# Patient Record
Sex: MALE | Race: WHITE | Employment: FULL TIME | URBAN - METROPOLITAN AREA
[De-identification: names, ages, dates, MRNs, and addresses within clinical notes are randomized per-mention and may not be internally consistent; named-entity substitution may affect disease eponyms.]

---

## 2017-02-06 ENCOUNTER — OFFICE VISIT (OUTPATIENT)
Dept: FAMILY MEDICINE CLINIC | Facility: CLINIC | Age: 42
End: 2017-02-06

## 2017-02-06 VITALS
OXYGEN SATURATION: 96 % | RESPIRATION RATE: 16 BRPM | WEIGHT: 221.4 LBS | SYSTOLIC BLOOD PRESSURE: 128 MMHG | HEART RATE: 94 BPM | BODY MASS INDEX: 31.7 KG/M2 | TEMPERATURE: 98.1 F | DIASTOLIC BLOOD PRESSURE: 88 MMHG | HEIGHT: 70 IN

## 2017-02-06 DIAGNOSIS — R68.89 FLU-LIKE SYMPTOMS: ICD-10-CM

## 2017-02-06 DIAGNOSIS — J11.1 INFLUENZA: Primary | ICD-10-CM

## 2017-02-06 LAB
FLUAV+FLUBV AG NOSE QL IA.RAPID: NEGATIVE POS/NEG
FLUAV+FLUBV AG NOSE QL IA.RAPID: NEGATIVE POS/NEG
VALID INTERNAL CONTROL?: YES

## 2017-02-06 RX ORDER — BENZONATATE 200 MG/1
200 CAPSULE ORAL
Qty: 21 CAP | Refills: 0 | Status: SHIPPED | OUTPATIENT
Start: 2017-02-06 | End: 2017-02-13

## 2017-02-06 RX ORDER — OSELTAMIVIR PHOSPHATE 75 MG/1
75 CAPSULE ORAL 2 TIMES DAILY
Qty: 10 CAP | Refills: 0 | Status: SHIPPED | OUTPATIENT
Start: 2017-02-06 | End: 2017-02-11

## 2017-02-06 NOTE — PROGRESS NOTES
SUBJECTIVE:  Kareen Zamora is a 42y.o. year old male   Chief Complaint   Patient presents with    Nasal Congestion     Ear Pain, chills, fever, bodyaches       History of Present Illness: For the last 3 days, he has had chills and body-ache, nasal congestion, cough and bilateral earache. Today, he had fever up to 102 and more severe bodyaches. Cough is making his chest sore. No dyspnea. No sore throat. He took a Tylenol #3 and his body ache/fever improved. He requests refill of Tylenol#3. Past Medical History   Diagnosis Date    Depression     Diabetes ([facility] [address]     Herniated lumbar intervertebral disc     Hypertension     Neurological disorder L3,4,5 torn Herniated disc     Past Surgical History   Procedure Laterality Date    Hx orthopaedic  trigger finger release        Current Outpatient Prescriptions   Medication Sig    atenolol (TENORMIN) 100 mg tablet Take 1 Tab by mouth two (2) times a day.  sertraline (ZOLOFT) 100 mg tablet Take 100 mg by mouth daily.  metFORMIN (GLUCOPHAGE) 1,000 mg tablet Take 500 mg by mouth three (3) times daily. No current facility-administered medications for this visit. No Known Allergies a      Review of Systems:   Ear/Nose/Throat: No throat/ sinus pain, lesions, unusual discharge, new speaking or hearing problems, nose bleed. Skin: no rash  Eyes: No eye discomfort, discharge, redness, new visual changes. Cardiovascular: No angina, palpitations, PND, orthopnea, lightheadedness, edema, claudication. Respiratory: No dyspnea, wheeze, pleurisy, hemoptysis, unusual sputum. Gastrointestinal: No nausea/ vomiting, bowel habit change, pain, YAJAIRA symptoms, melena, hematochezia, anorexia. Musculoskeletal: No other acute complaints  Psychiatric: No agitation, confusion/disorientation, suicidal or homicidal ideation. OBJECTIVE:  Physical Exam:   Constitutional: General Appearance:  well developed, obese, nontoxic, in no acute distress.    Visit Vitals    /88 (BP 1 Location: Left arm, BP Patient Position: Sitting)    Pulse 94    Temp 98.1 °F (36.7 °C) (Oral)    Resp 16    Ht 5' 10\" (1.778 m)    Wt 221 lb 6.4 oz (100.4 kg)    SpO2 96%    BMI 31.77 kg/m2     Otoscopic Examination: external auditory canals are clear; tympanic membranes are dull. Nasal Cavity: mildly swollen mucosa & turbinates. Maxillas are not tender w/o redness or heat. Throat: clear tonsils, oropharynx, posterior pharynx. Nodes[de-identified] Cervical: no significant adenopathy. Skin: no acute rash. Pulmonary: Respiratory effort: normal; no dyspnea, no retractions, no accessory muscle use. Auscultation: normal & symmetrical air exchange; no rales, no rhonchi, no wheeze; no rubs    Cardiovascular: Palpation: PMI not displaced or enlarged, no thrills or heaves. Auscultation: RRR; no murmur, rubs or gallops. Extremities: no edema, no active varicosity. GI[de-identified] Normal bowel sounds. No masses; no tenderness; no rebound/rigidity; no CVA tenderness. No hepatosplenomegaly. Psychiatric: Oriented to time, place and person. Musculoskeletal: NC/AT. Neck-supple. Rapid Flu- negative. ASSESSMENT:     1. Influenza    2. Flu-like symptoms    Clinically the patient has influenza. PLAN:     Pharmacologic Management: Medications reviewed with the patient. Tamiflu 75 bid x 5 days. Tessalon 200 tid prn cough. Tylenol prn fever and malaise. [facility] states \"Please note that this person has received controlled substances prescriptions written by 3 prescribers and had them filled at 4 pharmacies during the past 3 months. This equals or exceeds the threshold of 3 prescribers and 3 pharmacies and while there may be a valid reason for this, it also may be indicative of the practice of prescriber and pharmacy shopping. \". This is discussed with the patient and advised. Unable to provide with Tylenol #3 at this time.     Orders Placed This Encounter    XR CHEST PA LAT    CBC WITH AUTOMATED DIFF    AMB POC CHRIS INFLUENZA A/B TEST    oseltamivir (TAMIFLU) 75 mg capsule    benzonatate (TESSALON) 200 mg capsule      Other Instructions: Will get CBC and CXR since rapid influenza test were negative. Discussed DDx, follow-up & work-up. Discussed risk/benefit & side effect of treatment. Follow up for regular OV, prn sooner. PRN to ER. Rest and push fluids. Health risk from non adherence discussed. Patient voiced understanding. Follow-up Disposition:  Return if symptoms worsen or fail to improve.     Preston Stock MD

## 2017-02-16 ENCOUNTER — HOSPITAL ENCOUNTER (OUTPATIENT)
Dept: LAB | Age: 42
Discharge: HOME | End: 2017-02-16
Payer: MEDICAID

## 2017-02-16 ENCOUNTER — OFFICE VISIT (OUTPATIENT)
Dept: INTERNAL MEDICINE CLINIC | Age: 42
End: 2017-02-16

## 2017-02-16 ENCOUNTER — TELEPHONE (OUTPATIENT)
Dept: INTERNAL MEDICINE CLINIC | Age: 42
End: 2017-02-16

## 2017-02-16 VITALS
DIASTOLIC BLOOD PRESSURE: 94 MMHG | TEMPERATURE: 97.2 F | SYSTOLIC BLOOD PRESSURE: 127 MMHG | RESPIRATION RATE: 16 BRPM | HEART RATE: 106 BPM | WEIGHT: 223 LBS | BODY MASS INDEX: 31.92 KG/M2 | HEIGHT: 70 IN

## 2017-02-16 DIAGNOSIS — I10 ESSENTIAL HYPERTENSION: ICD-10-CM

## 2017-02-16 DIAGNOSIS — R52 BODY ACHES: ICD-10-CM

## 2017-02-16 DIAGNOSIS — J02.9 SORE THROAT: ICD-10-CM

## 2017-02-16 DIAGNOSIS — Z76.0 MEDICATION REFILL: ICD-10-CM

## 2017-02-16 DIAGNOSIS — J02.9 SORE THROAT: Primary | ICD-10-CM

## 2017-02-16 LAB
BASOPHILS # BLD AUTO: 0 K/UL (ref 0–0.06)
BASOPHILS # BLD: 1 % (ref 0–2)
DIFFERENTIAL METHOD BLD: NORMAL
EOSINOPHIL # BLD: 0.1 K/UL (ref 0–0.4)
EOSINOPHIL NFR BLD: 1 % (ref 0–5)
ERYTHROCYTE [DISTWIDTH] IN BLOOD BY AUTOMATED COUNT: 14 % (ref 11.6–14.5)
HCT VFR BLD AUTO: 42.5 % (ref 36–48)
HGB BLD-MCNC: 14.2 G/DL (ref 13–16)
LYMPHOCYTES # BLD AUTO: 26 % (ref 21–52)
LYMPHOCYTES # BLD: 1.5 K/UL (ref 0.9–3.6)
MCH RBC QN AUTO: 28.9 PG (ref 24–34)
MCHC RBC AUTO-ENTMCNC: 33.4 G/DL (ref 31–37)
MCV RBC AUTO: 86.6 FL (ref 74–97)
MONOCYTES # BLD: 0.4 K/UL (ref 0.05–1.2)
MONOCYTES NFR BLD AUTO: 6 % (ref 3–10)
NEUTS SEG # BLD: 3.9 K/UL (ref 1.8–8)
NEUTS SEG NFR BLD AUTO: 66 % (ref 40–73)
PLATELET # BLD AUTO: 262 K/UL (ref 135–420)
PMV BLD AUTO: 10.4 FL (ref 9.2–11.8)
RBC # BLD AUTO: 4.91 M/UL (ref 4.7–5.5)
S PYO AG THROAT QL: NEGATIVE
VALID INTERNAL CONTROL?: YES
WBC # BLD AUTO: 6 K/UL (ref 4.6–13.2)

## 2017-02-16 PROCEDURE — 36415 COLL VENOUS BLD VENIPUNCTURE: CPT | Performed by: NURSE PRACTITIONER

## 2017-02-16 PROCEDURE — 87077 CULTURE AEROBIC IDENTIFY: CPT | Performed by: NURSE PRACTITIONER

## 2017-02-16 PROCEDURE — 86308 HETEROPHILE ANTIBODY SCREEN: CPT | Performed by: NURSE PRACTITIONER

## 2017-02-16 PROCEDURE — 86664 EPSTEIN-BARR NUCLEAR ANTIGEN: CPT | Performed by: NURSE PRACTITIONER

## 2017-02-16 PROCEDURE — 85025 COMPLETE CBC W/AUTO DIFF WBC: CPT | Performed by: NURSE PRACTITIONER

## 2017-02-16 PROCEDURE — 87070 CULTURE OTHR SPECIMN AEROBIC: CPT | Performed by: NURSE PRACTITIONER

## 2017-02-16 RX ORDER — IBUPROFEN 800 MG/1
800 TABLET ORAL
Qty: 20 TAB | Refills: 0 | Status: SHIPPED | OUTPATIENT
Start: 2017-02-16 | End: 2017-03-03 | Stop reason: ALTCHOICE

## 2017-02-16 RX ORDER — ATENOLOL 100 MG/1
100 TABLET ORAL 2 TIMES DAILY
Qty: 60 TAB | Refills: 0 | Status: SHIPPED | OUTPATIENT
Start: 2017-02-16 | End: 2017-03-20 | Stop reason: SDUPTHER

## 2017-02-16 NOTE — MR AVS SNAPSHOT
Visit Information Date & Time Provider Department Dept. Phone Encounter #  
 2/16/2017 10:45 AM Radha Nath NP [facility] [phone] [id number] Upcoming Health Maintenance Date Due  
 EYE EXAM RETINAL OR DILATED Q1 12/28/1984 Pneumococcal 19-64 Highest Risk (1 of 3 - PCV13) 12/28/1993 DTaP/Tdap/Td series (1 - Tdap) 12/28/1995 HEMOGLOBIN A1C Q6M 11/4/2015 MICROALBUMIN Q1 7/7/2016 LIPID PANEL Q1 7/7/2016 FOOT EXAM Q1 4/27/2017 Allergies as of 2/16/2017  Review Complete On: 2/16/2017 By: Radha Nath NP No Known Allergies Current Immunizations  Reviewed on 9/2/2015 Name Date Influenza Vaccine (Quad) PF 9/2/2015 Influenza Vaccine Whole 12/1/2009 Not reviewed this visit You Were Diagnosed With   
  
 Codes Comments Sore throat    -  Primary ICD-10-CM: J02.9 ICD-9-CM: 704 Body aches     ICD-10-CM: R52 ICD-9-CM: 780.96 Essential hypertension     ICD-10-CM: I10 
ICD-9-CM: 401.9 Medication refill     ICD-10-CM: Z76.0 ICD-9-CM: V68.1 Vitals BP Pulse Temp Resp Height(growth percentile) Weight(growth percentile) (!) 127/94 (BP 1 Location: Left arm, BP Patient Position: Sitting) (!) 106 97.2 °F (36.2 °C) (Oral) 16 5' 10\" (1.778 m) 223 lb (101.2 kg) BMI Smoking Status 32 kg/m2 Never Smoker BMI and BSA Data Body Mass Index Body Surface Area 32 kg/m 2 2.24 m 2 Preferred Pharmacy Pharmacy Name Phone [facility] [address] [place] [address] [address] [phone] Your Updated Medication List  
  
   
This list is accurate as of: 2/16/17 11:43 AM.  Always use your most recent med list.  
  
  
  
  
 atenolol 100 mg tablet Commonly known as:  TENORMIN Take 1 Tab by mouth two (2) times a day. ibuprofen 800 mg tablet Commonly known as:  MOTRIN  
 Take 1 Tab by mouth every six (6) hours as needed for Pain.  
  
 magic mouthwash solution Take 10 mL by mouth two (2) times daily as needed for Stomatitis. Magic mouth wash  Maalox Lidocaine 2% viscous  Diphenhydramine oral solution   Pharmacy to mix equal portions of ingredients to a total volume as indicated in the dispense amount. metFORMIN 1,000 mg tablet Commonly known as:  GLUCOPHAGE Take 500 mg by mouth three (3) times daily. ZOLOFT 100 mg tablet Generic drug:  sertraline Take 100 mg by mouth daily. Prescriptions Printed Refills  
 magic mouthwash solution 0 Sig: Take 10 mL by mouth two (2) times daily as needed for Stomatitis. Magic mouth wash Maalox Lidocaine 2% viscous Diphenhydramine oral solution Pharmacy to mix equal portions of ingredients to a total volume as indicated in the dispense amount. Class: Print Route: Oral  
  
Prescriptions Sent to Pharmacy Refills  
 ibuprofen (MOTRIN) 800 mg tablet 0 Sig: Take 1 Tab by mouth every six (6) hours as needed for Pain. Class: Normal  
 Pharmacy: [address] Ph #: [phone] Route: Oral  
 atenolol (TENORMIN) 100 mg tablet 0 Sig: Take 1 Tab by mouth two (2) times a day. Class: Normal  
 Pharmacy: [address] Ph #: [phone] Route: Oral  
  
We Performed the Following AMB POC RAPID STREP A [31350 CPT(R)] To-Do List   
 02/16/2017 Lab:  CBC WITH AUTOMATED DIFF   
  
 02/16/2017 Lab:  MONO SCREEN W/ REFLX EBV Patient Instructions Sore Throat: Care Instructions Your Care Instructions Infection by bacteria or a virus causes most sore throats.  Cigarette smoke, dry air, air pollution, allergies, and yelling can also cause a sore throat. Sore throats can be painful and annoying. Fortunately, most sore throats go away on their own. If you have a bacterial infection, your doctor may prescribe antibiotics. Follow-up care is a key part of your treatment and safety. Be sure to make and go to all appointments, and call your doctor if you are having problems. It's also a good idea to know your test results and keep a list of the medicines you take. How can you care for yourself at home? · If your doctor prescribed antibiotics, take them as directed. Do not stop taking them just because you feel better. You need to take the full course of antibiotics. · Gargle with warm salt water once an hour to help reduce swelling and relieve discomfort. Use 1 teaspoon of salt mixed in 1 cup of warm water. · Take an over-the-counter pain medicine, such as acetaminophen (Tylenol), ibuprofen (Advil, Motrin), or naproxen (Aleve). Read and follow all instructions on the label. · Be careful when taking over-the-counter cold or flu medicines and Tylenol at the same time. Many of these medicines have acetaminophen, which is Tylenol. Read the labels to make sure that you are not taking more than the recommended dose. Too much acetaminophen (Tylenol) can be harmful. · Drink plenty of fluids. Fluids may help soothe an irritated throat. Hot fluids, such as tea or soup, may help decrease throat pain. · Use over-the-counter throat lozenges to soothe pain. Regular cough drops or hard candy may also help. These should not be given to young children because of the risk of choking. · Do not smoke or allow others to smoke around you. If you need help quitting, talk to your doctor about stop-smoking programs and medicines. These can increase your chances of quitting for good. · Use a vaporizer or humidifier to add moisture to your bedroom. Follow the directions for cleaning the machine. When should you call for help? Call your doctor now or seek immediate medical care if: 
· You have new or worse trouble swallowing. · Your sore throat gets much worse on one side. Watch closely for changes in your health, and be sure to contact your doctor if you do not get better as expected. Where can you learn more? Go to http://jacklyn-denice.info/. Enter [phone] [id number] [phone] in the search box to learn more about \"Sore Throat: Care Instructions. \" Current as of: July 29, 2016 Content Version: 11.1 © 5405-7231 reQwip. Care instructions adapted under license by [employer] (which disclaims liability or warranty for this information). If you have questions about a medical condition or this instruction, always ask your healthcare professional. [address] any warranty or liability for your use of this information. High Blood Pressure: Care Instructions Your Care Instructions If your blood pressure is usually above 140/90, you have high blood pressure, or hypertension. That means the top number is 140 or higher or the bottom number is 90 or higher, or both. Despite what a lot of people think, high blood pressure usually doesn't cause headaches or make you feel dizzy or lightheaded. It usually has no symptoms. But it does increase your risk for heart attack, stroke, and kidney or eye damage. The higher your blood pressure, the more your risk increases. Your doctor will give you a goal for your blood pressure. Your goal will be based on your health and your age. An example of a goal is to keep your blood pressure below 140/90. Lifestyle changes, such as eating healthy and being active, are always important to help lower blood pressure. You might also take medicine to reach your blood pressure goal. 
Follow-up care is a key part of your treatment and safety.  Be sure to make and go to all appointments, and call your doctor if you are having problems. It's also a good idea to know your test results and keep a list of the medicines you take. How can you care for yourself at home? Medical treatment · If you stop taking your medicine, your blood pressure will go back up. You may take one or more types of medicine to lower your blood pressure. Be safe with medicines. Take your medicine exactly as prescribed. Call your doctor if you think you are having a problem with your medicine. · Talk to your doctor before you start taking aspirin every day. Aspirin can help certain people lower their risk of a heart attack or stroke. But taking aspirin isn't right for everyone, because it can cause serious bleeding. · See your doctor regularly. You may need to see the doctor more often at first or until your blood pressure comes down. · If you are taking blood pressure medicine, talk to your doctor before you take decongestants or anti-inflammatory medicine, such as ibuprofen. Some of these medicines can raise blood pressure. · Learn how to check your blood pressure at home. Lifestyle changes · Stay at a healthy weight. This is especially important if you put on weight around the waist. Losing even 10 pounds can help you lower your blood pressure. · If your doctor recommends it, get more exercise. Walking is a good choice. Bit by bit, increase the amount you walk every day. Try for at least 30 minutes on most days of the week. You also may want to swim, bike, or do other activities. · Avoid or limit alcohol. Talk to your doctor about whether you can drink any alcohol. · Try to limit how much sodium you eat to less than 2,300 milligrams (mg) a day. Your doctor may ask you to try to eat less than 1,500 mg a day. · Eat plenty of fruits (such as bananas and oranges), vegetables, legumes, whole grains, and low-fat dairy products. · Lower the amount of saturated fat in your diet.  Saturated fat is found in animal products such as milk, cheese, and meat. Limiting these foods may help you lose weight and also lower your risk for heart disease. · Do not smoke. Smoking increases your risk for heart attack and stroke. If you need help quitting, talk to your doctor about stop-smoking programs and medicines. These can increase your chances of quitting for good. When should you call for help? Call 911 anytime you think you may need emergency care. This may mean having symptoms that suggest that your blood pressure is causing a serious heart or blood vessel problem. Your blood pressure may be over 180/110. For example, call 911 if: 
· You have symptoms of a heart attack. These may include: ¨ Chest pain or pressure, or a strange feeling in the chest. 
¨ Sweating. ¨ Shortness of breath. ¨ Nausea or vomiting. ¨ Pain, pressure, or a strange feeling in the back, neck, jaw, or upper belly or in one or both shoulders or arms. ¨ Lightheadedness or sudden weakness. ¨ A fast or irregular heartbeat. · You have symptoms of a stroke. These may include: 
¨ Sudden numbness, tingling, weakness, or loss of movement in your face, arm, or leg, especially on only one side of your body. ¨ Sudden vision changes. ¨ Sudden trouble speaking. ¨ Sudden confusion or trouble understanding simple statements. ¨ Sudden problems with walking or balance. ¨ A sudden, severe headache that is different from past headaches. · You have severe back or belly pain. Do not wait until your blood pressure comes down on its own. Get help right away. Call your doctor now or seek immediate care if: 
· Your blood pressure is much higher than normal (such as 180/110 or higher), but you don't have symptoms. · You think high blood pressure is causing symptoms, such as: ¨ Severe headache. ¨ Blurry vision. Watch closely for changes in your health, and be sure to contact your doctor if: · Your blood pressure measures 140/90 or higher at least 2 times. That means the top number is 140 or higher or the bottom number is 90 or higher, or both. · You think you may be having side effects from your blood pressure medicine. · Your blood pressure is usually normal, but it goes above normal at least 2 times. Where can you learn more? Go to http://jacklyn-denice.info/. Enter U239 in the search box to learn more about \"High Blood Pressure: Care Instructions. \" Current as of: August 8, 2016 Content Version: 11.1 © 7128-5376 TabUp. Care instructions adapted under license by [facility] (which disclaims liability or warranty for this information). If you have questions about a medical condition or this instruction, always ask your healthcare professional. [address] any warranty or liability for your use of this information. Introducing [facility]! Kiran Patino introduces Shop[employer] patient portal. Now you can access parts of your medical record, email your doctor's office, and request medication refills online. 1. In your internet browser, go to [email or web address] 2. Click on the First Time User? Click Here link in the Sign In box. You will see the New Member Sign Up page. 3. Enter your [employer] Access Code exactly as it appears below. You will not need to use this code after youve completed the sign-up process. If you do not sign up before the expiration date, you must request a new code. · [employer] Access Code: [email or web address] Expires: 5/17/2017 11:43 AM 
 
4. Enter the last four digits of your Social Security Number (xxxx) and Date of Birth (mm/dd/yyyy) as indicated and click Submit. You will be taken to the next sign-up page. 5. Create a [employer] ID. This will be your [employer] login ID and cannot be changed, so think of one that is secure and easy to remember. 6. Create a [employer] password. You can change your password at any time. 7. Enter your Password Reset Question and Answer. This can be used at a later time if you forget your password. 8. Enter your e-mail address. You will receive e-mail notification when new information is available in [address] 9. Click Sign Up. You can now view and download portions of your medical record. 10. Click the Download Summary menu link to download a portable copy of your medical information. If you have questions, please visit the Frequently Asked Questions section of the [employer] website. Remember, [employer] is NOT to be used for urgent needs. For medical emergencies, dial 911. Now available from your iPhone and Android! Please provide this summary of care documentation to your next provider. Your primary care clinician is listed as Vasquez Contreras. If you have any questions after today's visit, please call [phone].

## 2017-02-16 NOTE — PATIENT INSTRUCTIONS
Sore Throat: Care Instructions  Your Care Instructions    Infection by bacteria or a virus causes most sore throats. Cigarette smoke, dry air, air pollution, allergies, and yelling can also cause a sore throat. Sore throats can be painful and annoying. Fortunately, most sore throats go away on their own. If you have a bacterial infection, your doctor may prescribe antibiotics. Follow-up care is a key part of your treatment and safety. Be sure to make and go to all appointments, and call your doctor if you are having problems. It's also a good idea to know your test results and keep a list of the medicines you take. How can you care for yourself at home? · If your doctor prescribed antibiotics, take them as directed. Do not stop taking them just because you feel better. You need to take the full course of antibiotics. · Gargle with warm salt water once an hour to help reduce swelling and relieve discomfort. Use 1 teaspoon of salt mixed in 1 cup of warm water. · Take an over-the-counter pain medicine, such as acetaminophen (Tylenol), ibuprofen (Advil, Motrin), or naproxen (Aleve). Read and follow all instructions on the label. · Be careful when taking over-the-counter cold or flu medicines and Tylenol at the same time. Many of these medicines have acetaminophen, which is Tylenol. Read the labels to make sure that you are not taking more than the recommended dose. Too much acetaminophen (Tylenol) can be harmful. · Drink plenty of fluids. Fluids may help soothe an irritated throat. Hot fluids, such as tea or soup, may help decrease throat pain. · Use over-the-counter throat lozenges to soothe pain. Regular cough drops or hard candy may also help. These should not be given to young children because of the risk of choking. · Do not smoke or allow others to smoke around you. If you need help quitting, talk to your doctor about stop-smoking programs and medicines.  These can increase your chances of quitting for good. · Use a vaporizer or humidifier to add moisture to your bedroom. Follow the directions for cleaning the machine. When should you call for help? Call your doctor now or seek immediate medical care if:  · You have new or worse trouble swallowing. · Your sore throat gets much worse on one side. Watch closely for changes in your health, and be sure to contact your doctor if you do not get better as expected. Where can you learn more? Go to http://jacklyn-denice.info/. Enter 062 [id number] in the search box to learn more about \"Sore Throat: Care Instructions. \"  Current as of: July 29, 2016  Content Version: 11.1  © 7763-4870 [employer]. Care instructions adapted under license by [employer] (which disclaims liability or warranty for this information). If you have questions about a medical condition or this instruction, always ask your healthcare professional. [place] [address] any warranty or liability for your use of this information. High Blood Pressure: Care Instructions  Your Care Instructions  If your blood pressure is usually above 140/90, you have high blood pressure, or hypertension. That means the top number is 140 or higher or the bottom number is 90 or higher, or both. Despite what a lot of people think, high blood pressure usually doesn't cause headaches or make you feel dizzy or lightheaded. It usually has no symptoms. But it does increase your risk for heart attack, stroke, and kidney or eye damage. The higher your blood pressure, the more your risk increases. Your doctor will give you a goal for your blood pressure. Your goal will be based on your health and your age. An example of a goal is to keep your blood pressure below 140/90. Lifestyle changes, such as eating healthy and being active, are always important to help lower blood pressure.  You might also take medicine to reach your blood pressure goal.  Follow-up care is a key part of your treatment and safety. Be sure to make and go to all appointments, and call your doctor if you are having problems. It's also a good idea to know your test results and keep a list of the medicines you take. How can you care for yourself at home? Medical treatment  · If you stop taking your medicine, your blood pressure will go back up. You may take one or more types of medicine to lower your blood pressure. Be safe with medicines. Take your medicine exactly as prescribed. Call your doctor if you think you are having a problem with your medicine. · Talk to your doctor before you start taking aspirin every day. Aspirin can help certain people lower their risk of a heart attack or stroke. But taking aspirin isn't right for everyone, because it can cause serious bleeding. · See your doctor regularly. You may need to see the doctor more often at first or until your blood pressure comes down. · If you are taking blood pressure medicine, talk to your doctor before you take decongestants or anti-inflammatory medicine, such as ibuprofen. Some of these medicines can raise blood pressure. · Learn how to check your blood pressure at home. Lifestyle changes  · Stay at a healthy weight. This is especially important if you put on weight around the waist. Losing even 10 pounds can help you lower your blood pressure. · If your doctor recommends it, get more exercise. Walking is a good choice. Bit by bit, increase the amount you walk every day. Try for at least 30 minutes on most days of the week. You also may want to swim, bike, or do other activities. · Avoid or limit alcohol. Talk to your doctor about whether you can drink any alcohol. · Try to limit how much sodium you eat to less than 2,300 milligrams (mg) a day. Your doctor may ask you to try to eat less than 1,500 mg a day. · Eat plenty of fruits (such as bananas and oranges), vegetables, legumes, whole grains, and low-fat dairy products.   · Lower the amount of saturated fat in your diet. Saturated fat is found in animal products such as milk, cheese, and meat. Limiting these foods may help you lose weight and also lower your risk for heart disease. · Do not smoke. Smoking increases your risk for heart attack and stroke. If you need help quitting, talk to your doctor about stop-smoking programs and medicines. These can increase your chances of quitting for good. When should you call for help? Call 911 anytime you think you may need emergency care. This may mean having symptoms that suggest that your blood pressure is causing a serious heart or blood vessel problem. Your blood pressure may be over 180/110. For example, call 911 if:  · You have symptoms of a heart attack. These may include:  ¨ Chest pain or pressure, or a strange feeling in the chest.  ¨ Sweating. ¨ Shortness of breath. ¨ Nausea or vomiting. ¨ Pain, pressure, or a strange feeling in the back, neck, jaw, or upper belly or in one or both shoulders or arms. ¨ Lightheadedness or sudden weakness. ¨ A fast or irregular heartbeat. · You have symptoms of a stroke. These may include:  ¨ Sudden numbness, tingling, weakness, or loss of movement in your face, arm, or leg, especially on only one side of your body. ¨ Sudden vision changes. ¨ Sudden trouble speaking. ¨ Sudden confusion or trouble understanding simple statements. ¨ Sudden problems with walking or balance. ¨ A sudden, severe headache that is different from past headaches. · You have severe back or belly pain. Do not wait until your blood pressure comes down on its own. Get help right away. Call your doctor now or seek immediate care if:  · Your blood pressure is much higher than normal (such as 180/110 or higher), but you don't have symptoms. · You think high blood pressure is causing symptoms, such as:  ¨ Severe headache. ¨ Blurry vision.   Watch closely for changes in your health, and be sure to contact your doctor if:  · Your blood pressure measures 140/90 or higher at least 2 times. That means the top number is 140 or higher or the bottom number is 90 or higher, or both. · You think you may be having side effects from your blood pressure medicine. · Your blood pressure is usually normal, but it goes above normal at least 2 times. Where can you learn more? Go to http://jacklyn-denice.info/. Enter W815 in the search box to learn more about \"High Blood Pressure: Care Instructions. \"  Current as of: August 8, 2016  Content Version: 11.1  © 4640-9056 [employer]. Care instructions adapted under license by [employer] (which disclaims liability or warranty for this information). If you have questions about a medical condition or this instruction, always ask your healthcare professional. [address] any warranty or liability for your use of this information.

## 2017-02-16 NOTE — PROGRESS NOTES
HISTORY OF PRESENT ILLNESS  Deven Babcock is a 42 y.o. male. Patient presents with sore throat,chills ,body aches x 3 days,rates pain as 9/10. Patient was seen by his PCP 10 days ago and treated for clinical flu. Patient is also hypertensive and diabetic, would like to schedule an appointment to establish care within the practice, has been out of HTN medication x 5 days and request a temporal refill until he can either see his PCP or establish care with the practice. Patient denies any NVD, Patient denies any HA,blurry vision, unilateral weakness, slurred speech,facial drooling,drooping, ,numbness,tingling,dizziness,palpitations, malaise,faigue,confusion,SOB,CP. Sore Throat    The history is provided by the patient. This is a new problem. The current episode started more than 2 days ago. The problem has been gradually worsening. There has been no fever. Associated symptoms include swollen glands and cough. Pertinent negatives include no diarrhea, no vomiting, no drooling, no ear discharge, no ear pain, no headaches, no plugged ear sensation, no shortness of breath, no stridor, no trouble swallowing and no stiff neck. Review of Systems   HENT: Positive for sore throat. Negative for drooling, ear discharge, ear pain and trouble swallowing. Swollen bilateral tonsils   Eyes: Negative. Respiratory: Positive for cough. Negative for shortness of breath and stridor. Gastrointestinal: Negative for diarrhea and vomiting. Musculoskeletal: Negative. Skin: Negative. Neurological: Negative. Negative for headaches. Endo/Heme/Allergies: Negative. Psychiatric/Behavioral: Negative. Physical Exam   Constitutional: He is oriented to person, place, and time. He appears well-developed and well-nourished.    BP (!) 127/94 (BP 1 Location: Left arm, BP Patient Position: Sitting)  Pulse (!) 106  Temp 97.2 °F (36.2 °C) (Oral)   Resp 16  Ht 5' 10\" (1.778 m)  Wt 223 lb (101.2 kg)  BMI 32 kg/m2 HENT:   Head: Normocephalic and atraumatic. Mouth/Throat: Posterior oropharyngeal edema and posterior oropharyngeal erythema present. Eyes: Conjunctivae and EOM are normal. Pupils are equal, round, and reactive to light. Neck: Normal range of motion. Cardiovascular: Tachycardia present. Pulmonary/Chest: Effort normal and breath sounds normal.   Abdominal: Soft. Bowel sounds are normal.   Musculoskeletal: Normal range of motion. Lymphadenopathy:        Head (right side): Submental adenopathy present. Head (left side): Submental adenopathy present. Neurological: He is alert and oriented to person, place, and time. GCS eye subscore is 4. GCS verbal subscore is 5. GCS motor subscore is 6. Skin: Skin is warm and dry. Psychiatric: He has a normal mood and affect. His speech is normal and behavior is normal. Judgment and thought content normal. Cognition and memory are normal.   Vitals reviewed. ASSESSMENT and PLAN    ICD-10-CM ICD-9-CM    1. Sore throat J02.9 462 AMB POC RAPID STREP A      CULTURE, STREP THROAT      MONO SCREEN W/ REFLX EBV      CBC WITH AUTOMATED DIFF      magic mouthwash solution      ibuprofen (MOTRIN) 800 mg tablet   2. Body aches R52 780.96 ibuprofen (MOTRIN) 800 mg tablet   3. Essential hypertension I10 401.9 atenolol (TENORMIN) 100 mg tablet   4. Medication refill Z76.0 V68.1 atenolol (TENORMIN) 100 mg tablet     Encounter Diagnoses   Name Primary?     Sore throat Yes    Body aches     Essential hypertension     Medication refill      Orders Placed This Encounter    CULTURE, STREP THROAT    MONO SCREEN W/ REFLX EBV    CBC WITH AUTOMATED DIFF    AMB POC RAPID STREP A    magic mouthwash solution    ibuprofen (MOTRIN) 800 mg tablet    atenolol (TENORMIN) 100 mg tablet     Orders Placed This Encounter    CULTURE, STREP THROAT     Standing Status:   Future     Standing Expiration Date:   2/17/2018    MONO SCREEN W/ REFLX EBV     Standing Status:   Future Standing Expiration Date:   2/17/2018    CBC WITH AUTOMATED DIFF     Standing Status:   Future     Standing Expiration Date:   2/17/2018    AMB POC RAPID STREP A    magic mouthwash solution     Sig: Take 10 mL by mouth two (2) times daily as needed for Stomatitis. Magic mouth wash   Maalox  Lidocaine 2% viscous   Diphenhydramine oral solution     Pharmacy to mix equal portions of ingredients to a total volume as indicated in the dispense amount. Dispense:  120 mL     Refill:  0    ibuprofen (MOTRIN) 800 mg tablet     Sig: Take 1 Tab by mouth every six (6) hours as needed for Pain. Dispense:  20 Tab     Refill:  0    atenolol (TENORMIN) 100 mg tablet     Sig: Take 1 Tab by mouth two (2) times a day. Dispense:  60 Tab     Refill:  0     Orders Placed This Encounter    CULTURE, STREP THROAT    MONO SCREEN W/ REFLX EBV    CBC WITH AUTOMATED DIFF    AMB POC RAPID STREP A    magic mouthwash solution    ibuprofen (MOTRIN) 800 mg tablet    atenolol (TENORMIN) 100 mg tablet     Disamuel Zenakirit was seen today for sore throat. Diagnoses and all orders for this visit:    Sore throat  -     AMB POC RAPID STREP A  -     CULTURE, STREP THROAT; Future  -     MONO SCREEN W/ REFLX EBV; Future  -     CBC WITH AUTOMATED DIFF; Future  -     magic mouthwash solution; Take 10 mL by mouth two (2) times daily as needed for Stomatitis. Magic mouth wash   Maalox  Lidocaine 2% viscous   Diphenhydramine oral solution     Pharmacy to mix equal portions of ingredients to a total volume as indicated in the dispense amount. -     ibuprofen (MOTRIN) 800 mg tablet; Take 1 Tab by mouth every six (6) hours as needed for Pain. Body aches  -     ibuprofen (MOTRIN) 800 mg tablet; Take 1 Tab by mouth every six (6) hours as needed for Pain. Essential hypertension  -     atenolol (TENORMIN) 100 mg tablet; Take 1 Tab by mouth two (2) times a day. Medication refill  -     atenolol (TENORMIN) 100 mg tablet;  Take 1 Tab by mouth two (2) times a day. Follow-up Disposition:  Return if symptoms worsen or fail to improve.   current treatment plan is effective, no change in therapy  the following changes in treatment are made: F/u with PCP for DM and HTN

## 2017-02-16 NOTE — PROGRESS NOTES
Patient presents with sore throat,chills x 3 days,body aches,rates pain as 9/10. Patient was seen by his PCP 10 days ago and treated for clinical flu. Patient is also hypertensive and diabetic, would like to schedule an appointment to establish care within the practice, has been out of HTN medication x 5 days and request a temporal refill until he can either see his PCP or establish care with the practice. Patient denies any NVD, Patient denies any HA,blurry vision, unilateral weakness, slurred speech,facial drooling,drooping, ,numbness,tingling,dizziness,palpitations, malaise,faigue,confusion,SOB,CP.

## 2017-02-16 NOTE — PROGRESS NOTES
Pt presented today with worsening sore throat x 3 days . Has pt had any falls since last visit? no.  Pt preferred language for health care discussion is english. Advanced Directive? no    Is someone accompanying this pt? no    Is the patient using any DME equipment during OV? n      1. Have you been to the ER, urgent care clinic since your last visit? [facility] weeks ago for flu   Hospitalized since your last visit? No    2. Have you seen or consulted any other health care providers outside of the [address] [place] [address] since your last visit? Include any pap smears or colon screening. No      Patient  has a reminder for a \"due or due soon\" health maintenance. I have asked that she contact his primary care provider for follow-up on this health maintenance.

## 2017-02-17 ENCOUNTER — OFFICE VISIT (OUTPATIENT)
Dept: INTERNAL MEDICINE CLINIC | Age: 42
End: 2017-02-17

## 2017-02-17 ENCOUNTER — TELEPHONE (OUTPATIENT)
Dept: INTERNAL MEDICINE CLINIC | Age: 42
End: 2017-02-17

## 2017-02-17 VITALS
TEMPERATURE: 96.6 F | HEART RATE: 76 BPM | RESPIRATION RATE: 16 BRPM | HEIGHT: 70 IN | WEIGHT: 223.6 LBS | DIASTOLIC BLOOD PRESSURE: 92 MMHG | SYSTOLIC BLOOD PRESSURE: 145 MMHG | BODY MASS INDEX: 32.01 KG/M2 | OXYGEN SATURATION: 94 %

## 2017-02-17 DIAGNOSIS — R68.89 FLU-LIKE SYMPTOMS: ICD-10-CM

## 2017-02-17 DIAGNOSIS — R05.9 COUGH: Primary | ICD-10-CM

## 2017-02-17 DIAGNOSIS — I10 ESSENTIAL HYPERTENSION: ICD-10-CM

## 2017-02-17 LAB
BACTERIA SPEC CULT: ABNORMAL
EBV EA IGG SER-ACNC: <9 U/ML (ref 0–8.9)
EBV NA IGG SER-ACNC: 128 U/ML (ref 0–17.9)
EBV VCA IGG SER-ACNC: 79.5 U/ML (ref 0–17.9)
EBV VCA IGM SER-ACNC: <36 U/ML (ref 0–35.9)
HETEROPH AB SER QL: NEGATIVE
INTERPRETATION, 169995: ABNORMAL
QUICKVUE INFLUENZA TEST: NEGATIVE
SERVICE CMNT-IMP: ABNORMAL
VALID INTERNAL CONTROL?: YES

## 2017-02-17 RX ORDER — HYDROCODONE POLISTIREX AND CHLORPHENIRAMINE POLISTIREX 10; 8 MG/5ML; MG/5ML
5 SUSPENSION, EXTENDED RELEASE ORAL
Qty: 70 ML | Refills: 0 | Status: SHIPPED | OUTPATIENT
Start: 2017-02-17 | End: 2017-02-21

## 2017-02-17 NOTE — TELEPHONE ENCOUNTER
Spoke with the patient he was c/o worsening cough and he is requesting a cough medication to make him sleep. I informed the patient to come in to be seen today to worsening cough.

## 2017-02-17 NOTE — PATIENT INSTRUCTIONS
I recommend resuming your blood pressure medication and returning in a month or so to get it rechecked. Continue to monitor at home. Cough: Care Instructions  Your Care Instructions  A cough is your body's response to something that bothers your throat or airways. Many things can cause a cough. You might cough because of a cold or the flu, bronchitis, or asthma. Smoking, postnasal drip, allergies, and stomach acid that backs up into your throat also can cause coughs. A cough is a symptom, not a disease. Most coughs stop when the cause, such as a cold, goes away. You can take a few steps at home to cough less and feel better. Follow-up care is a key part of your treatment and safety. Be sure to make and go to all appointments, and call your doctor if you are having problems. It's also a good idea to know your test results and keep a list of the medicines you take. How can you care for yourself at home? · Drink lots of water and other fluids. This helps thin the mucus and soothes a dry or sore throat. Honey or lemon juice in hot water or tea may ease a dry cough. · Take cough medicine as directed by your doctor. · Prop up your head on pillows to help you breathe and ease a dry cough. · Try cough drops to soothe a dry or sore throat. Cough drops don't stop a cough. Medicine-flavored cough drops are no better than candy-flavored drops or hard candy. · Do not smoke. Avoid secondhand smoke. If you need help quitting, talk to your doctor about stop-smoking programs and medicines. These can increase your chances of quitting for good. When should you call for help? Call 911 anytime you think you may need emergency care. For example, call if:  · You have severe trouble breathing. Call your doctor now or seek immediate medical care if:  · You cough up blood. · You have new or worse trouble breathing. · You have a new or higher fever. · You have a new rash.   Watch closely for changes in your health, and be sure to contact your doctor if:  · You cough more deeply or more often, especially if you notice more mucus or a change in the color of your mucus. · You have new symptoms, such as a sore throat, an earache, or sinus pain. · You do not get better as expected. Where can you learn more? Go to http://jacklyn-denice.info/. Enter D279 in the search box to learn more about \"Cough: Care Instructions. \"  Current as of: May 27, 2016  Content Version: 11.1  © 2982-7704 [employer]. Care instructions adapted under license by [employer] (which disclaims liability or warranty for this information). If you have questions about a medical condition or this instruction, always ask your healthcare professional. [address] any warranty or liability for your use of this information.

## 2017-02-17 NOTE — TELEPHONE ENCOUNTER
Patient called back again this morning. He said he only got 2 hours of sleep last night, up coughing and just feeling terrible.

## 2017-02-17 NOTE — PROGRESS NOTES
Pt presented today with worsening cough, SOB and unable to sleep x 1 day . Has pt had any falls since last visit? no.  Pt preferred language for health care discussion is english. Advanced Directive? no    Is someone accompanying this pt? no    Is the patient using any DME equipment during OV? no      1. Have you been to the ER, urgent care clinic since your last visit? Hospitalized since your last visit? No    2. Have you seen or consulted any other health care providers outside of the [address] [place] [address] since your last visit? Include any pap smears or colon screening. No      Patient  has a reminder for a \"due or due soon\" health maintenance. I have asked that he contact his primary care provider for follow-up on this health maintenance.

## 2017-02-17 NOTE — PROGRESS NOTES
HISTORY OF PRESENT ILLNESS  Mackenzie Hawthorne is a 42 y.o. male. HPI Comments: Pt presents with ongoing cold/flu symptoms. He was treated clinically for flu on 2/7, and tested for strep and mono yesterday. The rapid strep test was negative. He complains of fever (101 last night), body aches, nasal congestion, sore throat, and cough. The cough is slightly productive, and has been keeping him up at night. States he took all the tamiflu when it was prescribed, but has been feeling worse over the past couple of days. His blood pressure is elevated today. He admits to stopping his blood pressure medication because his pressures have been 120s/80s at home. Cough   Associated symptoms include headaches (slight) and shortness of breath (greenish/yellow). Pertinent negatives include no chest pain. Sleep Problem   Associated symptoms include headaches (slight) and shortness of breath (greenish/yellow). Pertinent negatives include no chest pain. Shortness of Breath   Associated symptoms include a fever, headaches (slight), sore throat, ear pain (right ear) and cough. Pertinent negatives include no chest pain and no vomiting. Review of Systems   Constitutional: Positive for chills, diaphoresis (night sweats) and fever. HENT: Positive for congestion, ear pain (right ear) and sore throat. Eyes: Positive for blurred vision (\"always; I need glases\"). Negative for double vision and pain. Respiratory: Positive for cough and shortness of breath (greenish/yellow). Cardiovascular: Negative for chest pain and palpitations. Gastrointestinal: Positive for diarrhea (2-3 times last night). Negative for blood in stool, nausea and vomiting. Musculoskeletal: Positive for myalgias (generalized body). Neurological: Positive for headaches (slight). Negative for dizziness. Physical Exam   Constitutional: He is oriented to person, place, and time.  Vital signs are normal. He appears well-developed and well-nourished. He is cooperative. Non-toxic appearance. He appears ill. No distress. HENT:   Head: Normocephalic and atraumatic. Right Ear: Tympanic membrane, external ear and ear canal normal.   Left Ear: Tympanic membrane, external ear and ear canal normal.   Nose: Nose normal. No mucosal edema or rhinorrhea. Mouth/Throat: Uvula is midline, oropharynx is clear and moist and mucous membranes are normal. No oropharyngeal exudate. Eyes: Conjunctivae are normal.   Neck: Neck supple. Cardiovascular: Normal rate, regular rhythm and normal heart sounds. Exam reveals no gallop and no friction rub. No murmur heard. Pulses:       Radial pulses are 2+ on the right side, and 2+ on the left side. Pulmonary/Chest: Effort normal and breath sounds normal. No respiratory distress. He has no decreased breath sounds. He has no wheezes. He has no rhonchi. He has no rales. Lymphadenopathy:        Head (left side): Tonsillar adenopathy present. He has cervical adenopathy. Right cervical: No superficial cervical adenopathy present. Left cervical: Superficial cervical adenopathy present. Neurological: He is alert and oriented to person, place, and time. Skin: Skin is warm and dry. No rash noted. He is not diaphoretic. Psychiatric: He has a normal mood and affect. His speech is normal and behavior is normal. Thought content normal.   Nursing note and vitals reviewed. Results for orders placed or performed in visit on 02/17/17   AMB POC RAPID INFLUENZA TEST   Result Value Ref Range    VALID INTERNAL CONTROL POC Yes     QuickVue Influenza test Negative Negative       ASSESSMENT and PLAN  Unspecified viral illness. Reviewed . Shows multiple prescriptions for short-term narcotics and benzos. ICD-10-CM ICD-9-CM    1. Cough R05 786.2 chlorpheniramine-HYDROcodone (TUSSIONEX) 10-8 mg/5 mL suspension   2. Flu-like symptoms R68.89 780.99 AMB POC RAPID INFLUENZA TEST   3.  Essential hypertension I10 401.9      Pt encouraged to return in about a month for BP recheck (resume meds) and HM items. Provided after-visit information on  Cough  Reviewed reasons to return or seek emergency care. Pt verbalized understanding and agreement with the plan of care.     Niki Cheadle, PA-C

## 2017-02-17 NOTE — MR AVS SNAPSHOT
Visit Information Date & Time Provider Department Dept. Phone Encounter #  
 2/17/2017 10:00 AM [address] [phone] [id number] Follow-up Instructions Return in about 4 weeks (around 3/17/2017). Upcoming Health Maintenance Date Due  
 EYE EXAM RETINAL OR DILATED Q1 12/28/1984 Pneumococcal 19-64 Highest Risk (1 of 3 - PCV13) 12/28/1993 DTaP/Tdap/Td series (1 - Tdap) 12/28/1995 HEMOGLOBIN A1C Q6M 11/4/2015 MICROALBUMIN Q1 7/7/2016 LIPID PANEL Q1 7/7/2016 FOOT EXAM Q1 4/27/2017 Allergies as of 2/17/2017  Review Complete On: 2/17/2017 By: Sharee Barraza No Known Allergies Current Immunizations  Reviewed on 9/2/2015 Name Date Influenza Vaccine (Quad) PF 9/2/2015 Influenza Vaccine Whole 12/1/2009 Not reviewed this visit You Were Diagnosed With   
  
 Codes Comments Cough    -  Primary ICD-10-CM: U17 ICD-9-CM: 786.2 Flu-like symptoms     ICD-10-CM: R68.89 ICD-9-CM: 780.99 Vitals BP Pulse Temp Resp Height(growth percentile) Weight(growth percentile) (!) 145/92 (BP 1 Location: Left arm, BP Patient Position: Sitting) 76 96.6 °F (35.9 °C) (Oral) 16 5' 10\" (1.778 m) 223 lb 9.6 oz (101.4 kg) SpO2 BMI Smoking Status 94% 32.08 kg/m2 Never Smoker Vitals History BMI and BSA Data Body Mass Index Body Surface Area 32.08 kg/m 2 2.24 m 2 Preferred Pharmacy Pharmacy Name Phone [facility] [address] [place] [address] [address] [phone] Your Updated Medication List  
  
   
This list is accurate as of: 2/17/17 11:09 AM.  Always use your most recent med list.  
  
  
  
  
 atenolol 100 mg tablet Commonly known as:  TENORMIN Take 1 Tab by mouth two (2) times a day. chlorpheniramine-HYDROcodone 10-8 mg/5 mL suspension Commonly known as:  Lis Greenberg Take 5 mL by mouth every twelve (12) hours as needed for Cough for up to 7 days. Max Daily Amount: 10 mL. ibuprofen 800 mg tablet Commonly known as:  MOTRIN Take 1 Tab by mouth every six (6) hours as needed for Pain.  
  
 magic mouthwash solution Take 10 mL by mouth two (2) times daily as needed for Stomatitis. Magic mouth wash  Maalox Lidocaine 2% viscous  Diphenhydramine oral solution   Pharmacy to mix equal portions of ingredients to a total volume as indicated in the dispense amount. metFORMIN 1,000 mg tablet Commonly known as:  GLUCOPHAGE Take 500 mg by mouth three (3) times daily. ZOLOFT 100 mg tablet Generic drug:  sertraline Take 100 mg by mouth daily. Prescriptions Printed Refills  
 chlorpheniramine-HYDROcodone (TUSSIONEX) 10-8 mg/5 mL suspension 0 Sig: Take 5 mL by mouth every twelve (12) hours as needed for Cough for up to 7 days. Max Daily Amount: 10 mL. Class: Print Route: Oral  
  
We Performed the Following AMB POC RAPID INFLUENZA TEST [94970 CPT(R)] Follow-up Instructions Return in about 4 weeks (around 3/17/2017). Patient Instructions I recommend resuming your blood pressure medication and returning in a month or so to get it rechecked. Continue to monitor at home. Cough: Care Instructions Your Care Instructions A cough is your body's response to something that bothers your throat or airways. Many things can cause a cough. You might cough because of a cold or the flu, bronchitis, or asthma. Smoking, postnasal drip, allergies, and stomach acid that backs up into your throat also can cause coughs. A cough is a symptom, not a disease. Most coughs stop when the cause, such as a cold, goes away. You can take a few steps at home to cough less and feel better. Follow-up care is a key part of your treatment and safety.  Be sure to make and go to all appointments, and call your doctor if you are having problems. It's also a good idea to know your test results and keep a list of the medicines you take. How can you care for yourself at home? · Drink lots of water and other fluids. This helps thin the mucus and soothes a dry or sore throat. Honey or lemon juice in hot water or tea may ease a dry cough. · Take cough medicine as directed by your doctor. · Prop up your head on pillows to help you breathe and ease a dry cough. · Try cough drops to soothe a dry or sore throat. Cough drops don't stop a cough. Medicine-flavored cough drops are no better than candy-flavored drops or hard candy. · Do not smoke. Avoid secondhand smoke. If you need help quitting, talk to your doctor about stop-smoking programs and medicines. These can increase your chances of quitting for good. When should you call for help? Call 911 anytime you think you may need emergency care. For example, call if: 
· You have severe trouble breathing. Call your doctor now or seek immediate medical care if: 
· You cough up blood. · You have new or worse trouble breathing. · You have a new or higher fever. · You have a new rash. Watch closely for changes in your health, and be sure to contact your doctor if: 
· You cough more deeply or more often, especially if you notice more mucus or a change in the color of your mucus. · You have new symptoms, such as a sore throat, an earache, or sinus pain. · You do not get better as expected. Where can you learn more? Go to http://jacklyn-denice.info/. Enter D279 in the search box to learn more about \"Cough: Care Instructions. \" Current as of: May 27, 2016 Content Version: 11.1 © 9448-6208 [facility]. Care instructions adapted under license by [employer] (which disclaims liability or warranty for this information).  If you have questions about a medical condition or this instruction, always ask your healthcare professional. [employer] disclaims any warranty or liability for your use of this information. Introducing [facility]! [address] introduces [employer] patient portal. Now you can access parts of your medical record, email your doctor's office, and request medication refills online. 1. In your internet browser, go to [email or web address] 2. Click on the First Time User? Click Here link in the Sign In box. You will see the New Member Sign Up page. 3. Enter your [employer] Access Code exactly as it appears below. You will not need to use this code after youve completed the sign-up process. If you do not sign up before the expiration date, you must request a new code. · [employer] Access Code: [email or web address] Expires: 5/17/2017 11:43 AM 
 
4. Enter the last four digits of your Social Security Number (xxxx) and Date of Birth (mm/dd/yyyy) as indicated and click Submit. You will be taken to the next sign-up page. 5. Create a Ener1 ID. This will be your [employer]1 login ID and cannot be changed, so think of one that is secure and easy to remember. 6. Create a E[employer] password. You can change your password at any time. 7. Enter your Password Reset Question and Answer. This can be used at a later time if you forget your password. 8. Enter your e-mail address. You will receive e-mail notification when new information is available in [address] 9. Click Sign Up. You can now view and download portions of your medical record. 10. Click the Download Summary menu link to download a portable copy of your medical information. If you have questions, please visit the Frequently Asked Questions section of the [employer] website. Remember, [employer] is NOT to be used for urgent needs. For medical emergencies, dial 911. Now available from your iPhone and Android! Please provide this summary of care documentation to your next provider. Your primary care clinician is listed as Fletcher Lazo. If you have any questions after today's visit, please call [phone].

## 2017-02-18 ENCOUNTER — TELEPHONE (OUTPATIENT)
Dept: FAMILY MEDICINE CLINIC | Age: 42
End: 2017-02-18

## 2017-02-18 DIAGNOSIS — J02.0 STREPTOCOCCAL PHARYNGITIS: Primary | ICD-10-CM

## 2017-02-18 RX ORDER — AMOXICILLIN 500 MG/1
500 CAPSULE ORAL 2 TIMES DAILY
Qty: 20 CAP | Refills: 0 | Status: SHIPPED | OUTPATIENT
Start: 2017-02-18 | End: 2017-03-03

## 2017-02-18 NOTE — TELEPHONE ENCOUNTER
Spoke with patient's wife, confirmed name and . Advised of patient's lab results, per Prudence Sensor NP. Patient verbalized understanding.      Be advised

## 2017-02-18 NOTE — TELEPHONE ENCOUNTER
----- Message from Slywia Gallo NP sent at 2/18/2017  9:29 AM EST -----  Please inform patient he throat culture was positive for strep but his mono screen was negative with a positve IGG meaning he is either convalescent or has bee exposed to the virus in the past. Abx has been sent to the pharmacy on file for the strep.

## 2017-02-20 ENCOUNTER — HOSPITAL ENCOUNTER (EMERGENCY)
Age: 42
Discharge: HOME | End: 2017-02-21
Attending: EMERGENCY MEDICINE
Payer: SUBSIDIZED

## 2017-02-20 ENCOUNTER — HOSPITAL ENCOUNTER (EMERGENCY)
Age: 42
Discharge: HOME | End: 2017-02-20
Attending: EMERGENCY MEDICINE
Payer: SUBSIDIZED

## 2017-02-20 VITALS
BODY MASS INDEX: 31.5 KG/M2 | HEIGHT: 70 IN | OXYGEN SATURATION: 98 % | DIASTOLIC BLOOD PRESSURE: 94 MMHG | SYSTOLIC BLOOD PRESSURE: 141 MMHG | WEIGHT: 220 LBS | RESPIRATION RATE: 14 BRPM | TEMPERATURE: 97.9 F | HEART RATE: 67 BPM

## 2017-02-20 VITALS
RESPIRATION RATE: 18 BRPM | HEART RATE: 60 BPM | SYSTOLIC BLOOD PRESSURE: 125 MMHG | OXYGEN SATURATION: 95 % | DIASTOLIC BLOOD PRESSURE: 77 MMHG | TEMPERATURE: 97.8 F

## 2017-02-20 DIAGNOSIS — J02.0 ACUTE STREPTOCOCCAL PHARYNGITIS: Primary | ICD-10-CM

## 2017-02-20 DIAGNOSIS — B34.9 VIRAL ILLNESS: ICD-10-CM

## 2017-02-20 DIAGNOSIS — R45.851 SUICIDAL IDEATION: Primary | ICD-10-CM

## 2017-02-20 LAB
ALBUMIN SERPL BCP-MCNC: 4 G/DL (ref 3.4–5)
ALBUMIN/GLOB SERPL: 1 {RATIO} (ref 0.8–1.7)
ALP SERPL-CCNC: 60 U/L (ref 45–117)
ALT SERPL-CCNC: 63 U/L (ref 16–61)
AMPHET UR QL SCN: NEGATIVE
ANION GAP BLD CALC-SCNC: 9 MMOL/L (ref 3–18)
APAP SERPL-MCNC: <2 UG/ML (ref 10–30)
APPEARANCE UR: CLEAR
AST SERPL W P-5'-P-CCNC: 52 U/L (ref 15–37)
BARBITURATES UR QL SCN: NEGATIVE
BASOPHILS # BLD AUTO: 0 K/UL (ref 0–0.06)
BASOPHILS # BLD: 1 % (ref 0–2)
BENZODIAZ UR QL: POSITIVE
BILIRUB SERPL-MCNC: 0.2 MG/DL (ref 0.2–1)
BILIRUB UR QL: NEGATIVE
BUN SERPL-MCNC: 12 MG/DL (ref 7–18)
BUN/CREAT SERPL: 11 (ref 12–20)
CALCIUM SERPL-MCNC: 9.1 MG/DL (ref 8.5–10.1)
CANNABINOIDS UR QL SCN: NEGATIVE
CHLORIDE SERPL-SCNC: 95 MMOL/L (ref 100–108)
CO2 SERPL-SCNC: 30 MMOL/L (ref 21–32)
COCAINE UR QL SCN: NEGATIVE
COLOR UR: YELLOW
CREAT SERPL-MCNC: 1.05 MG/DL (ref 0.6–1.3)
DIFFERENTIAL METHOD BLD: ABNORMAL
EOSINOPHIL # BLD: 0.1 K/UL (ref 0–0.4)
EOSINOPHIL NFR BLD: 1 % (ref 0–5)
ERYTHROCYTE [DISTWIDTH] IN BLOOD BY AUTOMATED COUNT: 13.7 % (ref 11.6–14.5)
ETHANOL SERPL-MCNC: <3 MG/DL (ref 0–3)
GLOBULIN SER CALC-MCNC: 3.9 G/DL (ref 2–4)
GLUCOSE SERPL-MCNC: 202 MG/DL (ref 74–99)
GLUCOSE UR STRIP.AUTO-MCNC: NEGATIVE MG/DL
HCT VFR BLD AUTO: 38.8 % (ref 36–48)
HDSCOM,HDSCOM: ABNORMAL
HGB BLD-MCNC: 13.5 G/DL (ref 13–16)
HGB UR QL STRIP: NEGATIVE
KETONES UR QL STRIP.AUTO: NEGATIVE MG/DL
LEUKOCYTE ESTERASE UR QL STRIP.AUTO: NEGATIVE
LYMPHOCYTES # BLD AUTO: 23 % (ref 21–52)
LYMPHOCYTES # BLD: 1.4 K/UL (ref 0.9–3.6)
MCH RBC QN AUTO: 29 PG (ref 24–34)
MCHC RBC AUTO-ENTMCNC: 34.8 G/DL (ref 31–37)
MCV RBC AUTO: 83.4 FL (ref 74–97)
METHADONE UR QL: NEGATIVE
MONOCYTES # BLD: 0.3 K/UL (ref 0.05–1.2)
MONOCYTES NFR BLD AUTO: 5 % (ref 3–10)
NEUTS SEG # BLD: 4.6 K/UL (ref 1.8–8)
NEUTS SEG NFR BLD AUTO: 70 % (ref 40–73)
NITRITE UR QL STRIP.AUTO: NEGATIVE
OPIATES UR QL: POSITIVE
PCP UR QL: NEGATIVE
PH UR STRIP: 5 [PH] (ref 5–8)
PLATELET # BLD AUTO: 244 K/UL (ref 135–420)
PMV BLD AUTO: 9.7 FL (ref 9.2–11.8)
POTASSIUM SERPL-SCNC: 3.2 MMOL/L (ref 3.5–5.5)
PROT SERPL-MCNC: 7.9 G/DL (ref 6.4–8.2)
PROT UR STRIP-MCNC: NEGATIVE MG/DL
RBC # BLD AUTO: 4.65 M/UL (ref 4.7–5.5)
SALICYLATES SERPL-MCNC: <2.8 MG/DL (ref 2.8–20)
SODIUM SERPL-SCNC: 134 MMOL/L (ref 136–145)
SP GR UR REFRACTOMETRY: 1.02 (ref 1–1.03)
UROBILINOGEN UR QL STRIP.AUTO: 0.2 EU/DL (ref 0.2–1)
WBC # BLD AUTO: 6.4 K/UL (ref 4.6–13.2)

## 2017-02-20 PROCEDURE — 85025 COMPLETE CBC W/AUTO DIFF WBC: CPT | Performed by: PHYSICIAN ASSISTANT

## 2017-02-20 PROCEDURE — 80053 COMPREHEN METABOLIC PANEL: CPT | Performed by: PHYSICIAN ASSISTANT

## 2017-02-20 PROCEDURE — 99285 EMERGENCY DEPT VISIT HI MDM: CPT

## 2017-02-20 PROCEDURE — 81003 URINALYSIS AUTO W/O SCOPE: CPT | Performed by: PHYSICIAN ASSISTANT

## 2017-02-20 PROCEDURE — 99281 EMR DPT VST MAYX REQ PHY/QHP: CPT

## 2017-02-20 PROCEDURE — 74011250636 HC RX REV CODE- 250/636: Performed by: PHYSICIAN ASSISTANT

## 2017-02-20 PROCEDURE — 74011250637 HC RX REV CODE- 250/637: Performed by: PHYSICIAN ASSISTANT

## 2017-02-20 PROCEDURE — 96372 THER/PROPH/DIAG INJ SC/IM: CPT

## 2017-02-20 PROCEDURE — 80307 DRUG TEST PRSMV CHEM ANLYZR: CPT | Performed by: PHYSICIAN ASSISTANT

## 2017-02-20 RX ORDER — DIPHENHYDRAMINE HCL 50 MG
50 CAPSULE ORAL
Status: DISCONTINUED | OUTPATIENT
Start: 2017-02-20 | End: 2017-02-21 | Stop reason: HOSPADM

## 2017-02-20 RX ORDER — POTASSIUM CHLORIDE 20 MEQ/1
40 TABLET, EXTENDED RELEASE ORAL
Status: COMPLETED | OUTPATIENT
Start: 2017-02-20 | End: 2017-02-20

## 2017-02-20 RX ORDER — HYDROXYZINE 50 MG/ML
50 INJECTION, SOLUTION INTRAMUSCULAR
Status: DISCONTINUED | OUTPATIENT
Start: 2017-02-20 | End: 2017-02-21 | Stop reason: HOSPADM

## 2017-02-20 RX ADMIN — POTASSIUM CHLORIDE 40 MEQ: 20 TABLET, EXTENDED RELEASE ORAL at 19:15

## 2017-02-20 RX ADMIN — HYDROXYZINE HYDROCHLORIDE 50 MG: 50 INJECTION, SOLUTION INTRAMUSCULAR at 16:38

## 2017-02-20 NOTE — ED NOTES
4826 PM:  Accepted care of patient from Janice Lima PA-C. Awaiting [facility] evaluation. Pt is suicidal without plan. Positive for benzos and opiates. Hypokalemic. Will replace orally. Pt is now stating he would like to leave. He is informed that he needs to stay for evaluation and that if he leaves, PD will be notified. He is informed of TDO process. He is still voluntary for now. Will continue to monitor. Kang Hardin PA-C    1145 PM:  Pt evaluated by [facility]. States he is no longer suicidal and states he never really was. No HI or hallucinations. Will contract for safety and DC home.   Kang Hardin PA-C

## 2017-02-20 NOTE — ED PROVIDER NOTES
Patient is a 42 y.o. male presenting with suicidal ideation. The history is provided by the patient. Suicidal      Lawergerman Banks is a 42 y.o. male presents with SI thoughts. Pt was seen earlier today for Sore throat and requesting Tylenol #3. Review of records shows that he was denied from his PCP for frequent multiple Narcotic Rx. Pt left without his discharge summary. Pt was later brought in by EMS for SI thoughts. Does not have a plan. Has had similar episodes. Past Medical History:   Diagnosis Date    Depression     Diabetes ([facility] [address]     Herniated lumbar intervertebral disc     Hypertension     Neurological disorder L3,4,5 torn Herniated disc       Past Surgical History:   Procedure Laterality Date    Hx orthopaedic  trigger finger release         Family History:   Problem Relation Age of Onset    Hypertension Mother     Diabetes Mother     Heart Failure Mother     COPD Mother     Hypertension Father     Alcohol abuse Father        Social History     Social History    Marital status:      Spouse name: N/A    Number of children: N/A    Years of education: N/A     Occupational History    Not on file. Social History Main Topics    Smoking status: Never Smoker    Smokeless tobacco: Current User    Alcohol use No    Drug use: No    Sexual activity: No     Other Topics Concern    Not on file     Social History Narrative         ALLERGIES: Review of patient's allergies indicates no known allergies. Review of Systems  Constitutional:  Denies malaise, fever, chills. Head:  Denies injury. Face:  Denies injury or pain. ENMT:  Denies sore throat. Neck:  Denies injury or pain. Chest:  Denies injury. Cardiac:  Denies chest pain or palpitations. Respiratory:  Denies cough, wheezing, difficulty breathing, shortness of breath. GI/ABD:  Denies injury, pain, distention, nausea, vomiting, diarrhea. :  Denies injury, pain, dysuria or urgency.    Back:  Denies injury or pain.   Pelvis:  Denies injury or pain. Extremity/MS:  Denies injury or pain. Neuro:  Denies headache, LOC, dizziness, neurologic symptoms/deficits/paresthesias. Skin: Denies injury, rash, itching or skin changes. There were no vitals filed for this visit. Physical Exam   CONSTITUTIONAL: Alert, in no apparent distress; well-developed; well-nourished. HEAD:  Normocephalic, atraumatic. EYES: PERRL; EOM's intact. ENTM: Nose: No rhinorrhea; Throat: mucous membranes moist. Posterior pharynx-normal.  Neck:  No JVD, supple without lymphadenopathy. RESP: Chest clear, equal breath sounds. CV: S1 and S2 WNL; No murmurs, gallops or rubs. GI: Abdomen soft and non-tender. No masses or organomegaly. UPPER EXT:  Normal inspection. LOWER EXT: Normal inspection. NEURO: strength 5/5 and sym, sensation intact. SKIN: No rashes; Normal for age and stage. PSYCH:  Alert and oriented, normal affect. MDM  Number of Diagnoses or Management Options  Diagnosis management comments: IMPRESSION AND MEDICAL DECISION MAKING:  Based upon the patient's presentation with noted HPI and PE, along with the work up done in the emergency department, I believe that the patient has moderate depression and anxiety with SI thoughts. Psych evaluation recommends voluntary hospitalization. Spoke with  and will try to arrange with [facility]. Amount and/or Complexity of Data Reviewed  Clinical lab tests: ordered and reviewed  Tests in the medicine section of CPT®: ordered and reviewed  Review and summarize past medical records: yes  Discuss the patient with other providers: yes      ED Course       Procedures    Consulted with MATHEUS Aiken Teller  concerning patient Lawernce Sinks, standard discussion of reason for visit, HPI, ROS, PE, and current results available. Report was given at this time and pt was turned over to the provider above, who will assume care of pt at this time and disposition.  MATHEUS Arambula

## 2017-02-20 NOTE — CONSULTS
II: 42year old  male with hx of depression, anxiety, who came in with suicidal ideation. HPI:  \"I feel like I'm going to blow up at any second\". His ex told him that he was never going to see my children again. \"I'd rather die than not see my son again (son is 10years old). \"  He states that he was going to jump off the pier \"I made plans to go but I didn't go and I thought about my son. \"  He has been feeling depressed for the last 4 or 5 months worse over the past day after the recent incident with his ex-wife. He has been having trouble sleeping \"I don't sleep\". He has decreased appetite and states he has lost about 30-40 pounds. Psych Hx: last inpatient admit 9 months ago, he sees Dr. Hanley Litten for medications, no current outpatient therapy. Substance Abuse Hx: rare alcohol, no drugs    MHx:   Past Medical History   Diagnosis Date    Depression     Diabetes ([facility] [address]     Herniated lumbar intervertebral disc     Hypertension     Neurological disorder L3,4,5 torn Herniated disc     All: No Known Allergies     Meds:   No current facility-administered medications for this encounter. Current Outpatient Prescriptions   Medication Sig    amoxicillin (AMOXIL) 500 mg capsule Take 1 Cap by mouth two (2) times a day.  chlorpheniramine-HYDROcodone (TUSSIONEX) 10-8 mg/5 mL suspension Take 5 mL by mouth every twelve (12) hours as needed for Cough for up to 7 days. Max Daily Amount: 10 mL.  magic mouthwash solution Take 10 mL by mouth two (2) times daily as needed for Stomatitis. Magic mouth wash   Maalox  Lidocaine 2% viscous   Diphenhydramine oral solution     Pharmacy to mix equal portions of ingredients to a total volume as indicated in the dispense amount.  ibuprofen (MOTRIN) 800 mg tablet Take 1 Tab by mouth every six (6) hours as needed for Pain.  atenolol (TENORMIN) 100 mg tablet Take 1 Tab by mouth two (2) times a day.     sertraline (ZOLOFT) 100 mg tablet Take 100 mg by mouth daily.    metFORMIN (GLUCOPHAGE) 1,000 mg tablet Take 500 mg by mouth three (3) times daily. Xanax filled on Thursday but not on patient list    FHx: denies    Social Hx:  Lives with his brother, unemployed, graduated from high school.     Lab:   Recent Results (from the past 12 hour(s))   DRUG SCREEN, URINE    Collection Time: 02/20/17 11:45 AM   Result Value Ref Range    BENZODIAZEPINE POSITIVE (A) NEG      BARBITURATES NEGATIVE  NEG      THC (TH-CANNABINOL) NEGATIVE  NEG      OPIATES POSITIVE (A) NEG      PCP(PHENCYCLIDINE) NEGATIVE  NEG      COCAINE NEGATIVE  NEG      AMPHETAMINE NEGATIVE  NEG      METHADONE NEGATIVE       HDSCOM (NOTE)    URINALYSIS W/ RFLX MICROSCOPIC    Collection Time: 02/20/17 11:45 AM   Result Value Ref Range    Color YELLOW      Appearance CLEAR      Specific gravity 1.018 1.005 - 1.030      pH (UA) 5.0 5.0 - 8.0      Protein NEGATIVE  NEG mg/dL    Glucose NEGATIVE  NEG mg/dL    Ketone NEGATIVE  NEG mg/dL    Bilirubin NEGATIVE  NEG      Blood NEGATIVE  NEG      Urobilinogen 0.2 0.2 - 1.0 EU/dL    Nitrites NEGATIVE  NEG      Leukocyte Esterase NEGATIVE  NEG     ETHYL ALCOHOL    Collection Time: 02/20/17 12:10 PM   Result Value Ref Range    ALCOHOL(ETHYL),SERUM <3 0 - 3 MG/DL   ACETAMINOPHEN    Collection Time: 02/20/17 12:10 PM   Result Value Ref Range    ACETAMINOPHEN <2 (L) 10 - 30 ug/mL   SALICYLATE    Collection Time: 02/20/17 12:10 PM   Result Value Ref Range    SALICYLATE <7.6 (L) 2.8 - 20.0 MG/DL   CBC WITH AUTOMATED DIFF    Collection Time: 02/20/17 12:10 PM   Result Value Ref Range    WBC 6.4 4.6 - 13.2 K/uL    RBC 4.65 (L) 4.70 - 5.50 M/uL    HGB 13.5 13.0 - 16.0 g/dL    HCT 38.8 36.0 - 48.0 %    MCV 83.4 74.0 - 97.0 FL    MCH 29.0 24.0 - 34.0 PG    MCHC 34.8 31.0 - 37.0 g/dL    RDW 13.7 11.6 - 14.5 %    PLATELET 394 862 - 406 K/uL    MPV 9.7 9.2 - 11.8 FL    NEUTROPHILS 70 40 - 73 %    LYMPHOCYTES 23 21 - 52 %    MONOCYTES 5 3 - 10 %    EOSINOPHILS 1 0 - 5 %    BASOPHILS 1 0 - 2 %    ABS. NEUTROPHILS 4.6 1.8 - 8.0 K/UL    ABS. LYMPHOCYTES 1.4 0.9 - 3.6 K/UL    ABS. MONOCYTES 0.3 0.05 - 1.2 K/UL    ABS. EOSINOPHILS 0.1 0.0 - 0.4 K/UL    ABS. BASOPHILS 0.0 0.0 - 0.06 K/UL    DF AUTOMATED     METABOLIC PANEL, COMPREHENSIVE    Collection Time: 02/20/17 12:10 PM   Result Value Ref Range    Sodium 134 (L) 136 - 145 mmol/L    Potassium 3.2 (L) 3.5 - 5.5 mmol/L    Chloride 95 (L) 100 - 108 mmol/L    CO2 30 21 - 32 mmol/L    Anion gap 9 3.0 - 18 mmol/L    Glucose 202 (H) 74 - 99 mg/dL    BUN 12 7.0 - 18 MG/DL    Creatinine 1.05 0.6 - 1.3 MG/DL    BUN/Creatinine ratio 11 (L) 12 - 20      GFR est AA >60 >60 ml/min/1.73m2    GFR est non-AA >60 >60 ml/min/1.73m2    Calcium 9.1 8.5 - 10.1 MG/DL    Bilirubin, total 0.2 0.2 - 1.0 MG/DL    ALT (SGPT) 63 (H) 16 - 61 U/L    AST (SGOT) 52 (H) 15 - 37 U/L    Alk. phosphatase 60 45 - 117 U/L    Protein, total 7.9 6.4 - 8.2 g/dL    Albumin 4.0 3.4 - 5.0 g/dL    Globulin 3.9 2.0 - 4.0 g/dL    A-G Ratio 1.0 0.8 - 1.7         MSE:  Moderately well kempt in hospital attire, alert and oriented to person place and situation, cooperative but focused on getting benzo and tylenol 3, \"depressed\" affect congruent TP: logical and goal directed TC: +SI with plan no HI no delusions no AH no VH I/J:fair    Dx: MDD severe recurrent    Assessment:  42year old male with hx of depression anxiety comes in with SI with plan to jump off pier. Patient is moderate to high risk for acute self harm due to SI with plan, recent hospitalization, possible substance abuse, and recent separation. He requires admission for safety and stabilization and he is voluntary. Should he change his mind he should be screened by board of  for possible commitment. He states he is taking Xanax every 6 hours which is more than recommended. Before giving benzos please verify the benzo prescription with pharmacy or doctor's office.   He tells me that he has not been taking opiates but UDS positive for opiates. Plan: 1) Admit to psych voluntarily  2) Vistaril 50 mg PO q4 hrs prn anxiety first dose now  3) Verify Xanax prescription before giving benzos.

## 2017-02-20 NOTE — ED PROVIDER NOTES
Patient is a 42 y.o. male presenting with sore throat and general illness. The history is provided by the patient. Sore Throat      Generalized Body Aches      Liza Hermosillo is a 42 y.o. male presents with c/o sore throat and body aches. States was seen by PCP and given cough medication and Amox. States he was to go back today to get Tylenol # 3 but PCP was not in today. Past Medical History:   Diagnosis Date    Depression     Diabetes ([address]     Herniated lumbar intervertebral disc     Hypertension     Neurological disorder L3,4,5 torn Herniated disc       Past Surgical History:   Procedure Laterality Date    Hx orthopaedic  trigger finger release         Family History:   Problem Relation Age of Onset    Hypertension Mother     Diabetes Mother     Heart Failure Mother     COPD Mother     Hypertension Father     Alcohol abuse Father        Social History     Social History    Marital status:      Spouse name: N/A    Number of children: N/A    Years of education: N/A     Occupational History    Not on file. Social History Main Topics    Smoking status: Never Smoker    Smokeless tobacco: Current User    Alcohol use No    Drug use: No    Sexual activity: No     Other Topics Concern    Not on file     Social History Narrative         ALLERGIES: Review of patient's allergies indicates no known allergies. Review of Systems   HENT: Positive for sore throat. Constitutional:  body aches  Head:  Denies injury. Face:  Denies injury or pain. ENMT:  sore throat. Neck:  Denies injury or pain. Chest:  Denies injury. Cardiac:  Denies chest pain or palpitations. Respiratory:  Denies cough, wheezing, difficulty breathing, shortness of breath. GI/ABD:  Denies injury, pain, distention, nausea, vomiting, diarrhea. :  Denies injury, pain, dysuria or urgency. Back:  Denies injury or pain. Pelvis:  Denies injury or pain. Extremity/MS:  Denies injury or pain.    Neuro: Denies headache, LOC, dizziness, neurologic symptoms/deficits/paresthesias. Skin: Denies injury, rash, itching or skin changes. Vitals:    02/20/17 0814   BP: (!) 141/94   Pulse: 67   Resp: 14   Temp: 97.9 °F (36.6 °C)   SpO2: 98%   Weight: 99.8 kg (220 lb)   Height: 5' 10\" (1.778 m)            Physical Exam   Nursing note and vitals reviewed. CONSTITUTIONAL: Alert, in no apparent distress; well-developed; well-nourished. HEAD:  Normocephalic, atraumatic. EYES: PERRL; EOM's intact. ENTM: Nose: No rhinorrhea; Throat: mucous membranes moist. Posterior pharynx erythemaous  Neck:  No JVD, supple without lymphadenopathy. RESP: Chest clear, equal breath sounds. CV: S1 and S2 WNL; No murmurs, gallops or rubs. GI: Abdomen soft and non-tender. No masses or organomegaly. UPPER EXT:  Normal inspection. LOWER EXT: Normal inspection. NEURO: strength 5/5 and sym, sensation intact. SKIN: No rashes; Normal for age and stage. PSYCH:  Alert and oriented, normal affect. MDM  Number of Diagnoses or Management Options  Acute streptococcal pharyngitis:   Elevated blood pressure:   Viral illness:   Diagnosis management comments: DDx: pharyngitis, tonsillitis, laryngitis, URI, strep, mono, PTA, Bishop's angina, reactive lymphadenopathy, sialadenitis, bronchoconstrictions   IMPRESSION AND MEDICAL DECISION MAKING:  Based upon the patient's presentation with noted HPI and PE, along with the work up done in the emergency department, I believe that the patient has strep pharyngitis and is being treated for it. Pt was asking for Narcotics and has been asking multiple locations. States his PCP was to give him it but was not in today. Review of his PCP notes shows that he was denied due to multiple Rx. Explained that Narcotics are not indicated for Strep. He would need to follow back up with his PCP for additional pain medication. The patient will be discharged home.   Warning signs of worsening condition were discussed and understood by the patient. Based on patient's age, coexisting illness, exam, and the results of this ED evaluation, the decision to treat as an outpatient was made. Based on the information available at time of discharge, acute pathology requiring immediate intervention was deemed relative unlikely. While it is impossible to completely exclude the possibility of underlying serious disease or worsening of condition, I feel the relative likelihood is extremely low. I discussed this uncertainty with the patient, who understood ED evaluation and treatment and felt comfortable with the outpatient treatment plan. All questions regarding care, test results, and follow up were answered. The patient is stable and appropriate to discharge. They understand that they should return to the emergency department for any new or worsening symptoms. I stressed the importance of follow up for repeat assessment and possibly further evaluation/treatment. Amount and/or Complexity of Data Reviewed  Review and summarize past medical records: yes      ED Course       Procedures      Vitals:  Patient Vitals for the past 12 hrs:   Temp Pulse Resp BP SpO2   02/20/17 0814 97.9 °F (36.6 °C) 67 14 (!) 141/94 98 %         Medications ordered:   Medications - No data to display      Lab findings:  No results found for this or any previous visit (from the past 12 hour(s)). EKG interpretation by ED Physician:      X-Ray, CT or other radiology findings or impressions:  No orders to display       Progress notes, Consult notes or additional Procedure notes:       Disposition:  Diagnosis:   1. Acute streptococcal pharyngitis    2. Viral illness    3. Elevated blood pressure        Disposition:   9:24 AM  Pt reevaluated at this time and is resting comfortably in NAD. Discussed results and findings, as well as, diagnosis and plan for discharge. Pt verbalizes understanding and agreement with plan.  All questions addressed at this time.     Follow-up Information     Follow up With Details Comments Contact Info    MATHEUS Delvalle Schedule an appointment as soon as possible for a visit As needed [address] [place] [address],[facility] EMERGENCY DEPT  If symptoms worsen [address] [facility] [[email or web address]] 76.  [phone]           Discharge Medication List as of 2/20/2017  9:16 AM      CONTINUE these medications which have NOT CHANGED    Details   amoxicillin (AMOXIL) 500 mg capsule Take 1 Cap by mouth two (2) times a day., Normal, Disp-20 Cap, R-0      ibuprofen (MOTRIN) 800 mg tablet Take 1 Tab by mouth every six (6) hours as needed for Pain., Normal, Disp-20 Tab, R-0      atenolol (TENORMIN) 100 mg tablet Take 1 Tab by mouth two (2) times a day., Normal, Disp-60 Tab, R-0      sertraline (ZOLOFT) 100 mg tablet Take 100 mg by mouth daily. , Historical Med      metFORMIN (GLUCOPHAGE) 1,000 mg tablet Take 500 mg by mouth three (3) times daily. , Historical Med      chlorpheniramine-HYDROcodone (TUSSIONEX) 10-8 mg/5 mL suspension Take 5 mL by mouth every twelve (12) hours as needed for Cough for up to 7 days. Max Daily Amount: 10 mL., Print, Disp-70 mL, R-0      magic mouthwash solution Take 10 mL by mouth two (2) times daily as needed for Stomatitis. Magic mouth wash   Maalox  Lidocaine 2% viscous   Diphenhydramine oral solution     Pharmacy to mix equal portions of ingredients to a total volume as indicated in the dispense amount. , Pr int, Disp-120 mL, R-0

## 2017-02-20 NOTE — ED TRIAGE NOTES
Patient states he was at Urgent care on Thursday, was diagnosed with strep and the flu, was given an antibiotic and tussionex and 800 mg motrin, states he is still in pain \"feels like I have been beaten up\"    Patient states has a swab for both the flu and strep

## 2017-02-20 NOTE — DISCHARGE INSTRUCTIONS
Sore Throat: Care Instructions  Your Care Instructions    Infection by bacteria or a virus causes most sore throats. Cigarette smoke, dry air, air pollution, allergies, and yelling can also cause a sore throat. Sore throats can be painful and annoying. Fortunately, most sore throats go away on their own. If you have a bacterial infection, your doctor may prescribe antibiotics. Follow-up care is a key part of your treatment and safety. Be sure to make and go to all appointments, and call your doctor if you are having problems. It's also a good idea to know your test results and keep a list of the medicines you take. How can you care for yourself at home? · If your doctor prescribed antibiotics, take them as directed. Do not stop taking them just because you feel better. You need to take the full course of antibiotics. · Gargle with warm salt water once an hour to help reduce swelling and relieve discomfort. Use 1 teaspoon of salt mixed in 1 cup of warm water. · Take an over-the-counter pain medicine, such as acetaminophen (Tylenol), ibuprofen (Advil, Motrin), or naproxen (Aleve). Read and follow all instructions on the label. · Be careful when taking over-the-counter cold or flu medicines and Tylenol at the same time. Many of these medicines have acetaminophen, which is Tylenol. Read the labels to make sure that you are not taking more than the recommended dose. Too much acetaminophen (Tylenol) can be harmful. · Drink plenty of fluids. Fluids may help soothe an irritated throat. Hot fluids, such as tea or soup, may help decrease throat pain. · Use over-the-counter throat lozenges to soothe pain. Regular cough drops or hard candy may also help. These should not be given to young children because of the risk of choking. · Do not smoke or allow others to smoke around you. If you need help quitting, talk to your doctor about stop-smoking programs and medicines.  These can increase your chances of quitting for good. · Use a vaporizer or humidifier to add moisture to your bedroom. Follow the directions for cleaning the machine. When should you call for help? Call your doctor now or seek immediate medical care if:  · You have new or worse trouble swallowing. · Your sore throat gets much worse on one side. Watch closely for changes in your health, and be sure to contact your doctor if you do not get better as expected. Where can you learn more? Go to http://jacklyn-denice.info/. Enter [phone] [id number] [phone] in the search box to learn more about \"Sore Throat: Care Instructions. \"  Current as of: July 29, 2016  Content Version: 11.1  © 3337-6278 [employer]. Care instructions adapted under license by [employer] (which disclaims liability or warranty for this information). If you have questions about a medical condition or this instruction, always ask your healthcare professional. [address] any warranty or liability for your use of this information.

## 2017-02-20 NOTE — Clinical Note
Increase fluids Allegra or Zyrtec Daily Mucinex D or similar product Take Tylenol/Acetaminophen (every 4-6 hours) and/or Motrin/Ibuprofen/Advil (every 6-8 hours)  for fever or pain as needed. Follow up with your primary care provider or the provided  referral for further evaluation and management Increase fluids Return to emergency room at once for worsening or new symptoms.

## 2017-02-20 NOTE — TELEPHONE ENCOUNTER
Patient states he spoke with you on Sat and was told you were going to write him a Rx for Tylenol 3? Annika Keene came in but there is no Rx for him to ?

## 2017-02-21 ENCOUNTER — TELEPHONE (OUTPATIENT)
Dept: INTERNAL MEDICINE CLINIC | Age: 42
End: 2017-02-21

## 2017-02-21 ENCOUNTER — OFFICE VISIT (OUTPATIENT)
Dept: INTERNAL MEDICINE CLINIC | Age: 42
End: 2017-02-21

## 2017-02-21 VITALS
DIASTOLIC BLOOD PRESSURE: 87 MMHG | HEIGHT: 70 IN | OXYGEN SATURATION: 97 % | RESPIRATION RATE: 16 BRPM | TEMPERATURE: 97.5 F | HEART RATE: 86 BPM | SYSTOLIC BLOOD PRESSURE: 129 MMHG | WEIGHT: 220 LBS | BODY MASS INDEX: 31.5 KG/M2

## 2017-02-21 DIAGNOSIS — R52 BODY ACHES: ICD-10-CM

## 2017-02-21 DIAGNOSIS — J02.0 STREP THROAT: Primary | ICD-10-CM

## 2017-02-21 RX ORDER — ALPRAZOLAM 1 MG/1
TABLET ORAL
Refills: 1 | COMMUNITY
Start: 2017-02-15 | End: 2017-05-23 | Stop reason: ALTCHOICE

## 2017-02-21 RX ORDER — PENICILLIN V POTASSIUM 500 MG/1
TABLET, FILM COATED ORAL
Refills: 0 | COMMUNITY
Start: 2017-02-17 | End: 2017-03-03 | Stop reason: ALTCHOICE

## 2017-02-21 RX ORDER — AZITHROMYCIN 250 MG/1
TABLET, FILM COATED ORAL
Qty: 6 TAB | Refills: 0 | Status: SHIPPED | OUTPATIENT
Start: 2017-02-21 | End: 2017-02-26

## 2017-02-21 RX ORDER — QUETIAPINE FUMARATE 100 MG/1
TABLET, FILM COATED ORAL
Refills: 1 | COMMUNITY
Start: 2016-12-13 | End: 2017-03-03

## 2017-02-21 RX ORDER — QUETIAPINE FUMARATE 400 MG/1
400 TABLET, FILM COATED ORAL
COMMUNITY
Start: 2017-02-08 | End: 2017-04-08 | Stop reason: CLARIF

## 2017-02-21 RX ORDER — BENZONATATE 200 MG/1
CAPSULE ORAL
Refills: 0 | COMMUNITY
Start: 2017-02-17 | End: 2017-03-03

## 2017-02-21 RX ORDER — ACETAMINOPHEN AND CODEINE PHOSPHATE 300; 30 MG/1; MG/1
1 TABLET ORAL
Qty: 8 TAB | Refills: 0 | Status: SHIPPED | OUTPATIENT
Start: 2017-02-21 | End: 2017-02-23

## 2017-02-21 RX ORDER — HYDROCHLOROTHIAZIDE 25 MG/1
TABLET ORAL
Refills: 2 | COMMUNITY
Start: 2017-02-13 | End: 2017-04-10 | Stop reason: ALTCHOICE

## 2017-02-21 NOTE — TELEPHONE ENCOUNTER
Attempted to contact pt at  number, no answer. Lvm for pt to return call to office at [phone]. Will continue to try to contact pt.

## 2017-02-21 NOTE — MR AVS SNAPSHOT
Visit Information Date & Time Provider Department Dept. Phone Encounter #  
 2/21/2017  1:00 PM Patric Smallwood  [facility] [id number] Upcoming Health Maintenance Date Due  
 EYE EXAM RETINAL OR DILATED Q1 12/28/1984 Pneumococcal 19-64 Highest Risk (1 of 3 - PCV13) 12/28/1993 DTaP/Tdap/Td series (1 - Tdap) 12/28/1995 HEMOGLOBIN A1C Q6M 11/4/2015 MICROALBUMIN Q1 7/7/2016 LIPID PANEL Q1 7/7/2016 FOOT EXAM Q1 4/27/2017 Allergies as of 2/21/2017  Review Complete On: 2/21/2017 By: Odin Cooper LPN No Known Allergies Current Immunizations  Reviewed on 9/2/2015 Name Date Influenza Vaccine (Quad) PF 9/2/2015 Influenza Vaccine Whole 12/1/2009 Not reviewed this visit You Were Diagnosed With   
  
 Codes Comments Strep throat    -  Primary ICD-10-CM: J02.0 ICD-9-CM: 034.0 Body aches     ICD-10-CM: R52 ICD-9-CM: 780.96 Vitals BP Pulse Temp Resp Height(growth percentile) Weight(growth percentile) 129/87 (BP 1 Location: Right arm, BP Patient Position: Sitting) 86 97.5 °F (36.4 °C) (Oral) 16 5' 10\" (1.778 m) 220 lb (99.8 kg) SpO2 BMI Smoking Status 97% 31.57 kg/m2 Never Smoker Vitals History BMI and BSA Data Body Mass Index Body Surface Area  
 31.57 kg/m 2 2.22 m 2 Preferred Pharmacy Pharmacy Name Phone [facility] #[id number]- [place] Pillai, [facility] [phone] Your Updated Medication List  
  
   
This list is accurate as of: 2/21/17  1:31 PM.  Always use your most recent med list.  
  
  
  
  
 acetaminophen-codeine 300-30 mg per tablet Commonly known as:  TYLENOL #3 Take 1 Tab by mouth every six (6) hours as needed for Pain for up to 2 days. Max Daily Amount: 4 Tabs. ALPRAZolam 1 mg tablet Commonly known as:  XANAX  
TAKE 1 TABLET BY MOUTH 3 TIMES DAILY AS NEEDED  
  
 amoxicillin 500 mg capsule Commonly known as:  AMOXIL Take 1 Cap by mouth two (2) times a day. atenolol 100 mg tablet Commonly known as:  TENORMIN Take 1 Tab by mouth two (2) times a day. azithromycin 250 mg tablet Commonly known as:  [place] Take 2 tablets today, then take 1 tablet daily  
  
 benzonatate 200 mg capsule Commonly known as:  TESSALON  
TK ONE C PO 8 H PRF COUGH. TAKE WITH A GLASS OF WATER  
  
 hydroCHLOROthiazide 25 mg tablet Commonly known as:  HYDRODIURIL TK ONE T PO DAILY FOR 30 DAYS  
  
 ibuprofen 800 mg tablet Commonly known as:  MOTRIN Take 1 Tab by mouth every six (6) hours as needed for Pain.  
  
 magic mouthwash solution Take 10 mL by mouth two (2) times daily as needed for Stomatitis. Magic mouth wash  Maalox Lidocaine 2% viscous  Diphenhydramine oral solution   Pharmacy to mix equal portions of ingredients to a total volume as indicated in the dispense amount. metFORMIN 1,000 mg tablet Commonly known as:  GLUCOPHAGE Take 500 mg by mouth three (3) times daily. penicillin v potassium 500 mg tablet Commonly known as:  VEETID * QUEtiapine 100 mg tablet Commonly known as:  SEROquel TAKE 1 TABLET BY MOUTH AT BEDTIME  
  
 * QUEtiapine 400 mg tablet Commonly known as:  SEROquel 400 mg. ZOLOFT 100 mg tablet Generic drug:  sertraline Take 100 mg by mouth daily. * Notice: This list has 2 medication(s) that are the same as other medications prescribed for you. Read the directions carefully, and ask your doctor or other care provider to review them with you. Prescriptions Printed Refills  
 acetaminophen-codeine (TYLENOL #3) 300-30 mg per tablet 0 Sig: Take 1 Tab by mouth every six (6) hours as needed for Pain for up to 2 days. Max Daily Amount: 4 Tabs. Class: Print Route: Oral  
  
Prescriptions Sent to Pharmacy  Refills  
 azithromycin (ZITHROMAX) 250 mg tablet 0  
 Sig: Take 2 tablets today, then take 1 tablet daily Class: Normal  
 Pharmacy: [address], [address] Ph #: [phone] Introducing [facility]! Lynette Avery introduces [email or web address] patient portal. Now you can access parts of your medical record, email your doctor's office, and request medication refills online. 1. In your internet browser, go to [email or web address] 2. Click on the First Time User? Click Here link in the Sign In box. You will see the New Member Sign Up page. 3. Enter your [email or web address] Access Code exactly as it appears below. You will not need to use this code after youve completed the sign-up process. If you do not sign up before the expiration date, you must request a new code. · [email or web address] Access Code: [email or web address] Expires: 5/17/2017 11:43 AM 
 
4. Enter the last four digits of your Social Security Number (xxxx) and Date of Birth (mm/dd/yyyy) as indicated and click Submit. You will be taken to the next sign-up page. 5. Create a [email or web address] ID. This will be your [email or web address] login ID and cannot be changed, so think of one that is secure and easy to remember. 6. Create a [email or web address] password. You can change your password at any time. 7. Enter your Password Reset Question and Answer. This can be used at a later time if you forget your password. 8. Enter your e-mail address. You will receive e-mail notification when new information is available in [address] 9. Click Sign Up. You can now view and download portions of your medical record. 10. Click the Download Summary menu link to download a portable copy of your medical information. If you have questions, please visit the Frequently Asked Questions section of the [email or web address] website. Remember, Edvisor.io is NOT to be used for urgent needs. For medical emergencies, dial 911. Now available from your iPhone and Android! Please provide this summary of care documentation to your next provider. Your primary care clinician is listed as Isaiah Muro. If you have any questions after today's visit, please call [phone].

## 2017-02-21 NOTE — DISCHARGE INSTRUCTIONS
Suicidal Thoughts and Behavior: Care Instructions  Your Care Instructions  You have been seen by a doctor because you've had thoughts about killing yourself. Maybe you have tried to do it. This is much different from having fleeting thoughts of death, which many people have from time to time. Your doctor and support team will work hard to help keep you safe. Your team may include a , a , and a counselor. Most people who think about suicide don't want to die. They think suicide will end their problems and pain. People who consider suicide often feel hopeless, helpless, and worthless. These thoughts can make a person feel that there is no other choice. But you do have a choice. Help is always available. The doctor and staff members are taking you and your pain very seriously. It is important to remember that there are people who are willing and able to talk with you about your suicidal thoughts. Treatment and close follow-up care can help you feel better about life. Thoughts of hopelessness and suicide may come from being depressed. Depression is a medical condition. When you have depression, there may be problems with activity levels in certain parts of your brain. Chemicals in your brain called neurotransmitters may be out of balance. But depression can be treated. Treatment for depression includes counseling, medicines, and lifestyle changes. With treatment, you can feel better. Your doctor doesn't want you to hurt yourself. He or she may ask you to sign a \"no harm\" agreement or contract. This contract is your promise that you will not hurt yourself between now and your next visit. Be completely honest with your doctor if you feel that you can't sign it. You will get help. Follow-up care is a key part of your treatment and safety. Be sure to make and go to all appointments, and call your doctor if you are having problems.  It's also a good idea to know your test results and keep a list of the medicines you take. How can you care for yourself at home? · Talk to someone. Reach out to family members, friends, your doctor, or a counselor. Be open and honest with them about your thoughts and feelings. · Be safe with medicines. Take your medicines exactly as prescribed. Call your doctor if you think you are having a problem with your medicine. · Avoid illegal drugs and alcohol. · Attend all counseling sessions recommended by your doctor. · Have someone take away sharp or dangerous objects, guns, and drugs from your home. · Keep the numbers for these national suicide hotlines: 0-068-088-TALK (2-501.319.5034) and 7-764-DNEFWFP (6-406.385.1997). When should you call for help? Call 911 anytime you think you may need emergency care. For example, call if:  · You feel you cannot stop from hurting yourself or someone else. Call your doctor now or seek immediate medical care if:  · You have one or more warning signs of suicide. For example, call if:  ¨ You feel like giving away your possessions. ¨ You use illegal drugs or drink alcohol heavily. ¨ You talk or write about death. This may include writing suicide notes and talking about guns, knives, or pills. ¨ You start to spend a lot of time alone or spend more time alone than usual.  · You hear voices. · You start acting in an aggressive way that's not normal for you. Watch closely for changes in your health, and be sure to contact your doctor if you have any problems. Where can you learn more? Go to http://jacklyn-denice.info/. Enter T479 in the search box to learn more about \"Suicidal Thoughts and Behavior: Care Instructions. \"  Current as of: July 26, 2016  Content Version: 11.1  © 3097-7960 [employer]. Care instructions adapted under license by [employer] (which disclaims liability or warranty for this information).  If you have questions about a medical condition or this instruction, always ask your healthcare professional. [place] [address] any warranty or liability for your use of this information.

## 2017-02-21 NOTE — PROGRESS NOTES
Pt presented today with body pain, pt is requesting tylenol 3. Has pt had any falls since last visit? No.  Pt preferred language for health care discussion is english. Advanced Directive? No    Is someone accompanying this pt? No    Is the patient using any DME equipment during OV? No      1. Have you been to the ER, urgent care clinic since your last visit? Hospitalized since your last visit? No    2. Have you seen or consulted any other health care providers outside of the [address] [place] [address] since your last visit? Include any pap smears or colon screening. No      Pt has a reminder for a \"due or due soon\" health maintenance. I have asked that he contact his primary care provider for follow-up on this health maintenance.

## 2017-02-21 NOTE — TELEPHONE ENCOUNTER
Incoming call from pt. 2 pt identifiers confirmed. Pt states he is in severe pain due to having strep throat and he would like pain medication such as Tylenol 3. Pt states he was informed by MELODY Galvin that he could receive pain medication for his discomfort. Informed pt that MELODY Galvin is not in today and he can be seen in walk in clinic by MELODY Delarosa if he is in pain. Pt verbalized understanding. Daiana Galvin, NP please be advised.

## 2017-02-21 NOTE — ED NOTES
I have reviewed discharge instructions with the patient. The patient verbalized understanding. Patient armband removed and shredded. Pt agrees to safety contract. Pt d/c'd to home awake, alert and in NAD. All questions answered.

## 2017-02-21 NOTE — PROGRESS NOTES
HISTORY OF PRESENT ILLNESS  Chikis Bradshaw is a 42 y.o. male. Sore Throat    The history is provided by the patient. This is a new problem. The current episode started more than 1 week ago. The problem has been rapidly worsening. Patient reports a subjective fever - was not measured. The fever has been present for 1 - 2 days. Associated symptoms include congestion, ear pain, headaches, plugged ear sensation, trouble swallowing and cough. Pertinent negatives include no vomiting and no shortness of breath. He has had no exposure to strep or mono. Treatments tried: amoxil. The treatment provided no relief. Review of Systems   Constitutional: Positive for chills, fever and malaise/fatigue. HENT: Positive for congestion, ear pain, sore throat and trouble swallowing. Eyes: Negative for blurred vision and double vision. Respiratory: Positive for cough and sputum production. Negative for shortness of breath and wheezing. Cardiovascular: Negative for chest pain and palpitations. Gastrointestinal: Negative for heartburn, nausea and vomiting. Genitourinary: Negative for dysuria, frequency and urgency. Musculoskeletal: Negative for myalgias and neck pain. Skin: Negative for itching and rash. Neurological: Positive for headaches. Negative for dizziness and tingling. Endo/Heme/Allergies: Negative for polydipsia. Psychiatric/Behavioral: Negative for depression. Physical Exam   Constitutional: He is oriented to person, place, and time. He appears well-developed and well-nourished. HENT:   Head: Normocephalic and atraumatic. Right Ear: External ear normal. Tympanic membrane is erythematous. Left Ear: External ear normal. Tympanic membrane is erythematous. Mouth/Throat: Mucous membranes are normal. Oropharyngeal exudate, posterior oropharyngeal edema, posterior oropharyngeal erythema and tonsillar abscesses present. Eyes: EOM are normal. Pupils are equal, round, and reactive to light. Neck: Neck supple. Cardiovascular: Regular rhythm. Pulmonary/Chest: Breath sounds normal. No respiratory distress. He has no wheezes. Lymphadenopathy:     He has no cervical adenopathy. Neurological: He is alert and oriented to person, place, and time. No cranial nerve deficit. Skin: Skin is warm and dry. No erythema. Psychiatric: He has a normal mood and affect. Nursing note and vitals reviewed. ASSESSMENT and PLAN    ICD-10-CM ICD-9-CM    1. Strep throat J02.0 034.0 azithromycin (ZITHROMAX) 250 mg tablet   2. Body aches R52 780.96 acetaminophen-codeine (TYLENOL #3) 300-30 mg per tablet   Patient advised to take medication as prescribed. Take rest and plenty of fluids. Alternate tylenol and ibuprofen for fever. Return to clinic if condition worsens in next 72 hours.

## 2017-02-22 NOTE — TELEPHONE ENCOUNTER
Patient is calling in pain to speak with you. States he was only given an RX for 8 pain pills when he was in yesterday. States he is still in a lot of pain and is requesting to speak with you.

## 2017-02-22 NOTE — TELEPHONE ENCOUNTER
Patient was never told I would prescribe him Tylenol #3, patient was explicitly and unequivocally informed during our first meeting that this provider will not  prescribe even Tussionex because it contains Codeine and his records indicate an over use of narcotics

## 2017-02-23 ENCOUNTER — OFFICE VISIT (OUTPATIENT)
Dept: INTERNAL MEDICINE CLINIC | Age: 42
End: 2017-02-23

## 2017-02-23 ENCOUNTER — PATIENT OUTREACH (OUTPATIENT)
Dept: INTERNAL MEDICINE CLINIC | Age: 42
End: 2017-02-23

## 2017-02-23 VITALS
WEIGHT: 222.4 LBS | TEMPERATURE: 98.5 F | RESPIRATION RATE: 16 BRPM | OXYGEN SATURATION: 95 % | DIASTOLIC BLOOD PRESSURE: 83 MMHG | HEIGHT: 68 IN | BODY MASS INDEX: 33.71 KG/M2 | HEART RATE: 92 BPM | SYSTOLIC BLOOD PRESSURE: 123 MMHG

## 2017-02-23 DIAGNOSIS — H92.03 EAR PAIN, BILATERAL: ICD-10-CM

## 2017-02-23 DIAGNOSIS — J02.9 SORE THROAT: Primary | ICD-10-CM

## 2017-02-23 DIAGNOSIS — R52 BODY ACHES: ICD-10-CM

## 2017-02-23 RX ORDER — ACETAMINOPHEN 500 MG
500 TABLET ORAL
Qty: 20 TAB | Refills: 0 | Status: SHIPPED | OUTPATIENT
Start: 2017-02-23 | End: 2017-03-03

## 2017-02-23 NOTE — PROGRESS NOTES
Pt presented today with sore throat and ear pain x 4 days . Has pt had any falls since last visit? no.  Pt preferred language for health care discussion is english. Advanced Directive? no    Is someone accompanying this pt? no    Is the patient using any DME equipment during OV? no      1. Have you been to the ER, urgent care clinic since your last visit? Hospitalized since your last visit? No    2. Have you seen or consulted any other health care providers outside of the [address] [place] [address] since your last visit? Include any pap smears or colon screening. No      Patient  has a reminder for a \"due or due soon\" health maintenance. I have asked that he contact his primary care provider for follow-up on this health maintenance.

## 2017-02-23 NOTE — PROGRESS NOTES
Patient presents with sore throat x 10 days, bilateral ear pain x 3 days. Patient has been seen at this facility three different times by three different providers in the past week for the same complaints and treated initially with Amoxicillin, returned stating he was still in pain and the amoxicillin was not working ans was switched to Azithromycin. States he has one pill of Azithromycin left. Patient has also been calling on multiple occasions and spoke to various staff members requesting pain medication, patient told one of the staff members that this provider promised prescribing Tylenol # 3. Rates pain as 8/10, states \" it is ear and throat\". Patient requesting Tylenol# 3, states \" can you just give me Tylenol# 3/\". Reminded patient he does not need Tylenol # 3  for strep pain, he then states \" I have been having back pain, knee pain, for the past 6-7 days\". Reminded patient of his record of extensive narcotic use and that this provider can not provide him any narcotics at this but will refer him to ENT to investigate the throat and ear pain, Continue current Rx with abx and Tylenol for pain.

## 2017-02-23 NOTE — PROGRESS NOTES
HISTORY OF PRESENT ILLNESS  Shirley Bearden is a 42 y.o. male. Patient presents with sore throat x 10 days, bilateral ear pain x 3 days. Patient has been seen at this facility three different times by three different providers in the past week for the same complaints and treated initially with Amoxicillin, returned stating he was still in pain and the amoxicillin was not working ans was switched to Azithromycin. States he has one pill of Azithromycin left. Patient has also been calling on multiple occasions and spoke to various staff members requesting pain medication, patient told one of the staff members that this provider promised prescribing Tylenol # 3. Rates pain as 8/10, states \" it is ear and throat\". Patient requesting Tylenol# 3, states \" can you just give me Tylenol# 3/\". Reminded patient he does not need Tylenol # 3  for strep pain, he then states \" I have been having back pain, knee pain, for the past 6-7 days\". Reminded patient of his record of extensive narcotic use and that this provider can not provide him any narcotics at this but will refer him to ENT to investigate the throat and ear pain, Continue current Rx with abx and Tylenol for pain. Patient denies any drooling,drooping,dizziness,SOB,CP,SI or HI. Sore Throat    The history is provided by the patient. This is a recurrent problem. The current episode started more than 1 week ago. The problem has been gradually worsening. There has been no fever. Pertinent negatives include no diarrhea, no vomiting, no congestion, no drooling, no ear discharge, no ear pain, no headaches, no plugged ear sensation, no shortness of breath, no stridor, no trouble swallowing, no stiff neck and no cough. He has tried NSAIDs and oral narcotic analgesics for the symptoms. Ear Pain   The history is provided by the patient. This is a new problem. The current episode started more than 2 days ago. The problem occurs constantly.  Pertinent negatives include no headaches and no shortness of breath. Review of Systems   HENT: Positive for sore throat. Negative for congestion, drooling, ear discharge, ear pain and trouble swallowing. Respiratory: Negative. Negative for cough, shortness of breath and stridor. Cardiovascular: Negative. Gastrointestinal: Negative for diarrhea and vomiting. Genitourinary: Negative. Musculoskeletal:        C/o body aches, non specific,points to knee,back   Neurological: Negative for headaches. Physical Exam   Constitutional: He is oriented to person, place, and time. He appears well-developed. HENT:   Head: Normocephalic. Eyes: Pupils are equal, round, and reactive to light. Neck: Normal range of motion. Cardiovascular: Normal rate. Pulmonary/Chest: Effort normal.   Neurological: He is alert and oriented to person, place, and time. GCS eye subscore is 4. GCS verbal subscore is 5. GCS motor subscore is 6. Skin: Skin is warm. Psychiatric: His speech is normal and behavior is normal. Judgment and thought content normal. His affect is labile. Cognition and memory are normal.       ASSESSMENT and PLAN    ICD-10-CM ICD-9-CM    1. Sore throat J02.9 462 REFERRAL TO ENT-OTOLARYNGOLOGY   2. Ear pain, bilateral H92.03 388.70 REFERRAL TO ENT-OTOLARYNGOLOGY   3. Body aches R52 780.96      Encounter Diagnoses   Name Primary?  Sore throat Yes    Ear pain, bilateral     Body aches      Orders Placed This Encounter    REFERRAL TO ENT-OTOLARYNGOLOGY    acetaminophen (TYLENOL) 500 mg tablet     Orders Placed This Encounter    REFERRAL TO ENT-OTOLARYNGOLOGY     Referral Priority:   Routine     Referral Type:   Consultation     Referral Reason:   Specialty Services Required    acetaminophen (TYLENOL) 500 mg tablet     Sig: Take 1 Tab by mouth every six (6) hours as needed for Pain.      Dispense:  20 Tab     Refill:  0     Orders Placed This Encounter    REFERRAL TO ENT-OTOLARYNGOLOGY    acetaminophen (TYLENOL) 500 mg tablet     Clive Narvaez was seen today for sore throat and ear pain. Diagnoses and all orders for this visit:    Sore throat  -     REFERRAL TO ENT-OTOLARYNGOLOGY    Ear pain, bilateral  -     REFERRAL TO ENT-OTOLARYNGOLOGY    Body aches    Other orders  -     acetaminophen (TYLENOL) 500 mg tablet; Take 1 Tab by mouth every six (6) hours as needed for Pain. Follow-up Disposition:  Return if symptoms worsen or fail to improve.   current treatment plan is effective, no change in therapy

## 2017-02-23 NOTE — PROGRESS NOTES
Chart review due to multiple ED visits within the last 12 months. Pt was seen at:  [facility] visits  [facility] for [facility] visits    He is currently being seen for office 2/23/17.

## 2017-02-23 NOTE — MR AVS SNAPSHOT
Visit Information Date & Time Provider Department Dept. Phone Encounter #  
 2/23/2017  1:30 PM Todd Kaba NP [address] [phone] [id number] Upcoming Health Maintenance Date Due  
 EYE EXAM RETINAL OR DILATED Q1 12/28/1984 Pneumococcal 19-64 Highest Risk (1 of 3 - PCV13) 12/28/1993 DTaP/Tdap/Td series (1 - Tdap) 12/28/1995 HEMOGLOBIN A1C Q6M 11/4/2015 MICROALBUMIN Q1 7/7/2016 LIPID PANEL Q1 7/7/2016 FOOT EXAM Q1 4/27/2017 Allergies as of 2/23/2017  Review Complete On: 2/23/2017 By: Todd Kaba NP No Known Allergies Current Immunizations  Reviewed on 9/2/2015 Name Date Influenza Vaccine (Quad) PF 9/2/2015 Influenza Vaccine Whole 12/1/2009 Not reviewed this visit You Were Diagnosed With   
  
 Codes Comments Sore throat    -  Primary ICD-10-CM: J02.9 ICD-9-CM: 974 Ear pain, bilateral     ICD-10-CM: H92.03 
ICD-9-CM: 388.70 Body aches     ICD-10-CM: R52 ICD-9-CM: 780.96 Vitals BP  
  
  
  
  
  
 123/83 (BP 1 Location: Left arm, BP Patient Position: Sitting) BMI and BSA Data Body Mass Index Body Surface Area  
 33.82 kg/m 2 2.2 m 2 Preferred Pharmacy Pharmacy Name Phone [id number] [phone] [facility], [address] [place] [address] [phone] Your Updated Medication List  
  
   
This list is accurate as of: 2/23/17  2:00 PM.  Always use your most recent med list.  
  
  
  
  
 acetaminophen 500 mg tablet Commonly known as:  TYLENOL Take 1 Tab by mouth every six (6) hours as needed for Pain. acetaminophen-codeine 300-30 mg per tablet Commonly known as:  TYLENOL #3 Take 1 Tab by mouth every six (6) hours as needed for Pain for up to 2 days. Max Daily Amount: 4 Tabs. ALPRAZolam 1 mg tablet Commonly known as:  XANAX  
TAKE 1 TABLET BY MOUTH 3 TIMES DAILY AS NEEDED  
  
 amoxicillin 500 mg capsule Commonly known as:  AMOXIL Take 1 Cap by mouth two (2) times a day. atenolol 100 mg tablet Commonly known as:  TENORMIN Take 1 Tab by mouth two (2) times a day. azithromycin 250 mg tablet Commonly known as:  Tonny Ernst Take 2 tablets today, then take 1 tablet daily  
  
 benzonatate 200 mg capsule Commonly known as:  TESSALON  
TK ONE C PO 8 H PRF COUGH. TAKE WITH A GLASS OF WATER  
  
 hydroCHLOROthiazide 25 mg tablet Commonly known as:  HYDRODIURIL TK ONE T PO DAILY FOR 30 DAYS  
  
 ibuprofen 800 mg tablet Commonly known as:  MOTRIN Take 1 Tab by mouth every six (6) hours as needed for Pain.  
  
 magic mouthwash solution Take 10 mL by mouth two (2) times daily as needed for Stomatitis. Magic mouth wash  Maalox Lidocaine 2% viscous  Diphenhydramine oral solution   Pharmacy to mix equal portions of ingredients to a total volume as indicated in the dispense amount. metFORMIN 1,000 mg tablet Commonly known as:  GLUCOPHAGE Take 500 mg by mouth three (3) times daily. penicillin v potassium 500 mg tablet Commonly known as:  VEETID * QUEtiapine 100 mg tablet Commonly known as:  SEROquel TAKE 1 TABLET BY MOUTH AT BEDTIME  
  
 * QUEtiapine 400 mg tablet Commonly known as:  SEROquel 400 mg. ZOLOFT 100 mg tablet Generic drug:  sertraline Take 100 mg by mouth daily. * Notice: This list has 2 medication(s) that are the same as other medications prescribed for you. Read the directions carefully, and ask your doctor or other care provider to review them with you. Prescriptions Sent to Pharmacy Refills  
 acetaminophen (TYLENOL) 500 mg tablet 0 Sig: Take 1 Tab by mouth every six (6) hours as needed for Pain. Class: Normal  
 Pharmacy: [address], [address] [facility] Ph #: [phone] Route: Oral  
  
We Performed the Following REFERRAL TO ENT-OTOLARYNGOLOGY [BKF13 Custom] Comments: Please evaluate patient for Refractory pharyngitis associated with ear pain Referral Information Referral ID Referred By Referred To  
  
 [id number] MILES JOHNSON Not Available Visits Status Start Date End Date 1 New Request 2/23/17 2/23/18 If your referral has a status of pending review or denied, additional information will be sent to support the outcome of this decision. Introducing [facility]! Amaya Miller introduces [employer] patient portal. Now you can access parts of your medical record, email your doctor's office, and request medication refills online. 1. In your internet browser, go to [email or web address] 2. Click on the First Time User? Click Here link in the Sign In box. You will see the New Member Sign Up page. 3. Enter your [employer] Access Code exactly as it appears below. You will not need to use this code after youve completed the sign-up process. If you do not sign up before the expiration date, you must request a new code. · [employer] Access Code: [email or web address] Expires: 5/17/2017 11:43 AM 
 
4. Enter the last four digits of your Social Security Number (xxxx) and Date of Birth (mm/dd/yyyy) as indicated and click Submit. You will be taken to the next sign-up page. 5. Create a [employer] ID. This will be your [employer] login ID and cannot be changed, so think of one that is secure and easy to remember. 6. Create a [employer] password. You can change your password at any time. 7. Enter your Password Reset Question and Answer. This can be used at a later time if you forget your password. 8. Enter your e-mail address. You will receive e-mail notification when new information is available in [address] 9. Click Sign Up. You can now view and download portions of your medical record. 10. Click the Download Summary menu link to download a portable copy of your medical information. If you have questions, please visit the Frequently Asked Questions section of the [employer] website. Remember, [employer] is NOT to be used for urgent needs. For medical emergencies, dial 911. Now available from your iPhone and Android! Please provide this summary of care documentation to your next provider. Your primary care clinician is listed as Devora Peabody. If you have any questions after today's visit, please call [phone].

## 2017-03-03 ENCOUNTER — OFFICE VISIT (OUTPATIENT)
Dept: FAMILY MEDICINE CLINIC | Age: 42
End: 2017-03-03

## 2017-03-03 ENCOUNTER — HOSPITAL ENCOUNTER (OUTPATIENT)
Dept: LAB | Age: 42
Discharge: HOME | End: 2017-03-03
Payer: SUBSIDIZED

## 2017-03-03 VITALS
OXYGEN SATURATION: 95 % | BODY MASS INDEX: 31.35 KG/M2 | WEIGHT: 219 LBS | HEART RATE: 74 BPM | TEMPERATURE: 98.9 F | DIASTOLIC BLOOD PRESSURE: 82 MMHG | SYSTOLIC BLOOD PRESSURE: 125 MMHG | HEIGHT: 70 IN | RESPIRATION RATE: 16 BRPM

## 2017-03-03 DIAGNOSIS — E11.9 TYPE 2 DIABETES MELLITUS WITHOUT COMPLICATION, WITHOUT LONG-TERM CURRENT USE OF INSULIN (HCC): Primary | ICD-10-CM

## 2017-03-03 DIAGNOSIS — R42 DIZZINESS: ICD-10-CM

## 2017-03-03 DIAGNOSIS — G56.01 CARPAL TUNNEL SYNDROME ON RIGHT: ICD-10-CM

## 2017-03-03 PROBLEM — R52 BODY ACHES: Status: RESOLVED | Noted: 2017-02-21 | Resolved: 2017-03-03

## 2017-03-03 PROBLEM — J02.0 STREP THROAT: Status: RESOLVED | Noted: 2017-02-21 | Resolved: 2017-03-03

## 2017-03-03 LAB
ALBUMIN SERPL BCP-MCNC: 4.2 G/DL (ref 3.4–5)
ALBUMIN/GLOB SERPL: 1 {RATIO} (ref 0.8–1.7)
ALP SERPL-CCNC: 64 U/L (ref 45–117)
ALT SERPL-CCNC: 44 U/L (ref 16–61)
ANION GAP BLD CALC-SCNC: 11 MMOL/L (ref 3–18)
AST SERPL W P-5'-P-CCNC: 36 U/L (ref 15–37)
BILIRUB SERPL-MCNC: 0.3 MG/DL (ref 0.2–1)
BUN SERPL-MCNC: 15 MG/DL (ref 7–18)
BUN/CREAT SERPL: 14 (ref 12–20)
CALCIUM SERPL-MCNC: 9.7 MG/DL (ref 8.5–10.1)
CHLORIDE SERPL-SCNC: 96 MMOL/L (ref 100–108)
CO2 SERPL-SCNC: 28 MMOL/L (ref 21–32)
CREAT SERPL-MCNC: 1.04 MG/DL (ref 0.6–1.3)
GLOBULIN SER CALC-MCNC: 4.3 G/DL (ref 2–4)
GLUCOSE POC: 135 MG/DL
GLUCOSE SERPL-MCNC: 137 MG/DL (ref 74–99)
HBA1C MFR BLD HPLC: 8 %
POTASSIUM SERPL-SCNC: 3.3 MMOL/L (ref 3.5–5.5)
PROT SERPL-MCNC: 8.5 G/DL (ref 6.4–8.2)
SODIUM SERPL-SCNC: 135 MMOL/L (ref 136–145)

## 2017-03-03 PROCEDURE — 80053 COMPREHEN METABOLIC PANEL: CPT | Performed by: FAMILY MEDICINE

## 2017-03-03 RX ORDER — MELOXICAM 15 MG/1
15 TABLET ORAL
Qty: 30 TAB | Refills: 1 | Status: SHIPPED | OUTPATIENT
Start: 2017-03-03 | End: 2017-04-08 | Stop reason: CLARIF

## 2017-03-03 NOTE — PATIENT INSTRUCTIONS
For the carpal tunnel, wear the splint at all times except when bathing for the next couple of weeks. Switch from Motrin to meloxicam, it's better for inflammation and pain. If the wrist doesn't get better. We'll refer you to a specialist.    For the diabetes, add Januvia 50 mg every morning. Recheck with PCP in 6 weeks.
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
## 2017-03-03 NOTE — MR AVS SNAPSHOT
Visit Information Date & Time Provider Department Dept. Phone Encounter #  
 3/3/2017  1:30 PM Angelia Soto, [address] [place] [address] [phone] [id number] Follow-up Instructions Return in about 4 weeks (around 3/31/2017), or if symptoms worsen or fail to improve. Upcoming Health Maintenance Date Due  
 EYE EXAM RETINAL OR DILATED Q1 12/28/1984 Pneumococcal 19-64 Highest Risk (1 of 3 - PCV13) 12/28/1993 DTaP/Tdap/Td series (1 - Tdap) 12/28/1995 HEMOGLOBIN A1C Q6M 11/4/2015 MICROALBUMIN Q1 7/7/2016 LIPID PANEL Q1 7/7/2016 FOOT EXAM Q1 4/27/2017 Allergies as of 3/3/2017  Review Complete On: 3/3/2017 By: Millie Negrete LPN No Known Allergies Current Immunizations  Reviewed on 9/2/2015 Name Date Influenza Vaccine (Quad) PF 9/2/2015 Influenza Vaccine Whole 12/1/2009 Not reviewed this visit You Were Diagnosed With   
  
 Codes Comments Type 2 diabetes mellitus without complication, without long-term current use of insulin (HCC)    -  Primary ICD-10-CM: E11.9 ICD-9-CM: 250.00 Carpal tunnel syndrome on right     ICD-10-CM: G56.01 
ICD-9-CM: 354.0 Dizziness     ICD-10-CM: J21 ICD-9-CM: 780.4 Vitals BP  
  
  
  
  
  
 125/82 (BP 1 Location: Left arm, BP Patient Position: Sitting) BMI and BSA Data Body Mass Index Body Surface Area  
 31.42 kg/m 2 2.21 m 2 Preferred Pharmacy Pharmacy Name Phone [address] [phone] Your Updated Medication List  
  
   
This list is accurate as of: 3/3/17  2:13 PM.  Always use your most recent med list.  
  
  
  
  
 ALPRAZolam 1 mg tablet Commonly known as:  XANAX  
TAKE 1 TABLET BY MOUTH 3 TIMES DAILY AS NEEDED  
  
 atenolol 100 mg tablet Commonly known as:  TENORMIN Take 1 Tab by mouth two (2) times a day. hydroCHLOROthiazide 25 mg tablet Commonly known as:  HYDRODIURIL TK ONE T PO DAILY FOR 30 DAYS  
  
 meloxicam 15 mg tablet Commonly known as:  MOBIC Take 1 Tab by mouth daily as needed (inflammation). metFORMIN 1,000 mg tablet Commonly known as:  GLUCOPHAGE Take 500 mg by mouth three (3) times daily. QUEtiapine 400 mg tablet Commonly known as:  SEROquel 400 mg. SITagliptin 50 mg tablet Commonly known as:  Arleta Moons Take 1 Tab by mouth daily. ZOLOFT 100 mg tablet Generic drug:  sertraline Take 100 mg by mouth daily. Prescriptions Sent to Pharmacy Refills  
 meloxicam (MOBIC) 15 mg tablet 1 Sig: Take 1 Tab by mouth daily as needed (inflammation). Class: Normal  
 Pharmacy: [address]  #: [phone] Route: Oral  
 SITagliptin (JANUVIA) 50 mg tablet 2 Sig: Take 1 Tab by mouth daily. Class: Normal  
 Pharmacy: [address] Ph #: [phone] Route: Oral  
  
We Performed the Following AMB POC GLUCOSE BLOOD, BY GLUCOSE MONITORING DEVICE [04019 CPT(R)] AMB POC HEMOGLOBIN A1C [81457 CPT(R)] [facility] SUPPLY ORDER [[id number] Custom] Comments:  
 Premier wrist splint, [id number] Follow-up Instructions Return in about 4 weeks (around 3/31/2017), or if symptoms worsen or fail to improve. Patient Instructions For the carpal tunnel, wear the splint at all times except when bathing for the next couple of weeks. Switch from Motrin to meloxicam, it's better for inflammation and pain. If the wrist doesn't get better. We'll refer you to a specialist. 
 
For the diabetes, add Januvia 50 mg every morning. Recheck with PCP in 6 weeks. Introducing [facility]!    
 Jose M Hogue introduces i[employer] patient portal. Now you can access parts of your medical record, email your doctor's office, and request medication refills online. 1. In your internet browser, go to [email or web address] 2. Click on the First Time User? Click Here link in the Sign In box. You will see the New Member Sign Up page. 3. Enter your OpenX Access Code exactly as it appears below. You will not need to use this code after youve completed the sign-up process. If you do not sign up before the expiration date, you must request a new code. · OpenX Access Code: [email or web address] Expires: 5/17/2017 11:43 AM 
 
4. Enter the last four digits of your Social Security Number (xxxx) and Date of Birth (mm/dd/yyyy) as indicated and click Submit. You will be taken to the next sign-up page. 5. Create a OpenX ID. This will be your OpenX login ID and cannot be changed, so think of one that is secure and easy to remember. 6. Create a OpenX password. You can change your password at any time. 7. Enter your Password Reset Question and Answer. This can be used at a later time if you forget your password. 8. Enter your e-mail address. You will receive e-mail notification when new information is available in [address] 9. Click Sign Up. You can now view and download portions of your medical record. 10. Click the Download Summary menu link to download a portable copy of your medical information. If you have questions, please visit the Frequently Asked Questions section of the OpenX website. Remember, OpenX is NOT to be used for urgent needs. For medical emergencies, dial 911. Now available from your iPhone and Android! Please provide this summary of care documentation to your next provider. Your primary care clinician is listed as Edinson Hemphill. If you have any questions after today's visit, please call [phone].

## 2017-03-03 NOTE — PROGRESS NOTES
Patient presents to the clinic for carpal tunnel in both hands. Flu shot requested: received    Depression Screening Completed: yes    Learning Assessment Completed: yes    Abuse Screening Completed: n/a    Health Maintenance reviewed and discussed per provider: yes     Coordination of Care:    1. Have you been to the ER, urgent care clinic since your last visit? Hospitalized since your last visit? Yes, [facility]    2. Have you seen or consulted any other health care providers outside of the [address] [place] [address] since your last visit? Include any pap smears or colon screening.  no

## 2017-03-06 ENCOUNTER — TELEPHONE (OUTPATIENT)
Dept: FAMILY MEDICINE CLINIC | Age: 42
End: 2017-03-06

## 2017-03-06 DIAGNOSIS — G56.01 CARPAL TUNNEL SYNDROME OF RIGHT WRIST: Primary | ICD-10-CM

## 2017-03-06 NOTE — TELEPHONE ENCOUNTER
Patient called the office back. Patient stated he is experiencing a lot of pain in his right hand. Patient stated he is using 800 mg of ibuprofen along with Mobic and it is not helping his pain. Patient was advised Dr. Ferdinand Stone does not recommend taking ibuprofen and Mobic together because it can potentially damage the kidneys. Patient was informed Dr. Ferdinand Stone recommends taking tylenol with the Mobic. Patient was also informed Dr. Ferdinand Stone will place a referral to the hand specialist for further evaluation. Patient verbalized understanding and had no further questions.

## 2017-03-07 ENCOUNTER — TELEPHONE (OUTPATIENT)
Dept: FAMILY MEDICINE CLINIC | Age: 42
End: 2017-03-07

## 2017-03-07 NOTE — TELEPHONE ENCOUNTER
Pt called requesting an update on Ortho referral.     Pt was informed referral was generated yesterday 3/06/17 and faxed over. He will be contacted by Ortho to schedule an appt. Pt is aware and has spoken w/Aqeela about being unable to request pain medication, however he is requesting \"Tylenol 3\".

## 2017-03-07 NOTE — TELEPHONE ENCOUNTER
Patient call the office back. Patient stated he is having a lot of pain in his right hand and would like something for pain. Patient was informed Dr. Baudilio Patterson will not prescribe any narcotics because he received too many narcotics within the last 3 months. Patient stated he has an appointment with the hand specialist on 03/14/17 and he will wait until then. Patient verbalized understanding and had no further questions.

## 2017-03-10 ENCOUNTER — TELEPHONE (OUTPATIENT)
Dept: FAMILY MEDICINE CLINIC | Age: 42
End: 2017-03-10

## 2017-03-10 NOTE — TELEPHONE ENCOUNTER
Pt is requesting a prescription for a diabetes monitor, strips and needles. He also requested more pain meds and did not understand why he couldn't get any more to last him until his appointment. His appointment is March 14.

## 2017-03-14 RX ORDER — LANCETS
EACH MISCELLANEOUS
Qty: 1 EACH | Refills: 11 | Status: SHIPPED | OUTPATIENT
Start: 2017-03-14 | End: 2017-06-16 | Stop reason: CLARIF

## 2017-03-14 RX ORDER — INSULIN PUMP SYRINGE, 3 ML
EACH MISCELLANEOUS
Qty: 1 KIT | Refills: 0 | Status: SHIPPED | OUTPATIENT
Start: 2017-03-14 | End: 2017-06-16 | Stop reason: CLARIF

## 2017-03-20 ENCOUNTER — HOSPITAL ENCOUNTER (OUTPATIENT)
Dept: LAB | Age: 42
Discharge: HOME | End: 2017-03-20
Payer: SUBSIDIZED

## 2017-03-20 ENCOUNTER — OFFICE VISIT (OUTPATIENT)
Dept: FAMILY MEDICINE CLINIC | Age: 42
End: 2017-03-20

## 2017-03-20 VITALS
RESPIRATION RATE: 16 BRPM | HEIGHT: 70 IN | OXYGEN SATURATION: 96 % | WEIGHT: 221 LBS | DIASTOLIC BLOOD PRESSURE: 90 MMHG | SYSTOLIC BLOOD PRESSURE: 129 MMHG | BODY MASS INDEX: 31.64 KG/M2 | HEART RATE: 131 BPM | TEMPERATURE: 99.3 F

## 2017-03-20 DIAGNOSIS — I10 ESSENTIAL HYPERTENSION: ICD-10-CM

## 2017-03-20 DIAGNOSIS — R42 DIZZINESS: ICD-10-CM

## 2017-03-20 DIAGNOSIS — R10.84 GENERALIZED ABDOMINAL PAIN: ICD-10-CM

## 2017-03-20 DIAGNOSIS — R10.84 GENERALIZED ABDOMINAL PAIN: Primary | ICD-10-CM

## 2017-03-20 LAB
ALBUMIN SERPL BCP-MCNC: 4.5 G/DL (ref 3.4–5)
ALBUMIN/GLOB SERPL: 1 {RATIO} (ref 0.8–1.7)
ALP SERPL-CCNC: 72 U/L (ref 45–117)
ALT SERPL-CCNC: 32 U/L (ref 16–61)
ANION GAP BLD CALC-SCNC: 14 MMOL/L (ref 3–18)
AST SERPL W P-5'-P-CCNC: 27 U/L (ref 15–37)
BASOPHILS # BLD AUTO: 0 K/UL (ref 0–0.06)
BASOPHILS # BLD: 0 % (ref 0–2)
BILIRUB SERPL-MCNC: 0.3 MG/DL (ref 0.2–1)
BILIRUB UR QL STRIP: NEGATIVE
BUN SERPL-MCNC: 9 MG/DL (ref 7–18)
BUN/CREAT SERPL: 7 (ref 12–20)
CALCIUM SERPL-MCNC: 9.7 MG/DL (ref 8.5–10.1)
CHLORIDE SERPL-SCNC: 97 MMOL/L (ref 100–108)
CO2 SERPL-SCNC: 24 MMOL/L (ref 21–32)
CREAT SERPL-MCNC: 1.29 MG/DL (ref 0.6–1.3)
DIFFERENTIAL METHOD BLD: ABNORMAL
EOSINOPHIL # BLD: 0 K/UL (ref 0–0.4)
EOSINOPHIL NFR BLD: 1 % (ref 0–5)
ERYTHROCYTE [DISTWIDTH] IN BLOOD BY AUTOMATED COUNT: 14.1 % (ref 11.6–14.5)
GLOBULIN SER CALC-MCNC: 4.5 G/DL (ref 2–4)
GLUCOSE POC: 152 MG/DL
GLUCOSE SERPL-MCNC: 213 MG/DL (ref 74–99)
GLUCOSE UR-MCNC: NEGATIVE MG/DL
HCT VFR BLD AUTO: 42.5 % (ref 36–48)
HGB BLD-MCNC: 14 G/DL (ref 13–16)
KETONES P FAST UR STRIP-MCNC: NEGATIVE MG/DL
LIPASE SERPL-CCNC: 196 U/L (ref 73–393)
LYMPHOCYTES # BLD AUTO: 20 % (ref 21–52)
LYMPHOCYTES # BLD: 1.5 K/UL (ref 0.9–3.6)
MCH RBC QN AUTO: 28.5 PG (ref 24–34)
MCHC RBC AUTO-ENTMCNC: 32.9 G/DL (ref 31–37)
MCV RBC AUTO: 86.6 FL (ref 74–97)
MONOCYTES # BLD: 0.5 K/UL (ref 0.05–1.2)
MONOCYTES NFR BLD AUTO: 6 % (ref 3–10)
NEUTS SEG # BLD: 5.5 K/UL (ref 1.8–8)
NEUTS SEG NFR BLD AUTO: 73 % (ref 40–73)
PH UR STRIP: 6 [PH] (ref 4.6–8)
PLATELET # BLD AUTO: 297 K/UL (ref 135–420)
PMV BLD AUTO: 11.1 FL (ref 9.2–11.8)
POTASSIUM SERPL-SCNC: 3.6 MMOL/L (ref 3.5–5.5)
PROT SERPL-MCNC: 9 G/DL (ref 6.4–8.2)
PROT UR QL STRIP: NORMAL MG/DL
RBC # BLD AUTO: 4.91 M/UL (ref 4.7–5.5)
SODIUM SERPL-SCNC: 135 MMOL/L (ref 136–145)
SP GR UR STRIP: 1.02 (ref 1–1.03)
UA UROBILINOGEN AMB POC: NORMAL (ref 0.2–1)
URINALYSIS CLARITY POC: CLEAR
URINALYSIS COLOR POC: YELLOW
URINE BLOOD POC: NORMAL
URINE LEUKOCYTES POC: NEGATIVE
URINE NITRITES POC: NEGATIVE
WBC # BLD AUTO: 7.6 K/UL (ref 4.6–13.2)

## 2017-03-20 PROCEDURE — 85025 COMPLETE CBC W/AUTO DIFF WBC: CPT | Performed by: FAMILY MEDICINE

## 2017-03-20 PROCEDURE — 83690 ASSAY OF LIPASE: CPT | Performed by: FAMILY MEDICINE

## 2017-03-20 PROCEDURE — 80053 COMPREHEN METABOLIC PANEL: CPT | Performed by: FAMILY MEDICINE

## 2017-03-20 RX ORDER — ATENOLOL 100 MG/1
100 TABLET ORAL DAILY
Qty: 90 TAB | Refills: 1 | Status: SHIPPED | OUTPATIENT
Start: 2017-03-20 | End: 2017-04-10 | Stop reason: SDUPTHER

## 2017-03-20 NOTE — LETTER
NOTIFICATION RETURN TO WORK / SCHOOL 
 
3/20/2017 3:35 PM 
 
Mr. Petty Casas [address] [place] [id number] To Whom It May Concern: 
 
Petty Casas is currently under the care of [facility]. He will return to work/school on: 3/21/2017 If there are questions or concerns please have the patient contact our office. Sincerely, Viviana Lin MD

## 2017-03-20 NOTE — MR AVS SNAPSHOT
Visit Information Date & Time Provider Department Dept. Phone Encounter #  
 3/20/2017  3:30 PM Shawna Avelar Press [phone] [id number] Follow-up Instructions Return if symptoms worsen or fail to improve. Your Appointments 3/20/2017  3:30 PM  
Office Visit with MD Boston Mckeon [facility]) Appt Note: blood pressure and blood sugar issues Johnson Patiño [address] [place] [id number]  
[phone]  
  
   
 [address] [id number] Upcoming Health Maintenance Date Due  
 EYE EXAM RETINAL OR DILATED Q1 12/28/1984 Pneumococcal 19-64 Medium Risk (1 of 1 - PPSV23) 12/28/1993 DTaP/Tdap/Td series (1 - Tdap) 12/28/1995 MICROALBUMIN Q1 7/7/2016 LIPID PANEL Q1 7/7/2016 FOOT EXAM Q1 4/27/2017 HEMOGLOBIN A1C Q6M 9/3/2017 Allergies as of 3/20/2017  Review Complete On: 3/20/2017 By: Claudette Leos, LPN No Known Allergies Current Immunizations  Reviewed on 9/2/2015 Name Date Influenza Vaccine (Quad) PF 9/2/2015 Influenza Vaccine Whole 12/1/2009 Not reviewed this visit You Were Diagnosed With   
  
 Codes Comments Generalized abdominal pain    -  Primary ICD-10-CM: R10.84 ICD-9-CM: 789.07 Dizziness     ICD-10-CM: P27 ICD-9-CM: 780.4 Vitals BP Pulse Temp Resp Height(growth percentile) Weight(growth percentile) 129/90 (BP 1 Location: Right arm, BP Patient Position: Sitting) (!) 131 99.3 °F (37.4 °C) (Oral) 16 5' 10\" (1.778 m) 221 lb (100.2 kg) SpO2 BMI Smoking Status 96% 31.71 kg/m2 Never Smoker BMI and BSA Data Body Mass Index Body Surface Area 31.71 kg/m 2 2.22 m 2 Preferred Pharmacy Pharmacy Name Phone [facility] [address] [place] [address] [address] [phone] Your Updated Medication List  
  
   
 This list is accurate as of: 3/20/17  3:29 PM.  Always use your most recent med list.  
  
  
  
  
 ALPRAZolam 1 mg tablet Commonly known as:  XANAX  
TAKE 1 TABLET BY MOUTH 3 TIMES DAILY AS NEEDED  
  
 atenolol 100 mg tablet Commonly known as:  TENORMIN Take 1 Tab by mouth two (2) times a day. Blood-Glucose Meter monitoring kit Commonly known as:  Yazan Robertson Use as directed  
  
 glucose blood VI test strips strip Commonly known as:  ASCENSIA AUTODISC VI, ONE TOUCH ULTRA TEST VI Check blood sugar daily  
  
 hydroCHLOROthiazide 25 mg tablet Commonly known as:  HYDRODIURIL TK ONE T PO DAILY FOR 30 DAYS Lancets Misc Commonly known as:  ONETOUCH ULTRASOFT LANCETS Check blood sugar daily  
  
 meloxicam 15 mg tablet Commonly known as:  MOBIC Take 1 Tab by mouth daily as needed (inflammation). metFORMIN 1,000 mg tablet Commonly known as:  GLUCOPHAGE Take 500 mg by mouth two (2) times a day. QUEtiapine 400 mg tablet Commonly known as:  SEROquel 400 mg. SITagliptin 50 mg tablet Commonly known as:  Schuyler Karen Take 1 Tab by mouth daily. ZOLOFT 100 mg tablet Generic drug:  sertraline Take 100 mg by mouth daily. We Performed the Following AMB POC GLUCOSE BLOOD, BY GLUCOSE MONITORING DEVICE [53044 CPT(R)] AMB POC URINALYSIS DIP STICK AUTO W/O MICRO [57998 CPT(R)] Follow-up Instructions Return if symptoms worsen or fail to improve. Patient Instructions Observe, no treatment needed. We will call you tomorrow with the lab results. Recheck as needed, go to ER if you get worse. Introducing [facility]! Joseph Kowalski introduces [employer] patient portal. Now you can access parts of your medical record, email your doctor's office, and request medication refills online. 1. In your internet browser, go to [email or web address] 2. Click on the First Time User? Click Here link in the Sign In box. You will see the New Member Sign Up page. 3. Enter your Awesome.me Access Code exactly as it appears below. You will not need to use this code after youve completed the sign-up process. If you do not sign up before the expiration date, you must request a new code. · [email or web address] Access Code: [email or web address] Expires: 5/17/2017 12:43 PM 
 
4. Enter the last four digits of your Social Security Number (xxxx) and Date of Birth (mm/dd/yyyy) as indicated and click Submit. You will be taken to the next sign-up page. 5. Create a Awesome.me ID. This will be your Awesome.me login ID and cannot be changed, so think of one that is secure and easy to remember. 6. Create a Awesome.me password. You can change your password at any time. 7. Enter your Password Reset Question and Answer. This can be used at a later time if you forget your password. 8. Enter your e-mail address. You will receive e-mail notification when new information is available in [address] 9. Click Sign Up. You can now view and download portions of your medical record. 10. Click the Download Summary menu link to download a portable copy of your medical information. If you have questions, please visit the Frequently Asked Questions section of the [email or web address] website. Remember, Awesome.me is NOT to be used for urgent needs. For medical emergencies, dial 911. Now available from your iPhone and Android! Please provide this summary of care documentation to your next provider. Your primary care clinician is listed as Meagan Osullivan. If you have any questions after today's visit, please call [phone].

## 2017-03-20 NOTE — PROGRESS NOTES
Patient presents to the clinic for elevated blood sugar.  Patient states his blood sugar was 370 on 03/18/17

## 2017-03-20 NOTE — PROGRESS NOTES
HISTORY OF PRESENT ILLNESS  Glenna Kanner is a 42 y.o. male. HPI Comments: Mr. Du Wilder is here because he feels strange, dizzy and light-headed and his blood sugar was 370 this morning. I saw him a couple of weeks ago, his A1C was 8 at that time so i added Januvia to the regimen. He also c/o vague abdominal pain and points to both sides of his abdomen. No fever, chills, nausea or vomiting. Blood sugar problem   Associated symptoms include abdominal pain. Pertinent negatives include no chest pain, no headaches and no shortness of breath. Blood Pressure Check   Associated symptoms include abdominal pain. Pertinent negatives include no chest pain, no headaches and no shortness of breath. Review of Systems   Constitutional: Negative for chills and fever. Eyes: Negative for blurred vision and double vision. Respiratory: Negative for cough and shortness of breath. Cardiovascular: Negative for chest pain and palpitations. Gastrointestinal: Positive for abdominal pain. Negative for blood in stool, constipation, diarrhea, nausea and vomiting. Neurological: Positive for dizziness. Negative for tingling and headaches. Physical Exam   Constitutional: He appears well-developed and well-nourished. HENT:   Right Ear: Tympanic membrane, external ear and ear canal normal.   Left Ear: Tympanic membrane, external ear and ear canal normal.   Nose: Nose normal.   Mouth/Throat: Uvula is midline, oropharynx is clear and moist and mucous membranes are normal. No posterior oropharyngeal erythema. Eyes: Conjunctivae are normal. Pupils are equal, round, and reactive to light. Right conjunctiva is not injected. Left conjunctiva is not injected. Neck: Neck supple. No thyromegaly present. Cardiovascular: Normal rate and regular rhythm. Pulmonary/Chest: Effort normal and breath sounds normal. No respiratory distress. He has no wheezes. He has no rales. Abdominal: Soft.  Bowel sounds are normal. He exhibits no distension. There is no tenderness. There is no rebound and no guarding. Lymphadenopathy:     He has no cervical adenopathy. Skin: No rash noted. Nursing note and vitals reviewed. ASSESSMENT and PLAN    ICD-10-CM ICD-9-CM    1. Generalized abdominal pain R10.84 789.07 AMB POC URINALYSIS DIP STICK AUTO W/O MICRO      METABOLIC PANEL, COMPREHENSIVE      CBC WITH AUTOMATED DIFF      LIPASE   2. Dizziness R42 780.4 AMB POC GLUCOSE BLOOD, BY GLUCOSE MONITORING DEVICE      METABOLIC PANEL, COMPREHENSIVE   3.  Essential hypertension I10 401.9 atenolol (TENORMIN) 100 mg tablet

## 2017-03-20 NOTE — PATIENT INSTRUCTIONS
Observe, no treatment needed. We will call you tomorrow with the lab results. Recheck as needed, go to ER if you get worse.

## 2017-03-21 ENCOUNTER — TELEPHONE (OUTPATIENT)
Dept: FAMILY MEDICINE CLINIC | Age: 42
End: 2017-03-21

## 2017-03-21 NOTE — TELEPHONE ENCOUNTER
Patient called the office requesting his lab results. Patient was informed Dr. Margot Levy reviewed his lab results and it indicated his labs look fine. Patient still complains of dizziness, weakness, longer urination and agitation. Patient was advised Dr. Margot Levy recommends going to the ED if his symptoms continue. Patient verbalized understanding and had no further questions.

## 2017-03-30 ENCOUNTER — HOSPITAL ENCOUNTER (EMERGENCY)
Age: 42
Discharge: HOME | End: 2017-03-30
Attending: EMERGENCY MEDICINE | Admitting: EMERGENCY MEDICINE
Payer: SUBSIDIZED

## 2017-03-30 VITALS
DIASTOLIC BLOOD PRESSURE: 91 MMHG | SYSTOLIC BLOOD PRESSURE: 140 MMHG | RESPIRATION RATE: 16 BRPM | WEIGHT: 225 LBS | OXYGEN SATURATION: 100 % | HEART RATE: 60 BPM | BODY MASS INDEX: 32.28 KG/M2 | TEMPERATURE: 97.3 F

## 2017-03-30 DIAGNOSIS — K40.20 BILATERAL INGUINAL HERNIA WITHOUT OBSTRUCTION OR GANGRENE, RECURRENCE NOT SPECIFIED: ICD-10-CM

## 2017-03-30 DIAGNOSIS — R10.9 ABDOMINAL PAIN, OTHER SPECIFIED SITE: Primary | ICD-10-CM

## 2017-03-30 LAB
ALBUMIN SERPL BCP-MCNC: 3.9 G/DL (ref 3.4–5)
ALBUMIN/GLOB SERPL: 1 {RATIO} (ref 0.8–1.7)
ALP SERPL-CCNC: 54 U/L (ref 45–117)
ALT SERPL-CCNC: 43 U/L (ref 16–61)
ANION GAP BLD CALC-SCNC: 6 MMOL/L (ref 3–18)
AST SERPL W P-5'-P-CCNC: 31 U/L (ref 15–37)
BASOPHILS # BLD AUTO: 0 K/UL (ref 0–0.06)
BASOPHILS # BLD: 0 % (ref 0–2)
BILIRUB DIRECT SERPL-MCNC: <0.1 MG/DL (ref 0–0.2)
BILIRUB SERPL-MCNC: 0.4 MG/DL (ref 0.2–1)
BUN SERPL-MCNC: 6 MG/DL (ref 7–18)
BUN/CREAT SERPL: 7 (ref 12–20)
CALCIUM SERPL-MCNC: 8.9 MG/DL (ref 8.5–10.1)
CHLORIDE SERPL-SCNC: 102 MMOL/L (ref 100–108)
CO2 SERPL-SCNC: 30 MMOL/L (ref 21–32)
CREAT SERPL-MCNC: 0.86 MG/DL (ref 0.6–1.3)
DIFFERENTIAL METHOD BLD: ABNORMAL
EOSINOPHIL # BLD: 0.1 K/UL (ref 0–0.4)
EOSINOPHIL NFR BLD: 1 % (ref 0–5)
ERYTHROCYTE [DISTWIDTH] IN BLOOD BY AUTOMATED COUNT: 13.8 % (ref 11.6–14.5)
GLOBULIN SER CALC-MCNC: 4 G/DL (ref 2–4)
GLUCOSE SERPL-MCNC: 112 MG/DL (ref 74–99)
HCT VFR BLD AUTO: 37.3 % (ref 36–48)
HGB BLD-MCNC: 12.9 G/DL (ref 13–16)
LIPASE SERPL-CCNC: 214 U/L (ref 73–393)
LYMPHOCYTES # BLD AUTO: 27 % (ref 21–52)
LYMPHOCYTES # BLD: 2 K/UL (ref 0.9–3.6)
MCH RBC QN AUTO: 29.1 PG (ref 24–34)
MCHC RBC AUTO-ENTMCNC: 34.6 G/DL (ref 31–37)
MCV RBC AUTO: 84.2 FL (ref 74–97)
MONOCYTES # BLD: 0.6 K/UL (ref 0.05–1.2)
MONOCYTES NFR BLD AUTO: 9 % (ref 3–10)
NEUTS SEG # BLD: 4.6 K/UL (ref 1.8–8)
NEUTS SEG NFR BLD AUTO: 63 % (ref 40–73)
PLATELET # BLD AUTO: 262 K/UL (ref 135–420)
PMV BLD AUTO: 9.5 FL (ref 9.2–11.8)
POTASSIUM SERPL-SCNC: 3.8 MMOL/L (ref 3.5–5.5)
PROT SERPL-MCNC: 7.9 G/DL (ref 6.4–8.2)
RBC # BLD AUTO: 4.43 M/UL (ref 4.7–5.5)
SODIUM SERPL-SCNC: 138 MMOL/L (ref 136–145)
WBC # BLD AUTO: 7.3 K/UL (ref 4.6–13.2)

## 2017-03-30 PROCEDURE — 85025 COMPLETE CBC W/AUTO DIFF WBC: CPT | Performed by: EMERGENCY MEDICINE

## 2017-03-30 PROCEDURE — 96374 THER/PROPH/DIAG INJ IV PUSH: CPT

## 2017-03-30 PROCEDURE — 99284 EMERGENCY DEPT VISIT MOD MDM: CPT

## 2017-03-30 PROCEDURE — 80076 HEPATIC FUNCTION PANEL: CPT | Performed by: EMERGENCY MEDICINE

## 2017-03-30 PROCEDURE — 74011250636 HC RX REV CODE- 250/636: Performed by: EMERGENCY MEDICINE

## 2017-03-30 PROCEDURE — 80048 BASIC METABOLIC PNL TOTAL CA: CPT | Performed by: EMERGENCY MEDICINE

## 2017-03-30 PROCEDURE — 96361 HYDRATE IV INFUSION ADD-ON: CPT

## 2017-03-30 PROCEDURE — 83690 ASSAY OF LIPASE: CPT | Performed by: EMERGENCY MEDICINE

## 2017-03-30 RX ORDER — OXYCODONE AND ACETAMINOPHEN 5; 325 MG/1; MG/1
TABLET ORAL
COMMUNITY
End: 2017-04-08 | Stop reason: CLARIF

## 2017-03-30 RX ORDER — KETOROLAC TROMETHAMINE 30 MG/ML
30 INJECTION, SOLUTION INTRAMUSCULAR; INTRAVENOUS
Status: COMPLETED | OUTPATIENT
Start: 2017-03-30 | End: 2017-03-30

## 2017-03-30 RX ADMIN — KETOROLAC TROMETHAMINE 30 MG: 30 INJECTION, SOLUTION INTRAMUSCULAR at 20:50

## 2017-03-30 RX ADMIN — SODIUM CHLORIDE 1000 ML: 900 INJECTION, SOLUTION INTRAVENOUS at 20:54

## 2017-03-30 NOTE — ED TRIAGE NOTES
Seen at [facility] clinic for hernia. Told has bilateral hernias and will call with surgery time in next few days. Given percocet  With no relief.

## 2017-03-31 NOTE — ED PROVIDER NOTES
HPI Comments: Cristina Burch is a 42 y.o. Male with h/o bilateral inguinal hernias recently diagnosed on ct with c/o worsening pain in both groins, l>r for last day. Seen yesterday at [facility] and today at [address] [place] [address] by surgery and will get elective repair set up. Pt states the pain is worse, sharp, stabbing with nv x 3 yesterday. . Passing urine without diff. No fcs, cough, cp. Worse with movement    The history is provided by the patient and medical records. Past Medical History:   Diagnosis Date    Depression     Diabetes ([facility] [address]     Herniated lumbar intervertebral disc     Hypertension     Neurological disorder L3,4,5 torn Herniated disc       Past Surgical History:   Procedure Laterality Date    HX ORTHOPAEDIC  trigger finger release         Family History:   Problem Relation Age of Onset    Hypertension Mother     Diabetes Mother     Heart Failure Mother     COPD Mother     Heart Disease Mother     Hypertension Father     Alcohol abuse Father        Social History     Social History    Marital status:      Spouse name: N/A    Number of children: N/A    Years of education: N/A     Occupational History    Not on file. Social History Main Topics    Smoking status: Never Smoker    Smokeless tobacco: Never Used    Alcohol use Yes      Comment: rarely    Drug use: No    Sexual activity: Yes     Partners: Female     Birth control/ protection: Condom     Other Topics Concern    Not on file     Social History Narrative         ALLERGIES: Review of patient's allergies indicates no known allergies. Review of Systems   Constitutional: Positive for appetite change. Negative for fever. HENT: Negative for trouble swallowing. Eyes: Negative for visual disturbance. Respiratory: Negative for cough and shortness of breath. Cardiovascular: Negative for chest pain. Gastrointestinal: Positive for abdominal pain and nausea. Negative for diarrhea.    Endocrine: Negative for polyuria. Genitourinary: Negative for difficulty urinating, frequency and hematuria. Musculoskeletal: Negative for gait problem. Skin: Negative for rash. Allergic/Immunologic: Negative for immunocompromised state. Neurological: Negative for syncope. Hematological: Does not bruise/bleed easily. Psychiatric/Behavioral: Positive for sleep disturbance. Vitals:    03/30/17 1906   BP: (!) 140/91   Pulse: 60   Resp: 16   Temp: 97.3 °F (36.3 °C)   SpO2: 100%   Weight: 102.1 kg (225 lb)            Physical Exam   Constitutional: He is oriented to person, place, and time. Non-toxic appearance. He does not appear ill. He appears distressed (seems uncomfortable). HENT:   Head: Normocephalic and atraumatic. Right Ear: External ear normal.   Left Ear: External ear normal.   Nose: Nose normal.   Mouth/Throat: Oropharynx is clear and moist. No oropharyngeal exudate. Eyes: Conjunctivae are normal.   Neck: Normal range of motion. Cardiovascular: Normal rate, regular rhythm, normal heart sounds and intact distal pulses. Pulmonary/Chest: Effort normal and breath sounds normal. No respiratory distress. Abdominal: Soft. He exhibits no distension and no mass. There is no hepatosplenomegaly. There is tenderness. There is no rigidity, no rebound, no guarding and no CVA tenderness. Musculoskeletal: Normal range of motion. He exhibits no edema. Neurological: He is alert and oriented to person, place, and time. Skin: Skin is warm and dry. He is not diaphoretic. Psychiatric: His behavior is normal.   Nursing note and vitals reviewed.        [facility]  ED Course       Procedures  Vitals:  Patient Vitals for the past 12 hrs:   Temp Pulse Resp BP SpO2   03/30/17 1906 97.3 °F (36.3 °C) 60 16 (!) 140/91 100 %         Medications ordered:   Medications   sodium chloride 0.9 % bolus infusion 1,000 mL (0 mL IntraVENous IV Completed 3/30/17 2158)   ketorolac (TORADOL) injection 30 mg (30 mg IntraVENous Given 3/30/17 2050)         Lab findings:  Recent Results (from the past 12 hour(s))   CBC WITH AUTOMATED DIFF    Collection Time: 03/30/17  8:00 PM   Result Value Ref Range    WBC 7.3 4.6 - 13.2 K/uL    RBC 4.43 (L) 4.70 - 5.50 M/uL    HGB 12.9 (L) 13.0 - 16.0 g/dL    HCT 37.3 36.0 - 48.0 %    MCV 84.2 74.0 - 97.0 FL    MCH 29.1 24.0 - 34.0 PG    MCHC 34.6 31.0 - 37.0 g/dL    RDW 13.8 11.6 - 14.5 %    PLATELET 522 478 - 638 K/uL    MPV 9.5 9.2 - 11.8 FL    NEUTROPHILS 63 40 - 73 %    LYMPHOCYTES 27 21 - 52 %    MONOCYTES 9 3 - 10 %    EOSINOPHILS 1 0 - 5 %    BASOPHILS 0 0 - 2 %    ABS. NEUTROPHILS 4.6 1.8 - 8.0 K/UL    ABS. LYMPHOCYTES 2.0 0.9 - 3.6 K/UL    ABS. MONOCYTES 0.6 0.05 - 1.2 K/UL    ABS. EOSINOPHILS 0.1 0.0 - 0.4 K/UL    ABS. BASOPHILS 0.0 0.0 - 0.06 K/UL    DF AUTOMATED     HEPATIC FUNCTION PANEL    Collection Time: 03/30/17  8:00 PM   Result Value Ref Range    Protein, total 7.9 6.4 - 8.2 g/dL    Albumin 3.9 3.4 - 5.0 g/dL    Globulin 4.0 2.0 - 4.0 g/dL    A-G Ratio 1.0 0.8 - 1.7      Bilirubin, total 0.4 0.2 - 1.0 MG/DL    Bilirubin, direct <0.1 0.0 - 0.2 MG/DL    Alk.  phosphatase 54 45 - 117 U/L    AST (SGOT) 31 15 - 37 U/L    ALT (SGPT) 43 16 - 61 U/L   LIPASE    Collection Time: 03/30/17  8:00 PM   Result Value Ref Range    Lipase 214 73 - 999 U/L   METABOLIC PANEL, BASIC    Collection Time: 03/30/17  8:00 PM   Result Value Ref Range    Sodium 138 136 - 145 mmol/L    Potassium 3.8 3.5 - 5.5 mmol/L    Chloride 102 100 - 108 mmol/L    CO2 30 21 - 32 mmol/L    Anion gap 6 3.0 - 18 mmol/L    Glucose 112 (H) 74 - 99 mg/dL    BUN 6 (L) 7.0 - 18 MG/DL    Creatinine 0.86 0.6 - 1.3 MG/DL    BUN/Creatinine ratio 7 (L) 12 - 20      GFR est AA >60 >60 ml/min/1.73m2    GFR est non-AA >60 >60 ml/min/1.73m2    Calcium 8.9 8.5 - 10.1 MG/DL       EKG interpretation by ED Physician:      X-Ray, CT or other radiology findings or impressions:  No orders to display   ct abd/pel 3/29    1.  No acute intra-abdominal findings.  No etiology for patient's clinical symptoms. 2.  Small, probable left renal cysts. 3.  Diverticulosis. 4.  Inguinal hernias, fat-containing.  No bowel involvement. 5.  Mild hepatic steatosis. Progress notes, Consult notes or additional Procedure notes:   Pain improved; doubt need for other work up, repeat imaging. No bowel containing hernias. Dw/ pt need to take meds as directed, surgical f/u    Reevaluation of patient:   Stable for d/ c    Disposition:  Diagnosis:   1. Abdominal pain, other specified site    2. Bilateral inguinal hernia without obstruction or gangrene, recurrence not specified        Disposition: home      Follow-up Information     Follow up With Details Comments Contact Info    follow up with your surgery appointment as directed               Discharge Medication List as of 3/30/2017  9:51 PM      CONTINUE these medications which have NOT CHANGED    Details   oxyCODONE-acetaminophen (PERCOCET) 5-325 mg per tablet Take  by mouth every four (4) hours as needed for Pain., Historical Med      atenolol (TENORMIN) 100 mg tablet Take 1 Tab by mouth daily. , Normal, Disp-90 Tab, R-1      glucose blood VI test strips (ASCENSIA AUTODISC VI, ONE TOUCH ULTRA TEST VI) strip Check blood sugar daily, Normal, Disp-100 Strip, R-1      Lancets (ONETOUCH ULTRASOFT LANCETS) misc Check blood sugar daily, Normal, Disp-1 Each, R-11      Blood-Glucose Meter (ONETOUCH ULTRA2) monitoring kit Use as directed, Normal, Disp-1 Kit, R-0      meloxicam (MOBIC) 15 mg tablet Take 1 Tab by mouth daily as needed (inflammation). , Normal, Disp-30 Tab, R-1      SITagliptin (JANUVIA) 50 mg tablet Take 1 Tab by mouth daily. , Normal, Disp-30 Tab, R-2      ALPRAZolam (XANAX) 1 mg tablet TAKE 1 TABLET BY MOUTH 3 TIMES DAILY AS NEEDED, Historical Med, R-1      hydroCHLOROthiazide (HYDRODIURIL) 25 mg tablet TK ONE T PO DAILY FOR 30 DAYS, Historical Med, R-2      QUEtiapine (SEROQUEL) 400 mg tablet 400 mg., Historical Med      sertraline (ZOLOFT) 100 mg tablet Take 100 mg by mouth daily. , Historical Med      metFORMIN (GLUCOPHAGE) 1,000 mg tablet Take 500 mg by mouth two (2) times a day., Historical Med

## 2017-04-08 ENCOUNTER — APPOINTMENT (OUTPATIENT)
Dept: GENERAL RADIOLOGY | Age: 42
End: 2017-04-08
Attending: EMERGENCY MEDICINE
Payer: COMMERCIAL

## 2017-04-08 ENCOUNTER — HOSPITAL ENCOUNTER (EMERGENCY)
Age: 42
Discharge: HOME | End: 2017-04-09
Attending: EMERGENCY MEDICINE
Payer: COMMERCIAL

## 2017-04-08 VITALS
WEIGHT: 223 LBS | DIASTOLIC BLOOD PRESSURE: 106 MMHG | RESPIRATION RATE: 18 BRPM | SYSTOLIC BLOOD PRESSURE: 151 MMHG | HEIGHT: 70 IN | BODY MASS INDEX: 31.92 KG/M2 | OXYGEN SATURATION: 99 % | TEMPERATURE: 97.7 F | HEART RATE: 71 BPM

## 2017-04-08 DIAGNOSIS — R44.0 AUDITORY HALLUCINATION: ICD-10-CM

## 2017-04-08 DIAGNOSIS — R45.851 SUICIDAL THOUGHTS: Primary | ICD-10-CM

## 2017-04-08 LAB
ALBUMIN SERPL BCP-MCNC: 4 G/DL (ref 3.4–5)
ALBUMIN/GLOB SERPL: 1.2 {RATIO} (ref 0.8–1.7)
ALP SERPL-CCNC: 66 U/L (ref 45–117)
ALT SERPL-CCNC: 23 U/L (ref 16–61)
AMPHET UR QL SCN: NEGATIVE
ANION GAP BLD CALC-SCNC: 6 MMOL/L (ref 3–18)
APAP SERPL-MCNC: <2 UG/ML (ref 10–30)
AST SERPL W P-5'-P-CCNC: 13 U/L (ref 15–37)
BARBITURATES UR QL SCN: NEGATIVE
BASOPHILS # BLD AUTO: 0 K/UL (ref 0–0.06)
BASOPHILS # BLD: 0 % (ref 0–2)
BENZODIAZ UR QL: POSITIVE
BILIRUB SERPL-MCNC: 0.3 MG/DL (ref 0.2–1)
BUN SERPL-MCNC: 9 MG/DL (ref 7–18)
BUN/CREAT SERPL: 9 (ref 12–20)
CALCIUM SERPL-MCNC: 8.3 MG/DL (ref 8.5–10.1)
CANNABINOIDS UR QL SCN: NEGATIVE
CHLORIDE SERPL-SCNC: 107 MMOL/L (ref 100–108)
CO2 SERPL-SCNC: 27 MMOL/L (ref 21–32)
COCAINE UR QL SCN: NEGATIVE
CREAT SERPL-MCNC: 0.96 MG/DL (ref 0.6–1.3)
DIFFERENTIAL METHOD BLD: ABNORMAL
EOSINOPHIL # BLD: 0.1 K/UL (ref 0–0.4)
EOSINOPHIL NFR BLD: 1 % (ref 0–5)
ERYTHROCYTE [DISTWIDTH] IN BLOOD BY AUTOMATED COUNT: 14.1 % (ref 11.6–14.5)
ETHANOL SERPL-MCNC: <3 MG/DL (ref 0–3)
GLOBULIN SER CALC-MCNC: 3.3 G/DL (ref 2–4)
GLUCOSE SERPL-MCNC: 228 MG/DL (ref 74–99)
HCT VFR BLD AUTO: 35.1 % (ref 36–48)
HDSCOM,HDSCOM: ABNORMAL
HGB BLD-MCNC: 11.9 G/DL (ref 13–16)
LIPASE SERPL-CCNC: 218 U/L (ref 73–393)
LYMPHOCYTES # BLD AUTO: 19 % (ref 21–52)
LYMPHOCYTES # BLD: 1.3 K/UL (ref 0.9–3.6)
MCH RBC QN AUTO: 28.6 PG (ref 24–34)
MCHC RBC AUTO-ENTMCNC: 33.9 G/DL (ref 31–37)
MCV RBC AUTO: 84.4 FL (ref 74–97)
METHADONE UR QL: NEGATIVE
MONOCYTES # BLD: 0.5 K/UL (ref 0.05–1.2)
MONOCYTES NFR BLD AUTO: 7 % (ref 3–10)
NEUTS SEG # BLD: 5 K/UL (ref 1.8–8)
NEUTS SEG NFR BLD AUTO: 73 % (ref 40–73)
OPIATES UR QL: NEGATIVE
PCP UR QL: NEGATIVE
PLATELET # BLD AUTO: 195 K/UL (ref 135–420)
PMV BLD AUTO: 9.5 FL (ref 9.2–11.8)
POTASSIUM SERPL-SCNC: 3.9 MMOL/L (ref 3.5–5.5)
PROT SERPL-MCNC: 7.3 G/DL (ref 6.4–8.2)
RBC # BLD AUTO: 4.16 M/UL (ref 4.7–5.5)
SALICYLATES SERPL-MCNC: <2.8 MG/DL (ref 2.8–20)
SODIUM SERPL-SCNC: 140 MMOL/L (ref 136–145)
WBC # BLD AUTO: 6.9 K/UL (ref 4.6–13.2)

## 2017-04-08 PROCEDURE — 80307 DRUG TEST PRSMV CHEM ANLYZR: CPT | Performed by: EMERGENCY MEDICINE

## 2017-04-08 PROCEDURE — 74011250636 HC RX REV CODE- 250/636: Performed by: EMERGENCY MEDICINE

## 2017-04-08 PROCEDURE — 74020 XR ABD FLAT/ ERECT: CPT

## 2017-04-08 PROCEDURE — 74011250637 HC RX REV CODE- 250/637: Performed by: EMERGENCY MEDICINE

## 2017-04-08 PROCEDURE — 83690 ASSAY OF LIPASE: CPT | Performed by: EMERGENCY MEDICINE

## 2017-04-08 PROCEDURE — 85025 COMPLETE CBC W/AUTO DIFF WBC: CPT | Performed by: EMERGENCY MEDICINE

## 2017-04-08 PROCEDURE — 99285 EMERGENCY DEPT VISIT HI MDM: CPT

## 2017-04-08 PROCEDURE — 96374 THER/PROPH/DIAG INJ IV PUSH: CPT

## 2017-04-08 PROCEDURE — 80053 COMPREHEN METABOLIC PANEL: CPT | Performed by: EMERGENCY MEDICINE

## 2017-04-08 RX ORDER — ALPRAZOLAM 0.5 MG/1
1 TABLET ORAL
Status: COMPLETED | OUTPATIENT
Start: 2017-04-08 | End: 2017-04-08

## 2017-04-08 RX ORDER — KETOROLAC TROMETHAMINE 30 MG/ML
30 INJECTION, SOLUTION INTRAMUSCULAR; INTRAVENOUS
Status: COMPLETED | OUTPATIENT
Start: 2017-04-08 | End: 2017-04-08

## 2017-04-08 RX ORDER — KETOROLAC TROMETHAMINE 30 MG/ML
30 INJECTION, SOLUTION INTRAMUSCULAR; INTRAVENOUS
Status: DISCONTINUED | OUTPATIENT
Start: 2017-04-08 | End: 2017-04-09 | Stop reason: HOSPADM

## 2017-04-08 RX ORDER — METFORMIN HYDROCHLORIDE 500 MG/1
1000 TABLET ORAL 2 TIMES DAILY WITH MEALS
Status: DISCONTINUED | OUTPATIENT
Start: 2017-04-08 | End: 2017-04-09 | Stop reason: HOSPADM

## 2017-04-08 RX ORDER — ATENOLOL 50 MG/1
100 TABLET ORAL DAILY
Status: DISCONTINUED | OUTPATIENT
Start: 2017-04-08 | End: 2017-04-09 | Stop reason: HOSPADM

## 2017-04-08 RX ADMIN — ATENOLOL 100 MG: 50 TABLET ORAL at 18:50

## 2017-04-08 RX ADMIN — SITAGLIPTIN 50 MG: 50 TABLET, FILM COATED ORAL at 20:07

## 2017-04-08 RX ADMIN — METFORMIN HYDROCHLORIDE 1000 MG: 500 TABLET, FILM COATED ORAL at 18:50

## 2017-04-08 RX ADMIN — ALPRAZOLAM 1 MG: 0.5 TABLET ORAL at 22:42

## 2017-04-08 RX ADMIN — KETOROLAC TROMETHAMINE 30 MG: 30 INJECTION, SOLUTION INTRAMUSCULAR at 18:52

## 2017-04-08 NOTE — ED TRIAGE NOTES
Patient seen here on 2/17 for SI, patient stats that he is having thoughts of hurting self again and also has abdominal pain

## 2017-04-08 NOTE — ED PROVIDER NOTES
HPI Comments: 6:05 PM Vani Nicole is a 42 y.o. male with a history of depression presenting to the ED with SI. He has been admitted for this before his last admission was in May 16. He also reports decreased sleep and auditory hallucinations as associated symptoms. Pt also reports abd pain secondary to an inguinal hernia that he is supposed to have surgically corrected. Pt denies nausea, vomiting, diarrhea, fever, dysuria and cough. He has not used EtOH or illicit drug use. He does not have a plan for harming himself. No other complaints at this time. Patient is a 42 y.o. male presenting with suicidal ideation and abdominal pain. The history is provided by the patient. Suicidal   Pertinent negatives include no shortness of breath, no chest pain, no headaches and no nausea. Abdominal Pain    Pertinent negatives include no fever, no nausea, no dysuria, no headaches, no arthralgias and no chest pain. Past Medical History:   Diagnosis Date    Depression     Diabetes ([address]     Herniated lumbar intervertebral disc     Hypertension     Neurological disorder L3,4,5 torn Herniated disc       Past Surgical History:   Procedure Laterality Date    HX ORTHOPAEDIC  trigger finger release         Family History:   Problem Relation Age of Onset    Hypertension Mother     Diabetes Mother     Heart Failure Mother     COPD Mother     Heart Disease Mother     Hypertension Father     Alcohol abuse Father        Social History     Social History    Marital status:      Spouse name: N/A    Number of children: N/A    Years of education: N/A     Occupational History    Not on file.      Social History Main Topics    Smoking status: Never Smoker    Smokeless tobacco: Never Used    Alcohol use Yes      Comment: rarely    Drug use: No    Sexual activity: Yes     Partners: Female     Birth control/ protection: Condom     Other Topics Concern    Not on file     Social History Narrative         ALLERGIES: Review of patient's allergies indicates no known allergies. Review of Systems   Constitutional: Negative for fever. HENT: Negative for sore throat. Eyes: Negative for redness and visual disturbance. Respiratory: Negative for shortness of breath and wheezing. Cardiovascular: Negative for chest pain. Gastrointestinal: Positive for abdominal pain. Negative for nausea. Endocrine: Negative for polyuria. Genitourinary: Negative for dysuria. Musculoskeletal: Negative for arthralgias and neck stiffness. Skin: Negative for rash. Neurological: Negative for headaches. Psychiatric/Behavioral: Positive for hallucinations and suicidal ideas. All other systems reviewed and are negative. Vitals:    04/08/17 1659   BP: (!) 151/106   Pulse: 71   Resp: 18   Temp: 97.7 °F (36.5 °C)   SpO2: 99%   Weight: 101.2 kg (223 lb)   Height: 5' 10\" (1.778 m)            Physical Exam   Constitutional: He is oriented to person, place, and time. He appears well-developed and well-nourished. No distress. HENT:   Head: Normocephalic and atraumatic. Mouth/Throat: Oropharynx is clear and moist.   Eyes: Conjunctivae are normal. Pupils are equal, round, and reactive to light. No scleral icterus. Neck: Normal range of motion. Neck supple. Cardiovascular: Intact distal pulses. Capillary refill < 3 seconds   Pulmonary/Chest: Effort normal and breath sounds normal. No respiratory distress. He has no wheezes. Abdominal: Soft. Bowel sounds are normal. He exhibits no distension. There is no tenderness. Genitourinary:   Genitourinary Comments: No current inguinal hernia palpated. No testicular tenderness   Musculoskeletal: Normal range of motion. He exhibits no edema. Lymphadenopathy:     He has no cervical adenopathy. Neurological: He is alert and oriented to person, place, and time. No cranial nerve deficit. Skin: Skin is warm and dry. He is not diaphoretic.    Psychiatric: Flat affect   Nursing note and vitals reviewed. MDM  Number of Diagnoses or Management Options  Auditory hallucination:   Suicidal thoughts:   Diagnosis management comments: Pt with S.I and hearing voices. With get labs to clear for tele psychiatry to eval him. Pt with hx inguinal hernia; not palpated hernia or scrotal tenderness on my exam, pt had recent CT abd showed inguinal hernia and no bowel involvement, was fat containing. Amount and/or Complexity of Data Reviewed  Clinical lab tests: ordered  Tests in the radiology section of CPT®: ordered and reviewed  Tests in the medicine section of CPT®: ordered and reviewed      ED Course       Procedures      . PROGRESS NOTES  6:06 PM: Paris Murphy DO arrives to the bedside to evaluate the patient. Answered the patient's questions regarding the treatment plan. 7:00 PM aPris Murphy DO signed out the patient to Dr. Henry Oropeza pending telepsych evaluation and dispo.     ED PHYSICIAN ORDERS  Orders Placed This Encounter    XR ABD FLAT/ ERECT     Standing Status:   Standing     Number of Occurrences:   1     Order Specific Question:   Transport     Answer:   Stretcher [5]     Order Specific Question:   Reason for Exam     Answer:   abdo pain    CBC WITH AUTOMATED DIFF     Standing Status:   Standing     Number of Occurrences:   1    METABOLIC PANEL, COMPREHENSIVE     Standing Status:   Standing     Number of Occurrences:   1    LIPASE     Standing Status:   Standing     Number of Occurrences:   1    DRUG SCREEN, URINE     Standing Status:   Standing     Number of Occurrences:   1    ETHYL ALCOHOL     Standing Status:   Standing     Number of Occurrences:   1    ACETAMINOPHEN     Standing Status:   Standing     Number of Occurrences:   1    SALICYLATE     Standing Status:   Standing     Number of Occurrences:   1    SITTER     Standing Status:   Standing     Number of Occurrences:   1    IP CONSULT TO PSYCHIATRY     Standing Status:   Standing Number of Occurrences:   1     Order Specific Question:   Reason for Consult: Answer:   SI, hearing voices     Order Specific Question:   Did you call or speak to the consulting provider? Answer:   No     Order Specific Question:   Consult To     Answer:   tele psychiatry     Order Specific Question:   Schedule When? Answer:   TODAY    SUICIDE PRECAUTIONS     Standing Status:   Standing     Number of Occurrences:   1    ketorolac (TORADOL) injection 30 mg    SITagliptin (JANUVIA) tablet 50 mg    atenolol (TENORMIN) tablet 100 mg    metFORMIN (GLUCOPHAGE) tablet 1,000 mg           MEDICATIONS ORDERED  Medications   SITagliptin (JANUVIA) tablet 50 mg (50 mg Oral Given 4/8/17 2007)   atenolol (TENORMIN) tablet 100 mg (100 mg Oral Given 4/8/17 1850)   metFORMIN (GLUCOPHAGE) tablet 1,000 mg (1,000 mg Oral Given 4/8/17 1850)   ketorolac (TORADOL) injection 30 mg (30 mg IntraVENous Given 4/8/17 1852)           RADIOLOGY INTERPRETATIONS  XR ABD FLAT/ ERECT      no free air no obstruction no acute process  Per Marah Rogers, DO         CT abd pelvis at [place] on 3/29/17: Impression:     1.  No acute intra-abdominal findings.  No etiology for patient's clinical symptoms. 2.  Small, probable left renal cysts. 3.  Diverticulosis. 4.  Inguinal hernias, fat-containing.  No bowel involvement. 5.  Mild hepatic steatosis.         LABORATORY RESULTS  Recent Results (from the past 12 hour(s))   DRUG SCREEN, URINE    Collection Time: 04/08/17  5:01 PM   Result Value Ref Range    BENZODIAZEPINE POSITIVE (A) NEG      BARBITURATES NEGATIVE  NEG      THC (TH-CANNABINOL) NEGATIVE  NEG      OPIATES NEGATIVE  NEG      PCP(PHENCYCLIDINE) NEGATIVE  NEG      COCAINE NEGATIVE  NEG      AMPHETAMINE NEGATIVE  NEG      METHADONE NEGATIVE       HDSCOM (NOTE)    CBC WITH AUTOMATED DIFF    Collection Time: 04/08/17  5:43 PM   Result Value Ref Range    WBC 6.9 4.6 - 13.2 K/uL    RBC 4.16 (L) 4.70 - 5.50 M/uL    HGB 11.9 (L) 13.0 - 16.0 g/dL    HCT 35.1 (L) 36.0 - 48.0 %    MCV 84.4 74.0 - 97.0 FL    MCH 28.6 24.0 - 34.0 PG    MCHC 33.9 31.0 - 37.0 g/dL    RDW 14.1 11.6 - 14.5 %    PLATELET 174 887 - 565 K/uL    MPV 9.5 9.2 - 11.8 FL    NEUTROPHILS 73 40 - 73 %    LYMPHOCYTES 19 (L) 21 - 52 %    MONOCYTES 7 3 - 10 %    EOSINOPHILS 1 0 - 5 %    BASOPHILS 0 0 - 2 %    ABS. NEUTROPHILS 5.0 1.8 - 8.0 K/UL    ABS. LYMPHOCYTES 1.3 0.9 - 3.6 K/UL    ABS. MONOCYTES 0.5 0.05 - 1.2 K/UL    ABS. EOSINOPHILS 0.1 0.0 - 0.4 K/UL    ABS. BASOPHILS 0.0 0.0 - 0.06 K/UL    DF AUTOMATED     METABOLIC PANEL, COMPREHENSIVE    Collection Time: 04/08/17  5:43 PM   Result Value Ref Range    Sodium 140 136 - 145 mmol/L    Potassium 3.9 3.5 - 5.5 mmol/L    Chloride 107 100 - 108 mmol/L    CO2 27 21 - 32 mmol/L    Anion gap 6 3.0 - 18 mmol/L    Glucose 228 (H) 74 - 99 mg/dL    BUN 9 7.0 - 18 MG/DL    Creatinine 0.96 0.6 - 1.3 MG/DL    BUN/Creatinine ratio 9 (L) 12 - 20      GFR est AA >60 >60 ml/min/1.73m2    GFR est non-AA >60 >60 ml/min/1.73m2    Calcium 8.3 (L) 8.5 - 10.1 MG/DL    Bilirubin, total 0.3 0.2 - 1.0 MG/DL    ALT (SGPT) 23 16 - 61 U/L    AST (SGOT) 13 (L) 15 - 37 U/L    Alk. phosphatase 66 45 - 117 U/L    Protein, total 7.3 6.4 - 8.2 g/dL    Albumin 4.0 3.4 - 5.0 g/dL    Globulin 3.3 2.0 - 4.0 g/dL    A-G Ratio 1.2 0.8 - 1.7     LIPASE    Collection Time: 04/08/17  5:43 PM   Result Value Ref Range    Lipase 218 73 - 393 U/L   ETHYL ALCOHOL    Collection Time: 04/08/17  5:45 PM   Result Value Ref Range    ALCOHOL(ETHYL),SERUM <3 0 - 3 MG/DL       EKG READINGS        ED DIAGNOSIS & DISPOSITION INFORMATION  Diagnosis:   1. Suicidal thoughts    2.  Auditory hallucination            Disposition: pending    Follow-up Information     None            Patient's Medications   Start Taking    No medications on file   Continue Taking    ALPRAZOLAM (XANAX) 1 MG TABLET    TAKE 1 TABLET BY MOUTH 3 TIMES DAILY AS NEEDED    ATENOLOL (TENORMIN) 100 MG TABLET Take 1 Tab by mouth daily. BLOOD-GLUCOSE METER (ONETOUCH ULTRA2) MONITORING KIT    Use as directed    GLUCOSE BLOOD VI TEST STRIPS (ASCENSIA AUTODISC VI, ONE TOUCH ULTRA TEST VI) STRIP    Check blood sugar daily    HYDROCHLOROTHIAZIDE (HYDRODIURIL) 25 MG TABLET    TK ONE T PO DAILY FOR 30 DAYS    LANCETS (ONETOUCH ULTRASOFT LANCETS) MISC    Check blood sugar daily    METFORMIN (GLUCOPHAGE) 1,000 MG TABLET    Take 500 mg by mouth two (2) times a day. SERTRALINE (ZOLOFT) 100 MG TABLET    Take 100 mg by mouth daily. SITAGLIPTIN (JANUVIA) 50 MG TABLET    Take 1 Tab by mouth daily. These Medications have changed    No medications on file   Stop Taking    No medications on file               Scribe Attestation  I, Amando Goldberg , am scribing for, and in the presence of Dr. Mp Gilman DO, 6:06 PM, 04/08/17. Physician Attestation  I personally performed the services described in the documentation, reviewed the documentation, as recorded by the scribe in my presence, and it accurately and completely records my words and actions.     Mp Gilman DO

## 2017-04-08 NOTE — ED NOTES
-------------------------------------------------------------------------------------------------------------------  PROGRESS NOTE:  7:11 PM Shell Hernandez DO assumed care of the patient from Dr. Marietta Pastrana. 11:48 PM Pt states that he is in pain from his hernia; told pt will order Toradol to which he stated that is unacceptable and requested to leave stating he had pain medication at home; pt is requesting to have IV removed  12:07 AM Pt was contracted for safety and cleared for discharge per psych evaluation     CONSULTATIONS:  Discussed care with [facility] screener, who evaluated to the patient at bedside. Attempts to see placed the patient at [facility] and [address] [place] [address] unsuccessful.       ORDERS:  Orders Placed This Encounter    XR ABD FLAT/ ERECT    CBC WITH AUTOMATED DIFF    METABOLIC PANEL, COMPREHENSIVE    LIPASE    DRUG SCREEN, URINE    ETHYL ALCOHOL    ACETAMINOPHEN    SALICYLATE    SITTER    IP CONSULT TO PSYCHIATRY    SUICIDE PRECAUTIONS    ketorolac (TORADOL) injection 30 mg    SITagliptin (JANUVIA) tablet 50 mg    atenolol (TENORMIN) tablet 100 mg    metFORMIN (GLUCOPHAGE) tablet 1,000 mg    ALPRAZolam (XANAX) tablet 1 mg    ketorolac (TORADOL) injection 30 mg       LAB RESULTS:    Recent Results (from the past 12 hour(s))   DRUG SCREEN, URINE    Collection Time: 04/08/17  5:01 PM   Result Value Ref Range    BENZODIAZEPINE POSITIVE (A) NEG      BARBITURATES NEGATIVE  NEG      THC (TH-CANNABINOL) NEGATIVE  NEG      OPIATES NEGATIVE  NEG      PCP(PHENCYCLIDINE) NEGATIVE  NEG      COCAINE NEGATIVE  NEG      AMPHETAMINE NEGATIVE  NEG      METHADONE NEGATIVE       HDSCOM (NOTE)    CBC WITH AUTOMATED DIFF    Collection Time: 04/08/17  5:43 PM   Result Value Ref Range    WBC 6.9 4.6 - 13.2 K/uL    RBC 4.16 (L) 4.70 - 5.50 M/uL    HGB 11.9 (L) 13.0 - 16.0 g/dL    HCT 35.1 (L) 36.0 - 48.0 %    MCV 84.4 74.0 - 97.0 FL    MCH 28.6 24.0 - 34.0 PG    MCHC 33.9 31.0 - 37.0 g/dL    RDW 14.1 11.6 - 14.5 %    PLATELET 305 699 - 321 K/uL    MPV 9.5 9.2 - 11.8 FL    NEUTROPHILS 73 40 - 73 %    LYMPHOCYTES 19 (L) 21 - 52 %    MONOCYTES 7 3 - 10 %    EOSINOPHILS 1 0 - 5 %    BASOPHILS 0 0 - 2 %    ABS. NEUTROPHILS 5.0 1.8 - 8.0 K/UL    ABS. LYMPHOCYTES 1.3 0.9 - 3.6 K/UL    ABS. MONOCYTES 0.5 0.05 - 1.2 K/UL    ABS. EOSINOPHILS 0.1 0.0 - 0.4 K/UL    ABS. BASOPHILS 0.0 0.0 - 0.06 K/UL    DF AUTOMATED     METABOLIC PANEL, COMPREHENSIVE    Collection Time: 04/08/17  5:43 PM   Result Value Ref Range    Sodium 140 136 - 145 mmol/L    Potassium 3.9 3.5 - 5.5 mmol/L    Chloride 107 100 - 108 mmol/L    CO2 27 21 - 32 mmol/L    Anion gap 6 3.0 - 18 mmol/L    Glucose 228 (H) 74 - 99 mg/dL    BUN 9 7.0 - 18 MG/DL    Creatinine 0.96 0.6 - 1.3 MG/DL    BUN/Creatinine ratio 9 (L) 12 - 20      GFR est AA >60 >60 ml/min/1.73m2    GFR est non-AA >60 >60 ml/min/1.73m2    Calcium 8.3 (L) 8.5 - 10.1 MG/DL    Bilirubin, total 0.3 0.2 - 1.0 MG/DL    ALT (SGPT) 23 16 - 61 U/L    AST (SGOT) 13 (L) 15 - 37 U/L    Alk. phosphatase 66 45 - 117 U/L    Protein, total 7.3 6.4 - 8.2 g/dL    Albumin 4.0 3.4 - 5.0 g/dL    Globulin 3.3 2.0 - 4.0 g/dL    A-G Ratio 1.2 0.8 - 1.7     LIPASE    Collection Time: 04/08/17  5:43 PM   Result Value Ref Range    Lipase 218 73 - 393 U/L   ETHYL ALCOHOL    Collection Time: 04/08/17  5:45 PM   Result Value Ref Range    ALCOHOL(ETHYL),SERUM <3 0 - 3 MG/DL   ACETAMINOPHEN    Collection Time: 04/08/17  5:45 PM   Result Value Ref Range    ACETAMINOPHEN <2 (L) 10 - 30 ug/mL   SALICYLATE    Collection Time: 04/08/17  5:45 PM   Result Value Ref Range    SALICYLATE <1.3 (L) 2.8 - 20.0 MG/DL         DISPOSITION:  Diagnosis:   1. Suicidal thoughts    2.  Auditory hallucination            Disposition: Discharge home      Follow-up Information     Follow up With Details Comments Contact Info    [facility] Schedule an appointment as soon as possible for a visit in 1 day for re-evaluation and further treatment [address] [facility]    Sabino Carrillo MD Schedule an appointment as soon as possible for a visit in 3 days for re-evaluation and further treatment [address] [place] [address]  [phone]                   Patient's Medications   Start Taking    No medications on file   Continue Taking    ALPRAZOLAM (XANAX) 1 MG TABLET    TAKE 1 TABLET BY MOUTH 3 TIMES DAILY AS NEEDED    ATENOLOL (TENORMIN) 100 MG TABLET    Take 1 Tab by mouth daily. BLOOD-GLUCOSE METER (ONETOUCH ULTRA2) MONITORING KIT    Use as directed    GLUCOSE BLOOD VI TEST STRIPS (ASCENSIA AUTODISC VI, ONE TOUCH ULTRA TEST VI) STRIP    Check blood sugar daily    HYDROCHLOROTHIAZIDE (HYDRODIURIL) 25 MG TABLET    TK ONE T PO DAILY FOR 30 DAYS    LANCETS (ONETOUCH ULTRASOFT LANCETS) MISC    Check blood sugar daily    METFORMIN (GLUCOPHAGE) 1,000 MG TABLET    Take 500 mg by mouth two (2) times a day. SERTRALINE (ZOLOFT) 100 MG TABLET    Take 100 mg by mouth daily. SITAGLIPTIN (JANUVIA) 50 MG TABLET    Take 1 Tab by mouth daily. These Medications have changed    No medications on file   Stop Taking    No medications on file         -------------------------------------------------------------------------------------------------------------------  Scribe Attestation:  I, Timotyh Jefferson, am scribing for and in the presence of Homer Burnette DO. Signed by: Gilbert Prabhakar, 04/08/17, 7:11 PM      Provider Attestation:  I personally performed the services described in the documentation, reviewed the documentation, as recorded by the scribe in my presence, and it accurately and completely records my words and actions. Dr. Kev Mcnair. 7:11 PM      Scribe Attestation:   I, Sandra Cordova, am scribing for and in the presence of Homer Burnette DO on this day 04/08/17 at 11:48 PM   gilbert Avila    Provider Attestation:  I personally performed the services described in the documentation, reviewed the documentation, as recorded by the scribe in my presence, and it accurately and completely records my words and actions.   Dom Madrid DO. 11:48 PM      Signed by: Jayjay Haynes, 11:48 PM

## 2017-04-08 NOTE — ED NOTES
Pt requests he has access to a phone at [address] he calls his 8 yo son every night at that time

## 2017-04-08 NOTE — ED NOTES
abdo pain, hernia and wants to kill self. I performed a brief evaluation, including history and physical, of the patient here in triage and I have determined that pt will need further treatment and evaluation from the main side ER physician. I have placed initial orders to help in expediting patients care.      April 08, 2017 at 5:00 PM - Jannie Wilde MD        Visit Vitals    BP (!) 151/106 (BP 1 Location: Right arm, BP Patient Position: Sitting)    Pulse 71    Temp 97.7 °F (36.5 °C)    Resp 18    Ht 5' 10\" (1.778 m)    Wt 101.2 kg (223 lb)    SpO2 99%    BMI 32 kg/m2

## 2017-04-08 NOTE — ED NOTES
Pt medicated with most of his nightly medications and for abdominal pain  psychiatric medications held until after tele-psych interview  Educated pt and pt verbalizes understanding  Pt states \"torodol isnt strong enough for my pain\"  Told pt we could re-evaluate his pain after tele-psych interview-no narcotics for now

## 2017-04-09 NOTE — BSMART NOTE
Counselor was already informed that [facility]. Contacted  [facility] and spoke with Vinny Xie. He stated that they have no beds available. Contacted [facility] and spoke with Rodriguez. He informed the counselor that [facility] and [facility] were full and he was concerned that they would not have any beds available on tomorrow. Counselor contacted [facility] and spoke with Ed. He requested that the counselor fax the information to them. He called back and stated that they did not have a bed available for the pt. The counselor was informed that the pt wanted to leave because he was not getting any pain medication. The pt took out his IV. Devan Kyle.  Jono Philippe, Ph.D., LPC, CSAC, BCPC, CCTP, CAMS-I  Crisis Counselor

## 2017-04-09 NOTE — ED NOTES
Pt requesting stronger pain medication \"through the IV\" and nightly dose of zoloft. Pt given xanax, pt in nad at this time.

## 2017-04-09 NOTE — ED NOTES
Patient wanting something for pain. Dr. Hallie Berry went to talk to patient and informed him that he wasn't going to give him narcotics. Patient upset that he wasn't getting narcotics and threatening to pull his IV out.

## 2017-04-09 NOTE — ED NOTES
Pt voicing he is upset he is not receiving more medication. Dr. Amanuel Ruiz not writing for more pain medication at this time. Pt states he would like to speak with MD dr. Amanuel Ruiz  Notified.

## 2017-04-09 NOTE — ED NOTES
Pt requesting stronger pain medication and nightly psychiatric medication and PRN xanex  Provider aware

## 2017-04-09 NOTE — ED NOTES
Pt given ice pop, states he is \"hungry but the pain is so bad im afraid to eat\"  Awaiting tele-psych consult

## 2017-04-09 NOTE — ED NOTES
I have reviewed discharge instructions with the patient. The patient verbalized understanding. Pt given belongings from locker #2 and security. Pt agreeable to dc plan in nad, ambulatory to ED lobby.

## 2017-04-09 NOTE — ED NOTES
Pt becoming agitated he his not receiving any pain medication or medication to help fall asleep. Pt updated by [facility] possible placement at [facility]. Pt states \"i want to go home, I've got pain medication there.   I might as well leave and go home since you're doing nothing\"

## 2017-04-09 NOTE — ED NOTES
Pt adds complaint that over the past two nights in the middle of the night he wakes up \"stumbles around drunk, but im not drunk. Dont remember my family members who i live with. And im very confused like i'm in a fog\".     Provider notified

## 2017-04-09 NOTE — DISCHARGE INSTRUCTIONS
Suicidal Thoughts and Behavior: Care Instructions  Your Care Instructions  You have been seen by a doctor because you've had thoughts about killing yourself. Maybe you have tried to do it. This is much different from having fleeting thoughts of death, which many people have from time to time. Your doctor and support team will work hard to help keep you safe. Your team may include a , a , and a counselor. Most people who think about suicide don't want to die. They think suicide will end their problems and pain. People who consider suicide often feel hopeless, helpless, and worthless. These thoughts can make a person feel that there is no other choice. But you do have a choice. Help is always available. The doctor and staff members are taking you and your pain very seriously. It is important to remember that there are people who are willing and able to talk with you about your suicidal thoughts. Treatment and close follow-up care can help you feel better about life. Thoughts of hopelessness and suicide may come from being depressed. Depression is a medical condition. When you have depression, there may be problems with activity levels in certain parts of your brain. Chemicals in your brain called neurotransmitters may be out of balance. But depression can be treated. Treatment for depression includes counseling, medicines, and lifestyle changes. With treatment, you can feel better. Your doctor doesn't want you to hurt yourself. He or she may ask you to sign a \"no harm\" agreement or contract. This contract is your promise that you will not hurt yourself between now and your next visit. Be completely honest with your doctor if you feel that you can't sign it. You will get help. Follow-up care is a key part of your treatment and safety. Be sure to make and go to all appointments, and call your doctor if you are having problems.  It's also a good idea to know your test results and keep a list of the medicines you take. How can you care for yourself at home? · Talk to someone. Reach out to family members, friends, your doctor, or a counselor. Be open and honest with them about your thoughts and feelings. · Be safe with medicines. Take your medicines exactly as prescribed. Call your doctor if you think you are having a problem with your medicine. · Avoid illegal drugs and alcohol. · Attend all counseling sessions recommended by your doctor. · Have someone take away sharp or dangerous objects, guns, and drugs from your home. · Keep the numbers for these national suicide hotlines: 8-739-099-TALK (8-383.932.8333) and 6-329-FLOLBJP (3-980.215.6811). When should you call for help? Call 911 anytime you think you may need emergency care. For example, call if:  · You feel you cannot stop from hurting yourself or someone else. Call your doctor now or seek immediate medical care if:  · You have one or more warning signs of suicide. For example, call if:  ¨ You feel like giving away your possessions. ¨ You use illegal drugs or drink alcohol heavily. ¨ You talk or write about death. This may include writing suicide notes and talking about guns, knives, or pills. ¨ You start to spend a lot of time alone or spend more time alone than usual.  · You hear voices. · You start acting in an aggressive way that's not normal for you. Watch closely for changes in your health, and be sure to contact your doctor if you have any problems. Where can you learn more? Go to http://jacklyn-denice.info/. Enter W906 in the search box to learn more about \"Suicidal Thoughts and Behavior: Care Instructions. \"  Current as of: July 26, 2016  Content Version: 11.2  © 0459-1585 [employer]. Care instructions adapted under license by [employer] (which disclaims liability or warranty for this information).  If you have questions about a medical condition or this instruction, always ask your healthcare professional. [place] [address] any warranty or liability for your use of this information.

## 2017-04-09 NOTE — BSMART NOTE
Mental Status: Pt was alert and oriented. He was pleasant and cooperative. Mood and affect were depressed. Thought process was logical and goal oriented. Memory was intact. Speech was normal.     Present Illness: Pt reported that one year ago his wife left him abruptly and took his son. He stated he was so distraught that he walked to a gas station, purchased a 20 oz beer and refused to leave his home. The pt stated he was threatening to hurt himself. He stated \"if you look on Google, you will see my story. \" The pt stated he was hospitalized at [facility] and crisis stabilization for approximately 23 days. The pt stated that this is the anniversary of that incident and it triggered a lot of negative emotions. He stated he is not sleeping, he is severely depressed, and he is having thoughts of suicide. He stated his thoughts are \"It would be better for everyone else if I . \" He stated he is worth more dead than alive, especially to his son. The pt stated that he has no specific plan, but he believes that [place] he [place]. The pt stated that he is also having visual and auditory hallucinations. He stated it started two weeks ago and he thought it was due to his medication, so he stopped taking them. However, the hallucinations continued so he started taking his medications again. The pt stated he has been seeing his brother and actually talking to him, but when he would close and open his eyes, his brother would not be there. The pt stated this usually happened at night when he was in bed, so now he keeps his light on at night. The pt stated that he worries all of the time. He worries about his son, his future, and his physical and mental health. The pt denied having any homicidal ideations. Past Psychiatric Illness: Prior to the incident last year, the pt denied any mental health issues. The client reported that he has a psychiatrist at [address] [place] [address].     Family Psychiatric History: The pt reported that his father was an alcoholic, but he  23 years ago. Medications: The pt reported that he takes Atenolol for high blood pressure, Metformin for diabetes, Xanax for anxiety, Zoloft and Seroquel for depression. Medical issues: Pt stated he is scheduled for hernia surgery on 2017 at [facility]. He stated he does not want to miss this surgery due to the abdominal pain he has been experiencing. Substance Use: Pt stated that he was a heavy drinker from the age of 14 until the age of 24. He stated now he only drinks on special occasions. Social History: Pt stated he and his wife will be filing for divorce in May of 2017. He has visitation with his son. He sees him every other weekend. He stated his son will be turning 7 this month. Pt lives with his younger brother. He stated that they get along well. Diagnosis:  296.34   Major Depressive Disorder, With Psychotic Features  300.02   Generalized Anxiety Disorder: By History  V61.10  Relationship Distress With Spouse or Intimate Partner      Impression/Assessement: Pt is a 42year-old [facility] male with a history of depression and anxiety. Pt reported feeling depressed and having suicidal ideations with a vague plan involving the [place] area. Pt denied having any homicidal ideations. Pt reported having auditory and visual hallucinations that began approximately two weeks ago. The hallucinations only occur at night. He now keeps his light on at night. The pt stated he worries a lot. Pt believes that his emotional state has been triggered by thoughts of what occurred last year when his wife left him and as a result he threatened to harm himself and barricaded himself in his home. Pt is voluntary for inpatient psychiatric hospitalization, but is concerned about being out in time for his surgery that is scheduled for 17 at [facility]. Disposition: Counselor will attempt to locate an inpatient psychiatric facility willing to accept the pt.     Brunilda Schulte Jessica Jimenez, Ph.D., LPC, CSAC, BCPC, CCTP, CAMS-I  Crisis Counselor

## 2017-04-09 NOTE — BSMART NOTE
Pt talked with Dr. Stevphen Sever and the decision was made to allow the pt to go home. The pt was concerned about his surgery on 4/11/17. Counselor talked with pt and he stated that has no desire to die. He wants to have his surgery so that he can feel better. He completed a personal safety plan. Pt was discharged to home. Nancy Ovalle.  Iftikhar Prasad, Ph.D., LPC, CSAC, BCPC, CCTP, CAMS-I  Crisis Counselor

## 2017-04-10 DIAGNOSIS — I10 ESSENTIAL HYPERTENSION: ICD-10-CM

## 2017-04-10 RX ORDER — ATENOLOL 100 MG/1
100 TABLET ORAL DAILY
Qty: 90 TAB | Refills: 1 | Status: SHIPPED | OUTPATIENT
Start: 2017-04-10 | End: 2017-05-12

## 2017-04-10 RX ORDER — LISINOPRIL AND HYDROCHLOROTHIAZIDE 20; 25 MG/1; MG/1
1 TABLET ORAL DAILY
Qty: 30 TAB | Refills: 5 | Status: SHIPPED | OUTPATIENT
Start: 2017-04-10 | End: 2017-07-03

## 2017-04-10 RX ORDER — HYDROCHLOROTHIAZIDE 25 MG/1
TABLET ORAL
Refills: 2 | OUTPATIENT
Start: 2017-04-10

## 2017-05-02 ENCOUNTER — HOSPITAL ENCOUNTER (EMERGENCY)
Age: 42
Discharge: HOME | End: 2017-05-02
Attending: EMERGENCY MEDICINE
Payer: SUBSIDIZED

## 2017-05-02 ENCOUNTER — APPOINTMENT (OUTPATIENT)
Dept: GENERAL RADIOLOGY | Age: 42
End: 2017-05-02
Attending: NURSE PRACTITIONER
Payer: SUBSIDIZED

## 2017-05-02 VITALS
OXYGEN SATURATION: 96 % | TEMPERATURE: 98.7 F | DIASTOLIC BLOOD PRESSURE: 83 MMHG | SYSTOLIC BLOOD PRESSURE: 129 MMHG | RESPIRATION RATE: 14 BRPM | HEART RATE: 78 BPM

## 2017-05-02 DIAGNOSIS — R07.89 NON-CARDIAC CHEST PAIN: Primary | ICD-10-CM

## 2017-05-02 LAB
ALBUMIN SERPL BCP-MCNC: 4.1 G/DL (ref 3.4–5)
ALBUMIN/GLOB SERPL: 0.9 {RATIO} (ref 0.8–1.7)
ALP SERPL-CCNC: 65 U/L (ref 45–117)
ALT SERPL-CCNC: 25 U/L (ref 16–61)
ANION GAP BLD CALC-SCNC: 9 MMOL/L (ref 3–18)
AST SERPL W P-5'-P-CCNC: 20 U/L (ref 15–37)
BASOPHILS # BLD AUTO: 0 K/UL (ref 0–0.06)
BASOPHILS # BLD: 0 % (ref 0–2)
BILIRUB SERPL-MCNC: 0.2 MG/DL (ref 0.2–1)
BUN SERPL-MCNC: 11 MG/DL (ref 7–18)
BUN/CREAT SERPL: 9 (ref 12–20)
CALCIUM SERPL-MCNC: 9.4 MG/DL (ref 8.5–10.1)
CHLORIDE SERPL-SCNC: 101 MMOL/L (ref 100–108)
CK MB CFR SERPL CALC: NORMAL % (ref 0–4)
CK MB SERPL-MCNC: <1 NG/ML (ref 5–25)
CK SERPL-CCNC: 43 U/L (ref 39–308)
CO2 SERPL-SCNC: 26 MMOL/L (ref 21–32)
CREAT SERPL-MCNC: 1.18 MG/DL (ref 0.6–1.3)
DIFFERENTIAL METHOD BLD: ABNORMAL
EOSINOPHIL # BLD: 0.1 K/UL (ref 0–0.4)
EOSINOPHIL NFR BLD: 1 % (ref 0–5)
ERYTHROCYTE [DISTWIDTH] IN BLOOD BY AUTOMATED COUNT: 13.2 % (ref 11.6–14.5)
GLOBULIN SER CALC-MCNC: 4.6 G/DL (ref 2–4)
GLUCOSE SERPL-MCNC: 162 MG/DL (ref 74–99)
HCT VFR BLD AUTO: 40.1 % (ref 36–48)
HGB BLD-MCNC: 13.9 G/DL (ref 13–16)
LYMPHOCYTES # BLD AUTO: 15 % (ref 21–52)
LYMPHOCYTES # BLD: 1.1 K/UL (ref 0.9–3.6)
MCH RBC QN AUTO: 28.9 PG (ref 24–34)
MCHC RBC AUTO-ENTMCNC: 34.7 G/DL (ref 31–37)
MCV RBC AUTO: 83.4 FL (ref 74–97)
MONOCYTES # BLD: 0.4 K/UL (ref 0.05–1.2)
MONOCYTES NFR BLD AUTO: 5 % (ref 3–10)
NEUTS SEG # BLD: 5.5 K/UL (ref 1.8–8)
NEUTS SEG NFR BLD AUTO: 79 % (ref 40–73)
PLATELET # BLD AUTO: 296 K/UL (ref 135–420)
PMV BLD AUTO: 9.2 FL (ref 9.2–11.8)
POTASSIUM SERPL-SCNC: 4 MMOL/L (ref 3.5–5.5)
PROT SERPL-MCNC: 8.7 G/DL (ref 6.4–8.2)
RBC # BLD AUTO: 4.81 M/UL (ref 4.7–5.5)
SODIUM SERPL-SCNC: 136 MMOL/L (ref 136–145)
TROPONIN I SERPL-MCNC: <0.02 NG/ML (ref 0–0.04)
WBC # BLD AUTO: 7.1 K/UL (ref 4.6–13.2)

## 2017-05-02 PROCEDURE — 93005 ELECTROCARDIOGRAM TRACING: CPT

## 2017-05-02 PROCEDURE — 82550 ASSAY OF CK (CPK): CPT | Performed by: NURSE PRACTITIONER

## 2017-05-02 PROCEDURE — 80053 COMPREHEN METABOLIC PANEL: CPT | Performed by: NURSE PRACTITIONER

## 2017-05-02 PROCEDURE — 99285 EMERGENCY DEPT VISIT HI MDM: CPT

## 2017-05-02 PROCEDURE — 74011250637 HC RX REV CODE- 250/637: Performed by: NURSE PRACTITIONER

## 2017-05-02 PROCEDURE — 85025 COMPLETE CBC W/AUTO DIFF WBC: CPT | Performed by: NURSE PRACTITIONER

## 2017-05-02 PROCEDURE — 71010 XR CHEST PORT: CPT

## 2017-05-02 RX ORDER — IBUPROFEN 600 MG/1
600 TABLET ORAL
Qty: 20 TAB | Refills: 0 | Status: SHIPPED | OUTPATIENT
Start: 2017-05-02 | End: 2017-06-16 | Stop reason: CLARIF

## 2017-05-02 RX ORDER — IBUPROFEN 400 MG/1
800 TABLET ORAL
Status: COMPLETED | OUTPATIENT
Start: 2017-05-02 | End: 2017-05-02

## 2017-05-02 RX ADMIN — IBUPROFEN 800 MG: 400 TABLET, FILM COATED ORAL at 12:08

## 2017-05-02 NOTE — DISCHARGE INSTRUCTIONS
Chest Pain: Care Instructions  Your Care Instructions  There are many things that can cause chest pain. Some are not serious and will get better on their own in a few days. But some kinds of chest pain need more testing and treatment. Your doctor may have recommended a follow-up visit in the next 8 to 12 hours. If you are not getting better, you may need more tests or treatment. Even though your doctor has released you, you still need to watch for any problems. The doctor carefully checked you, but sometimes problems can develop later. If you have new symptoms or if your symptoms do not get better, get medical care right away. If you have worse or different chest pain or pressure that lasts more than 5 minutes or you passed out (lost consciousness), call 911 or seek other emergency help right away. A medical visit is only one step in your treatment. Even if you feel better, you still need to do what your doctor recommends, such as going to all suggested follow-up appointments and taking medicines exactly as directed. This will help you recover and help prevent future problems. How can you care for yourself at home? · Rest until you feel better. · Take your medicine exactly as prescribed. Call your doctor if you think you are having a problem with your medicine. · Do not drive after taking a prescription pain medicine. When should you call for help? Call 911 if:  · You passed out (lost consciousness). · You have severe difficulty breathing. · You have symptoms of a heart attack. These may include:  ¨ Chest pain or pressure, or a strange feeling in your chest.  ¨ Sweating. ¨ Shortness of breath. ¨ Nausea or vomiting. ¨ Pain, pressure, or a strange feeling in your back, neck, jaw, or upper belly or in one or both shoulders or arms. ¨ Lightheadedness or sudden weakness. ¨ A fast or irregular heartbeat.   After you call 911, the  may tell you to chew 1 adult-strength or 2 to 4 low-dose aspirin. Wait for an ambulance. Do not try to drive yourself. Call your doctor today if:  · You have any trouble breathing. · Your chest pain gets worse. · You are dizzy or lightheaded, or you feel like you may faint. · You are not getting better as expected. · You are having new or different chest pain. Where can you learn more? Go to http://jacklyn-denice.info/. Enter A120 in the search box to learn more about \"Chest Pain: Care Instructions. \"  Current as of: May 27, 2016  Content Version: 11.2  © 0465-9901 [employer]. Care instructions adapted under license by [employer] (which disclaims liability or warranty for this information). If you have questions about a medical condition or this instruction, always ask your healthcare professional. [address] any warranty or liability for your use of this information. BeckonCall Activation    Thank you for requesting access to [employer]. Please follow the instructions below to securely access and download your online medical record. BeckonCall allows you to send messages to your doctor, view your test results, renew your prescriptions, schedule appointments, and more. How Do I Sign Up? 1. In your internet browser, go to [email or web address]  2. Click on the First Time User? Click Here link in the Sign In box. You will be redirect to the New Member Sign Up page. 3. Enter your BeckonCall Access Code exactly as it appears below. You will not need to use this code after youve completed the sign-up process. If you do not sign up before the expiration date, you must request a new code. BeckonCall Access Code: [email or web address]  Expires: 2017 12:43 PM (This is the date your BeckonCall access code will )    4. Enter the last four digits of your Social Security Number (xxxx) and Date of Birth (mm/dd/yyyy) as indicated and click Submit. You will be taken to the next sign-up page.   5. Create a BeckonCall ID. This will be your Skillz login ID and cannot be changed, so think of one that is secure and easy to remember. 6. Create a Skillz password. You can change your password at any time. 7. Enter your Password Reset Question and Answer. This can be used at a later time if you forget your password. 8. Enter your e-mail address. You will receive e-mail notification when new information is available in [address] 9. Click Sign Up. You can now view and download portions of your medical record. 10. Click the Download Summary menu link to download a portable copy of your medical information. Additional Information    If you have questions, please visit the Frequently Asked Questions section of the Sk[employer]z website at [email or web address] Remember, Skillz is NOT to be used for urgent needs. For medical emergencies, dial 911.

## 2017-05-02 NOTE — ED PROVIDER NOTES
Patient is a 42 y.o. male presenting with chest pain. The history is provided by the patient. History limited by: No communication barrier. Chest Pain (Angina)    This is a new problem. The current episode started 12 to 24 hours ago. The problem has not changed since onset. The problem occurs constantly. The pain is associated with normal activity. The pain does not radiate. Past Medical History:   Diagnosis Date    Depression     Diabetes ([address]     Herniated lumbar intervertebral disc     Hypertension     Neurological disorder L3,4,5 torn Herniated disc       Past Surgical History:   Procedure Laterality Date    HX ORTHOPAEDIC  trigger finger release         Family History:   Problem Relation Age of Onset    Hypertension Mother     Diabetes Mother     Heart Failure Mother     COPD Mother     Heart Disease Mother     Hypertension Father     Alcohol abuse Father        Social History     Social History    Marital status:      Spouse name: N/A    Number of children: N/A    Years of education: N/A     Occupational History    Not on file. Social History Main Topics    Smoking status: Never Smoker    Smokeless tobacco: Never Used    Alcohol use Yes      Comment: rarely    Drug use: No    Sexual activity: Yes     Partners: Female     Birth control/ protection: Condom     Other Topics Concern    Not on file     Social History Narrative         ALLERGIES: Review of patient's allergies indicates no known allergies. Review of Systems   Constitutional: Negative. HENT: Negative. Eyes: Negative. Respiratory: Negative. Cardiovascular: Positive for chest pain. Gastrointestinal: Negative. Endocrine: Negative. Genitourinary: Negative. Musculoskeletal: Negative. Skin: Negative. Allergic/Immunologic: Negative. Neurological: Negative. Hematological: Negative. Psychiatric/Behavioral: Negative.         Vitals:    05/02/17 1100   BP: 124/82   Pulse: 83 Resp: 14   SpO2: 97%            Physical Exam   Constitutional: He is oriented to person, place, and time. He appears well-developed and well-nourished. No distress. HENT:   Head: Normocephalic and atraumatic. Eyes: Pupils are equal, round, and reactive to light. Neck: Normal range of motion. Neck supple. Cardiovascular: Normal rate and regular rhythm. Pulmonary/Chest: Effort normal and breath sounds normal. He has no wheezes. He has no rales. Abdominal: Soft. Bowel sounds are normal. There is no tenderness. There is no rebound and no guarding. Genitourinary:   Genitourinary Comments: NE   Musculoskeletal: Normal range of motion. Neurological: He is alert and oriented to person, place, and time. No cranial nerve deficit. Coordination normal.   Skin: Skin is warm and dry. Psychiatric: He has a normal mood and affect. Nursing note and vitals reviewed. MDM  Number of Diagnoses or Management Options  Non-cardiac chest pain:      Amount and/or Complexity of Data Reviewed  Clinical lab tests: ordered and reviewed  Tests in the radiology section of CPT®: ordered and reviewed      ED Course       Procedures    Diagnosis:   1. Non-cardiac chest pain          Disposition:   discharge to home. Follow-up Information     Follow up With Details Comments Contact Derian Peralta Call today to arrange follow up. Belinda  [phone]          Patient's Medications   Start Taking    IBUPROFEN (MOTRIN) 600 MG TABLET    Take 1 Tab by mouth every six (6) hours as needed for Pain. Continue Taking    ALPRAZOLAM (XANAX) 1 MG TABLET    TAKE 1 TABLET BY MOUTH 3 TIMES DAILY AS NEEDED    ATENOLOL (TENORMIN) 100 MG TABLET    Take 1 Tab by mouth daily.     BLOOD-GLUCOSE METER (ONETOUCH ULTRA2) MONITORING KIT    Use as directed    GLUCOSE BLOOD VI TEST STRIPS (ASCENSIA AUTODISC VI, ONE TOUCH ULTRA TEST VI) STRIP    Check blood sugar daily LANCETS (ONETOUCH ULTRASOFT LANCETS) MISC    Check blood sugar daily    LISINOPRIL-HYDROCHLOROTHIAZIDE (ZESTORETIC) 20-25 MG PER TABLET    Take 1 Tab by mouth daily. METFORMIN (GLUCOPHAGE) 1,000 MG TABLET    Take 500 mg by mouth two (2) times a day. SERTRALINE (ZOLOFT) 100 MG TABLET    Take 100 mg by mouth daily. SITAGLIPTIN (JANUVIA) 50 MG TABLET    Take 1 Tab by mouth daily.    These Medications have changed    No medications on file   Stop Taking    No medications on file

## 2017-05-03 LAB
ATRIAL RATE: 79 BPM
CALCULATED P AXIS, ECG09: 9 DEGREES
CALCULATED R AXIS, ECG10: -46 DEGREES
CALCULATED T AXIS, ECG11: 18 DEGREES
DIAGNOSIS, 93000: NORMAL
P-R INTERVAL, ECG05: 140 MS
Q-T INTERVAL, ECG07: 348 MS
QRS DURATION, ECG06: 100 MS
QTC CALCULATION (BEZET), ECG08: 399 MS
VENTRICULAR RATE, ECG03: 79 BPM

## 2017-05-08 ENCOUNTER — OFFICE VISIT (OUTPATIENT)
Dept: INTERNAL MEDICINE CLINIC | Age: 42
End: 2017-05-08

## 2017-05-08 VITALS
RESPIRATION RATE: 16 BRPM | SYSTOLIC BLOOD PRESSURE: 103 MMHG | TEMPERATURE: 97.4 F | BODY MASS INDEX: 30.92 KG/M2 | HEART RATE: 81 BPM | DIASTOLIC BLOOD PRESSURE: 69 MMHG | HEIGHT: 70 IN | OXYGEN SATURATION: 98 % | WEIGHT: 216 LBS

## 2017-05-08 DIAGNOSIS — R10.30 LOWER ABDOMINAL PAIN: Primary | ICD-10-CM

## 2017-05-08 DIAGNOSIS — R11.2 INTRACTABLE VOMITING WITH NAUSEA, UNSPECIFIED VOMITING TYPE: ICD-10-CM

## 2017-05-08 DIAGNOSIS — K40.20 BILATERAL INGUINAL HERNIA WITHOUT OBSTRUCTION OR GANGRENE, RECURRENCE NOT SPECIFIED: ICD-10-CM

## 2017-05-08 RX ORDER — CARVEDILOL 3.12 MG/1
3.12 TABLET ORAL
COMMUNITY
Start: 2017-04-28 | End: 2017-07-03

## 2017-05-08 RX ORDER — HYDROCODONE POLISTIREX AND CHLORPHENIRAMINE POLISTIREX 10; 8 MG/5ML; MG/5ML
SUSPENSION, EXTENDED RELEASE ORAL
Refills: 0 | COMMUNITY
Start: 2017-02-17 | End: 2017-05-12

## 2017-05-08 RX ORDER — MELOXICAM 15 MG/1
TABLET ORAL
Refills: 1 | COMMUNITY
Start: 2017-03-27 | End: 2017-05-12

## 2017-05-08 RX ORDER — IBUPROFEN 800 MG/1
TABLET ORAL
Refills: 0 | COMMUNITY
Start: 2017-02-16 | End: 2017-06-16 | Stop reason: CLARIF

## 2017-05-08 RX ORDER — DOCUSATE SODIUM 100 MG/1
100 CAPSULE, LIQUID FILLED ORAL
COMMUNITY
Start: 2017-04-28 | End: 2017-05-12

## 2017-05-08 RX ORDER — PROMETHAZINE HYDROCHLORIDE 12.5 MG/1
TABLET ORAL
Refills: 0 | COMMUNITY
Start: 2017-03-29 | End: 2017-05-12

## 2017-05-08 RX ORDER — ONDANSETRON 4 MG/1
4 TABLET, ORALLY DISINTEGRATING ORAL
Qty: 15 TAB | Refills: 0 | Status: SHIPPED | OUTPATIENT
Start: 2017-05-08 | End: 2017-05-13

## 2017-05-08 RX ORDER — POLYETHYLENE GLYCOL 3350 17 G/17G
POWDER, FOR SOLUTION ORAL
COMMUNITY
Start: 2017-04-28 | End: 2017-05-12

## 2017-05-08 RX ORDER — LISINOPRIL AND HYDROCHLOROTHIAZIDE 20; 25 MG/1; MG/1
TABLET ORAL
COMMUNITY
End: 2017-05-12

## 2017-05-08 RX ORDER — QUETIAPINE FUMARATE 400 MG/1
TABLET, FILM COATED ORAL
Refills: 0 | COMMUNITY
Start: 2017-04-03 | End: 2017-06-16 | Stop reason: CLARIF

## 2017-05-08 RX ORDER — PRAZOSIN HYDROCHLORIDE 1 MG/1
CAPSULE ORAL
Refills: 1 | COMMUNITY
Start: 2017-04-24 | End: 2017-05-12

## 2017-05-08 RX ORDER — OXYCODONE AND ACETAMINOPHEN 5; 325 MG/1; MG/1
1 TABLET ORAL
Qty: 15 TAB | Refills: 0 | Status: SHIPPED | OUTPATIENT
Start: 2017-05-08 | End: 2017-05-12

## 2017-05-08 RX ORDER — BENZONATATE 200 MG/1
CAPSULE ORAL
Refills: 0 | COMMUNITY
Start: 2017-02-17 | End: 2017-05-12

## 2017-05-08 NOTE — MR AVS SNAPSHOT
Visit Information Date & Time Provider Department Dept. Phone Encounter #  
 5/8/2017  3:15 PM Katrina Dailey NP [facility] [address] [id number] Upcoming Health Maintenance Date Due  
 EYE EXAM RETINAL OR DILATED Q1 12/28/1984 Pneumococcal 19-64 Medium Risk (1 of 1 - PPSV23) 12/28/1993 DTaP/Tdap/Td series (1 - Tdap) 12/28/1995 MICROALBUMIN Q1 7/7/2016 LIPID PANEL Q1 7/7/2016 FOOT EXAM Q1 4/27/2017 INFLUENZA AGE 9 TO ADULT 8/1/2017 HEMOGLOBIN A1C Q6M 9/3/2017 Allergies as of 5/8/2017  Review Complete On: 5/8/2017 By: Vanna Galindo LPN No Known Allergies Current Immunizations  Reviewed on 9/2/2015 Name Date Influenza Vaccine (Quad) PF 9/2/2015 Influenza Vaccine Whole 12/1/2009 Not reviewed this visit You Were Diagnosed With   
  
 Codes Comments Lower abdominal pain    -  Primary ICD-10-CM: R10.30 ICD-9-CM: 789.09 Bilateral inguinal hernia without obstruction or gangrene, recurrence not specified     ICD-10-CM: K40.20 ICD-9-CM: 550.92 Intractable vomiting with nausea, unspecified vomiting type     ICD-10-CM: R11.2 ICD-9-CM: 945. 2 Vitals BP Pulse Temp Resp Height(growth percentile) Weight(growth percentile) 103/69 (BP 1 Location: Right arm, BP Patient Position: Sitting) 81 97.4 °F (36.3 °C) (Oral) 16 5' 10\" (1.778 m) 216 lb (98 kg) SpO2 BMI Smoking Status 98% 30.99 kg/m2 Never Smoker Vitals History BMI and BSA Data Body Mass Index Body Surface Area 30.99 kg/m 2 2.2 m 2 Preferred Pharmacy Pharmacy Name Phone [facility] [address] [place], [place] [address] [address] [phone] Your Updated Medication List  
  
   
This list is accurate as of: 5/8/17  3:45 PM.  Always use your most recent med list.  
  
  
  
  
 ALPRAZolam 1 mg tablet Commonly known as:  Angelina Knee TAKE 1 TABLET BY MOUTH 3 TIMES DAILY AS NEEDED  
  
 atenolol 100 mg tablet Commonly known as:  TENORMIN Take 1 Tab by mouth daily. benzonatate 200 mg capsule Commonly known as:  TESSALON  
TK ONE C PO 8 H PRF COUGH. TAKE WITH A GLASS OF WATER Blood-Glucose Meter monitoring kit Commonly known as:  Andrew Rogers Use as directed  
  
 carvedilol 3.125 mg tablet Commonly known as:  COREG  
3.125 mg.  
  
 chlorpheniramine-HYDROcodone 10-8 mg/5 mL suspension Commonly known as:  TUSSIONEX  
take 5 milliliters by mouth every 12 hours if needed for cough for UP TO 7 DAYS / MAX 10MLS PER DAY  
  
 docusate sodium 100 mg capsule Commonly known as:  COLACE  
100 mg.  
  
 glucose blood VI test strips strip Commonly known as:  ASCENSIA AUTODISC VI, ONE TOUCH ULTRA TEST VI Check blood sugar daily * ibuprofen 800 mg tablet Commonly known as:  MOTRIN  
TK 1 T PO Q 6 H PRF PAIN  
  
 * ibuprofen 600 mg tablet Commonly known as:  MOTRIN Take 1 Tab by mouth every six (6) hours as needed for Pain. Lancets Misc Commonly known as:  ONETOUCH ULTRASOFT LANCETS Check blood sugar daily * lisinopril-hydroCHLOROthiazide 20-25 mg per tablet Commonly known as:  Fab Miranda Take 1 Tab by Mouth Once a Day. Patient is not quite sure of dosage at this time * lisinopril-hydroCHLOROthiazide 20-25 mg per tablet Commonly known as:  Herlinda Du Take 1 Tab by mouth daily. meloxicam 15 mg tablet Commonly known as:  MOBIC TK 1 T PO D PRF INFLAMMATION  
  
 metFORMIN 1,000 mg tablet Commonly known as:  GLUCOPHAGE Take 500 mg by mouth two (2) times a day. ondansetron 4 mg disintegrating tablet Commonly known as:  ZOFRAN ODT Take 1 Tab by mouth every eight (8) hours as needed for Nausea for up to 5 days. oxyCODONE-acetaminophen 5-325 mg per tablet Commonly known as:  PERCOCET  
 Take 1 Tab by mouth every eight (8) hours as needed for Pain for up to 5 days. Max Daily Amount: 3 Tabs. polyethylene glycol 17 gram packet Commonly known as:  Cathy [place] Mix 1 packet (17g) into 4-8 ounces of beverage and drink once daily. Results in 2-4 days, Max treatment length of 2 weeks. prazosin 1 mg capsule Commonly known as:  MINIPRESS TK 1 C PO HS  
  
 promethazine 12.5 mg tablet Commonly known as:  PHENERGAN  
TK 1 T PO QID - GEF - PHENERGAN  
  
 QUEtiapine 400 mg tablet Commonly known as:  SEROquel TK 1 T PO HS  
  
 * SITagliptin 50 mg tablet Commonly known as:  Annie Go 50 mg.  
  
 * SITagliptin 50 mg tablet Commonly known as:  Annie Go Take 1 Tab by mouth daily. ZOLOFT 100 mg tablet Generic drug:  sertraline Take 100 mg by mouth daily. * Notice: This list has 6 medication(s) that are the same as other medications prescribed for you. Read the directions carefully, and ask your doctor or other care provider to review them with you. Prescriptions Printed Refills  
 oxyCODONE-acetaminophen (PERCOCET) 5-325 mg per tablet 0 Sig: Take 1 Tab by mouth every eight (8) hours as needed for Pain for up to 5 days. Max Daily Amount: 3 Tabs. Class: Print Route: Oral  
  
Prescriptions Sent to Pharmacy Refills  
 ondansetron (ZOFRAN ODT) 4 mg disintegrating tablet 0 Sig: Take 1 Tab by mouth every eight (8) hours as needed for Nausea for up to 5 days. Class: Normal  
 Pharmacy: [facility] [address] [place] [address] [address]  #: [phone] Route: Oral  
  
Patient Instructions Hernia Repair: Before Your Surgery What is hernia repair surgery? A hernia occurs when a weak spot in your belly muscles allows a piece of your intestines or the tissue around them to poke through. This can cause a bulge in the area. It can also cause pain. But you may not feel anything. The hernia may be in your groin. Or it may be near your belly button. In some cases, it's in a scar from an earlier surgery. A doctor can fix a hernia through a cut (incision) made near it. This is called open surgery. Or the doctor may make some very small cuts and use a thin, lighted scope and small tools. This is laparoscopic surgery. If your hernia is bulging, the bulge is pushed back into place. The doctor then sews the healthy tissue back together. Often a piece of material is used to patch the weak spot. Open surgery will leave a longer scar. Laparoscopic surgery leaves a few small scars. The scars will fade with time. You may need to take 1 to 2 weeks off from work. This depends on the type of work you do and how you feel. Follow-up care is a key part of your treatment and safety. Be sure to make and go to all appointments, and call your doctor if you are having problems. It's also a good idea to know your test results and keep a list of the medicines you take. What happens before surgery? Surgery can be stressful. This information will help you understand what you can expect. And it will help you safely prepare for surgery. Preparing for surgery · Understand exactly what surgery is planned, along with the risks, benefits, and other options. · Tell your doctors ALL the medicines, vitamins, supplements, and herbal remedies you take. Some of these can increase the risk of bleeding or interact with anesthesia. · If you take blood thinners, such as warfarin (Coumadin), clopidogrel (Plavix), or aspirin, be sure to talk to your doctor. He or she will tell you if you should stop taking these medicines before your surgery. Make sure that you understand exactly what your doctor wants you to do. · Your doctor will tell you which medicines to take or stop before your surgery. You may need to stop taking certain medicines a week or more before surgery. So talk to your doctor as soon as you can. · If you have an advance directive, let your doctor know. It may include a living will and a durable power of  for health care. Bring a copy to the hospital. If you don't have one, you may want to prepare one. It lets your doctor and loved ones know your health care wishes. Doctors advise that everyone prepare these papers before any type of surgery or procedure. What happens on the day of surgery? · Follow the instructions exactly about when to stop eating and drinking. If you don't, your surgery may be canceled. If your doctor told you to take your medicines on the day of surgery, take them with only a sip of water. · Take a bath or shower before you come in for your surgery. Do not apply lotions, perfumes, deodorants, or nail polish. · Do not shave the surgical site yourself. · Take off all jewelry and piercings. And take out contact lenses, if you wear them. At the hospital or surgery center · Bring a picture ID. · The area for surgery is often marked to make sure there are no errors. · You will be kept comfortable and safe by your anesthesia provider. You will be asleep during the surgery. · The surgery will take about 30 minutes to 2 hours. This depends on the size of the hernia and where it is. Going home · Be sure you have someone to drive you home. Anesthesia and pain medicine make it unsafe for you to drive. · You will be given more specific instructions about recovering from your surgery. They will cover things like diet, wound care, follow-up care, driving, and getting back to your normal routine. When should you call your doctor? · You have questions or concerns. · You don't understand how to prepare for your surgery. · You become ill before the surgery (such as fever, flu, or a cold). · You need to reschedule or have changed your mind about having the surgery. Where can you learn more? Go to http://jacklyn-denice.info/. Enter M309 in the search box to learn more about \"Hernia Repair: Before Your Surgery. \" Current as of: August 9, 2016 Content Version: 11.2 © 5317-0569 [employer]. Care instructions adapted under license by [employer] (which disclaims liability or warranty for this information). If you have questions about a medical condition or this instruction, always ask your healthcare professional. [place] [address] any warranty or liability for your use of this information. Introducing [facility]! [facility] introduces Monitoring Division patient portal. Now you can access parts of your medical record, email your doctor's office, and request medication refills online. 1. In your internet browser, go to [email or web address] 2. Click on the First Time User? Click Here link in the Sign In box. You will see the New Member Sign Up page. 3. Enter your Monitoring Division Access Code exactly as it appears below. You will not need to use this code after youve completed the sign-up process. If you do not sign up before the expiration date, you must request a new code. · Monitoring Division Access Code: [email or web address] Expires: 5/17/2017 12:43 PM 
 
4. Enter the last four digits of your Social Security Number (xxxx) and Date of Birth (mm/dd/yyyy) as indicated and click Submit. You will be taken to the next sign-up page. 5. Create a Monitoring Division ID. This will be your Monitoring Division login ID and cannot be changed, so think of one that is secure and easy to remember. 6. Create a Monitoring Division password. You can change your password at any time. 7. Enter your Password Reset Question and Answer. This can be used at a later time if you forget your password. 8. Enter your e-mail address. You will receive e-mail notification when new information is available in [address] 9. Click Sign Up. You can now view and download portions of your medical record. 10. Click the Download Summary menu link to download a portable copy of your medical information. If you have questions, please visit the Frequently Asked Questions section of the OB10 website. Remember, [facility] is NOT to be used for urgent needs. For medical emergencies, dial 911. Now available from your iPhone and Android! Please provide this summary of care documentation to your next provider. Your primary care clinician is listed as Nichol Donahue. If you have any questions after today's visit, please call [phone].

## 2017-05-08 NOTE — PROGRESS NOTES
Pt presented today with hernia pain, pt reports he is supposed to have surgery in 2-3 weeks. Has pt had any falls since last visit? No.  Pt preferred language for health care discussion is english. Advanced Directive? No    Is someone accompanying this pt? No    Is the patient using any DME equipment during OV? No      1. Have you been to the ER, urgent care clinic since your last visit? Hospitalized since your last visit? No    2. Have you seen or consulted any other health care providers outside of the [address] [place] [address] since your last visit? Include any colon screening. No      Pt has a reminder for a \"due or due soon\" health maintenance. I have asked that he contact his primary care provider for follow-up on this health maintenance.

## 2017-05-08 NOTE — PATIENT INSTRUCTIONS
Hernia Repair: Before Your Surgery  What is hernia repair surgery? A hernia occurs when a weak spot in your belly muscles allows a piece of your intestines or the tissue around them to poke through. This can cause a bulge in the area. It can also cause pain. But you may not feel anything. The hernia may be in your groin. Or it may be near your belly button. In some cases, it's in a scar from an earlier surgery. A doctor can fix a hernia through a cut (incision) made near it. This is called open surgery. Or the doctor may make some very small cuts and use a thin, lighted scope and small tools. This is laparoscopic surgery. If your hernia is bulging, the bulge is pushed back into place. The doctor then sews the healthy tissue back together. Often a piece of material is used to patch the weak spot. Open surgery will leave a longer scar. Laparoscopic surgery leaves a few small scars. The scars will fade with time. You may need to take 1 to 2 weeks off from work. This depends on the type of work you do and how you feel. Follow-up care is a key part of your treatment and safety. Be sure to make and go to all appointments, and call your doctor if you are having problems. It's also a good idea to know your test results and keep a list of the medicines you take. What happens before surgery? Surgery can be stressful. This information will help you understand what you can expect. And it will help you safely prepare for surgery. Preparing for surgery  · Understand exactly what surgery is planned, along with the risks, benefits, and other options. · Tell your doctors ALL the medicines, vitamins, supplements, and herbal remedies you take. Some of these can increase the risk of bleeding or interact with anesthesia. · If you take blood thinners, such as warfarin (Coumadin), clopidogrel (Plavix), or aspirin, be sure to talk to your doctor.  He or she will tell you if you should stop taking these medicines before your surgery. Make sure that you understand exactly what your doctor wants you to do. · Your doctor will tell you which medicines to take or stop before your surgery. You may need to stop taking certain medicines a week or more before surgery. So talk to your doctor as soon as you can. · If you have an advance directive, let your doctor know. It may include a living will and a durable power of  for health care. Bring a copy to the hospital. If you don't have one, you may want to prepare one. It lets your doctor and loved ones know your health care wishes. Doctors advise that everyone prepare these papers before any type of surgery or procedure. What happens on the day of surgery? · Follow the instructions exactly about when to stop eating and drinking. If you don't, your surgery may be canceled. If your doctor told you to take your medicines on the day of surgery, take them with only a sip of water. · Take a bath or shower before you come in for your surgery. Do not apply lotions, perfumes, deodorants, or nail polish. · Do not shave the surgical site yourself. · Take off all jewelry and piercings. And take out contact lenses, if you wear them. At the hospital or surgery center  · Bring a picture ID. · The area for surgery is often marked to make sure there are no errors. · You will be kept comfortable and safe by your anesthesia provider. You will be asleep during the surgery. · The surgery will take about 30 minutes to 2 hours. This depends on the size of the hernia and where it is. Going home  · Be sure you have someone to drive you home. Anesthesia and pain medicine make it unsafe for you to drive. · You will be given more specific instructions about recovering from your surgery. They will cover things like diet, wound care, follow-up care, driving, and getting back to your normal routine. When should you call your doctor? · You have questions or concerns.   · You don't understand how to prepare for your surgery. · You become ill before the surgery (such as fever, flu, or a cold). · You need to reschedule or have changed your mind about having the surgery. Where can you learn more? Go to http://jacklyn-denice.info/. Enter B378 in the search box to learn more about \"Hernia Repair: Before Your Surgery. \"  Current as of: August 9, 2016  Content Version: 11.2  © 6282-4133 [employer]. Care instructions adapted under license by [employer] (which disclaims liability or warranty for this information). If you have questions about a medical condition or this instruction, always ask your healthcare professional. [place] [address] any warranty or liability for your use of this information.

## 2017-05-08 NOTE — PROGRESS NOTES
HISTORY OF PRESENT ILLNESS  Hodan Chamorro is a 42 y.o. male. Abdominal Pain   The history is provided by the patient. This is a chronic problem. The problem occurs constantly. The problem has been gradually worsening. Associated symptoms include abdominal pain. Pertinent negatives include no chest pain, no headaches and no shortness of breath. The symptoms are aggravated by bending, twisting, coughing, walking and exertion. The symptoms are relieved by medications. Treatments tried: percocet. The treatment provided mild relief. Review of Systems   Constitutional: Negative for chills, fever and malaise/fatigue. HENT: Negative for congestion and sore throat. Eyes: Negative for blurred vision, double vision, photophobia and pain. Respiratory: Negative for cough, sputum production and shortness of breath. Cardiovascular: Negative for chest pain, palpitations and orthopnea. Gastrointestinal: Positive for abdominal pain, nausea and vomiting. Negative for blood in stool, constipation, diarrhea, heartburn and melena. Genitourinary: Negative for dysuria, flank pain, frequency, hematuria and urgency. Musculoskeletal: Negative for myalgias. Skin: Negative for itching and rash. Neurological: Negative for dizziness and headaches. Endo/Heme/Allergies: Negative for polydipsia. Psychiatric/Behavioral: The patient is not nervous/anxious. Physical Exam   Constitutional: He is oriented to person, place, and time. He appears well-developed and well-nourished. HENT:   Head: Normocephalic and atraumatic. Eyes: Pupils are equal, round, and reactive to light. Neck: Normal range of motion. Neck supple. Cardiovascular: Regular rhythm. Pulmonary/Chest: Breath sounds normal.   Abdominal: Soft. He exhibits no distension. There is tenderness in the periumbilical area and suprapubic area. There is no rebound, no tenderness at McBurney's point and negative Bradley's sign.    Lymphadenopathy:     He has no cervical adenopathy. Neurological: He is alert and oriented to person, place, and time. No cranial nerve deficit. Skin: He is not diaphoretic. Psychiatric: He has a normal mood and affect. Nursing note and vitals reviewed. ASSESSMENT and PLAN    ICD-10-CM ICD-9-CM    1. Lower abdominal pain R10.30 789.09 oxyCODONE-acetaminophen (PERCOCET) 5-325 mg per tablet   2. Bilateral inguinal hernia without obstruction or gangrene, recurrence not specified K40.20 550.92 oxyCODONE-acetaminophen (PERCOCET) 5-325 mg per tablet   3. Intractable vomiting with nausea, unspecified vomiting type R11.2 536.2 ondansetron (ZOFRAN ODT) 4 mg disintegrating tablet   patient is scheduled for hernia repair surgery on June 5th. Further plan of care will be established accordingly. Patient teaching done regarding safety.

## 2017-05-10 ENCOUNTER — TELEPHONE (OUTPATIENT)
Dept: INTERNAL MEDICINE CLINIC | Age: 42
End: 2017-05-10

## 2017-05-10 NOTE — TELEPHONE ENCOUNTER
Attempted to contact pt at  number, no answer. Lvm for pt to return call to office at [phone]. Will continue to try to contact pt.

## 2017-05-10 NOTE — TELEPHONE ENCOUNTER
Incoming call from pt. 2 pt identifiers confirmed. Pt is calling stating he is in a great deal of pain. Would like more pain medication. Please be advised.

## 2017-05-10 NOTE — TELEPHONE ENCOUNTER
Patient called again for stronger medication, advised him a nurse will call him, but Adriano November is not in and patient was just given pain meds on the 8th of May

## 2017-05-11 ENCOUNTER — TELEPHONE (OUTPATIENT)
Dept: INTERNAL MEDICINE CLINIC | Age: 42
End: 2017-05-11

## 2017-05-11 NOTE — TELEPHONE ENCOUNTER
Call returned. Patient want more pain medication. i informed patient that we cannot write more pain medication. Patient should contact general surgeon or go to nearest ER. Patient verbalized understanding.

## 2017-05-12 ENCOUNTER — HOSPITAL ENCOUNTER (EMERGENCY)
Age: 42
Discharge: HOME | End: 2017-05-12
Attending: EMERGENCY MEDICINE
Payer: SUBSIDIZED

## 2017-05-12 VITALS
HEART RATE: 78 BPM | WEIGHT: 216 LBS | TEMPERATURE: 98.4 F | RESPIRATION RATE: 16 BRPM | OXYGEN SATURATION: 99 % | DIASTOLIC BLOOD PRESSURE: 72 MMHG | BODY MASS INDEX: 30.99 KG/M2 | SYSTOLIC BLOOD PRESSURE: 133 MMHG

## 2017-05-12 DIAGNOSIS — K40.20 NON-RECURRENT BILATERAL INGUINAL HERNIA WITHOUT OBSTRUCTION OR GANGRENE: Primary | ICD-10-CM

## 2017-05-12 DIAGNOSIS — R10.30 INGUINAL PAIN, UNSPECIFIED LATERALITY: ICD-10-CM

## 2017-05-12 PROCEDURE — 99283 EMERGENCY DEPT VISIT LOW MDM: CPT

## 2017-05-12 RX ORDER — HYDROCODONE BITARTRATE AND ACETAMINOPHEN 5; 325 MG/1; MG/1
1 TABLET ORAL
Qty: 8 TAB | Refills: 0 | Status: SHIPPED | OUTPATIENT
Start: 2017-05-12 | End: 2017-06-02

## 2017-05-12 RX ORDER — HYDROMORPHONE HYDROCHLORIDE 1 MG/ML
1 INJECTION, SOLUTION INTRAMUSCULAR; INTRAVENOUS; SUBCUTANEOUS
Status: DISCONTINUED | OUTPATIENT
Start: 2017-05-12 | End: 2017-05-12

## 2017-05-12 NOTE — DISCHARGE INSTRUCTIONS
[employer] Activation    Thank you for requesting access to [employer]. Please follow the instructions below to securely access and download your online medical record. [employer] allows you to send messages to your doctor, view your test results, renew your prescriptions, schedule appointments, and more. How Do I Sign Up? 1. In your internet browser, go to [email or web address]  2. Click on the First Time User? Click Here link in the Sign In box. You will be redirect to the New Member Sign Up page. 3. Enter your [employer] Access Code exactly as it appears below. You will not need to use this code after youve completed the sign-up process. If you do not sign up before the expiration date, you must request a new code. [employer] Access Code: [email or web address]  Expires: 2017  9:05 PM (This is the date your [employer] access code will )    4. Enter the last four digits of your Social Security Number (xxxx) and Date of Birth (mm/dd/yyyy) as indicated and click Submit. You will be taken to the next sign-up page. 5. Create a [facility] ID. This will be your [employer] login ID and cannot be changed, so think of one that is secure and easy to remember. 6. Create a [employer] password. You can change your password at any time. 7. Enter your Password Reset Question and Answer. This can be used at a later time if you forget your password. 8. Enter your e-mail address. You will receive e-mail notification when new information is available in [address] 9. Click Sign Up. You can now view and download portions of your medical record. 10. Click the Download Summary menu link to download a portable copy of your medical information. Additional Information    If you have questions, please visit the Frequently Asked Questions section of the [employer] website at [email or web address] Remember, [employer] is NOT to be used for urgent needs. For medical emergencies, dial 911.     Follow up with Dr Sharath Barbour by calling today for an appointment.

## 2017-05-12 NOTE — ED TRIAGE NOTES
Lower abdominal pain from hernia. 4/27 was in [place] [address] and prepped for surgery and had \"cardiac arrest\". Surgery cancel;ed ,stayed in hospital 2 days . Has appointment 6/5 with cardiology.  Here for pain

## 2017-05-12 NOTE — ED NOTES
Pt being discharged home. Discharge instructions given, and pt expresses understanding. Helped patient to gather belongings. Pt discharged with family member.  Prescription given for Norco.

## 2017-05-12 NOTE — ED PROVIDER NOTES
HPI Comments: Zelalem Amador is a 42year old male who presents with a bilateral inguinal hernia pain. Pt states he wqs diagnosed and went to have laparascopic surgery to correct this at [facility] on 04-27-17. While in the OR under anesthesia, he started to have ST segment elevation,  and the surgery was cancelled. He is to have the surgery with his surgeon, but has to be cleaned by Cardiology first.  Pt came here because he is still in pain. Patient is a 42 y.o. male presenting with abdominal pain. The history is provided by the patient. History limited by: no communication barrier. Abdominal Pain    This is a chronic problem. The current episode started more than 2 days ago. The problem occurs constantly. The problem has not changed since onset. The pain is associated with vomiting. The pain is moderate. Past Medical History:   Diagnosis Date    Depression     Diabetes ([facility] [address]     Herniated lumbar intervertebral disc     Hypertension     Neurological disorder L3,4,5 torn Herniated disc       Past Surgical History:   Procedure Laterality Date    HX ORTHOPAEDIC  trigger finger release         Family History:   Problem Relation Age of Onset    Hypertension Mother     Diabetes Mother     Heart Failure Mother     COPD Mother     Heart Disease Mother     Hypertension Father     Alcohol abuse Father        Social History     Social History    Marital status:      Spouse name: N/A    Number of children: N/A    Years of education: N/A     Occupational History    Not on file. Social History Main Topics    Smoking status: Never Smoker    Smokeless tobacco: Never Used    Alcohol use Yes      Comment: rarely    Drug use: No    Sexual activity: Yes     Partners: Female     Birth control/ protection: Condom     Other Topics Concern    Not on file     Social History Narrative         ALLERGIES: Review of patient's allergies indicates no known allergies.     Review of Systems Constitutional: Negative. HENT: Negative. Eyes: Negative. Respiratory: Negative. Cardiovascular: Negative. Gastrointestinal:        Bilateral inguinal pain     Endocrine: Negative. Genitourinary: Negative. Musculoskeletal: Negative. Skin: Negative. Allergic/Immunologic: Negative. Neurological: Negative. Hematological: Negative. Psychiatric/Behavioral: Negative. Vitals:    05/12/17 0836 05/12/17 0900 05/12/17 0905 05/12/17 0915   BP: 123/77 118/73  133/72   Pulse: 78      Resp: 16      Temp: 98.4 °F (36.9 °C)      SpO2: 97%  97% 99%   Weight: 98 kg (216 lb)               Physical Exam   Constitutional: He is oriented to person, place, and time. He appears well-developed and well-nourished. No distress. HENT:   Head: Normocephalic and atraumatic. Eyes: Pupils are equal, round, and reactive to light. Neck: Normal range of motion. Neck supple. Cardiovascular: Normal rate and regular rhythm. Pulmonary/Chest: Effort normal and breath sounds normal.   Abdominal: Soft. Bowel sounds are normal.   No profound bulging of intestine in the inguinal area. Pt is TTP bilaterally. Genitourinary:   Genitourinary Comments: NE   Musculoskeletal: Normal range of motion. Neurological: He is alert and oriented to person, place, and time. No cranial nerve deficit. Coordination normal.   Skin: Skin is warm and dry. Psychiatric: He has a normal mood and affect. Nursing note and vitals reviewed. MDM  Number of Diagnoses or Management Options  Inguinal pain, unspecified laterality:   Non-recurrent bilateral inguinal hernia without obstruction or gangrene:   Risk of Complications, Morbidity, and/or Mortality  Presenting problems: low  Diagnostic procedures: low  Management options: low    Patient Progress  Patient progress: stable    ED Course       Procedures    Diagnosis:   1. Non-recurrent bilateral inguinal hernia without obstruction or gangrene    2.  Inguinal pain, unspecified laterality          Disposition:   Discharged to Home. Follow-up Information     Follow up With Details Comments Contact Jaylyn Romero MD Call for follow up for pain [address] [facility],[address]      Lowry Rinne, MD Call today to schedule a follow up appointment. [address] [place] [address] Dr Nargis Carlin  [phone]            Patient's Medications   Start Taking    No medications on file   Continue Taking    ALPRAZOLAM (XANAX) 1 MG TABLET    TAKE 1 TABLET BY MOUTH 3 TIMES DAILY AS NEEDED    BLOOD-GLUCOSE METER (ONETOUCH ULTRA2) MONITORING KIT    Use as directed    CARVEDILOL (COREG) 3.125 MG TABLET    3.125 mg. GLUCOSE BLOOD VI TEST STRIPS (ASCENSIA AUTODISC VI, ONE TOUCH ULTRA TEST VI) STRIP    Check blood sugar daily    IBUPROFEN (MOTRIN) 600 MG TABLET    Take 1 Tab by mouth every six (6) hours as needed for Pain. IBUPROFEN (MOTRIN) 800 MG TABLET    TK 1 T PO Q 6 H PRF PAIN    LANCETS (ONETOUCH ULTRASOFT LANCETS) MISC    Check blood sugar daily    LISINOPRIL-HYDROCHLOROTHIAZIDE (ZESTORETIC) 20-25 MG PER TABLET    Take 1 Tab by mouth daily. METFORMIN (GLUCOPHAGE) 1,000 MG TABLET    Take 500 mg by mouth two (2) times a day. ONDANSETRON (ZOFRAN ODT) 4 MG DISINTEGRATING TABLET    Take 1 Tab by mouth every eight (8) hours as needed for Nausea for up to 5 days. QUETIAPINE (SEROQUEL) 400 MG TABLET    TK 1 T PO HS    SERTRALINE (ZOLOFT) 100 MG TABLET    Take 100 mg by mouth daily. SITAGLIPTIN (JANUVIA) 50 MG TABLET    Take 1 Tab by mouth daily. These Medications have changed    No medications on file   Stop Taking    ATENOLOL (TENORMIN) 100 MG TABLET    Take 1 Tab by mouth daily. BENZONATATE (TESSALON) 200 MG CAPSULE    TK ONE C PO 8 H PRF COUGH.  TAKE WITH A GLASS OF WATER    CHLORPHENIRAMINE-HYDROCODONE (TUSSIONEX) 10-8 MG/5 ML SUSPENSION    take 5 milliliters by mouth every 12 hours if needed for cough for UP TO 7 DAYS / MAX 10MLS PER DAY    DOCUSATE SODIUM (COLACE) 100 MG CAPSULE    100 mg. LISINOPRIL-HYDROCHLOROTHIAZIDE (PRINZIDE, ZESTORETIC) 20-25 MG PER TABLET    Take 1 Tab by Mouth Once a Day. Patient is not quite sure of dosage at this time    MELOXICAM (MOBIC) 15 MG TABLET    TK 1 T PO D PRF INFLAMMATION    OXYCODONE-ACETAMINOPHEN (PERCOCET) 5-325 MG PER TABLET    Take 1 Tab by mouth every eight (8) hours as needed for Pain for up to 5 days. Max Daily Amount: 3 Tabs. POLYETHYLENE GLYCOL (MIRALAX) 17 GRAM PACKET    Mix 1 packet (17g) into 4-8 ounces of beverage and drink once daily. Results in 2-4 days, Max treatment length of 2 weeks. PRAZOSIN (MINIPRESS) 1 MG CAPSULE    TK 1 C PO HS    PROMETHAZINE (PHENERGAN) 12.5 MG TABLET    TK 1 T PO QID - GEF - PHENERGAN    SITAGLIPTIN (JANUVIA) 50 MG TABLET    50 mg.

## 2017-05-16 ENCOUNTER — OFFICE VISIT (OUTPATIENT)
Dept: FAMILY MEDICINE CLINIC | Age: 42
End: 2017-05-16

## 2017-05-16 VITALS
DIASTOLIC BLOOD PRESSURE: 92 MMHG | OXYGEN SATURATION: 98 % | WEIGHT: 220 LBS | TEMPERATURE: 96.9 F | HEART RATE: 74 BPM | SYSTOLIC BLOOD PRESSURE: 126 MMHG | RESPIRATION RATE: 16 BRPM | BODY MASS INDEX: 31.5 KG/M2 | HEIGHT: 70 IN

## 2017-05-16 DIAGNOSIS — K40.20 BILATERAL INGUINAL HERNIA WITHOUT OBSTRUCTION OR GANGRENE, RECURRENCE NOT SPECIFIED: Primary | ICD-10-CM

## 2017-05-16 DIAGNOSIS — E66.9 NON MORBID OBESITY, UNSPECIFIED OBESITY TYPE: ICD-10-CM

## 2017-05-16 DIAGNOSIS — F11.90 NARCOTIC DRUG USE: ICD-10-CM

## 2017-05-16 DIAGNOSIS — F41.9 ANXIETY: ICD-10-CM

## 2017-05-16 DIAGNOSIS — I10 ESSENTIAL HYPERTENSION: ICD-10-CM

## 2017-05-16 DIAGNOSIS — E11.9 TYPE 2 DIABETES MELLITUS WITHOUT COMPLICATION, WITHOUT LONG-TERM CURRENT USE OF INSULIN (HCC): ICD-10-CM

## 2017-05-16 RX ORDER — NALOXONE HYDROCHLORIDE 4 MG/.1ML
1 SPRAY NASAL AS NEEDED
Qty: 1 BOX | Refills: 1 | Status: SHIPPED | OUTPATIENT
Start: 2017-05-16 | End: 2017-06-16 | Stop reason: CLARIF

## 2017-05-16 RX ORDER — CLONAZEPAM 1 MG/1
1 TABLET ORAL 2 TIMES DAILY
Qty: 14 TAB | Refills: 0 | Status: SHIPPED | OUTPATIENT
Start: 2017-05-16 | End: 2017-05-23 | Stop reason: ALTCHOICE

## 2017-05-16 NOTE — PROGRESS NOTES
Rm 8  Pt presents to the clinic for a med evaluation. Pt currently takes Xanax but does not believe it is working well. Pt also complains of pain from his two hernias. Pt had a scheduled appointment with Dr. Leni Willis but his BP dropped too low while sedated so they postponed the surgery until he sees cardio. Depression Screening Completed: yes    Learning Assessment Completed: yes    Abuse Screening Completed: n/a    Health Maintenance reviewed and discussed per provider: yes     Coordination of Care:    1. Have you been to the ER, urgent care clinic since your last visit? Hospitalized since your last visit? no    2. Have you seen or consulted any other health care providers outside of the [address] [place] [address] since your last visit? Include any pap smears or colon screening.  no

## 2017-05-16 NOTE — MR AVS SNAPSHOT
Visit Information Date & Time Provider Department Dept. Phone Encounter #  
 5/16/2017 12:00 PM Rylee Ferrell PA-C [facility] [phone] [id number] Follow-up Instructions Return if symptoms worsen or fail to improve. Upcoming Health Maintenance Date Due  
 EYE EXAM RETINAL OR DILATED Q1 12/28/1984 Pneumococcal 19-64 Medium Risk (1 of 1 - PPSV23) 12/28/1993 DTaP/Tdap/Td series (1 - Tdap) 12/28/1995 MICROALBUMIN Q1 7/7/2016 LIPID PANEL Q1 7/7/2016 FOOT EXAM Q1 4/27/2017 INFLUENZA AGE 9 TO ADULT 8/1/2017 HEMOGLOBIN A1C Q6M 9/3/2017 Allergies as of 5/16/2017  Review Complete On: 5/16/2017 By: Rylee Ferrell PA-C No Known Allergies Current Immunizations  Reviewed on 9/2/2015 Name Date Influenza Vaccine (Quad) PF 9/2/2015 Influenza Vaccine Whole 12/1/2009 Not reviewed this visit You Were Diagnosed With   
  
 Codes Comments Bilateral inguinal hernia without obstruction or gangrene, recurrence not specified    -  Primary ICD-10-CM: K40.20 ICD-9-CM: 550.92 Anxiety     ICD-10-CM: F41.9 ICD-9-CM: 300.00 Narcotic drug use     ICD-10-CM: F11.90 ICD-9-CM: 305.50 Vitals BP Pulse Temp Resp Height(growth percentile) Weight(growth percentile) (!) 126/92 (BP 1 Location: Left arm, BP Patient Position: Sitting) 74 96.9 °F (36.1 °C) (Oral) 16 5' 10\" (1.778 m) 220 lb (99.8 kg) SpO2 BMI Smoking Status 98% 31.57 kg/m2 Never Smoker Vitals History BMI and BSA Data Body Mass Index Body Surface Area  
 31.57 kg/m 2 2.22 m 2 Preferred Pharmacy Pharmacy Name Phone [address] Jerome [address] [phone] Your Updated Medication List  
  
   
This list is accurate as of: 5/16/17  1:37 PM.  Always use your most recent med list.  
  
  
  
  
 ALPRAZolam 1 mg tablet Commonly known as:  Lawyer Calderón TAKE 1 TABLET BY MOUTH 3 TIMES DAILY AS NEEDED Blood-Glucose Meter monitoring kit Commonly known as:  Karely Vega Use as directed  
  
 carvedilol 3.125 mg tablet Commonly known as:  COREG  
3.125 mg.  
  
 clonazePAM 1 mg tablet Commonly known as:  Alexandr Meieroms Take 1 Tab by mouth two (2) times a day. Max Daily Amount: 2 mg.  
  
 glucose blood VI test strips strip Commonly known as:  ASCENSIA AUTODISC VI, ONE TOUCH ULTRA TEST VI Check blood sugar daily HYDROcodone-acetaminophen 5-325 mg per tablet Commonly known as:  Miguel Hobosn Take 1 Tab by mouth every four (4) hours as needed for Pain. Max Daily Amount: 6 Tabs. * ibuprofen 800 mg tablet Commonly known as:  MOTRIN  
TK 1 T PO Q 6 H PRF PAIN  
  
 * ibuprofen 600 mg tablet Commonly known as:  MOTRIN Take 1 Tab by mouth every six (6) hours as needed for Pain. Lancets Misc Commonly known as:  ONETOUCH ULTRASOFT LANCETS Check blood sugar daily  
  
 lisinopril-hydroCHLOROthiazide 20-25 mg per tablet Commonly known as:  [facility] Take 1 Tab by mouth daily. metFORMIN 1,000 mg tablet Commonly known as:  GLUCOPHAGE Take 500 mg by mouth two (2) times a day.  
  
 naloxone 4 mg/actuation Spry 1 Spray by Nasal route as needed (overdose). Use as directed QUEtiapine 400 mg tablet Commonly known as:  SEROquel TK 1 T PO HS  
  
 SITagliptin 50 mg tablet Commonly known as:  Jon Soulier Take 1 Tab by mouth daily. ZOLOFT 100 mg tablet Generic drug:  sertraline Take 100 mg by mouth daily. * Notice: This list has 2 medication(s) that are the same as other medications prescribed for you. Read the directions carefully, and ask your doctor or other care provider to review them with you. Prescriptions Printed Refills  
 clonazePAM (KLONOPIN) 1 mg tablet 0 Sig: Take 1 Tab by mouth two (2) times a day. Max Daily Amount: 2 mg. Class: Print  Route: Oral  
  
 Prescriptions Sent to Pharmacy Refills  
 naloxone 4 mg/actuation spry 1 Si Spray by Nasal route as needed (overdose). Use as directed Class: Normal  
 Pharmacy: [facility] [address] [place] [address] Jerome Spivey  #: [phone] Route: Nasal  
  
We Performed the Following REFERRAL TO GENERAL SURGERY [REF27 Custom] Comments:  
 Please evaluate patient for bilateral inguinal hernias. REFERRAL TO PSYCHIATRY [REF91 Custom] Comments:  
 Please evaluate patient for anxiety and depression. Follow-up Instructions Return if symptoms worsen or fail to improve. Referral Information Referral ID Referred By Referred To  
  
 [id number] AIDEE, [address] [place] [address] [facility] [place] [address] Phone: [phone] Fax: [phone] Visits Status Start Date End Date 1 New Request 17 If your referral has a status of pending review or denied, additional information will be sent to support the outcome of this decision. Referral ID Referred By Referred To  
 [id number] AIDEE[address] Gonzalo Steve MD  
   [address] [place] [address] [facility] Phone: [phone] Fax: [phone] Visits Status Start Date End Date 1 New Request 17 If your referral has a status of pending review or denied, additional information will be sent to support the outcome of this decision. Patient Instructions I have given you enough klonopin to take twice daily for a week. If it works, you'll need to approach your psychiatrist about a refill of it. We don't prescribe these kinds of medication on a chronic basis. Be sure to discuss your full history with our surgeon when you go. Start working on [employer] now (see below), and work on getting more exercise once your hernias get fixed.  your blood sugar monitoring device and start checking your blood sugars twice daily. Check out \"The Ultra Simple Diet\" by Dr Ana Crowder Consider going on a low carb or ketogenic diet. Reducing dietary intake of carbohydrates can be an effective way of losing weight and improving metabolic diseases such as diabetes, pre-diabetes, hypertension, and high cholesterol. Although some end up eating fewer calories on this type of diet, it is not a calorie-restricted diet per se. Most people will need to replace the calories from carbohydrates with healthy fats and protein in order to remain satisfied and maintain or increase their metabolism. Here are some great articles on low-carb diets:  
https://Clzby/low-carb-diet-meal-plan-and-menu/ 
https://Clzby/eme-mbze-iakpc-per-day-to-lose-weight/ 
https://MakersKit/diabetes-carbs-per-day/ This may be another approach to figuring out food sensitivities:  
http://[employer]/grow/easy-elimination-diet-for-food-intolerance Eat real food 
 
https://MakersKit/real-foods-and-weight-loss/ 
 
 
 
Many people say that it is expensive to eat well, but here are some web sites that show it doesn't have to be:  
 
AntiHot.gl 
 
https://Clzby/56-dvfg-hs-eat-healthy-on-a-budget/ 
 
http://ecoATM/blog/2010/08/20/eat-well-for-less-even-if-you-dont-have-time/ 
 
SSLAlert.is Try these quick mindfulness exercise. Setting aside a few minutes every day can help with stress and focus, Or you can do it when you're feeling stressed:  
 
https://OANDAu. be/kLnLTQPzI5b It can be found on YouParallelsube under \"5-Minute Mindful Breathing Meditation\" Stop, Breathe & Think 
 
IRSCoupons.no \"Mindfulness Guided Meditation - 5 Minutes\" by Dr. Migdalia Calabrese. Farhana 
 
https://youtu. be/4K3VuzKAECt 10 minute Mindfulness Breathing Exercise Meditation has been shown in scientific studies to have a whole host of benefits from reductions in anxiety, blood pressure, and hemoglobin A1C to improvements in concentration, sleep, and general well-being. Check out this article from Brainly on the benefits of regular meditation and mindfulness practices: https://Cronote/rpuxwgjnhpw-uydjzfbx-cgvpdw-mindfulness-can-help/ 
 
Here's a quick (10 minute) mindfulness exercise:  
https://youCareemu. be/5L5UodEWZMd \"Mindfulness Breathing Exercise\" (on YouTube) Anxiety Disorder: Care Instructions Your Care Instructions Anxiety is a normal reaction to stress. Difficult situations can cause you to have symptoms such as sweaty palms and a nervous feeling. In an anxiety disorder, the symptoms are far more severe. Constant worry, muscle tension, trouble sleeping, nausea and diarrhea, and other symptoms can make normal daily activities difficult or impossible. These symptoms may occur for no reason, and they can affect your work, school, or social life. Medicines, counseling, and self-care can all help. Follow-up care is a key part of your treatment and safety. Be sure to make and go to all appointments, and call your doctor if you are having problems. It's also a good idea to know your test results and keep a list of the medicines you take. How can you care for yourself at home? · Take medicines exactly as directed. Call your doctor if you think you are having a problem with your medicine. · Go to your counseling sessions and follow-up appointments. · Recognize and accept your anxiety. Then, when you are in a situation that makes you anxious, say to yourself, \"This is not an emergency. I feel uncomfortable, but I am not in danger. I can keep going even if I feel anxious. \" · Be kind to your body: ¨ Relieve tension with exercise or a massage. ¨ Get enough rest. 
¨ Avoid alcohol, caffeine, nicotine, and illegal drugs. They can increase your anxiety level and cause sleep problems. ¨ Learn and do relaxation techniques. See below for more about these techniques. · Engage your mind. Get out and do something you enjoy. Go to a funny movie, or take a walk or hike. Plan your day. Having too much or too little to do can make you anxious. · Keep a record of your symptoms. Discuss your fears with a good friend or family member, or join a support group for people with similar problems. Talking to others sometimes relieves stress. · Get involved in social groups, or volunteer to help others. Being alone sometimes makes things seem worse than they are. · Get at least 30 minutes of exercise on most days of the week to relieve stress. Walking is a good choice. You also may want to do other activities, such as running, swimming, cycling, or playing tennis or team sports. Relaxation techniques Do relaxation exercises 10 to 20 minutes a day. You can play soothing, relaxing music while you do them, if you wish. · Tell others in your house that you are going to do your relaxation exercises. Ask them not to disturb you. · Find a comfortable place, away from all distractions and noise. · Lie down on your back, or sit with your back straight. · Focus on your breathing. Make it slow and steady. · Breathe in through your nose. Breathe out through either your nose or mouth. · Breathe deeply, filling up the area between your navel and your rib cage. Breathe so that your belly goes up and down. · Do not hold your breath. · Breathe like this for 5 to 10 minutes. Notice the feeling of calmness throughout your whole body. As you continue to breathe slowly and deeply, relax by doing the following for another 5 to 10 minutes: · Tighten and relax each muscle group in your body.  You can begin at your toes and work your way up to your head. · Imagine your muscle groups relaxing and becoming heavy. · Empty your mind of all thoughts. · Let yourself relax more and more deeply. · Become aware of the state of calmness that surrounds you. · When your relaxation time is over, you can bring yourself back to alertness by moving your fingers and toes and then your hands and feet and then stretching and moving your entire body. Sometimes people fall asleep during relaxation, but they usually wake up shortly afterward. · Always give yourself time to return to full alertness before you drive a car or do anything that might cause an accident if you are not fully alert. Never play a relaxation tape while you drive a car. When should you call for help? Call 911 anytime you think you may need emergency care. For example, call if: 
· You feel you cannot stop from hurting yourself or someone else. Keep the numbers for these national suicide hotlines: 3-947-800-TALK (0-111.274.9220) and 0-156-MFHLWAG (5-437.100.4677). If you or someone you know talks about suicide or feeling hopeless, get help right away. Watch closely for changes in your health, and be sure to contact your doctor if: 
· You have anxiety or fear that affects your life. · You have symptoms of anxiety that are new or different from those you had before. Where can you learn more? Go to http://jacklyn-denice.info/. Enter P754 in the search box to learn more about \"Anxiety Disorder: Care Instructions. \" Current as of: July 26, 2016 Content Version: 11.2 © 4695-9918 Wanderio. Care instructions adapted under license by goBramble (which disclaims liability or warranty for this information). If you have questions about a medical condition or this instruction, always ask your healthcare professional. [address] any warranty or liability for your use of this information. Hernia: Care Instructions Your Care Instructions A hernia develops when tissue bulges through a weak spot in the wall of your belly. The groin area and the navel are common areas for a hernia. A hernia can also develop near the area of a surgery you had before. Pressure from lifting, straining, or coughing can tear the weak area, causing the hernia to bulge and be painful. If you cannot push a hernia back into place, the tissue may become trapped outside the belly wall. If the hernia gets twisted and loses its blood supply, it will swell and die. This is called a strangulated hernia. It usually causes a lot of pain. It needs treatment right away. Some hernias need to be repaired to prevent a strangulated hernia. If your hernia causes symptoms or is large, you may need surgery. Follow-up care is a key part of your treatment and safety. Be sure to make and go to all appointments, and call your doctor if you are having problems. Its also a good idea to know your test results and keep a list of the medicines you take. How can you care for yourself at home? · Take care when lifting heavy objects. · Stay at a healthy weight. · Do not smoke. Smoking can cause coughing, which can cause your hernia to bulge. If you need help quitting, talk to your doctor about stop-smoking programs and medicines. These can increase your chances of quitting for good. · Talk with your doctor before wearing a corset or truss for a hernia. These devices are not recommended for treating hernias and sometimes can do more harm than good. There may be certain situations when your doctor thinks a truss would work, but these are rare. When should you call for help? Call your doctor now or seek immediate medical care if: 
· You have severe belly pain. · You have nausea or vomiting. · You have belly pain and are not passing gas or stool. · You cannot push the hernia back into place with gentle pressure when you are lying down. · The skin over the hernia turns red or becomes tender. Watch closely for changes in your health, and be sure to contact your doctor if: 
· You have new or increased pain. · You do not get better as expected. Where can you learn more? Go to http://jacklyn-denice.info/. Enter C129 in the search box to learn more about \"Hernia: Care Instructions. \" Current as of: August 9, 2016 Content Version: 11.2 © 3801-0292 SuiteLinq. Care instructions adapted under license by 2Catalyze (which disclaims liability or warranty for this information). If you have questions about a medical condition or this instruction, always ask your healthcare professional. [address] any warranty or liability for your use of this information. Introducing [facility]! Vivian Cade introduces Ticket Cake patient portal. Now you can access parts of your medical record, email your doctor's office, and request medication refills online. 1. In your internet browser, go to [email or web address] Throttle Indoor Kart Racing. Group Commerce/Full Throttle [employer]art [employer] 2. Click on the First Time User? Click Here link in the Sign In box. You will see the New Member Sign Up page. 3. Enter your Ticket Cake Access Code exactly as it appears below. You will not need to use this code after youve completed the sign-up process. If you do not sign up before the expiration date, you must request a new code. · Ticket Cake Access Code: [email or web address] Expires: 8/9/2017  9:05 PM 
 
4. Enter the last four digits of your Social Security Number (xxxx) and Date of Birth (mm/dd/yyyy) as indicated and click Submit. You will be taken to the next sign-up page. 5. Create a Ticket Cake ID. This will be your Ticket Cake login ID and cannot be changed, so think of one that is secure and easy to remember. 6. Create a Ticket Cake password. You can change your password at any time. 7. Enter your Password Reset Question and Answer.  This can be used at a later time if you forget your password. 8. Enter your e-mail address. You will receive e-mail notification when new information is available in [address] 9. Click Sign Up. You can now view and download portions of your medical record. 10. Click the Download Summary menu link to download a portable copy of your medical information. If you have questions, please visit the Frequently Asked Questions section of the Swag Of The Month website. Remember, Swag Of The Month is NOT to be used for urgent needs. For medical emergencies, dial 911. Now available from your iPhone and Android! Please provide this summary of care documentation to your next provider. Your primary care clinician is listed as Nichol Donahue. If you have any questions after today's visit, please call [phone].

## 2017-05-16 NOTE — PROGRESS NOTES
HISTORY OF PRESENT ILLNESS  Yanci Jasso is a [address] y.o. male. HPI Comments: Pt presents with c/o hernia pain and worsening anxiety and depression. He reports that he was undergoing anesthesia for bilateral inguinal hernia repair when the surgery was cancelled. Previous notes show that the patient went into SVT during the surgery, and was referred to cardiology for clearance prior to attempting the operation again. States he is scheduled to see cardiology on June 7th. States he is scheduled with Osmel Mina for hernia repair following cardiology clearance. States the pain is not helped by 800 mg ibuprofen nor by ultram. States he got some ultram yesterday. He complains of worsening anxiety d/t being in the midst of a divorce, loss of job because of the hernias, and a niece at [employer], and having to move in with his brother. According to the , he has been prescribed Xanax as recently as March by Jluis Bonner. He is also taking seroquel and Zoloft. States Dr Rhett Moreno office told him he would have to come to his regular doctor for a refill of Xanax. His diastolic has been noted to be elevated. States he took his coreg and lisinopril-HCTZ this morning. Medication Evaluation   Associated symptoms include chest pain, abdominal pain (hernias) and shortness of breath. Review of Systems   Constitutional: Positive for malaise/fatigue. Negative for chills and fever. Respiratory: Positive for shortness of breath. Cardiovascular: Positive for chest pain and palpitations (heart racing). Gastrointestinal: Positive for abdominal pain (hernias), constipation (chronic, last BM last night) and nausea (periodically). Negative for blood in stool and vomiting. Genitourinary: Negative for dysuria and hematuria.        + bilateral hernia pain   Neurological: Negative for weakness. Psychiatric/Behavioral: Positive for depression.  The patient is nervous/anxious and has insomnia (secondary to pain).      Visit Vitals    BP (!) 126/92 (BP 1 Location: Left arm, BP Patient Position: Sitting)  Comment: maroon cuff    Pulse 74    Temp 96.9 °F (36.1 °C) (Oral)    Resp 16    Ht 5' 10\" (1.778 m)    Wt 220 lb (99.8 kg)    SpO2 98%    BMI 31.57 kg/m2       Physical Exam   Constitutional: He is oriented to person, place, and time. He appears well-developed and well-nourished. He is cooperative. He does not appear ill. No distress. HENT:   Head: Normocephalic and atraumatic. Right Ear: External ear normal.   Left Ear: External ear normal.   Nose: Nose normal. No nasal deformity. Eyes: Conjunctivae are normal.   Neck: Neck supple. Cardiovascular: Normal rate, regular rhythm and normal heart sounds. Exam reveals no gallop and no friction rub. No murmur heard. Pulses:       Radial pulses are 2+ on the right side, and 2+ on the left side. Pulmonary/Chest: Effort normal and breath sounds normal. No respiratory distress. He has no decreased breath sounds. He has no wheezes. He has no rhonchi. He has no rales. Abdominal: Soft. Normal appearance and bowel sounds are normal. There is tenderness in the left lower quadrant. A hernia is present. Hernia confirmed positive in the left inguinal area. Hernia confirmed negative in the ventral area. Genitourinary:   Genitourinary Comments: There distention noted with valsava at my finger on the left side. None appreciated on the right    There is some suprapubic distention (likely fat) that does not change with valsalva. Pt is tender in the inguial/suprpubic area, especially on the left   Lymphadenopathy:     He has no cervical adenopathy. Right: No inguinal adenopathy present. Left: No inguinal adenopathy present. Neurological: He is alert and oriented to person, place, and time. Skin: Skin is warm and dry. No rash noted. He is not diaphoretic. Psychiatric: He has a normal mood and affect.  His speech is normal and behavior is normal. Thought content normal.   Nursing note and vitals reviewed. ASSESSMENT and PLAN    ICD-10-CM ICD-9-CM    1. Bilateral inguinal hernia without obstruction or gangrene, recurrence not specified K40.20 550.92 REFERRAL TO GENERAL SURGERY   2. Anxiety F41.9 300.00 REFERRAL TO PSYCHIATRY      clonazePAM (KLONOPIN) 1 mg tablet   3. Narcotic drug use F11.90 305.50 naloxone 4 mg/actuation spry   4. Type 2 diabetes mellitus without complication, without long-term current use of insulin (HCC) E11.9 250.00    5. Essential hypertension I10 401.9    6. Non morbid obesity, unspecified obesity type E66.9 278.00      1. Placed referral to [facility] surgeon at pt's request. He is advised that our surgeon may want him to follow through with cardiology work-up first. Provided after-visit information on  Hernia. Reviewed reasons to return or seek emergency care. 2. Referred pt to alternate psychiatrist per his request. He is advised that we do not prescribe benzodiazepines on a chronic basis, and that further requests should be submitted to psychiatry. Counseled him not to take Xanax and Klonopin at the same time.  was reviewed, and raises some significant concerns, however the medication prescribed is short term in the interest of helping pt determine what works for him, with plans in place to turn management over to psych. Also recommended daily mindfulness practice (15-20 minutes daily), and discussed the Ultra Simple Diet, which may help to remove some food sensitivities and sources of blood sugar instability in hopes of improving mood and inflammation. 3. Provided naloxone prescription d/t concurrant use of benzos and narcotics. 4. Recommended low carb or ketogenic diet to improve blood sugar control. It may also help with reflux symptoms, weight loss, and mood. 5. Diastolic likely elevated d/t pain and anxiety.  Will recheck and follow-up and consider adjustment of regimen depending on outcome of surgery, improvements in pain, and changes in lifestyle. 6. Recommending starting with dietary improvements now & recommendations for weekly exercise for after his hernias get fixed. Provided information on ways to eat healthy on a budget. Pt verbalized understanding and agreement with the plan of care.     Brian Zaman PA-C

## 2017-05-16 NOTE — PATIENT INSTRUCTIONS
I have given you enough klonopin to take twice daily for a week. If it works, you'll need to approach your psychiatrist about a refill of it. We don't prescribe these kinds of medication on a chronic basis. Be sure to discuss your full history with our surgeon when you go. Start working on [employer] now (see below), and work on getting more exercise once your hernias get fixed.  your blood sugar monitoring device and start checking your blood sugars twice daily. Check out \"The Ultra Simple Diet\" by Dr Nadya Acosta    Consider going on a low carb or ketogenic diet. Reducing dietary intake of carbohydrates can be an effective way of losing weight and improving metabolic diseases such as diabetes, pre-diabetes, hypertension, and high cholesterol. Although some end up eating fewer calories on this type of diet, it is not a calorie-restricted diet per se. Most people will need to replace the calories from carbohydrates with healthy fats and protein in order to remain satisfied and maintain or increase their metabolism. Here are some great articles on low-carb diets:   https://Prifloat/low-carb-diet-meal-plan-and-menu/  https://Prifloat/dfg-uzdu-czupl-per-day-to-lose-weight/  https://PinoyTravel/diabetes-carbs-per-day/    This may be another approach to figuring out food sensitivities:   http://Netli/grow/easy-elimination-diet-for-food-intolerance    Eat real food    https://PinoyTravel/real-foods-and-weight-loss/        Many people say that it is expensive to eat well, but here are some web sites that show it doesn't have to be: AntiHot.gl    https://Tesoro Enterprises.Tylr Mobile/56-lenc-fy-eat-healthy-on-a-budget/    http://MyRooms Inc..Tylr Mobile/blog/2010/08/20/eat-well-for-less-even-if-you-dont-have-time/    SSLAivanat.is      Try these quick mindfulness exercise. Setting aside a few minutes every day can help with stress and focus,  Or you can do it when you're feeling stressed:     https://Nexx Systems/cCpEOXYyC9f  It can be found on eeGeoube under   \"5-Minute Mindful Breathing Meditation\"  Stop, Breathe & Think    IRSCoupons.no  \"Mindfulness Guided Meditation - 5 Minutes\"  by Dr. Claudia Rice. Farhana    https://Tango Card. Swagbucks/3F8AhrCYOEb  10 minute Mindfulness Breathing Exercise    Meditation has been shown in scientific studies to have a whole host of benefits from reductions in anxiety, blood pressure, and hemoglobin A1C to improvements in concentration, sleep, and general well-being. Check out this article from Isidoro [employer] on the benefits of regular meditation and mindfulness practices: https://Winster/kyotbawfetj-oyfoqfqh-xjiuid-mindfulness-can-help/    Here's a quick (10 minute) mindfulness exercise:   https://Nexx Systems/0E0WhxVEGAr  \"Mindfulness Breathing Exercise\"  (on YouTube)               Anxiety Disorder: Care Instructions  Your Care Instructions  Anxiety is a normal reaction to stress. Difficult situations can cause you to have symptoms such as sweaty palms and a nervous feeling. In an anxiety disorder, the symptoms are far more severe. Constant worry, muscle tension, trouble sleeping, nausea and diarrhea, and other symptoms can make normal daily activities difficult or impossible. These symptoms may occur for no reason, and they can affect your work, school, or social life. Medicines, counseling, and self-care can all help.   Follow-up care is a key part of your treatment and safety. Be sure to make and go to all appointments, and call your doctor if you are having problems. It's also a good idea to know your test results and keep a list of the medicines you take. How can you care for yourself at home? · Take medicines exactly as directed. Call your doctor if you think you are having a problem with your medicine. · Go to your counseling sessions and follow-up appointments. · Recognize and accept your anxiety. Then, when you are in a situation that makes you anxious, say to yourself, \"This is not an emergency. I feel uncomfortable, but I am not in danger. I can keep going even if I feel anxious. \"  · Be kind to your body:  ¨ Relieve tension with exercise or a massage. ¨ Get enough rest.  ¨ Avoid alcohol, caffeine, nicotine, and illegal drugs. They can increase your anxiety level and cause sleep problems. ¨ Learn and do relaxation techniques. See below for more about these techniques. · Engage your mind. Get out and do something you enjoy. Go to a funny movie, or take a walk or hike. Plan your day. Having too much or too little to do can make you anxious. · Keep a record of your symptoms. Discuss your fears with a good friend or family member, or join a support group for people with similar problems. Talking to others sometimes relieves stress. · Get involved in social groups, or volunteer to help others. Being alone sometimes makes things seem worse than they are. · Get at least 30 minutes of exercise on most days of the week to relieve stress. Walking is a good choice. You also may want to do other activities, such as running, swimming, cycling, or playing tennis or team sports. Relaxation techniques  Do relaxation exercises 10 to 20 minutes a day. You can play soothing, relaxing music while you do them, if you wish. · Tell others in your house that you are going to do your relaxation exercises. Ask them not to disturb you.   · Find a comfortable place, away from all distractions and noise. · Lie down on your back, or sit with your back straight. · Focus on your breathing. Make it slow and steady. · Breathe in through your nose. Breathe out through either your nose or mouth. · Breathe deeply, filling up the area between your navel and your rib cage. Breathe so that your belly goes up and down. · Do not hold your breath. · Breathe like this for 5 to 10 minutes. Notice the feeling of calmness throughout your whole body. As you continue to breathe slowly and deeply, relax by doing the following for another 5 to 10 minutes:  · Tighten and relax each muscle group in your body. You can begin at your toes and work your way up to your head. · Imagine your muscle groups relaxing and becoming heavy. · Empty your mind of all thoughts. · Let yourself relax more and more deeply. · Become aware of the state of calmness that surrounds you. · When your relaxation time is over, you can bring yourself back to alertness by moving your fingers and toes and then your hands and feet and then stretching and moving your entire body. Sometimes people fall asleep during relaxation, but they usually wake up shortly afterward. · Always give yourself time to return to full alertness before you drive a car or do anything that might cause an accident if you are not fully alert. Never play a relaxation tape while you drive a car. When should you call for help? Call 911 anytime you think you may need emergency care. For example, call if:  · You feel you cannot stop from hurting yourself or someone else. Keep the numbers for these national suicide hotlines: 6-546-549-TALK (1-631.344.6535) and 9-119-YBMHMEL (4-594.691.7531). If you or someone you know talks about suicide or feeling hopeless, get help right away. Watch closely for changes in your health, and be sure to contact your doctor if:  · You have anxiety or fear that affects your life.   · You have symptoms of anxiety that are new or different from those you had before. Where can you learn more? Go to http://jacklyn-denice.info/. Enter P754 in the search box to learn more about \"Anxiety Disorder: Care Instructions. \"  Current as of: July 26, 2016  Content Version: 11.2  © 2787-9733 [employer]. Care instructions adapted under license by m-spatial (which disclaims liability or warranty for this information). If you have questions about a medical condition or this instruction, always ask your healthcare professional. [place] [address] any warranty or liability for your use of this information. Hernia: Care Instructions  Your Care Instructions    A hernia develops when tissue bulges through a weak spot in the wall of your belly. The groin area and the navel are common areas for a hernia. A hernia can also develop near the area of a surgery you had before. Pressure from lifting, straining, or coughing can tear the weak area, causing the hernia to bulge and be painful. If you cannot push a hernia back into place, the tissue may become trapped outside the belly wall. If the hernia gets twisted and loses its blood supply, it will swell and die. This is called a strangulated hernia. It usually causes a lot of pain. It needs treatment right away. Some hernias need to be repaired to prevent a strangulated hernia. If your hernia causes symptoms or is large, you may need surgery. Follow-up care is a key part of your treatment and safety. Be sure to make and go to all appointments, and call your doctor if you are having problems. Its also a good idea to know your test results and keep a list of the medicines you take. How can you care for yourself at home? · Take care when lifting heavy objects. · Stay at a healthy weight. · Do not smoke. Smoking can cause coughing, which can cause your hernia to bulge.  If you need help quitting, talk to your doctor about stop-smoking programs and medicines. These can increase your chances of quitting for good. · Talk with your doctor before wearing a corset or truss for a hernia. These devices are not recommended for treating hernias and sometimes can do more harm than good. There may be certain situations when your doctor thinks a truss would work, but these are rare. When should you call for help? Call your doctor now or seek immediate medical care if:  · You have severe belly pain. · You have nausea or vomiting. · You have belly pain and are not passing gas or stool. · You cannot push the hernia back into place with gentle pressure when you are lying down. · The skin over the hernia turns red or becomes tender. Watch closely for changes in your health, and be sure to contact your doctor if:  · You have new or increased pain. · You do not get better as expected. Where can you learn more? Go to http://jacklyn-denice.info/. Enter C129 in the search box to learn more about \"Hernia: Care Instructions. \"  Current as of: August 9, 2016  Content Version: 11.2  © 8671-8033 [employer]. Care instructions adapted under license by Medisse (which disclaims liability or warranty for this information). If you have questions about a medical condition or this instruction, always ask your healthcare professional. [address] any warranty or liability for your use of this information.

## 2017-05-17 ENCOUNTER — TELEPHONE (OUTPATIENT)
Dept: SURGERY | Age: 42
End: 2017-05-17

## 2017-05-17 ENCOUNTER — OFFICE VISIT (OUTPATIENT)
Dept: SURGERY | Age: 42
End: 2017-05-17

## 2017-05-17 VITALS
HEART RATE: 105 BPM | OXYGEN SATURATION: 96 % | SYSTOLIC BLOOD PRESSURE: 116 MMHG | HEIGHT: 70 IN | TEMPERATURE: 99.2 F | RESPIRATION RATE: 18 BRPM | DIASTOLIC BLOOD PRESSURE: 70 MMHG | WEIGHT: 222.4 LBS | BODY MASS INDEX: 31.84 KG/M2

## 2017-05-17 DIAGNOSIS — K40.20 BILATERAL INGUINAL HERNIA WITHOUT OBSTRUCTION OR GANGRENE, RECURRENCE NOT SPECIFIED: Primary | ICD-10-CM

## 2017-05-17 NOTE — PATIENT INSTRUCTIONS
If you have any questions or concerns about today's appointment, the verbal and/or written instructions you were given for follow up care, please call our office at [phone].     [facility] - [address]    [phone] office  [phone]

## 2017-05-17 NOTE — PROGRESS NOTES
General Surgery Consult      Josephine Gaytan  Admit date: (Not on file)    MRN: [id number]     : 1975     Age: 42 y.o. Attending Physician: Leann Macdonald MD, FACS      History of Present Illness:     Anuj Gavin is a 42 y.o. male was referredfor evaluation of bilateral inguinal hernia. Symptoms were first noted a few months ago. Pain is dull. The mass is  reducible. He does not have a history of incarceration or obstruction. The patient was scheduled for surgery at [facility] but was cancelled after induction because of hypotension. He was suppose to follow up to re-schedule his surgery but decided to get another opinion. Patient Active Problem List    Diagnosis Date Noted    Bilateral inguinal hernia 2017    Intractable vomiting with nausea 2017    ACP (advance care planning) 2016    Diabetes mellitus ([facility] [address] [facility] 2015    Lower abdominal pain 2014    Chronic low back pain 2013    Narcotic abuse 2013    Hypertension      Past Medical History:   Diagnosis Date    Depression     Diabetes ([address]     Herniated lumbar intervertebral disc     Hypertension     Neurological disorder L3,4,5 torn Herniated disc      Past Surgical History:   Procedure Laterality Date    HX ORTHOPAEDIC  trigger finger release      Social History   Substance Use Topics    Smoking status: Never Smoker    Smokeless tobacco: Never Used    Alcohol use Yes      Comment: rarely      History   Smoking Status    Never Smoker   Smokeless Tobacco    Never Used     Family History   Problem Relation Age of Onset    Hypertension Mother     Diabetes Mother     Heart Failure Mother     COPD Mother     Heart Disease Mother     Hypertension Father     Alcohol abuse Father       Current Outpatient Prescriptions   Medication Sig    clonazePAM (KLONOPIN) 1 mg tablet Take 1 Tab by mouth two (2) times a day. Max Daily Amount: 2 mg.     carvedilol (COREG) 3.125 mg tablet 3.125 mg.    ibuprofen (MOTRIN) 800 mg tablet TK 1 T PO Q 6 H PRF PAIN    ibuprofen (MOTRIN) 600 mg tablet Take 1 Tab by mouth every six (6) hours as needed for Pain.  lisinopril-hydroCHLOROthiazide (ZESTORETIC) 20-25 mg per tablet Take 1 Tab by mouth daily.  glucose blood VI test strips (ASCENSIA AUTODISC VI, ONE TOUCH ULTRA TEST VI) strip Check blood sugar daily    Lancets (ONETOUCH ULTRASOFT LANCETS) misc Check blood sugar daily    Blood-Glucose Meter (ONETOUCH ULTRA2) monitoring kit Use as directed    SITagliptin (JANUVIA) 50 mg tablet Take 1 Tab by mouth daily.  sertraline (ZOLOFT) 100 mg tablet Take 100 mg by mouth daily.  metFORMIN (GLUCOPHAGE) 1,000 mg tablet Take 500 mg by mouth two (2) times a day.  naloxone 4 mg/actuation spry 1 Spray by Nasal route as needed (overdose). Use as directed    HYDROcodone-acetaminophen (NORCO) 5-325 mg per tablet Take 1 Tab by mouth every four (4) hours as needed for Pain. Max Daily Amount: 6 Tabs.  QUEtiapine (SEROQUEL) 400 mg tablet TK 1 T PO HS    ALPRAZolam (XANAX) 1 mg tablet TAKE 1 TABLET BY MOUTH 3 TIMES DAILY AS NEEDED     No current facility-administered medications for this visit. No Known Allergies     Review of Systems:  Pertinent items are noted in the History of Present Illness. Objective:     Visit Vitals    /70 (BP 1 Location: Right arm, BP Patient Position: Sitting)    Pulse (!) 105    Temp 99.2 °F (37.3 °C) (Oral)    Resp 18    Ht 5' 10\" (1.778 m)    Wt 100.9 kg (222 lb 6.4 oz)    SpO2 96%    BMI 31.91 kg/m2       Physical Exam:      General:  in no apparent distress   Eyes:  conjunctivae and sclerae normal, pupils equal, round, reactive to light   Throat & Neck: no erythema or exudates noted and neck supple and symmetrical; no palpable masses   Lungs:   clear to auscultation bilaterally   Heart:  Regular rate and rhythm   Abdomen:   rounded, soft, nontender, nondistended, no masses or organomegaly.  bilateral inguinal hernias that are easily reducible and mildly tender on palpation. Extremities: extremities normal, atraumatic, no cyanosis or edema   Skin: Normal.       Imaging and Lab Review:     CBC:   Lab Results   Component Value Date/Time    WBC 7.1 05/02/2017 10:59 AM    RBC 4.81 05/02/2017 10:59 AM    HGB 13.9 05/02/2017 10:59 AM    HCT 40.1 05/02/2017 10:59 AM    PLATELET 893 09/13/9593 10:59 AM     BMP:   Lab Results   Component Value Date/Time    Glucose 162 05/02/2017 10:59 AM    Sodium 136 05/02/2017 10:59 AM    Potassium 4.0 05/02/2017 10:59 AM    Chloride 101 05/02/2017 10:59 AM    CO2 26 05/02/2017 10:59 AM    BUN 11 05/02/2017 10:59 AM    Creatinine 1.18 05/02/2017 10:59 AM    Calcium 9.4 05/02/2017 10:59 AM     CMP:  Lab Results   Component Value Date/Time    Glucose 162 05/02/2017 10:59 AM    Sodium 136 05/02/2017 10:59 AM    Potassium 4.0 05/02/2017 10:59 AM    Chloride 101 05/02/2017 10:59 AM    CO2 26 05/02/2017 10:59 AM    BUN 11 05/02/2017 10:59 AM    Creatinine 1.18 05/02/2017 10:59 AM    Calcium 9.4 05/02/2017 10:59 AM    Anion gap 9 05/02/2017 10:59 AM    BUN/Creatinine ratio 9 05/02/2017 10:59 AM    Alk. phosphatase 65 05/02/2017 10:59 AM    Protein, total 8.7 05/02/2017 10:59 AM    Albumin 4.1 05/02/2017 10:59 AM    Globulin 4.6 05/02/2017 10:59 AM    A-G Ratio 0.9 05/02/2017 10:59 AM       No results found for this or any previous visit (from the past 24 hour(s)). images and reports reviewed    Assessment:   Isaura Prather is a 42 y.o. male is presenting with a picture of bilateral inguinal hernias. He needs cardiology consult before surgery. I Discussed the possibility of incarceration, strangulation, enlargement in size over time, and the risk of emergency surgery in the face of strangulation. I also discussed the use of prosthetic materials (mesh), including the risk of infection.  Also discussed the risk of surgery including recurrence and the possible need for reoperation and removal of mesh if used, possibility of postoperative small bowel injury, obstruction or ileus, and the risks of general anesthetic. I explained to the the patient about the robotic hernia repair procedure. Plan:     Make sure insurance will cover his surgery  Cardiology clearance  Follow up for surgery.    Please call me if you have any questions (cell phone: [phone])     Signed By: Mary Yao MD     May 17, 2017

## 2017-05-17 NOTE — PROGRESS NOTES
Patient is a 42 y.o. male who presents for an evaluation of bilateral inguinal hernias, which he relays having for the past three to four months. He scores his groin pain as a 10/10 today.

## 2017-05-17 NOTE — LETTER
5/17/2017 1:49 PM 
 
Patient:  Shefali Denis YOB: 1975 Date of Visit: 5/17/2017 Malena Madrid PA-C 
[address]  [address] [place] [id number] VIA In Basket Dear Malena Madrid PA-C, Thank you for referring Mr. Matias Deleon to [place] [address] for evaluation and treatment. Below are the relevant portions of my assessment and plan of care. Thank you very much for your referral of Mr. Matias Deleon. If you have questions, please do not hesitate to call me. I look forward to following Mr. Gaytan along with you and will keep you updated as to his progress. Sincerely, Ko Segura MD

## 2017-05-17 NOTE — TELEPHONE ENCOUNTER
Spoke with Mr. Taya Gray to inform Dr. Deon Hopper has reviewed his medical records and would like him to obtain a cardiac clearance prior to surgery (bilateral inguinal hernia repair with mesh) Mr. Taya Gray states that's entirely too long for him to wait and want to know if Dr. Deon Hopper could prescribe him a pain medication. I informed Mr. Taya Gray I would relay his message to Dr. Deon Hopper nurse.

## 2017-05-18 ENCOUNTER — DOCUMENTATION ONLY (OUTPATIENT)
Dept: SURGERY | Age: 42
End: 2017-05-18

## 2017-05-18 NOTE — PROGRESS NOTES
Mr. Derik Sandy stopped by the office to inform he's scheduled for a stress test today and to inquire what time could he have his surgery tomorrow at [facility]. I informed Mr. Derik Sandy per Dr. Lisbeth Dupree is requiring that he obtain a cardiac clearance from his cardiologist not a stress test. Mr. Derik Sandy states he spoke with [facility] last night and his May 24, 2017 appointment was rescheduled for today however he doesn't know where the stress test is scheduled. I called [facility] office at [phone] and spoke with nurse, Talib Ayala, of which Talib Ayala spoke with me and Mr. Derik Sandy to inform they do not have any sooner available appointments then May 24, 2017. Additionally I told Mr. Derik Sandy as we had discussed during his office visit yesterday with Dr. Lisbeth Dupree that his current health insurance, [place], will only cover outpatient diagnostic services ie. ..testing, labs,  office visit/procedures performed in the physicians office. I provided Mr. Derik Sandy with financial assistance phone number to initiate an application. I also explained routinely it takes approximately 30 days to process the financial assistance applications and encouraged Mr. Derik Sandy to call soon. Mr. Derik Sandy asked, \"so I'm not scheduled for surgery tomorrow. \" of which I informed him he is not scheduled and will not be scheduled for surgery unless he obtains the cardiac clearance as required by Dr. Lisbeth Dupree and contact  at [phone].

## 2017-05-19 ENCOUNTER — TELEPHONE (OUTPATIENT)
Dept: SURGERY | Age: 42
End: 2017-05-19

## 2017-05-19 NOTE — TELEPHONE ENCOUNTER
Mr. Rehana Barry called stating he's experiencing a lot of pain, and that he don't understand why he's not able to have his surgery today because he hasn't eaten anything and he went to his cardiologist yesterday to have the stress test. I explained to Mr. Rehana Barry Dr. Michele Li is requesting a cardiac clearance from his cardiologist prior to scheduling his surgery additionally I informed Mr. Rehana Barry he should go to the nearest emergency room for an evaluation of his pain. I also informed Mr. Rehana Barry to contact  at [facility] to initiate an application for financial assistance.

## 2017-05-19 NOTE — TELEPHONE ENCOUNTER
Mr. Therese Jara called again emphasizing that he don't understand why he's not being scheduled for surgery. He called his cardiologist and they informed him they faxed his stress test results to our office this morning at 6am and he was also seen by the cardiologist yesterday. I informed Mr. Leah Billingsley will contact his [facility] to verify this information and upon receipt of the medical records I will forward them to Dr. Frances Tavares for his review and I will call him back this afternoon after I speak with Dr. Frances Tavares.

## 2017-05-22 ENCOUNTER — APPOINTMENT (OUTPATIENT)
Dept: GENERAL RADIOLOGY | Age: 42
End: 2017-05-22
Attending: PHYSICIAN ASSISTANT
Payer: SUBSIDIZED

## 2017-05-22 ENCOUNTER — HOSPITAL ENCOUNTER (EMERGENCY)
Age: 42
Discharge: HOME | End: 2017-05-22
Attending: EMERGENCY MEDICINE
Payer: SUBSIDIZED

## 2017-05-22 VITALS
RESPIRATION RATE: 18 BRPM | TEMPERATURE: 97.4 F | HEART RATE: 93 BPM | DIASTOLIC BLOOD PRESSURE: 84 MMHG | SYSTOLIC BLOOD PRESSURE: 129 MMHG | OXYGEN SATURATION: 98 %

## 2017-05-22 DIAGNOSIS — K40.20 BILATERAL INGUINAL HERNIA WITHOUT OBSTRUCTION OR GANGRENE, RECURRENCE NOT SPECIFIED: Primary | ICD-10-CM

## 2017-05-22 LAB
ALBUMIN SERPL BCP-MCNC: 4.1 G/DL (ref 3.4–5)
ALBUMIN/GLOB SERPL: 0.9 {RATIO} (ref 0.8–1.7)
ALP SERPL-CCNC: 66 U/L (ref 45–117)
ALT SERPL-CCNC: 36 U/L (ref 16–61)
ANION GAP BLD CALC-SCNC: 9 MMOL/L (ref 3–18)
APPEARANCE UR: CLEAR
AST SERPL W P-5'-P-CCNC: 18 U/L (ref 15–37)
BASOPHILS # BLD AUTO: 0 K/UL (ref 0–0.06)
BASOPHILS # BLD: 0 % (ref 0–2)
BILIRUB SERPL-MCNC: 0.3 MG/DL (ref 0.2–1)
BILIRUB UR QL: NEGATIVE
BUN SERPL-MCNC: 25 MG/DL (ref 7–18)
BUN/CREAT SERPL: 18 (ref 12–20)
CALCIUM SERPL-MCNC: 10.5 MG/DL (ref 8.5–10.1)
CHLORIDE SERPL-SCNC: 99 MMOL/L (ref 100–108)
CO2 SERPL-SCNC: 29 MMOL/L (ref 21–32)
COLOR UR: YELLOW
CREAT SERPL-MCNC: 1.38 MG/DL (ref 0.6–1.3)
DIFFERENTIAL METHOD BLD: ABNORMAL
EOSINOPHIL # BLD: 0.1 K/UL (ref 0–0.4)
EOSINOPHIL NFR BLD: 1 % (ref 0–5)
ERYTHROCYTE [DISTWIDTH] IN BLOOD BY AUTOMATED COUNT: 13.4 % (ref 11.6–14.5)
GLOBULIN SER CALC-MCNC: 4.5 G/DL (ref 2–4)
GLUCOSE SERPL-MCNC: 137 MG/DL (ref 74–99)
GLUCOSE UR STRIP.AUTO-MCNC: NEGATIVE MG/DL
HCT VFR BLD AUTO: 38.5 % (ref 36–48)
HGB BLD-MCNC: 13.1 G/DL (ref 13–16)
HGB UR QL STRIP: NEGATIVE
KETONES UR QL STRIP.AUTO: NEGATIVE MG/DL
LEUKOCYTE ESTERASE UR QL STRIP.AUTO: NEGATIVE
LIPASE SERPL-CCNC: 208 U/L (ref 73–393)
LYMPHOCYTES # BLD AUTO: 20 % (ref 21–52)
LYMPHOCYTES # BLD: 1.3 K/UL (ref 0.9–3.6)
MCH RBC QN AUTO: 28.7 PG (ref 24–34)
MCHC RBC AUTO-ENTMCNC: 34 G/DL (ref 31–37)
MCV RBC AUTO: 84.2 FL (ref 74–97)
MONOCYTES # BLD: 0.6 K/UL (ref 0.05–1.2)
MONOCYTES NFR BLD AUTO: 8 % (ref 3–10)
NEUTS SEG # BLD: 4.8 K/UL (ref 1.8–8)
NEUTS SEG NFR BLD AUTO: 71 % (ref 40–73)
NITRITE UR QL STRIP.AUTO: NEGATIVE
PH UR STRIP: 5.5 [PH] (ref 5–8)
PLATELET # BLD AUTO: 249 K/UL (ref 135–420)
PMV BLD AUTO: 9.5 FL (ref 9.2–11.8)
POTASSIUM SERPL-SCNC: 4.5 MMOL/L (ref 3.5–5.5)
PROT SERPL-MCNC: 8.6 G/DL (ref 6.4–8.2)
PROT UR STRIP-MCNC: NEGATIVE MG/DL
RBC # BLD AUTO: 4.57 M/UL (ref 4.7–5.5)
SODIUM SERPL-SCNC: 137 MMOL/L (ref 136–145)
SP GR UR REFRACTOMETRY: 1.02 (ref 1–1.03)
UROBILINOGEN UR QL STRIP.AUTO: 0.2 EU/DL (ref 0.2–1)
WBC # BLD AUTO: 6.8 K/UL (ref 4.6–13.2)

## 2017-05-22 PROCEDURE — 83690 ASSAY OF LIPASE: CPT | Performed by: EMERGENCY MEDICINE

## 2017-05-22 PROCEDURE — 99284 EMERGENCY DEPT VISIT MOD MDM: CPT

## 2017-05-22 PROCEDURE — 80053 COMPREHEN METABOLIC PANEL: CPT | Performed by: EMERGENCY MEDICINE

## 2017-05-22 PROCEDURE — 81003 URINALYSIS AUTO W/O SCOPE: CPT | Performed by: EMERGENCY MEDICINE

## 2017-05-22 PROCEDURE — 85025 COMPLETE CBC W/AUTO DIFF WBC: CPT | Performed by: EMERGENCY MEDICINE

## 2017-05-22 PROCEDURE — 74011250637 HC RX REV CODE- 250/637: Performed by: PHYSICIAN ASSISTANT

## 2017-05-22 PROCEDURE — 74020 XR ABD (AP AND ERECT OR DECUB): CPT

## 2017-05-22 RX ORDER — TRAMADOL HYDROCHLORIDE 50 MG/1
50 TABLET ORAL
Status: COMPLETED | OUTPATIENT
Start: 2017-05-22 | End: 2017-05-22

## 2017-05-22 RX ORDER — TRAMADOL HYDROCHLORIDE 50 MG/1
50 TABLET ORAL
Qty: 12 TAB | Refills: 0 | Status: SHIPPED | OUTPATIENT
Start: 2017-05-22 | End: 2017-06-07

## 2017-05-22 RX ADMIN — TRAMADOL HYDROCHLORIDE 50 MG: 50 TABLET, FILM COATED ORAL at 16:53

## 2017-05-22 NOTE — DISCHARGE INSTRUCTIONS
Hernia Repair: Before Your Surgery  What is hernia repair surgery? A hernia occurs when a weak spot in your belly muscles allows a piece of your intestines or the tissue around them to poke through. This can cause a bulge in the area. It can also cause pain. But you may not feel anything. The hernia may be in your groin. Or it may be near your belly button. In some cases, it's in a scar from an earlier surgery. A doctor can fix a hernia through a cut (incision) made near it. This is called open surgery. Or the doctor may make some very small cuts and use a thin, lighted scope and small tools. This is laparoscopic surgery. If your hernia is bulging, the bulge is pushed back into place. The doctor then sews the healthy tissue back together. Often a piece of material is used to patch the weak spot. Open surgery will leave a longer scar. Laparoscopic surgery leaves a few small scars. The scars will fade with time. You may need to take 1 to 2 weeks off from work. This depends on the type of work you do and how you feel. Follow-up care is a key part of your treatment and safety. Be sure to make and go to all appointments, and call your doctor if you are having problems. It's also a good idea to know your test results and keep a list of the medicines you take. What happens before surgery? Surgery can be stressful. This information will help you understand what you can expect. And it will help you safely prepare for surgery. Preparing for surgery  · Understand exactly what surgery is planned, along with the risks, benefits, and other options. · Tell your doctors ALL the medicines, vitamins, supplements, and herbal remedies you take. Some of these can increase the risk of bleeding or interact with anesthesia. · If you take blood thinners, such as warfarin (Coumadin), clopidogrel (Plavix), or aspirin, be sure to talk to your doctor.  He or she will tell you if you should stop taking these medicines before your surgery. Make sure that you understand exactly what your doctor wants you to do. · Your doctor will tell you which medicines to take or stop before your surgery. You may need to stop taking certain medicines a week or more before surgery. So talk to your doctor as soon as you can. · If you have an advance directive, let your doctor know. It may include a living will and a durable power of  for health care. Bring a copy to the hospital. If you don't have one, you may want to prepare one. It lets your doctor and loved ones know your health care wishes. Doctors advise that everyone prepare these papers before any type of surgery or procedure. What happens on the day of surgery? · Follow the instructions exactly about when to stop eating and drinking. If you don't, your surgery may be canceled. If your doctor told you to take your medicines on the day of surgery, take them with only a sip of water. · Take a bath or shower before you come in for your surgery. Do not apply lotions, perfumes, deodorants, or nail polish. · Do not shave the surgical site yourself. · Take off all jewelry and piercings. And take out contact lenses, if you wear them. At the hospital or surgery center  · Bring a picture ID. · The area for surgery is often marked to make sure there are no errors. · You will be kept comfortable and safe by your anesthesia provider. You will be asleep during the surgery. · The surgery will take about 30 minutes to 2 hours. This depends on the size of the hernia and where it is. Going home  · Be sure you have someone to drive you home. Anesthesia and pain medicine make it unsafe for you to drive. · You will be given more specific instructions about recovering from your surgery. They will cover things like diet, wound care, follow-up care, driving, and getting back to your normal routine. When should you call your doctor? · You have questions or concerns.   · You don't understand how to prepare for your surgery. · You become ill before the surgery (such as fever, flu, or a cold). · You need to reschedule or have changed your mind about having the surgery. Where can you learn more? Go to http://jacklyn-denice.info/. Enter M258 in the search box to learn more about \"Hernia Repair: Before Your Surgery. \"  Current as of: August 9, 2016  Content Version: 11.2  © 6284-2590 [employer]. Care instructions adapted under license by [employer] (which disclaims liability or warranty for this information). If you have questions about a medical condition or this instruction, always ask your healthcare professional. [place] [address] any warranty or liability for your use of this information. Inguinal Hernia: Care Instructions  Your Care Instructions    An inguinal hernia occurs when tissue bulges through a weak spot in your groin area. You may see or feel a tender bulge in the groin or scrotum. You may also have pain, pressure or burning, or a feeling that something has \"given way. \"  Hernias are caused by a weakness in the belly wall. The bulge or discomfort may occur after heavy lifting, straining, or coughing. Hernias do not heal on their own, and they tend to get worse over time. If your hernia does not bother you, you most likely can wait to have surgery. Your hernia may get worse, but it may not. In some cases, hernias that are small and painless may never need to be repaired. Follow-up care is a key part of your treatment and safety. Be sure to make and go to all appointments, and call your doctor if you are having problems. It's also a good idea to know your test results and keep a list of the medicines you take. How can you care for yourself at home? · Take pain medicines exactly as directed. ¨ If the doctor gave you a prescription medicine for pain, take it as prescribed.   ¨ If you are not taking a prescription pain medicine, ask your doctor if you can take an over-the-counter medicine. · Use proper lifting techniques, and avoid heavy lifting if you can. To lift things more safely, bend your knees and let your arms and legs do the work. Keep your back straight, and do not bend over at the waist. Keep the load as close to your body as you can. Move your feet instead of turning or twisting your body. · Lose weight if you are overweight. · Include fruits, vegetables, legumes, and whole grains in your diet each day. These foods are high in fiber and will make it easier to avoid straining during bowel movements. · Do not smoke. Smoking can cause coughing, which can cause your hernia to bulge. If you need help quitting, talk to your doctor about stop-smoking programs and medicines. These can increase your chances of quitting for good. When should you call for help? Call your doctor now or seek immediate medical care if:  · You have sudden, severe pain in the hernia area. · You have nausea or vomiting. · You have belly pain and are not passing gas or stool. · You cannot push your hernia back into place with gentle pressure when you are lying down. · The skin over the hernia turns red or becomes tender. Watch closely for changes in your health, and be sure to contact your doctor if:  · You have new or increased pain. · You do not get better as expected. Where can you learn more? Go to http://jacklyn-denice.info/. Enter E625 in the search box to learn more about \"Inguinal Hernia: Care Instructions. \"  Current as of: August 9, 2016  Content Version: 11.2  © 6108-5062 [employer]. Care instructions adapted under license by [employer] (which disclaims liability or warranty for this information). If you have questions about a medical condition or this instruction, always ask your healthcare professional. [address] any warranty or liability for your use of this information.

## 2017-05-22 NOTE — ED TRIAGE NOTES
PT ARRIVED THROUGH TRIAGE WITH C/O PAIN FROM ABDOMINAL HERNIA. PT STATES HE WAS SEEN AT [place] [address] ER TODAY AND D/C AND TOLD TO COME HERE.

## 2017-05-22 NOTE — ED PROVIDER NOTES
HPI Comments: Pt 41 yo male with PMH, depression, DM and bilateral inguinal hernias presents to the ED for worsening abd pain for a week. Symptoms were first noted a few months ago but now feels the pain is \"moving up some\". Pain is dull. The mass is reducible. He does not have a history of incarceration or obstruction. The patient was scheduled for surgery at [facility] but was cancelled after induction because of hypotension. He then went to Dr. Aydee Rivera last week who agreed to do surgery pending cardio clearance and insurance approval. He has been cleared with stress test by cardio per pt. Pt went to [place] [address] today and was evaluated for the same, labs neg, he reports he was told to follow up here since his surgeon was here. Pt without any other complaints at this time. Patient is a 42 y.o. male presenting with abdominal pain. The history is provided by the patient. Abdominal Pain    Pertinent negatives include no fever, no belching, no diarrhea, no flatus, no hematochezia, no melena, no nausea, no vomiting, no constipation, no dysuria, no frequency, no hematuria, no headaches, no arthralgias, no myalgias, no trauma, no chest pain, no testicular pain and no back pain. Nothing worsens the pain. The pain is relieved by nothing.         Past Medical History:   Diagnosis Date    Depression     Diabetes ([address]     Herniated lumbar intervertebral disc     Hypertension     Neurological disorder L3,4,5 torn Herniated disc       Past Surgical History:   Procedure Laterality Date    HX ORTHOPAEDIC  trigger finger release         Family History:   Problem Relation Age of Onset    Hypertension Mother     Diabetes Mother     Heart Failure Mother     COPD Mother     Heart Disease Mother     Hypertension Father     Alcohol abuse Father        Social History     Social History    Marital status:      Spouse name: N/A    Number of children: N/A    Years of education: N/A     Occupational History    Not on file. Social History Main Topics    Smoking status: Never Smoker    Smokeless tobacco: Never Used    Alcohol use Yes      Comment: rarely    Drug use: No    Sexual activity: Yes     Partners: Female     Birth control/ protection: Condom     Other Topics Concern    Not on file     Social History Narrative         ALLERGIES: Review of patient's allergies indicates no known allergies. Review of Systems   Constitutional: Negative for chills and fever. HENT: Negative for ear pain, rhinorrhea and sore throat. Eyes: Negative for pain and redness. Respiratory: Negative for cough and shortness of breath. Cardiovascular: Negative for chest pain. Gastrointestinal: Positive for abdominal pain. Negative for abdominal distention, anal bleeding, blood in stool, constipation, diarrhea, flatus, hematochezia, melena, nausea, rectal pain and vomiting. Genitourinary: Negative for dysuria, frequency, hematuria and testicular pain. Musculoskeletal: Negative for arthralgias, back pain and myalgias. Skin: Negative. Neurological: Negative for dizziness, light-headedness and headaches. Psychiatric/Behavioral: Negative. Vitals:    05/22/17 1323   BP: 129/84   Pulse: 93   Resp: 18   Temp: 97.4 °F (36.3 °C)   SpO2: 98%            Physical Exam   Constitutional: He is oriented to person, place, and time. He appears well-developed and well-nourished. No distress. HENT:   Head: Normocephalic and atraumatic. Right Ear: Tympanic membrane, external ear and ear canal normal.   Left Ear: Tympanic membrane, external ear and ear canal normal.   Nose: Nose normal.   Mouth/Throat: Oropharynx is clear and moist and mucous membranes are normal. No oropharyngeal exudate. Eyes: Conjunctivae and EOM are normal. Pupils are equal, round, and reactive to light. Right eye exhibits no discharge. Left eye exhibits no discharge. Neck: Normal range of motion. Neck supple.    Cardiovascular: Normal rate, regular rhythm and intact distal pulses. Exam reveals no gallop and no friction rub. No murmur heard. Pulmonary/Chest: Effort normal and breath sounds normal. No respiratory distress. He has no wheezes. He has no rales. Abdominal: Soft. Normal appearance and bowel sounds are normal. He exhibits no distension. There is no tenderness. There is no rigidity, no rebound, no guarding, no CVA tenderness, no tenderness at McBurney's point and negative Bradley's sign. Abdomen soft, nontender, nondistended, no masses or organomegaly. bilateral inguinal hernias that are easily reducible and mildly tender on palpation   Musculoskeletal: Normal range of motion. Lymphadenopathy:     He has no cervical adenopathy. Neurological: He is alert and oriented to person, place, and time. Skin: Skin is warm and dry. He is not diaphoretic. Psychiatric: He has a normal mood and affect. His behavior is normal. Judgment and thought content normal.   Nursing note and vitals reviewed. MDM  Number of Diagnoses or Management Options  Bilateral inguinal hernia without obstruction or gangrene, recurrence not specified: established and worsening  Diagnosis management comments: DDx: gastroenteritis, GERD, hernia, hepatitis, pancreatitis, gallbladder etiology, constipation, adhesions, UTI, pyelo, kidney stones, STD,  Aewk-Kvne-Wugqby syndrome, preg, ectopic, ovarian cyst, ovarian torsion, tubo-ovarian abscess, appendicitis, diverticulitis, SBO, GI bleed, mesenteric ischemia, AAA, cardiac etiology, musculoskeletal pain/spasm, malignancy    Pt with labs at [facility], and here, grossly abnormal, slightly elevated cr likely d/t toradol. Pt not rx anything. Per review to follow up with surgery not this ED. Will check KUB to r/o obstruction but unlikely given abd exam being soft and non-tender. Consult:    4:01 PM   Discussed care with Dr. Maurilio Fairchild.   Standard discussion; including history of patients chief complaint, available diagnostic results, and treatment course. PLAN: Follow up in office. Does not feel further eval needs to be done at this time. KUB neg for obstruction. Will dc to home with symptomatic tx. Pt results have been reviewed with them. They have been counseled regarding diagnosis, treatment, and plan. Pt verbally conveys understanding and agreement of the signs, symptoms, diagnosis, treatment and prognosis and additionally agrees to follow up as discussed. Pt also agrees with the care-plan and conveys that all of their questions have been answered. I have also provided discharge instructions for them that include: educational information regarding their diagnosis and treatment, and list of reasons why they would want to return to the ED prior to their follow-up appointment, should their condition change.    Kyung Marquez PA-C 4:56 PM          Amount and/or Complexity of Data Reviewed  Clinical lab tests: ordered and reviewed  Tests in the radiology section of CPT®: ordered and reviewed  Tests in the medicine section of CPT®: ordered and reviewed  Discussion of test results with the performing providers: yes  Decide to obtain previous medical records or to obtain history from someone other than the patient: yes  Obtain history from someone other than the patient: yes  Review and summarize past medical records: yes  Discuss the patient with other providers: yes  Independent visualization of images, tracings, or specimens: yes    Risk of Complications, Morbidity, and/or Mortality  Presenting problems: moderate  Diagnostic procedures: moderate  Management options: moderate    Patient Progress  Patient progress: stable    ED Course       Procedures    Labs Reviewed   CBC WITH AUTOMATED DIFF - Abnormal; Notable for the following:        Result Value    RBC 4.57 (*)     LYMPHOCYTES 20 (*)     All other components within normal limits   METABOLIC PANEL, COMPREHENSIVE - Abnormal; Notable for the following:     Chloride 99 (*) Glucose 137 (*)     BUN 25 (*)     Creatinine 1.38 (*)     GFR est non-AA 57 (*)     Calcium 10.5 (*)     Protein, total 8.6 (*)     Globulin 4.5 (*)     All other components within normal limits   URINALYSIS W/ RFLX MICROSCOPIC   LIPASE     Diagnosis:   1. Bilateral inguinal hernia without obstruction or gangrene, recurrence not specified          Disposition: home    Follow-up Information     Follow up With Details Comments Contact Info    [facility] EMERGENCY DEPT  As needed, If symptoms worsen [address] [place] [address]    Law Forbes MD In 2 days  [address] & [facility] [id number] [phone]            Patient's Medications   Start Taking    TRAMADOL (ULTRAM) 50 MG TABLET    Take 1 Tab by mouth every six (6) hours as needed for Pain. Max Daily Amount: 200 mg. Continue Taking    ALPRAZOLAM (XANAX) 1 MG TABLET    TAKE 1 TABLET BY MOUTH 3 TIMES DAILY AS NEEDED    BLOOD-GLUCOSE METER (ONETOUCH ULTRA2) MONITORING KIT    Use as directed    CARVEDILOL (COREG) 3.125 MG TABLET    3.125 mg. CLONAZEPAM (KLONOPIN) 1 MG TABLET    Take 1 Tab by mouth two (2) times a day. Max Daily Amount: 2 mg. GLUCOSE BLOOD VI TEST STRIPS (ASCENSIA AUTODISC VI, ONE TOUCH ULTRA TEST VI) STRIP    Check blood sugar daily    HYDROCODONE-ACETAMINOPHEN (NORCO) 5-325 MG PER TABLET    Take 1 Tab by mouth every four (4) hours as needed for Pain. Max Daily Amount: 6 Tabs. IBUPROFEN (MOTRIN) 600 MG TABLET    Take 1 Tab by mouth every six (6) hours as needed for Pain. IBUPROFEN (MOTRIN) 800 MG TABLET    TK 1 T PO Q 6 H PRF PAIN    LANCETS (ONETOUCH ULTRASOFT LANCETS) MISC    Check blood sugar daily    LISINOPRIL-HYDROCHLOROTHIAZIDE (ZESTORETIC) 20-25 MG PER TABLET    Take 1 Tab by mouth daily. METFORMIN (GLUCOPHAGE) 1,000 MG TABLET    Take 500 mg by mouth two (2) times a day. NALOXONE 4 MG/ACTUATION SPRY    1 Spray by Nasal route as needed (overdose).  Use as directed QUETIAPINE (SEROQUEL) 400 MG TABLET    TK 1 T PO HS    SERTRALINE (ZOLOFT) 100 MG TABLET    Take 100 mg by mouth daily. SITAGLIPTIN (JANUVIA) 50 MG TABLET    Take 1 Tab by mouth daily.    These Medications have changed    No medications on file   Stop Taking    No medications on file

## 2017-05-23 ENCOUNTER — OFFICE VISIT (OUTPATIENT)
Dept: FAMILY MEDICINE CLINIC | Age: 42
End: 2017-05-23

## 2017-05-23 ENCOUNTER — TELEPHONE (OUTPATIENT)
Dept: SURGERY | Age: 42
End: 2017-05-23

## 2017-05-23 VITALS
BODY MASS INDEX: 31.5 KG/M2 | WEIGHT: 220 LBS | OXYGEN SATURATION: 97 % | RESPIRATION RATE: 16 BRPM | SYSTOLIC BLOOD PRESSURE: 122 MMHG | DIASTOLIC BLOOD PRESSURE: 87 MMHG | HEIGHT: 70 IN | HEART RATE: 90 BPM | TEMPERATURE: 99 F

## 2017-05-23 DIAGNOSIS — F41.9 ANXIETY: ICD-10-CM

## 2017-05-23 DIAGNOSIS — K40.20 NON-RECURRENT BILATERAL INGUINAL HERNIA WITHOUT OBSTRUCTION OR GANGRENE: Primary | ICD-10-CM

## 2017-05-23 RX ORDER — CLONAZEPAM 1 MG/1
1 TABLET ORAL
Qty: 10 TAB | Refills: 0 | Status: SHIPPED | OUTPATIENT
Start: 2017-05-23 | End: 2017-06-09 | Stop reason: SDUPTHER

## 2017-05-23 NOTE — PROGRESS NOTES
Rm 1  Pt presents to the clinic for a follow-up regarding his hernias. Pt saw cardio and was cleared for surgery. Depression Screening Completed: yes    Learning Assessment Completed: yes    Abuse Screening Completed: n/a    Health Maintenance reviewed and discussed per provider: yes     Coordination of Care:    1. Have you been to the ER, urgent care clinic since your last visit? Hospitalized since your last visit? no    2. Have you seen or consulted any other health care providers outside of the [address] [place] [address] since your last visit? Include any pap smears or colon screening.  no

## 2017-05-23 NOTE — MR AVS SNAPSHOT
Visit Information Date & Time Provider Department Dept. Phone Encounter #  
 5/23/2017 12:45 PM Fer Patel, [address] [facility] [phone] [id number] Follow-up Instructions Return if symptoms worsen or fail to improve. Your Appointments 6/13/2017 12:45 PM  
Office Visit with Fer Patel MD  
[address] Madeleine Beltranman) Appt Note: follow up Johnson Patiño [address] [place] [address]  
[phone]  
  
   
 [address] [id number] Upcoming Health Maintenance Date Due  
 EYE EXAM RETINAL OR DILATED Q1 12/28/1984 Pneumococcal 19-64 Medium Risk (1 of 1 - PPSV23) 12/28/1993 DTaP/Tdap/Td series (1 - Tdap) 12/28/1995 MICROALBUMIN Q1 7/7/2016 LIPID PANEL Q1 7/7/2016 FOOT EXAM Q1 4/27/2017 INFLUENZA AGE 9 TO ADULT 8/1/2017 HEMOGLOBIN A1C Q6M 9/3/2017 Allergies as of 5/23/2017  Review Complete On: 5/23/2017 By: Anne Marie Bower LPN No Known Allergies Current Immunizations  Reviewed on 9/2/2015 Name Date Influenza Vaccine (Quad) PF 9/2/2015 Influenza Vaccine Whole 12/1/2009 Not reviewed this visit You Were Diagnosed With   
  
 Codes Comments Non-recurrent bilateral inguinal hernia without obstruction or gangrene    -  Primary ICD-10-CM: K40.20 ICD-9-CM: 550.92 Anxiety     ICD-10-CM: F41.9 ICD-9-CM: 300.00 Vitals BP Pulse Temp Resp Height(growth percentile) Weight(growth percentile) 122/87 (BP 1 Location: Right arm, BP Patient Position: Sitting) 90 99 °F (37.2 °C) (Oral) 16 5' 10\" (1.778 m) 220 lb (99.8 kg) SpO2 BMI Smoking Status 97% 31.57 kg/m2 Never Smoker Vitals History BMI and BSA Data Body Mass Index Body Surface Area  
 31.57 kg/m 2 2.22 m 2 [facility] [address] [phone] Your Updated Medication List  
  
   
This list is accurate as of: 5/23/17  1:02 PM.  Always use your most recent med list.  
  
  
  
  
 Blood-Glucose Meter monitoring kit Commonly known as:  Lindsey Mi Use as directed  
  
 carvedilol 3.125 mg tablet Commonly known as:  COREG  
3.125 mg.  
  
 clonazePAM 1 mg tablet Commonly known as:  Concepcion Loge Take 1 Tab by mouth daily as needed. glucose blood VI test strips strip Commonly known as:  ASCENSIA AUTODISC VI, ONE TOUCH ULTRA TEST VI Check blood sugar daily HYDROcodone-acetaminophen 5-325 mg per tablet Commonly known as:  Yordy Kilts Take 1 Tab by mouth every four (4) hours as needed for Pain. Max Daily Amount: 6 Tabs. * ibuprofen 800 mg tablet Commonly known as:  MOTRIN  
TK 1 T PO Q 6 H PRF PAIN  
  
 * ibuprofen 600 mg tablet Commonly known as:  MOTRIN Take 1 Tab by mouth every six (6) hours as needed for Pain. Lancets Misc Commonly known as:  ONETOUCH ULTRASOFT LANCETS Check blood sugar daily  
  
 lisinopril-hydroCHLOROthiazide 20-25 mg per tablet Commonly known as:  Jose C Pleasure Take 1 Tab by mouth daily. metFORMIN 1,000 mg tablet Commonly known as:  GLUCOPHAGE Take 500 mg by mouth two (2) times a day.  
  
 naloxone 4 mg/actuation Spry 1 Spray by Nasal route as needed (overdose). Use as directed QUEtiapine 400 mg tablet Commonly known as:  SEROquel TK 1 T PO HS  
  
 SITagliptin 50 mg tablet Commonly known as:  Lesley Reef Take 1 Tab by mouth daily. traMADol 50 mg tablet Commonly known as:  ULTRAM  
Take 1 Tab by mouth every six (6) hours as needed for Pain. Max Daily Amount: 200 mg. ZOLOFT 100 mg tablet Generic drug:  sertraline Take 100 mg by mouth daily. * Notice: This list has 2 medication(s) that are the same as other medications prescribed for you. Read the directions carefully, and ask your doctor or other care provider to review them with you. Prescriptions Printed Refills  
 clonazePAM (KLONOPIN) 1 mg tablet 0 Sig: Take 1 Tab by mouth daily as needed. Class: Print Route: Oral  
  
Follow-up Instructions Return if symptoms worsen or fail to improve. Patient Instructions Congratulations on the cardiac clearance, follow up with Dr. Ciara Weiner as planned. A ONE TIME PRESCRIPTION FOR 10 MORE KLONOPIN GIVEN, DO NOT CALL FOR REFILLS. See psych if your anxiety continues. Introducing [facility] SERVICES! Celestina Whittaker introduces [employer] patient portal. Now you can access parts of your medical record, email your doctor's office, and request medication refills online. 1. In your internet browser, go to [email or web address] 2. Click on the First Time User? Click Here link in the Sign In box. You will see the New Member Sign Up page. 3. Enter your [employer] Access Code exactly as it appears below. You will not need to use this code after youve completed the sign-up process. If you do not sign up before the expiration date, you must request a new code. · [facility] Access Code: [email or web address] Expires: 8/9/2017  9:05 PM 
 
4. Enter the last four digits of your Social Security Number (xxxx) and Date of Birth (mm/dd/yyyy) as indicated and click Submit. You will be taken to the next sign-up page. 5. Create a [facility] ID. This will be your [facility] login ID and cannot be changed, so think of one that is secure and easy to remember. 6. Create a [facility] password. You can change your password at any time. 7. Enter your Password Reset Question and Answer. This can be used at a later time if you forget your password. 8. Enter your e-mail address. You will receive e-mail notification when new information is available in [address] 9. Click Sign Up. You can now view and download portions of your medical record.  
10. Click the Download Summary menu link to download a portable copy of your medical information. If you have questions, please visit the Frequently Asked Questions section of the [employer] website. Remember, [employer] is NOT to be used for urgent needs. For medical emergencies, dial 911. Now available from your iPhone and Android! Please provide this summary of care documentation to your next provider. Your primary care clinician is listed as Sylvia Ibrahim. If you have any questions after today's visit, please call [phone].

## 2017-05-23 NOTE — PROGRESS NOTES
HISTORY OF PRESENT ILLNESS  Derick Moran is a 42 y.o. male. HPI Comments: Mr. Sonu Raymond is here for follow up, although nobody told him to come back so quickly. He has managed to get his cardiac clearance done by Dr. Noelle Vee and he has contacted Dr. Cha James office and they are going to get back to him about a date. His nurse told Mr. Sonu Raymond that he will have to fill out a financial aid form before surgery, and then approval will take at least 30 days. Mr. Sonu Raymond says he is going to get the surgery done right away and they are going to bill him. He also asks for more Klonopin to get him through surgery. He hasn't used up the 14 yet given to him 1 week ago. Review of Systems   Constitutional: Negative for chills and fever. Eyes: Negative for blurred vision and double vision. Respiratory: Negative for cough and shortness of breath. Cardiovascular: Negative for chest pain and palpitations. Gastrointestinal: Positive for abdominal pain. Negative for nausea and vomiting. Musculoskeletal: Negative for myalgias. Neurological: Negative for headaches. Psychiatric/Behavioral: The patient is nervous/anxious. Physical Exam   Constitutional: He is oriented to person, place, and time. He appears well-developed and well-nourished. Eyes: Pupils are equal, round, and reactive to light. Neck: Neck supple. No thyromegaly present. Cardiovascular: Normal rate and regular rhythm. Pulmonary/Chest: Effort normal and breath sounds normal. No respiratory distress. He has no wheezes. Abdominal: Soft. There is no tenderness. Genitourinary:   Genitourinary Comments: L inguinal hernia felt   Lymphadenopathy:     He has no cervical adenopathy. Neurological: He is alert and oriented to person, place, and time. Psychiatric: He has a normal mood and affect. Nursing note and vitals reviewed.       ASSESSMENT and PLAN    ICD-10-CM ICD-9-CM    1. Non-recurrent bilateral inguinal hernia without obstruction or gangrene K40.20 550.92    2. Anxiety F41.9 300.00 clonazePAM (KLONOPIN) 1 mg tablet     See orders.

## 2017-05-23 NOTE — PATIENT INSTRUCTIONS
Congratulations on the cardiac clearance, follow up with Dr. Rosalia Salcido as planned. A ONE TIME PRESCRIPTION FOR 10 MORE KLONOPIN GIVEN, DO NOT CALL FOR REFILLS. See psych if your anxiety continues.

## 2017-05-23 NOTE — TELEPHONE ENCOUNTER
Mr. Hue Clinton called to inquire about the status of scheduling his surgery and want to know if Dr. Jose Miguel Thomas received his cardiac clearance and states he was seen in the ED yesterday at [facility] and the ED physician spoke with Dr. Jose Miguel Thomas and was informed he will do what he can to schedule his surgery soon. I informed Mr. Hue Andujar we received Dr. Trudy Valerio cardiac clearance and I will call him back this afternoon afternoon I speak with Dr. Jose Miguel Thomas because Dr. Jose Miguel Thomas will not be available until noon today. I also reiterated [facility] regarding scheduling self pay patients out 30 days to allow ample time to process financial assistance application.

## 2017-05-24 ENCOUNTER — TELEPHONE (OUTPATIENT)
Dept: SURGERY | Age: 42
End: 2017-05-24

## 2017-05-24 ENCOUNTER — OFFICE VISIT (OUTPATIENT)
Dept: INTERNAL MEDICINE CLINIC | Age: 42
End: 2017-05-24

## 2017-05-24 DIAGNOSIS — E11.9 TYPE 2 DIABETES MELLITUS WITHOUT COMPLICATION, WITHOUT LONG-TERM CURRENT USE OF INSULIN (HCC): ICD-10-CM

## 2017-05-24 RX ORDER — SITAGLIPTIN 50 MG/1
TABLET, FILM COATED ORAL
Qty: 30 TAB | Refills: 5 | Status: SHIPPED | OUTPATIENT
Start: 2017-05-24 | End: 2017-12-21 | Stop reason: SDUPTHER

## 2017-05-24 NOTE — TELEPHONE ENCOUNTER
Mr. Michael Pedro called stating the tramadol is not relieving the pain. He indicate Norco is the only medication that helps relieve his pain. I told him we will not be able to prescribe him any narcotic prior to surgery and additionally after the surgery he would only be prescribed a minimum prescription with no future refills. I informed Mr. Michael Pedro is surgery is scheduled for June 27, 2017 and Mr. Michael Pedro states this is too long and he's willing to take on a $50,000 bill I informed Mr. Michael Pedro we do not want to put him in a situation such as that and this is why we are recommending that he apply for financial assistance and the process takes time to complete.

## 2017-05-31 DIAGNOSIS — F41.9 ANXIETY: ICD-10-CM

## 2017-05-31 RX ORDER — CLONAZEPAM 1 MG/1
1 TABLET ORAL
Qty: 10 TAB | Refills: 0 | OUTPATIENT
Start: 2017-05-31

## 2017-06-01 ENCOUNTER — DOCUMENTATION ONLY (OUTPATIENT)
Dept: SURGERY | Age: 42
End: 2017-06-01

## 2017-06-02 ENCOUNTER — HOSPITAL ENCOUNTER (OUTPATIENT)
Age: 42
Setting detail: OUTPATIENT SURGERY
Discharge: HOME | End: 2017-06-02
Attending: SURGERY | Admitting: SURGERY
Payer: SUBSIDIZED

## 2017-06-02 ENCOUNTER — ANESTHESIA EVENT (OUTPATIENT)
Dept: SURGERY | Age: 42
End: 2017-06-02
Payer: SUBSIDIZED

## 2017-06-02 ENCOUNTER — ANESTHESIA (OUTPATIENT)
Dept: SURGERY | Age: 42
End: 2017-06-02
Payer: SUBSIDIZED

## 2017-06-02 VITALS
OXYGEN SATURATION: 95 % | RESPIRATION RATE: 16 BRPM | DIASTOLIC BLOOD PRESSURE: 67 MMHG | HEIGHT: 70 IN | TEMPERATURE: 98.3 F | HEART RATE: 88 BPM | SYSTOLIC BLOOD PRESSURE: 111 MMHG | BODY MASS INDEX: 30.35 KG/M2 | WEIGHT: 212 LBS

## 2017-06-02 LAB
GLUCOSE BLD STRIP.AUTO-MCNC: 124 MG/DL (ref 70–110)
GLUCOSE BLD STRIP.AUTO-MCNC: 143 MG/DL (ref 70–110)

## 2017-06-02 PROCEDURE — 76210000021 HC REC RM PH II 0.5 TO 1 HR: Performed by: SURGERY

## 2017-06-02 PROCEDURE — 77030026438 HC STYL ET INTUB CARD -A: Performed by: ANESTHESIOLOGY

## 2017-06-02 PROCEDURE — 77030011640 HC PAD GRND REM COVD -A: Performed by: SURGERY

## 2017-06-02 PROCEDURE — 76060000033 HC ANESTHESIA 1 TO 1.5 HR: Performed by: SURGERY

## 2017-06-02 PROCEDURE — 77030022704 HC SUT VLOC COVD -B: Performed by: SURGERY

## 2017-06-02 PROCEDURE — 74011000258 HC RX REV CODE- 258

## 2017-06-02 PROCEDURE — 74011250636 HC RX REV CODE- 250/636: Performed by: NURSE ANESTHETIST, CERTIFIED REGISTERED

## 2017-06-02 PROCEDURE — 74011250637 HC RX REV CODE- 250/637: Performed by: ANESTHESIOLOGY

## 2017-06-02 PROCEDURE — 77030008683 HC TU ET CUF COVD -A: Performed by: ANESTHESIOLOGY

## 2017-06-02 PROCEDURE — 74011000250 HC RX REV CODE- 250

## 2017-06-02 PROCEDURE — 82962 GLUCOSE BLOOD TEST: CPT

## 2017-06-02 PROCEDURE — 74011250636 HC RX REV CODE- 250/636

## 2017-06-02 PROCEDURE — 77030020782 HC GWN BAIR PAWS FLX 3M -B: Performed by: SURGERY

## 2017-06-02 PROCEDURE — 74011250636 HC RX REV CODE- 250/636: Performed by: ANESTHESIOLOGY

## 2017-06-02 PROCEDURE — 77030018706 HC CORD MPLR COVD -A: Performed by: SURGERY

## 2017-06-02 PROCEDURE — 74011250637 HC RX REV CODE- 250/637: Performed by: NURSE ANESTHETIST, CERTIFIED REGISTERED

## 2017-06-02 PROCEDURE — 77030035488 HC SEAL UNIV DISP INTU -C: Performed by: SURGERY

## 2017-06-02 PROCEDURE — C1781 MESH (IMPLANTABLE): HCPCS | Performed by: SURGERY

## 2017-06-02 PROCEDURE — 76210000000 HC OR PH I REC 2 TO 2.5 HR: Performed by: SURGERY

## 2017-06-02 PROCEDURE — 76010000934 HC OR TIME 1 TO 1.5HR INTENSV - TIER 2: Performed by: SURGERY

## 2017-06-02 PROCEDURE — 74011000250 HC RX REV CODE- 250: Performed by: SURGERY

## 2017-06-02 PROCEDURE — 77030031139 HC SUT VCRL2 J&J -A: Performed by: SURGERY

## 2017-06-02 PROCEDURE — 77030010507 HC ADH SKN DERMBND J&J -B: Performed by: SURGERY

## 2017-06-02 PROCEDURE — 77030032490 HC SLV COMPR SCD KNE COVD -B: Performed by: SURGERY

## 2017-06-02 PROCEDURE — 77030018836 HC SOL IRR NACL ICUM -A: Performed by: SURGERY

## 2017-06-02 PROCEDURE — 77030035277 HC OBTRTR BLDELSS DISP INTU -B: Performed by: SURGERY

## 2017-06-02 PROCEDURE — 77030003580 HC NDL INSUF VERES J&J -B: Performed by: SURGERY

## 2017-06-02 PROCEDURE — 77030002933 HC SUT MCRYL J&J -A: Performed by: SURGERY

## 2017-06-02 DEVICE — MESH HERN W10XL15CM POLY POLYLACTIC ACID 70% CLLGN 30% GLYC: Type: IMPLANTABLE DEVICE | Site: INGUINAL | Status: FUNCTIONAL

## 2017-06-02 RX ORDER — INSULIN LISPRO 100 [IU]/ML
INJECTION, SOLUTION INTRAVENOUS; SUBCUTANEOUS ONCE
Status: DISCONTINUED | OUTPATIENT
Start: 2017-06-02 | End: 2017-06-02 | Stop reason: HOSPADM

## 2017-06-02 RX ORDER — ONDANSETRON 2 MG/ML
4 INJECTION INTRAMUSCULAR; INTRAVENOUS ONCE
Status: COMPLETED | OUTPATIENT
Start: 2017-06-02 | End: 2017-06-02

## 2017-06-02 RX ORDER — DIPHENHYDRAMINE HYDROCHLORIDE 50 MG/ML
12.5 INJECTION, SOLUTION INTRAMUSCULAR; INTRAVENOUS ONCE
Status: DISCONTINUED | OUTPATIENT
Start: 2017-06-02 | End: 2017-06-02 | Stop reason: HOSPADM

## 2017-06-02 RX ORDER — GLYCOPYRROLATE 0.2 MG/ML
INJECTION INTRAMUSCULAR; INTRAVENOUS AS NEEDED
Status: DISCONTINUED | OUTPATIENT
Start: 2017-06-02 | End: 2017-06-02 | Stop reason: HOSPADM

## 2017-06-02 RX ORDER — MAGNESIUM SULFATE 100 %
4 CRYSTALS MISCELLANEOUS AS NEEDED
Status: DISCONTINUED | OUTPATIENT
Start: 2017-06-02 | End: 2017-06-02 | Stop reason: HOSPADM

## 2017-06-02 RX ORDER — LIDOCAINE HYDROCHLORIDE 20 MG/ML
INJECTION, SOLUTION EPIDURAL; INFILTRATION; INTRACAUDAL; PERINEURAL AS NEEDED
Status: DISCONTINUED | OUTPATIENT
Start: 2017-06-02 | End: 2017-06-02 | Stop reason: HOSPADM

## 2017-06-02 RX ORDER — KETOROLAC TROMETHAMINE 30 MG/ML
INJECTION, SOLUTION INTRAMUSCULAR; INTRAVENOUS
Status: DISCONTINUED
Start: 2017-06-02 | End: 2017-06-02 | Stop reason: HOSPADM

## 2017-06-02 RX ORDER — HYDROMORPHONE HYDROCHLORIDE 2 MG/ML
0.2 INJECTION, SOLUTION INTRAMUSCULAR; INTRAVENOUS; SUBCUTANEOUS
Status: DISCONTINUED | OUTPATIENT
Start: 2017-06-02 | End: 2017-06-02 | Stop reason: SDUPTHER

## 2017-06-02 RX ORDER — HYDROMORPHONE HYDROCHLORIDE 2 MG/ML
0.5 INJECTION, SOLUTION INTRAMUSCULAR; INTRAVENOUS; SUBCUTANEOUS
Status: DISCONTINUED | OUTPATIENT
Start: 2017-06-02 | End: 2017-06-02 | Stop reason: SDUPTHER

## 2017-06-02 RX ORDER — HYDROMORPHONE HYDROCHLORIDE 2 MG/ML
0.5 INJECTION, SOLUTION INTRAMUSCULAR; INTRAVENOUS; SUBCUTANEOUS
Status: DISCONTINUED | OUTPATIENT
Start: 2017-06-02 | End: 2017-06-02 | Stop reason: HOSPADM

## 2017-06-02 RX ORDER — DIPHENHYDRAMINE HYDROCHLORIDE 50 MG/ML
12.5 INJECTION, SOLUTION INTRAMUSCULAR; INTRAVENOUS
Status: DISCONTINUED | OUTPATIENT
Start: 2017-06-02 | End: 2017-06-02 | Stop reason: HOSPADM

## 2017-06-02 RX ORDER — DEXAMETHASONE SODIUM PHOSPHATE 4 MG/ML
INJECTION, SOLUTION INTRA-ARTICULAR; INTRALESIONAL; INTRAMUSCULAR; INTRAVENOUS; SOFT TISSUE AS NEEDED
Status: DISCONTINUED | OUTPATIENT
Start: 2017-06-02 | End: 2017-06-02 | Stop reason: HOSPADM

## 2017-06-02 RX ORDER — CEFAZOLIN SODIUM IN 0.9 % NACL 2 G/100 ML
PLASTIC BAG, INJECTION (ML) INTRAVENOUS AS NEEDED
Status: DISCONTINUED | OUTPATIENT
Start: 2017-06-02 | End: 2017-06-02 | Stop reason: HOSPADM

## 2017-06-02 RX ORDER — KETOROLAC TROMETHAMINE 30 MG/ML
30 INJECTION, SOLUTION INTRAMUSCULAR; INTRAVENOUS
Status: COMPLETED | OUTPATIENT
Start: 2017-06-02 | End: 2017-06-02

## 2017-06-02 RX ORDER — PROMETHAZINE HYDROCHLORIDE 25 MG/ML
12.5 INJECTION, SOLUTION INTRAMUSCULAR; INTRAVENOUS
Status: COMPLETED | OUTPATIENT
Start: 2017-06-02 | End: 2017-06-02

## 2017-06-02 RX ORDER — FENTANYL CITRATE 50 UG/ML
INJECTION, SOLUTION INTRAMUSCULAR; INTRAVENOUS AS NEEDED
Status: DISCONTINUED | OUTPATIENT
Start: 2017-06-02 | End: 2017-06-02 | Stop reason: HOSPADM

## 2017-06-02 RX ORDER — FAMOTIDINE 20 MG/1
20 TABLET, FILM COATED ORAL ONCE
Status: COMPLETED | OUTPATIENT
Start: 2017-06-02 | End: 2017-06-02

## 2017-06-02 RX ORDER — ROCURONIUM BROMIDE 10 MG/ML
INJECTION, SOLUTION INTRAVENOUS AS NEEDED
Status: DISCONTINUED | OUTPATIENT
Start: 2017-06-02 | End: 2017-06-02 | Stop reason: HOSPADM

## 2017-06-02 RX ORDER — OXYCODONE AND ACETAMINOPHEN 5; 325 MG/1; MG/1
1 TABLET ORAL AS NEEDED
Status: DISCONTINUED | OUTPATIENT
Start: 2017-06-02 | End: 2017-06-02 | Stop reason: HOSPADM

## 2017-06-02 RX ORDER — HYDROCODONE BITARTRATE AND ACETAMINOPHEN 10; 325 MG/1; MG/1
1 TABLET ORAL
Qty: 24 TAB | Refills: 0 | Status: SHIPPED | OUTPATIENT
Start: 2017-06-02 | End: 2017-06-07

## 2017-06-02 RX ORDER — SODIUM CHLORIDE, SODIUM LACTATE, POTASSIUM CHLORIDE, CALCIUM CHLORIDE 600; 310; 30; 20 MG/100ML; MG/100ML; MG/100ML; MG/100ML
75 INJECTION, SOLUTION INTRAVENOUS CONTINUOUS
Status: DISCONTINUED | OUTPATIENT
Start: 2017-06-02 | End: 2017-06-02 | Stop reason: HOSPADM

## 2017-06-02 RX ORDER — MIDAZOLAM HYDROCHLORIDE 1 MG/ML
INJECTION, SOLUTION INTRAMUSCULAR; INTRAVENOUS AS NEEDED
Status: DISCONTINUED | OUTPATIENT
Start: 2017-06-02 | End: 2017-06-02 | Stop reason: HOSPADM

## 2017-06-02 RX ORDER — NEOSTIGMINE METHYLSULFATE 5 MG/5 ML
SYRINGE (ML) INTRAVENOUS AS NEEDED
Status: DISCONTINUED | OUTPATIENT
Start: 2017-06-02 | End: 2017-06-02 | Stop reason: HOSPADM

## 2017-06-02 RX ORDER — ONDANSETRON 2 MG/ML
INJECTION INTRAMUSCULAR; INTRAVENOUS AS NEEDED
Status: DISCONTINUED | OUTPATIENT
Start: 2017-06-02 | End: 2017-06-02 | Stop reason: HOSPADM

## 2017-06-02 RX ORDER — NALOXONE HYDROCHLORIDE 0.4 MG/ML
0.1 INJECTION, SOLUTION INTRAMUSCULAR; INTRAVENOUS; SUBCUTANEOUS AS NEEDED
Status: DISCONTINUED | OUTPATIENT
Start: 2017-06-02 | End: 2017-06-02 | Stop reason: HOSPADM

## 2017-06-02 RX ORDER — SODIUM CHLORIDE 0.9 % (FLUSH) 0.9 %
5-10 SYRINGE (ML) INJECTION AS NEEDED
Status: DISCONTINUED | OUTPATIENT
Start: 2017-06-02 | End: 2017-06-02 | Stop reason: HOSPADM

## 2017-06-02 RX ORDER — DEXTROSE 50 % IN WATER (D50W) INTRAVENOUS SYRINGE
25-50 AS NEEDED
Status: DISCONTINUED | OUTPATIENT
Start: 2017-06-02 | End: 2017-06-02 | Stop reason: HOSPADM

## 2017-06-02 RX ORDER — CEFAZOLIN SODIUM 2 G/50ML
2 SOLUTION INTRAVENOUS
Status: DISCONTINUED | OUTPATIENT
Start: 2017-06-02 | End: 2017-06-02 | Stop reason: HOSPADM

## 2017-06-02 RX ORDER — SODIUM CHLORIDE 0.9 % (FLUSH) 0.9 %
5-10 SYRINGE (ML) INJECTION EVERY 8 HOURS
Status: DISCONTINUED | OUTPATIENT
Start: 2017-06-02 | End: 2017-06-02 | Stop reason: HOSPADM

## 2017-06-02 RX ORDER — PROPOFOL 10 MG/ML
INJECTION, EMULSION INTRAVENOUS AS NEEDED
Status: DISCONTINUED | OUTPATIENT
Start: 2017-06-02 | End: 2017-06-02 | Stop reason: HOSPADM

## 2017-06-02 RX ORDER — PROMETHAZINE HYDROCHLORIDE 25 MG/ML
INJECTION, SOLUTION INTRAMUSCULAR; INTRAVENOUS
Status: COMPLETED
Start: 2017-06-02 | End: 2017-06-02

## 2017-06-02 RX ADMIN — HYDROMORPHONE HYDROCHLORIDE 0.2 MG: 2 INJECTION, SOLUTION INTRAMUSCULAR; INTRAVENOUS; SUBCUTANEOUS at 11:18

## 2017-06-02 RX ADMIN — ONDANSETRON 4 MG: 2 INJECTION INTRAMUSCULAR; INTRAVENOUS at 10:25

## 2017-06-02 RX ADMIN — MIDAZOLAM HYDROCHLORIDE 2 MG: 1 INJECTION, SOLUTION INTRAMUSCULAR; INTRAVENOUS at 08:56

## 2017-06-02 RX ADMIN — OXYCODONE HYDROCHLORIDE AND ACETAMINOPHEN 1 TABLET: 5; 325 TABLET ORAL at 12:29

## 2017-06-02 RX ADMIN — HYDROMORPHONE HYDROCHLORIDE 0.5 MG: 2 INJECTION, SOLUTION INTRAMUSCULAR; INTRAVENOUS; SUBCUTANEOUS at 10:40

## 2017-06-02 RX ADMIN — LIDOCAINE HYDROCHLORIDE 100 MG: 20 INJECTION, SOLUTION EPIDURAL; INFILTRATION; INTRACAUDAL; PERINEURAL at 09:01

## 2017-06-02 RX ADMIN — DIPHENHYDRAMINE HYDROCHLORIDE 12.5 MG: 50 INJECTION, SOLUTION INTRAMUSCULAR; INTRAVENOUS at 10:19

## 2017-06-02 RX ADMIN — FENTANYL CITRATE 50 MCG: 50 INJECTION, SOLUTION INTRAMUSCULAR; INTRAVENOUS at 10:10

## 2017-06-02 RX ADMIN — ROCURONIUM BROMIDE 30 MG: 10 INJECTION, SOLUTION INTRAVENOUS at 09:02

## 2017-06-02 RX ADMIN — HYDROMORPHONE HYDROCHLORIDE 0.5 MG: 2 INJECTION, SOLUTION INTRAMUSCULAR; INTRAVENOUS; SUBCUTANEOUS at 10:55

## 2017-06-02 RX ADMIN — HYDROMORPHONE HYDROCHLORIDE 0.5 MG: 2 INJECTION, SOLUTION INTRAMUSCULAR; INTRAVENOUS; SUBCUTANEOUS at 11:15

## 2017-06-02 RX ADMIN — HYDROMORPHONE HYDROCHLORIDE 0.5 MG: 2 INJECTION, SOLUTION INTRAMUSCULAR; INTRAVENOUS; SUBCUTANEOUS at 10:25

## 2017-06-02 RX ADMIN — PROMETHAZINE HYDROCHLORIDE 12.5 MG: 25 INJECTION INTRAMUSCULAR; INTRAVENOUS at 11:14

## 2017-06-02 RX ADMIN — HYDROMORPHONE HYDROCHLORIDE 0.2 MG: 2 INJECTION, SOLUTION INTRAMUSCULAR; INTRAVENOUS; SUBCUTANEOUS at 10:33

## 2017-06-02 RX ADMIN — FAMOTIDINE 20 MG: 20 TABLET ORAL at 08:32

## 2017-06-02 RX ADMIN — KETOROLAC TROMETHAMINE 30 MG: 30 INJECTION, SOLUTION INTRAMUSCULAR at 11:10

## 2017-06-02 RX ADMIN — HYDROMORPHONE HYDROCHLORIDE 0.5 MG: 2 INJECTION, SOLUTION INTRAMUSCULAR; INTRAVENOUS; SUBCUTANEOUS at 11:29

## 2017-06-02 RX ADMIN — HYDROMORPHONE HYDROCHLORIDE 0.2 MG: 2 INJECTION, SOLUTION INTRAMUSCULAR; INTRAVENOUS; SUBCUTANEOUS at 10:48

## 2017-06-02 RX ADMIN — ONDANSETRON 4 MG: 2 INJECTION INTRAMUSCULAR; INTRAVENOUS at 09:49

## 2017-06-02 RX ADMIN — PROPOFOL 200 MG: 10 INJECTION, EMULSION INTRAVENOUS at 09:01

## 2017-06-02 RX ADMIN — Medication 2 G: at 09:01

## 2017-06-02 RX ADMIN — HYDROMORPHONE HYDROCHLORIDE 0.2 MG: 2 INJECTION, SOLUTION INTRAMUSCULAR; INTRAVENOUS; SUBCUTANEOUS at 10:18

## 2017-06-02 RX ADMIN — SODIUM CHLORIDE, SODIUM LACTATE, POTASSIUM CHLORIDE, AND CALCIUM CHLORIDE 75 ML/HR: 600; 310; 30; 20 INJECTION, SOLUTION INTRAVENOUS at 08:32

## 2017-06-02 RX ADMIN — ROCURONIUM BROMIDE 10 MG: 10 INJECTION, SOLUTION INTRAVENOUS at 09:40

## 2017-06-02 RX ADMIN — Medication 5 MG: at 09:49

## 2017-06-02 RX ADMIN — HYDROMORPHONE HYDROCHLORIDE 0.2 MG: 2 INJECTION, SOLUTION INTRAMUSCULAR; INTRAVENOUS; SUBCUTANEOUS at 11:03

## 2017-06-02 RX ADMIN — DIPHENHYDRAMINE HYDROCHLORIDE 12.5 MG: 50 INJECTION, SOLUTION INTRAMUSCULAR; INTRAVENOUS at 12:44

## 2017-06-02 RX ADMIN — FENTANYL CITRATE 50 MCG: 50 INJECTION, SOLUTION INTRAMUSCULAR; INTRAVENOUS at 09:42

## 2017-06-02 RX ADMIN — DEXAMETHASONE SODIUM PHOSPHATE 4 MG: 4 INJECTION, SOLUTION INTRA-ARTICULAR; INTRALESIONAL; INTRAMUSCULAR; INTRAVENOUS; SOFT TISSUE at 09:16

## 2017-06-02 RX ADMIN — PROMETHAZINE HYDROCHLORIDE 12.5 MG: 25 INJECTION, SOLUTION INTRAMUSCULAR; INTRAVENOUS at 11:14

## 2017-06-02 RX ADMIN — FENTANYL CITRATE 150 MCG: 50 INJECTION, SOLUTION INTRAMUSCULAR; INTRAVENOUS at 09:01

## 2017-06-02 RX ADMIN — HYDROMORPHONE HYDROCHLORIDE 0.5 MG: 2 INJECTION, SOLUTION INTRAMUSCULAR; INTRAVENOUS; SUBCUTANEOUS at 11:39

## 2017-06-02 RX ADMIN — GLYCOPYRROLATE 0.8 MG: 0.2 INJECTION INTRAMUSCULAR; INTRAVENOUS at 09:49

## 2017-06-02 NOTE — PERIOP NOTES
I have reviewed discharge instructions with the patient and friend, Kanwal Connor. The patient and friend verbalized understanding. Patient armband removed and shredded.

## 2017-06-02 NOTE — ANESTHESIA POSTPROCEDURE EVALUATION
Post-Anesthesia Evaluation and Assessment    Patient: Gia Go MRN: [de-identified]  SSN: [id number]    YOB: 1975  Age: 42 y.o. Sex: male      Data from PACU flowsheet    Cardiovascular Function/Vital Signs  Visit Vitals    /63    Pulse 74    Temp 36.6 °C (97.8 °F)    Resp 15    Ht 5' 10\" (1.778 m)    Wt 96.2 kg (212 lb)    SpO2 99%    BMI 30.42 kg/m2       Patient is status post general anesthesia for Procedure(s):  ROBOTIC BILATERAL INGUINAL HERNIA REPAIR. Nausea/Vomiting: controlled    Postoperative hydration reviewed and adequate. Pain:  Pain Scale 1: Numeric (0 - 10) (06/02/17 1200)  Pain Intensity 1: 5 (06/02/17 1200)   Managed      Mental Status and Level of Consciousness: Alert and oriented     Pulmonary Status:   O2 Device: Room air (06/02/17 1011)   Adequate oxygenation and airway patent    Complications related to anesthesia: None    Post-anesthesia assessment completed.  No concerns    Signed By: Bo Morse MD     June 2, 2017

## 2017-06-02 NOTE — BRIEF OP NOTE
BRIEF OPERATIVE NOTE    Date of Procedure: 6/2/2017   Preoperative Diagnosis: Bilateral inguinal hernia without obstruction or gangrene, recurrence not specified [K40.20]  Postoperative Diagnosis: Bilateral inguinal hernia without obstruction or gangrene, recurrence not specified [K40.20]    Procedure(s):  ROBOTIC BILATERAL INGUINAL HERNIA REPAIR  Surgeon(s) and Role:     * Adelaide Torres MD - Primary         Assistant Staff:       Surgical Staff:  Circ-1: Pool Colonr  Scrub Tech-1: Nataliya Alberts  Surg Asst-1: Lucie Cortés  Event Time In   Incision Start 2678   Incision Close      Anesthesia: General   Estimated Blood Loss: minimal  Specimens: * No specimens in log *   Findings: bilateral indirect inguinal hernias   Complications: none  Implants:   Implant Name Type Inv.  Item Serial No.  Lot No. LRB No. Used Action   MESH ABSORB SURG PROGRIP 10X15 --  - [id number]  MESH ABSORB SURG PROGRIP 10X15 --   [employer]  SURGICAL [id number] Left 1 Implanted   MESH ABSORB SURG PROGRIP 10X15 --  - [id number]   MESH ABSORB SURG PROGRIP 10X15 --    [address] [id number] Right 1 Implanted

## 2017-06-02 NOTE — ANESTHESIA PREPROCEDURE EVALUATION
Anesthetic History   No history of anesthetic complications            Review of Systems / Medical History  Patient summary reviewed and pertinent labs reviewed    Pulmonary  Within defined limits                 Neuro/Psych         Psychiatric history    Comments: Chronic pain Cardiovascular    Hypertension              Exercise tolerance: >4 METS     GI/Hepatic/Renal         Renal disease: CRI       Endo/Other    Diabetes: type 2    Obesity     Other Findings   Comments:   Risk Factors for Postoperative nausea/vomiting:       History of postoperative nausea/vomiting? NO       Female? NO       Motion sickness? NO       Intended opioid administration for postoperative analgesia? YES      Smoking Abstinence  Current Smoker? NO  Elective Surgery? YES  Seen preoperatively by anesthesiologist or proxy prior to day of surgery? YES  Pt abstained from smoking 24 hours prior to anesthesia?  N/A         Physical Exam    Airway  Mallampati: III  TM Distance: 4 - 6 cm  Neck ROM: normal range of motion   Mouth opening: Normal     Cardiovascular  Regular rate and rhythm,  S1 and S2 normal,  no murmur, click, rub, or gallop  Rhythm: regular  Rate: normal         Dental  No notable dental hx       Pulmonary  Breath sounds clear to auscultation               Abdominal  GI exam deferred       Other Findings            Anesthetic Plan    ASA: 2  Anesthesia type: general          Induction: Intravenous  Anesthetic plan and risks discussed with: Patient

## 2017-06-02 NOTE — OP NOTES
[address]    Name:  Bijan Sawant  MR#:  [de-identified]  :  1975  Account #:  [de-identified]  Date of Adm:  2017  Date of Surgery:  2017      ATTENDING SURGEON: Jazmín Campa MD    PREOPERATIVE DIAGNOSIS: Bilateral inguinal hernia. POSTOPERATIVE DIAGNOSIS: Bilateral indirect inguinal hernia. PROCEDURES PERFORMED: Robotic repair of bilateral indirect  inguinal hernia with placement of mesh. ANESTHESIA: General.    COMPLICATIONS: None. SPECIMENS REMOVED: None. ESTIMATED BLOOD LOSS: Minimal.    DETAILS OF PROCEDURE: The patient was brought to the operating  room. Scrubbing and draping of the abdomen was done in the usual  manner. A timeout was performed. A skin incision in the supraumbilical  area was performed. Veress needle was inserted. Abdomen was  insufflated. A 8 mm port was inserted, abdomen was explored. There  was bilateral indirect inguinal hernia. At this point, 2 other 8 mm ports  were placed on the lateral side of the abdominal wall bilaterally. The  robot was docked. Attention was paid to the left side. The preperitoneal  space was opened. The preperitoneal space was dissected and the  inferior epigastric vessels were identified and protected. The cord  structures were identified and protected. A large dissection was  performed to be able to place a 15 x 10 ProGrip Covidien mesh. This  was placed and it was fixed with interrupted 2-0 Vicryl and then the  peritoneum was closed on top of the mesh with a running 2-0 V-Loc  suture. Attention was now paid to the right side where the peritoneum  was opened. The preperitoneal space was dissected. The inferior  epigastric were identified and protected. The cord structures were  identified and protected. A 15 x 10 ProGrip mesh was placed in the  preperitoneal space.  It was opened and then it was fixed with  interrupted 2-0 Vicryl and then the peritoneum was closed on top of the  mesh with a 2-0 V-Loc suture. Hemostasis was secured. All the old  suture were taken out and the robot was undocked. Count was correct. Skin incisions were closed with 4-0 Monocryl.         MD STEPHANIE Simmons / Danial.Braulio  D:  06/02/2017   10:01  T:  06/02/2017   10:23  Job #:  [id number]

## 2017-06-02 NOTE — IP AVS SNAPSHOT
[address] [place] [id number] 
[phone] Patient: Cyndi Baez MRN: [id number] :1975 You are allergic to the following No active allergies Recent Documentation Height Weight BMI Smoking Status 1.778 m 96.2 kg 30.42 kg/m2 Never Smoker Emergency Contacts Name Discharge Info Relation Home Work Mobile [employer] [24]   [phone] Conrado Gayatn  Other Relative [6] [phone] About your hospitalization You were admitted on:  2017 You last received care in the:  [facility] PACU You were discharged on:  2017 Unit phone number:  [phone] Why you were hospitalized Your primary diagnosis was:  Not on File Providers Seen During Your Hospitalizations Provider Role Specialty Primary office phone Sotero Pallas, MD Attending Provider Surgery [phone] Your Primary Care Physician (PCP) Primary Care Physician Office Phone Office Fax [facility] [phone] Follow-up Information Follow up With Details Comments Contact Info [address] [place] [address] [address] [id number] 
[phone] Sotero Pallas, MD Schedule an appointment as soon as possible for a visit in 2 week(s)  [address] [facility] [address]  [facility] [address] [address] 
[phone] Your Appointments 2017 12:45 PM EDT Office Visit with Roxann Iniguez MD  
[employer] ([facility] [address] [address]  
[phone] 2017 11:30 AM EDT  
POST OP with Sotero Pallas, MD  
[address] [place] [address] [facility] [address] [facility] [address] [phone]  
[phone] Current Discharge Medication List  
  
START taking these medications Dose & Instructions Dispensing Information Comments Morning Noon Evening Bedtime HYDROcodone-acetaminophen  mg tablet Commonly known as:  Eltito Barley Replaces:  HYDROcodone-acetaminophen 5-325 mg per tablet Your last dose was: Your next dose is:    
   
   
 Dose:  1 Tab Take 1 Tab by mouth every six (6) hours as needed for Pain. Max Daily Amount: 4 Tabs. Quantity:  24 Tab Refills:  0 CONTINUE these medications which have NOT CHANGED Dose & Instructions Dispensing Information Comments Morning Noon Evening Bedtime Blood-Glucose Meter monitoring kit Commonly known as:  Uriah Jones Your last dose was: Your next dose is:    
   
   
 Use as directed Quantity:  1 Kit Refills:  0  
     
   
   
   
  
 carvedilol 3.125 mg tablet Commonly known as:  Jv Medrano Your last dose was: Your next dose is:    
   
   
 Dose:  3.125 mg  
3.125 mg. Refills:  0  
     
   
   
   
  
 clonazePAM 1 mg tablet Commonly known as:  Florence Hardin Your last dose was: Your next dose is:    
   
   
 Dose:  1 mg Take 1 Tab by mouth daily as needed. Quantity:  10 Tab Refills:  0  
     
   
   
   
  
 glucose blood VI test strips strip Commonly known as:  ASCENSIA AUTODISC VI, ONE TOUCH ULTRA TEST VI Your last dose was: Your next dose is:    
   
   
 Check blood sugar daily Quantity:  100 Strip Refills:  1  
     
   
   
   
  
 * ibuprofen 800 mg tablet Commonly known as:  MOTRIN Your last dose was: Your next dose is:    
   
   
 TK 1 T PO Q 6 H PRF PAIN Refills:  0  
     
   
   
   
  
 * ibuprofen 600 mg tablet Commonly known as:  MOTRIN Your last dose was: Your next dose is:    
   
   
 Dose:  600 mg Take 1 Tab by mouth every six (6) hours as needed for Pain. Quantity:  20 Tab Refills:  0 JANUVIA 50 mg tablet Generic drug:  SITagliptin Your last dose was: Your next dose is: TAKE 1 TABLET BY MOUTH DAILY Quantity:  30 Tab Refills:  5 Lancets Misc Commonly known as:  ONETOUCH ULTRASOFT LANCETS Your last dose was: Your next dose is:    
   
   
 Check blood sugar daily Quantity:  1 Each Refills:  11  
     
   
   
   
  
 lisinopril-hydroCHLOROthiazide 20-25 mg per tablet Commonly known as:  Framingham Ganser Your last dose was: Your next dose is:    
   
   
 Dose:  1 Tab Take 1 Tab by mouth daily. Quantity:  30 Tab Refills:  5  
     
   
   
   
  
 metFORMIN 1,000 mg tablet Commonly known as:  GLUCOPHAGE Your last dose was: Your next dose is:    
   
   
 Dose:  500 mg Take 500 mg by mouth two (2) times a day. Refills:  0  
     
   
   
   
  
 naloxone 4 mg/actuation Spry Your last dose was: Your next dose is:    
   
   
 Dose:  1 Spray 1 Spray by Nasal route as needed (overdose). Use as directed Quantity:  1 Box Refills:  1 QUEtiapine 400 mg tablet Commonly known as:  SEROquel Your last dose was: Your next dose is:    
   
   
 TK 1 T PO HS Refills:  0  
     
   
   
   
  
 traMADol 50 mg tablet Commonly known as:  ULTRAM  
   
Your last dose was: Your next dose is:    
   
   
 Dose:  50 mg Take 1 Tab by mouth every six (6) hours as needed for Pain. Max Daily Amount: 200 mg. Quantity:  12 Tab Refills:  0  
     
   
   
   
  
 ZOLOFT 100 mg tablet Generic drug:  sertraline Your last dose was: Your next dose is:    
   
   
 Dose:  100 mg Take 100 mg by mouth daily. Refills:  0  
     
   
   
   
  
 * Notice:   This list has 2 medication(s) that are the same as other medications prescribed for you. Read the directions carefully, and ask your doctor or other care provider to review them with you. STOP taking these medications HYDROcodone-acetaminophen 5-325 mg per tablet Commonly known as:  Martinez Fryer Replaced by:  HYDROcodone-acetaminophen  mg tablet Where to Get Your Medications Information on where to get these meds will be given to you by the nurse or doctor. ! Ask your nurse or doctor about these medications HYDROcodone-acetaminophen  mg tablet Discharge Instructions Instructions Following Ambulatory Surgery Patient: Angelica Hutchinson MRN: [de-identified]  SSN: [id number] YOB: 1975  Age: 42 y.o. Sex: male Activity · As tolerated, walking encourage, stairs are okay · Avoid strenuous activities - no lifting anything heavier than 15 pounds. · You may shower at home after 48 hours. Diet · Regular diet after nausea from the anesthetic has passed. Pain · Take pain medication as directed by your doctor ·  Call your doctor if pain is NOT relieved by medication Dressing Care · Remove outer dressing (if any) after 48 hours. Leave steri-strips (if any) in place until they fall off. After Anesthesia · For the first 24 hours: DO NOT Drive, Drink alcoholic beverages, or Make important decisions · Be aware of dizziness following anesthesia and while taking pain medication Call your doctor if 
· Excessive bleeding that does not stop after holding mild pressure over the area · Temperature of 101 degrees F or above · Redness,excessive swelling or bruising, and/or green or yellow, smelly discharge from incision · If nausea and vomiting continues Follow-Up Phone Calls · Call the office at [phone] to make your follow-up appointment Appointment date/time: 2 weeks after the surgery. Dr. Perry Mckinney cell phone number is [phone].  Please call me if you have any concerns or questions. DISCHARGE SUMMARY from Nurse The following personal items are in your possession at time of discharge: 
 
Dental Appliances: None Home Medications: None Jewelry: None Clothing: Shorts, Shirt, Undergarments, Footwear Other Valuables: None PATIENT INSTRUCTIONS: 
 
 
F-face looks uneven A-arms unable to move or move unevenly S-speech slurred or non-existent T-time-call 911 as soon as signs and symptoms begin-DO NOT go Back to bed or wait to see if you get better-TIME IS BRAIN. Warning Signs of HEART ATTACK Call 911 if you have these symptoms: 
? Chest discomfort. Most heart attacks involve discomfort in the center of the chest that lasts more than a few minutes, or that goes away and comes back. It can feel like uncomfortable pressure, squeezing, fullness, or pain. ? Discomfort in other areas of the upper body. Symptoms can include pain or discomfort in one or both arms, the back, neck, jaw, or stomach. ? Shortness of breath with or without chest discomfort. ? Other signs may include breaking out in a cold sweat, nausea, or lightheadedness. Don't wait more than five minutes to call [address]! Fast action can save your life. Calling 911 is almost always the fastest way to get lifesaving treatment. Emergency Medical Services staff can begin treatment when they arrive  up to an hour sooner than if someone gets to the hospital by car. The discharge information has been reviewed with the patient and girlfriend. The patient and girlfriend verbalized understanding. Discharge medications reviewed with the patient and girlfriend and appropriate educational materials and side effects teaching were provided. Hydrocodone/Acetaminophen (By mouth) Acetaminophen (n-ntvu-g-MIN-oh-fen), Hydrocodone Bitartrate (pzo-xinz-UXC-done bye-TAR-trate) Treats pain. This medicine contains a narcotic pain reliever. Brand Name(s): Hycet, Lorcet, Lorcet HD, Lorcet Plus, Lortab 10/325, Lortab 5/325, Lortab 7.5/325, Lortab Elixir, Norco, Verdrocet, Vicodin, Vicodin ES, Vicodin HP, Xodol, Xodol 5/300 There may be other brand names for this medicine. When This Medicine Should Not Be Used: This medicine is not right for everyone. Do not use it if you had an allergic reaction to acetaminophen, hydrocodone, or other narcotic medicines, or stomach or bowel blockage (including paralytic ileus). How to Use This Medicine:  
Capsule, Liquid, Tablet · Your doctor will tell you how much medicine to use. Do not use more than directed. · An overdose can be dangerous. Follow directions carefully so you do not get too much medicine at one time. · Oral liquid: Measure the oral liquid medicine with a marked measuring spoon, oral syringe, or medicine cup. · Drink plenty of liquids to help avoid constipation. · This medicine should come with a Medication Guide. Ask your pharmacist for a copy if you do not have one. · Missed dose: Take a dose as soon as you remember. If it is almost time for your next dose, wait until then and take a regular dose. Do not take extra medicine to make up for a missed dose. · Store the medicine in a closed container at room temperature, away from heat, moisture, and direct light. Flush any unused Norco® tablets down the toilet. Drugs and Foods to Avoid: Ask your doctor or pharmacist before using any other medicine, including over-the-counter medicines, vitamins, and herbal products. · Do not use this medicine if you are using or have used an MAO inhibitor within the past 14 days. · Some medicines can affect how hydrocodone/acetaminophen works. Tell your doctor if you are using any of the following: ¨ Carbamazepine, erythromycin, ketoconazole, mirtazapine, phenytoin, rifampin, ritonavir, tramadol, trazodone ¨ Diuretic (water pill) ¨ Medicine to treat depression or mental health problems ¨ Medicine to treat migraine headaches ¨ Phenothiazine medicine · Tell your doctor if you use anything else that makes you sleepy. Some examples are allergy medicine, narcotic pain medicine, and alcohol. Tell your doctor if you are using buprenorphine, butorphanol, nalbuphine, pentazocine, or a muscle relaxer. · Do not drink alcohol while you are using this medicine. Acetaminophen can damage your liver, and your risk is higher if you also drink alcohol. Warnings While Using This Medicine: · Tell your doctor if you are pregnant or breastfeeding, or if you have kidney disease, liver disease, lung or breathing problems, gallbladder or pancreas problems, an underactive thyroid, Laurel disease, prostate problems, trouble urinating, stomach problems, or a history of head injury or brain tumor, seizures, alcohol or drug addiction. · This medicine may cause the following problems:  
¨ High risk of overdose, which can lead to death ¨ Respiratory depression (serious breathing problem that can be life-threatening) ¨ Liver problems ¨ Serious skin reactions ¨ Serotonin syndrome (when used with certain medicines) · This medicine can be habit-forming. Do not use more than your prescribed dose. Call your doctor if you think your medicine is not working. · This medicine may make you dizzy or drowsy. Do not drive or doing anything else that could be dangerous until you know how this medicine affects you. · This medicine contains acetaminophen. Read the labels of all other medicines you are using to see if they also contain acetaminophen, or ask your doctor or pharmacist. Skeet Lute not use more than 4 grams (4,000 milligrams) total of acetaminophen in one day. · Tell any doctor or dentist who treats you that you are using this medicine. This medicine may affect certain medical test results. · This medicine may cause constipation, especially with long-term use.  Ask your doctor if you should use a laxative to prevent and treat constipation. · This medicine could cause infertility. Talk with your doctor before using this medicine if you plan to have children. · Keep all medicine out of the reach of children. Never share your medicine with anyone. Possible Side Effects While Using This Medicine:  
Call your doctor right away if you notice any of these side effects: · Allergic reaction: Itching or hives, swelling in your face or hands, swelling or tingling in your mouth or throat, chest tightness, trouble breathing · Anxiety, restlessness, fast heartbeat, fever, sweating, muscle spasms, twitching, diarrhea, seeing or hearing things that are not there · Blistering, peeling, red skin rash · Blue lips, fingernails, or skin · Dark urine or pale stools, loss of appetite, nausea or vomiting, stomach pain, yellow skin or eyes · Extreme weakness, shallow breathing, slow heartbeat, sweating, seizures, cold or clammy skin · Lightheadedness, dizziness, fainting If you notice these less serious side effects, talk with your doctor: · Constipation, nausea, vomiting · Tiredness or sleepiness If you notice other side effects that you think are caused by this medicine, tell your doctor. Call your doctor for medical advice about side effects. You may report side effects to FDA at 1-659-FDA-2286 © 2017 [address] Information is for End User's use only and may not be sold, redistributed or otherwise used for commercial purposes. The above information is an  only. It is not intended as medical advice for individual conditions or treatments. Talk to your doctor, nurse or pharmacist before following any medical regimen to see if it is safe and effective for you. Discharge Orders None Introducing [facility]!    
 Ayde Valentine introduces [employer] patient portal. Now you can access parts of your medical record, email your doctor's office, and request medication refills online. 1. In your internet browser, go to [email or web address] 2. Click on the First Time User? Click Here link in the Sign In box. You will see the New Member Sign Up page. 3. Enter your [employer] Access Code exactly as it appears below. You will not need to use this code after youve completed the sign-up process. If you do not sign up before the expiration date, you must request a new code. · [employer] Access Code: [email or web address] Expires: 8/9/2017  9:05 PM 
 
4. Enter the last four digits of your Social Security Number (xxxx) and Date of Birth (mm/dd/yyyy) as indicated and click Submit. You will be taken to the next sign-up page. 5. Create a [employer] ID. This will be your [employer] login ID and cannot be changed, so think of one that is secure and easy to remember. 6. Create a [employer] password. You can change your password at any time. 7. Enter your Password Reset Question and Answer. This can be used at a later time if you forget your password. 8. Enter your e-mail address. You will receive e-mail notification when new information is available in [address] 9. Click Sign Up. You can now view and download portions of your medical record. 10. Click the Download Summary menu link to download a portable copy of your medical information. If you have questions, please visit the Frequently Asked Questions section of the [employer] website. Remember, [employer] is NOT to be used for urgent needs. For medical emergencies, dial 911. Now available from your iPhone and Android! General Information Please provide this summary of care documentation to your next provider. Patient Signature:  ____________________________________________________________ Date:  ____________________________________________________________  
  
Silvana Nim  Provider Signature: ____________________________________________________________ Date:  ____________________________________________________________

## 2017-06-02 NOTE — DISCHARGE INSTRUCTIONS
Instructions Following Ambulatory Surgery    Patient: Miles Connelly MRN: [de-identified]  SSN: [id number]    YOB: 1975  Age: 42 y.o. Sex: male      Activity  · As tolerated, walking encourage, stairs are okay  · Avoid strenuous activities - no lifting anything heavier than 15 pounds. · You may shower at home after 48 hours. Diet  · Regular diet after nausea from the anesthetic has passed. Pain  · Take pain medication as directed by your doctor  ·  Call your doctor if pain is NOT relieved by medication    Dressing Care  · Remove outer dressing (if any) after 48 hours. Leave steri-strips (if any) in place until they fall off. After Anesthesia  · For the first 24 hours: DO NOT Drive, Drink alcoholic beverages, or Make important decisions  · Be aware of dizziness following anesthesia and while taking pain medication    Call your doctor if  · Excessive bleeding that does not stop after holding mild pressure over the area  · Temperature of 101 degrees F or above  · Redness,excessive swelling or bruising, and/or green or yellow, smelly discharge from incision  · If nausea and vomiting continues    Follow-Up Phone Calls  · Call the office at [phone] to make your follow-up appointment    Appointment date/time: 2 weeks after the surgery. Dr. Evelin Zacarias cell phone number is [phone]. Please call me if you have any concerns or questions.        DISCHARGE SUMMARY from Nurse    The following personal items are in your possession at time of discharge:    Dental Appliances: None        Home Medications: None  Jewelry: None  Clothing: Shorts, Shirt, Undergarments, Footwear  Other Valuables: None     PATIENT INSTRUCTIONS:    After general anesthesia or intravenous sedation, for 24 hours or while taking prescription Narcotics:  · Limit your activities  · Do not drive and operate hazardous machinery  · Do not make important personal or business decisions  · Do  not drink alcoholic beverages  · If you have not urinated within 8 hours after discharge, please contact your surgeon on call. Report the following to your surgeon:  · Excessive pain, swelling, redness or odor of or around the surgical area  · Temperature over 100.5  · Nausea and vomiting lasting longer than 4 hours or if unable to take medications  · Any signs of decreased circulation or nerve impairment to extremity: change in color, persistent  numbness, tingling, coldness or increase pain  · Any questions    *  Please give a list of your current medications to your Primary Care Provider. *  Please update this list whenever your medications are discontinued, doses are      changed, or new medications (including over-the-counter products) are added. *  Please carry medication information at all times in case of emergency situations. These are general instructions for a healthy lifestyle:    No smoking/ No tobacco products/ Avoid exposure to second hand smoke    Surgeon General's Warning:  Quitting smoking now greatly reduces serious risk to your health. Obesity, smoking, and sedentary lifestyle greatly increases your risk for illness    A healthy diet, regular physical exercise & weight monitoring are important for maintaining a healthy lifestyle    You may be retaining fluid if you have a history of heart failure or if you experience any of the following symptoms:  Weight gain of 3 pounds or more overnight or 5 pounds in a week, increased swelling in our hands or feet or shortness of breath while lying flat in bed. Please call your doctor as soon as you notice any of these symptoms; do not wait until your next office visit. Recognize signs and symptoms of STROKE:    F-face looks uneven    A-arms unable to move or move unevenly    S-speech slurred or non-existent    T-time-call 911 as soon as signs and symptoms begin-DO NOT go       Back to bed or wait to see if you get better-TIME IS BRAIN.     Warning Signs of HEART ATTACK     Call 911 if you have these symptoms:   Chest discomfort. Most heart attacks involve discomfort in the center of the chest that lasts more than a few minutes, or that goes away and comes back. It can feel like uncomfortable pressure, squeezing, fullness, or pain.  Discomfort in other areas of the upper body. Symptoms can include pain or discomfort in one or both arms, the back, neck, jaw, or stomach.  Shortness of breath with or without chest discomfort.  Other signs may include breaking out in a cold sweat, nausea, or lightheadedness. Don't wait more than five minutes to call 911 - MINUTES MATTER! Fast action can save your life. Calling 911 is almost always the fastest way to get lifesaving treatment. Emergency Medical Services staff can begin treatment when they arrive -- up to an hour sooner than if someone gets to the hospital by car. The discharge information has been reviewed with the patient and girlfriend. The patient and girlfriend verbalized understanding. Discharge medications reviewed with the patient and girlfriend and appropriate educational materials and side effects teaching were provided. Hydrocodone/Acetaminophen (By mouth)   Acetaminophen (k-wqrg-n-MIN-oh-fen), Hydrocodone Bitartrate (mkx-ruog-HSI-done bye-TAR-trate)  Treats pain. This medicine contains a narcotic pain reliever. Brand Name(s): Hycet, Lorcet, Lorcet HD, Lorcet Plus, Lortab 10/325, Lortab 5/325, Lortab 7.5/325, Lortab Elixir, Norco, Verdrocet, Vicodin, Vicodin ES, Vicodin HP, Xodol, Xodol 5/300   There may be other brand names for this medicine. When This Medicine Should Not Be Used: This medicine is not right for everyone. Do not use it if you had an allergic reaction to acetaminophen, hydrocodone, or other narcotic medicines, or stomach or bowel blockage (including paralytic ileus). How to Use This Medicine:   Capsule, Liquid, Tablet  · Your doctor will tell you how much medicine to use.  Do not use more than directed. · An overdose can be dangerous. Follow directions carefully so you do not get too much medicine at one time. · Oral liquid: Measure the oral liquid medicine with a marked measuring spoon, oral syringe, or medicine cup. · Drink plenty of liquids to help avoid constipation. · This medicine should come with a Medication Guide. Ask your pharmacist for a copy if you do not have one. · Missed dose: Take a dose as soon as you remember. If it is almost time for your next dose, wait until then and take a regular dose. Do not take extra medicine to make up for a missed dose. · Store the medicine in a closed container at room temperature, away from heat, moisture, and direct light. Flush any unused Norco® tablets down the toilet. Drugs and Foods to Avoid:   Ask your doctor or pharmacist before using any other medicine, including over-the-counter medicines, vitamins, and herbal products. · Do not use this medicine if you are using or have used an MAO inhibitor within the past 14 days. · Some medicines can affect how hydrocodone/acetaminophen works. Tell your doctor if you are using any of the following:   ¨ Carbamazepine, erythromycin, ketoconazole, mirtazapine, phenytoin, rifampin, ritonavir, tramadol, trazodone  ¨ Diuretic (water pill)  ¨ Medicine to treat depression or mental health problems  ¨ Medicine to treat migraine headaches  ¨ Phenothiazine medicine  · Tell your doctor if you use anything else that makes you sleepy. Some examples are allergy medicine, narcotic pain medicine, and alcohol. Tell your doctor if you are using buprenorphine, butorphanol, nalbuphine, pentazocine, or a muscle relaxer. · Do not drink alcohol while you are using this medicine. Acetaminophen can damage your liver, and your risk is higher if you also drink alcohol.   Warnings While Using This Medicine:   · Tell your doctor if you are pregnant or breastfeeding, or if you have kidney disease, liver disease, lung or breathing problems, gallbladder or pancreas problems, an underactive thyroid, Myles disease, prostate problems, trouble urinating, stomach problems, or a history of head injury or brain tumor, seizures, alcohol or drug addiction. · This medicine may cause the following problems:   ¨ High risk of overdose, which can lead to death  ¨ Respiratory depression (serious breathing problem that can be life-threatening)  ¨ Liver problems  ¨ Serious skin reactions  ¨ Serotonin syndrome (when used with certain medicines)  · This medicine can be habit-forming. Do not use more than your prescribed dose. Call your doctor if you think your medicine is not working. · This medicine may make you dizzy or drowsy. Do not drive or doing anything else that could be dangerous until you know how this medicine affects you. · This medicine contains acetaminophen. Read the labels of all other medicines you are using to see if they also contain acetaminophen, or ask your doctor or pharmacist. Darlene Odonnellavani not use more than 4 grams (4,000 milligrams) total of acetaminophen in one day. · Tell any doctor or dentist who treats you that you are using this medicine. This medicine may affect certain medical test results. · This medicine may cause constipation, especially with long-term use. Ask your doctor if you should use a laxative to prevent and treat constipation. · This medicine could cause infertility. Talk with your doctor before using this medicine if you plan to have children. · Keep all medicine out of the reach of children. Never share your medicine with anyone.   Possible Side Effects While Using This Medicine:   Call your doctor right away if you notice any of these side effects:  · Allergic reaction: Itching or hives, swelling in your face or hands, swelling or tingling in your mouth or throat, chest tightness, trouble breathing  · Anxiety, restlessness, fast heartbeat, fever, sweating, muscle spasms, twitching, diarrhea, seeing or hearing things that are not there  · Blistering, peeling, red skin rash  · Blue lips, fingernails, or skin  · Dark urine or pale stools, loss of appetite, nausea or vomiting, stomach pain, yellow skin or eyes  · Extreme weakness, shallow breathing, slow heartbeat, sweating, seizures, cold or clammy skin  · Lightheadedness, dizziness, fainting  If you notice these less serious side effects, talk with your doctor:   · Constipation, nausea, vomiting  · Tiredness or sleepiness  If you notice other side effects that you think are caused by this medicine, tell your doctor. Call your doctor for medical advice about side effects. You may report side effects to FDA at 9-250-FDA-6882  © 2017 Formerly named [facility] Information is for End User's use only and may not be sold, redistributed or otherwise used for commercial purposes. The above information is an  only. It is not intended as medical advice for individual conditions or treatments. Talk to your doctor, nurse or pharmacist before following any medical regimen to see if it is safe and effective for you.

## 2017-06-02 NOTE — PROGRESS NOTES
Date of Surgery Update:  Pradip Barraza was seen and examined. History and physical has been reviewed. The patient has been examined. There have been no significant clinical changes since the completion of the originally dated History and Physical. Will proceed with robotic bilateral inguinal hernia repair with mesh.      Signed By: Javid Doran MD     June 2, 2017 8:08 AM

## 2017-06-04 RX ORDER — BLOOD SUGAR DIAGNOSTIC
STRIP MISCELLANEOUS
Qty: 100 STRIP | Refills: 0 | Status: SHIPPED | COMMUNITY
Start: 2017-06-04 | End: 2017-06-16 | Stop reason: CLARIF

## 2017-06-05 ENCOUNTER — OFFICE VISIT (OUTPATIENT)
Dept: SURGERY | Age: 42
End: 2017-06-05

## 2017-06-05 VITALS
BODY MASS INDEX: 31.07 KG/M2 | RESPIRATION RATE: 16 BRPM | HEART RATE: 105 BPM | SYSTOLIC BLOOD PRESSURE: 136 MMHG | WEIGHT: 217 LBS | HEIGHT: 70 IN | DIASTOLIC BLOOD PRESSURE: 94 MMHG | TEMPERATURE: 98.3 F | OXYGEN SATURATION: 98 %

## 2017-06-05 DIAGNOSIS — Z09 POSTOPERATIVE EXAMINATION: Primary | ICD-10-CM

## 2017-06-05 RX ORDER — OXYCODONE AND ACETAMINOPHEN 5; 325 MG/1; MG/1
1 TABLET ORAL
Qty: 24 TAB | Refills: 0 | Status: SHIPPED | OUTPATIENT
Start: 2017-06-05 | End: 2017-06-07

## 2017-06-05 NOTE — LETTER
6/5/2017 11:10 AM 
 
Patient:  Gia Go YOB: 1975 Date of Visit: 6/5/2017 Epi Tong PA-C 
[place] [address] [address] [place] [address] VIA In Basket Dear Epi Tong PA-C, Thank you for referring Mr. Valencia Rodriguez to [place] [address] for evaluation and treatment. Below are the relevant portions of my assessment and plan of care. Thank you very much for your referral of Mr. Valencia Rodriguez. If you have questions, please do not hesitate to call me. I look forward to following Mr. Gaytan along with you and will keep you updated as to his progress. Sincerely, Ang Yun MD

## 2017-06-05 NOTE — PATIENT INSTRUCTIONS
If you have any questions or concerns about today's appointment, the verbal and/or written instructions you were given for follow up care, please call our office at [phone].     [facility] - [address]    [phone] office  [phone]

## 2017-06-05 NOTE — LETTER
NOTIFICATION OF RETURN TO WORK 
 
6/5/2017 11:21 AM 
 
Mr. Alaina Beckwith [address] [place] [address] [address] To Whom It May Concern: 
 
Alaina Beckwith was under the care of Damien Obando 11 & had surgery performed on 06/02/17. He will be able to return to work on 06/12/17 with the restriction of no heavy lifting of greater than 10 lbs. for five days. If there are questions or concerns please have the patient contact our office. Sincerely, Isidoro Smith MD

## 2017-06-05 NOTE — PROGRESS NOTES
Patient seen and examined. He is doing quite well. He still having some pain especially in the left groin. He said he had 6 more tablets of Percocet and he would like a new prescription. On exam he is soft and non tender. The wounds are healing well. Bilateral groin exam is normal.   Plan:  I will write him a new prescription of Percocet but I told him that would be the last time  Follow up with me as needed.

## 2017-06-05 NOTE — PROGRESS NOTES
Shefali Denis is a 42 y.o. male who presents today for a post-op exam for robotic repair of bilateral indirect inguinal hernias with placement of mesh performed on 06/02/17. Patient scores their post-op pain level on a pain scale from 1-10  as a 9 today. 1. Have you been to the ER, urgent care clinic since your last visit? Hospitalized since your last visit? No    2. Have you seen or consulted any other health care providers outside of the [address] [place] [address] since your last visit? Include any pap smears or colon screening.  No

## 2017-06-05 NOTE — MR AVS SNAPSHOT
Visit Information Date & Time Provider Department Dept. Phone Encounter #  
 6/5/2017 11:15 AM Mckayla Byrd MD [facility] [phone] [id number] Your Appointments 6/13/2017 12:45 PM  
Office Visit with Yordy Leigh MD  
[facility] [address]) Appt Note: follow up [address] [place] [address]  
[phone]  
  
   
 Randy GARIBAY [id number] 6/14/2017 11:30 AM  
POST OP with Mckayla Byrd MD  
[address]) Appt Note: Post Op Check [address] [facility] 405 [facility] [address] [place]  
  
   
 [address] [facility] [address] [place] [address] Upcoming Health Maintenance Date Due  
 EYE EXAM RETINAL OR DILATED Q1 12/28/1984 Pneumococcal 19-64 Medium Risk (1 of 1 - PPSV23) 12/28/1993 DTaP/Tdap/Td series (1 - Tdap) 12/28/1995 MICROALBUMIN Q1 7/7/2016 LIPID PANEL Q1 7/7/2016 FOOT EXAM Q1 4/27/2017 INFLUENZA AGE 9 TO ADULT 8/1/2017 HEMOGLOBIN A1C Q6M 9/3/2017 Allergies as of 6/5/2017  Review Complete On: 6/5/2017 By: Ciara Tamez No Known Allergies Current Immunizations  Reviewed on 9/2/2015 Name Date Influenza Vaccine (Quad) PF 9/2/2015 Influenza Vaccine Whole 12/1/2009 Not reviewed this visit Vitals BP Pulse Temp Resp Height(growth percentile) Weight(growth percentile) (!) 136/94 (BP 1 Location: Right arm, BP Patient Position: Sitting) (!) 105 98.3 °F (36.8 °C) (Oral) 16 5' 10\" (1.778 m) 217 lb (98.4 kg) SpO2 BMI Smoking Status 98% 31.14 kg/m2 Never Smoker BMI and BSA Data Body Mass Index Body Surface Area  
 31.14 kg/m 2 2.2 m 2 Pref[facility] Pharm[facility] [address] [place] [address] Jerome Walls 136 [phone] Your Updated Medication List  
  
   
This list is accurate as of: 6/5/17 11:27 AM.  Always use your most recent med list.  
  
  
  
  
 Blood-Glucose Meter monitoring kit Commonly known as:  Lindsey Mi Use as directed  
  
 carvedilol 3.125 mg tablet Commonly known as:  COREG  
3.125 mg.  
  
 clonazePAM 1 mg tablet Commonly known as:  Concepcion Loge Take 1 Tab by mouth daily as needed. HYDROcodone-acetaminophen  mg tablet Commonly known as:  Yordy Kilts Take 1 Tab by mouth every six (6) hours as needed for Pain. Max Daily Amount: 4 Tabs. * ibuprofen 800 mg tablet Commonly known as:  MOTRIN  
TK 1 T PO Q 6 H PRF PAIN  
  
 * ibuprofen 600 mg tablet Commonly known as:  MOTRIN Take 1 Tab by mouth every six (6) hours as needed for Pain. JANUVIA 50 mg tablet Generic drug:  SITagliptin TAKE 1 TABLET BY MOUTH DAILY Lancets Misc Commonly known as:  ONETOUCH ULTRASOFT LANCETS Check blood sugar daily  
  
 lisinopril-hydroCHLOROthiazide 20-25 mg per tablet Commonly known as:  Jose C Pleasure Take 1 Tab by mouth daily. metFORMIN 1,000 mg tablet Commonly known as:  GLUCOPHAGE Take 500 mg by mouth two (2) times a day.  
  
 naloxone 4 mg/actuation Spry 1 Spray by Nasal route as needed (overdose). Use as directed ONETOUCH ULTRA TEST strip Generic drug:  glucose blood VI test strips CHECK BLOOD SUGAR DAILY  
  
 oxyCODONE-acetaminophen 5-325 mg per tablet Commonly known as:  PERCOCET Take 1 Tab by mouth every four (4) hours as needed for Pain. Max Daily Amount: 6 Tabs. QUEtiapine 400 mg tablet Commonly known as:  SEROquel TK 1 T PO HS  
  
 traMADol 50 mg tablet Commonly known as:  ULTRAM  
Take 1 Tab by mouth every six (6) hours as needed for Pain. Max Daily Amount: 200 mg. ZOLOFT 100 mg tablet Generic drug:  sertraline Take 100 mg by mouth daily. * Notice:   This list has 2 medication(s) that are the same as other medications prescribed for you. Read the directions carefully, and ask your doctor or other care provider to review them with you. Prescriptions Printed Refills  
 oxyCODONE-acetaminophen (PERCOCET) 5-325 mg per tablet 0 Sig: Take 1 Tab by mouth every four (4) hours as needed for Pain. Max Daily Amount: 6 Tabs. Class: Print Route: Oral  
  
Patient Instructions If you have any questions or concerns about today's appointment, the verbal and/or written instructions you were given for follow up care, please call our office at [phone]. [facility] - [address] 
 
[phone] office [phone] Introducing [facility]! [facility] I[facility] introduces "[employer] patient portal. Now you can access parts of your medical record, email your doctor's office, and request medication refills online. 1. In your internet browser, go to [email or web address] 2. Click on the First Time User? Click Here link in the Sign In box. You will see the New Member Sign Up page. 3. Enter your "[employer] Access Code exactly as it appears below. You will not need to use this code after youve completed the sign-up process. If you do not sign up before the expiration date, you must request a new code. · "[employer] Access Code: [email or web address] Expires: 8/9/2017  9:05 PM 
 
4. Enter the last four digits of your Social Security Number (xxxx) and Date of Birth (mm/dd/yyyy) as indicated and click Submit. You will be taken to the next sign-up page. 5. Create a "[employer] ID. This will be your "[employer] login ID and cannot be changed, so think of one that is secure and easy to remember. 6. Create a "[employer] password. You can change your password at any time. 7. Enter your Password Reset Question and Answer. This can be used at a later time if you forget your password. 8. Enter your e-mail address. You will receive e-mail notification when new information is available in [address] 9. Click Sign Up. You can now view and download portions of your medical record. 10. Click the Download Summary menu link to download a portable copy of your medical information. If you have questions, please visit the Frequently Asked Questions section of the [employer] website. Remember, [employer] is NOT to be used for urgent needs. For medical emergencies, dial 911. Now available from your iPhone and Android! Please provide this summary of care documentation to your next provider. Your primary care clinician is listed as Dallin Aparicio. If you have any questions after today's visit, please call [phone].

## 2017-06-07 ENCOUNTER — APPOINTMENT (OUTPATIENT)
Dept: CT IMAGING | Age: 42
End: 2017-06-07
Attending: PHYSICIAN ASSISTANT
Payer: SUBSIDIZED

## 2017-06-07 ENCOUNTER — APPOINTMENT (OUTPATIENT)
Dept: ULTRASOUND IMAGING | Age: 42
End: 2017-06-07
Attending: PHYSICIAN ASSISTANT
Payer: SUBSIDIZED

## 2017-06-07 ENCOUNTER — HOSPITAL ENCOUNTER (EMERGENCY)
Age: 42
Discharge: HOME | End: 2017-06-07
Attending: EMERGENCY MEDICINE
Payer: SUBSIDIZED

## 2017-06-07 VITALS
BODY MASS INDEX: 37.22 KG/M2 | SYSTOLIC BLOOD PRESSURE: 128 MMHG | WEIGHT: 260 LBS | OXYGEN SATURATION: 100 % | TEMPERATURE: 97.9 F | DIASTOLIC BLOOD PRESSURE: 91 MMHG | HEART RATE: 84 BPM | HEIGHT: 70 IN | RESPIRATION RATE: 14 BRPM

## 2017-06-07 DIAGNOSIS — G89.18 POST-OPERATIVE PAIN: Primary | ICD-10-CM

## 2017-06-07 LAB
ALBUMIN SERPL BCP-MCNC: 4 G/DL (ref 3.4–5)
ALBUMIN/GLOB SERPL: 0.9 {RATIO} (ref 0.8–1.7)
ALP SERPL-CCNC: 74 U/L (ref 45–117)
ALT SERPL-CCNC: 14 U/L (ref 16–61)
ANION GAP BLD CALC-SCNC: 19 MMOL/L (ref 10–20)
ANION GAP BLD CALC-SCNC: 9 MMOL/L (ref 3–18)
APPEARANCE UR: CLEAR
AST SERPL W P-5'-P-CCNC: 16 U/L (ref 15–37)
BASOPHILS # BLD AUTO: 0 K/UL (ref 0–0.06)
BASOPHILS # BLD: 1 % (ref 0–2)
BILIRUB SERPL-MCNC: 0.2 MG/DL (ref 0.2–1)
BILIRUB UR QL: NEGATIVE
BUN BLD-MCNC: 28 MG/DL (ref 7–18)
BUN SERPL-MCNC: 31 MG/DL (ref 7–18)
BUN/CREAT SERPL: 14 (ref 12–20)
CA-I BLD-MCNC: 1.19 MMOL/L (ref 1.12–1.32)
CALCIUM SERPL-MCNC: 9.8 MG/DL (ref 8.5–10.1)
CHLORIDE BLD-SCNC: 103 MMOL/L (ref 100–108)
CHLORIDE SERPL-SCNC: 100 MMOL/L (ref 100–108)
CO2 BLD-SCNC: 23 MMOL/L (ref 19–24)
CO2 SERPL-SCNC: 24 MMOL/L (ref 21–32)
COLOR UR: YELLOW
CREAT SERPL-MCNC: 2.21 MG/DL (ref 0.6–1.3)
CREAT UR-MCNC: 1.7 MG/DL (ref 0.6–1.3)
DIFFERENTIAL METHOD BLD: ABNORMAL
EOSINOPHIL # BLD: 0.1 K/UL (ref 0–0.4)
EOSINOPHIL NFR BLD: 2 % (ref 0–5)
ERYTHROCYTE [DISTWIDTH] IN BLOOD BY AUTOMATED COUNT: 13.3 % (ref 11.6–14.5)
GLOBULIN SER CALC-MCNC: 4.5 G/DL (ref 2–4)
GLUCOSE BLD STRIP.AUTO-MCNC: 102 MG/DL (ref 74–106)
GLUCOSE SERPL-MCNC: 224 MG/DL (ref 74–99)
GLUCOSE UR STRIP.AUTO-MCNC: 500 MG/DL
HCT VFR BLD AUTO: 36.6 % (ref 36–48)
HCT VFR BLD CALC: 35 % (ref 36–49)
HGB BLD-MCNC: 11.9 G/DL (ref 12–16)
HGB BLD-MCNC: 12.5 G/DL (ref 13–16)
HGB UR QL STRIP: NEGATIVE
KETONES UR QL STRIP.AUTO: NEGATIVE MG/DL
LACTATE BLD-SCNC: 0.9 MMOL/L (ref 0.4–2)
LACTATE BLD-SCNC: 3.3 MMOL/L (ref 0.4–2)
LEUKOCYTE ESTERASE UR QL STRIP.AUTO: NEGATIVE
LYMPHOCYTES # BLD AUTO: 26 % (ref 21–52)
LYMPHOCYTES # BLD: 1.7 K/UL (ref 0.9–3.6)
MCH RBC QN AUTO: 28.9 PG (ref 24–34)
MCHC RBC AUTO-ENTMCNC: 34.2 G/DL (ref 31–37)
MCV RBC AUTO: 84.5 FL (ref 74–97)
MONOCYTES # BLD: 0.4 K/UL (ref 0.05–1.2)
MONOCYTES NFR BLD AUTO: 6 % (ref 3–10)
NEUTS SEG # BLD: 4.4 K/UL (ref 1.8–8)
NEUTS SEG NFR BLD AUTO: 65 % (ref 40–73)
NITRITE UR QL STRIP.AUTO: NEGATIVE
PH UR STRIP: 5.5 [PH] (ref 5–8)
PLATELET # BLD AUTO: 323 K/UL (ref 135–420)
PMV BLD AUTO: 9.4 FL (ref 9.2–11.8)
POTASSIUM BLD-SCNC: 4.4 MMOL/L (ref 3.5–5.5)
POTASSIUM SERPL-SCNC: 4.3 MMOL/L (ref 3.5–5.5)
PROT SERPL-MCNC: 8.5 G/DL (ref 6.4–8.2)
PROT UR STRIP-MCNC: NEGATIVE MG/DL
RBC # BLD AUTO: 4.33 M/UL (ref 4.7–5.5)
SODIUM BLD-SCNC: 140 MMOL/L (ref 136–145)
SODIUM SERPL-SCNC: 133 MMOL/L (ref 136–145)
SP GR UR REFRACTOMETRY: 1.02 (ref 1–1.03)
UROBILINOGEN UR QL STRIP.AUTO: 0.2 EU/DL (ref 0.2–1)
WBC # BLD AUTO: 6.6 K/UL (ref 4.6–13.2)

## 2017-06-07 PROCEDURE — 96376 TX/PRO/DX INJ SAME DRUG ADON: CPT

## 2017-06-07 PROCEDURE — 80047 BASIC METABLC PNL IONIZED CA: CPT

## 2017-06-07 PROCEDURE — 83605 ASSAY OF LACTIC ACID: CPT

## 2017-06-07 PROCEDURE — 99284 EMERGENCY DEPT VISIT MOD MDM: CPT

## 2017-06-07 PROCEDURE — 80053 COMPREHEN METABOLIC PANEL: CPT | Performed by: PHYSICIAN ASSISTANT

## 2017-06-07 PROCEDURE — 96374 THER/PROPH/DIAG INJ IV PUSH: CPT

## 2017-06-07 PROCEDURE — 76870 US EXAM SCROTUM: CPT

## 2017-06-07 PROCEDURE — 85025 COMPLETE CBC W/AUTO DIFF WBC: CPT | Performed by: PHYSICIAN ASSISTANT

## 2017-06-07 PROCEDURE — 74177 CT ABD & PELVIS W/CONTRAST: CPT

## 2017-06-07 PROCEDURE — 96361 HYDRATE IV INFUSION ADD-ON: CPT

## 2017-06-07 PROCEDURE — 81003 URINALYSIS AUTO W/O SCOPE: CPT | Performed by: PHYSICIAN ASSISTANT

## 2017-06-07 PROCEDURE — 74011636320 HC RX REV CODE- 636/320: Performed by: EMERGENCY MEDICINE

## 2017-06-07 PROCEDURE — 74011250636 HC RX REV CODE- 250/636: Performed by: PHYSICIAN ASSISTANT

## 2017-06-07 RX ORDER — SODIUM CHLORIDE 9 MG/ML
1000 INJECTION, SOLUTION INTRAVENOUS ONCE
Status: COMPLETED | OUTPATIENT
Start: 2017-06-07 | End: 2017-06-07

## 2017-06-07 RX ORDER — MORPHINE SULFATE 4 MG/ML
4 INJECTION, SOLUTION INTRAMUSCULAR; INTRAVENOUS
Status: COMPLETED | OUTPATIENT
Start: 2017-06-07 | End: 2017-06-07

## 2017-06-07 RX ORDER — HYDROCODONE BITARTRATE AND ACETAMINOPHEN 5; 325 MG/1; MG/1
1 TABLET ORAL
Qty: 10 TAB | Refills: 0 | Status: SHIPPED | OUTPATIENT
Start: 2017-06-07 | End: 2017-06-16 | Stop reason: CLARIF

## 2017-06-07 RX ORDER — MORPHINE SULFATE 2 MG/ML
2 INJECTION, SOLUTION INTRAMUSCULAR; INTRAVENOUS
Status: COMPLETED | OUTPATIENT
Start: 2017-06-07 | End: 2017-06-07

## 2017-06-07 RX ADMIN — IOPAMIDOL 100 ML: 612 INJECTION, SOLUTION INTRAVENOUS at 17:49

## 2017-06-07 RX ADMIN — Medication 4 MG: at 18:47

## 2017-06-07 RX ADMIN — SODIUM CHLORIDE 1000 ML: 900 INJECTION, SOLUTION INTRAVENOUS at 18:47

## 2017-06-07 RX ADMIN — SODIUM CHLORIDE 1000 ML: 900 INJECTION, SOLUTION INTRAVENOUS at 17:22

## 2017-06-07 RX ADMIN — MORPHINE SULFATE 2 MG: 2 INJECTION, SOLUTION INTRAMUSCULAR; INTRAVENOUS at 17:21

## 2017-06-07 NOTE — ED PROVIDER NOTES
HPI Comments: 42 yr old male, hx DM, htn, depression, herniated lumbar disc, and bilateral inguinal hernia, presents to the ED complaining of bilateral lower abdominal pain and L testicular pain since his hernia repair on 6/2/17. Pt states Dr. Brenda Madsen performed his hernia repair. Pt states he has also been constipated since the surgery but is not taking his stool softeners like he should. Pt denies fever, chill, and urinary sx. No other complaints. Patient is a 42 y.o. male presenting with groin pain. Groin Pain   Associated symptoms include abdominal pain. Pertinent negatives include no chest pain, no headaches and no shortness of breath. Past Medical History:   Diagnosis Date    Depression     Diabetes ([facility] [address]     Herniated lumbar intervertebral disc     Hypertension     Neurological disorder L3,4,5 torn Herniated disc       Past Surgical History:   Procedure Laterality Date    HX HERNIA REPAIR Bilateral 06/02/2017    robotic repair of bilateral indirect inguinal hernias with placement of mesh    HX ORTHOPAEDIC  trigger finger release         Family History:   Problem Relation Age of Onset    Hypertension Mother     Diabetes Mother     Heart Failure Mother     COPD Mother     Heart Disease Mother     Hypertension Father     Alcohol abuse Father        Social History     Social History    Marital status:      Spouse name: N/A    Number of children: N/A    Years of education: N/A     Occupational History    Not on file. Social History Main Topics    Smoking status: Never Smoker    Smokeless tobacco: Never Used    Alcohol use Yes      Comment: rarely    Drug use: No    Sexual activity: Yes     Partners: Female     Birth control/ protection: Condom     Other Topics Concern    Not on file     Social History Narrative         ALLERGIES: Review of patient's allergies indicates no known allergies. Review of Systems   Constitutional: Negative.   Negative for chills, diaphoresis, fatigue and fever. HENT: Negative. Negative for congestion, ear pain, rhinorrhea and sore throat. Eyes: Negative. Negative for pain, redness and visual disturbance. Respiratory: Negative. Negative for cough, shortness of breath, wheezing and stridor. Cardiovascular: Negative. Negative for chest pain, palpitations and leg swelling. Gastrointestinal: Positive for abdominal pain and constipation. Negative for diarrhea, nausea and vomiting. Endocrine: Negative. Genitourinary: Positive for testicular pain. Negative for dysuria, flank pain, frequency and hematuria. Musculoskeletal: Negative. Negative for back pain, myalgias, neck pain and neck stiffness. Skin: Negative. Negative for rash and wound. Allergic/Immunologic: Negative. Neurological: Negative. Negative for dizziness, seizures, syncope, weakness, light-headedness, numbness and headaches. Hematological: Negative. Psychiatric/Behavioral: Negative. All other systems reviewed and are negative. Vitals:    06/07/17 1653   BP: 124/87   Pulse: (!) 103   Resp: 16   Temp: 98.5 °F (36.9 °C)   SpO2: 98%   Weight: 117.9 kg (260 lb)   Height: 5' 10\" (1.778 m)            Physical Exam   Constitutional: He is oriented to person, place, and time. He appears well-developed and well-nourished. He appears distressed. HENT:   Head: Normocephalic. Neck: Normal range of motion. Neck supple. Cardiovascular: Regular rhythm and normal heart sounds. Slightly tachycardiac    Pulmonary/Chest: Effort normal and breath sounds normal. No stridor. No respiratory distress. He has no wheezes. He has no rales. Abdominal: Soft. Bowel sounds are normal. He exhibits no distension and no mass. There is tenderness. There is guarding. There is no rebound. Diffuse lower abdominal TTP, with mild guarding noted   Musculoskeletal: Normal range of motion. Neurological: He is alert and oriented to person, place, and time.    Skin: Skin is warm and dry. No rash noted. He is not diaphoretic. No erythema. Psychiatric: He has a normal mood and affect. His behavior is normal. Thought content normal.   Nursing note and vitals reviewed. MDM  Number of Diagnoses or Management Options  Post-operative pain:   Diagnosis management comments: Impression: post op abd pain    IV inserted 2 L saline given 2 mg morphine   POC lactic 3.3   glucose otherwise negative     RBC 4.33, hgb 12.5, glucose 224, BUN 31, Cr 2.21, GFRAA 40, Na 133, GFRNA 33, GPT 14, TP 8.5, GLOB 4.5    CT abd Normal postoperative appearance of recent left and right inguinal hernia  repairs. No postoperative ileus. Normal appendix. No acute abnormality otherwise  throughout the abdomen or pelvis. US testicles Normal appearance of left and right testicles. Small left and right epididymal  head cysts. No acute or focal abnormality to explain patient's testicular pain. No postoperative complication. Progress: pt still having pain, 4 mg morphine given     Progress: repeat lactic 0.9, POC chem 8: BUN 28, Cr 1.7, GFRAA 54, GFRNA 44, hct 35, hgb 11.9    Discussed these results with the pt. Patient is stable for discharge at this time. Pt states his percocet makes him feel bad an requests norco. Rx for short course norco given. Explained to the pt that narcotics will worsen his constipation. Pt encouraged to start his daily stool softeners and miralax again. Rest and follow-up with general surgery this week. Return to the ED immediately for any new or worsening sx.   Lydia Benavidez PA-C 8:14 PM        Amount and/or Complexity of Data Reviewed  Clinical lab tests: ordered and reviewed  Tests in the radiology section of CPT®: ordered and reviewed    Risk of Complications, Morbidity, and/or Mortality  Presenting problems: moderate  Diagnostic procedures: moderate  Management options: moderate    Patient Progress  Patient progress: stable    ED Course       Procedures

## 2017-06-07 NOTE — ED TRIAGE NOTES
Pt c/o left groin pain. Reports surgery one week ago at [facility].  Dr Shobha Gonzales

## 2017-06-07 NOTE — Clinical Note
Complete all medications as prescribed. Follow-up with general surgery this week. Return to the ED immediately for any new or worsening symptoms.

## 2017-06-08 ENCOUNTER — TELEPHONE (OUTPATIENT)
Dept: FAMILY MEDICINE CLINIC | Age: 42
End: 2017-06-08

## 2017-06-08 NOTE — TELEPHONE ENCOUNTER
Pt is requesting his notes be viewed from surgery and callback with results. Pt showed some concerns for L kidney .

## 2017-06-08 NOTE — DISCHARGE INSTRUCTIONS
Acute Pain After Surgery: Care Instructions  Your Care Instructions  It's common to have some pain after surgery. Pain doesn't mean that something is wrong or that the surgery didn't go well. But when the pain is severe, it's important to work with your doctor to manage it. It's also important to be aware of a few facts about pain and pain medicine. · You are the only person who knows what your pain feels like. So be sure to tell your doctor when you are in pain or when the pain changes. Then he or she will know how to adjust your medicines. · Pain is often easier to control right after it starts. So it may be better to take regular doses of pain medicine and not wait until the pain gets bad. · Medicine can help control pain. But this doesn't mean you'll have no pain. Medicine works to keep the pain at a level you can live with. With time, you will feel better. Follow-up care is a key part of your treatment and safety. Be sure to make and go to all appointments, and call your doctor if you are having problems. It's also a good idea to know your test results and keep a list of the medicines you take. How can you care for yourself at home? · Be safe with medicines. Read and follow all instructions on the label. ¨ If the doctor gave you a prescription medicine for pain, take it as prescribed. ¨ If you are not taking a prescription pain medicine, ask your doctor if you can take an over-the-counter medicine. · If you take an over-the-counter pain medicine, such as acetaminophen (Tylenol), ibuprofen (Advil, Motrin), or naproxen (Aleve), read and follow all instructions on the label. · Do not take two or more pain medicines at the same time unless the doctor told you to. · Do not drink alcohol while you are taking pain medicines. · Try to walk each day if your doctor recommends it. Start by walking a little more than you did the day before. Bit by bit, increase the amount you walk.  Walking increases blood flow. It also helps prevent pneumonia and constipation. · To prevent constipation from opioid pain medicines:  ¨ Talk to your doctor about a laxative. ¨ Include fruits, vegetables, beans, and whole grains in your diet each day. These foods are high in fiber. ¨ Drink plenty of fluids, enough so that your urine is light yellow or clear like water. Drink water, fruit juice, or other drinks that do not contain caffeine or alcohol. If you have kidney, heart, or liver disease and have to limit fluids, talk with your doctor before you increase the amount of fluids you drink. ¨ Take a fiber supplement, such as Citrucel or Metamucil, every day if needed. Read and follow all instructions on the label. If you take pain medicine for more than a few days, talk to your doctor before you take fiber. When should you call for help? Call your doctor now or seek immediate medical care if:  · Your pain gets worse. · Your pain is not controlled by medicine. Watch closely for changes in your health, and be sure to contact your doctor if you have any problems. Where can you learn more? Go to http://jacklyn-denice.info/. Enter [phone] in the search box to learn more about \"Acute Pain After Surgery: Care Instructions. \"  Current as of: November 15, 2016  Content Version: 11.2  © 5754-9118 kidthing. Care instructions adapted under license by [employer] (which disclaims liability or warranty for this information). If you have questions about a medical condition or this instruction, always ask your healthcare professional. [place] [address] any warranty or liability for your use of this information. SMATOOS Activation    Thank you for requesting access to SMATOOS. Please follow the instructions below to securely access and download your online medical record.  SMATOOS allows you to send messages to your doctor, view your test results, renew your prescriptions, schedule appointments, and more. How Do I Sign Up? 1. In your internet browser, go to [email or web address]  2. Click on the First Time User? Click Here link in the Sign In box. You will be redirect to the New Member Sign Up page. 3. Enter your Yuppics Access Code exactly as it appears below. You will not need to use this code after youve completed the sign-up process. If you do not sign up before the expiration date, you must request a new code. Yuppics Access Code: [email or web address]  Expires: 2017  9:05 PM (This is the date your Yuppics access code will )    4. Enter the last four digits of your Social Security Number (xxxx) and Date of Birth (mm/dd/yyyy) as indicated and click Submit. You will be taken to the next sign-up page. 5. Create a Yuppics ID. This will be your Yuppics login ID and cannot be changed, so think of one that is secure and easy to remember. 6. Create a Yuppics password. You can change your password at any time. 7. Enter your Password Reset Question and Answer. This can be used at a later time if you forget your password. 8. Enter your e-mail address. You will receive e-mail notification when new information is available in [address] 9. Click Sign Up. You can now view and download portions of your medical record. 10. Click the Download Summary menu link to download a portable copy of your medical information. Additional Information    If you have questions, please visit the Frequently Asked Questions section of the Yuppics website at [email or web address] Remember, Yuppics is NOT to be used for urgent needs. For medical emergencies, dial 911.

## 2017-06-09 ENCOUNTER — OFFICE VISIT (OUTPATIENT)
Dept: FAMILY MEDICINE CLINIC | Age: 42
End: 2017-06-09

## 2017-06-09 ENCOUNTER — HOSPITAL ENCOUNTER (OUTPATIENT)
Dept: LAB | Age: 42
Discharge: HOME | End: 2017-06-09
Payer: MEDICAID

## 2017-06-09 VITALS
HEIGHT: 70 IN | WEIGHT: 220 LBS | RESPIRATION RATE: 16 BRPM | BODY MASS INDEX: 31.5 KG/M2 | HEART RATE: 112 BPM | SYSTOLIC BLOOD PRESSURE: 121 MMHG | DIASTOLIC BLOOD PRESSURE: 81 MMHG | OXYGEN SATURATION: 95 % | TEMPERATURE: 98.1 F

## 2017-06-09 DIAGNOSIS — E11.9 TYPE 2 DIABETES MELLITUS WITHOUT COMPLICATION, WITHOUT LONG-TERM CURRENT USE OF INSULIN (HCC): ICD-10-CM

## 2017-06-09 DIAGNOSIS — N28.9 KIDNEY FUNCTION ABNORMAL: ICD-10-CM

## 2017-06-09 DIAGNOSIS — F41.9 ANXIETY: Primary | ICD-10-CM

## 2017-06-09 LAB
ANION GAP BLD CALC-SCNC: 9 MMOL/L (ref 3–18)
BUN SERPL-MCNC: 18 MG/DL (ref 7–18)
BUN/CREAT SERPL: 9 (ref 12–20)
CALCIUM SERPL-MCNC: 9.6 MG/DL (ref 8.5–10.1)
CHLORIDE SERPL-SCNC: 104 MMOL/L (ref 100–108)
CO2 SERPL-SCNC: 25 MMOL/L (ref 21–32)
CREAT SERPL-MCNC: 2.11 MG/DL (ref 0.6–1.3)
EST. AVERAGE GLUCOSE BLD GHB EST-MCNC: 148 MG/DL
GLUCOSE SERPL-MCNC: 228 MG/DL (ref 74–99)
HBA1C MFR BLD: 6.8 % (ref 4.2–5.6)
POTASSIUM SERPL-SCNC: 5 MMOL/L (ref 3.5–5.5)
SODIUM SERPL-SCNC: 138 MMOL/L (ref 136–145)

## 2017-06-09 PROCEDURE — 80048 BASIC METABOLIC PNL TOTAL CA: CPT | Performed by: NURSE PRACTITIONER

## 2017-06-09 PROCEDURE — 83036 HEMOGLOBIN GLYCOSYLATED A1C: CPT | Performed by: NURSE PRACTITIONER

## 2017-06-09 RX ORDER — CLONAZEPAM 1 MG/1
1 TABLET ORAL
Qty: 10 TAB | Refills: 0 | Status: SHIPPED | OUTPATIENT
Start: 2017-06-09 | End: 2017-06-16 | Stop reason: CLARIF

## 2017-06-09 NOTE — MR AVS SNAPSHOT
Visit Information Date & Time Provider Department Dept. Phone Encounter #  
 6/9/2017  4:00 PM Jake Blood, NP [facility] [phone] [id number] Your Appointments 6/13/2017 12:45 PM  
Office Visit with Marilu Ford MD  
[facility] [place] [facility] Appt Note: follow up [address] [place] [address]  
[phone]  
  
   
 [address] [id number] 6/14/2017 11:30 AM  
POST OP with Elisha Correa MD  
[address] [facility]) Appt Note: Post Op Check [address] [place] [address]  
  
   
 [address] [facility] [address] [facility] Upcoming Health Maintenance Date Due  
 EYE EXAM RETINAL OR DILATED Q1 12/28/1984 Pneumococcal 19-64 Medium Risk (1 of 1 - PPSV23) 12/28/1993 DTaP/Tdap/Td series (1 - Tdap) 12/28/1995 MICROALBUMIN Q1 7/7/2016 LIPID PANEL Q1 7/7/2016 FOOT EXAM Q1 4/27/2017 INFLUENZA AGE 9 TO ADULT 8/1/2017 HEMOGLOBIN A1C Q6M 9/3/2017 Allergies as of 6/9/2017  Review Complete On: 6/9/2017 By: Jesús Bah No Known Allergies Current Immunizations  Reviewed on 9/2/2015 Name Date Influenza Vaccine (Quad) PF 9/2/2015 Influenza Vaccine Whole 12/1/2009 Not reviewed this visit You Were Diagnosed With   
  
 Codes Comments Anxiety    -  Primary ICD-10-CM: F41.9 ICD-9-CM: 300.00 Vitals BP Pulse Temp Resp Height(growth percentile) Weight(growth percentile) 121/81 (BP 1 Location: Left arm, BP Patient Position: Sitting) (!) 112 98.1 °F (36.7 °C) (Oral) 16 5' 10\" (1.778 m) 220 lb (99.8 kg) SpO2 BMI Smoking Status 95% 31.57 kg/m2 Never Smoker Vitals History BMI and BSA Data Body Mass Index Body Surface Area  
 31.57 kg/m 2 2.22 m 2 Preferred Pharmacy Pharmacy Name Phone [place] [address] [address] [phone] Your Updated Medication List  
  
   
This list is accurate as of: 6/9/17  4:05 PM.  Always use your most recent med list.  
  
  
  
  
 Blood-Glucose Meter monitoring kit Commonly known as:  Nahum Gonzales Use as directed  
  
 carvedilol 3.125 mg tablet Commonly known as:  COREG  
3.125 mg.  
  
 clonazePAM 1 mg tablet Commonly known as:  Wendelyn Mech Take 1 Tab by mouth daily as needed for up to 10 days. HYDROcodone-acetaminophen 5-325 mg per tablet Commonly known as:  Omi Dus Take 1 Tab by mouth every four (4) hours as needed for Pain. Max Daily Amount: 6 Tabs. * ibuprofen 800 mg tablet Commonly known as:  MOTRIN  
TK 1 T PO Q 6 H PRF PAIN  
  
 * ibuprofen 600 mg tablet Commonly known as:  MOTRIN Take 1 Tab by mouth every six (6) hours as needed for Pain. JANUVIA 50 mg tablet Generic drug:  SITagliptin TAKE 1 TABLET BY MOUTH DAILY Lancets Misc Commonly known as:  ONETOUCH ULTRASOFT LANCETS Check blood sugar daily  
  
 lisinopril-hydroCHLOROthiazide 20-25 mg per tablet Commonly known as:  Daisy Gauze Take 1 Tab by mouth daily. metFORMIN 1,000 mg tablet Commonly known as:  GLUCOPHAGE Take 500 mg by mouth two (2) times a day.  
  
 naloxone 4 mg/actuation Spry 1 Spray by Nasal route as needed (overdose). Use as directed ONETOUCH ULTRA TEST strip Generic drug:  glucose blood VI test strips CHECK BLOOD SUGAR DAILY QUEtiapine 400 mg tablet Commonly known as:  SEROquel TK 1 T PO HS  
  
 ZOLOFT 100 mg tablet Generic drug:  sertraline Take 100 mg by mouth daily. * Notice: This list has 2 medication(s) that are the same as other medications prescribed for you. Read the directions carefully, and ask your doctor or other care provider to review them with you. Prescriptions Printed Refills clonazePAM (KLONOPIN) 1 mg tablet 0 Sig: Take 1 Tab by mouth daily as needed for up to 10 days. Class: Print Route: Oral  
  
Patient Instructions Anxiety Disorder: Care Instructions Your Care Instructions Anxiety is a normal reaction to stress. Difficult situations can cause you to have symptoms such as sweaty palms and a nervous feeling. In an anxiety disorder, the symptoms are far more severe. Constant worry, muscle tension, trouble sleeping, nausea and diarrhea, and other symptoms can make normal daily activities difficult or impossible. These symptoms may occur for no reason, and they can affect your work, school, or social life. Medicines, counseling, and self-care can all help. Follow-up care is a key part of your treatment and safety. Be sure to make and go to all appointments, and call your doctor if you are having problems. It's also a good idea to know your test results and keep a list of the medicines you take. How can you care for yourself at home? · Take medicines exactly as directed. Call your doctor if you think you are having a problem with your medicine. · Go to your counseling sessions and follow-up appointments. · Recognize and accept your anxiety. Then, when you are in a situation that makes you anxious, say to yourself, \"This is not an emergency. I feel uncomfortable, but I am not in danger. I can keep going even if I feel anxious. \" · Be kind to your body: ¨ Relieve tension with exercise or a massage. ¨ Get enough rest. 
¨ Avoid alcohol, caffeine, nicotine, and illegal drugs. They can increase your anxiety level and cause sleep problems. ¨ Learn and do relaxation techniques. See below for more about these techniques. · Engage your mind. Get out and do something you enjoy. Go to a funny movie, or take a walk or hike. Plan your day. Having too much or too little to do can make you anxious. · Keep a record of your symptoms.  Discuss your fears with a good friend or family member, or join a support group for people with similar problems. Talking to others sometimes relieves stress. · Get involved in social groups, or volunteer to help others. Being alone sometimes makes things seem worse than they are. · Get at least 30 minutes of exercise on most days of the week to relieve stress. Walking is a good choice. You also may want to do other activities, such as running, swimming, cycling, or playing tennis or team sports. Relaxation techniques Do relaxation exercises 10 to 20 minutes a day. You can play soothing, relaxing music while you do them, if you wish. · Tell others in your house that you are going to do your relaxation exercises. Ask them not to disturb you. · Find a comfortable place, away from all distractions and noise. · Lie down on your back, or sit with your back straight. · Focus on your breathing. Make it slow and steady. · Breathe in through your nose. Breathe out through either your nose or mouth. · Breathe deeply, filling up the area between your navel and your rib cage. Breathe so that your belly goes up and down. · Do not hold your breath. · Breathe like this for 5 to 10 minutes. Notice the feeling of calmness throughout your whole body. As you continue to breathe slowly and deeply, relax by doing the following for another 5 to 10 minutes: · Tighten and relax each muscle group in your body. You can begin at your toes and work your way up to your head. · Imagine your muscle groups relaxing and becoming heavy. · Empty your mind of all thoughts. · Let yourself relax more and more deeply. · Become aware of the state of calmness that surrounds you. · When your relaxation time is over, you can bring yourself back to alertness by moving your fingers and toes and then your hands and feet and then stretching and moving your entire body. Sometimes people fall asleep during relaxation, but they usually wake up shortly afterward. · Always give yourself time to return to full alertness before you drive a car or do anything that might cause an accident if you are not fully alert. Never play a relaxation tape while you drive a car. When should you call for help? Call 911 anytime you think you may need emergency care. For example, call if: 
· You feel you cannot stop from hurting yourself or someone else. Keep the numbers for these national suicide hotlines: 9-505-335-TALK (7-606.575.4554) and 1-702-XNFXBFG (2-361.425.1196). If you or someone you know talks about suicide or feeling hopeless, get help right away. Watch closely for changes in your health, and be sure to contact your doctor if: 
· You have anxiety or fear that affects your life. · You have symptoms of anxiety that are new or different from those you had before. Where can you learn more? Go to http://jacklynNeuMedicsdenice.info/. Enter P754 in the search box to learn more about \"Anxiety Disorder: Care Instructions. \" Current as of: July 26, 2016 Content Version: 11.2 © 1176-0221 [employer]. Care instructions adapted under license by [employer] (which disclaims liability or warranty for this information). If you have questions about a medical condition or this instruction, always ask your healthcare professional. [address] any warranty or liability for your use of this information. Introducing hospitals & HEALTH SERVICES! [employer] introduces [employer] patient portal. Now you can access parts of your medical record, email your doctor's office, and request medication refills online. 1. In your internet browser, go to [email or web address] 2. Click on the First Time User? Click Here link in the Sign In box. You will see the New Member Sign Up page. 3. Enter your Am[employer] Gaming Access Code exactly as it appears below. You will not need to use this code after youve completed the sign-up process.  If you do not sign up before the expiration date, you must request a new code. · Pongo Resume Access Code: [email or web address] Expires: 8/9/2017  9:05 PM 
 
4. Enter the last four digits of your Social Security Number (xxxx) and Date of Birth (mm/dd/yyyy) as indicated and click Submit. You will be taken to the next sign-up page. 5. Create a Pongo Resume ID. This will be your Pongo Resume login ID and cannot be changed, so think of one that is secure and easy to remember. 6. Create a Pongo Resume password. You can change your password at any time. 7. Enter your Password Reset Question and Answer. This can be used at a later time if you forget your password. 8. Enter your e-mail address. You will receive e-mail notification when new information is available in [address] 9. Click Sign Up. You can now view and download portions of your medical record. 10. Click the Download Summary menu link to download a portable copy of your medical information. If you have questions, please visit the Frequently Asked Questions section of the Pongo Resume website. Remember, Pongo Resume is NOT to be used for urgent needs. For medical emergencies, dial 911. Now available from your iPhone and Android! Please provide this summary of care documentation to your next provider. Your primary care clinician is listed as Sylvia Ibrahim. If you have any questions after today's visit, please call [phone].

## 2017-06-09 NOTE — PROGRESS NOTES
Pt presented today with stress due to family issues . Has pt had any falls since last visit? no.  Pt preferred language for health care discussion is english. Advanced Directive? no    Is someone accompanying this pt? no    Is the patient using any DME equipment during OV? no      1. Have you been to the ER, urgent care clinic since your last visit? Yes [facility] for hernia pain 6/2/17   Hospitalized since your last visit? No    2. Have you seen or consulted any other health care providers outside of the [address] [place] [address] since your last visit? Include any pap smears or colon screening. No      Patient  has a reminder for a \"due or due soon\" health maintenance. I have asked that he contact his primary care provider for follow-up on this health maintenance.

## 2017-06-09 NOTE — PATIENT INSTRUCTIONS

## 2017-06-09 NOTE — PROGRESS NOTES
HISTORY OF PRESENT ILLNESS  James Cabello is a 42 y.o. male. Anxiety   The history is provided by the patient. This is a chronic problem. The problem occurs constantly. The problem has not changed since onset. Associated symptoms include abdominal pain. Pertinent negatives include no chest pain, no headaches and no shortness of breath. Nothing aggravates the symptoms. The symptoms are relieved by medications. Treatments tried: klonopin. The treatment provided moderate relief. Review of Systems   Constitutional: Negative for chills, fever and malaise/fatigue. HENT: Negative for congestion and sore throat. Eyes: Negative for blurred vision and double vision. Respiratory: Negative for cough, sputum production and shortness of breath. Cardiovascular: Negative for chest pain and palpitations. Gastrointestinal: Positive for abdominal pain and constipation. Negative for blood in stool, diarrhea, heartburn, melena, nausea and vomiting. Genitourinary: Negative for dysuria, frequency and urgency. Musculoskeletal: Negative for back pain, myalgias and neck pain. Skin: Negative for rash. Neurological: Negative for dizziness, tingling and headaches. Endo/Heme/Allergies: Negative for environmental allergies and polydipsia. Psychiatric/Behavioral: Negative for depression, memory loss, substance abuse and suicidal ideas. The patient is nervous/anxious. The patient does not have insomnia. Physical Exam   Constitutional: He is oriented to person, place, and time. He appears well-developed and well-nourished. No distress. HENT:   Head: Normocephalic and atraumatic. Eyes: Pupils are equal, round, and reactive to light. Neck: Neck supple. Cardiovascular: Regular rhythm. Pulmonary/Chest: Breath sounds normal.   Abdominal: Soft. He exhibits no distension. There is no tenderness. Neurological: He is alert and oriented to person, place, and time. No cranial nerve deficit.    Skin: Skin is warm and dry. He is not diaphoretic. Psychiatric: He has a normal mood and affect. His speech is normal and behavior is normal. Judgment and thought content normal. His mood appears not anxious. Cognition and memory are normal. He does not exhibit a depressed mood. Nursing note and vitals reviewed. ASSESSMENT and PLAN    ICD-10-CM ICD-9-CM    1. Anxiety F41.9 300.00 clonazePAM (KLONOPIN) 1 mg tablet   2. Kidney function abnormal N28.9 593.9 REFERRAL TO NEPHROLOGY      METABOLIC PANEL, BASIC   3. Type 2 diabetes mellitus without complication, without long-term current use of insulin ([facility]) E11.9 250.00 HEMOGLOBIN A1C WITH EAG   1- patient is strictly advised to schedule an appointment with psychiatry ASAP. No more refills on [facility] medications will be provided. 2- advised to reduce the dose of metformin to 500 mg BID until the results of BMP are back. Follow up with nephrologist regarding declining kidney function. Teaching done regarding harms of poly pharmacy and frequent physician changes. Patient verbalized the understanding.

## 2017-06-13 ENCOUNTER — TELEPHONE (OUTPATIENT)
Dept: INTERNAL MEDICINE CLINIC | Age: 42
End: 2017-06-13

## 2017-06-16 ENCOUNTER — TELEPHONE (OUTPATIENT)
Dept: INTERNAL MEDICINE CLINIC | Age: 42
End: 2017-06-16

## 2017-06-16 ENCOUNTER — HOSPITAL ENCOUNTER (EMERGENCY)
Age: 42
Discharge: HOME | End: 2017-06-16
Attending: EMERGENCY MEDICINE
Payer: SUBSIDIZED

## 2017-06-16 VITALS
SYSTOLIC BLOOD PRESSURE: 112 MMHG | HEART RATE: 82 BPM | HEIGHT: 70 IN | TEMPERATURE: 99 F | RESPIRATION RATE: 18 BRPM | WEIGHT: 220 LBS | OXYGEN SATURATION: 100 % | BODY MASS INDEX: 31.5 KG/M2 | DIASTOLIC BLOOD PRESSURE: 72 MMHG

## 2017-06-16 DIAGNOSIS — R42 ORTHOSTATIC DIZZINESS: ICD-10-CM

## 2017-06-16 DIAGNOSIS — R10.9 LEFT FLANK PAIN: Primary | ICD-10-CM

## 2017-06-16 DIAGNOSIS — N28.9 RENAL INSUFFICIENCY: ICD-10-CM

## 2017-06-16 LAB
ALBUMIN SERPL BCP-MCNC: 4 G/DL (ref 3.4–5)
ALBUMIN/GLOB SERPL: 0.8 {RATIO} (ref 0.8–1.7)
ALP SERPL-CCNC: 69 U/L (ref 45–117)
ALT SERPL-CCNC: 15 U/L (ref 16–61)
ANION GAP BLD CALC-SCNC: 11 MMOL/L (ref 3–18)
APPEARANCE UR: CLEAR
AST SERPL W P-5'-P-CCNC: 11 U/L (ref 15–37)
BACTERIA URNS QL MICRO: ABNORMAL /HPF
BASOPHILS # BLD AUTO: 0 K/UL (ref 0–0.06)
BASOPHILS # BLD: 1 % (ref 0–2)
BILIRUB SERPL-MCNC: 0.2 MG/DL (ref 0.2–1)
BILIRUB UR QL: NEGATIVE
BUN SERPL-MCNC: 24 MG/DL (ref 7–18)
BUN/CREAT SERPL: 10 (ref 12–20)
CALCIUM SERPL-MCNC: 9.6 MG/DL (ref 8.5–10.1)
CHLORIDE SERPL-SCNC: 102 MMOL/L (ref 100–108)
CK SERPL-CCNC: 44 U/L (ref 39–308)
CO2 SERPL-SCNC: 23 MMOL/L (ref 21–32)
COLOR UR: YELLOW
CREAT SERPL-MCNC: 2.32 MG/DL (ref 0.6–1.3)
DIFFERENTIAL METHOD BLD: ABNORMAL
EOSINOPHIL # BLD: 0.1 K/UL (ref 0–0.4)
EOSINOPHIL NFR BLD: 2 % (ref 0–5)
EPITH CASTS URNS QL MICRO: ABNORMAL /LPF (ref 0–5)
ERYTHROCYTE [DISTWIDTH] IN BLOOD BY AUTOMATED COUNT: 13.8 % (ref 11.6–14.5)
GLOBULIN SER CALC-MCNC: 4.9 G/DL (ref 2–4)
GLUCOSE SERPL-MCNC: 121 MG/DL (ref 74–99)
GLUCOSE UR STRIP.AUTO-MCNC: 250 MG/DL
HCT VFR BLD AUTO: 35.5 % (ref 36–48)
HGB BLD-MCNC: 12.2 G/DL (ref 13–16)
HGB UR QL STRIP: NEGATIVE
KETONES UR QL STRIP.AUTO: NEGATIVE MG/DL
LEUKOCYTE ESTERASE UR QL STRIP.AUTO: NEGATIVE
LYMPHOCYTES # BLD AUTO: 19 % (ref 21–52)
LYMPHOCYTES # BLD: 1.4 K/UL (ref 0.9–3.6)
MCH RBC QN AUTO: 28.7 PG (ref 24–34)
MCHC RBC AUTO-ENTMCNC: 34.4 G/DL (ref 31–37)
MCV RBC AUTO: 83.5 FL (ref 74–97)
MONOCYTES # BLD: 0.4 K/UL (ref 0.05–1.2)
MONOCYTES NFR BLD AUTO: 6 % (ref 3–10)
NEUTS SEG # BLD: 5.4 K/UL (ref 1.8–8)
NEUTS SEG NFR BLD AUTO: 72 % (ref 40–73)
NITRITE UR QL STRIP.AUTO: NEGATIVE
PH UR STRIP: 5.5 [PH] (ref 5–8)
PLATELET # BLD AUTO: 247 K/UL (ref 135–420)
PMV BLD AUTO: 9 FL (ref 9.2–11.8)
POTASSIUM SERPL-SCNC: 4 MMOL/L (ref 3.5–5.5)
PROT SERPL-MCNC: 8.9 G/DL (ref 6.4–8.2)
PROT UR STRIP-MCNC: ABNORMAL MG/DL
RBC # BLD AUTO: 4.25 M/UL (ref 4.7–5.5)
RBC #/AREA URNS HPF: ABNORMAL /HPF (ref 0–5)
SODIUM SERPL-SCNC: 136 MMOL/L (ref 136–145)
SP GR UR REFRACTOMETRY: 1.01 (ref 1–1.03)
TROPONIN I SERPL-MCNC: <0.02 NG/ML (ref 0–0.04)
UROBILINOGEN UR QL STRIP.AUTO: 0.2 EU/DL (ref 0.2–1)
WBC # BLD AUTO: 7.4 K/UL (ref 4.6–13.2)
WBC URNS QL MICRO: ABNORMAL /HPF (ref 0–4)

## 2017-06-16 PROCEDURE — 80053 COMPREHEN METABOLIC PANEL: CPT | Performed by: EMERGENCY MEDICINE

## 2017-06-16 PROCEDURE — 96374 THER/PROPH/DIAG INJ IV PUSH: CPT

## 2017-06-16 PROCEDURE — 99284 EMERGENCY DEPT VISIT MOD MDM: CPT

## 2017-06-16 PROCEDURE — 74011250637 HC RX REV CODE- 250/637: Performed by: NURSE PRACTITIONER

## 2017-06-16 PROCEDURE — 74011250636 HC RX REV CODE- 250/636: Performed by: EMERGENCY MEDICINE

## 2017-06-16 PROCEDURE — 74011250636 HC RX REV CODE- 250/636: Performed by: NURSE PRACTITIONER

## 2017-06-16 PROCEDURE — 82550 ASSAY OF CK (CPK): CPT | Performed by: EMERGENCY MEDICINE

## 2017-06-16 PROCEDURE — 96361 HYDRATE IV INFUSION ADD-ON: CPT

## 2017-06-16 PROCEDURE — 84484 ASSAY OF TROPONIN QUANT: CPT | Performed by: NURSE PRACTITIONER

## 2017-06-16 PROCEDURE — 81001 URINALYSIS AUTO W/SCOPE: CPT | Performed by: EMERGENCY MEDICINE

## 2017-06-16 PROCEDURE — 85025 COMPLETE CBC W/AUTO DIFF WBC: CPT | Performed by: EMERGENCY MEDICINE

## 2017-06-16 PROCEDURE — 96375 TX/PRO/DX INJ NEW DRUG ADDON: CPT

## 2017-06-16 PROCEDURE — 93005 ELECTROCARDIOGRAM TRACING: CPT

## 2017-06-16 PROCEDURE — 96376 TX/PRO/DX INJ SAME DRUG ADON: CPT

## 2017-06-16 RX ORDER — MORPHINE SULFATE 4 MG/ML
4 INJECTION, SOLUTION INTRAMUSCULAR; INTRAVENOUS
Status: COMPLETED | OUTPATIENT
Start: 2017-06-16 | End: 2017-06-16

## 2017-06-16 RX ORDER — OXYCODONE AND ACETAMINOPHEN 5; 325 MG/1; MG/1
1 TABLET ORAL
Status: COMPLETED | OUTPATIENT
Start: 2017-06-16 | End: 2017-06-16

## 2017-06-16 RX ORDER — OXYCODONE AND ACETAMINOPHEN 5; 325 MG/1; MG/1
1 TABLET ORAL
Qty: 12 TAB | Refills: 0 | Status: SHIPPED | OUTPATIENT
Start: 2017-06-16 | End: 2017-07-03

## 2017-06-16 RX ORDER — MORPHINE SULFATE 2 MG/ML
4 INJECTION, SOLUTION INTRAMUSCULAR; INTRAVENOUS ONCE
Status: COMPLETED | OUTPATIENT
Start: 2017-06-16 | End: 2017-06-16

## 2017-06-16 RX ORDER — ONDANSETRON 4 MG/1
4 TABLET, ORALLY DISINTEGRATING ORAL
Qty: 10 TAB | Refills: 0 | Status: SHIPPED | OUTPATIENT
Start: 2017-06-16 | End: 2017-07-03

## 2017-06-16 RX ORDER — ONDANSETRON 2 MG/ML
4 INJECTION INTRAMUSCULAR; INTRAVENOUS
Status: COMPLETED | OUTPATIENT
Start: 2017-06-16 | End: 2017-06-16

## 2017-06-16 RX ADMIN — SODIUM CHLORIDE 1000 ML: 900 INJECTION, SOLUTION INTRAVENOUS at 17:00

## 2017-06-16 RX ADMIN — Medication 4 MG: at 14:34

## 2017-06-16 RX ADMIN — SODIUM CHLORIDE 1000 ML: 900 INJECTION, SOLUTION INTRAVENOUS at 14:31

## 2017-06-16 RX ADMIN — OXYCODONE HYDROCHLORIDE AND ACETAMINOPHEN 1 TABLET: 5; 325 TABLET ORAL at 18:14

## 2017-06-16 RX ADMIN — MORPHINE SULFATE 4 MG: 2 INJECTION, SOLUTION INTRAMUSCULAR; INTRAVENOUS at 13:04

## 2017-06-16 RX ADMIN — ONDANSETRON 4 MG: 2 INJECTION INTRAMUSCULAR; INTRAVENOUS at 14:34

## 2017-06-16 NOTE — ED NOTES
Purposeful rounding completed:    Side rails up x 2:  YES  Bed in low position and wheels locked: YES  Call bell within reach: YES  Comfort addressed: YES    Toileting needs addressed: YES  Plan of care reviewed/updated with patient and or family members: YES  IV site assessed: YES  Pain assessed and addressed: YES, 2

## 2017-06-16 NOTE — TELEPHONE ENCOUNTER
Incoming call from pt. Pt reports his kidneys are only functioning at 35% and when he tried to urinate recently only bubbles were seen. Pt states he also has a pain in his left side that he is concerned about. Pt informed that he can be seen by an NP in walk-in clinic for pain and also a referral has been entered for nephrology so he should be contacted regarding kidney function.

## 2017-06-16 NOTE — ED PROVIDER NOTES
HPI Comments:   1:38 PM   42 y.o. male presents to ED C/O flank pain, urine abnormality, dizziness. Patient has a HX of HTN, depression, chronic back pain, hernia repair. Patient reports he is being worked up up for renal impairment, he has an appointment with nephrology in 7 days. Patient was concerned because he has been having intermittent left flank pain for two days and has noted bubbles in his urine x 2 days. Patient reports decreased appetite, and nausea, denies fever, hematuria, abdominal pain. Patient reports occasionally he feels dizzy when he goes from a sitting to standing position quickly, reports dizziness last for approx 5 seconds and resolves, patient reports it he stands up slower he has no dizziness. Patient denies CP, SOB, fever, leg swelling, vision changes. Patient is a nonsmoker. Pt denies any other sxs or complaints. Written by Jeane MARQUEZ      The history is provided by the patient. History limited by: No language barrier. Past Medical History:   Diagnosis Date    Depression     Diabetes ([facility] [address]     Herniated lumbar intervertebral disc     Hypertension     Neurological disorder L3,4,5 torn Herniated disc       Past Surgical History:   Procedure Laterality Date    HX HERNIA REPAIR Bilateral 06/02/2017    robotic repair of bilateral indirect inguinal hernias with placement of mesh    HX ORTHOPAEDIC  trigger finger release         Family History:   Problem Relation Age of Onset    Hypertension Mother     Diabetes Mother     Heart Failure Mother     COPD Mother     Heart Disease Mother     Hypertension Father     Alcohol abuse Father        Social History     Social History    Marital status:      Spouse name: N/A    Number of children: N/A    Years of education: N/A     Occupational History    Not on file.      Social History Main Topics    Smoking status: Never Smoker    Smokeless tobacco: Never Used    Alcohol use Yes      Comment: rarely  Drug use: No    Sexual activity: Yes     Partners: Female     Birth control/ protection: Condom     Other Topics Concern    Not on file     Social History Narrative         ALLERGIES: Review of patient's allergies indicates no known allergies. Review of Systems   Constitutional: Positive for appetite change. Negative for activity change, chills, fatigue and fever. HENT: Negative for congestion, ear pain, rhinorrhea and sore throat. Eyes: Negative for pain, discharge, redness and itching. Respiratory: Negative for cough, chest tightness, shortness of breath and wheezing. Cardiovascular: Negative for chest pain and palpitations. Gastrointestinal: Positive for nausea. Negative for abdominal pain, blood in stool, constipation, diarrhea and vomiting. Endocrine: Negative for polyuria. Genitourinary: Positive for dysuria and flank pain. Negative for discharge, hematuria, penile pain and testicular pain. Musculoskeletal: Negative for back pain, joint swelling and neck pain. Skin: Negative for rash and wound. Allergic/Immunologic: Negative for immunocompromised state. Neurological: Negative for dizziness, weakness, light-headedness, numbness and headaches. Hematological: Negative for adenopathy. Psychiatric/Behavioral: Negative for agitation and confusion. The patient is not nervous/anxious. Vitals:    06/16/17 1236 06/16/17 1237 06/16/17 1622   BP: 119/83  112/72   Pulse: (!) 105  82   Resp: 18     Temp: 99 °F (37.2 °C)     SpO2: 97%  100%   Weight:  99.8 kg (220 lb)    Height: 5' 10\" (1.778 m)              Physical Exam   Constitutional: He is oriented to person, place, and time. He appears well-developed and well-nourished. No distress. HENT:   Head: Normocephalic and atraumatic.    Right Ear: External ear normal.   Left Ear: External ear normal.   Mouth/Throat: Oropharynx is clear and moist.   Eyes: Conjunctivae and EOM are normal. Pupils are equal, round, and reactive to light.   No nystagmus noted. Cardiovascular: Normal rate, regular rhythm and normal heart sounds. Pulmonary/Chest: Effort normal and breath sounds normal. No respiratory distress. He has no wheezes. He has no rales. Abdominal: Normal appearance and bowel sounds are normal. There is tenderness in the suprapubic area. There is rebound and CVA tenderness. There is no rigidity and no guarding. Musculoskeletal: Normal range of motion. Neurological: He is alert and oriented to person, place, and time. He exhibits normal muscle tone. Coordination normal.   Cranial nerves 2-12 check and intact. No ataxia of lower or upper extremities.  strength equal bilaterally. No facial droop, loss of sensation, speech changes. Strength of upper and lower extremities 5/5 bilaterally. Skin: Skin is warm and dry. No rash noted. He is not diaphoretic. No erythema. No pallor. Nursing note and vitals reviewed. MDM  Number of Diagnoses or Management Options  Left flank pain:   Orthostatic dizziness:   Renal insufficiency:   Diagnosis management comments: Differential Diagnosis: renal colic, pyelonephritis, aortic aneurysm, IBD, IBS, diverticulitis, constipation, myofascial strain/sprain. Vertigo, dysequilibrium (parkinson's, peripheral neuropathy), OM, anxiety, panic, depression, presyncope (orthostatic hypotension), medication adverse effect/withdrawal, arrhythmia, AMI, CVA, acoustic neuroma, brain tumor, MS, motion sickness, hypoglycemia, hypotension, hypoxemia, anemia    MDM  Plan:  CBC, BMP, UA, cardiac panel, EKG, bolus of NS. I will not repeat CT abd pelvis at this time, patient had one 9 days ago, renal cyst of left kidney noted, however nothing acute, will evaluate UA   Progress - creat is 2.32, BUN is 24 and GFRNA is 31 - slight worsening from most recent labs, nothing significant. UA - trace protein, no significant finding, Cardiac panel is WNL.   EKG - Normal sinus rhythm Normal ECG When compared with ECG of 02-MAY-2017 11:09, QRS axis shifted right   -orthostatics completed by staff - mild orthostatic, drop of 11 of DBP, however no significant increase in heart rate, will give additional bolus of saline and discharge. Patient does admit to decreased intact x 3 days, dizziness x 2 days. Low clinical concern for neurologic abnormality, dizziness if very short lived, can be avoided with standing slowly, no abnormal neuro findings. 5:45 PM No indication for emergent imagining, low clinical concern for new abnormal kidney finding, patient had CT scan 1 week ago, nothing acute noted, doubt kidney stone due to no blood in urine, at this time i do not believe another CT scan is indicated, labs essentially unchanged, appropriate for outpatient follow-up as planned. Patient educated to return to the ED for any new or worsening symptoms. Patient denies questions.         Amount and/or Complexity of Data Reviewed  Clinical lab tests: ordered and reviewed      ED Course       Procedures        RESULTS:    No orders to display       Labs Reviewed   URINALYSIS W/ RFLX MICROSCOPIC - Abnormal; Notable for the following:        Result Value    Protein TRACE (*)     Glucose 250 (*)     All other components within normal limits   METABOLIC PANEL, COMPREHENSIVE - Abnormal; Notable for the following:     Glucose 121 (*)     BUN 24 (*)     Creatinine 2.32 (*)     BUN/Creatinine ratio 10 (*)     GFR est AA 38 (*)     GFR est non-AA 31 (*)     ALT (SGPT) 15 (*)     AST (SGOT) 11 (*)     Protein, total 8.9 (*)     Globulin 4.9 (*)     All other components within normal limits   CBC WITH AUTOMATED DIFF - Abnormal; Notable for the following:     RBC 4.25 (*)     HGB 12.2 (*)     HCT 35.5 (*)     MPV 9.0 (*)     LYMPHOCYTES 19 (*)     All other components within normal limits   URINE MICROSCOPIC ONLY - Abnormal; Notable for the following:     Bacteria FEW (*)     All other components within normal limits   CK   TROPONIN I Recent Results (from the past 12 hour(s))   URINALYSIS W/ RFLX MICROSCOPIC    Collection Time: 06/16/17 12:46 PM   Result Value Ref Range    Color YELLOW      Appearance CLEAR      Specific gravity 1.013 1.005 - 1.030      pH (UA) 5.5 5.0 - 8.0      Protein TRACE (A) NEG mg/dL    Glucose 250 (A) NEG mg/dL    Ketone NEGATIVE  NEG mg/dL    Bilirubin NEGATIVE  NEG      Blood NEGATIVE  NEG      Urobilinogen 0.2 0.2 - 1.0 EU/dL    Nitrites NEGATIVE  NEG      Leukocyte Esterase NEGATIVE  NEG     URINE MICROSCOPIC ONLY    Collection Time: 06/16/17 12:46 PM   Result Value Ref Range    WBC 0 to 2 0 - 4 /hpf    RBC 0 to 1 0 - 5 /hpf    Epithelial cells FEW 0 - 5 /lpf    Bacteria FEW (A) NEG /hpf   METABOLIC PANEL, COMPREHENSIVE    Collection Time: 06/16/17  1:05 PM   Result Value Ref Range    Sodium 136 136 - 145 mmol/L    Potassium 4.0 3.5 - 5.5 mmol/L    Chloride 102 100 - 108 mmol/L    CO2 23 21 - 32 mmol/L    Anion gap 11 3.0 - 18 mmol/L    Glucose 121 (H) 74 - 99 mg/dL    BUN 24 (H) 7.0 - 18 MG/DL    Creatinine 2.32 (H) 0.6 - 1.3 MG/DL    BUN/Creatinine ratio 10 (L) 12 - 20      GFR est AA 38 (L) >60 ml/min/1.73m2    GFR est non-AA 31 (L) >60 ml/min/1.73m2    Calcium 9.6 8.5 - 10.1 MG/DL    Bilirubin, total 0.2 0.2 - 1.0 MG/DL    ALT (SGPT) 15 (L) 16 - 61 U/L    AST (SGOT) 11 (L) 15 - 37 U/L    Alk.  phosphatase 69 45 - 117 U/L    Protein, total 8.9 (H) 6.4 - 8.2 g/dL    Albumin 4.0 3.4 - 5.0 g/dL    Globulin 4.9 (H) 2.0 - 4.0 g/dL    A-G Ratio 0.8 0.8 - 1.7     CBC WITH AUTOMATED DIFF    Collection Time: 06/16/17  1:05 PM   Result Value Ref Range    WBC 7.4 4.6 - 13.2 K/uL    RBC 4.25 (L) 4.70 - 5.50 M/uL    HGB 12.2 (L) 13.0 - 16.0 g/dL    HCT 35.5 (L) 36.0 - 48.0 %    MCV 83.5 74.0 - 97.0 FL    MCH 28.7 24.0 - 34.0 PG    MCHC 34.4 31.0 - 37.0 g/dL    RDW 13.8 11.6 - 14.5 %    PLATELET 647 786 - 932 K/uL    MPV 9.0 (L) 9.2 - 11.8 FL    NEUTROPHILS 72 40 - 73 %    LYMPHOCYTES 19 (L) 21 - 52 %    MONOCYTES 6 3 - 10 %    EOSINOPHILS 2 0 - 5 %    BASOPHILS 1 0 - 2 %    ABS. NEUTROPHILS 5.4 1.8 - 8.0 K/UL    ABS. LYMPHOCYTES 1.4 0.9 - 3.6 K/UL    ABS. MONOCYTES 0.4 0.05 - 1.2 K/UL    ABS. EOSINOPHILS 0.1 0.0 - 0.4 K/UL    ABS. BASOPHILS 0.0 0.0 - 0.06 K/UL    DF AUTOMATED     CK    Collection Time: 06/16/17  1:05 PM   Result Value Ref Range    CK 44 39 - 308 U/L   EKG, 12 LEAD, INITIAL    Collection Time: 06/16/17  2:20 PM   Result Value Ref Range    Ventricular Rate 92 BPM    Atrial Rate 92 BPM    P-R Interval 138 ms    QRS Duration 104 ms    Q-T Interval 360 ms    QTC Calculation (Bezet) 445 ms    Calculated P Axis 39 degrees    Calculated R Axis 1 degrees    Calculated T Axis 31 degrees    Diagnosis       Normal sinus rhythm  Normal ECG  When compared with ECG of 02-MAY-2017 11:09,  QRS axis shifted right         PROGRESS NOTE:   1:38 PM   Initial assessment completed. Written by Raza MARQUEZ     DISCHARGE NOTE:  5:50 PM   Brissa Gaytan's  results have been reviewed with him. He has been counseled regarding his diagnosis, treatment, and plan. He verbally conveys understanding and agreement of the signs, symptoms, diagnosis, treatment and prognosis and additionally agrees to follow up as discussed. He also agrees with the care-plan and conveys that all of his questions have been answered. I have also provided discharge instructions for him that include: educational information regarding their diagnosis and treatment, and list of reasons why they would want to return to the ED prior to their follow-up appointment, should his condition change. CLINICAL IMPRESSION:    1. Left flank pain    2. Renal insufficiency    3. Orthostatic dizziness        AFTER VISIT PLAN:    Current Discharge Medication List      START taking these medications    Details   ondansetron (ZOFRAN ODT) 4 mg disintegrating tablet Take 1 Tab by mouth every eight (8) hours as needed for Nausea.   Qty: 10 Tab, Refills: 0 oxyCODONE-acetaminophen (PERCOCET) 5-325 mg per tablet Take 1 Tab by mouth every six (6) hours as needed for Pain. Max Daily Amount: 4 Tabs.   Qty: 12 Tab, Refills: 0              Follow-up Information     Follow up With Details Comments Contact Info    Renetta Garnett PA-C Schedule an appointment as soon as possible for a visit in 3 days Further evaluation [address] in 1 week Further evaluation [address] [facility], [address]           Written by Jaron KRAUSC

## 2017-06-16 NOTE — ED TRIAGE NOTES
Provider in triage: 42 y o male with known renal insufficiency has 24 hrs or bubbles in his urine, talked to nephrologist, [facility], told to come in. Also right flank pain.   Urine is yellow  Ordered: labs, ck, morph  DDx: nephrotic synd, renal fail  Sent to (MAIN treatment area, FAST track): main

## 2017-06-16 NOTE — DISCHARGE INSTRUCTIONS
Abdominal Pain: Care Instructions  Your Care Instructions    Abdominal pain has many possible causes. Some aren't serious and get better on their own in a few days. Others need more testing and treatment. If your pain continues or gets worse, you need to be rechecked and may need more tests to find out what is wrong. You may need surgery to correct the problem. Don't ignore new symptoms, such as fever, nausea and vomiting, urination problems, pain that gets worse, and dizziness. These may be signs of a more serious problem. Your doctor may have recommended a follow-up visit in the next 8 to 12 hours. If you are not getting better, you may need more tests or treatment. The doctor has checked you carefully, but problems can develop later. If you notice any problems or new symptoms, get medical treatment right away. Follow-up care is a key part of your treatment and safety. Be sure to make and go to all appointments, and call your doctor if you are having problems. It's also a good idea to know your test results and keep a list of the medicines you take. How can you care for yourself at home? · Rest until you feel better. · To prevent dehydration, drink plenty of fluids, enough so that your urine is light yellow or clear like water. Choose water and other caffeine-free clear liquids until you feel better. If you have kidney, heart, or liver disease and have to limit fluids, talk with your doctor before you increase the amount of fluids you drink. · If your stomach is upset, eat mild foods, such as rice, dry toast or crackers, bananas, and applesauce. Try eating several small meals instead of two or three large ones. · Wait until 48 hours after all symptoms have gone away before you have spicy foods, alcohol, and drinks that contain caffeine. · Do not eat foods that are high in fat. · Avoid anti-inflammatory medicines such as aspirin, ibuprofen (Advil, Motrin), and naproxen (Aleve).  These can cause stomach upset. Talk to your doctor if you take daily aspirin for another health problem. When should you call for help? Call 911 anytime you think you may need emergency care. For example, call if:  · You passed out (lost consciousness). · You pass maroon or very bloody stools. · You vomit blood or what looks like coffee grounds. · You have new, severe belly pain. Call your doctor now or seek immediate medical care if:  · Your pain gets worse, especially if it becomes focused in one area of your belly. · You have a new or higher fever. · Your stools are black and look like tar, or they have streaks of blood. · You have unexpected vaginal bleeding. · You have symptoms of a urinary tract infection. These may include:  ¨ Pain when you urinate. ¨ Urinating more often than usual.  ¨ Blood in your urine. · You are dizzy or lightheaded, or you feel like you may faint. Watch closely for changes in your health, and be sure to contact your doctor if:  · You are not getting better after 1 day (24 hours). Where can you learn more? Go to http://jacklynTape TVdenice.info/. Enter H425 in the search box to learn more about \"Abdominal Pain: Care Instructions. \"  Current as of: May 27, 2016  Content Version: 11.2  © 5472-6384 [employer]. Care instructions adapted under license by [employer] (which disclaims liability or warranty for this information). If you have questions about a medical condition or this instruction, always ask your healthcare professional. [place] [address] any warranty or liability for your use of this information. Dizziness: Care Instructions  Your Care Instructions  Dizziness is the feeling of unsteadiness or fuzziness in your head. It is different than having vertigo, which is a feeling that the room is spinning or that you are moving or falling.  It is also different from lightheadedness, which is the feeling that you are about to faint. It can be hard to know what causes dizziness. Some people feel dizzy when they have migraine headaches. Sometimes bouts of flu can make you feel dizzy. Some medical conditions, such as heart problems or high blood pressure, can make you feel dizzy. Many medicines can cause dizziness, including medicines for high blood pressure, pain, or anxiety. If a medicine causes your symptoms, your doctor may recommend that you stop or change the medicine. If it is a problem with your heart, you may need medicine to help your heart work better. If there is no clear reason for your symptoms, your doctor may suggest watching and waiting for a while to see if the dizziness goes away on its own. Follow-up care is a key part of your treatment and safety. Be sure to make and go to all appointments, and call your doctor if you are having problems. It's also a good idea to know your test results and keep a list of the medicines you take. How can you care for yourself at home? · If your doctor recommends or prescribes medicine, take it exactly as directed. Call your doctor if you think you are having a problem with your medicine. · Do not drive while you feel dizzy. · Try to prevent falls. Steps you can take include:  ¨ Using nonskid mats, adding grab bars near the tub, and using night-lights. ¨ Clearing your home so that walkways are free of anything you might trip on. ¨ Letting family and friends know that you have been feeling dizzy. This will help them know how to help you. When should you call for help? Call 911 anytime you think you may need emergency care. For example, call if:  · You passed out (lost consciousness). · You have dizziness along with symptoms of a heart attack. These may include:  ¨ Chest pain or pressure, or a strange feeling in the chest.  ¨ Sweating. ¨ Shortness of breath. ¨ Nausea or vomiting.   ¨ Pain, pressure, or a strange feeling in the back, neck, jaw, or upper belly or in one or both shoulders or arms. ¨ Lightheadedness or sudden weakness. ¨ A fast or irregular heartbeat. · You have symptoms of a stroke. These may include:  ¨ Sudden numbness, tingling, weakness, or loss of movement in your face, arm, or leg, especially on only one side of your body. ¨ Sudden vision changes. ¨ Sudden trouble speaking. ¨ Sudden confusion or trouble understanding simple statements. ¨ Sudden problems with walking or balance. ¨ A sudden, severe headache that is different from past headaches. Call your doctor now or seek immediate medical care if:  · You feel dizzy and have a fever, headache, or ringing in your ears. · You have new or increased nausea and vomiting. · Your dizziness does not go away or comes back. Watch closely for changes in your health, and be sure to contact your doctor if:  · You do not get better as expected. Where can you learn more? Go to http://jacklyn-denice.info/. Enter Z968 in the search box to learn more about \"Dizziness: Care Instructions. \"  Current as of: May 27, 2016  Content Version: 11.2  © 7740-0503 [employer]. Care instructions adapted under license by [employer] (which disclaims liability or warranty for this information). If you have questions about a medical condition or this instruction, always ask your healthcare professional. [place] [address] any warranty or liability for your use of this information. Lightheadedness or Faintness: Care Instructions  Your Care Instructions  Lightheadedness is a feeling that you are about to faint or \"pass out. \" You do not feel as if you or your surroundings are moving. It is different from vertigo, which is the feeling that you or things around you are spinning or tilting. Lightheadedness usually goes away or gets better when you lie down. If lightheadedness gets worse, it can lead to a fainting spell. It is common to feel lightheaded from time to time. Lightheadedness usually is not caused by a serious problem. It often is caused by a short-lasting drop in blood pressure and blood flow to your head that occurs when you get up too quickly from a seated or lying position. Follow-up care is a key part of your treatment and safety. Be sure to make and go to all appointments, and call your doctor if you are having problems. It's also a good idea to know your test results and keep a list of the medicines you take. How can you care for yourself at home? · Lie down for 1 or 2 minutes when you feel lightheaded. After lying down, sit up slowly and remain sitting for 1 to 2 minutes before slowly standing up. · Avoid movements, positions, or activities that have made you lightheaded in the past.  · Get plenty of rest, especially if you have a cold or flu, which can cause lightheadedness. · Make sure you drink plenty of fluids, especially if you have a fever or have been sweating. · Do not drive or put yourself and others in danger while you feel lightheaded. When should you call for help? Call 911 anytime you think you may need emergency care. For example, call if:  · You have symptoms of a stroke. These may include:  ¨ Sudden numbness, tingling, weakness, or loss of movement in your face, arm, or leg, especially on only one side of your body. ¨ Sudden vision changes. ¨ Sudden trouble speaking. ¨ Sudden confusion or trouble understanding simple statements. ¨ Sudden problems with walking or balance. ¨ A sudden, severe headache that is different from past headaches. · You have symptoms of a heart attack. These may include:  ¨ Chest pain or pressure, or a strange feeling in the chest.  ¨ Sweating. ¨ Shortness of breath. ¨ Nausea or vomiting. ¨ Pain, pressure, or a strange feeling in the back, neck, jaw, or upper belly or in one or both shoulders or arms. ¨ Lightheadedness or sudden weakness. ¨ A fast or irregular heartbeat.   After you call 911, the  may tell you to chew 1 adult-strength or 2 to 4 low-dose aspirin. Wait for an ambulance. Do not try to drive yourself. Watch closely for changes in your health, and be sure to contact your doctor if:  · Your lightheadedness gets worse or does not get better with home care. Where can you learn more? Go to http://jacklyn-denice.info/. Enter Z098 in the search box to learn more about \"Lightheadedness or Faintness: Care Instructions. \"  Current as of: May 27, 2016  Content Version: 11.2  © 5726-1590 [employer]. Care instructions adapted under license by [employer] (which disclaims liability or warranty for this information). If you have questions about a medical condition or this instruction, always ask your healthcare professional. [address] any warranty or liability for your use of this information. ViaBill Activation    Thank you for requesting access to ViaBill. Please follow the instructions below to securely access and download your online medical record. ViaBill allows you to send messages to your doctor, view your test results, renew your prescriptions, schedule appointments, and more. How Do I Sign Up? 1. In your internet browser, go to [email or web address]  2. Click on the First Time User? Click Here link in the Sign In box. You will be redirect to the New Member Sign Up page. 3. Enter your ViaBill Access Code exactly as it appears below. You will not need to use this code after youve completed the sign-up process. If you do not sign up before the expiration date, you must request a new code. ViaBill Access Code: [email or web address]  Expires: 2017  9:05 PM (This is the date your ViaBill access code will )    4. Enter the last four digits of your Social Security Number (xxxx) and Date of Birth (mm/dd/yyyy) as indicated and click Submit. You will be taken to the next sign-up page. 5. Create a ViaBill ID.  This will be your [email or web address] login ID and cannot be changed, so think of one that is secure and easy to remember. 6. Create a [email or web address] password. You can change your password at any time. 7. Enter your Password Reset Question and Answer. This can be used at a later time if you forget your password. 8. Enter your e-mail address. You will receive e-mail notification when new information is available in [address] 9. Click Sign Up. You can now view and download portions of your medical record. 10. Click the Download Summary menu link to download a portable copy of your medical information. Additional Information    If you have questions, please visit the Frequently Asked Questions section of the [email or web address] website at [email or web address] Remember, [email or web address] is NOT to be used for urgent needs. For medical emergencies, dial 911.

## 2017-06-17 LAB
ATRIAL RATE: 92 BPM
CALCULATED P AXIS, ECG09: 39 DEGREES
CALCULATED R AXIS, ECG10: 1 DEGREES
CALCULATED T AXIS, ECG11: 31 DEGREES
DIAGNOSIS, 93000: NORMAL
P-R INTERVAL, ECG05: 138 MS
Q-T INTERVAL, ECG07: 360 MS
QRS DURATION, ECG06: 104 MS
QTC CALCULATION (BEZET), ECG08: 445 MS
VENTRICULAR RATE, ECG03: 92 BPM

## 2017-06-19 ENCOUNTER — TELEPHONE (OUTPATIENT)
Dept: FAMILY MEDICINE CLINIC | Age: 42
End: 2017-06-19

## 2017-06-19 ENCOUNTER — OFFICE VISIT (OUTPATIENT)
Dept: SURGERY | Age: 42
End: 2017-06-19

## 2017-06-19 ENCOUNTER — HOSPITAL ENCOUNTER (EMERGENCY)
Age: 42
Discharge: HOME | End: 2017-06-19
Attending: EMERGENCY MEDICINE
Payer: SUBSIDIZED

## 2017-06-19 VITALS
DIASTOLIC BLOOD PRESSURE: 80 MMHG | TEMPERATURE: 98.6 F | BODY MASS INDEX: 31.01 KG/M2 | HEART RATE: 77 BPM | OXYGEN SATURATION: 100 % | WEIGHT: 216.6 LBS | SYSTOLIC BLOOD PRESSURE: 122 MMHG | RESPIRATION RATE: 16 BRPM | HEIGHT: 70 IN

## 2017-06-19 VITALS
HEIGHT: 70 IN | OXYGEN SATURATION: 97 % | SYSTOLIC BLOOD PRESSURE: 116 MMHG | RESPIRATION RATE: 16 BRPM | BODY MASS INDEX: 31.5 KG/M2 | HEART RATE: 83 BPM | TEMPERATURE: 98.6 F | WEIGHT: 220 LBS | DIASTOLIC BLOOD PRESSURE: 77 MMHG

## 2017-06-19 DIAGNOSIS — Z09 POSTOPERATIVE EXAMINATION: Primary | ICD-10-CM

## 2017-06-19 DIAGNOSIS — R10.9 LEFT FLANK PAIN: Primary | ICD-10-CM

## 2017-06-19 LAB
APPEARANCE UR: CLEAR
BILIRUB UR QL: NEGATIVE
COLOR UR: YELLOW
GLUCOSE UR STRIP.AUTO-MCNC: >1000 MG/DL
HGB UR QL STRIP: NEGATIVE
KETONES UR QL STRIP.AUTO: NEGATIVE MG/DL
LEUKOCYTE ESTERASE UR QL STRIP.AUTO: NEGATIVE
NITRITE UR QL STRIP.AUTO: NEGATIVE
PH UR STRIP: 7 [PH] (ref 5–8)
PROT UR STRIP-MCNC: NEGATIVE MG/DL
SP GR UR REFRACTOMETRY: 1.01 (ref 1–1.03)
UROBILINOGEN UR QL STRIP.AUTO: 0.2 EU/DL (ref 0.2–1)

## 2017-06-19 PROCEDURE — 99283 EMERGENCY DEPT VISIT LOW MDM: CPT

## 2017-06-19 PROCEDURE — 74011250636 HC RX REV CODE- 250/636: Performed by: EMERGENCY MEDICINE

## 2017-06-19 PROCEDURE — 96372 THER/PROPH/DIAG INJ SC/IM: CPT

## 2017-06-19 PROCEDURE — 81003 URINALYSIS AUTO W/O SCOPE: CPT | Performed by: EMERGENCY MEDICINE

## 2017-06-19 RX ORDER — SERTRALINE HYDROCHLORIDE 100 MG/1
100 TABLET, FILM COATED ORAL DAILY
Qty: 30 TAB | Refills: 2 | Status: SHIPPED | OUTPATIENT
Start: 2017-06-19 | End: 2017-08-04

## 2017-06-19 RX ORDER — KETOROLAC TROMETHAMINE 30 MG/ML
60 INJECTION, SOLUTION INTRAMUSCULAR; INTRAVENOUS
Status: COMPLETED | OUTPATIENT
Start: 2017-06-19 | End: 2017-06-19

## 2017-06-19 RX ADMIN — KETOROLAC TROMETHAMINE 60 MG: 30 INJECTION, SOLUTION INTRAMUSCULAR at 17:46

## 2017-06-19 NOTE — DISCHARGE INSTRUCTIONS
Flank Pain: Care Instructions  Your Care Instructions  Flank pain is pain on the side of the back just below the rib cage and above the waist. It can be on one or both sides. Flank pain has many possible causes, including a kidney stone, a urinary tract infection, or back strain. Flank pain may get better on its own. But don't ignore new symptoms, such as fever, nausea and vomiting, urination problems, pain that gets worse, and dizziness. These may be signs of a more serious problem. You may have to have tests or other treatment. Follow-up care is a key part of your treatment and safety. Be sure to make and go to all appointments, and call your doctor if you are having problems. It's also a good idea to know your test results and keep a list of the medicines you take. How can you care for yourself at home? · Rest until you feel better. · Take pain medicines exactly as directed. ¨ If the doctor gave you a prescription medicine for pain, take it as prescribed. ¨ If you are not taking a prescription pain medicine, ask your doctor if you can take an over-the-counter pain medicine, such as acetaminophen (Tylenol), ibuprofen (Advil, Motrin), or naproxen (Aleve). Read and follow all instructions on the label. · If your doctor prescribed antibiotics, take them as directed. Do not stop taking them just because you feel better. You need to take the full course of antibiotics. · To apply heat, put a warm water bottle, a heating pad set on low, or a warm cloth on the painful area. Do not go to sleep with a heating pad on your skin. · To prevent dehydration, drink plenty of fluids, enough so that your urine is light yellow or clear like water. Choose water and other caffeine-free clear liquids until you feel better. If you have kidney, heart, or liver disease and have to limit fluids, talk with your doctor before you increase the amount of fluids you drink. When should you call for help?   Call your doctor now or seek immediate medical care if:  · Your flank pain gets worse. · You have new symptoms, such as fever, nausea, or vomiting. · You have symptoms of a urinary problem. For example:  ¨ You have blood or pus in your urine. ¨ You have chills or body aches. ¨ It hurts to urinate. ¨ You have groin or belly pain. Watch closely for changes in your health, and be sure to contact your doctor if you do not get better as expected. Where can you learn more? Go to http://jacklyn-denice.info/. Enter S191 in the search box to learn more about \"Flank Pain: Care Instructions. \"  Current as of: March 20, 2017  Content Version: 11.3  © 2623-3240 [employer]. Care instructions adapted under license by [employer] (which disclaims liability or warranty for this information). If you have questions about a medical condition or this instruction, always ask your healthcare professional. [place] [address] any warranty or liability for your use of this information.

## 2017-06-19 NOTE — PATIENT INSTRUCTIONS
If you have any questions or concerns about today's appointment, the verbal and/or written instructions you were given for follow up care, please call our office at [phone].     [facility] - [address]    [phone] office  [phone]

## 2017-06-19 NOTE — PROGRESS NOTES
Tracey Rhodes is a 42 y.o. male who presents today for a post-op exam for a robotic repair of bilateral indirect inguinal hernia with placement of mesh performed on 06/02/17. Patient scores their post-op pain level on a pain scale from 1-10  as a 8 today. 1. Have you been to the ER, urgent care clinic since your last visit? Hospitalized since your last visit? Yes, [facility] on 06/16/17 for pain left flank. 2. Have you seen or consulted any other health care providers outside of the [address] [place] [address] since your last visit? Include any pap smears or colon screening.  No

## 2017-06-19 NOTE — ED TRIAGE NOTES
Pt c/o left flank pain. V/s observed in chart with todays date. Pt denies being seen anywhere today. I triaged him on Friday for same complaint. Denies he can give urine sample.

## 2017-06-19 NOTE — LETTER
6/19/2017 9:27 AM 
 
Patient:  Guera Dwyer YOB: 1975 Date of Visit: 6/19/2017 Mir Grajeda PA-C 
[place] [address] [address] [place] [id number] VIA In Basket Dear Mir Grajeda PA-C, Thank you for referring Mr. Kevin Lester to [place] [address] for evaluation and treatment. Below are the relevant portions of my assessment and plan of care. Thank you very much for your referral of Mr. Kevin Lester. If you have questions, please do not hesitate to call me. I look forward to following Mr. Gaytan along with you and will keep you updated as to his progress. Sincerely, Mercy Baig MD

## 2017-06-19 NOTE — TELEPHONE ENCOUNTER
Pt stated he has an appt scheduled with Dr. Alejandrina Dorsey (Psych) July 17. 2017 @10:30am.    Pt is requesting his antidepressant medication until his visit with Dr. Sonny Hemphill. Aby smith/Perla at Dr. Sonny Hemphill and confirmed appt.

## 2017-06-19 NOTE — ED PROVIDER NOTES
HPI Comments: 5:20 PM Aaron Hewitt is a 42 y.o. male with h/o HTN, depression, and DM who presents to ED complaining of left sided flank pain starting last Thursday (6/15). He states the pain has been getting increasingly worse. He states he had Percocet previously but now takes 800mg Ibuprofen with no relief. He denies any other symptoms at this time. PCP: Preet Paula PA-C      The history is provided by the patient. No  was used. Past Medical History:   Diagnosis Date    Depression     Diabetes ([facility] [address]     Drug-seeking behavior     56 prescriptions from 32 different prescribers at 6 different pharmacies in last 12 months    Herniated lumbar intervertebral disc     Hypertension     Neurological disorder L3,4,5 torn Herniated disc       Past Surgical History:   Procedure Laterality Date    HX HERNIA REPAIR Bilateral 06/02/2017    robotic repair of bilateral indirect inguinal hernias with placement of mesh    HX ORTHOPAEDIC  trigger finger release         Family History:   Problem Relation Age of Onset    Hypertension Mother     Diabetes Mother     Heart Failure Mother     COPD Mother     Heart Disease Mother     Hypertension Father     Alcohol abuse Father        Social History     Social History    Marital status:      Spouse name: N/A    Number of children: N/A    Years of education: N/A     Occupational History    Not on file. Social History Main Topics    Smoking status: Never Smoker    Smokeless tobacco: Never Used    Alcohol use Yes      Comment: rarely    Drug use: No    Sexual activity: Yes     Partners: Female     Birth control/ protection: Condom     Other Topics Concern    Not on file     Social History Narrative         ALLERGIES: Review of patient's allergies indicates no known allergies. Review of Systems   Constitutional: Negative for fever. HENT: Negative for trouble swallowing.     Respiratory: Negative for shortness of breath. Cardiovascular: Negative for chest pain. Gastrointestinal: Negative for abdominal pain, diarrhea and vomiting. Genitourinary: Positive for flank pain (left side). Negative for difficulty urinating. Musculoskeletal: Negative for back pain and neck pain. Skin: Negative for wound. Neurological: Negative for syncope. Psychiatric/Behavioral: Negative for behavioral problems. All other systems reviewed and are negative. Vitals:    06/19/17 1706   BP: (!) 125/91   Pulse: (!) 102   Resp: 15   Temp: 99.2 °F (37.3 °C)   SpO2: 97%   Weight: 99.8 kg (220 lb)   Height: 5' 10\" (1.778 m)            Physical Exam   Constitutional: He is oriented to person, place, and time. He appears well-developed. No distress. well-appearing, nad   HENT:   Head: Normocephalic and atraumatic. Eyes: EOM are normal.   Neck: Normal range of motion. Cardiovascular: Normal rate. Pulmonary/Chest: Effort normal and breath sounds normal. No respiratory distress. Abdominal: Soft. There is no tenderness. Genitourinary:   Genitourinary Comments: No CVA tenderness   Musculoskeletal: Normal range of motion. Mechanically stable   Neurological: He is alert and oriented to person, place, and time. No focal deficits noted   Psychiatric: His behavior is normal.   Nursing note and vitals reviewed. MDM  Number of Diagnoses or Management Options  Left flank pain:   Diagnosis management comments: 41 yo M with PMHx DM presents with left flank pain and hematuria. Pt with recent hernia repair and saw Dr. Dee Herron today. Examination unremarkable. Of note, [facility], pt has had 52 prescriptions from 26 providers filled at 11 different pharmacies over the last 12 months. 7:31 PM  ua unremarkable. Dc home, symptom management, follow-up, return precautions.        Amount and/or Complexity of Data Reviewed  Clinical lab tests: ordered and reviewed  Review and summarize past medical records: yes    Patient Progress  Patient progress: stable    ED Course       Procedures    Vitals:  Patient Vitals for the past 12 hrs:   Temp Pulse Resp BP SpO2   06/19/17 1706 99.2 °F (37.3 °C) (!) 102 15 (!) 125/91 97 %       Medications ordered:   Medications   ketorolac tromethamine (TORADOL) 60 mg/2 mL injection 60 mg (60 mg IntraMUSCular Given 6/19/17 1746)         Lab findings:  Recent Results (from the past 12 hour(s))   URINALYSIS W/ RFLX MICROSCOPIC    Collection Time: 06/19/17  5:07 PM   Result Value Ref Range    Color YELLOW      Appearance CLEAR      Specific gravity 1.015 1.005 - 1.030      pH (UA) 7.0 5.0 - 8.0      Protein NEGATIVE  NEG mg/dL    Glucose >1000 (A) NEG mg/dL    Ketone NEGATIVE  NEG mg/dL    Bilirubin NEGATIVE  NEG      Blood NEGATIVE  NEG      Urobilinogen 0.2 0.2 - 1.0 EU/dL    Nitrites NEGATIVE  NEG      Leukocyte Esterase NEGATIVE  NEG         Reevaluation of patient:   7:29 PM I have assessed the patient and discussed their results and diagnosis. Pt will be discharged in stable condition. Patient is to return to emergency department if any new or worsening condition. Patient understands and verbalizes agreement with plan. Disposition:  Diagnosis:   1. Left flank pain        Disposition: Discharged in stable condition      Follow-up Information     Follow up With Details Comments Contact Info    Yahir Cortes PA-C Schedule an appointment as soon as possible for a visit ED visit follow up [phone]  [phone]      [facility] EMERGENCY DEPT Go to If symptoms worsen [address] [phone]           Patient's Medications   Start Taking    No medications on file   Continue Taking    CARVEDILOL (COREG) 3.125 MG TABLET    3.125 mg. JANUVIA 50 MG TABLET    TAKE 1 TABLET BY MOUTH DAILY    LISINOPRIL-HYDROCHLOROTHIAZIDE (ZESTORETIC) 20-25 MG PER TABLET    Take 1 Tab by mouth daily.     ONDANSETRON (ZOFRAN ODT) 4 MG DISINTEGRATING TABLET    Take 1 Tab by mouth every eight (8) hours as needed for Nausea. OXYCODONE-ACETAMINOPHEN (PERCOCET) 5-325 MG PER TABLET    Take 1 Tab by mouth every six (6) hours as needed for Pain. Max Daily Amount: 4 Tabs. SERTRALINE (ZOLOFT) 100 MG TABLET    Take 1 Tab by mouth daily. These Medications have changed    No medications on file   Stop Taking    No medications on file     Scribe Attestation  Maddy Chapman acting as a scribe for and in the presence of Jada Chwala MD  June 19, 2017 at 7:32 PM       Provider Attestation:  I personally performed the services described in the documentation, reviewed the documentation, as recorded by the scribe in my presence, and it accurately and completely records my words and actions.   Jada Chawla MD      Signed by: Maddy Parra, June 19, 2017 at 7:32 PM

## 2017-06-19 NOTE — PROGRESS NOTES
Patient seen and examined. He is doing better. Has minimal abdominal pain. Tolerating regular diet. Abdomen is soft and nontender. Wounds are healing well.  Bilateral groin exam is normal.  Plan:  Follow up as needed

## 2017-06-20 ENCOUNTER — TELEPHONE (OUTPATIENT)
Dept: FAMILY MEDICINE CLINIC | Age: 42
End: 2017-06-20

## 2017-06-20 NOTE — TELEPHONE ENCOUNTER
Pt called requesting a F/U on request for Clonazepam.    Pt was informed refill request has been refused. Pt verbalized understanding.

## 2017-06-22 NOTE — TELEPHONE ENCOUNTER
Requested Prescriptions     Pending Prescriptions Disp Refills    glucose blood VI test strips (RELION PRIME TEST STRIPS) strip       Sig: by Does Not Apply route See Admin Instructions.

## 2017-06-22 NOTE — TELEPHONE ENCOUNTER
Pt called stating that the test strips ordered for him at [facility] were incorrect and was requesting the order be placed at [facility] on [address] because the test strips and lancets are [employer] brand. Pt stated the brand name is Rely-on brand.

## 2017-06-23 ENCOUNTER — TELEPHONE (OUTPATIENT)
Dept: INTERNAL MEDICINE CLINIC | Age: 42
End: 2017-06-23

## 2017-06-23 NOTE — TELEPHONE ENCOUNTER
Patient has been calling for a couple of day with questions about the medication that he was given by you. Please call back ASAP please he calls everyday. Called back. According to the patient he was told by  physician at [address] [place] [address] that he requires long term klonopin or xanax therapy. So that he is slowly weaned off. I advised the patient to follow up with the same provider or your PCP. I am unable to prescribe any more of the above mentioned medications. Patient verbalized the understanding.

## 2017-06-26 ENCOUNTER — TELEPHONE (OUTPATIENT)
Dept: FAMILY MEDICINE CLINIC | Age: 42
End: 2017-06-26

## 2017-06-26 NOTE — TELEPHONE ENCOUNTER
PT called requesting medication. Stated he had just finished up from his appt @[facility] in [place], [address] [place] [address]  Address: Terri Roach Aiyana [facility], [facility]  Phone: [phone]   and is now calling for Clonazepam.      Pt stated Psych advised him to seek meds from PCP. Pt phone was passed to  lacey KING/Lisseth and I was informed Psych is requesting medical records from PCP. Pt was informed meds have been refused by PCP and once a release of medical records has been signed and faxed we will release records to [facility] and Pysch will be able to continue care. Pt verbalized understanding. Pt called requesting medication. Pt stated he called [facility] and they advised him it would take a week due to office policy before the request of medical records. I informed the Pt again that I had spoken to Uziel on 6/26/17 and she informed me that the provider would have to approve on the request of which records are needed. The request for Clonazepam has been refused and would have to continue care with [facility]. I also informed Pt once we received request for release of Med records from [facility] we will send request to [facility]. Pt verbalized understand and stated he will contact [facility] again. 06/27/17.

## 2017-06-27 ENCOUNTER — DOCUMENTATION ONLY (OUTPATIENT)
Dept: FAMILY MEDICINE CLINIC | Age: 42
End: 2017-06-27

## 2017-06-27 NOTE — PROGRESS NOTES
Called patient to inform him the medication refill request for Klonopin was refused and Dr. Fozia Sabillon is awaiting the notes from [facility].      Patient stated [facility] is requesting our notes before the can prescribe any medications. Patient was advised the psychiatrist should have prescribed medications yesterday if they felt he needed it.       Patient was advised I would fax over our office notes to [facility] today.      Patient was informed he would not get any medications at this time.     Patient stated he felt that his concerns at not being address and he believes he needs the medication to cope with the problems in his personal life.     Patient's medical record request was faxed to [facility]     Patient's last to office notes was faxed to [facility].

## 2017-06-27 NOTE — TELEPHONE ENCOUNTER
Called patient to inform him the medication refill request for Klonopin was refused and Dr. Angi Orlando is awaiting the notes from [facility]. Patient stated [facility] is requesting our notes before the can prescribe any medications. Patient was advised the psychiatrist should have prescribed medications yesterday if they felt he needed it. Patient was advised I would fax over our office notes to [facility] today. Patient was informed he would not get any medications at this time. Patient stated he felt that his concerns at not being address and he believes he needs the medication to cope with the problems in his personal life. Patient's medical record request was faxed to [facility]    Patient's last to office notes was faxed to [facility].

## 2017-06-29 ENCOUNTER — TELEPHONE (OUTPATIENT)
Dept: SURGERY | Age: 42
End: 2017-06-29

## 2017-06-29 NOTE — TELEPHONE ENCOUNTER
Received call from patient stating that he is experiencing abd pain rated (8/10) on the left side that is unchanged since inguinal hernia repair surgery. Patient states ibuprofen is not relieving his pain. Patient denies fever, constipation or difficulty urinating. He reports some nausea yesterday. Offered patient an appointment to see Dr. Cale Cortez, which he accepted. Patient states that he has been to the ED and he cannot go back there. Will notify Dr. Cale Cortez of patients concerns.

## 2017-07-03 ENCOUNTER — TELEPHONE (OUTPATIENT)
Dept: FAMILY MEDICINE CLINIC | Age: 42
End: 2017-07-03

## 2017-07-03 ENCOUNTER — HOSPITAL ENCOUNTER (EMERGENCY)
Age: 42
Discharge: HOME | End: 2017-07-03
Attending: EMERGENCY MEDICINE
Payer: SUBSIDIZED

## 2017-07-03 ENCOUNTER — OFFICE VISIT (OUTPATIENT)
Dept: SURGERY | Age: 42
End: 2017-07-03

## 2017-07-03 VITALS
SYSTOLIC BLOOD PRESSURE: 135 MMHG | HEART RATE: 106 BPM | WEIGHT: 216 LBS | TEMPERATURE: 97.9 F | BODY MASS INDEX: 30.92 KG/M2 | RESPIRATION RATE: 20 BRPM | OXYGEN SATURATION: 100 % | HEIGHT: 70 IN | DIASTOLIC BLOOD PRESSURE: 86 MMHG

## 2017-07-03 VITALS
RESPIRATION RATE: 20 BRPM | DIASTOLIC BLOOD PRESSURE: 88 MMHG | TEMPERATURE: 97.9 F | SYSTOLIC BLOOD PRESSURE: 114 MMHG | HEART RATE: 89 BPM | WEIGHT: 220 LBS | BODY MASS INDEX: 31.5 KG/M2 | HEIGHT: 70 IN

## 2017-07-03 DIAGNOSIS — Z98.890 HX OF BILATERAL INGUINAL HERNIA REPAIR: ICD-10-CM

## 2017-07-03 DIAGNOSIS — R10.32 GROIN PAIN, LEFT: Primary | ICD-10-CM

## 2017-07-03 DIAGNOSIS — Z87.19 HX OF BILATERAL INGUINAL HERNIA REPAIR: ICD-10-CM

## 2017-07-03 DIAGNOSIS — Z09 POSTOPERATIVE EXAMINATION: Primary | ICD-10-CM

## 2017-07-03 PROCEDURE — 74011250637 HC RX REV CODE- 250/637: Performed by: PHYSICIAN ASSISTANT

## 2017-07-03 PROCEDURE — 99283 EMERGENCY DEPT VISIT LOW MDM: CPT

## 2017-07-03 RX ORDER — IBUPROFEN 800 MG/1
TABLET ORAL
COMMUNITY
End: 2017-08-07

## 2017-07-03 RX ORDER — NAPROXEN 375 MG/1
375 TABLET ORAL 2 TIMES DAILY WITH MEALS
Qty: 10 TAB | Refills: 0 | Status: SHIPPED | OUTPATIENT
Start: 2017-07-03 | End: 2017-07-08

## 2017-07-03 RX ORDER — IBUPROFEN 600 MG/1
600 TABLET ORAL
Status: COMPLETED | OUTPATIENT
Start: 2017-07-03 | End: 2017-07-03

## 2017-07-03 RX ADMIN — IBUPROFEN 600 MG: 600 TABLET ORAL at 12:44

## 2017-07-03 NOTE — MR AVS SNAPSHOT
Visit Information Date & Time Provider Department Dept. Phone Encounter #  
 7/3/2017  9:00 AM [facility] [phone] [id number] Upcoming Health Maintenance Date Due  
 EYE EXAM RETINAL OR DILATED Q1 12/28/1984 Pneumococcal 19-64 Medium Risk (1 of 1 - PPSV23) 12/28/1993 DTaP/Tdap/Td series (1 - Tdap) 12/28/1995 MICROALBUMIN Q1 7/7/2016 LIPID PANEL Q1 7/7/2016 FOOT EXAM Q1 4/27/2017 INFLUENZA AGE 9 TO ADULT 8/1/2017 HEMOGLOBIN A1C Q6M 12/9/2017 Allergies as of 7/3/2017  Review Complete On: 7/3/2017 By: Abhijeet Bellamy No Known Allergies Current Immunizations  Reviewed on 9/2/2015 Name Date Influenza Vaccine (Quad) PF 9/2/2015 Influenza Vaccine Whole 12/1/2009 Not reviewed this visit Vitals BP Pulse Temp Resp Height(growth percentile) Weight(growth percentile) 114/88 (BP 1 Location: Left arm, BP Patient Position: At rest) 89 97.9 °F (36.6 °C) (Oral) 20 5' 10\" (1.778 m) 220 lb (99.8 kg) BMI Smoking Status 31.57 kg/m2 Never Smoker BMI and BSA Data Body Mass Index Body Surface Area  
 31.57 kg/m 2 2.22 m 2 Preferred Pharmacy Pharmacy Name Phone [facility] [address], [address] [place] [address] [phone] Your Updated Medication List  
  
   
This list is accurate as of: 7/3/17  9:24 AM.  Always use your most recent med list.  
  
  
  
  
 carvedilol 3.125 mg tablet Commonly known as:  COREG  
3.125 mg.  
  
 glucose blood VI test strips strip Commonly known as:  RELION PRIME TEST STRIPS Check blood sugars twice daily  
  
 ibuprofen 800 mg tablet Commonly known as:  MOTRIN Take  by mouth. JANUVIA 50 mg tablet Generic drug:  SITagliptin TAKE 1 TABLET BY MOUTH DAILY  
  
 lisinopril-hydroCHLOROthiazide 20-25 mg per tablet Commonly known as:  Clyman Frater Take 1 Tab by mouth daily. ondansetron 4 mg disintegrating tablet Commonly known as:  ZOFRAN ODT Take 1 Tab by mouth every eight (8) hours as needed for Nausea. oxyCODONE-acetaminophen 5-325 mg per tablet Commonly known as:  PERCOCET Take 1 Tab by mouth every six (6) hours as needed for Pain. Max Daily Amount: 4 Tabs. RELION NEEDLES  
by Does Not Apply route. sertraline 100 mg tablet Commonly known as:  ZOLOFT Take 1 Tab by mouth daily. Introducing [facility]! Ignacio Graham introduces Betable patient portal. Now you can access parts of your medical record, email your doctor's office, and request medication refills online. 1. In your internet browser, go to [email or web address] 2. Click on the First Time User? Click Here link in the Sign In box. You will see the New Member Sign Up page. 3. Enter your Betable Access Code exactly as it appears below. You will not need to use this code after youve completed the sign-up process. If you do not sign up before the expiration date, you must request a new code. · Betable Access Code: [email or web address] Expires: 8/9/2017  9:05 PM 
 
4. Enter the last four digits of your Social Security Number (xxxx) and Date of Birth (mm/dd/yyyy) as indicated and click Submit. You will be taken to the next sign-up page. 5. Create a Betable ID. This will be your Betable login ID and cannot be changed, so think of one that is secure and easy to remember. 6. Create a Betable password. You can change your password at any time. 7. Enter your Password Reset Question and Answer. This can be used at a later time if you forget your password. 8. Enter your e-mail address. You will receive e-mail notification when new information is available in [address] 9. Click Sign Up. You can now view and download portions of your medical record. 10. Click the Download Summary menu link to download a portable copy of your medical information. If you have questions, please visit the Frequently Asked Questions section of the [employer] website. Remember, [employer] is NOT to be used for urgent needs. For medical emergencies, dial 911. Now available from your iPhone and Android! Please provide this summary of care documentation to your next provider. Your primary care clinician is listed as Db Marina. If you have any questions after today's visit, please call [phone].

## 2017-07-03 NOTE — TELEPHONE ENCOUNTER
Received a call from [address] [place] [address] Road @ [address] [place] [address] requesting information     Ck Lacie was notified of Pt last visit on 06/09/2017 with MMA and was told Pt does not receive any pain medication from our office. Richard JARVIS verbalized understanding.

## 2017-07-03 NOTE — ED PROVIDER NOTES
Patient is a 42 y.o. male presenting with abdominal pain. Abdominal Pain    Pertinent negatives include no fever, no diarrhea, no nausea, no vomiting, no constipation, no frequency, no hematuria, no chest pain, no testicular pain and no back pain. Marly Peters is a 42 y.o. male post b/ inguinal repair done on June 2 nd and was seen by Sarah Espinosa, who had cleared him earlier today. Had gone heme and was lifting \" some couch and felt pain in my L groin\" Denies of any urinary sx, no testicular pain, n/v, abd pain or diarrhea. Denies of any direct injury to the site or urethral discharge. Past Medical History:   Diagnosis Date    Depression     Diabetes ([facility] [address]     Drug-seeking behavior     56 prescriptions from 32 different prescribers at 6 different pharmacies in last 12 months    Herniated lumbar intervertebral disc     Hypertension     Neurological disorder L3,4,5 torn Herniated disc       Past Surgical History:   Procedure Laterality Date    HX HERNIA REPAIR Bilateral 06/02/2017    robotic repair of bilateral indirect inguinal hernias with placement of mesh    HX ORTHOPAEDIC  trigger finger release         Family History:   Problem Relation Age of Onset    Hypertension Mother     Diabetes Mother     Heart Failure Mother     COPD Mother     Heart Disease Mother     Hypertension Father     Alcohol abuse Father        Social History     Social History    Marital status:      Spouse name: N/A    Number of children: N/A    Years of education: N/A     Occupational History    Not on file.      Social History Main Topics    Smoking status: Never Smoker    Smokeless tobacco: Never Used    Alcohol use Yes      Comment: rarely    Drug use: No    Sexual activity: Yes     Partners: Female     Birth control/ protection: Condom     Other Topics Concern    Not on file     Social History Narrative         ALLERGIES: Review of patient's allergies indicates no known allergies. Review of Systems   Constitutional: Negative for chills and fever. HENT: Negative for ear discharge, ear pain, hearing loss and sinus pressure. Eyes: Negative for pain. Respiratory: Negative for cough, chest tightness and shortness of breath. Cardiovascular: Negative for chest pain, palpitations and leg swelling. Gastrointestinal: Negative for abdominal pain, constipation, diarrhea, nausea and vomiting. Genitourinary: Negative for discharge, flank pain, frequency, hematuria, penile pain, penile swelling, scrotal swelling and testicular pain. Musculoskeletal: Negative for back pain, gait problem and joint swelling. Neurological: Negative for light-headedness and numbness. Psychiatric/Behavioral: Negative for hallucinations. Vitals:    07/03/17 1213   BP: 135/86   Pulse: (!) 106   Resp: 20   Temp: 97.9 °F (36.6 °C)   SpO2: 100%   Weight: 98 kg (216 lb)   Height: 5' 10\" (1.778 m)            Physical Exam   Constitutional: He appears well-developed and well-nourished. No distress. HENT:   Head: Normocephalic and atraumatic. Eyes: Conjunctivae and EOM are normal. Pupils are equal, round, and reactive to light. Neck: Normal range of motion. Cardiovascular: Normal rate, regular rhythm and normal heart sounds. Pulmonary/Chest: Effort normal and breath sounds normal. No respiratory distress. He has no wheezes. He has no rales. He exhibits no tenderness. Abdominal: Soft. Bowel sounds are normal. He exhibits no distension. There is no tenderness. There is no rebound and no guarding. Genitourinary: No penile tenderness. Genitourinary Comments: Visual examination of b/l inguinal region including palpation with patient standing and coughing with no hernia appreciated. No surrounding tissue erythema noted over the b/l inguinal region. Skin: He is not diaphoretic.         MDM  Number of Diagnoses or Management Options  Groin pain, left:   Diagnosis management comments: Case discussed with Dr Zuleima Bernardo. Examination benign, will give dose of ibuprofen her and recommend using warm compression and follow up with   Bonilla Miranda MD and PCP for further mgmt and treatment. Will discharge. ED Course       Procedures        DISCHARGE NOTE:  1:06 PM    Justin Gaytan's  results have been reviewed with him. He has been counseled regarding his diagnosis, treatment, and plan. He verbally conveys understanding and agreement of the signs, symptoms, diagnosis, treatment and prognosis and additionally agrees to follow up as discussed. He also agrees with the care-plan and conveys that all of his questions have been answered. I have also provided discharge instructions for him that include: educational information regarding their diagnosis and treatment, and list of reasons why they would want to return to the ED prior to their follow-up appointment, should his condition change. CLINICAL IMPRESSION:    1. Groin pain, left    2. Hx of bilateral inguinal hernia repair        AFTER VISIT PLAN:    Current Discharge Medication List      START taking these medications    Details   naproxen (NAPROSYN) 375 mg tablet Take 1 Tab by mouth two (2) times daily (with meals) for 5 days.   Qty: 10 Tab, Refills: 0              Follow-up Information     Follow up With Details [address]  [facility] [address]  [phone]      Natalya Duran PA-C Schedule an appointment as soon as possible for a visit If symptoms worsen [address]  [address]  [address] [address] [phone]             Written by Emil Pike PA-C

## 2017-07-03 NOTE — PROGRESS NOTES
Ric Zamudio is a 42 y.o. male who presents today with   Chief Complaint   Patient presents with    Surgical Follow-up     Pt presents today for follow up on Robotic repair or Bilateral indirect inguinal  hernia repair with mesh 6/2/2017                1. Have you been to the ER, urgent care clinic since your last visit? Hospitalized since your last visit? No    2. Have you seen or consulted any other health care providers outside of the [address] [place] [address] since your last visit? Include any pap smears or colon screening.  No

## 2017-07-03 NOTE — PROGRESS NOTES
Patient seen and examined. He is doing well. He is having some pain in the left groin. Tolerating a regular diet. No nausea or vomiting. Abdomen is soft and nontender. Wounds are healing very well. Bilateral groin exam is normal. No swelling. No evidence of recurrence of the hernia.  Testicular exam is normal.   Plan:  Follow up as needed

## 2017-07-03 NOTE — ED NOTES
I have reviewed discharge instruction and prescriptions with patient. Patient verbalized understanding and has no further questions at this time. Education taught and patient verbalized understanding of education. Teach back method used. Patients pain decreased. Belongings given to patient. Patient discharged to home.

## 2017-07-03 NOTE — ED NOTES
Patient medicated per [facility], allergies verified with patient prior to medication administration. Patient provided ice packs per instructions from PA.

## 2017-07-03 NOTE — ED TRIAGE NOTES
Patient had hernia repair last month. Cleared by MD this morning. Patient was helping a friend move and felt a tear in the lower left quadrant.

## 2017-07-11 ENCOUNTER — OFFICE VISIT (OUTPATIENT)
Dept: FAMILY MEDICINE CLINIC | Facility: CLINIC | Age: 42
End: 2017-07-11

## 2017-07-11 ENCOUNTER — HOSPITAL ENCOUNTER (OUTPATIENT)
Dept: LAB | Age: 42
Discharge: HOME | End: 2017-07-11
Payer: MEDICAID

## 2017-07-11 VITALS
WEIGHT: 218.8 LBS | TEMPERATURE: 97.7 F | OXYGEN SATURATION: 99 % | DIASTOLIC BLOOD PRESSURE: 100 MMHG | HEIGHT: 70 IN | SYSTOLIC BLOOD PRESSURE: 138 MMHG | BODY MASS INDEX: 31.32 KG/M2 | RESPIRATION RATE: 16 BRPM | HEART RATE: 77 BPM

## 2017-07-11 DIAGNOSIS — F41.9 ANXIETY AND DEPRESSION: ICD-10-CM

## 2017-07-11 DIAGNOSIS — R53.82 CHRONIC FATIGUE: ICD-10-CM

## 2017-07-11 DIAGNOSIS — E78.5 HYPERLIPIDEMIA, UNSPECIFIED HYPERLIPIDEMIA TYPE: ICD-10-CM

## 2017-07-11 DIAGNOSIS — R10.31 INGUINAL PAIN OF BOTH SIDES: ICD-10-CM

## 2017-07-11 DIAGNOSIS — F32.A ANXIETY AND DEPRESSION: ICD-10-CM

## 2017-07-11 DIAGNOSIS — R10.32 INGUINAL PAIN OF BOTH SIDES: ICD-10-CM

## 2017-07-11 DIAGNOSIS — I10 ESSENTIAL HYPERTENSION: ICD-10-CM

## 2017-07-11 DIAGNOSIS — N28.9 KIDNEY FUNCTION ABNORMAL: ICD-10-CM

## 2017-07-11 DIAGNOSIS — I10 ESSENTIAL HYPERTENSION: Primary | ICD-10-CM

## 2017-07-11 DIAGNOSIS — K59.00 CONSTIPATION, UNSPECIFIED CONSTIPATION TYPE: ICD-10-CM

## 2017-07-11 DIAGNOSIS — E11.9 TYPE 2 DIABETES MELLITUS WITHOUT COMPLICATION, WITHOUT LONG-TERM CURRENT USE OF INSULIN (HCC): ICD-10-CM

## 2017-07-11 LAB
25(OH)D3 SERPL-MCNC: 25.1 NG/ML (ref 30–100)
ALBUMIN SERPL BCP-MCNC: 3.7 G/DL (ref 3.4–5)
ALBUMIN/GLOB SERPL: 0.9 {RATIO} (ref 0.8–1.7)
ALP SERPL-CCNC: 59 U/L (ref 45–117)
ALT SERPL-CCNC: 18 U/L (ref 16–61)
ANION GAP BLD CALC-SCNC: 7 MMOL/L (ref 3–18)
AST SERPL W P-5'-P-CCNC: 9 U/L (ref 15–37)
BILIRUB SERPL-MCNC: 0.3 MG/DL (ref 0.2–1)
BUN SERPL-MCNC: 13 MG/DL (ref 7–18)
BUN/CREAT SERPL: 11 (ref 12–20)
CALCIUM SERPL-MCNC: 9.3 MG/DL (ref 8.5–10.1)
CHLORIDE SERPL-SCNC: 106 MMOL/L (ref 100–108)
CHOLEST SERPL-MCNC: 224 MG/DL
CO2 SERPL-SCNC: 26 MMOL/L (ref 21–32)
CREAT SERPL-MCNC: 1.14 MG/DL (ref 0.6–1.3)
GLOBULIN SER CALC-MCNC: 4.1 G/DL (ref 2–4)
GLUCOSE SERPL-MCNC: 113 MG/DL (ref 74–99)
HDLC SERPL-MCNC: 37 MG/DL (ref 40–60)
HDLC SERPL: 6.1 {RATIO} (ref 0–5)
LDLC SERPL CALC-MCNC: 107 MG/DL (ref 0–100)
LIPID PROFILE,FLP: ABNORMAL
POTASSIUM SERPL-SCNC: 4.2 MMOL/L (ref 3.5–5.5)
PROT SERPL-MCNC: 7.8 G/DL (ref 6.4–8.2)
SODIUM SERPL-SCNC: 139 MMOL/L (ref 136–145)
T4 FREE SERPL-MCNC: 0.8 NG/DL (ref 0.7–1.5)
TRIGL SERPL-MCNC: 400 MG/DL (ref ?–150)
TSH SERPL DL<=0.05 MIU/L-ACNC: 0.42 UIU/ML (ref 0.36–3.74)
VLDLC SERPL CALC-MCNC: 80 MG/DL

## 2017-07-11 PROCEDURE — 80061 LIPID PANEL: CPT | Performed by: PHYSICIAN ASSISTANT

## 2017-07-11 PROCEDURE — 84439 ASSAY OF FREE THYROXINE: CPT | Performed by: PHYSICIAN ASSISTANT

## 2017-07-11 PROCEDURE — 36415 COLL VENOUS BLD VENIPUNCTURE: CPT | Performed by: PHYSICIAN ASSISTANT

## 2017-07-11 PROCEDURE — 80053 COMPREHEN METABOLIC PANEL: CPT | Performed by: PHYSICIAN ASSISTANT

## 2017-07-11 PROCEDURE — 84443 ASSAY THYROID STIM HORMONE: CPT | Performed by: PHYSICIAN ASSISTANT

## 2017-07-11 PROCEDURE — 82306 VITAMIN D 25 HYDROXY: CPT | Performed by: PHYSICIAN ASSISTANT

## 2017-07-11 RX ORDER — LANCETS
EACH MISCELLANEOUS
Qty: 1 EACH | Refills: 11 | Status: SHIPPED | OUTPATIENT
Start: 2017-07-11 | End: 2018-11-20 | Stop reason: SDUPTHER

## 2017-07-11 RX ORDER — CARVEDILOL 12.5 MG/1
TABLET ORAL 2 TIMES DAILY WITH MEALS
COMMUNITY
End: 2017-11-28

## 2017-07-11 RX ORDER — BUPROPION HYDROCHLORIDE 150 MG/1
150 TABLET ORAL
Qty: 30 TAB | Refills: 4 | Status: SHIPPED | OUTPATIENT
Start: 2017-07-11 | End: 2017-11-28

## 2017-07-11 RX ORDER — PSYLLIUM HUSK 0.4 G
0.4 CAPSULE ORAL DAILY
Qty: 900 EACH | Refills: 0 | Status: SHIPPED | OUTPATIENT
Start: 2017-07-11 | End: 2017-08-04

## 2017-07-11 RX ORDER — HYDROXYZINE PAMOATE 50 MG/1
CAPSULE ORAL
Qty: 90 CAP | Refills: 3 | Status: SHIPPED | OUTPATIENT
Start: 2017-07-11 | End: 2017-09-05

## 2017-07-11 RX ORDER — DOCUSATE SODIUM 100 MG/1
100 CAPSULE, LIQUID FILLED ORAL 2 TIMES DAILY
Qty: 60 CAP | Refills: 2 | Status: SHIPPED | OUTPATIENT
Start: 2017-07-11 | End: 2017-08-03

## 2017-07-11 RX ORDER — INSULIN PUMP SYRINGE, 3 ML
EACH MISCELLANEOUS
Qty: 1 KIT | Refills: 0 | Status: SHIPPED | OUTPATIENT
Start: 2017-07-11 | End: 2018-03-16 | Stop reason: ALTCHOICE

## 2017-07-11 NOTE — PATIENT INSTRUCTIONS
Abdominal Hernia Repair: What to Expect at Home  Your Recovery  After surgery to repair your hernia, you are likely to have pain for a few days. You may also feel like you have the flu, and you may have a low fever and feel tired and nauseated. This is common. You should feel better after a few days and will probably feel much better in 7 days. For several weeks you may feel twinges or pulling in the hernia repair when you move. You may have some bruising around the area of your hernia repair. This is normal.  This care sheet gives you a general idea about how long it will take for you to recover. But each person recovers at a different pace. Follow the steps below to get better as quickly as possible. How can you care for yourself at home? Activity  · Rest when you feel tired. Getting enough sleep will help you recover. · Try to walk each day. Start by walking a little more than you did the day before. Bit by bit, increase the amount you walk. Walking boosts blood flow and helps prevent pneumonia and constipation. · If your doctor gives you an abdominal binder to wear, use it as directed. This is an elastic bandage that wraps around your belly and upper hips. It helps support your belly muscles after surgery. · Avoid strenuous activities, such as biking, jogging, weight lifting, or aerobic exercise, until your doctor says it is okay. · Avoid lifting anything that would make you strain. This may include heavy grocery bags and milk containers, a heavy briefcase or backpack, cat litter or dog food bags, a vacuum , or a child. · Ask your doctor when you can drive again. · Most people are able to return to work within 1 to 2 weeks after surgery. But if your job requires that you to do heavy lifting or strenuous activity, you may need to take 4 to 6 weeks off from work. · You may shower 24 to 48 hours after surgery, if your doctor okays it. Pat the cut (incision) dry.  Do not take a bath for the first 2 weeks, or until your doctor tells you it is okay. · Ask your doctor when it is okay for you to have sex. Diet  · You can eat your normal diet. If your stomach is upset, try bland, low-fat foods like plain rice, broiled chicken, toast, and yogurt. · Drink plenty of fluids (unless your doctor tells you not to). · You may notice that your bowel movements are not regular right after your surgery. This is common. Avoid constipation and straining with bowel movements. You may want to take a fiber supplement every day. If you have not had a bowel movement after a couple of days, ask your doctor about taking a mild laxative. Medicines  · Your doctor will tell you if and when you can restart your medicines. He or she will also give you instructions about taking any new medicines. · If you take blood thinners, such as warfarin (Coumadin), clopidogrel (Plavix), or aspirin, be sure to talk to your doctor. He or she will tell you if and when to start taking those medicines again. Make sure that you understand exactly what your doctor wants you to do. · Be safe with medicines. Take pain medicines exactly as directed. ¨ If the doctor gave you a prescription medicine for pain, take it as prescribed. ¨ If you are not taking a prescription pain medicine, ask your doctor if you can take an over-the-counter medicine. · If your doctor prescribed antibiotics, take them as directed. Do not stop taking them just because you feel better. You need to take the full course of antibiotics. · If you think your pain medicine is making you sick to your stomach:  ¨ Take your medicine after meals (unless your doctor has told you not to). ¨ Ask your doctor for a different pain medicine. Incision care  · If you have strips of tape on the cut (incision) the doctor made, leave the tape on for a week or until it falls off. Or follow your doctor's instructions for removing the tape.   · If you have staples closing the cut, you will need to visit your doctor in 1 to 2 weeks to have them removed. · Wash the area daily with warm, soapy water, and pat it dry. Don't use hydrogen peroxide or alcohol, which can slow healing. You may cover the area with a gauze bandage if it weeps or rubs against clothing. Change the bandage every day. Other instructions  · Hold a pillow over your incision when you cough or take deep breaths. This will support your belly and decrease your pain. · Do breathing exercises at home as instructed by your doctor. This will help prevent pneumonia. · If you had laparoscopic surgery, you may also have pain in your left shoulder. The pain usually lasts about a day or two. Follow-up care is a key part of your treatment and safety. Be sure to make and go to all appointments, and call your doctor if you are having problems. It's also a good idea to know your test results and keep a list of the medicines you take. When should you call for help? Call 911 anytime you think you may need emergency care. For example, call if:  · You passed out (lost consciousness). · You have sudden chest pain and shortness of breath, or you cough up blood. · You have severe pain in your belly. Call your doctor now or seek immediate medical care if:  · You are sick to your stomach and cannot keep fluids down. · You have signs of a blood clot, such as:  ¨ Pain in your calf, back of the knee, thigh, or groin. ¨ Redness and swelling in your leg or groin. · You have signs of infection, such as:  ¨ Increased pain, swelling, warmth, or redness. ¨ Red streaks leading from the incision. ¨ Pus draining from the incision. ¨ A fever. · You have trouble passing urine or stool, especially if you have mild pain or swelling in your lower belly. · Bright red blood has soaked through the bandage over your incision. Watch closely for changes in your health, and be sure to contact your doctor if:  · Your swelling is getting worse.   · Your swelling is not going down. · You still don't have a bowel movement after taking a laxative. Where can you learn more? Go to http://jacklyn-denice.info/. Enter B577 in the search box to learn more about \"Abdominal Hernia Repair: What to Expect at Home. \"  Current as of: August 9, 2016  Content Version: 11.3  © 6035-9779 [employer]. Care instructions adapted under license by [employer] (which disclaims liability or warranty for this information). If you have questions about a medical condition or this instruction, always ask your healthcare professional. [place] [address] any warranty or liability for your use of this information.

## 2017-07-11 NOTE — PROGRESS NOTES
History and Physical    Patient: Chris Campos MRN: [id number]  SSN: [id number]    YOB: 1975  Age: 42 y.o. Sex: male      Subjective:      Chris Campos is a 42 y.o. male who was being seen here, switched to another practice and now is coming back here. He is presenting with concerns for anxiety and depression. He is currently on Zoloft, he states he has been on this for 2 years but does not feel like it is helping, he is currently going through a divorce and recently lost his job. Patient use to be on Xanax and Klonopin, he states Klonpin worked well for him, requesting refill. He was seeing psych but he states he felt that they were prejudice against him, does not want to see them again. Patients chart is noted to have drug seeking behavior with history of seeing many different providers within the [employer] system. He has a h/o DM, is on Januvia. States he does not check his blood sugar because he states that the supplies were not covered by his insurance. Patients renal function has recently declined, patient states he saw nephrologist who told him that it was due to having had contrast dye recently. Patient had bilateral inguinal hernia surgery by Dr. Bull Curry, was seen several weeks ago and appeared exam looked normal and to follow up as normal, patient presented to ED that same day after having lifted heavy furniture and felt pain in his groin, exam again was benign at ED. Patient states he continues to feel discomfort in his groin where the hernias were repaired but he denies bulges. He has c/o constipation, states he uses miralax daily but still can go a week without having a BM, denies bloody or black stools. Patient has a h/o HTN, is on Coreg, use to also be on Lisinopril HCTZ but he states Dr. Alina Escobar took him off of this, BP elevated today.        PMH:  Past Medical History:   Diagnosis Date    Depression     Diabetes ([facility] [address]     Drug-seeking behavior     56 prescriptions from 32 different prescribers at 6 different pharmacies in last 12 months    Herniated lumbar intervertebral disc     Hypertension     Neurological disorder L3,4,5 torn Herniated disc     Past Surgical History:   Procedure Laterality Date    HX HERNIA REPAIR Bilateral 06/02/2017    robotic repair of bilateral indirect inguinal hernias with placement of mesh    HX ORTHOPAEDIC  trigger finger release        FamHx:  Family History   Problem Relation Age of Onset    Hypertension Mother     Diabetes Mother     Heart Failure Mother     COPD Mother     Heart Disease Mother     Hypertension Father     Alcohol abuse Father        Socialhx:  Social History   Substance Use Topics    Smoking status: Never Smoker    Smokeless tobacco: Never Used    Alcohol use Yes      Comment: rarely        Meds:  Prior to Admission medications    Medication Sig Start Date End Date Taking? Authorizing Provider   carvedilol (COREG) 12.5 mg tablet Take  by mouth two (2) times daily (with meals). Yes Historical Provider   buPROPion XL (WELLBUTRIN XL) 150 mg tablet Take 1 Tab by mouth every morning. 7/11/17  Yes MATHEUS Cunningham   Blood-Glucose Meter monitoring kit Check fasting blood sugar once daily 7/11/17  Yes Jessica Pumphrey V, PA   glucose blood VI test strips (ASCENSIA AUTODISC VI, ONE TOUCH ULTRA TEST VI) strip Check fasting blood sugar once daily 7/11/17  Yes MATHEUS Cunningham   Lancets misc Check fasting blood sugar once daily 7/11/17  Yes Jessica Pumphrey V, PA   docusate sodium (COLACE) 100 mg capsule Take 1 Cap by mouth two (2) times a day for 90 days. 7/11/17 10/9/17 Yes Jessica Pumphrey V, PA   psyllium husk (METAMUCIL) 0.4 gram cap Take 0.4 g by mouth daily. 7/11/17  Yes MATHEUS Aguiar   Lactobac. rhamnosus GG-inulin (CULTURELLE PROBIOTICS) 10 billion cell -200 mg chew Take 1 Each by mouth daily.  7/11/17  Yes MATHEUS Cunningham   hydrOXYzine pamoate (VISTARIL) 50 mg capsule Take 1-2 caps TID PRN 7/11/17  Yes Jessica Pumphrey V, PA   ibuprofen (MOTRIN) 800 mg tablet Take  by mouth. Yes Historical Provider   PEN NEEDLE, DIABETIC (RELION NEEDLES) by Does Not Apply route. Yes Historical Provider   glucose blood VI test strips (RELION PRIME TEST STRIPS) strip Check blood sugars twice daily 6/22/17  Yes Ham Orozco PA-C   sertraline (ZOLOFT) 100 mg tablet Take 1 Tab by mouth daily. 6/19/17  Yes Thao Garcia MD   JANUVIA 50 mg tablet TAKE 1 TABLET BY MOUTH DAILY 5/24/17  Yes Thao Garcia MD        Allergies:  No Known Allergies    Review of Systems:  Items in bold are positive:  Constitutional: fatigue, negative for fevers, chills and malaise  Eyes: negative for visual disturbance  Ears, Nose, Mouth, Throat, and Face: negative for nasal congestion  Respiratory: negative for cough or SOB  Cardiovascular: negative for chest pain, chest pressure/discomfort  Gastrointestinal:constipation,  negative for nausea, vomiting, melena, BRBPR, diarrhea, and abdominal pain  Genitourinary: bilateral inguinal pain, negative for frequency, dysuria, hesitancy and decreased stream  Musculoskeletal:negative for myalgias and arthralgias  Neurological: negative for headaches, dizziness and paresthesia  Psych: anxiety and depression, denies si/hi    Objective:     Vitals:    07/11/17 0844   BP: (!) 138/100   Pulse: 77   Resp: 16   Temp: 97.7 °F (36.5 °C)   TempSrc: Oral   SpO2: 99%   Weight: 218 lb 12.8 oz (99.2 kg)   Height: 5' 10\" (1.778 m)        Physical Exam:  GENERAL: alert, cooperative, no distress, appears stated age  HEENT: EYE: conjunctivae/corneas clear. EOM's intact. LUNG: clear to auscultation bilaterally  HEART: regular rate and rhythm, S1, S2 normal, no murmur, click, rub or gallop  ABDOMEN: soft, non-tender.  Bowel sounds normal. No masses  : no inguinal bulging upon valsalva, no ttp in inguinal region bialterally  EXTREMITIES:  extremities normal, atraumatic, no cyanosis or edema  NEUROLOGIC: AOx3. Gait normal.  PSYCH: mood: depressed affect: depressed    Labs  Lab Results   Component Value Date/Time    Hemoglobin A1c 6.8 06/09/2017 04:29 PM    Hemoglobin A1c (POC) 8.0 03/03/2017 02:11 PM       Lab Results   Component Value Date/Time    Sodium 136 06/16/2017 01:05 PM    Potassium 4.0 06/16/2017 01:05 PM    Chloride 102 06/16/2017 01:05 PM    CO2 23 06/16/2017 01:05 PM    Anion gap 11 06/16/2017 01:05 PM    Glucose 121 06/16/2017 01:05 PM    BUN 24 06/16/2017 01:05 PM    Creatinine 2.32 06/16/2017 01:05 PM    BUN/Creatinine ratio 10 06/16/2017 01:05 PM    GFR est AA 38 06/16/2017 01:05 PM    GFR est non-AA 31 06/16/2017 01:05 PM    Calcium 9.6 06/16/2017 01:05 PM    Bilirubin, total 0.2 06/16/2017 01:05 PM    AST (SGOT) 11 06/16/2017 01:05 PM    Alk. phosphatase 69 06/16/2017 01:05 PM    Protein, total 8.9 06/16/2017 01:05 PM    Albumin 4.0 06/16/2017 01:05 PM    Globulin 4.9 06/16/2017 01:05 PM    A-G Ratio 0.8 06/16/2017 01:05 PM    ALT (SGPT) 15 06/16/2017 01:05 PM         Assessment and Plan:       ICD-10-CM ICD-9-CM    1. Essential hypertension C46 645.4 METABOLIC PANEL, COMPREHENSIVE   2. Anxiety and depression F41.9 300.00 buPROPion XL (WELLBUTRIN XL) 150 mg tablet    F32.9 311 REFERRAL TO PSYCHIATRY      hydrOXYzine pamoate (VISTARIL) 50 mg capsule   3. Type 2 diabetes mellitus without complication, without long-term current use of insulin (HCC) E11.9 250.00 Blood-Glucose Meter monitoring kit      glucose blood VI test strips (ASCENSIA AUTODISC VI, ONE TOUCH ULTRA TEST VI) strip      Lancets [facility]      REFERRAL TO OPHTHALMOLOGY   4. Hyperlipidemia, unspecified hyperlipidemia type E78.5 272.4 LIPID PANEL   5. Kidney function abnormal N28.9 593.9    6. Inguinal pain of both sides R10.30 789.09 US ABD LTD   7. Chronic fatigue R53.82 780.79 T4, FREE      TSH 3RD GENERATION      VITAMIN D, 25 HYDROXY   8.  Constipation, unspecified constipation type K59.00 564.00 docusate sodium (COLACE) 100 mg capsule      psyllium husk (METAMUCIL) 0.4 gram cap      Lactobac. rhamnosus GG-inulin (CULTURELLE PROBIOTICS) 10 billion cell -200 mg chew         Medical Decision Making:  HTN- continue to monitor- slightly elevated today, if remains elevated at follow up will need therapy    Anxiety and depression- continue Zoloft + start Wellbutrin + start Vistaril + referral to psych    DM- testing supplies +referral to opthalmology + continue current therapy    HLD- labs- needs follow up    Abnormal renal function- labs- needs follow up    Bilateral inguinal pain- US    Fatigue- labs    Constipation- daily stool softeners + metamucil + daily Miralax + daily probiotics- advised if diarrhea develops to back off of stool softener or miralax    Follow-up Disposition:  Return in about 2 weeks (around 7/25/2017) for for lab review. Patient acknowledges understanding of instructions and acknowledges understanding to call back if current symptoms worsen or new symptoms arise. Patient acknowledges and agrees with plan.     Signed By: MATHEUS Cruz     July 11, 2017

## 2017-07-11 NOTE — MR AVS SNAPSHOT
Visit Information Date & Time Provider Department Dept. Phone Encounter #  
 7/11/2017  8:30 AM Irais Lilly M[facility]LIN [facility] [phone] [id number] Upcoming Health Maintenance Date Due  
 EYE EXAM RETINAL OR DILATED Q1 12/28/1984 Pneumococcal 19-64 Medium Risk (1 of 1 - PPSV23) 12/28/1993 DTaP/Tdap/Td series (1 - Tdap) 12/28/1995 MICROALBUMIN Q1 7/7/2016 LIPID PANEL Q1 7/7/2016 FOOT EXAM Q1 4/27/2017 INFLUENZA AGE 9 TO ADULT 8/1/2017 HEMOGLOBIN A1C Q6M 12/9/2017 Allergies as of 7/11/2017  Review Complete On: 7/11/2017 By: Desirae Sexton LPN No Known Allergies Current Immunizations  Reviewed on 9/2/2015 Name Date Influenza Vaccine (Quad) PF 9/2/2015 Influenza Vaccine Whole 12/1/2009 Not reviewed this visit You Were Diagnosed With   
  
 Codes Comments Essential hypertension    -  Primary ICD-10-CM: I10 
ICD-9-CM: 401.9 Anxiety and depression     ICD-10-CM: F41.9, F32.9 ICD-9-CM: 300.00, 311 Type 2 diabetes mellitus without complication, without long-term current use of insulin (HCC)     ICD-10-CM: E11.9 ICD-9-CM: 250.00 Kidney function abnormal     ICD-10-CM: N28.9 ICD-9-CM: 593.9 Inguinal pain of both sides     ICD-10-CM: R10.30 ICD-9-CM: 789.09 Chronic fatigue     ICD-10-CM: R53.82 
ICD-9-CM: 780.79 Constipation, unspecified constipation type     ICD-10-CM: K59.00 ICD-9-CM: 564.00 Hyperlipidemia, unspecified hyperlipidemia type     ICD-10-CM: E78.5 ICD-9-CM: 272.4 Vitals BP Pulse Temp Resp Height(growth percentile) Weight(growth percentile) (!) 138/100 (BP 1 Location: Left arm, BP Patient Position: Sitting) 77 97.7 °F (36.5 °C) (Oral) 16 5' 10\" (1.778 m) 218 lb 12.8 oz (99.2 kg) SpO2 BMI Smoking Status 99% 31.39 kg/m2 Never Smoker BMI and BSA Data Body Mass Index Body Surface Area  
 31.39 kg/m 2 2.21 m 2 Preferred Pharmacy Pharmacy Name Phone [facility] [address] [place] [address], [address] [place] [address]. [address] [phone] Your Updated Medication List  
  
   
This list is accurate as of: 7/11/17  9:10 AM.  Always use your most recent med list.  
  
  
  
  
 Blood-Glucose Meter monitoring kit Check fasting blood sugar once daily buPROPion  mg tablet Commonly known as:  Blaze Priestly Take 1 Tab by mouth every morning. COREG 12.5 mg tablet Generic drug:  carvedilol Take  by mouth two (2) times daily (with meals). docusate sodium 100 mg capsule Commonly known as:  Brittny Parsley Take 1 Cap by mouth two (2) times a day for 90 days. * glucose blood VI test strips strip Commonly known as:  RELION PRIME TEST STRIPS Check blood sugars twice daily * glucose blood VI test strips strip Commonly known as:  ASCENSIA AUTODISC VI, ONE TOUCH ULTRA TEST VI Check fasting blood sugar once daily  
  
 hydrOXYzine pamoate 50 mg capsule Commonly known as:  VISTARIL Take 1-2 caps TID PRN  
  
 ibuprofen 800 mg tablet Commonly known as:  MOTRIN Take  by mouth. JANUVIA 50 mg tablet Generic drug:  SITagliptin TAKE 1 TABLET BY MOUTH DAILY Lactobac. rhamnosus GG-inulin 10 billion cell -200 mg Arvid Mellow Commonly known as:  CULTURELLE PROBIOTICS Take 1 Each by mouth daily. Lancets Misc Check fasting blood sugar once daily  
  
 psyllium husk 0.4 gram Cap Commonly known as:  METAMUCIL Take 0.4 g by mouth daily. RELION NEEDLES  
by Does Not Apply route. sertraline 100 mg tablet Commonly known as:  ZOLOFT Take 1 Tab by mouth daily. * Notice: This list has 2 medication(s) that are the same as other medications prescribed for you. Read the directions carefully, and ask your doctor or other care provider to review them with you. Prescriptions Sent to Pharmacy Refills buPROPion XL (WELLBUTRIN XL) 150 mg tablet 4 Sig: Take 1 Tab by mouth every morning. Class: Normal  
 Pharmacy: [address] [place] [address] Ph #: [phone] Route: Oral  
 Blood-Glucose Meter monitoring kit 0 Sig: Check fasting blood sugar once daily Class: Normal  
 Pharmacy: [address] [place] [address] Ph #: [phone]  
 glucose blood VI test strips (ASCENSIA AUTODISC VI, ONE TOUCH ULTRA TEST VI) strip 3 Sig: Check fasting blood sugar once daily Class: Normal  
 Pharmacy: [address] [place] [address] Ph #: [phone] Lancets misc 11 Sig: Check fasting blood sugar once daily Class: Normal  
 Pharmacy: [address] [place] [address] Ph #: [phone]  
 docusate sodium (COLACE) 100 mg capsule 2 Sig: Take 1 Cap by mouth two (2) times a day for 90 days. Class: Normal  
 Pharmacy: [address] [place] [address] Ph #: [phone] Route: Oral  
 psyllium husk (METAMUCIL) 0.4 gram cap 0 Sig: Take 0.4 g by mouth daily. Class: Normal  
 Pharmacy: [address] [place] [address] Ph #: [phone] Route: Oral  
 Lactobac. rhamnosus GG-inulin (CULTURELLE PROBIOTICS) 10 billion cell -200 mg chew 0 Sig: Take 1 Each by mouth daily. Class: Normal  
 Pharmacy: [address] [place] [address] Ph #: [phone] Route: Oral  
 hydrOXYzine pamoate (VISTARIL) 50 mg capsule 3 Sig: Take 1-2 caps TID PRN Class: Normal  
 Pharmacy: [address] [place], [address] [place] [address]. [place] [address] Ph #: [phone] We Performed the Following REFERRAL TO OPHTHALMOLOGY [REF57 Custom] Comments:  
 Please evaluate patient for DM eye exam  
 REFERRAL TO PSYCHIATRY [REF91 Custom] Comments:  
 Please evaluate patient for anxiety and depression To-Do List   
 07/25/2017 Lab:  LIPID PANEL   
  
 07/25/2017 Lab:  METABOLIC PANEL, COMPREHENSIVE   
  
 07/25/2017 Lab:  T4, FREE   
  
 07/25/2017 Lab:  TSH 3RD GENERATION   
  
 07/25/2017 Imaging:  US ABD LTD   
  
 07/25/2017 Lab:  VITAMIN D, 25 HYDROXY Referral Information Referral ID Referred By Referred To  
  
 [id number] MD Kathrin Trejo [address] [place], [address] Phone: [phone] Fax: [phone] Visits Status Start Date End Date 1 New Request 7/11/17 7/11/18 If your referral has a status of pending review or denied, additional information will be sent to support the outcome of this decision. Referral ID Referred By Referred To  
 [id number] MAXINE Lehman [employer] [address] [address] Phone: [phone] Fax: [phone] Visits Status Start Date End Date 1 New Request 7/11/17 7/11/18 If your referral has a status of pending review or denied, additional information will be sent to support the outcome of this decision. Patient Instructions Abdominal Hernia Repair: What to Expect at Home Your Recovery After surgery to repair your hernia, you are likely to have pain for a few days. You may also feel like you have the flu, and you may have a low fever and feel tired and nauseated. This is common. You should feel better after a few days and will probably feel much better in 7 days. For several weeks you may feel twinges or pulling in the hernia repair when you move.  You may have some bruising around the area of your hernia repair. This is normal. 
This care sheet gives you a general idea about how long it will take for you to recover. But each person recovers at a different pace. Follow the steps below to get better as quickly as possible. How can you care for yourself at home? Activity · Rest when you feel tired. Getting enough sleep will help you recover. · Try to walk each day. Start by walking a little more than you did the day before. Bit by bit, increase the amount you walk. Walking boosts blood flow and helps prevent pneumonia and constipation. · If your doctor gives you an abdominal binder to wear, use it as directed. This is an elastic bandage that wraps around your belly and upper hips. It helps support your belly muscles after surgery. · Avoid strenuous activities, such as biking, jogging, weight lifting, or aerobic exercise, until your doctor says it is okay. · Avoid lifting anything that would make you strain. This may include heavy grocery bags and milk containers, a heavy briefcase or backpack, cat litter or dog food bags, a vacuum , or a child. · Ask your doctor when you can drive again. · Most people are able to return to work within 1 to 2 weeks after surgery. But if your job requires that you to do heavy lifting or strenuous activity, you may need to take 4 to 6 weeks off from work. · You may shower 24 to 48 hours after surgery, if your doctor okays it. Pat the cut (incision) dry. Do not take a bath for the first 2 weeks, or until your doctor tells you it is okay. · Ask your doctor when it is okay for you to have sex. Diet · You can eat your normal diet. If your stomach is upset, try bland, low-fat foods like plain rice, broiled chicken, toast, and yogurt. · Drink plenty of fluids (unless your doctor tells you not to). · You may notice that your bowel movements are not regular right after your surgery. This is common.  Avoid constipation and straining with bowel movements. You may want to take a fiber supplement every day. If you have not had a bowel movement after a couple of days, ask your doctor about taking a mild laxative. Medicines · Your doctor will tell you if and when you can restart your medicines. He or she will also give you instructions about taking any new medicines. · If you take blood thinners, such as warfarin (Coumadin), clopidogrel (Plavix), or aspirin, be sure to talk to your doctor. He or she will tell you if and when to start taking those medicines again. Make sure that you understand exactly what your doctor wants you to do. · Be safe with medicines. Take pain medicines exactly as directed. ¨ If the doctor gave you a prescription medicine for pain, take it as prescribed. ¨ If you are not taking a prescription pain medicine, ask your doctor if you can take an over-the-counter medicine. · If your doctor prescribed antibiotics, take them as directed. Do not stop taking them just because you feel better. You need to take the full course of antibiotics. · If you think your pain medicine is making you sick to your stomach: 
¨ Take your medicine after meals (unless your doctor has told you not to). ¨ Ask your doctor for a different pain medicine. Incision care · If you have strips of tape on the cut (incision) the doctor made, leave the tape on for a week or until it falls off. Or follow your doctor's instructions for removing the tape. · If you have staples closing the cut, you will need to visit your doctor in 1 to 2 weeks to have them removed. · Wash the area daily with warm, soapy water, and pat it dry. Don't use hydrogen peroxide or alcohol, which can slow healing. You may cover the area with a gauze bandage if it weeps or rubs against clothing. Change the bandage every day. Other instructions · Hold a pillow over your incision when you cough or take deep breaths. This will support your belly and decrease your pain. · Do breathing exercises at home as instructed by your doctor. This will help prevent pneumonia. · If you had laparoscopic surgery, you may also have pain in your left shoulder. The pain usually lasts about a day or two. Follow-up care is a key part of your treatment and safety. Be sure to make and go to all appointments, and call your doctor if you are having problems. It's also a good idea to know your test results and keep a list of the medicines you take. When should you call for help? Call 911 anytime you think you may need emergency care. For example, call if: 
· You passed out (lost consciousness). · You have sudden chest pain and shortness of breath, or you cough up blood. · You have severe pain in your belly. Call your doctor now or seek immediate medical care if: 
· You are sick to your stomach and cannot keep fluids down. · You have signs of a blood clot, such as: 
¨ Pain in your calf, back of the knee, thigh, or groin. ¨ Redness and swelling in your leg or groin. · You have signs of infection, such as: 
¨ Increased pain, swelling, warmth, or redness. ¨ Red streaks leading from the incision. ¨ Pus draining from the incision. ¨ A fever. · You have trouble passing urine or stool, especially if you have mild pain or swelling in your lower belly. · Bright red blood has soaked through the bandage over your incision. Watch closely for changes in your health, and be sure to contact your doctor if: 
· Your swelling is getting worse. · Your swelling is not going down. · You still don't have a bowel movement after taking a laxative. Where can you learn more? Go to http://jacklyn-denice.info/. Enter B577 in the search box to learn more about \"Abdominal Hernia Repair: What to Expect at Home. \" Current as of: August 9, 2016 Content Version: 11.3 © 9593-6560 [employer].  Care instructions adapted under license by [address] (which disclaims liability or warranty for this information). If you have questions about a medical condition or this instruction, always ask your healthcare professional. [address] any warranty or liability for your use of this information. Introducing [facility]! Shawn Muro introduces [employer] patient portal. Now you can access parts of your medical record, email your doctor's office, and request medication refills online. 1. In your internet browser, go to [email or web address] 2. Click on the First Time User? Click Here link in the Sign In box. You will see the New Member Sign Up page. 3. Enter your [employer] Access Code exactly as it appears below. You will not need to use this code after youve completed the sign-up process. If you do not sign up before the expiration date, you must request a new code. · [employer] Access Code: [email or web address] Expires: 8/9/2017  9:05 PM 
 
4. Enter the last four digits of your Social Security Number (xxxx) and Date of Birth (mm/dd/yyyy) as indicated and click Submit. You will be taken to the next sign-up page. 5. Create a [employer] ID. This will be your [employer] login ID and cannot be changed, so think of one that is secure and easy to remember. 6. Create a [employer] password. You can change your password at any time. 7. Enter your Password Reset Question and Answer. This can be used at a later time if you forget your password. 8. Enter your e-mail address. You will receive e-mail notification when new information is available in [address] 9. Click Sign Up. You can now view and download portions of your medical record. 10. Click the Download Summary menu link to download a portable copy of your medical information. If you have questions, please visit the Frequently Asked Questions section of the [employer] website.  Remember, [employer] is NOT to be used for urgent needs. For medical emergencies, dial 911. Now available from your iPhone and Android! Please provide this summary of care documentation to your next provider. Your primary care clinician is listed as Manoj Williamson. If you have any questions after today's visit, please call [phone].

## 2017-07-11 NOTE — PROGRESS NOTES
Telma Florence is a 42 y.o.  male presents today for office visit for acute care. Pt is in Room # 9      1. Have you been to the ER, urgent care clinic since your last visit? Hospitalized since your last visit? No    2. Have you seen or consulted any other health care providers outside of the [address] [place] [address] since your last visit? Include any pap smears or colon screening. NO    No Patient Care Coordination Note on file.

## 2017-07-13 ENCOUNTER — APPOINTMENT (OUTPATIENT)
Dept: CT IMAGING | Age: 42
End: 2017-07-13
Attending: EMERGENCY MEDICINE
Payer: SUBSIDIZED

## 2017-07-13 ENCOUNTER — HOSPITAL ENCOUNTER (EMERGENCY)
Age: 42
Discharge: HOME | End: 2017-07-13
Attending: EMERGENCY MEDICINE
Payer: SUBSIDIZED

## 2017-07-13 VITALS
WEIGHT: 216 LBS | OXYGEN SATURATION: 96 % | SYSTOLIC BLOOD PRESSURE: 138 MMHG | HEART RATE: 92 BPM | HEIGHT: 70 IN | DIASTOLIC BLOOD PRESSURE: 89 MMHG | RESPIRATION RATE: 16 BRPM | TEMPERATURE: 98.7 F | BODY MASS INDEX: 30.92 KG/M2

## 2017-07-13 DIAGNOSIS — R10.31 RLQ ABDOMINAL PAIN: Primary | ICD-10-CM

## 2017-07-13 DIAGNOSIS — G89.29 OTHER CHRONIC PAIN: ICD-10-CM

## 2017-07-13 LAB
ALBUMIN SERPL BCP-MCNC: 3.9 G/DL (ref 3.4–5)
ALBUMIN/GLOB SERPL: 0.8 {RATIO} (ref 0.8–1.7)
ALP SERPL-CCNC: 62 U/L (ref 45–117)
ALT SERPL-CCNC: 18 U/L (ref 16–61)
ANION GAP BLD CALC-SCNC: 6 MMOL/L (ref 3–18)
APPEARANCE UR: CLEAR
AST SERPL W P-5'-P-CCNC: 12 U/L (ref 15–37)
BASOPHILS # BLD AUTO: 0 K/UL (ref 0–0.06)
BASOPHILS # BLD: 1 % (ref 0–2)
BILIRUB DIRECT SERPL-MCNC: <0.1 MG/DL (ref 0–0.2)
BILIRUB SERPL-MCNC: 0.3 MG/DL (ref 0.2–1)
BILIRUB UR QL: NEGATIVE
BUN SERPL-MCNC: 16 MG/DL (ref 7–18)
BUN/CREAT SERPL: 12 (ref 12–20)
CALCIUM SERPL-MCNC: 9.3 MG/DL (ref 8.5–10.1)
CHLORIDE SERPL-SCNC: 104 MMOL/L (ref 100–108)
CO2 SERPL-SCNC: 27 MMOL/L (ref 21–32)
COLOR UR: YELLOW
CREAT SERPL-MCNC: 1.33 MG/DL (ref 0.6–1.3)
DIFFERENTIAL METHOD BLD: ABNORMAL
EOSINOPHIL # BLD: 0.1 K/UL (ref 0–0.4)
EOSINOPHIL NFR BLD: 2 % (ref 0–5)
ERYTHROCYTE [DISTWIDTH] IN BLOOD BY AUTOMATED COUNT: 14.7 % (ref 11.6–14.5)
GLOBULIN SER CALC-MCNC: 4.8 G/DL (ref 2–4)
GLUCOSE SERPL-MCNC: 185 MG/DL (ref 74–99)
GLUCOSE UR STRIP.AUTO-MCNC: 100 MG/DL
HCT VFR BLD AUTO: 36.7 % (ref 36–48)
HGB BLD-MCNC: 12.5 G/DL (ref 13–16)
HGB UR QL STRIP: NEGATIVE
KETONES UR QL STRIP.AUTO: NEGATIVE MG/DL
LEUKOCYTE ESTERASE UR QL STRIP.AUTO: NEGATIVE
LIPASE SERPL-CCNC: 309 U/L (ref 73–393)
LYMPHOCYTES # BLD AUTO: 21 % (ref 21–52)
LYMPHOCYTES # BLD: 1.2 K/UL (ref 0.9–3.6)
MCH RBC QN AUTO: 28.8 PG (ref 24–34)
MCHC RBC AUTO-ENTMCNC: 34.1 G/DL (ref 31–37)
MCV RBC AUTO: 84.6 FL (ref 74–97)
MONOCYTES # BLD: 0.4 K/UL (ref 0.05–1.2)
MONOCYTES NFR BLD AUTO: 7 % (ref 3–10)
NEUTS SEG # BLD: 4 K/UL (ref 1.8–8)
NEUTS SEG NFR BLD AUTO: 69 % (ref 40–73)
NITRITE UR QL STRIP.AUTO: NEGATIVE
PH UR STRIP: 7.5 [PH] (ref 5–8)
PLATELET # BLD AUTO: 226 K/UL (ref 135–420)
PMV BLD AUTO: 9.6 FL (ref 9.2–11.8)
POTASSIUM SERPL-SCNC: 3.9 MMOL/L (ref 3.5–5.5)
PROT SERPL-MCNC: 8.7 G/DL (ref 6.4–8.2)
PROT UR STRIP-MCNC: NEGATIVE MG/DL
RBC # BLD AUTO: 4.34 M/UL (ref 4.7–5.5)
SODIUM SERPL-SCNC: 137 MMOL/L (ref 136–145)
SP GR UR REFRACTOMETRY: 1.01 (ref 1–1.03)
UROBILINOGEN UR QL STRIP.AUTO: 0.2 EU/DL (ref 0.2–1)
WBC # BLD AUTO: 5.8 K/UL (ref 4.6–13.2)

## 2017-07-13 PROCEDURE — 99283 EMERGENCY DEPT VISIT LOW MDM: CPT

## 2017-07-13 PROCEDURE — 96375 TX/PRO/DX INJ NEW DRUG ADDON: CPT

## 2017-07-13 PROCEDURE — 96374 THER/PROPH/DIAG INJ IV PUSH: CPT

## 2017-07-13 PROCEDURE — 74011636320 HC RX REV CODE- 636/320: Performed by: EMERGENCY MEDICINE

## 2017-07-13 PROCEDURE — 83690 ASSAY OF LIPASE: CPT | Performed by: PHYSICIAN ASSISTANT

## 2017-07-13 PROCEDURE — 80048 BASIC METABOLIC PNL TOTAL CA: CPT | Performed by: PHYSICIAN ASSISTANT

## 2017-07-13 PROCEDURE — 96376 TX/PRO/DX INJ SAME DRUG ADON: CPT

## 2017-07-13 PROCEDURE — 96361 HYDRATE IV INFUSION ADD-ON: CPT

## 2017-07-13 PROCEDURE — 74011250636 HC RX REV CODE- 250/636: Performed by: EMERGENCY MEDICINE

## 2017-07-13 PROCEDURE — 85025 COMPLETE CBC W/AUTO DIFF WBC: CPT | Performed by: PHYSICIAN ASSISTANT

## 2017-07-13 PROCEDURE — 74177 CT ABD & PELVIS W/CONTRAST: CPT

## 2017-07-13 PROCEDURE — 80076 HEPATIC FUNCTION PANEL: CPT | Performed by: PHYSICIAN ASSISTANT

## 2017-07-13 PROCEDURE — 81003 URINALYSIS AUTO W/O SCOPE: CPT | Performed by: PHYSICIAN ASSISTANT

## 2017-07-13 RX ORDER — HYDROMORPHONE HYDROCHLORIDE 1 MG/ML
1 INJECTION, SOLUTION INTRAMUSCULAR; INTRAVENOUS; SUBCUTANEOUS ONCE
Status: COMPLETED | OUTPATIENT
Start: 2017-07-13 | End: 2017-07-13

## 2017-07-13 RX ORDER — SODIUM CHLORIDE 9 MG/ML
150 INJECTION, SOLUTION INTRAVENOUS CONTINUOUS
Status: DISCONTINUED | OUTPATIENT
Start: 2017-07-13 | End: 2017-07-13 | Stop reason: HOSPADM

## 2017-07-13 RX ORDER — MORPHINE SULFATE 4 MG/ML
4 INJECTION, SOLUTION INTRAMUSCULAR; INTRAVENOUS
Status: COMPLETED | OUTPATIENT
Start: 2017-07-13 | End: 2017-07-13

## 2017-07-13 RX ORDER — ONDANSETRON 2 MG/ML
4 INJECTION INTRAMUSCULAR; INTRAVENOUS
Status: COMPLETED | OUTPATIENT
Start: 2017-07-13 | End: 2017-07-13

## 2017-07-13 RX ADMIN — HYDROMORPHONE HYDROCHLORIDE 1 MG: 1 INJECTION, SOLUTION INTRAMUSCULAR; INTRAVENOUS; SUBCUTANEOUS at 14:30

## 2017-07-13 RX ADMIN — SODIUM CHLORIDE 150 ML/HR: 900 INJECTION, SOLUTION INTRAVENOUS at 13:39

## 2017-07-13 RX ADMIN — Medication 4 MG: at 13:07

## 2017-07-13 RX ADMIN — ONDANSETRON 4 MG: 2 INJECTION INTRAMUSCULAR; INTRAVENOUS at 13:38

## 2017-07-13 RX ADMIN — ONDANSETRON 4 MG: 2 INJECTION INTRAMUSCULAR; INTRAVENOUS at 14:33

## 2017-07-13 RX ADMIN — IOPAMIDOL 100 ML: 612 INJECTION, SOLUTION INTRAVENOUS at 13:53

## 2017-07-13 RX ADMIN — SODIUM CHLORIDE 1000 ML: 900 INJECTION, SOLUTION INTRAVENOUS at 13:39

## 2017-07-13 NOTE — ED TRIAGE NOTES
Patient C/O right lower abdominal pain, nausea, and diarrhea symptoms. Reports symptoms were there last night and over time gotten worse.

## 2017-07-13 NOTE — ED NOTES
I performed a brief evaluation, including history and physical, of the patient here in triage and I have determined that pt will need further treatment and evaluation from the [facility] Provider. I have placed initial orders to help in expediting patients care.      July 13, 2017 at 12:57 PM - Larisa Quesada PA-C        Visit Vitals    /89 (BP 1 Location: Left arm, BP Patient Position: At rest)    Pulse 92    Temp 98.7 °F (37.1 °C)    Resp 16    Ht 5' 10\" (1.778 m)    Wt 98 kg (216 lb)    SpO2 96%    BMI 30.99 kg/m2

## 2017-07-13 NOTE — ED PROVIDER NOTES
HPI Comments: 1:09 PM Fabricio Queen is a 42 y.o. male who presents to the ED c/o RLQ abd pain. Pt states that the sx started last night. Pt took TUMS stating that he initially thought that it was indigestion w/ no relief. Pt also c/o nausea and diarrhea. Pt has had 6 episodes of diarrhea today. Pt has been taking Zofran from his hernia surgery for nausea control. Pt's last PO was last night. Pt denies hx of colonoscopy, cholecystectomy, appendectomy, hematochezia, fever, and emesis. Pt has no other sx or complaints. Past Medical History:   Diagnosis Date    Depression     Diabetes ([facility] [address]     Drug-seeking behavior     56 prescriptions from 32 different prescribers at 6 different pharmacies in last 12 months    Herniated lumbar intervertebral disc     Hypertension     Neurological disorder L3,4,5 torn Herniated disc       Past Surgical History:   Procedure Laterality Date    HX HERNIA REPAIR Bilateral 06/02/2017    robotic repair of bilateral indirect inguinal hernias with placement of mesh    HX ORTHOPAEDIC  trigger finger release         Family History:   Problem Relation Age of Onset    Hypertension Mother     Diabetes Mother     Heart Failure Mother     COPD Mother     Heart Disease Mother     Hypertension Father     Alcohol abuse Father        Social History     Social History    Marital status:      Spouse name: N/A    Number of children: N/A    Years of education: N/A     Occupational History    Not on file. Social History Main Topics    Smoking status: Never Smoker    Smokeless tobacco: Never Used    Alcohol use Yes      Comment: rarely    Drug use: No    Sexual activity: Yes     Partners: Female     Birth control/ protection: Condom     Other Topics Concern    Not on file     Social History Narrative         ALLERGIES: Review of patient's allergies indicates no known allergies. Review of Systems   Constitutional: Negative for fever.    Gastrointestinal: Positive for abdominal pain and nausea. Negative for blood in stool, diarrhea and vomiting. All other systems reviewed and are negative. Vitals:    07/13/17 1251   BP: 138/89   Pulse: 92   Resp: 16   Temp: 98.7 °F (37.1 °C)   SpO2: 96%   Weight: 98 kg (216 lb)   Height: 5' 10\" (1.778 m)            Physical Exam   Constitutional: He is oriented to person, place, and time. He appears well-developed and well-nourished. No distress. HENT:   Head: Normocephalic and atraumatic. Eyes: Conjunctivae and EOM are normal. Pupils are equal, round, and reactive to light. Neck: Normal range of motion. Neck supple. Cardiovascular: Normal rate and regular rhythm. Pulmonary/Chest: Effort normal and breath sounds normal.   Abdominal: Soft. Bowel sounds are normal. There is tenderness. RLQ abd tenderness to palpation    Musculoskeletal: Normal range of motion. Neurological: He is alert and oriented to person, place, and time. Skin: Skin is warm and dry. He is not diaphoretic. Psychiatric: He has a normal mood and affect. His behavior is normal.   Nursing note and vitals reviewed. MDM  Number of Diagnoses or Management Options  Other chronic pain:   RLQ abdominal pain:   Diagnosis management comments: Differential: appendicitis; colits; diverticulitis; Crohn's; dehydratio;n kidney stone; SBO; mesenteric ischemia    Nothing acute on labs or imaging. Pt has had 56 Rx for narcotics by 26 providers in past year. Will not give narcs.        Amount and/or Complexity of Data Reviewed  Clinical lab tests: ordered and reviewed  Tests in the radiology section of CPT®: ordered and reviewed      ED Course       Procedures                       Scribe Attestation:   Nisa Field am scribing for and in the presence of Mani Jaramillo PA-C on this day 07/13/17 at 1:08 PM   gilbert Jack    Provider Attestation:  I personally performed the services described in the documentation, reviewed the documentation, as recorded by the scribe in my presence, and it accurately and completely records my words and actions.   Capo Luna PA-C. 1:08 PM      Signed by: Jayjay Craven, 1:08 PM

## 2017-07-13 NOTE — DISCHARGE INSTRUCTIONS
Abdominal Pain: Care Instructions  Your Care Instructions    Abdominal pain has many possible causes. Some aren't serious and get better on their own in a few days. Others need more testing and treatment. If your pain continues or gets worse, you need to be rechecked and may need more tests to find out what is wrong. You may need surgery to correct the problem. Don't ignore new symptoms, such as fever, nausea and vomiting, urination problems, pain that gets worse, and dizziness. These may be signs of a more serious problem. Your doctor may have recommended a follow-up visit in the next 8 to 12 hours. If you are not getting better, you may need more tests or treatment. The doctor has checked you carefully, but problems can develop later. If you notice any problems or new symptoms, get medical treatment right away. Follow-up care is a key part of your treatment and safety. Be sure to make and go to all appointments, and call your doctor if you are having problems. It's also a good idea to know your test results and keep a list of the medicines you take. How can you care for yourself at home? · Rest until you feel better. · To prevent dehydration, drink plenty of fluids, enough so that your urine is light yellow or clear like water. Choose water and other caffeine-free clear liquids until you feel better. If you have kidney, heart, or liver disease and have to limit fluids, talk with your doctor before you increase the amount of fluids you drink. · If your stomach is upset, eat mild foods, such as rice, dry toast or crackers, bananas, and applesauce. Try eating several small meals instead of two or three large ones. · Wait until 48 hours after all symptoms have gone away before you have spicy foods, alcohol, and drinks that contain caffeine. · Do not eat foods that are high in fat. · Avoid anti-inflammatory medicines such as aspirin, ibuprofen (Advil, Motrin), and naproxen (Aleve).  These can cause stomach upset. Talk to your doctor if you take daily aspirin for another health problem. When should you call for help? Call 911 anytime you think you may need emergency care. For example, call if:  · You passed out (lost consciousness). · You pass maroon or very bloody stools. · You vomit blood or what looks like coffee grounds. · You have new, severe belly pain. Call your doctor now or seek immediate medical care if:  · Your pain gets worse, especially if it becomes focused in one area of your belly. · You have a new or higher fever. · Your stools are black and look like tar, or they have streaks of blood. · You have unexpected vaginal bleeding. · You have symptoms of a urinary tract infection. These may include:  ¨ Pain when you urinate. ¨ Urinating more often than usual.  ¨ Blood in your urine. · You are dizzy or lightheaded, or you feel like you may faint. Watch closely for changes in your health, and be sure to contact your doctor if:  · You are not getting better after 1 day (24 hours). Where can you learn more? Go to http://jacklynObviousdenice.info/. Enter A837 in the search box to learn more about \"Abdominal Pain: Care Instructions. \"  Current as of: March 20, 2017  Content Version: 11.3  © 7368-3008 "[employer]". Care instructions adapted under license by [employer] (which disclaims liability or warranty for this information). If you have questions about a medical condition or this instruction, always ask your healthcare professional. [place] [address] any warranty or liability for your use of this information. Chronic Pain: Care Instructions  Your Care Instructions  Chronic pain is pain that lasts a long time (months or even years) and may or may not have a clear cause. It is different from acute pain, which usually does have a clear cause--like an injury or illness--and gets better over time.  Chronic pain:  · Lasts over time but may vary from day to day. · Does not go away despite efforts to end it. · May disrupt your sleep and lead to fatigue. · May cause depression or anxiety. · May make your muscles tense, causing more pain. · Can disrupt your work, hobbies, home life, and relationships with friends and family. Chronic pain is a very real condition. It is not just in your head. Treatment can help and usually includes several methods used together, such as medicines, physical therapy, exercise, and other treatments. Learning how to relax and changing negative thought patterns can also help you cope. Chronic pain is complex. Taking an active role in your treatment will help you better manage your pain. Tell your doctor if you have trouble dealing with your pain. You may have to try several things before you find what works best for you. Follow-up care is a key part of your treatment and safety. Be sure to make and go to all appointments, and call your doctor if you are having problems. Its also a good idea to know your test results and keep a list of the medicines you take. How can you care for yourself at home? · Pace yourself. Break up large jobs into smaller tasks. Save harder tasks for days when you have less pain, or go back and forth between hard tasks and easier ones. Take rest breaks. · Relax, and reduce stress. Relaxation techniques such as deep breathing or meditation can help. · Keep moving. Gentle, daily exercise can help reduce pain over the long run. Try low- or no-impact exercises such as walking, swimming, and stationary biking. Do stretches to stay flexible. · Try heat, cold packs, and massage. · Get enough sleep. Chronic pain can make you tired and drain your energy. Talk with your doctor if you have trouble sleeping because of pain. · Think positive. Your thoughts can affect your pain level. Do things that you enjoy to distract yourself when you have pain instead of focusing on the pain.  See a movie, read a book, listen to music, or spend time with a friend. · If you think you are depressed, talk to your doctor about treatment. · Keep a daily pain diary. Record how your moods, thoughts, sleep patterns, activities, and medicine affect your pain. You may find that your pain is worse during or after certain activities or when you are feeling a certain emotion. Having a record of your pain can help you and your doctor find the best ways to treat your pain. · Take pain medicines exactly as directed. ¨ If the doctor gave you a prescription medicine for pain, take it as prescribed. ¨ If you are not taking a prescription pain medicine, ask your doctor if you can take an over-the-counter medicine. Reducing constipation caused by pain medicine  · Include fruits, vegetables, beans, and whole grains in your diet each day. These foods are high in fiber. · Drink plenty of fluids, enough so that your urine is light yellow or clear like water. If you have kidney, heart, or liver disease and have to limit fluids, talk with your doctor before you increase the amount of fluids you drink. · If your doctor recommends it, get more exercise. Walking is a good choice. Bit by bit, increase the amount you walk every day. Try for at least 30 minutes on most days of the week. · Schedule time each day for a bowel movement. A daily routine may help. Take your time and do not strain when having a bowel movement. When should you call for help? Call your doctor now or seek immediate medical care if:  · Your pain gets worse or is out of control. · You feel down or blue, or you do not enjoy things like you once did. You may be depressed, which is common in people with chronic pain. Depression can be treated. · You have vomiting or cramps for more than 2 hours. Watch closely for changes in your health, and be sure to contact your doctor if:  · You cannot sleep because of pain. · You are very worried or anxious about your pain.   · You have trouble taking your pain medicine. · You have any concerns about your pain medicine. · You have trouble with bowel movements, such as:  ¨ No bowel movement in 3 days. ¨ Blood in the anal area, in your stool, or on the toilet paper. ¨ Diarrhea for more than 24 hours. Where can you learn more? Go to http://jacklyn-deniec.info/. Enter N004 in the search box to learn more about \"Chronic Pain: Care Instructions. \"  Current as of: October 14, 2016  Content Version: 11.3  © 3711-1675 [employer] Care instructions adapted under license by [employer] (which disclaims liability or warranty for this information). If you have questions about a medical condition or this instruction, always ask your healthcare professional. [address] any warranty or liability for your use of this information.

## 2017-07-19 ENCOUNTER — TELEPHONE (OUTPATIENT)
Dept: FAMILY MEDICINE CLINIC | Facility: CLINIC | Age: 42
End: 2017-07-19

## 2017-07-19 NOTE — TELEPHONE ENCOUNTER
Called pt and left message. Call back number left and I myself or one of the other nurses will attempt to contact again.     The call was to inform pt he will have to pay out of pocket for glucose meter strips

## 2017-07-20 ENCOUNTER — DOCUMENTATION ONLY (OUTPATIENT)
Dept: SURGERY | Age: 42
End: 2017-07-20

## 2017-07-20 NOTE — PROGRESS NOTES
Patient called c/o left-sided groin pain & swelling he had been experiencing since Tuesday of last week s/p a robotic repair of a bilateral indirect inguinal hernia on 06/02/17. Informed patient that Dr. Jb Celestin would need to assess him prior to treating him for his complaint. Patient stated, \"I gotta do something. \" Offered patient an appointment for Monday with Dr. Jb Celestin & patient was agreeable with this. Appointment was scheduled for 07/24/17.

## 2017-07-24 ENCOUNTER — OFFICE VISIT (OUTPATIENT)
Dept: SURGERY | Age: 42
End: 2017-07-24

## 2017-07-24 ENCOUNTER — HOSPITAL ENCOUNTER (EMERGENCY)
Age: 42
Discharge: HOME | End: 2017-07-24
Attending: EMERGENCY MEDICINE
Payer: MEDICAID

## 2017-07-24 ENCOUNTER — APPOINTMENT (OUTPATIENT)
Dept: CT IMAGING | Age: 42
End: 2017-07-24
Attending: EMERGENCY MEDICINE
Payer: MEDICAID

## 2017-07-24 VITALS
WEIGHT: 221.2 LBS | OXYGEN SATURATION: 96 % | HEIGHT: 70 IN | BODY MASS INDEX: 31.67 KG/M2 | SYSTOLIC BLOOD PRESSURE: 162 MMHG | TEMPERATURE: 97.3 F | HEART RATE: 99 BPM | RESPIRATION RATE: 18 BRPM | DIASTOLIC BLOOD PRESSURE: 119 MMHG

## 2017-07-24 VITALS
RESPIRATION RATE: 16 BRPM | SYSTOLIC BLOOD PRESSURE: 140 MMHG | DIASTOLIC BLOOD PRESSURE: 97 MMHG | TEMPERATURE: 98.2 F | HEART RATE: 96 BPM | OXYGEN SATURATION: 97 %

## 2017-07-24 DIAGNOSIS — R81 GLUCOSURIA: ICD-10-CM

## 2017-07-24 DIAGNOSIS — Z09 POSTOPERATIVE EXAMINATION: Primary | ICD-10-CM

## 2017-07-24 DIAGNOSIS — K59.03 DRUG-INDUCED CONSTIPATION: Primary | ICD-10-CM

## 2017-07-24 DIAGNOSIS — R73.9 HYPERGLYCEMIA: ICD-10-CM

## 2017-07-24 DIAGNOSIS — R03.0 ELEVATED BLOOD PRESSURE READING: ICD-10-CM

## 2017-07-24 LAB
ALBUMIN SERPL BCP-MCNC: 3.6 G/DL (ref 3.4–5)
ALBUMIN/GLOB SERPL: 0.9 {RATIO} (ref 0.8–1.7)
ALP SERPL-CCNC: 83 U/L (ref 45–117)
ALT SERPL-CCNC: 15 U/L (ref 16–61)
ANION GAP BLD CALC-SCNC: 11 MMOL/L (ref 3–18)
APPEARANCE UR: CLEAR
AST SERPL W P-5'-P-CCNC: 10 U/L (ref 15–37)
BASOPHILS # BLD AUTO: 0 K/UL (ref 0–0.06)
BASOPHILS # BLD: 0 % (ref 0–2)
BILIRUB SERPL-MCNC: 0.2 MG/DL (ref 0.2–1)
BILIRUB UR QL: NEGATIVE
BUN SERPL-MCNC: 8 MG/DL (ref 7–18)
BUN/CREAT SERPL: 7 (ref 12–20)
CALCIUM SERPL-MCNC: 8.9 MG/DL (ref 8.5–10.1)
CHLORIDE SERPL-SCNC: 104 MMOL/L (ref 100–108)
CO2 SERPL-SCNC: 24 MMOL/L (ref 21–32)
COLOR UR: YELLOW
CREAT SERPL-MCNC: 1.2 MG/DL (ref 0.6–1.3)
DIFFERENTIAL METHOD BLD: ABNORMAL
EOSINOPHIL # BLD: 0.1 K/UL (ref 0–0.4)
EOSINOPHIL NFR BLD: 1 % (ref 0–5)
ERYTHROCYTE [DISTWIDTH] IN BLOOD BY AUTOMATED COUNT: 14.4 % (ref 11.6–14.5)
GLOBULIN SER CALC-MCNC: 3.9 G/DL (ref 2–4)
GLUCOSE SERPL-MCNC: 130 MG/DL (ref 74–99)
GLUCOSE UR STRIP.AUTO-MCNC: 100 MG/DL
HCT VFR BLD AUTO: 36.8 % (ref 36–48)
HGB BLD-MCNC: 12.3 G/DL (ref 13–16)
HGB UR QL STRIP: NEGATIVE
KETONES UR QL STRIP.AUTO: NEGATIVE MG/DL
LEUKOCYTE ESTERASE UR QL STRIP.AUTO: NEGATIVE
LYMPHOCYTES # BLD AUTO: 21 % (ref 21–52)
LYMPHOCYTES # BLD: 1.1 K/UL (ref 0.9–3.6)
MCH RBC QN AUTO: 28.3 PG (ref 24–34)
MCHC RBC AUTO-ENTMCNC: 33.4 G/DL (ref 31–37)
MCV RBC AUTO: 84.6 FL (ref 74–97)
MONOCYTES # BLD: 0.3 K/UL (ref 0.05–1.2)
MONOCYTES NFR BLD AUTO: 6 % (ref 3–10)
NEUTS SEG # BLD: 3.7 K/UL (ref 1.8–8)
NEUTS SEG NFR BLD AUTO: 72 % (ref 40–73)
NITRITE UR QL STRIP.AUTO: NEGATIVE
PH UR STRIP: 5.5 [PH] (ref 5–8)
PLATELET # BLD AUTO: 227 K/UL (ref 135–420)
PMV BLD AUTO: 9.2 FL (ref 9.2–11.8)
POTASSIUM SERPL-SCNC: 4.1 MMOL/L (ref 3.5–5.5)
PROT SERPL-MCNC: 7.5 G/DL (ref 6.4–8.2)
PROT UR STRIP-MCNC: NEGATIVE MG/DL
RBC # BLD AUTO: 4.35 M/UL (ref 4.7–5.5)
SODIUM SERPL-SCNC: 139 MMOL/L (ref 136–145)
SP GR UR REFRACTOMETRY: 1.01 (ref 1–1.03)
UROBILINOGEN UR QL STRIP.AUTO: 0.2 EU/DL (ref 0.2–1)
WBC # BLD AUTO: 5.1 K/UL (ref 4.6–13.2)

## 2017-07-24 PROCEDURE — 99283 EMERGENCY DEPT VISIT LOW MDM: CPT

## 2017-07-24 PROCEDURE — 96374 THER/PROPH/DIAG INJ IV PUSH: CPT

## 2017-07-24 PROCEDURE — 85025 COMPLETE CBC W/AUTO DIFF WBC: CPT | Performed by: EMERGENCY MEDICINE

## 2017-07-24 PROCEDURE — 80053 COMPREHEN METABOLIC PANEL: CPT | Performed by: EMERGENCY MEDICINE

## 2017-07-24 PROCEDURE — 74011250636 HC RX REV CODE- 250/636: Performed by: EMERGENCY MEDICINE

## 2017-07-24 PROCEDURE — 81003 URINALYSIS AUTO W/O SCOPE: CPT | Performed by: EMERGENCY MEDICINE

## 2017-07-24 PROCEDURE — 74176 CT ABD & PELVIS W/O CONTRAST: CPT

## 2017-07-24 RX ORDER — TRAMADOL HYDROCHLORIDE 50 MG/1
50 TABLET ORAL
COMMUNITY
End: 2017-11-28

## 2017-07-24 RX ORDER — KETOROLAC TROMETHAMINE 30 MG/ML
15 INJECTION, SOLUTION INTRAMUSCULAR; INTRAVENOUS
Status: COMPLETED | OUTPATIENT
Start: 2017-07-24 | End: 2017-07-24

## 2017-07-24 RX ADMIN — KETOROLAC TROMETHAMINE 15 MG: 30 INJECTION, SOLUTION INTRAMUSCULAR at 10:06

## 2017-07-24 NOTE — PROGRESS NOTES
Patient seen and examined. He is complaining of constipation and left groin pain. He is passing gas but not passing stool regularly. No fever or chills. No nausea or vomiting. Abdomen is soft and non tender. Wounds are healing well. Right and left groin exam is completely normal with no evidence of recurrence of the hernia. I think the patient is having constipation because of the chronic opioid use. There is no evidence of any recurrence of the hernia or bowel obstruction secondary to the hernia.   The patient will follow up with primary care physician and possibly need for GI for a colonoscopy and treatment of his opioid-induced constipation

## 2017-07-24 NOTE — PATIENT INSTRUCTIONS
If you have any questions or concerns about today's appointment, the verbal and/or written instructions you were given for follow up care, please call our office at [phone].     [facility] - [address]    [phone] office  [phone]

## 2017-07-24 NOTE — MR AVS SNAPSHOT
Visit Information Date & Time Provider Department Dept. Phone Encounter #  
 7/24/2017  8:15 AM Rufina Guzmán MD [facility] [phone] [id number] Your Appointments 7/25/2017  8:30 AM  
ROUTINE CARE with MATHEUS [facility] ([address]) Appt Note: 2 week follow up [address] [place] [address] [phone]  
[address] [place] [address] Upcoming Health Maintenance Date Due  
 EYE EXAM RETINAL OR DILATED Q1 12/28/1984 Pneumococcal 19-64 Medium Risk (1 of 1 - PPSV23) 12/28/1993 DTaP/Tdap/Td series (1 - Tdap) 12/28/1995 MICROALBUMIN Q1 7/7/2016 FOOT EXAM Q1 4/27/2017 INFLUENZA AGE 9 TO ADULT 8/1/2017 HEMOGLOBIN A1C Q6M 12/9/2017 LIPID PANEL Q1 7/11/2018 Allergies as of 7/24/2017  Review Complete On: 7/24/2017 By: Be Edmondson No Known Allergies Current Immunizations  Reviewed on 9/2/2015 Name Date Influenza Vaccine (Quad) PF 9/2/2015 Influenza Vaccine Whole 12/1/2009 Not reviewed this visit You Were Diagnosed With   
  
 Codes Comments Postoperative examination    -  Primary ICD-10-CM: O66 ICD-9-CM: V67.00 Vitals BP Pulse Temp Resp Height(growth percentile) Weight(growth percentile) (!) 162/119 (BP 1 Location: Right arm, BP Patient Position: Sitting) 99 97.3 °F (36.3 °C) (Oral) 18 5' 10\" (1.778 m) 221 lb 3.2 oz (100.3 kg) SpO2 BMI Smoking Status 96% 31.74 kg/m2 Never Smoker Vitals History BMI and BSA Data Body Mass Index Body Surface Area 31.74 kg/m 2 2.23 m 2 Preferred Pharmacy Pharmacy Name Phone [facility] [address] [place], [place] [address] [phone] Your Updated Medication List  
  
   
This list is accurate as of: 7/24/17  8:29 AM.  Always use your most recent med list.  
  
  
 Blood-Glucose Meter monitoring kit Check fasting blood sugar once daily buPROPion  mg tablet Commonly known as:  Vesta Strange Take 1 Tab by mouth every morning. COREG 12.5 mg tablet Generic drug:  carvedilol Take  by mouth two (2) times daily (with meals). docusate sodium 100 mg capsule Commonly known as:  Brian Hobson Take 1 Cap by mouth two (2) times a day for 90 days. * glucose blood VI test strips strip Commonly known as:  RELION PRIME TEST STRIPS Check blood sugars twice daily * glucose blood VI test strips strip Commonly known as:  ASCENSIA AUTODISC VI, ONE TOUCH ULTRA TEST VI Check fasting blood sugar once daily  
  
 hydrOXYzine pamoate 50 mg capsule Commonly known as:  VISTARIL Take 1-2 caps TID PRN  
  
 ibuprofen 800 mg tablet Commonly known as:  MOTRIN Take  by mouth. JANUVIA 50 mg tablet Generic drug:  SITagliptin TAKE 1 TABLET BY MOUTH DAILY Lactobac. rhamnosus GG-inulin 10 billion cell -200 mg Lang Vandana Commonly known as:  CULTURELLE PROBIOTICS Take 1 Each by mouth daily. Lancets Misc Check fasting blood sugar once daily  
  
 methocarbamol 500 mg tablet Commonly known as:  ROBAXIN Take 2 Tabs by mouth four (4) times daily. psyllium husk 0.4 gram Cap Commonly known as:  METAMUCIL Take 0.4 g by mouth daily. RELION NEEDLES  
by Does Not Apply route. sertraline 100 mg tablet Commonly known as:  ZOLOFT Take 1 Tab by mouth daily. traMADol 50 mg tablet Commonly known as:  ULTRAM  
Take 50 mg by mouth every six (6) hours as needed for Pain. * Notice: This list has 2 medication(s) that are the same as other medications prescribed for you. Read the directions carefully, and ask your doctor or other care provider to review them with you. To-Do List   
 07/26/2017 8:30 AM  
  Appointment with [facility] RAD US RM 1 at [facility] ([phone]) OUTSIDE FILMS  - Any outside films related to the study being scheduled should be brought with you on the day of the exam.  If this cannot be done there may be a delay in the reading of the study. NPO -Patient must be NPO (no food or drink) 8 hours prior to the exam.  MEDICATIONS  - Patient must bring a complete list of all medications currently taking to include prescriptions, over-the-counter meds, herbals, vitamins & any dietary supplements Patient Instructions If you have any questions or concerns about today's appointment, the verbal and/or written instructions you were given for follow up care, please call our office at [phone]. [facility] - [address], [address] 
 
[phone] office [phone] Introducing [facility]! Kristy Mai introduces Enswers patient portal. Now you can access parts of your medical record, email your doctor's office, and request medication refills online. 1. In your internet browser, go to [email or web address] 2. Click on the First Time User? Click Here link in the Sign In box. You will see the New Member Sign Up page. 3. Enter your Enswers Access Code exactly as it appears below. You will not need to use this code after youve completed the sign-up process. If you do not sign up before the expiration date, you must request a new code. · Enswers Access Code: [email or web address] Expires: 8/9/2017  9:05 PM 
 
4. Enter the last four digits of your Social Security Number (xxxx) and Date of Birth (mm/dd/yyyy) as indicated and click Submit. You will be taken to the next sign-up page. 5. Create a Moneytreet ID. This will be your Enswers login ID and cannot be changed, so think of one that is secure and easy to remember. 6. Create a Moneytreet password. You can change your password at any time. 7. Enter your Password Reset Question and Answer.  This can be used at a later time if you forget your password. 8. Enter your e-mail address. You will receive e-mail notification when new information is available in [address] 9. Click Sign Up. You can now view and download portions of your medical record. 10. Click the Download Summary menu link to download a portable copy of your medical information. If you have questions, please visit the Frequently Asked Questions section of the Clever website. Remember, Clever is NOT to be used for urgent needs. For medical emergencies, dial 911. Now available from your iPhone and Android! Please provide this summary of care documentation to your next provider. Your primary care clinician is listed as Phillip Shore. If you have any questions after today's visit, please call [phone].

## 2017-07-24 NOTE — LETTER
7/24/2017 8:20 AM 
 
Patient:  K[facility] Roman YOB: 1975 Date of Visit: 7/24/2017 MATHEUS Dela Cruz 
[address] [place] [address] [phone] VIA In Basket Dear MATHEUS Dela Cruz, Thank you for referring [facility] [place] [address] for evaluation and treatment. Below are the relevant portions of my assessment and plan of care. Thank you very much for your referral of Mr. Matthew Davison. If you have questions, please do not hesitate to call me. I look forward to following Mr. Gaytan along with you and will keep you updated as to his progress. Sincerely, Neo Pena MD

## 2017-07-24 NOTE — ED TRIAGE NOTES
3-4 weeks since last good bowel movement. Surgery June 2 for double hernia at [facility].    States he is not passing much gas

## 2017-07-24 NOTE — ED PROVIDER NOTES
HPI Comments: Carmen Felton is a 42 y.o. male who presents to the ED c/o constipation. Pt had bilateral inguinal repair surgery 6/2/17 performed by Dr. Marilyn Strauss. Pt claims his last BM was 3wks ago. Pt is currently taking Dulcolax b.i.d., Miralax. He followed up today with Dr. Marilyn Strauss. He was told to see a GI specialist, and his PCP MATHEUS Hutchinson told him to come to the ED if no BM occurred. NKDA. Hx HTN, recently stopped blood pressure med. No etOH, non smoker. Takes Januvia. The history is provided by the patient. Past Medical History:   Diagnosis Date    Depression     Diabetes ([facility] [address]     Drug-seeking behavior     56 prescriptions from 32 different prescribers at 6 different pharmacies in last 12 months    Herniated lumbar intervertebral disc     Hypertension     Neurological disorder L3,4,5 torn Herniated disc       Past Surgical History:   Procedure Laterality Date    HX HERNIA REPAIR Bilateral 06/02/2017    robotic repair of bilateral indirect inguinal hernias with placement of mesh    HX ORTHOPAEDIC  trigger finger release         Family History:   Problem Relation Age of Onset    Hypertension Mother     Diabetes Mother     Heart Failure Mother     COPD Mother     Heart Disease Mother     Hypertension Father     Alcohol abuse Father        Social History     Social History    Marital status:      Spouse name: N/A    Number of children: N/A    Years of education: N/A     Occupational History    Not on file. Social History Main Topics    Smoking status: Never Smoker    Smokeless tobacco: Never Used    Alcohol use Yes      Comment: rarely    Drug use: No    Sexual activity: Yes     Partners: Female     Birth control/ protection: Condom     Other Topics Concern    Not on file     Social History Narrative         ALLERGIES: Review of patient's allergies indicates no known allergies. Review of Systems   Constitutional: Negative. HENT: Negative.     Eyes: Negative. Respiratory: Negative. Cardiovascular: Negative. Gastrointestinal: Positive for constipation. Endocrine: Negative. Genitourinary: Negative. Musculoskeletal: Negative. Skin: Negative. Allergic/Immunologic: Negative. Neurological: Negative. Hematological: Negative. Psychiatric/Behavioral: Negative. All other systems reviewed and are negative. Vitals:    07/24/17 0907 07/24/17 1044   BP: (!) 149/115 (!) 140/97   Pulse: 87 96   Resp: 18 16   Temp: 98.2 °F (36.8 °C)    SpO2: 100% 97%            Physical Exam   Constitutional: He is oriented to person, place, and time. He appears well-developed and well-nourished. HENT:   Head: Normocephalic and atraumatic. Nose: Nose normal.   Mouth/Throat: Oropharynx is clear and moist.   Eyes: Conjunctivae and EOM are normal. Pupils are equal, round, and reactive to light. Neck: Normal range of motion. Neck supple. Cardiovascular: Normal rate, regular rhythm, normal heart sounds and intact distal pulses. Pulses:       Radial pulses are 2+ on the right side, and 2+ on the left side. Pulmonary/Chest: Effort normal and breath sounds normal.   Abdominal: Soft. Bowel sounds are normal. There is no guarding. Laparoscopic sites clean, dry and intact. Surgical abdomen     Genitourinary:   Genitourinary Comments: Chaperone present. No external hemorrhoids  Hemoccult neg  No signs of impaction   Musculoskeletal:   No pretibial swelling   Neurological: He is alert and oriented to person, place, and time. Skin: Skin is warm and dry. Psychiatric: He has a normal mood and affect. His behavior is normal. Judgment and thought content normal.   Nursing note and vitals reviewed. MDM  Number of Diagnoses or Management Options  Diagnosis management comments: Patient with c/o constipation  Patient was seen this AM by Dr. Joseph Stage  He is believed to have constipation secondary to chronic opioid use.  He has had multiple visits to ED since surgery. Will assess labwork, FOBT negative, no signs of impaction. Surgical sites are C/D/I. Abdomen soft and non surgical. Patient has had scripts written by 13 prescribers and been to 4 pharmacies in the past three months upon review of . Will not be giving narcotics on this visit as this can be contributing to his constipation. ED Course       Procedures    Vitals:  Patient Vitals for the past 12 hrs:   Temp Pulse Resp BP SpO2   07/24/17 1044 - 96 16 (!) 140/97 97 %   07/24/17 0907 98.2 °F (36.8 °C) 87 18 (!) 149/115 100 %   Patient is 100% O2 on RA, indicating adequate oxygenation. Medications ordered:   Medications   ketorolac (TORADOL) injection 15 mg (15 mg IntraVENous Given 7/24/17 1006)         Lab findings:  Recent Results (from the past 12 hour(s))   URINALYSIS W/ RFLX MICROSCOPIC    Collection Time: 07/24/17  9:50 AM   Result Value Ref Range    Color YELLOW      Appearance CLEAR      Specific gravity 1.010 1.005 - 1.030      pH (UA) 5.5 5.0 - 8.0      Protein NEGATIVE  NEG mg/dL    Glucose 100 (A) NEG mg/dL    Ketone NEGATIVE  NEG mg/dL    Bilirubin NEGATIVE  NEG      Blood NEGATIVE  NEG      Urobilinogen 0.2 0.2 - 1.0 EU/dL    Nitrites NEGATIVE  NEG      Leukocyte Esterase NEGATIVE  NEG     CBC WITH AUTOMATED DIFF    Collection Time: 07/24/17  9:51 AM   Result Value Ref Range    WBC 5.1 4.6 - 13.2 K/uL    RBC 4.35 (L) 4.70 - 5.50 M/uL    HGB 12.3 (L) 13.0 - 16.0 g/dL    HCT 36.8 36.0 - 48.0 %    MCV 84.6 74.0 - 97.0 FL    MCH 28.3 24.0 - 34.0 PG    MCHC 33.4 31.0 - 37.0 g/dL    RDW 14.4 11.6 - 14.5 %    PLATELET 955 038 - 631 K/uL    MPV 9.2 9.2 - 11.8 FL    NEUTROPHILS 72 40 - 73 %    LYMPHOCYTES 21 21 - 52 %    MONOCYTES 6 3 - 10 %    EOSINOPHILS 1 0 - 5 %    BASOPHILS 0 0 - 2 %    ABS. NEUTROPHILS 3.7 1.8 - 8.0 K/UL    ABS. LYMPHOCYTES 1.1 0.9 - 3.6 K/UL    ABS. MONOCYTES 0.3 0.05 - 1.2 K/UL    ABS. EOSINOPHILS 0.1 0.0 - 0.4 K/UL    ABS.  BASOPHILS 0.0 0.0 - 0.06 K/UL    DF AUTOMATED     METABOLIC PANEL, COMPREHENSIVE    Collection Time: 07/24/17  9:51 AM   Result Value Ref Range    Sodium 139 136 - 145 mmol/L    Potassium 4.1 3.5 - 5.5 mmol/L    Chloride 104 100 - 108 mmol/L    CO2 24 21 - 32 mmol/L    Anion gap 11 3.0 - 18 mmol/L    Glucose 130 (H) 74 - 99 mg/dL    BUN 8 7.0 - 18 MG/DL    Creatinine 1.20 0.6 - 1.3 MG/DL    BUN/Creatinine ratio 7 (L) 12 - 20      GFR est AA >60 >60 ml/min/1.73m2    GFR est non-AA >60 >60 ml/min/1.73m2    Calcium 8.9 8.5 - 10.1 MG/DL    Bilirubin, total 0.2 0.2 - 1.0 MG/DL    ALT (SGPT) 15 (L) 16 - 61 U/L    AST (SGOT) 10 (L) 15 - 37 U/L    Alk. phosphatase 83 45 - 117 U/L    Protein, total 7.5 6.4 - 8.2 g/dL    Albumin 3.6 3.4 - 5.0 g/dL    Globulin 3.9 2.0 - 4.0 g/dL    A-G Ratio 0.9 0.8 - 1.7           X-Ray, CT or other radiology findings or impressions:  Ct Abd Pelv Wo Cont    Result Date: 7/24/2017  EXAM: CT of the abdomen and pelvis INDICATION: History of abdominal pain, constipation. COMPARISON: None. TECHNIQUE: Axial CT imaging of the abdomen and pelvis was performed without intravenous contrast. Multiplanar reformats were generated. One or more dose reduction techniques were used on this CT: automated exposure control, adjustment of the mAs and/or kVp according to patient's size, and iterative reconstruction techniques. The specific techniques utilized on this CT exam have been documented in the patient's electronic medical record. _______________ FINDINGS: Please note that lack of IV contrast limits detailed evaluation of the solid organs, bowel, and retroperitoneum for subtle pathology. LOWER CHEST: Unremarkable. LIVER, BILIARY: Liver is normal. No biliary dilation. Gallbladder is unremarkable. PANCREAS: Normal. SPLEEN: Normal. ADRENALS: Normal. KIDNEYS/URETERS/BLADDER: No acute finding. Small cyst again noted lower pole left kidney. PELVIC ORGANS: Unremarkable. VASCULATURE: Unremarkable LYMPH NODES: No enlarged lymph nodes. GASTROINTESTINAL TRACT: No bowel dilation or wall thickening. BONES: No acute or aggressive osseous abnormalities identified. OTHER: None. _______________     IMPRESSION: No acute intra-abdominal pathology identified. Moderate/large amount of stool throughout the colon compatible with history of constipation. Progress notes, Consult notes or additional Procedure notes:     10:35 AM Upon re-evaluation the pt has non-toxic appearance and condition is stable for discharge. He was instructed to f/u with PCP and return to the ED upon worsening of symptoms. Explained to pt that he needs to make an appointment with PCP. All questions and concerns were addressed. Disposition:  Diagnosis:   1. Drug-induced constipation    2. Elevated blood pressure reading    3. Hyperglycemia    4. Glucosuria        Disposition: Discharge    Follow-up Information     Follow up With Details Comments Contact Info    MATHEUS Flores Schedule an appointment as soon as possible for a visit in 2 days  [address] [id number]  [phone]      [facility] EMERGENCY DEPT  If symptoms worsen [address] [facility] [address].  [phone]           Discharge Medication List as of 7/24/2017 10:34 AM      CONTINUE these medications which have NOT CHANGED    Details   traMADol (ULTRAM) 50 mg tablet Take 50 mg by mouth every six (6) hours as needed for Pain., Historical Med      methocarbamol (ROBAXIN) 500 mg tablet Take 2 Tabs by mouth four (4) times daily. , Print, Disp-24 Tab, R-0      carvedilol (COREG) 12.5 mg tablet Take  by mouth two (2) times daily (with meals). , Historical Med      buPROPion XL (WELLBUTRIN XL) 150 mg tablet Take 1 Tab by mouth every morning., Normal, Disp-30 Tab, R-4      Blood-Glucose Meter monitoring kit Check fasting blood sugar once daily, Normal, Disp-1 Kit, R-0      !! glucose blood VI test strips (ASCENSIA AUTODISC VI, ONE TOUCH ULTRA TEST VI) strip Check fasting blood sugar once daily, Normal, Disp-100 Strip, R-3      Lancets misc Check fasting blood sugar once daily, Normal, Disp-1 Each, R-11      docusate sodium (COLACE) 100 mg capsule Take 1 Cap by mouth two (2) times a day for 90 days. , Normal, Disp-60 Cap, R-2      psyllium husk (METAMUCIL) 0.4 gram cap Take 0.4 g by mouth daily. , Normal, Disp-900 Each, R-0      Lactobac. rhamnosus GG-inulin (CULTURELLE PROBIOTICS) 10 billion cell -200 mg chew Take 1 Each by mouth daily. , Normal, Disp-90 Each, R-0      hydrOXYzine pamoate (VISTARIL) 50 mg capsule Take 1-2 caps TID PRN, Normal, Disp-90 Cap, R-3      ibuprofen (MOTRIN) 800 mg tablet Take  by mouth., Historical Med      PEN NEEDLE, DIABETIC (RELION NEEDLES) by Does Not Apply route., Historical Med      !! glucose blood VI test strips (RELION PRIME TEST STRIPS) strip Check blood sugars twice daily, Normal, Disp-100 Strip, R-2      sertraline (ZOLOFT) 100 mg tablet Take 1 Tab by mouth daily. , Normal, Disp-30 Tab, R-2      JANUVIA 50 mg tablet TAKE 1 TABLET BY MOUTH DAILY, Normal, Disp-30 Tab, R-5       !! - Potential duplicate medications found. Please discuss with provider. Scribe Attestation:   Linh Hoff and Desirae العراقي acting as scribes for and in the presence of Elizabeth Ascencio MD July 24, 2017 at 9:31 AM     Signed by: Jayjay Hare, July 24, 2017 at 9:31 AM   Signed by: Jayjay Vidales, July 24, 2017 at 9:31 AM     Provider Attestation:   I personally performed the services described in the documentation, reviewed the documentation, as recorded by the scribe in my presence, and it accurately and completely records my words and actions.      Reviewed and signed by:  Elizabeth Ascencio MD

## 2017-07-24 NOTE — PROGRESS NOTES
Elvie Cardenas is a 42 y.o. male who presents today for a fourth post-op exam for a robotic repair of a bilateral indirect inguinal hernia with placement of mesh performed on 06/02/17. Patient scores their post-op pain level on a pain scale from 1-10  as an 8  today. 1. Have you been to the ER, urgent care clinic since your last visit? Hospitalized since your last visit? Yes, [facility] last week for postop pain. 2. Have you seen or consulted any other health care providers outside of the [address] [place] [address] since your last visit? Include any pap smears or colon screening.  No.

## 2017-07-24 NOTE — DISCHARGE INSTRUCTIONS
Constipation: Care Instructions  Your Care Instructions  Constipation means that you have a hard time passing stools (bowel movements). People pass stools from 3 times a day to once every 3 days. What is normal for you may be different. Constipation may occur with pain in the rectum and cramping. The pain may get worse when you try to pass stools. Sometimes there are small amounts of bright red blood on toilet paper or the surface of stools. This is because of enlarged veins near the rectum (hemorrhoids). A few changes in your diet and lifestyle may help you avoid ongoing constipation. Your doctor may also prescribe medicine to help loosen your stool. Some medicines can cause constipation. These include pain medicines and antidepressants. Tell your doctor about all the medicines you take. Your doctor may want to make a medicine change to ease your symptoms. Follow-up care is a key part of your treatment and safety. Be sure to make and go to all appointments, and call your doctor if you are having problems. It's also a good idea to know your test results and keep a list of the medicines you take. How can you care for yourself at home? · Drink plenty of fluids, enough so that your urine is light yellow or clear like water. If you have kidney, heart, or liver disease and have to limit fluids, talk with your doctor before you increase the amount of fluids you drink. · Include high-fiber foods in your diet each day. These include fruits, vegetables, beans, and whole grains. · Get at least 30 minutes of exercise on most days of the week. Walking is a good choice. You also may want to do other activities, such as running, swimming, cycling, or playing tennis or team sports. · Take a fiber supplement, such as Citrucel or Metamucil, every day. Read and follow all instructions on the label. · Schedule time each day for a bowel movement. A daily routine may help.  Take your time having your bowel movement. · Support your feet with a small step stool when you sit on the toilet. This helps flex your hips and places your pelvis in a squatting position. · Your doctor may recommend an over-the-counter laxative to relieve your constipation. Examples are Milk of Magnesia and MiraLax. Read and follow all instructions on the label. Do not use laxatives on a long-term basis. When should you call for help? Call your doctor now or seek immediate medical care if:  · You have new or worse belly pain. · You have new or worse nausea or vomiting. · You have blood in your stools. Watch closely for changes in your health, and be sure to contact your doctor if:  · Your constipation is getting worse. · You do not get better as expected. Where can you learn more? Go to http://jacklyn-denice.info/. Enter 21 [phone] in the search box to learn more about \"Constipation: Care Instructions. \"  Current as of: March 20, 2017  Content Version: 11.3  © 6725-0685 [employer]. Care instructions adapted under license by [employer] (which disclaims liability or warranty for this information). If you have questions about a medical condition or this instruction, always ask your healthcare professional. [place] [address] any warranty or liability for your use of this information.   CONTINUE TAKING CONSTIPATION MEDICATIONS PREVIOUSLY PRESCRIBED

## 2017-08-03 ENCOUNTER — OFFICE VISIT (OUTPATIENT)
Dept: FAMILY MEDICINE CLINIC | Facility: CLINIC | Age: 42
End: 2017-08-03

## 2017-08-03 VITALS
TEMPERATURE: 98.5 F | OXYGEN SATURATION: 95 % | HEART RATE: 91 BPM | BODY MASS INDEX: 30.92 KG/M2 | RESPIRATION RATE: 16 BRPM | HEIGHT: 70 IN | SYSTOLIC BLOOD PRESSURE: 127 MMHG | DIASTOLIC BLOOD PRESSURE: 89 MMHG | WEIGHT: 216 LBS

## 2017-08-03 DIAGNOSIS — Z76.5 DRUG-SEEKING BEHAVIOR: ICD-10-CM

## 2017-08-03 DIAGNOSIS — I10 ESSENTIAL HYPERTENSION: Primary | ICD-10-CM

## 2017-08-03 DIAGNOSIS — R10.9 CHRONIC ABDOMINAL PAIN: ICD-10-CM

## 2017-08-03 DIAGNOSIS — G89.29 CHRONIC ABDOMINAL PAIN: ICD-10-CM

## 2017-08-03 DIAGNOSIS — G47.00 INSOMNIA, UNSPECIFIED TYPE: ICD-10-CM

## 2017-08-03 DIAGNOSIS — F41.9 ANXIETY: ICD-10-CM

## 2017-08-03 RX ORDER — HYDROCODONE BITARTRATE AND ACETAMINOPHEN 5; 325 MG/1; MG/1
TABLET ORAL
COMMUNITY
End: 2017-08-07

## 2017-08-03 NOTE — MR AVS SNAPSHOT
Visit Information Date & Time Provider Department Dept. Phone Encounter #  
 8/3/2017  3:00 PM Jerome Aguiar  [phone] [id number] Upcoming Health Maintenance Date Due  
 EYE EXAM RETINAL OR DILATED Q1 12/28/1984 Pneumococcal 19-64 Medium Risk (1 of 1 - PPSV23) 12/28/1993 DTaP/Tdap/Td series (1 - Tdap) 12/28/1995 MICROALBUMIN Q1 7/7/2016 FOOT EXAM Q1 4/27/2017 INFLUENZA AGE 9 TO ADULT 8/1/2017 HEMOGLOBIN A1C Q6M 12/9/2017 LIPID PANEL Q1 7/11/2018 Allergies as of 8/3/2017  Review Complete On: 8/3/2017 By: Rolan Barnes LPN No Known Allergies Current Immunizations  Reviewed on 9/2/2015 Name Date Influenza Vaccine (Quad) PF 9/2/2015 Influenza Vaccine Whole 12/1/2009 Not reviewed this visit You Were Diagnosed With   
  
 Codes Comments Chronic abdominal pain    -  Primary ICD-10-CM: R10.9, G89.29 ICD-9-CM: 789.00, 338.29 Insomnia, unspecified type     ICD-10-CM: G47.00 ICD-9-CM: 780.52 Vitals BP Pulse Temp Resp Height(growth percentile) Weight(growth percentile) 127/89 (BP 1 Location: Right arm, BP Patient Position: Sitting) 91 98.5 °F (36.9 °C) (Oral) 16 5' 10\" (1.778 m) 216 lb (98 kg) SpO2 BMI Smoking Status 95% 30.99 kg/m2 Never Smoker BMI and BSA Data Body Mass Index Body Surface Area 30.99 kg/m 2 2.2 m 2 Preferred Pharmacy Pharmacy Name Phone [facility] [address] [phone] Your Updated Medication List  
  
   
This list is accurate as of: 8/3/17  3:01 PM.  Always use your most recent med list.  
  
  
  
  
 Blood-Glucose Meter monitoring kit Check fasting blood sugar once daily buPROPion  mg tablet Commonly known as:  Blaze [place] Take 1 Tab by mouth every morning. COREG 12.5 mg tablet Generic drug:  carvedilol Take  by mouth two (2) times daily (with meals). docusate sodium 100 mg capsule Commonly known as:  Hope Sarah Beth Take 1 Cap by mouth two (2) times a day for 90 days. * glucose blood VI test strips strip Commonly known as:  RELION PRIME TEST STRIPS Check blood sugars twice daily * glucose blood VI test strips strip Commonly known as:  ASCENSIA AUTODISC VI, ONE TOUCH ULTRA TEST VI Check fasting blood sugar once daily  
  
 hydrOXYzine pamoate 50 mg capsule Commonly known as:  VISTARIL Take 1-2 caps TID PRN  
  
 ibuprofen 800 mg tablet Commonly known as:  MOTRIN Take  by mouth. JANUVIA 50 mg tablet Generic drug:  SITagliptin TAKE 1 TABLET BY MOUTH DAILY Lactobac. rhamnosus GG-inulin 10 billion cell -200 mg Doylene Arreola Commonly known as:  CULTURELLE PROBIOTICS Take 1 Each by mouth daily. Lancets Misc Check fasting blood sugar once daily  
  
 methocarbamol 500 mg tablet Commonly known as:  ROBAXIN Take 2 Tabs by mouth four (4) times daily. psyllium husk 0.4 gram Cap Commonly known as:  METAMUCIL Take 0.4 g by mouth daily. RELION NEEDLES  
by Does Not Apply route. sertraline 100 mg tablet Commonly known as:  ZOLOFT Take 1 Tab by mouth daily. traMADol 50 mg tablet Commonly known as:  ULTRAM  
Take 50 mg by mouth every six (6) hours as needed for Pain. * Notice: This list has 2 medication(s) that are the same as other medications prescribed for you. Read the directions carefully, and ask your doctor or other care provider to review them with you. We Performed the Following REFERRAL TO GASTROENTEROLOGY [XSO35 Custom] Comments:  
 chronic abdominal pain Referral Information Referral ID Referred By Referred To  
  
 [id number] Chris Pedroza MD   
   [facility] Phone: [phone] Fax: [phone] Visits Status Start Date End Date 1 New Request 8/3/17 8/3/18 If your referral has a status of pending review or denied, additional information will be sent to support the outcome of this decision. Patient Instructions Insomnia: Care Instructions Your Care Instructions Insomnia is the inability to sleep well. It is a common problem for most people at some time. Insomnia may make it hard for you to get to sleep, stay asleep, or sleep as long as you need to. This can make you tired and grouchy during the day. It can also make you forgetful, less effective at work, and unhappy. Insomnia can be caused by conditions such as depression or anxiety. Pain can also affect your ability to sleep. When these problems are solved, the insomnia usually clears up. But sometimes bad sleep habits can cause insomnia. If insomnia is affecting your work or your enjoyment of life, you can take steps to improve your sleep. Follow-up care is a key part of your treatment and safety. Be sure to make and go to all appointments, and call your doctor if you are having problems. It's also a good idea to know your test results and keep a list of the medicines you take. How can you care for yourself at home? What to avoid · Do not have drinks with caffeine, such as coffee or black tea, for 8 hours before bed. · Do not smoke or use other types of tobacco near bedtime. Nicotine is a stimulant and can keep you awake. · Avoid drinking alcohol late in the evening, because it can cause you to wake in the middle of the night. · Do not eat a big meal close to bedtime. If you are hungry, eat a light snack. · Do not drink a lot of water close to bedtime, because the need to urinate may wake you up during the night. · Do not read or watch TV in bed. Use the bed only for sleeping and sexual activity. What to try · Go to bed at the same time every night, and wake up at the same time every morning. Do not take naps during the day. · Keep your bedroom quiet, dark, and cool. · Sleep on a comfortable pillow and mattress. · If watching the clock makes you anxious, turn it facing away from you so you cannot see the time. · If you worry when you lie down, start a worry book. Well before bedtime, write down your worries, and then set the book and your concerns aside. · Try meditation or other relaxation techniques before you go to bed. · If you cannot fall asleep, get up and go to another room until you feel sleepy. Do something relaxing. Repeat your bedtime routine before you go to bed again. · Make your house quiet and calm about an hour before bedtime. Turn down the lights, turn off the TV, log off the computer, and turn down the volume on music. This can help you relax after a busy day. When should you call for help? Watch closely for changes in your health, and be sure to contact your doctor if: 
· Your efforts to improve your sleep do not work. · Your insomnia gets worse. · You have been feeling down, depressed, or hopeless or have lost interest in things that you usually enjoy. Where can you learn more? Go to http://jacklyn-denice.info/. Enter P513 in the search box to learn more about \"Insomnia: Care Instructions. \" Current as of: July 26, 2016 Content Version: 11.3 © 1254-9605 [employer]. Care instructions adapted under license by [employer] (which disclaims liability or warranty for this information). If you have questions about a medical condition or this instruction, always ask your healthcare professional. [address] any warranty or liability for your use of this information. Introducing [facility]! Pradip Bennett introduces [employer] patient portal. Now you can access parts of your medical record, email your doctor's office, and request medication refills online. 1. In your internet browser, go to [email or web address] 2. Click on the First Time User? Click Here link in the Sign In box. You will see the New Member Sign Up page. 3. Enter your Aternity Access Code exactly as it appears below. You will not need to use this code after youve completed the sign-up process. If you do not sign up before the expiration date, you must request a new code. · Aternity Access Code: [email or web address] Expires: 8/9/2017  9:05 PM 
 
4. Enter the last four digits of your Social Security Number (xxxx) and Date of Birth (mm/dd/yyyy) as indicated and click Submit. You will be taken to the next sign-up page. 5. Create a Aternity ID. This will be your Aternity login ID and cannot be changed, so think of one that is secure and easy to remember. 6. Create a Aternity password. You can change your password at any time. 7. Enter your Password Reset Question and Answer. This can be used at a later time if you forget your password. 8. Enter your e-mail address. You will receive e-mail notification when new information is available in [address] 9. Click Sign Up. You can now view and download portions of your medical record. 10. Click the Download Summary menu link to download a portable copy of your medical information. If you have questions, please visit the Frequently Asked Questions section of the Aternity website. Remember, Aternity is NOT to be used for urgent needs. For medical emergencies, dial 911. Now available from your iPhone and Android! Please provide this summary of care documentation to your next provider. Your primary care clinician is listed as Becca Alvarez. If you have any questions after today's visit, please call [phone].

## 2017-08-03 NOTE — PATIENT INSTRUCTIONS

## 2017-08-03 NOTE — PROGRESS NOTES
Irina Webb is a 42 y.o.  male presents today for office visit for follow up. Pt is in Room # 9      1. Have you been to the ER, urgent care clinic since your last visit? Hospitalized since your last visit? Yes When: Last week  [facility]  Reason for visit: Abdomnal pain     2. Have you seen or consulted any other health care providers outside of the [address] [place] [address] since your last visit? Include any pap smears or colon screening. No    No Patient Care Coordination Note on file.

## 2017-08-03 NOTE — PROGRESS NOTES
History and Physical    Patient: Ziyad Dinh MRN: [id number]  SSN: [id number]    YOB: 1975  Age: 42 y.o. Sex: male      Subjective:      Ziyad Dinh is a 42 y.o. male who was being seen here, switched to another practice and now is coming back here. Presenting for inability to sleep. Patient missed his scheduled follow up visit to discuss labs and his chronic abdominal pain, an US was ordered that patient no-showed for. Instead, patient has gone to the ER total of 7 times for the month of July for complaints including persistent abdominal pain and constipation. It has been noted by various ED providers that patient has exhibited drug seeking behavior, patient received Norco 7/27/17 per  report that patient did not disclose in this visit. Last script for Benzos was 6/22 per . Patient was referred to psych for anxiety and depression, for which he was taking Zoloft, Wellbutrin and Hydroxyzine for. Patient was previously asking for Klonopin but he was advised this would need to come from psych. Patient reports he went to psych and they took him off of Zoloft 3 weeks ago for unstated reasons, he thought they put him on Hydroxizine and Wellbutrin instead but did not seem to remember that this office started him on those already. He states that he does not have a follow up visit and \"does not have time\" to go back to them. He states he has not slept in 5 nights because he has racing thoughts. Patient states that he has been taking 200 mg of Hydroxyzine at night even though his prescription says maximum of 100 mg at a time. Patient states he continues to have chronic abdominal pain but his constipation has resolved, patient has seen Dr. Mariel Nixon, the surgeon who performed his most recent hernia repair, without any concerning findings and believed his constipation was secondary to chronic opioid use.   Patient had 3 abdominal CTs last month alone that did not show clear etiology for patients reported pain. Patient has a h/o HTN, is on Coreg, BP wnl today. PMH:  Past Medical History:   Diagnosis Date    Depression     Diabetes ([facility] [address]     Drug-seeking behavior     64 prescriptions from 32 different prescribers at 6 different pharmacies in last 12 months    Herniated lumbar intervertebral disc     Hypertension     Neurological disorder L3,4,5 torn Herniated disc     Past Surgical History:   Procedure Laterality Date    HX HERNIA REPAIR Bilateral 06/02/2017    robotic repair of bilateral indirect inguinal hernias with placement of mesh    HX ORTHOPAEDIC  trigger finger release        FamHx:  Family History   Problem Relation Age of Onset    Hypertension Mother     Diabetes Mother     Heart Failure Mother     COPD Mother     Heart Disease Mother     Hypertension Father     Alcohol abuse Father        Socialhx:  Social History   Substance Use Topics    Smoking status: Never Smoker    Smokeless tobacco: Never Used    Alcohol use Yes      Comment: rarely        Meds:  Prior to Admission medications    Medication Sig Start Date End Date Taking? Authorizing Provider   HYDROcodone-acetaminophen (NORCO) 5-325 mg per tablet Take  by mouth. Yes Historical Provider   traMADol (ULTRAM) 50 mg tablet Take 50 mg by mouth every six (6) hours as needed for Pain. Yes Historical Provider   carvedilol (COREG) 12.5 mg tablet Take  by mouth two (2) times daily (with meals).    Yes Historical Provider   buPROPion XL (WELLBUTRIN XL) 150 mg tablet Take 1 Tab by mouth every morning. 7/11/17  Yes MATHEUS Box   Blood-Glucose Meter monitoring kit Check fasting blood sugar once daily 7/11/17  Yes Jessica Pumphrey V, PA   glucose blood VI test strips (ASCENSIA AUTODISC VI, ONE TOUCH ULTRA TEST VI) strip Check fasting blood sugar once daily 7/11/17  Yes MATHEUS Box   Lancets misc Check fasting blood sugar once daily 7/11/17  Yes MATHEUS Box   hydrOXYzine pamoate (VISTARIL) 50 mg capsule Take 1-2 caps TID PRN 7/11/17  Yes Jessica Pumphrey V, PA   ibuprofen (MOTRIN) 800 mg tablet Take  by mouth. Yes Historical Provider   PEN NEEDLE, DIABETIC (RELION NEEDLES) by Does Not Apply route. Yes Historical Provider   glucose blood VI test strips (RELION PRIME TEST STRIPS) strip Check blood sugars twice daily 6/22/17  Yes Ham Orozco PA-C   JANUVIA 50 mg tablet TAKE 1 TABLET BY MOUTH DAILY 5/24/17  Yes Anum Odonnell MD        Allergies:  No Known Allergies    Review of Systems:  Items in bold are positive:  Constitutional: negative for fevers, chills and malaise  Eyes: negative for visual disturbance  Ears, Nose, Mouth, Throat, and Face: negative for nasal congestion  Respiratory: negative for cough or SOB  Cardiovascular: negative for chest pain, chest pressure/discomfort  Gastrointestinal:chronic abdominal pain,  negative for nausea, vomiting, melena, BRBPR, diarrhea, and abdominal pain  Genitourinary:, negative for frequency, dysuria, hesitancy and decreased stream  Musculoskeletal:negative for myalgias and arthralgias  Neurological: insomnia, negative for headaches, dizziness and paresthesia      Objective:     Vitals:    08/03/17 1445   BP: 127/89   Pulse: 91   Resp: 16   Temp: 98.5 °F (36.9 °C)   TempSrc: Oral   SpO2: 95%   Weight: 216 lb (98 kg)   Height: 5' 10\" (1.778 m)        Physical Exam:  GENERAL: alert, cooperative, no distress, appears stated age  HEENT: EYE: conjunctivae/corneas clear. EOM's intact. LUNG: clear to auscultation bilaterally  HEART: regular rate and rhythm, S1, S2 normal, no murmur, click, rub or gallop  NEUROLOGIC: AOx3. Gait normal.          Assessment and Plan:       ICD-10-CM ICD-9-CM    1. Essential hypertension I10 401.9    2. Insomnia, unspecified type G47.00 780.52    3. Anxiety F41.9 300.00    4. Chronic abdominal pain R10.9 789.00 REFERRAL TO GASTROENTEROLOGY    G89.29 338.29    5.  Drug-seeking behavior Z76.5 305.90 Medical Decision Making:  HTN- continue current therapy- wnl today    Anxiety and depression- patient advised to follow up with psych regarding reportedly being taken off zoloft without any other plans for therapy or monitoring, patient advised to schedule follow up for insomnia to discuss medication management, no controlled sleep aids will be given from this office    Chronic abdominal pain- referral to gastro given to patient with advise to call and self schedule    Drug seeking behavior- no controlled substances can be given from this office as patient has exhibited extremely suspicious and concerning behavior to include multiple ER visits to different ERs for complaints of pain, multiple visits to different medical providers within the [facility],  showing 48 prescriptions from 23 different providers from 9 different pharmacies and patient leaving visit stating that he \"will find sleep medication on his own\" or he \"will go back to drinking\"- patient advised to abstain from both of these measures to obtain sleep aids as they could interfere with the medications he is taking, advised he needs to schedule with psych as previously instructed       Follow-up Disposition:  Return if symptoms worsen or fail to improve. Patient acknowledges understanding of instructions and acknowledges understanding to call back if current symptoms worsen or new symptoms arise. Patient acknowledges and agrees with plan.     Signed By: MATHEUS Dela Cruz     August 4, 2017

## 2017-08-04 PROBLEM — Z76.5 DRUG-SEEKING BEHAVIOR: Status: ACTIVE | Noted: 2017-08-04

## 2017-08-07 ENCOUNTER — APPOINTMENT (OUTPATIENT)
Dept: GENERAL RADIOLOGY | Age: 42
End: 2017-08-07
Attending: EMERGENCY MEDICINE
Payer: SUBSIDIZED

## 2017-08-07 ENCOUNTER — HOSPITAL ENCOUNTER (EMERGENCY)
Age: 42
Discharge: HOME | End: 2017-08-07
Attending: EMERGENCY MEDICINE
Payer: SUBSIDIZED

## 2017-08-07 VITALS
BODY MASS INDEX: 30.92 KG/M2 | SYSTOLIC BLOOD PRESSURE: 139 MMHG | HEIGHT: 70 IN | WEIGHT: 216 LBS | TEMPERATURE: 97.5 F | DIASTOLIC BLOOD PRESSURE: 97 MMHG | HEART RATE: 81 BPM | OXYGEN SATURATION: 100 % | RESPIRATION RATE: 16 BRPM

## 2017-08-07 DIAGNOSIS — S70.02XA CONTUSION OF LEFT HIP, INITIAL ENCOUNTER: ICD-10-CM

## 2017-08-07 DIAGNOSIS — S39.012A LOW BACK STRAIN, INITIAL ENCOUNTER: ICD-10-CM

## 2017-08-07 DIAGNOSIS — S20.222A BACK CONTUSION, LEFT, INITIAL ENCOUNTER: Primary | ICD-10-CM
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
## 2017-08-07 DIAGNOSIS — S70.12XA CONTUSION OF LEFT THIGH, INITIAL ENCOUNTER: ICD-10-CM

## 2017-08-07 PROCEDURE — 99283 EMERGENCY DEPT VISIT LOW MDM: CPT

## 2017-08-07 PROCEDURE — 73502 X-RAY EXAM HIP UNI 2-3 VIEWS: CPT

## 2017-08-07 PROCEDURE — 74011250637 HC RX REV CODE- 250/637: Performed by: EMERGENCY MEDICINE

## 2017-08-07 PROCEDURE — 72110 X-RAY EXAM L-2 SPINE 4/>VWS: CPT

## 2017-08-07 RX ORDER — IBUPROFEN 800 MG/1
800 TABLET ORAL EVERY 8 HOURS
Qty: 15 TAB | Refills: 0 | Status: SHIPPED | OUTPATIENT
Start: 2017-08-07 | End: 2017-08-12

## 2017-08-07 RX ORDER — KETOROLAC TROMETHAMINE 30 MG/ML
60 INJECTION, SOLUTION INTRAMUSCULAR; INTRAVENOUS
Status: DISCONTINUED | OUTPATIENT
Start: 2017-08-07 | End: 2017-08-07

## 2017-08-07 RX ORDER — CYCLOBENZAPRINE HCL 5 MG
10 TABLET ORAL 3 TIMES DAILY
Qty: 18 TAB | Refills: 0 | Status: SHIPPED | OUTPATIENT
Start: 2017-08-07 | End: 2017-11-28 | Stop reason: SDUPTHER

## 2017-08-07 RX ORDER — HYDROCODONE BITARTRATE AND ACETAMINOPHEN 5; 325 MG/1; MG/1
1 TABLET ORAL
Status: COMPLETED | OUTPATIENT
Start: 2017-08-07 | End: 2017-08-07

## 2017-08-07 RX ORDER — HYDROCODONE BITARTRATE AND ACETAMINOPHEN 5; 325 MG/1; MG/1
TABLET ORAL
Qty: 6 TAB | Refills: 0 | Status: SHIPPED | OUTPATIENT
Start: 2017-08-07 | End: 2017-10-22

## 2017-08-07 RX ADMIN — HYDROCODONE BITARTRATE AND ACETAMINOPHEN 1 TABLET: 5; 325 TABLET ORAL at 13:01

## 2017-08-07 RX ADMIN — HYDROCODONE BITARTRATE AND ACETAMINOPHEN 1 TABLET: 5; 325 TABLET ORAL at 12:04

## 2017-08-07 NOTE — LETTER
NOTIFICATION RETURN TO WORK / SCHOOL 
 
8/7/2017 2:06 PM 
 
Mr. Luisa Pelaez [address] [id number] To Whom It May Concern: 
 
Luisa Pelaez is currently under the care of [facility] EMERGENCY DEPT. He will return to work/school on: 8/8/2017 If there are questions or concerns please have the patient contact our office.  
 
 
 
Sincerely, 
 
 
Timur Pang MD

## 2017-08-07 NOTE — ED PROVIDER NOTES
[facility] EMERGENCY DEPT      42 y.o. male with noted past medical history who presents to the emergency department for evalaution post fall. Pt states that he was standing on top of a 6 foot dumpster when he fell. Pt notes that he hit his L leg and is having lower back pain. Pt is ambulatory and denies any lacerations. Pt denies LOC, syncope, and head trauma. No other complaints. Nursing nurses regarding the HPI and triage nursing notes were reviewed. No current facility-administered medications for this encounter. Current Outpatient Prescriptions   Medication Sig    HYDROcodone-acetaminophen (NORCO) 5-325 mg per tablet Take  by mouth.  traMADol (ULTRAM) 50 mg tablet Take 50 mg by mouth every six (6) hours as needed for Pain.  carvedilol (COREG) 12.5 mg tablet Take  by mouth two (2) times daily (with meals).  buPROPion XL (WELLBUTRIN XL) 150 mg tablet Take 1 Tab by mouth every morning.  Blood-Glucose Meter monitoring kit Check fasting blood sugar once daily    glucose blood VI test strips (ASCENSIA AUTODISC VI, ONE TOUCH ULTRA TEST VI) strip Check fasting blood sugar once daily    Lancets misc Check fasting blood sugar once daily    hydrOXYzine pamoate (VISTARIL) 50 mg capsule Take 1-2 caps TID PRN    ibuprofen (MOTRIN) 800 mg tablet Take  by mouth.  PEN NEEDLE, DIABETIC (RELION NEEDLES) by Does Not Apply route.     glucose blood VI test strips (RELION PRIME TEST STRIPS) strip Check blood sugars twice daily    JANUVIA 50 mg tablet TAKE 1 TABLET BY MOUTH DAILY       Past Medical History:   Diagnosis Date    Depression     Diabetes ([address]     Drug-seeking behavior     56 prescriptions from 32 different prescribers at 6 different pharmacies in last 12 months    Herniated lumbar intervertebral disc     Hypertension     Neurological disorder L3,4,5 torn Herniated disc       Past Surgical History:   Procedure Laterality Date    HX HERNIA REPAIR Bilateral 06/02/2017 robotic repair of bilateral indirect inguinal hernias with placement of mesh    HX ORTHOPAEDIC  trigger finger release       Family History   Problem Relation Age of Onset    Hypertension Mother     Diabetes Mother     Heart Failure Mother     COPD Mother     Heart Disease Mother     Hypertension Father     Alcohol abuse Father        Social History     Social History    Marital status:      Spouse name: N/A    Number of children: N/A    Years of education: N/A     Occupational History    Not on file. Social History Main Topics    Smoking status: Never Smoker    Smokeless tobacco: Never Used    Alcohol use Yes      Comment: rarely    Drug use: No    Sexual activity: Yes     Partners: Female     Birth control/ protection: Condom     Other Topics Concern    Not on file     Social History Narrative       No Known Allergies    Patient's primary care provider (as noted in EPIC):  MATHEUS Lewis    REVIEW OF SYSTEMS:    Constitutional:  Negative for diaphoresis. Eyes:  Negative for diploplia. HENT:  Negative for congestion. Respiratory:  Negative for stridor. Cardiovascular:  Negative for palpitations. Gastrointestinal:  Negative for diarrhea. Genitourinary:  Negative for flank pain. Musculoskeletal:  Negative for back pain. Skin:  Negative for pallor. Neurological:  Negative for weakness. Psychiatric:  Negative for hallucinations. Visit Vitals    BP (!) 139/97 (BP 1 Location: Right arm, BP Patient Position: At rest)    Pulse 81    Temp 97.5 °F (36.4 °C)    Resp 16    Ht 5' 10\" (1.778 m)    Wt 98 kg (216 lb)    SpO2 100%    BMI 30.99 kg/m2       PHYSICAL EXAM:    CONSTITUTIONAL: Alert, in no apparent distress; well-developed; well-nourished. HEAD:  Normocephalic, atraumatic. No Battles sign. No Raccoons eyes. EYES:  EOM's intact. Normal conjunctiva. Anicteric sclera.   ENTM: Nose: no rhinorrhea; Oropharynx:  mucous membranes moist    Neck:  No JVD.  No cervical vertebral bony point tenderness or step-off. RESP: Chest clear, equal breath sounds. CARDIOVASCULAR:  Regular rate and rhythm. No murmurs, rubs, or gallops. GI: Normal bowel sounds, abdomen soft and non-tender. No masses or organomegaly. : No costo-vertebral angle tenderness. BACK:  No TLS vertebral bony point tenderness or step-off. Left lower paralumbar area has mild focal reproducible tenderness to palpation. No rash or lesions. UPPER EXT:  Normal inspection. LOWER EXT: No edema, no calf tenderness. Distal pulses intact. Left lateral hip and left proximal lateral thigh have mild focal reproducible tenderness to palpation. No rash or lesions. NEURO: Grossly normal motor and sensation. SKIN: No rashes; Normal for age and stage. PSYCH:  Alert and oriented, normal affect. DIFFERENTIAL DIAGNOSES/ MEDICAL DECISION MAKING:  Contusion, sprain, dislocation, fracture, ligamentous tear/ disruption or a combination of the above. Abnormal lab results from this emergency department encounter:  Labs Reviewed - No data to display    Lab values for this patient within approximately the last 12 hours:  No results found for this or any previous visit (from the past 12 hour(s)). Radiologist and cardiologist interpretations if available at time of this note:  Xr Spine Lumb Min 4 V    Result Date: 8/7/2017  LUMBAR SPINE SERIES: Indication:  Trauma. Five views. COMPARISON: 03/10/2016 Findings: There are five non rib bearing lumbar vertebra. There is no evidence of fracture or subluxation. No spondylolysis is present. Endplate osteophyte formation noted at L3-4, L4-5 and L5-S1. IMPRESSION: Minimal lower lumbar spondylosis. No acute bony abnormality. Left hip xray:  Initial interpretation/reading by the emergency physician:  Interpretation of the ordered X-ray(s) shows no fractures or dislocations.     Medication(s) ordered for patient during this emergency visit encounter:  Medications   HYDROcodone-acetaminophen (NORCO) 5-325 mg per tablet 1 Tab (1 Tab Oral Given 8/7/17 1204)   HYDROcodone-acetaminophen (NORCO) 5-325 mg per tablet 1 Tab (1 Tab Oral Given 8/7/17 1301)        ED COURSE:    Will get left hip and LS spine xrays given pain and fall from about 6 feet. IMPRESSION AND MEDICAL DECISION MAKING:  Based upon the patients presentation with noted HPI and PE, along with the work up done in the emergency department, I believe that the patient is having noted contusion(s) from noted fall. DIAGNOSIS:  1. Contusions, left buttock and thigh  2. Contusions, left lower back  3. Left lower back strain  4. Fall    SPECIFIC PATIENT INSTRUCTIONS FROM THE PHYSICIAN WHO TREATED YOU IN THE ER TODAY:  1. Ibuprofen as prescribed until finished. IF flexeril was prescribed, take that as prescribed until finished as well. 2. Norco for pain not controlled with the ibuprofen and flexeril. 3. Return if any concerns or worsening condition(s). 4. FOLLOW UP APPOINTMENT:  Your primary doctor in 1-2 days. Kerry Cardenas M.D. Provider Attestation:  If a scribe was utilized in generation of this patient record, I personally performed the services described in the documentation, reviewed the documentation, as recorded by the scribe in my presence, and it accurately records the patient's history of presenting illness, review of systems, patient physical examination, and procedures performed by me as the attending physician. Kerry Cardenas M.D.   [facility] Board Certified Emergency Physician  8/7/2017.  11:31 AM      Scribe Attestation:   Jamar Colon am scribing for and in the presence of Yun Beckwith MD on this day 08/07/17 at 11:33 AM   gilbert Silva    Provider Attestation:  I personally performed the services described in the documentation, reviewed the documentation, as recorded by the scribe in my presence, and it accurately and completely records my words and actions.   Iftikhar Tovar MD. 11:33 AM      Signed by: Jayjay Lopez, 11:33 AM

## 2017-08-07 NOTE — ED NOTES
I have reviewed discharge instruction and prescriptions with patient. Patient verbalized understanding and has no further questions at this time. Education taught and patient verbalized understanding of education. Teach back method used. Patients pain decreased. Belongings given to patient. Patient discharged with family to home. Patient verbalized he will not be driving today.

## 2017-08-07 NOTE — DISCHARGE INSTRUCTIONS
SPECIFIC PATIENT INSTRUCTIONS FROM THE PHYSICIAN WHO TREATED YOU IN THE ER TODAY:  1. Ibuprofen as prescribed until finished. IF flexeril was prescribed, take that as prescribed until finished as well. 2. Norco for pain not controlled with the ibuprofen and flexeril. 3. Return if any concerns or worsening condition(s). 4. FOLLOW UP APPOINTMENT:  Your primary doctor in 1-2 days. Back Strain: Care Instructions  Your Care Instructions    Back strain happens when you overstretch, or pull, a muscle in your back. You may hurt your back in an accident or when you exercise or lift something. Most back pain will get better with rest and time. You can take care of yourself at home to help your back heal.  Follow-up care is a key part of your treatment and safety. Be sure to make and go to all appointments, and call your doctor if you are having problems. It's also a good idea to know your test results and keep a list of the medicines you take. How can you care for yourself at home? · Try to stay as active as you can, but stop or reduce any activity that causes pain. · Put ice or a cold pack on the sore muscle for 10 to 20 minutes at a time to stop swelling. Try this every 1 to 2 hours for 3 days (when you are awake) or until the swelling goes down. Put a thin cloth between the ice pack and your skin. · After 2 or 3 days, apply a heating pad on low or a warm cloth to your back. Some doctors suggest that you go back and forth between hot and cold treatments. · Take pain medicines exactly as directed. ¨ If the doctor gave you a prescription medicine for pain, take it as prescribed. ¨ If you are not taking a prescription pain medicine, ask your doctor if you can take an over-the-counter medicine. · Try sleeping on your side with a pillow between your legs. Or put a pillow under your knees when you lie on your back. These measures can ease pain in your lower back.   · Return to your usual level of activity slowly. When should you call for help? Call 911 anytime you think you may need emergency care. For example, call if:  · You are unable to move a leg at all. Call your doctor now or seek immediate medical care if:  · You have new or worse symptoms in your legs, belly, or buttocks. Symptoms may include:  ¨ Numbness or tingling. ¨ Weakness. ¨ Pain. · You lose bladder or bowel control. Watch closely for changes in your health, and be sure to contact your doctor if you are not getting better as expected. Where can you learn more? Go to http://jacklynPharmMDdenice.info/. Enter Y546 in the search box to learn more about \"Back Strain: Care Instructions. \"  Current as of: March 21, 2017  Content Version: 11.3  © 9404-2255 [employer]. Care instructions adapted under license by [employer] (which disclaims liability or warranty for this information). If you have questions about a medical condition or this instruction, always ask your healthcare professional. [place] [address] any warranty or liability for your use of this information. Preventing Falls: Care Instructions  Your Care Instructions  Getting around your home safely can be a challenge if you have injuries or health problems that make it easy for you to fall. Loose rugs and furniture in walkways are among the dangers for many older people who have problems walking or who have poor eyesight. People who have conditions such as arthritis, osteoporosis, or dementia also have to be careful not to fall. You can make your home safer with a few simple measures. Follow-up care is a key part of your treatment and safety. Be sure to make and go to all appointments, and call your doctor if you are having problems. It's also a good idea to know your test results and keep a list of the medicines you take. How can you care for yourself at home?   Taking care of yourself  · You may get dizzy if you do not drink enough water. To prevent dehydration, drink plenty of fluids, enough so that your urine is light yellow or clear like water. Choose water and other caffeine-free clear liquids. If you have kidney, heart, or liver disease and have to limit fluids, talk with your doctor before you increase the amount of fluids you drink. · Exercise regularly to improve your strength, muscle tone, and balance. Walk if you can. Swimming may be a good choice if you cannot walk easily. · Have your vision and hearing checked each year or any time you notice a change. If you have trouble seeing and hearing, you might not be able to avoid objects and could lose your balance. · Know the side effects of the medicines you take. Ask your doctor or pharmacist whether the medicines you take can affect your balance. Sleeping pills or sedatives can affect your balance. · Limit the amount of alcohol you drink. Alcohol can impair your balance and other senses. · Ask your doctor whether calluses or corns on your feet need to be removed. If you wear loose-fitting shoes because of calluses or corns, you can lose your balance and fall. · Talk to your doctor if you have numbness in your feet. Preventing falls at home  · Remove raised doorway thresholds, throw rugs, and clutter. Repair loose carpet or raised areas in the floor. · Move furniture and electrical cords to keep them out of walking paths. · Use nonskid floor wax, and wipe up spills right away, especially on ceramic tile floors. · If you use a walker or cane, put rubber tips on it. If you use crutches, clean the bottoms of them regularly with an abrasive pad, such as steel wool. · Keep your house well lit, especially [place], and outside walkways. Use night-lights in areas such as hallways and bathrooms.  Add extra light switches or use remote switches (such as switches that go on or off when you clap your hands) to make it easier to turn lights on if you have to get up during the night. · Install sturdy handrails on stairways. · Move items in your cabinets so that the things you use a lot are on the lower shelves (about waist level). · Keep a cordless phone and a flashlight with new batteries by your bed. If possible, put a phone in each of the main rooms of your house, or carry a cell phone in case you fall and cannot reach a phone. Or, you can wear a device around your neck or wrist. You push a button that sends a signal for help. · Wear low-heeled shoes that fit well and give your feet good support. Use footwear with nonskid soles. Check the heels and soles of your shoes for wear. Repair or replace worn heels or soles. · Do not wear socks without shoes on wood floors. · Walk on the grass when the sidewalks are slippery. If you live in an area that gets snow and ice in the winter, sprinkle salt on slippery steps and sidewalks. Preventing falls in the bath  · Install grab bars and nonskid mats inside and outside your shower or tub and near the toilet and sinks. · Use shower chairs and bath benches. · Use a hand-held shower head that will allow you to sit while showering. · Get into a tub or shower by putting the weaker leg in first. Get out of a tub or shower with your strong side first.  · Repair loose toilet seats and consider installing a raised toilet seat to make getting on and off the toilet easier. · Keep your bathroom door unlocked while you are in the shower. Where can you learn more? Go to http://jacklyn-denice.info/. Enter 0476 79 69 71 in the search box to learn more about \"Preventing Falls: Care Instructions. \"  Current as of: August 4, 2016  Content Version: 11.3  © 4933-4815 [employer]. Care instructions adapted under license by [employer] (which disclaims liability or warranty for this information).  If you have questions about a medical condition or this instruction, always ask your healthcare professional. [employer] disclaims any warranty or liability for your use of this information. MyChart Activation    Thank you for requesting access to [employer]. Please follow the instructions below to securely access and download your online medical record. [employer] allows you to send messages to your doctor, view your test results, renew your prescriptions, schedule appointments, and more. How Do I Sign Up? 1. In your internet browser, go to [email or web address]. 2. Click on the First Time User? Click Here link in the Sign In box. You will see the New Member Sign Up page. 3. Enter your [employer] Access Code exactly as it appears below. You will not need to use this code after youve completed the sign-up process. If you do not sign up before the expiration date, you must request a new code. [employer] Access Code: [email or web address]  Expires: 2017  9:05 PM (This is the date your [employer] access code will )    4. Enter the last four digits of your Social Security Number (xxxx) and Date of Birth (mm/dd/yyyy) as indicated and click Submit. You will be taken to the next sign-up page. 5. Create a [employer] ID. This will be your [employer] login ID and cannot be changed, so think of one that is secure and easy to remember. 6. Create a [employer] password. You can change your password at any time. 7. Enter your Password Reset Question and Answer. This can be used at a later time if you forget your password. 8. Enter your e-mail address. You will receive e-mail notification when new information is available in [address] 9. Click Sign Up. You can now view and download portions of your medical record. 10. Click the Download Summary menu link to download a portable copy of your medical information. Additional Information    If you have questions, please visit the Frequently Asked Questions section of the [employer] website at [email or web address] Remember, [employer] is NOT to be used for urgent needs. For medical emergencies, dial 911.

## 2017-09-05 ENCOUNTER — HOSPITAL ENCOUNTER (OUTPATIENT)
Dept: LAB | Age: 42
Discharge: HOME | End: 2017-09-05
Payer: MEDICAID

## 2017-09-05 ENCOUNTER — OFFICE VISIT (OUTPATIENT)
Dept: FAMILY MEDICINE CLINIC | Facility: CLINIC | Age: 42
End: 2017-09-05

## 2017-09-05 VITALS
HEART RATE: 116 BPM | HEIGHT: 70 IN | TEMPERATURE: 98.1 F | WEIGHT: 215.4 LBS | RESPIRATION RATE: 15 BRPM | SYSTOLIC BLOOD PRESSURE: 127 MMHG | BODY MASS INDEX: 30.84 KG/M2 | DIASTOLIC BLOOD PRESSURE: 88 MMHG | OXYGEN SATURATION: 98 %

## 2017-09-05 DIAGNOSIS — E11.9 TYPE 2 DIABETES MELLITUS WITHOUT COMPLICATION, WITHOUT LONG-TERM CURRENT USE OF INSULIN (HCC): Primary | ICD-10-CM

## 2017-09-05 DIAGNOSIS — G47.00 INSOMNIA DISORDER RELATED TO KNOWN ORGANIC FACTOR: ICD-10-CM

## 2017-09-05 DIAGNOSIS — Z23 ENCOUNTER FOR IMMUNIZATION: ICD-10-CM

## 2017-09-05 DIAGNOSIS — E11.9 TYPE 2 DIABETES MELLITUS WITHOUT COMPLICATION, WITHOUT LONG-TERM CURRENT USE OF INSULIN (HCC): ICD-10-CM

## 2017-09-05 PROCEDURE — 82043 UR ALBUMIN QUANTITATIVE: CPT | Performed by: NURSE PRACTITIONER

## 2017-09-05 RX ORDER — PROPRANOLOL HYDROCHLORIDE 20 MG/1
1 TABLET ORAL
Refills: 0 | COMMUNITY
Start: 2017-07-26 | End: 2017-11-28

## 2017-09-05 RX ORDER — CARVEDILOL 3.12 MG/1
3.12 TABLET ORAL 2 TIMES DAILY
COMMUNITY
Start: 2017-04-28 | End: 2017-11-28

## 2017-09-05 RX ORDER — QUETIAPINE FUMARATE 200 MG/1
1 TABLET, FILM COATED ORAL
Refills: 2 | COMMUNITY
Start: 2017-07-27 | End: 2017-11-28

## 2017-09-05 RX ORDER — ALPRAZOLAM 1 MG/1
1 TABLET ORAL
COMMUNITY
End: 2017-11-28

## 2017-09-05 RX ORDER — PROMETHAZINE HYDROCHLORIDE 25 MG/1
25 TABLET ORAL
Qty: 30 TAB | Refills: 0 | Status: SHIPPED | OUTPATIENT
Start: 2017-09-05 | End: 2017-09-05 | Stop reason: DRUGHIGH

## 2017-09-05 RX ORDER — ZOLPIDEM TARTRATE 5 MG/1
TABLET ORAL
Refills: 0 | COMMUNITY
Start: 2017-08-03 | End: 2017-11-28

## 2017-09-05 RX ORDER — OXYCODONE AND ACETAMINOPHEN 5; 325 MG/1; MG/1
1 TABLET ORAL
COMMUNITY
Start: 2017-08-29 | End: 2017-10-22

## 2017-09-05 RX ORDER — PAROXETINE HYDROCHLORIDE 40 MG/1
TABLET, FILM COATED ORAL
Refills: 0 | COMMUNITY
Start: 2017-07-26 | End: 2017-11-28

## 2017-09-05 RX ORDER — PROMETHAZINE HYDROCHLORIDE 25 MG/1
25 TABLET ORAL
Qty: 30 TAB | Refills: 1 | Status: SHIPPED | OUTPATIENT
Start: 2017-09-05 | End: 2017-09-06 | Stop reason: ALTCHOICE

## 2017-09-05 RX ORDER — ONDANSETRON 4 MG/1
4 TABLET, ORALLY DISINTEGRATING ORAL
COMMUNITY
Start: 2017-08-29 | End: 2017-09-05

## 2017-09-05 NOTE — PROGRESS NOTES
Fabricio Queen is a 42 y.o.  male presents today for office visit for depression and anxiety. Pt is in Room # 8.      1. Have you been to the ER, urgent care clinic since your last visit? Hospitalized since your last visit? Yes [facility] 8/3/17 and 8/9/17 for Insomnia, [facility] 8/17/17 for Abdominal pain and hallucinations, [facility] 8/29/17 and 9/3/17 for abdominal pain. 2. Have you seen or consulted any other health care providers outside of the [address] [place] [address] since your last visit? Include any pap smears or colon screening. No    Health Maintenance reviewed - urine microalbumin ordered, patient asked to schedule his diabetic eye exam.      Upcoming Appts  N/A    VORB: Administer Flulaval via IM injection once./Tosin Yan NP/HALI Steve LPN        Fabricio Queen is a 42 y.o. male who presents for routine immunizations. He denies any symptoms , reactions or allergies that would exclude them from being immunized today. Risks and adverse reactions were discussed and the VIS was given to them. All questions were addressed. He was observed for 10 min post injection. There were no reactions observed. Raghav Aguilar PMP reviewed.

## 2017-09-05 NOTE — PROGRESS NOTES
Patient is here today for anxiety and insomnia- he reports that he is unable to sleep because he is going through a divorce and he is under a great deal of stress because of that. He is requesting Xanax. He said that he went to a Psychiatrist but did not get a prescription because the Psychiatrist wanted him to come a few more times before she prescribe anything. Patient denies suicidal/homicidal ideations. He is a diabetic and his labs are due, he is also requesting the influenza vaccine. His  was obtained which showed multiple narcotics from multiple providers. 1. Type 2 diabetes mellitus without complication, without long-term current use of insulin (HCC)    - MICROALBUMIN, UR, RAND W/ MICROALBUMIN/CREA RATIO; Future  -  DIABETES FOOT EXAM    2. Encounter for immunization      - Influenza virus vaccine (QUADRIVALENT PRES FREE SYRINGE) IM (48601)    3. Insomnia disorder related to known organic factor  Phenergan 25 mg one tab po at bedtime for sedation. - will discontinue phenergan and start trazadone.

## 2017-09-05 NOTE — PATIENT INSTRUCTIONS
Vaccine Information Statement    Influenza (Flu) Vaccine (Inactivated or Recombinant): What you need to know    Many Vaccine Information Statements are available in Bengali and other languages. See www.immunize.org/vis  Hojas de Información Sobre Vacunas están disponibles en Español y en muchos otros idiomas. Visite www.immunize.org/vis    1. Why get vaccinated? Influenza (flu) is a contagious disease that spreads around the [place] every year, usually between October and May. Flu is caused by influenza viruses, and is spread mainly by coughing, sneezing, and close contact. Anyone can get flu. Flu strikes suddenly and can last several days. Symptoms vary by age, but can include:   fever/chills   sore throat   muscle aches   fatigue   cough   headache    runny or stuffy nose    Flu can also lead to pneumonia and blood infections, and cause diarrhea and seizures in children. If you have a medical condition, such as heart or lung disease, flu can make it worse. Flu is more dangerous for some people. Infants and young children, people 72years of age and older, pregnant women, and people with certain health conditions or a weakened immune system are at greatest risk. Each year thousands of people in the [facility] die from flu, and many more are hospitalized. Flu vaccine can:   keep you from getting flu,   make flu less severe if you do get it, and   keep you from spreading flu to your family and other people. 2. Inactivated and recombinant flu vaccines    A dose of flu vaccine is recommended every flu season. Children 6 months through 6years of age may need two doses during the same flu season. Everyone else needs only one dose each flu season.        Some inactivated flu vaccines contain a very small amount of a mercury-based preservative called thimerosal. Studies have not shown thimerosal in vaccines to be harmful, but flu vaccines that do not contain thimerosal are available. There is no live flu virus in flu shots. They cannot cause the flu. There are many flu viruses, and they are always changing. Each year a new flu vaccine is made to protect against three or four viruses that are likely to cause disease in the upcoming flu season. But even when the vaccine doesnt exactly match these viruses, it may still provide some protection    Flu vaccine cannot prevent:   flu that is caused by a virus not covered by the vaccine, or   illnesses that look like flu but are not. It takes about 2 weeks for protection to develop after vaccination, and protection lasts through the flu season. 3. Some people should not get this vaccine    Tell the person who is giving you the vaccine:     If you have any severe, life-threatening allergies. If you ever had a life-threatening allergic reaction after a dose of flu vaccine, or have a severe allergy to any part of this vaccine, you may be advised not to get vaccinated. Most, but not all, types of flu vaccine contain a small amount of egg protein.  If you ever had Guillain-Barré Syndrome (also called GBS). Some people with a history of GBS should not get this vaccine. This should be discussed with your doctor.  If you are not feeling well. It is usually okay to get flu vaccine when you have a mild illness, but you might be asked to come back when you feel better. 4. Risks of a vaccine reaction    With any medicine, including vaccines, there is a chance of reactions. These are usually mild and go away on their own, but serious reactions are also possible. Most people who get a flu shot do not have any problems with it.      Minor problems following a flu shot include:    soreness, redness, or swelling where the shot was given     hoarseness   sore, red or itchy eyes   cough   fever   aches   headache   itching   fatigue  If these problems occur, they usually begin soon after the shot and last 1 or 2 days. More serious problems following a flu shot can include the following:     There may be a small increased risk of Guillain-Barré Syndrome (GBS) after inactivated flu vaccine. This risk has been estimated at 1 or 2 additional cases per million people vaccinated. This is much lower than the risk of severe complications from flu, which can be prevented by flu vaccine.  Young children who get the flu shot along with pneumococcal vaccine (PCV13) and/or DTaP vaccine at the same time might be slightly more likely to have a seizure caused by fever. Ask your doctor for more information. Tell your doctor if a child who is getting flu vaccine has ever had a seizure. Problems that could happen after any injected vaccine:      People sometimes faint after a medical procedure, including vaccination. Sitting or lying down for about 15 minutes can help prevent fainting, and injuries caused by a fall. Tell your doctor if you feel dizzy, or have vision changes or ringing in the ears.  Some people get severe pain in the shoulder and have difficulty moving the arm where a shot was given. This happens very rarely.  Any medication can cause a severe allergic reaction. Such reactions from a vaccine are very rare, estimated at about 1 in a million doses, and would happen within a few minutes to a few hours after the vaccination. As with any medicine, there is a very remote chance of a vaccine causing a serious injury or death. The safety of vaccines is always being monitored. For more information, visit: www.cdc.gov/vaccinesafety/    5. What if there is a serious reaction? What should I look for?  Look for anything that concerns you, such as signs of a severe allergic reaction, very high fever, or unusual behavior.     Signs of a severe allergic reaction can include hives, swelling of the face and throat, difficulty breathing, a fast heartbeat, dizziness, and weakness - usually within a few minutes to a few hours after the vaccination. What should I do?  If you think it is a severe allergic reaction or other emergency that cant wait, call 9-1-1 and get the person to the nearest hospital. Otherwise, call your doctor.  Reactions should be reported to the Vaccine Adverse Event Reporting System (VAERS). Your doctor should file this report, or you can do it yourself through  the VAERS web site at www.vaers. [facility].gov, or by calling 8-890.263.1025. VAERS does not give medical advice. 6. The National Vaccine Injury Compensation Program    The [facility] Vaccine Injury Compensation Program (VICP) is a federal program that was created to compensate people who may have been injured by certain vaccines. Persons who believe they may have been injured by a vaccine can learn about the program and about filing a claim by calling 0-382.698.7518 or visiting the [employer] website at www.[facility].gov/vaccinecompensation. There is a time limit to file a claim for compensation. 7. How can I learn more?  Ask your healthcare provider. He or she can give you the vaccine package insert or suggest other sources of information.  Call your local or state health department.  Contact the Centers for Disease Control and Prevention (CDC):  - Call 3-572.168.9195 (1-800-CDC-INFO) or  - Visit CDCs website at www.cdc.gov/flu    Vaccine Information Statement   Inactivated Influenza Vaccine   8/7/2015  [address] [id number]    Department of Health and Human Services  Centers for Disease Control and Prevention    Office Use Only

## 2017-09-06 ENCOUNTER — TELEPHONE (OUTPATIENT)
Dept: FAMILY MEDICINE CLINIC | Facility: CLINIC | Age: 42
End: 2017-09-06

## 2017-09-06 LAB
CREAT UR-MCNC: 67.85 MG/DL (ref 30–125)
MICROALBUMIN UR-MCNC: 3.8 MG/DL (ref 0–3)
MICROALBUMIN/CREAT UR-RTO: 56 MG/G (ref 0–30)

## 2017-09-06 NOTE — TELEPHONE ENCOUNTER
Returned patients phone call. Shonda NP only prescribed one medication which was phenergan. Patient received today.

## 2017-09-06 NOTE — TELEPHONE ENCOUNTER
Rajendra called patient and said that trazadone was not prescribed right. Was wondering if he could get another prescription.

## 2017-09-22 ENCOUNTER — HOSPITAL ENCOUNTER (EMERGENCY)
Age: 42
Discharge: HOME | End: 2017-09-22
Attending: EMERGENCY MEDICINE
Payer: MEDICAID

## 2017-09-22 VITALS
BODY MASS INDEX: 31.21 KG/M2 | DIASTOLIC BLOOD PRESSURE: 54 MMHG | OXYGEN SATURATION: 98 % | HEIGHT: 70 IN | SYSTOLIC BLOOD PRESSURE: 125 MMHG | RESPIRATION RATE: 14 BRPM | HEART RATE: 93 BPM | WEIGHT: 218 LBS | TEMPERATURE: 98.3 F

## 2017-09-22 DIAGNOSIS — J02.9 VIRAL PHARYNGITIS: Primary | ICD-10-CM

## 2017-09-22 PROCEDURE — 96374 THER/PROPH/DIAG INJ IV PUSH: CPT

## 2017-09-22 PROCEDURE — 87081 CULTURE SCREEN ONLY: CPT | Performed by: EMERGENCY MEDICINE

## 2017-09-22 PROCEDURE — 74011250636 HC RX REV CODE- 250/636: Performed by: NURSE PRACTITIONER

## 2017-09-22 PROCEDURE — 99282 EMERGENCY DEPT VISIT SF MDM: CPT

## 2017-09-22 PROCEDURE — 87077 CULTURE AEROBIC IDENTIFY: CPT | Performed by: EMERGENCY MEDICINE

## 2017-09-22 RX ORDER — IBUPROFEN 600 MG/1
600 TABLET ORAL
Qty: 20 TAB | Refills: 0 | Status: SHIPPED | OUTPATIENT
Start: 2017-09-22 | End: 2017-11-28

## 2017-09-22 RX ORDER — DEXAMETHASONE SODIUM PHOSPHATE 4 MG/ML
10 INJECTION, SOLUTION INTRA-ARTICULAR; INTRALESIONAL; INTRAMUSCULAR; INTRAVENOUS; SOFT TISSUE
Status: COMPLETED | OUTPATIENT
Start: 2017-09-22 | End: 2017-09-22

## 2017-09-22 RX ADMIN — DEXAMETHASONE SODIUM PHOSPHATE 10 MG: 4 INJECTION, SOLUTION INTRAMUSCULAR; INTRAVENOUS at 06:23

## 2017-09-22 NOTE — DISCHARGE INSTRUCTIONS
Sore Throat: Care Instructions  Your Care Instructions    Infection by bacteria or a virus causes most sore throats. Cigarette smoke, dry air, air pollution, allergies, and yelling can also cause a sore throat. Sore throats can be painful and annoying. Fortunately, most sore throats go away on their own. If you have a bacterial infection, your doctor may prescribe antibiotics. Follow-up care is a key part of your treatment and safety. Be sure to make and go to all appointments, and call your doctor if you are having problems. It's also a good idea to know your test results and keep a list of the medicines you take. How can you care for yourself at home? · If your doctor prescribed antibiotics, take them as directed. Do not stop taking them just because you feel better. You need to take the full course of antibiotics. · Gargle with warm salt water once an hour to help reduce swelling and relieve discomfort. Use 1 teaspoon of salt mixed in 1 cup of warm water. · Take an over-the-counter pain medicine, such as acetaminophen (Tylenol), ibuprofen (Advil, Motrin), or naproxen (Aleve). Read and follow all instructions on the label. · Be careful when taking over-the-counter cold or flu medicines and Tylenol at the same time. Many of these medicines have acetaminophen, which is Tylenol. Read the labels to make sure that you are not taking more than the recommended dose. Too much acetaminophen (Tylenol) can be harmful. · Drink plenty of fluids. Fluids may help soothe an irritated throat. Hot fluids, such as tea or soup, may help decrease throat pain. · Use over-the-counter throat lozenges to soothe pain. Regular cough drops or hard candy may also help. These should not be given to young children because of the risk of choking. · Do not smoke or allow others to smoke around you. If you need help quitting, talk to your doctor about stop-smoking programs and medicines.  These can increase your chances of quitting for good. · Use a vaporizer or humidifier to add moisture to your bedroom. Follow the directions for cleaning the machine. When should you call for help? Call your doctor now or seek immediate medical care if:  · You have new or worse trouble swallowing. · Your sore throat gets much worse on one side. Watch closely for changes in your health, and be sure to contact your doctor if you do not get better as expected. Where can you learn more? Go to http://jacklyn-denice.info/. Enter [phone] in the search box to learn more about \"Sore Throat: Care Instructions. \"  Current as of: 2016  Content Version: 11.3  © 6280-8436 [employer]. Care instructions adapted under license by [employer] (which disclaims liability or warranty for this information). If you have questions about a medical condition or this instruction, always ask your healthcare professional. [place] [address] any warranty or liability for your use of this information. [employer] Activation    Thank you for requesting access to [employer]. Please follow the instructions below to securely access and download your online medical record. [employer] allows you to send messages to your doctor, view your test results, renew your prescriptions, schedule appointments, and more. How Do I Sign Up? 1. In your internet browser, go to [email or web address]  2. Click on the First Time User? Click Here link in the Sign In box. You will be redirect to the New Member Sign Up page. 3. Enter your [employer] Access Code exactly as it appears below. You will not need to use this code after youve completed the sign-up process. If you do not sign up before the expiration date, you must request a new code. [employer] Access Code: [email or web address]  Expires: 2017 11:43 AM (This is the date your [employer] access code will )    4.  Enter the last four digits of your Social Security Number (xxxx) and Date of Birth (mm/dd/yyyy) as indicated and click Submit. You will be taken to the next sign-up page. 5. Create a [employer]os ID. This will be your [employer] login ID and cannot be changed, so think of one that is secure and easy to remember. 6. Create a [employer] password. You can change your password at any time. 7. Enter your Password Reset Question and Answer. This can be used at a later time if you forget your password. 8. Enter your e-mail address. You will receive e-mail notification when new information is available in [address] 9. Click Sign Up. You can now view and download portions of your medical record. 10. Click the Download Summary menu link to download a portable copy of your medical information. Additional Information    If you have questions, please visit the Frequently Asked Questions section of the [employer] website at [email or web address] Remember, [employer] is NOT to be used for urgent needs. For medical emergencies, dial 911.

## 2017-09-22 NOTE — ED TRIAGE NOTES
Pt c/o sore throat, and generalized body aches that began yesterday. Patient stating \"I just want to make sure it isn't strep because I get strep every year and I have a 3year old son\".

## 2017-09-22 NOTE — LETTER
[facility] EMERGENCY DEPT 
[address] [place] [id number] [phone] Work/School Note Date: 9/22/2017 To Whom It May concern: 
 
Eliezer Farias was seen and treated today in the emergency room by the following provider(s): 
Attending Provider: Gabe Herzog MD 
Nurse Practitioner: Ema Marrero NP. Eliezer Farias may return to work on 09/23/2017. Sincerely, Ema Marrero NP

## 2017-09-22 NOTE — ED PROVIDER NOTES
HPI Comments: 6:03 AM   42 y.o. male presents to ED C/O sore throat. Patient has a HX of HTN, depression, herniated disc, diabetes, drug seeking behavior. Patient reports sore throat since last night, he is concerned he has strep. Patient denies fever, CP, SOB, cough, N/V/D. Patient also reports generalized body aches, however he recently started working construction. Patient is a nonsmoker. Patient denies any other symptoms or complaints. The history is provided by the patient. History limited by: No language barrier. Past Medical History:   Diagnosis Date    Depression     Diabetes ([facility] [address]     Drug-seeking behavior     56 prescriptions from 32 different prescribers at 6 different pharmacies in last 12 months    Herniated lumbar intervertebral disc     Hypertension     Neurological disorder L3,4,5 torn Herniated disc       Past Surgical History:   Procedure Laterality Date    HX HERNIA REPAIR Bilateral 06/02/2017    robotic repair of bilateral indirect inguinal hernias with placement of mesh    HX ORTHOPAEDIC  trigger finger release         Family History:   Problem Relation Age of Onset    Hypertension Mother     Diabetes Mother     Heart Failure Mother     COPD Mother     Heart Disease Mother     Hypertension Father     Alcohol abuse Father        Social History     Social History    Marital status:      Spouse name: N/A    Number of children: N/A    Years of education: N/A     Occupational History    Not on file. Social History Main Topics    Smoking status: Never Smoker    Smokeless tobacco: Never Used    Alcohol use Yes      Comment: rarely    Drug use: No    Sexual activity: Yes     Partners: Female     Birth control/ protection: Condom     Other Topics Concern    Not on file     Social History Narrative         ALLERGIES: Review of patient's allergies indicates no known allergies.     Review of Systems   Constitutional: Negative for activity change, appetite change, chills, fatigue and fever. HENT: Positive for sore throat. Negative for congestion, ear pain and rhinorrhea. Eyes: Negative for pain, discharge, redness and itching. Respiratory: Negative for cough, chest tightness, shortness of breath and wheezing. Cardiovascular: Negative for chest pain and palpitations. Gastrointestinal: Negative for abdominal pain, blood in stool, constipation, diarrhea, nausea and vomiting. Endocrine: Negative for polyuria. Genitourinary: Negative for discharge, dysuria, flank pain, hematuria, penile pain and testicular pain. Musculoskeletal: Positive for myalgias. Negative for back pain, joint swelling and neck pain. Skin: Negative for rash and wound. Allergic/Immunologic: Negative for immunocompromised state. Neurological: Negative for dizziness, weakness, light-headedness, numbness and headaches. Hematological: Negative for adenopathy. Psychiatric/Behavioral: Negative for agitation and confusion. The patient is not nervous/anxious. Vitals:    09/22/17 0556   BP: 125/54   Pulse: 93   Resp: 14   Temp: 98.3 °F (36.8 °C)   SpO2: 98%   Weight: 98.9 kg (218 lb)   Height: 5' 10\" (1.778 m)            Physical Exam   Constitutional: He is oriented to person, place, and time. He appears well-developed and well-nourished. No distress. HENT:   Mouth/Throat: No trismus in the jaw. Posterior oropharyngeal edema and posterior oropharyngeal erythema present. No oropharyngeal exudate or tonsillar abscesses. Neck: Normal range of motion. Neck supple. Cardiovascular: Normal rate, regular rhythm and normal heart sounds. Pulmonary/Chest: Effort normal and breath sounds normal. No respiratory distress. He has no wheezes. He has no rales. Musculoskeletal: Normal range of motion. Lymphadenopathy:     He has cervical adenopathy. Neurological: He is alert and oriented to person, place, and time. He exhibits normal muscle tone.  Coordination normal. Skin: Skin is warm and dry. No rash noted. He is not diaphoretic. No erythema. No pallor. Nursing note and vitals reviewed. MDM  Number of Diagnoses or Management Options  Viral pharyngitis:   Diagnosis management comments: DDX: Viral pharyngitis vs strep pharyngitis    MDM;  Plan - strep screen and decadron. Progress - negative strep screen. Patient educated to take motrin for discomfort. Referred to PCP as needed. Vitals are within normal, negative strep, no exudate. Patient educated to return to the ED for any new or worsening symptoms. Questions denied. Amount and/or Complexity of Data Reviewed  Clinical lab tests: ordered and reviewed      ED Course       Procedures        RESULTS:    No orders to display       Labs Reviewed   STREP THROAT SCREEN       Recent Results (from the past 12 hour(s))   STREP THROAT SCREEN    Collection Time: 09/22/17  5:50 AM   Result Value Ref Range    Special Requests: NO SPECIAL REQUESTS      Strep Screen NEGATIVE       Culture result: PENDING        PROGRESS NOTE:   6:03 AM   Initial assessment completed. Written by Donavon MARQUEZ    DISCHARGE NOTE:  6:55 AM   Mandodiaz Gaytan's  results have been reviewed with him. He has been counseled regarding his diagnosis, treatment, and plan. He verbally conveys understanding and agreement of the signs, symptoms, diagnosis, treatment and prognosis and additionally agrees to follow up as discussed. He also agrees with the care-plan and conveys that all of his questions have been answered. I have also provided discharge instructions for him that include: educational information regarding their diagnosis and treatment, and list of reasons why they would want to return to the ED prior to their follow-up appointment, should his condition change. CLINICAL IMPRESSION:    1.  Viral pharyngitis        AFTER VISIT PLAN:    Current Discharge Medication List      START taking these medications    Details ibuprofen (MOTRIN) 600 mg tablet Take 1 Tab by mouth every six (6) hours as needed for Pain.   Qty: 20 Tab, Refills: 0              Follow-up Information     Follow up With Details Comments Contact Info    MATHEUS Alanis Schedule an appointment as soon as possible for a visit in 3 days As needed Katrin Manuel [phone]  [phone]             Written by Kelly MARQUEZ

## 2017-09-24 ENCOUNTER — APPOINTMENT (OUTPATIENT)
Dept: GENERAL RADIOLOGY | Age: 42
End: 2017-09-24
Attending: EMERGENCY MEDICINE
Payer: MEDICAID

## 2017-09-24 ENCOUNTER — HOSPITAL ENCOUNTER (EMERGENCY)
Age: 42
Discharge: HOME | End: 2017-09-24
Attending: EMERGENCY MEDICINE | Admitting: EMERGENCY MEDICINE
Payer: MEDICAID

## 2017-09-24 VITALS
SYSTOLIC BLOOD PRESSURE: 124 MMHG | HEART RATE: 115 BPM | TEMPERATURE: 98.3 F | RESPIRATION RATE: 16 BRPM | DIASTOLIC BLOOD PRESSURE: 78 MMHG | OXYGEN SATURATION: 96 %

## 2017-09-24 DIAGNOSIS — B34.9 VIRAL SYNDROME: Primary | ICD-10-CM

## 2017-09-24 DIAGNOSIS — R09.1 PLEURISY: ICD-10-CM

## 2017-09-24 DIAGNOSIS — R00.0 TACHYCARDIA: ICD-10-CM

## 2017-09-24 LAB
B-HEM STREP THROAT QL CULT: NEGATIVE
BACTERIA SPEC CULT: ABNORMAL
BACTERIA SPEC CULT: ABNORMAL
SERVICE CMNT-IMP: ABNORMAL

## 2017-09-24 PROCEDURE — 99283 EMERGENCY DEPT VISIT LOW MDM: CPT

## 2017-09-24 PROCEDURE — 74011250637 HC RX REV CODE- 250/637: Performed by: EMERGENCY MEDICINE

## 2017-09-24 PROCEDURE — 71020 XR CHEST PA LAT: CPT

## 2017-09-24 RX ORDER — ACETAMINOPHEN 500 MG
1000 TABLET ORAL
Status: COMPLETED | OUTPATIENT
Start: 2017-09-24 | End: 2017-09-24

## 2017-09-24 RX ORDER — IBUPROFEN 600 MG/1
600 TABLET ORAL
Status: COMPLETED | OUTPATIENT
Start: 2017-09-24 | End: 2017-09-24

## 2017-09-24 RX ADMIN — IBUPROFEN 600 MG: 600 TABLET, FILM COATED ORAL at 12:05

## 2017-09-24 RX ADMIN — ACETAMINOPHEN 1000 MG: 500 TABLET ORAL at 12:06

## 2017-09-24 NOTE — ED PROVIDER NOTES
[address] [facility]  EMERGENCY DEPARTMENT HISTORY AND PHYSICAL EXAM       Date: 9/24/2017   Patient Name: Medina Monsalve   YOB: 1975  Medical Record Number: [id number]    HISTORY OF PRESENTING ILLNESS:     Chief Complaint   Patient presents with    Sore Throat    Cough        Medina Monsalve is a 42 y.o. male presenting with the PMH of HTN and DM to the ED c/o sore throat and coughing onset 2 days ago, seen here Dx with viral illness and given Decadron. Patient states that he has been feeling sore all over and his sxs are ongoing. Admits to rhinorrhea and sneezing. Takes ibuprofen and Nyquil with no relief. No other concerns or symptoms at this time. Primary Care Provider: MATHEUS Cifuentes   Specialist:    Past Medical History:   Past Medical History:   Diagnosis Date    Depression     Diabetes ([facility] [address]     Drug-seeking behavior     56 prescriptions from 32 different prescribers at 6 different pharmacies in last 12 months    Herniated lumbar intervertebral disc     Hypertension     Neurological disorder L3,4,5 torn Herniated disc        Past Surgical History:   Past Surgical History:   Procedure Laterality Date    HX HERNIA REPAIR Bilateral 06/02/2017    robotic repair of bilateral indirect inguinal hernias with placement of mesh    HX ORTHOPAEDIC  trigger finger release        Social History:   Social History   Substance Use Topics    Smoking status: Never Smoker    Smokeless tobacco: Never Used    Alcohol use Yes      Comment: rarely        Allergies:   No Known Allergies     REVIEW OF SYSTEMS:  Review of Systems   Constitutional: Positive for chills. Negative for fever. HENT: Positive for rhinorrhea, sneezing and sore throat. Negative for ear pain. Eyes: Negative for pain and visual disturbance. Respiratory: Positive for cough. Negative for shortness of breath. Cardiovascular: Negative for chest pain and palpitations.    Gastrointestinal: Negative for abdominal pain, diarrhea, nausea and vomiting. Genitourinary: Negative for flank pain. Musculoskeletal: Negative for back pain and neck pain. Skin: Negative for rash. Neurological: Negative for syncope and headaches. Psychiatric/Behavioral: Negative for agitation. The patient is not nervous/anxious. PHYSICAL EXAM:  Vitals:    09/24/17 1141   BP: 124/78   Pulse: (!) 115   Resp: 16   Temp: 98.3 °F (36.8 °C)   SpO2: 96%       Physical Exam   Constitutional: He is oriented to person, place, and time. He appears well-developed and well-nourished. HENT:   Head: Normocephalic and atraumatic. Nose: Rhinorrhea present. Mouth/Throat: Oropharynx is clear and moist.   Eyes: Pupils are equal, round, and reactive to light. No scleral icterus. Neck: Neck supple. No tracheal deviation present. Cardiovascular: Regular rhythm. No murmur heard. Pulmonary/Chest: Effort normal and breath sounds normal. No respiratory distress. Abdominal: Soft. There is no tenderness. Musculoskeletal: Normal range of motion. He exhibits no deformity. Neurological: He is alert and oriented to person, place, and time. No gross neuro deficit   Skin: Skin is warm and dry. No rash noted. He is not diaphoretic. Psychiatric: He has a normal mood and affect. Nursing note and vitals reviewed. Medications   acetaminophen (TYLENOL) tablet 1,000 mg (1,000 mg Oral Given 9/24/17 1206)   ibuprofen (MOTRIN) tablet 600 mg (600 mg Oral Given 9/24/17 1205)       Radiologic Studies -  Xr Chest Pa Lat    Result Date: 9/24/2017  EXAM:  XR CHEST PA LAT INDICATION:   Viral illness. Evaluation for pneumonia. COMPARISON: Several prior exams, most recently May 2, 2017. FINDINGS: PA and lateral radiographs of the chest demonstrate no focal pneumonic opacity, pneumothorax, or pleural effusion. The cardiac and mediastinal contours and pulmonary vascularity are normal.  No acute osseous abnormality.      IMPRESSION: No acute radiographic cardiopulmonary abnormality       MEDICAL DECISION MAKING    DDX: viral syndrome, URI, bronchitis, PNA    42 y.o. male with noted past medical history who presented with Flu like sxs. Vitals notable for tachycardia. PE notable for rhinorrhea. Xray will be performed to rule out infection. Patient was given tylenol, motrin, and oral fluids and was counseled about viral illness. XR negative for infection/infiltrate. Denied CP but stated he did have \"lung irritation\" only when coughing. Patient has no new complaints, changes, or physical findings. Results were reviewed with the patient. Pt's questions and concerns were addressed. Care plan was outlined, including follow-up with PCP/specialist and return precautions were discussed. Patient is felt to be stable for discharge at this time. Diagnosis   Clinical Impression:   1. Viral syndrome    2. Tachycardia    3. Pleurisy           Follow-up Information     Follow up With Details Comments [address] [place], [place] Schedule an appointment as soon as possible for a visit  [facility] [address],[facility] EMERGENCY DEPT  If symptoms worsen [address]  [phone]          Current Discharge Medication List          _______________________________   Attestations:   [address] [place] [address] acting as a scribe for and in the presence of Leyla Kelly DO      September 24, 2017 at 11:56 AM       Provider Attestation:      I personally performed the services described in the documentation, reviewed the documentation, as recorded by the scribe in my presence, and it accurately and completely records my words and actions.  September 24, 2017 at 11:56 AM - Leyla Kelly DO

## 2017-09-29 ENCOUNTER — TELEPHONE (OUTPATIENT)
Dept: FAMILY MEDICINE CLINIC | Facility: CLINIC | Age: 42
End: 2017-09-29

## 2017-09-29 NOTE — TELEPHONE ENCOUNTER
Received a staff message stating was seen the the ED and was prescribed Tussionex for cough. Pt states his insurance is requiring a prior authorization. The pt is advised to f/u with the ED for alternative cough medication or to make a follow up appt. Pt decline an appt and the phone line was disconnected.

## 2017-10-16 NOTE — PROGRESS NOTES
.. PATIENT PRE AND POST OPERATIVE INSTRUCTIONS     [facility]     Before Surgery Instructions:   1) You must have someone available to drive you to and from your procedure and stay with you for the first 24 hours. 2) It is very important that you have nothing to eat or drink after midnight the night before your surgery. This includes chewing gum or sucking on hard candy. Take only heart, blood pressure and cholesterol medications the morning of surgery with only a sip of water. 3) Please stop taking Plavix 10 days prior to your surgery. Stop taking Coumadin 5 days prior to your surgery. Stop taking all Aspirin or Aspirin containing products 7 days prior to your surgery. Stop taking Advil, Motrin, Aleve, and etc. 3 days prior to your surgery. 4) If you take any diabetic medications please consult with your primary care physician on how to take them on the day of your surgery  5) Please stop all Herbal products 2 weeks prior to your surgery. 6) Please arrive at the hospital 2 hours prior to your surgery, unless you have been otherwise instructed. 7) Patients having an operation on their colon will be given a separate instruction sheet on their Bowel Prep. 8) For any pre-operative work up check in at the main entrance to [facility], and then go to Patient Registration. These studies are done on a walk in basis they are open from 7:00am to 5:00pm Monday through Friday. 9) Please wash your surgical site the morning of your surgery with soap and water. 10) If you are of child bearing age you will have pregnancy test done the morning of your surgery as soon as you arrive. After Surgery Instructions: You will need to be seen in the office for a follow-up visit 7-14 days after your surgery. Please call after you have had the procedure to make this appointment.   Unless otherwise instructed, you may remove your outer bandage and shower 48 hours after your surgery. If you develop a fever greater than 101, have any significant drainage, bleeding, swelling and/or pus of the wound. Please call our office immediately. Surgery Date and Time:  Friday, June 2, 2017 at 9:00am     Please check in at [facility],  enter through the main entrance and take the elevator to the second floor. Please check in by 7:00am the day of your surgery. You may contact Carlos Gillette with any questions at [phone]. normal (ped)...

## 2017-10-22 ENCOUNTER — APPOINTMENT (OUTPATIENT)
Dept: GENERAL RADIOLOGY | Age: 42
End: 2017-10-22
Attending: NURSE PRACTITIONER
Payer: SUBSIDIZED

## 2017-10-22 ENCOUNTER — HOSPITAL ENCOUNTER (EMERGENCY)
Age: 42
Discharge: HOME | End: 2017-10-23
Attending: EMERGENCY MEDICINE | Admitting: EMERGENCY MEDICINE
Payer: SUBSIDIZED

## 2017-10-22 DIAGNOSIS — K59.09 OTHER CONSTIPATION: Primary | ICD-10-CM

## 2017-10-22 LAB
ALBUMIN SERPL-MCNC: 4.2 G/DL (ref 3.4–5)
ALBUMIN/GLOB SERPL: 0.8 {RATIO} (ref 0.8–1.7)
ALP SERPL-CCNC: 80 U/L (ref 45–117)
ALT SERPL-CCNC: 30 U/L (ref 16–61)
ANION GAP SERPL CALC-SCNC: 6 MMOL/L (ref 3–18)
APPEARANCE UR: CLEAR
AST SERPL-CCNC: 19 U/L (ref 15–37)
BASOPHILS # BLD: 0 K/UL (ref 0–0.1)
BASOPHILS NFR BLD: 1 % (ref 0–2)
BILIRUB SERPL-MCNC: 0.5 MG/DL (ref 0.2–1)
BILIRUB UR QL: NEGATIVE
BUN SERPL-MCNC: 14 MG/DL (ref 7–18)
BUN/CREAT SERPL: 13 (ref 12–20)
CALCIUM SERPL-MCNC: 10.1 MG/DL (ref 8.5–10.1)
CHLORIDE SERPL-SCNC: 97 MMOL/L (ref 100–108)
CO2 SERPL-SCNC: 30 MMOL/L (ref 21–32)
COLOR UR: YELLOW
CREAT SERPL-MCNC: 1.11 MG/DL (ref 0.6–1.3)
DIFFERENTIAL METHOD BLD: NORMAL
EOSINOPHIL # BLD: 0.1 K/UL (ref 0–0.4)
EOSINOPHIL NFR BLD: 1 % (ref 0–5)
ERYTHROCYTE [DISTWIDTH] IN BLOOD BY AUTOMATED COUNT: 13.9 % (ref 11.6–14.5)
GLOBULIN SER CALC-MCNC: 5.4 G/DL (ref 2–4)
GLUCOSE SERPL-MCNC: 121 MG/DL (ref 74–99)
GLUCOSE UR STRIP.AUTO-MCNC: NEGATIVE MG/DL
HCT VFR BLD AUTO: 42.3 % (ref 36–48)
HGB BLD-MCNC: 14.6 G/DL (ref 13–16)
HGB UR QL STRIP: NEGATIVE
KETONES UR QL STRIP.AUTO: NEGATIVE MG/DL
LEUKOCYTE ESTERASE UR QL STRIP.AUTO: NEGATIVE
LIPASE SERPL-CCNC: 342 U/L (ref 73–393)
LYMPHOCYTES # BLD: 1.9 K/UL (ref 0.9–3.6)
LYMPHOCYTES NFR BLD: 28 % (ref 21–52)
MAGNESIUM SERPL-MCNC: 1.8 MG/DL (ref 1.6–2.6)
MCH RBC QN AUTO: 29 PG (ref 24–34)
MCHC RBC AUTO-ENTMCNC: 34.5 G/DL (ref 31–37)
MCV RBC AUTO: 83.9 FL (ref 74–97)
MONOCYTES # BLD: 0.6 K/UL (ref 0.05–1.2)
MONOCYTES NFR BLD: 9 % (ref 3–10)
NEUTS SEG # BLD: 4.1 K/UL (ref 1.8–8)
NEUTS SEG NFR BLD: 61 % (ref 40–73)
NITRITE UR QL STRIP.AUTO: NEGATIVE
PH UR STRIP: 5.5 [PH] (ref 5–8)
PLATELET # BLD AUTO: 324 K/UL (ref 135–420)
PMV BLD AUTO: 9.8 FL (ref 9.2–11.8)
POTASSIUM SERPL-SCNC: 3.7 MMOL/L (ref 3.5–5.5)
PROT SERPL-MCNC: 9.6 G/DL (ref 6.4–8.2)
PROT UR STRIP-MCNC: NEGATIVE MG/DL
RBC # BLD AUTO: 5.04 M/UL (ref 4.7–5.5)
SODIUM SERPL-SCNC: 133 MMOL/L (ref 136–145)
SP GR UR REFRACTOMETRY: 1.02 (ref 1–1.03)
UROBILINOGEN UR QL STRIP.AUTO: 0.2 EU/DL (ref 0.2–1)
WBC # BLD AUTO: 6.7 K/UL (ref 4.6–13.2)

## 2017-10-22 PROCEDURE — 81003 URINALYSIS AUTO W/O SCOPE: CPT | Performed by: NURSE PRACTITIONER

## 2017-10-22 PROCEDURE — 83690 ASSAY OF LIPASE: CPT | Performed by: NURSE PRACTITIONER

## 2017-10-22 PROCEDURE — 74011250636 HC RX REV CODE- 250/636: Performed by: NURSE PRACTITIONER

## 2017-10-22 PROCEDURE — 83735 ASSAY OF MAGNESIUM: CPT | Performed by: NURSE PRACTITIONER

## 2017-10-22 PROCEDURE — 96374 THER/PROPH/DIAG INJ IV PUSH: CPT

## 2017-10-22 PROCEDURE — 99283 EMERGENCY DEPT VISIT LOW MDM: CPT

## 2017-10-22 PROCEDURE — 96375 TX/PRO/DX INJ NEW DRUG ADDON: CPT

## 2017-10-22 PROCEDURE — 85025 COMPLETE CBC W/AUTO DIFF WBC: CPT | Performed by: NURSE PRACTITIONER

## 2017-10-22 PROCEDURE — 74022 RADEX COMPL AQT ABD SERIES: CPT

## 2017-10-22 PROCEDURE — 80053 COMPREHEN METABOLIC PANEL: CPT | Performed by: NURSE PRACTITIONER

## 2017-10-22 RX ORDER — DICYCLOMINE HYDROCHLORIDE 20 MG/1
20 TABLET ORAL EVERY 6 HOURS
Qty: 20 TAB | Refills: 0 | Status: SHIPPED | OUTPATIENT
Start: 2017-10-22 | End: 2017-10-27

## 2017-10-22 RX ORDER — ONDANSETRON 2 MG/ML
8 INJECTION INTRAMUSCULAR; INTRAVENOUS
Status: COMPLETED | OUTPATIENT
Start: 2017-10-22 | End: 2017-10-22

## 2017-10-22 RX ORDER — LACTULOSE 10 G/15ML
30 SOLUTION ORAL; RECTAL 2 TIMES DAILY
Qty: 450 ML | Refills: 0 | Status: SHIPPED | OUTPATIENT
Start: 2017-10-22 | End: 2017-10-27

## 2017-10-22 RX ORDER — ONDANSETRON 8 MG/1
8 TABLET, ORALLY DISINTEGRATING ORAL
Qty: 15 TAB | Refills: 0 | Status: SHIPPED | OUTPATIENT
Start: 2017-10-22 | End: 2017-11-28

## 2017-10-22 RX ORDER — KETOROLAC TROMETHAMINE 30 MG/ML
30 INJECTION, SOLUTION INTRAMUSCULAR; INTRAVENOUS
Status: COMPLETED | OUTPATIENT
Start: 2017-10-22 | End: 2017-10-22

## 2017-10-22 RX ADMIN — ONDANSETRON 8 MG: 2 INJECTION INTRAMUSCULAR; INTRAVENOUS at 22:34

## 2017-10-22 RX ADMIN — KETOROLAC TROMETHAMINE 30 MG: 30 INJECTION, SOLUTION INTRAMUSCULAR at 22:31

## 2017-10-22 NOTE — LETTER
NOTIFICATION RETURN TO WORK / SCHOOL 
 
10/23/2017 12:12 AM 
 
Mr. Lonnie Shields [address] [place] [address] [phone] To Whom It May Concern: 
 
Lonnie Shields is currently under the care of [facility]. He will return to work/school on:  10/24/2017 If there are questions or concerns please have the patient contact our office. Sincerely,  Shawna North NP

## 2017-10-22 NOTE — ED TRIAGE NOTES
Patient complains of abd pain due to an umbilical hernia . Patient states he has been nauseous the last 2 days.

## 2017-10-22 NOTE — ED NOTES
Anorexia, persistent severe burning midline and lower right quadrant pain, no BM since Thursday and tolerates water with Zofran only. Abd exam and he has hypoactive bowel sounds, soft, is tender to light and deep palpation at umbilicus and right lower quadrant. No palpation of hernia. Assisted in Triage of patient and referred to main treatment area due to complexity of complaint.   Initial orders started   Signed By: Maynor Echevarria NP     October 22, 2017

## 2017-10-22 NOTE — LETTER
NOTIFICATION RETURN TO WORK / SCHOOL 
 
10/22/2017 11:57 PM 
 
Mr. Tomasz Mckeon [address] [place] [id number] To Whom It May Concern: 
 
Tomasz Mckeon is currently under the care of [facility]. He will return to work/school on:10/24/2017 If there are questions or concerns please have the patient contact our office. Sincerely,  Aaron Hsieh NP

## 2017-10-23 VITALS
DIASTOLIC BLOOD PRESSURE: 86 MMHG | WEIGHT: 210 LBS | RESPIRATION RATE: 16 BRPM | HEIGHT: 70 IN | HEART RATE: 84 BPM | OXYGEN SATURATION: 96 % | SYSTOLIC BLOOD PRESSURE: 128 MMHG | BODY MASS INDEX: 30.06 KG/M2 | TEMPERATURE: 97.5 F

## 2017-10-23 NOTE — ED PROVIDER NOTES
HPI     Pt presents to the ed with a complaint of abd pain. Pt has a recent history in June of inguinal hernia repair. Pt states right sided abd pain. States no fever, no vomiting, no diarrhea. +nausea reported. Pt was seen at [facility] for same less than a week ago and got a ct scan and was told had a umbilical hernia. Past Medical History:   Diagnosis Date    Depression     Diabetes ([address]     Drug-seeking behavior     56 prescriptions from 32 different prescribers at 6 different pharmacies in last 12 months    Herniated lumbar intervertebral disc     Hypertension     Neurological disorder L3,4,5 torn Herniated disc       Past Surgical History:   Procedure Laterality Date    HX HERNIA REPAIR Bilateral 06/02/2017    robotic repair of bilateral indirect inguinal hernias with placement of mesh    HX ORTHOPAEDIC  trigger finger release         Family History:   Problem Relation Age of Onset    Hypertension Mother     Diabetes Mother     Heart Failure Mother     COPD Mother     Heart Disease Mother     Hypertension Father     Alcohol abuse Father        Social History     Social History    Marital status:      Spouse name: N/A    Number of children: N/A    Years of education: N/A     Occupational History    Not on file. Social History Main Topics    Smoking status: Never Smoker    Smokeless tobacco: Never Used    Alcohol use Yes      Comment: rarely    Drug use: No    Sexual activity: Yes     Partners: Female     Birth control/ protection: Condom     Other Topics Concern    Not on file     Social History Narrative         ALLERGIES: Review of patient's allergies indicates no known allergies. Review of Systems   Constitutional: Negative for fever. Gastrointestinal: Positive for abdominal pain and nausea. Negative for diarrhea and vomiting. All other systems reviewed and are negative.       Vitals:    10/22/17 1757   BP: 131/90   Pulse: 93   Resp: 17   Temp: 98.2 °F (36.8 °C)   SpO2: 99%   Weight: 95.3 kg (210 lb)   Height: 5' 10\" (1.778 m)            Physical Exam   Constitutional: He is oriented to person, place, and time. He appears well-developed and well-nourished. HENT:   Head: Normocephalic and atraumatic. Eyes: Conjunctivae and EOM are normal. Pupils are equal, round, and reactive to light. Neck: Normal range of motion. Neck supple. Cardiovascular: Normal rate and regular rhythm. Pulmonary/Chest: Effort normal and breath sounds normal.   Abdominal: Soft. Bowel sounds are normal. There is tenderness. Mild inguinal tenderness, no palpable hernia    Musculoskeletal: Normal range of motion. Neurological: He is alert and oriented to person, place, and time. He has normal reflexes. Skin: Skin is warm and dry. Psychiatric: He has a normal mood and affect. His behavior is normal. Judgment and thought content normal.   Nursing note and vitals reviewed. MDM  Number of Diagnoses or Management Options  Other constipation: established and improving  Diagnosis management comments: Pt improved after zofran and toradal,  Suspect chronic constipation due to opiate use. Pt has a history of opiate use obtaining multiple prescriptions from multiple providers. Discussed issue with pt and the issue that it is the probable cause of his constipation. Pt states he understands and explained the lactulose and bentyl use.  Pt given a work note to give him time to take the medication and hopefully evacuate the stool to help resolve the constipation       Amount and/or Complexity of Data Reviewed  Clinical lab tests: ordered and reviewed  Tests in the radiology section of CPT®: ordered and reviewed    Risk of Complications, Morbidity, and/or Mortality  Presenting problems: low  Diagnostic procedures: low  Management options: low    Patient Progress  Patient progress: improved    ED Course       Procedures            Vitals:  Patient Vitals for the past 12 hrs:   Temp Pulse Resp BP SpO2   10/22/17 1757 98.2 °F (36.8 °C) 93 17 131/90 99 %       Medications ordered:   Medications   ondansetron (ZOFRAN) injection 8 mg (8 mg IntraVENous Given 10/22/17 2234)   ketorolac (TORADOL) injection 30 mg (30 mg IntraVENous Given 10/22/17 2231)         Lab findings:  Recent Results (from the past 12 hour(s))   CBC WITH AUTOMATED DIFF    Collection Time: 10/22/17  8:30 PM   Result Value Ref Range    WBC 6.7 4.6 - 13.2 K/uL    RBC 5.04 4.70 - 5.50 M/uL    HGB 14.6 13.0 - 16.0 g/dL    HCT 42.3 36.0 - 48.0 %    MCV 83.9 74.0 - 97.0 FL    MCH 29.0 24.0 - 34.0 PG    MCHC 34.5 31.0 - 37.0 g/dL    RDW 13.9 11.6 - 14.5 %    PLATELET 702 749 - 101 K/uL    MPV 9.8 9.2 - 11.8 FL    NEUTROPHILS 61 40 - 73 %    LYMPHOCYTES 28 21 - 52 %    MONOCYTES 9 3 - 10 %    EOSINOPHILS 1 0 - 5 %    BASOPHILS 1 0 - 2 %    ABS. NEUTROPHILS 4.1 1.8 - 8.0 K/UL    ABS. LYMPHOCYTES 1.9 0.9 - 3.6 K/UL    ABS. MONOCYTES 0.6 0.05 - 1.2 K/UL    ABS. EOSINOPHILS 0.1 0.0 - 0.4 K/UL    ABS. BASOPHILS 0.0 0.0 - 0.1 K/UL    DF AUTOMATED     METABOLIC PANEL, COMPREHENSIVE    Collection Time: 10/22/17  8:30 PM   Result Value Ref Range    Sodium 133 (L) 136 - 145 mmol/L    Potassium 3.7 3.5 - 5.5 mmol/L    Chloride 97 (L) 100 - 108 mmol/L    CO2 30 21 - 32 mmol/L    Anion gap 6 3.0 - 18 mmol/L    Glucose 121 (H) 74 - 99 mg/dL    BUN 14 7.0 - 18 MG/DL    Creatinine 1.11 0.6 - 1.3 MG/DL    BUN/Creatinine ratio 13 12 - 20      GFR est AA >60 >60 ml/min/1.73m2    GFR est non-AA >60 >60 ml/min/1.73m2    Calcium 10.1 8.5 - 10.1 MG/DL    Bilirubin, total 0.5 0.2 - 1.0 MG/DL    ALT (SGPT) 30 16 - 61 U/L    AST (SGOT) 19 15 - 37 U/L    Alk.  phosphatase 80 45 - 117 U/L    Protein, total 9.6 (H) 6.4 - 8.2 g/dL    Albumin 4.2 3.4 - 5.0 g/dL    Globulin 5.4 (H) 2.0 - 4.0 g/dL    A-G Ratio 0.8 0.8 - 1.7     LIPASE    Collection Time: 10/22/17  8:30 PM   Result Value Ref Range    Lipase 342 73 - 393 U/L   MAGNESIUM    Collection Time: 10/22/17  8:30 PM Result Value Ref Range    Magnesium 1.8 1.6 - 2.6 mg/dL   URINALYSIS W/ RFLX MICROSCOPIC    Collection Time: 10/22/17  9:19 PM   Result Value Ref Range    Color YELLOW      Appearance CLEAR      Specific gravity 1.016 1.005 - 1.030      pH (UA) 5.5 5.0 - 8.0      Protein NEGATIVE  NEG mg/dL    Glucose NEGATIVE  NEG mg/dL    Ketone NEGATIVE  NEG mg/dL    Bilirubin NEGATIVE  NEG      Blood NEGATIVE  NEG      Urobilinogen 0.2 0.2 - 1.0 EU/dL    Nitrites NEGATIVE  NEG      Leukocyte Esterase NEGATIVE  NEG            X-Ray, CT or other radiology findings or impressions:  XR ABD ACUTE W 1 V CHEST    (Results Pending)     Excess stool otherwise no acute process noted per my read     Reevaluation of patient:   I have reassessed the patient. Patient is feeling better and is asking to go home      Disposition:    Diagnosis:   1. Other constipation        Disposition: to Home      Follow-up Information     Follow up With Details Comments [address] [facility]  [address] [facility]  [phone]           Patient's Medications   Start Taking    DICYCLOMINE (BENTYL) 20 MG TABLET    Take 1 Tab by mouth every six (6) hours for 20 doses. LACTULOSE (CHRONULAC) 10 GRAM/15 ML SOLUTION    Take 45 mL by mouth two (2) times a day for 5 days. ONDANSETRON (ZOFRAN ODT) 8 MG DISINTEGRATING TABLET    Take 1 Tab by mouth every eight (8) hours as needed for Nausea for up to 15 doses. Continue Taking    ALPRAZOLAM (XANAX) 1 MG TABLET    Take 1 mg by mouth three (3) times daily as needed. BLOOD-GLUCOSE METER MONITORING KIT    Check fasting blood sugar once daily    BUPROPION XL (WELLBUTRIN XL) 150 MG TABLET    Take 1 Tab by mouth every morning. CARVEDILOL (COREG) 12.5 MG TABLET    Take  by mouth two (2) times daily (with meals). CARVEDILOL (COREG) 3.125 MG TABLET    Take 3.125 mg by mouth two (2) times a day.     GLUCOSE BLOOD VI TEST STRIPS (ASCENSIA AUTODISC VI, should his condition change.     Brian Shore FNP-C

## 2017-10-23 NOTE — DISCHARGE INSTRUCTIONS
Constipation: Care Instructions  Your Care Instructions  Constipation means that you have a hard time passing stools (bowel movements). People pass stools from 3 times a day to once every 3 days. What is normal for you may be different. Constipation may occur with pain in the rectum and cramping. The pain may get worse when you try to pass stools. Sometimes there are small amounts of bright red blood on toilet paper or the surface of stools. This is because of enlarged veins near the rectum (hemorrhoids). A few changes in your diet and lifestyle may help you avoid ongoing constipation. Your doctor may also prescribe medicine to help loosen your stool. Some medicines can cause constipation. These include pain medicines and antidepressants. Tell your doctor about all the medicines you take. Your doctor may want to make a medicine change to ease your symptoms. Follow-up care is a key part of your treatment and safety. Be sure to make and go to all appointments, and call your doctor if you are having problems. It's also a good idea to know your test results and keep a list of the medicines you take. How can you care for yourself at home? · Drink plenty of fluids, enough so that your urine is light yellow or clear like water. If you have kidney, heart, or liver disease and have to limit fluids, talk with your doctor before you increase the amount of fluids you drink. · Include high-fiber foods in your diet each day. These include fruits, vegetables, beans, and whole grains. · Get at least 30 minutes of exercise on most days of the week. Walking is a good choice. You also may want to do other activities, such as running, swimming, cycling, or playing tennis or team sports. · Take a fiber supplement, such as Citrucel or Metamucil, every day. Read and follow all instructions on the label. · Schedule time each day for a bowel movement. A daily routine may help.  Take your time having your bowel movement. · Support your feet with a small step stool when you sit on the toilet. This helps flex your hips and places your pelvis in a squatting position. · Your doctor may recommend an over-the-counter laxative to relieve your constipation. Examples are Milk of Magnesia and MiraLax. Read and follow all instructions on the label. Do not use laxatives on a long-term basis. When should you call for help? Call your doctor now or seek immediate medical care if:  · You have new or worse belly pain. · You have new or worse nausea or vomiting. · You have blood in your stools. Watch closely for changes in your health, and be sure to contact your doctor if:  · Your constipation is getting worse. · You do not get better as expected. Where can you learn more? Go to http://jacklyn-denice.info/. Enter 21 [phone] in the search box to learn more about \"Constipation: Care Instructions. \"  Current as of: March 20, 2017  Content Version: 11.3  © 4188-2743 [employer]. Care instructions adapted under license by [employer] (which disclaims liability or warranty for this information). If you have questions about a medical condition or this instruction, always ask your healthcare professional. [place] [address] any warranty or liability for your use of this information.

## 2017-10-23 NOTE — ED NOTES
I have reviewed discharge instructions with the patient. The patient verbalized understanding. Discharge medications reviewed with patient and appropriate educational materials and side effects teaching were provided. Pt ambulated out of ED with steady gait.

## 2017-11-28 ENCOUNTER — OFFICE VISIT (OUTPATIENT)
Dept: FAMILY MEDICINE CLINIC | Age: 42
End: 2017-11-28

## 2017-11-28 VITALS
DIASTOLIC BLOOD PRESSURE: 96 MMHG | RESPIRATION RATE: 14 BRPM | SYSTOLIC BLOOD PRESSURE: 143 MMHG | HEART RATE: 105 BPM | TEMPERATURE: 98.8 F | WEIGHT: 210 LBS | HEIGHT: 70 IN | OXYGEN SATURATION: 98 % | BODY MASS INDEX: 30.06 KG/M2

## 2017-11-28 DIAGNOSIS — F32.A DEPRESSION, UNSPECIFIED DEPRESSION TYPE: ICD-10-CM

## 2017-11-28 DIAGNOSIS — E11.9 TYPE 2 DIABETES MELLITUS WITHOUT COMPLICATION, WITHOUT LONG-TERM CURRENT USE OF INSULIN (HCC): ICD-10-CM

## 2017-11-28 DIAGNOSIS — F41.9 ANXIETY: ICD-10-CM

## 2017-11-28 DIAGNOSIS — I10 ESSENTIAL HYPERTENSION: Primary | ICD-10-CM

## 2017-11-28 DIAGNOSIS — M54.32 SCIATICA OF LEFT SIDE: ICD-10-CM

## 2017-11-28 RX ORDER — BUPROPION HYDROCHLORIDE 150 MG/1
150 TABLET, EXTENDED RELEASE ORAL 2 TIMES DAILY
Qty: 60 TAB | Refills: 2 | Status: SHIPPED | OUTPATIENT
Start: 2017-11-28 | End: 2018-02-22 | Stop reason: SDUPTHER

## 2017-11-28 RX ORDER — METOPROLOL SUCCINATE 50 MG/1
50 TABLET, EXTENDED RELEASE ORAL DAILY
Qty: 60 TAB | Refills: 2 | Status: SHIPPED | OUTPATIENT
Start: 2017-11-28 | End: 2018-02-22 | Stop reason: SDUPTHER

## 2017-11-28 RX ORDER — CYCLOBENZAPRINE HCL 5 MG
10 TABLET ORAL 3 TIMES DAILY
Qty: 18 TAB | Refills: 0 | Status: SHIPPED | OUTPATIENT
Start: 2017-11-28 | End: 2017-12-01

## 2017-11-28 NOTE — PATIENT INSTRUCTIONS
An increasing body of evidence is showing the link between gut health and mental health. Eating a healthy, plant-rich, low-glycemic diet can help with a variety of symptoms including anxiety and depression. Probiotics have been shown to aide mental health as well. Check out the \"brain health\" section of the following article:   https://Sazneo/best-probiotic-supplement/    For further discussion of the role of the gut in anxiety:   https://On-Q-ity/heal-your-gut-heal-your-brain/      This article talks about how \"dumb TV\" can help anxiety:   https://Clout/live/anxiety-panic-attacks-television    http://weezim.com/blog/2015/09/18/8-okxnvmjumr-ld-eliminate-depression/    https://On-Q-ity/heal-your-gut-heal-your-brain/    https://Sazneo/best-probiotic-supplement/    This study used 248 mg of magnesium per day to improve depression:  http://journals. plos. org/plosone/article?id=10.1371/journal.pone. 0635627      Bupropion (By mouth)   Bupropion (nib-TEUY-jiu-on)  Treats depression and aids in quitting smoking. Also prevents depression caused by seasonal affective disorder (SAD). Brand Name(s): Aplenzin, Forfivo XL, Wellbutrin, Wellbutrin SR, Wellbutrin XL, Zyban   There may be other brand names for this medicine. When This Medicine Should Not Be Used: This medicine is not right for everyone. Do not use it if you had an allergic reaction to bupropion, or if you have seizures, anorexia, or bulimia. How to Use This Medicine:   Tablet, Long Acting Tablet  · Take your medicine as directed. Your dose may need to be changed several times to find what works best for you. · You may need to take Wellbutrin® for up to 4 weeks before you feel better. You may need to take Zyban® for 1 to 2 weeks before the date that you plan to stop smoking. · Swallow the extended-release tablet whole. Do not crush, break, or chew it.   · It is best to take Aplenzin® in the morning. · Do not take Wellbutrin® or Zyban® close to bedtime if you have trouble sleeping. · Take it with food if it upsets your stomach or if you have nausea. · If you take the extended-release tablet, part of the tablet may pass into your stools. This is normal and is nothing to worry about. · This medicine should come with a Medication Guide. Ask your pharmacist for a copy if you do not have one. · Missed dose: Skip the missed dose and go back to your regular dosing schedule. Never take extra medicine to make up for a missed dose. · Store the medicine in a closed container at room temperature, away from heat, moisture, and direct light. Drugs and Foods to Avoid:   Ask your doctor or pharmacist before using any other medicine, including over-the-counter medicines, vitamins, and herbal products. · Do not use this medicine and an MAO inhibitor (MAOI) within 14 days of each other. Do not use Zyban® to quit smoking if you already take Aplenzin® or Wellbutrin® for depression, because they are the same medicine. · Tell your doctor if you take barbiturates, benzodiazepines, antiseizure medicine, or sedatives, or if you recently stopped taking them. · Some medicines can affect how bupropion works.  Tell your doctor if you use any of the following:   ¨ Amantadine, carbamazepine, cimetidine, clopidogrel, cyclophosphamide, digoxin, efavirenz, levodopa, lopinavir, nelfinavir, nicotine patch, orphenadrine, phenobarbital, phenytoin, ritonavir, tamoxifen, theophylline, thiotepa, ticlopidine  ¨ Beta blocker medicine (including metoprolol)  ¨ A blood thinner (including warfarin)  ¨ Insulin or diabetes medicine  ¨ Medicine to treat depression (including desipramine, fluoxetine, imipramine, nortriptyline, paroxetine, sertraline, venlafaxine)  ¨ Medicine to treat heart rhythm problems (including flecainide, propafenone)  ¨ Medicine to treat mental illness (including haloperidol, risperidone, thioridazine)  ¨ Steroid medicine (including dexamethasone, hydrocortisone, methylprednisolone, prednisolone, prednisone)  · Do not drink alcohol while you are using this medicine. · Tell your doctor if you use anything else that makes you sleepy. Some examples are allergy medicine, narcotic pain medicine, and alcohol. Warnings While Using This Medicine:   · Tell your doctor if you are pregnant or breastfeeding, or if you have kidney disease, liver disease, heart disease, diabetes, glaucoma, mental illness (including bipolar disorder), or high blood pressure. Tell your doctor if you have a history of drug addiction or if you drink alcohol. · For some children, teenagers, and young adults, this medicine may increase mental or emotional problems. This may lead to thoughts of suicide and violence. Talk with your doctor right away if you have any thoughts or behavior changes that concern you. Tell your doctor if you or anyone in your family has a history of bipolar disorder or suicide attempts. · This medicine may cause the following problems:  ¨ Increased risk of seizures  ¨ Changes in mood or behavior  ¨ High blood pressure  ¨ Serious skin reactions  · This medicine may make you dizzy or drowsy. Do not drive or do anything that could be dangerous until you know how this medicine affects you. · Zyban® is only part of a complete program to help you quit smoking. You may still want to smoke at times. Have a plan to cope with these situations. · Do not stop using this medicine suddenly. Your doctor will need to slowly decrease your dose before you stop it completely. · Tell any doctor or dentist who treats you that you are using this medicine. This medicine may affect certain medical test results. · Your doctor will check your progress and the effects of this medicine at regular visits. Keep all appointments. · Keep all medicine out of the reach of children. Never share your medicine with anyone.   Possible Side Effects While Using This Medicine:   Call your doctor right away if you notice any of these side effects:  · Allergic reaction: Itching or hives, swelling in your face or hands, swelling or tingling in your mouth or throat, chest tightness, trouble breathing  · Blistering, peeling, red skin rash  · Chest pain, trouble breathing, fast, slow, or uneven heartbeat  · Eye pain, vision changes, seeing halos around lights  · Muscle or joint pain, fever with rash  · Seeing or hearing things that are not there, feeling like people are against you  · Seizures  · Sudden increase in energy, racing thoughts, trouble sleeping  · Thoughts of hurting yourself, worsening depression, severe agitation or confusion  If you notice these less serious side effects, talk with your doctor:   · Dry mouth  · Headache, dizziness  · Nausea, vomiting, constipation, diarrhea, gas, stomach pain  · Weight gain or loss  If you notice other side effects that you think are caused by this medicine, tell your doctor. Call your doctor for medical advice about side effects. You may report side effects to FDA at 0-031-UGM-4144  © 2017 [facility] Information is for End User's use only and may not be sold, redistributed or otherwise used for commercial purposes. The above information is an  only. It is not intended as medical advice for individual conditions or treatments. Talk to your doctor, nurse or pharmacist before following any medical regimen to see if it is safe and effective for you. Metoprolol (By mouth)   Metoprolol (met-oh-PROE-lol)  Treats high blood pressure, angina (chest pain), and heart failure. May lower the risk of death after a heart attack. This medicine is a beta-blocker. Brand Name(s): Lopressor, Toprol XL   There may be other brand names for this medicine. When This Medicine Should Not Be Used: This medicine is not right for everyone. Do not use if you had an allergic reaction to metoprolol or similar medicines.  Do not use this medicine if you have certain blood circulation or heart problems. Ask your doctor about these problems. How to Use This Medicine:   Tablet, Long Acting Tablet  · Take your medicine as directed. Your dose may need to be changed several times to find what works best for you. · Take this medicine with a meal or right after a meal. Take this medicine the same way every day, at the same time. · Swallow the tablet whole with a glass of water. You may break the extended-release tablet in half, but do not chew or crush it. · Missed dose: Take a dose as soon as you remember. If it is almost time for your next dose, wait until then and take a regular dose. Do not take extra medicine to make up for a missed dose. · Store the medicine in a closed container at room temperature, away from heat, moisture, and direct light. Drugs and Foods to Avoid:   Ask your doctor or pharmacist before using any other medicine, including over-the-counter medicines, vitamins, and herbal products. · Some medicines can affect how metoprolol works.  Tell your doctor if you are taking any of the following:   ¨ Digoxin, dipyridamole, hydralazine, hydroxychloroquine, methyldopa, quinidine  ¨ Medicine to treat depression (such as bupropion, clomipramine, desipramine, fluoxetine, fluvoxamine, paroxetine, sertraline), medicine to treat mental illness (such as chlorpromazine, fluphenazine, haloperidol, thioridazine), medicine for heart rhythm problems (such as propafenone), HIV/AIDS medicine (such as ritonavir), medicine to treat a fungus infection (such as terbinafine), a monoamine oxidase inhibitor (MAOI), an ergot medicine for headaches, a calcium channel blocker (such as amlodipine, diltiazem, verapamil), or an alpha blocker (such as clonidine, prazosin, reserpine, guanethidine)  Warnings While Using This Medicine:   · Tell your doctor if you are pregnant or breastfeeding, or if you have blood vessel, heart, or circulation problems (such as heart failure, rhythm problems, or slow heartbeat). Tell your doctor if you have kidney disease, liver disease, diabetes, lung disease (such as asthma), an overactive thyroid, or a history of allergies. · This medicine may cause worse symptoms of heart failure while the dose is being adjusted. · Do not stop using this medicine suddenly. Your doctor will need to slowly decrease your dose before you stop it completely. · Tell any doctor or dentist who treats you that you are using this medicine. You may need to stop using this medicine several days before you have surgery or medical tests. · This medicine could lower your blood pressure too much, especially when you first use it or if you are dehydrated. Stand or sit up slowly if you feel lightheaded or dizzy. · This medicine may make you dizzy or drowsy. Do not drive or do anything else that could be dangerous until you know how this medicine affects you. · Your doctor will check your progress and the effects of this medicine at regular visits. Keep all appointments. · Keep all medicine out of the reach of children. Never share your medicine with anyone. Possible Side Effects While Using This Medicine:   Call your doctor right away if you notice any of these side effects:  · Allergic reaction: Itching or hives, swelling in your face or hands, swelling or tingling in your mouth or throat, chest tightness, trouble breathing  · Lightheadedness, dizziness, or fainting  · Slow heartbeat  · Swelling in your hands, ankles, or feet, trouble breathing, tiredness  · Worsening chest pain  If you notice these less serious side effects, talk with your doctor:   · Diarrhea  · Mild dizziness or tiredness  If you notice other side effects that you think are caused by this medicine, tell your doctor. Call your doctor for medical advice about side effects.  You may report side effects to FDA at 0-610-FDA-9435  © 2017 [address] Information is for End User's use only and may not be sold, redistributed or otherwise used for commercial purposes. The above information is an  only. It is not intended as medical advice for individual conditions or treatments. Talk to your doctor, nurse or pharmacist before following any medical regimen to see if it is safe and effective for you. Anxiety Disorder: Care Instructions  Your Care Instructions    Anxiety is a normal reaction to stress. Difficult situations can cause you to have symptoms such as sweaty palms and a nervous feeling. In an anxiety disorder, the symptoms are far more severe. Constant worry, muscle tension, trouble sleeping, nausea and diarrhea, and other symptoms can make normal daily activities difficult or impossible. These symptoms may occur for no reason, and they can affect your work, school, or social life. Medicines, counseling, and self-care can all help. Follow-up care is a key part of your treatment and safety. Be sure to make and go to all appointments, and call your doctor if you are having problems. It's also a good idea to know your test results and keep a list of the medicines you take. How can you care for yourself at home? · Take medicines exactly as directed. Call your doctor if you think you are having a problem with your medicine. · Go to your counseling sessions and follow-up appointments. · Recognize and accept your anxiety. Then, when you are in a situation that makes you anxious, say to yourself, \"This is not an emergency. I feel uncomfortable, but I am not in danger. I can keep going even if I feel anxious. \"  · Be kind to your body:  ¨ Relieve tension with exercise or a massage. ¨ Get enough rest.  ¨ Avoid alcohol, caffeine, nicotine, and illegal drugs. They can increase your anxiety level and cause sleep problems. ¨ Learn and do relaxation techniques. See below for more about these techniques. · Engage your mind. Get out and do something you enjoy.  Go to a funny movie, or take a walk or hike. Plan your day. Having too much or too little to do can make you anxious. · Keep a record of your symptoms. Discuss your fears with a good friend or family member, or join a support group for people with similar problems. Talking to others sometimes relieves stress. · Get involved in social groups, or volunteer to help others. Being alone sometimes makes things seem worse than they are. · Get at least 30 minutes of exercise on most days of the week to relieve stress. Walking is a good choice. You also may want to do other activities, such as running, swimming, cycling, or playing tennis or team sports. Relaxation techniques  Do relaxation exercises 10 to 20 minutes a day. You can play soothing, relaxing music while you do them, if you wish. · Tell others in your house that you are going to do your relaxation exercises. Ask them not to disturb you. · Find a comfortable place, away from all distractions and noise. · Lie down on your back, or sit with your back straight. · Focus on your breathing. Make it slow and steady. · Breathe in through your nose. Breathe out through either your nose or mouth. · Breathe deeply, filling up the area between your navel and your rib cage. Breathe so that your belly goes up and down. · Do not hold your breath. · Breathe like this for 5 to 10 minutes. Notice the feeling of calmness throughout your whole body. As you continue to breathe slowly and deeply, relax by doing the following for another 5 to 10 minutes:  · Tighten and relax each muscle group in your body. You can begin at your toes and work your way up to your head. · Imagine your muscle groups relaxing and becoming heavy. · Empty your mind of all thoughts. · Let yourself relax more and more deeply. · Become aware of the state of calmness that surrounds you.   · When your relaxation time is over, you can bring yourself back to alertness by moving your fingers and toes and then your hands and feet and then stretching and moving your entire body. Sometimes people fall asleep during relaxation, but they usually wake up shortly afterward. · Always give yourself time to return to full alertness before you drive a car or do anything that might cause an accident if you are not fully alert. Never play a relaxation tape while you drive a car. When should you call for help? Call 911 anytime you think you may need emergency care. For example, call if:  ? · You feel you cannot stop from hurting yourself or someone else. ? Keep the numbers for these national suicide hotlines: 4-833-843-TALK (5-614.438.7146) and 0-551-OWHYIVI (4-816.692.2015). If you or someone you know talks about suicide or feeling hopeless, get help right away. ? Watch closely for changes in your health, and be sure to contact your doctor if:  ? · You have anxiety or fear that affects your life. ? · You have symptoms of anxiety that are new or different from those you had before. Where can you learn more? Go to http://jacklynFilm Freshdenice.info/. Enter P754 in the search box to learn more about \"Anxiety Disorder: Care Instructions. \"  Current as of: May 12, 2017  Content Version: 11.4  © 9726-8808 [employer]. Care instructions adapted under license by [employer] (which disclaims liability or warranty for this information). If you have questions about a medical condition or this instruction, always ask your healthcare professional. [place] [address] any warranty or liability for your use of this information. Check out the [employer] Youtube channel: Ketogenic Diet    Reducing dietary intake of carbohydrates can be an effective way of losing weight and improving metabolic diseases such as diabetes, pre-diabetes, hypertension, and high cholesterol. Although some end up eating fewer calories on this type of diet, it is not a calorie-restricted diet per se. Most people will need to replace the calories from carbohydrates with healthy fats and protein in order to remain satisfied and maintain or increase their metabolism. Here are some great articles on low-carb diets:   https://Principle Power.Kubi Mobi/low-carb-diet-meal-plan-and-menu/  https://Principle Power.Kubi Mobi/pgm-yldv-xqhzz-per-day-to-lose-weight/  https://Principle Power. Kubi Mobi/diabetes-carbs-per-day/  https://Principle Power. Kubi Mobi/21-best-low-carb-vegetables/    Nutritional ketosis is a state in which your body burns fat (in stead of carbohydrates) as its primary fuel source. Done properly, this type of eating can lead to significant weight loss as well as improvements in blood lipids and insulin resistance. Here is a video by Eboni Grimm explaining how this can work:  http://youFrogAppsu. be/yL6hcHrpnJx    Here Joel Carpenter provides a great Kevin Talk on low-carb high-fat eating:  [email or web address].is    Dr Wilbert Rob has had significant success with the ketogenic diet, and explains his approach in this video:   Https://[email or web address] be/[email or web address]    Here are some recipes:   https://GetSnippy/diets/keto/  https://Proxio/keto-recipes/

## 2017-11-28 NOTE — PROGRESS NOTES
HISTORY OF PRESENT ILLNESS  Raúl Zamorano is a 42 y.o. male. HPI Comments: Pt presents with concerns for high blood pressure. He is not currently on anything for blood pressure as his last provider purportedly stopped his blood pressure medication because his pressure was getting too low. He was going to [facility], but the [place] he was seeing went out on maternity leave, and he didn't feel as comfortable with the provider that took over his care. He sites anxiety related to a custody issue with his son; his brother is getting , so Richa Dacosta has to find his own place to live; and having to go back to court for a child support issue with his ex-wife. States he went to [place], off [place] [address] and spoke to the psychiatrist, but states she wanted him to keep coming back and he is simply not able to with his full-time job. He would like something he can take on an episodic basis, but his history and  findings dissuade us from prescribing any benzodiazepines. I offered hydroxyzine, but he states he has a lot of it at home and that it does nothing for him. We discuss options for a daily anti-depressant/anti-anxiety medication, and he states that Wellbutrin has been helpful in the past.     ROS:  Admits to occasional skipped heart beats that he notices every two days or so, and takes his breath away when it happens. He feels like it is more common when he is feeling stressed/anxious. He was seen by cardiology over the summer. Anxiety   Pertinent negatives include no chest pain and no shortness of breath. Blood Pressure Check   Pertinent negatives include no chest pain and no shortness of breath. Review of Systems   Eyes: Positive for blurred vision (thinks it's related to diabetes). Negative for double vision. Respiratory: Negative for cough and shortness of breath. Cardiovascular: Positive for palpitations. Negative for chest pain.    Musculoskeletal: Positive for back pain (with left sided sciatica). Psychiatric/Behavioral: The patient is nervous/anxious. Visit Vitals    BP (!) 143/96 (BP 1 Location: Right arm, BP Patient Position: Sitting)  Comment: maroon cuff    Pulse (!) 105    Temp 98.8 °F (37.1 °C) (Oral)    Resp 14    Ht 5' 10\" (1.778 m)    Wt 210 lb (95.3 kg)    SpO2 98%    BMI 30.13 kg/m2       Physical Exam   Constitutional: He is oriented to person, place, and time. He appears well-developed and well-nourished. He is cooperative. He does not appear ill. No distress. HENT:   Head: Normocephalic and atraumatic. Right Ear: External ear normal.   Left Ear: External ear normal.   Nose: Nose normal. No nasal deformity. Eyes: Conjunctivae are normal.   Neck: Neck supple. Cardiovascular: Regular rhythm and normal heart sounds. Tachycardia present. Exam reveals no gallop and no friction rub. No murmur heard. Pulses:       Radial pulses are 2+ on the right side, and 2+ on the left side. Mild tachycardia   Pulmonary/Chest: Effort normal and breath sounds normal. No respiratory distress. He has no decreased breath sounds. He has no wheezes. He has no rhonchi. He has no rales. Lymphadenopathy:     He has no cervical adenopathy. Neurological: He is alert and oriented to person, place, and time. Skin: Skin is warm and dry. He is not diaphoretic. Psychiatric: He has a normal mood and affect. His speech is normal and behavior is normal. Thought content normal.   Nursing note and vitals reviewed. ASSESSMENT and PLAN    ICD-10-CM ICD-9-CM    1. Essential hypertension I10 401.9 metoprolol succinate (TOPROL-XL) 50 mg XL tablet   2. Anxiety F41.9 300.00 metoprolol succinate (TOPROL-XL) 50 mg XL tablet      buPROPion SR (WELLBUTRIN SR) 150 mg SR tablet   3. Depression, unspecified depression type F32.9 311 buPROPion SR (WELLBUTRIN SR) 150 mg SR tablet   4. Sciatica of left side M54.32 724.3      1.  Given pt's history of hypertension, his slight tachycardia at this visit, and his anxiety, a beta-blocker seems like a good choice. Return in 2 weeks for recheck. 2,3. Provided after-visit information on  Anxiety disorder. Also encouraged a regular mindfulness/meditation practice. 4. Pt indicates he was given Robaxin by the ER recently, but prefers Flexeril. He indicates he has not yet picked up the Robaxin, and would like to fill Flexeril instead. He knows not to use them both. Pt also has some health maintenance items related to his diabetes that we plan to address at his next visit. Pt verbalized understanding and agreement with the plan of care.     Carter Rdz PA-C

## 2017-11-28 NOTE — PROGRESS NOTES
Patient presents to the clinic for a blood pressure check. Patient would also like to discuss annxiety that has gotten worse. Flu Shot Requested: received    Depression Screening:  PHQ over the last two weeks 11/28/2017 5/23/2017 5/16/2017 3/20/2017 3/3/2017 2/17/2017 2/16/2017   PHQ Not Done - - - - - - -   Little interest or pleasure in doing things Not at all Not at all Not at all Not at all Not at all Not at all Not at all   Feeling down, depressed or hopeless Not at all Not at all Not at all Not at all Not at all Not at all Not at all   Total Score PHQ 2 0 0 0 0 0 0 0       Learning Assessment:  Learning Assessment 3/3/2017 4/27/2016 3/16/2016   PRIMARY LEARNER Patient Patient Patient   HIGHEST LEVEL OF EDUCATION - PRIMARY LEARNER  GRADUATED HIGH SCHOOL OR GED GRADUATED HIGH SCHOOL OR GED GRADUATED HIGH SCHOOL OR GED   BARRIERS PRIMARY LEARNER NONE - -   CO-LEARNER CAREGIVER No - -   PRIMARY LANGUAGE ENGLISH ENGLISH ENGLISH   LEARNER PREFERENCE PRIMARY READING DEMONSTRATION READING   ANSWERED BY patient patient DARIAN Gaytan   RELATIONSHIP SELF SELF SELF       Abuse Screening:  Abuse Screening Questionnaire 3/16/2016   Do you ever feel afraid of your partner? N   Are you in a relationship with someone who physically or mentally threatens you? N   Is it safe for you to go home? Y       Health Maintenance reviewed and discussed per provider: yes     Coordination of Care:    1. Have you been to the ER, urgent care clinic since your last visit? Hospitalized since your last visit? yes    2. Have you seen or consulted any other health care providers outside of the [facility] since your last visit? Include any pap smears or colon screening.  no

## 2017-11-28 NOTE — LETTER
NOTIFICATION RETURN TO WORK  
 
11/28/2017 3:18 PM 
 
Mr. Mara Bangura [address] [id number] To Whom It May Concern: 
 
Mara Bangura is currently under the care of [facility]. He was seen in our office this afternoon, and I would like him to return for follow-up on 2 weeks. If there are questions or concerns please have the patient contact our office. Sincerely, Skye Colin PA-C

## 2017-11-28 NOTE — MR AVS SNAPSHOT
Visit Information Date & Time Provider Department Dept. Phone Encounter #  
 11/28/2017  2:00 PM Skyler Rendon PA-C [facility] [phone] [id number] Follow-up Instructions Return in about 2 weeks (around 12/12/2017) for blood pressure follow-up. Upcoming Health Maintenance Date Due  
 EYE EXAM RETINAL OR DILATED Q1 12/28/1984 HEMOGLOBIN A1C Q6M 12/9/2017 LIPID PANEL Q1 7/11/2018 FOOT EXAM Q1 9/5/2018 MICROALBUMIN Q1 9/5/2018 DTaP/Tdap/Td series (2 - Td) 1/22/2023 Allergies as of 11/28/2017  Review Complete On: 11/28/2017 By: Skyler Rendon PA-C No Known Allergies Current Immunizations  Reviewed on 9/2/2015 Name Date Influenza Vaccine 1/22/2013 12:00 AM, 1/6/2010 12:00 AM  
 Influenza Vaccine (Quad) PF 9/5/2017, 9/2/2015 Influenza Vaccine Whole 12/1/2009 Pneumococcal Polysaccharide (PPSV-23) 4/28/2017 12:00 AM  
 Tdap 1/22/2013 12:00 AM  
  
 Not reviewed this visit You Were Diagnosed With   
  
 Codes Comments Essential hypertension    -  Primary ICD-10-CM: I10 
ICD-9-CM: 401.9 Anxiety     ICD-10-CM: F41.9 ICD-9-CM: 300.00 Depression, unspecified depression type     ICD-10-CM: F32.9 ICD-9-CM: 194 Sciatica of left side     ICD-10-CM: M54.32 
ICD-9-CM: 724.3 Vitals BP Pulse Temp Resp Height(growth percentile) Weight(growth percentile) (!) 143/96 (BP 1 Location: Right arm, BP Patient Position: Sitting) (!) 105 98.8 °F (37.1 °C) (Oral) 14 5' 10\" (1.778 m) 210 lb (95.3 kg) SpO2 BMI Smoking Status 98% 30.13 kg/m2 Never Smoker Vitals History BMI and BSA Data Body Mass Index Body Surface Area  
 30.13 kg/m 2 2.17 m 2 Preferred Pharmacy Pharmacy Name Phone [facility] [address] [place] [address] [facility] [phone] Your Updated Medication List  
  
   
 This list is accurate as of: 11/28/17  3:16 PM.  Always use your most recent med list.  
  
  
  
  
 ALEVE 220 mg tablet Generic drug:  naproxen sodium Take 220 mg by mouth two (2) times daily (with meals). Blood-Glucose Meter monitoring kit Check fasting blood sugar once daily buPROPion  mg SR tablet Commonly known as:  Otilia Kiel Take 1 Tab by mouth two (2) times a day. Indications: ANXIETY WITH DEPRESSION  
  
 cyclobenzaprine 5 mg tablet Commonly known as:  FLEXERIL Take 2 Tabs by mouth three (3) times daily for 3 days. * glucose blood VI test strips strip Commonly known as:  RELION PRIME TEST STRIPS Check blood sugars twice daily * glucose blood VI test strips strip Commonly known as:  ASCENSIA AUTODISC VI, ONE TOUCH ULTRA TEST VI Check fasting blood sugar once daily JANUVIA 50 mg tablet Generic drug:  SITagliptin TAKE 1 TABLET BY MOUTH DAILY Lancets Misc Check fasting blood sugar once daily  
  
 metoprolol succinate 50 mg XL tablet Commonly known as:  TOPROL-XL Take 1 Tab by mouth daily. * Notice: This list has 2 medication(s) that are the same as other medications prescribed for you. Read the directions carefully, and ask your doctor or other care provider to review them with you. Prescriptions Sent to Pharmacy Refills  
 metoprolol succinate (TOPROL-XL) 50 mg XL tablet 2 Sig: Take 1 Tab by mouth daily. Class: Normal  
 Pharmacy: [address] Ph #: [phone] Route: Oral  
 buPROPion SR (WELLBUTRIN SR) 150 mg SR tablet 2 Sig: Take 1 Tab by mouth two (2) times a day. Indications: ANXIETY WITH DEPRESSION Class: Normal  
 Pharmacy: [address] Ph #: [phone]  Route: Oral  
 cyclobenzaprine (FLEXERIL) 5 mg tablet 0  
 Sig: Take 2 Tabs by mouth three (3) times daily for 3 days. Class: Normal  
 Pharmacy: [facility] [address] [place], [place] [address]  #: [phone] Route: Oral  
  
Follow-up Instructions Return in about 2 weeks (around 12/12/2017) for blood pressure follow-up. Patient Instructions An increasing body of evidence is showing the link between gut health and mental health. Eating a healthy, plant-rich, low-glycemic diet can help with a variety of symptoms including anxiety and depression. Probiotics have been shown to aide mental health as well. Check out the \"brain health\" section of the following article:  
https://[employer]/best-probiotic-supplement/ 
 
For further discussion of the role of the gut in anxiety:  
https://mphoria/heal-your-gut-heal-your-brain/ This article talks about how \"dumb TV\" can help anxiety:  
https://Appature/live/anxiety-panic-attacks-television 
 
http://ReserveOut/blog/2015/09/18/8-qosrsuwvmd-gj-eliminate-depression/ 
 
https://mphoria/heal-your-gut-heal-your-brain/ 
 
https://[employer] Authentication Technologies/[employer]-probiotic-supplement/ This study used 248 mg of magnesium per day to improve depression: 
http://journals. plos. org/plosone/article?id=10.1371/journal.pone. 2286528 Bupropion (By mouth) Bupropion (qog-HLMN-dzr-on) Treats depression and aids in quitting smoking. Also prevents depression caused by seasonal affective disorder (SAD). Brand Name(s): Aplenzin, Forfivo XL, Wellbutrin, Wellbutrin SR, Wellbutrin XL, Zyban There may be other brand names for this medicine. When This Medicine Should Not Be Used: This medicine is not right for everyone. Do not use it if you had an allergic reaction to bupropion, or if you have seizures, anorexia, or bulimia. How to Use This Medicine:  
Tablet, Long Acting Tablet · Take your medicine as directed. Your dose may need to be changed several times to find what works best for you. · You may need to take Wellbutrin® for up to 4 weeks before you feel better. You may need to take Zyban® for 1 to 2 weeks before the date that you plan to stop smoking. · Swallow the extended-release tablet whole. Do not crush, break, or chew it. · It is best to take Aplenzin® in the morning. · Do not take Wellbutrin® or Zyban® close to bedtime if you have trouble sleeping. · Take it with food if it upsets your stomach or if you have nausea. · If you take the extended-release tablet, part of the tablet may pass into your stools. This is normal and is nothing to worry about. · This medicine should come with a Medication Guide. Ask your pharmacist for a copy if you do not have one. · Missed dose: Skip the missed dose and go back to your regular dosing schedule. Never take extra medicine to make up for a missed dose. · Store the medicine in a closed container at room temperature, away from heat, moisture, and direct light. Drugs and Foods to Avoid: Ask your doctor or pharmacist before using any other medicine, including over-the-counter medicines, vitamins, and herbal products. · Do not use this medicine and an MAO inhibitor (MAOI) within 14 days of each other. Do not use Zyban® to quit smoking if you already take Aplenzin® or Wellbutrin® for depression, because they are the same medicine. · Tell your doctor if you take barbiturates, benzodiazepines, antiseizure medicine, or sedatives, or if you recently stopped taking them. · Some medicines can affect how bupropion works. Tell your doctor if you use any of the following: ¨ Amantadine, carbamazepine, cimetidine, clopidogrel, cyclophosphamide, digoxin, efavirenz, levodopa, lopinavir, nelfinavir, nicotine patch, orphenadrine, phenobarbital, phenytoin, ritonavir, tamoxifen, theophylline, thiotepa, ticlopidine ¨ Beta blocker medicine (including metoprolol) ¨ A blood thinner (including warfarin) ¨ Insulin or diabetes medicine ¨ Medicine to treat depression (including desipramine, fluoxetine, imipramine, nortriptyline, paroxetine, sertraline, venlafaxine) ¨ Medicine to treat heart rhythm problems (including flecainide, propafenone) ¨ Medicine to treat mental illness (including haloperidol, risperidone, thioridazine) ¨ Steroid medicine (including dexamethasone, hydrocortisone, methylprednisolone, prednisolone, prednisone) · Do not drink alcohol while you are using this medicine. · Tell your doctor if you use anything else that makes you sleepy. Some examples are allergy medicine, narcotic pain medicine, and alcohol. Warnings While Using This Medicine: · Tell your doctor if you are pregnant or breastfeeding, or if you have kidney disease, liver disease, heart disease, diabetes, glaucoma, mental illness (including bipolar disorder), or high blood pressure. Tell your doctor if you have a history of drug addiction or if you drink alcohol. · For some children, teenagers, and young adults, this medicine may increase mental or emotional problems. This may lead to thoughts of suicide and violence. Talk with your doctor right away if you have any thoughts or behavior changes that concern you. Tell your doctor if you or anyone in your family has a history of bipolar disorder or suicide attempts. · This medicine may cause the following problems: 
¨ Increased risk of seizures ¨ Changes in mood or behavior ¨ High blood pressure ¨ Serious skin reactions · This medicine may make you dizzy or drowsy. Do not drive or do anything that could be dangerous until you know how this medicine affects you. · Zyban® is only part of a complete program to help you quit smoking. You may still want to smoke at times. Have a plan to cope with these situations. · Do not stop using this medicine suddenly.  Your doctor will need to slowly decrease your dose before you stop it completely. · Tell any doctor or dentist who treats you that you are using this medicine. This medicine may affect certain medical test results. · Your doctor will check your progress and the effects of this medicine at regular visits. Keep all appointments. · Keep all medicine out of the reach of children. Never share your medicine with anyone. Possible Side Effects While Using This Medicine:  
Call your doctor right away if you notice any of these side effects: · Allergic reaction: Itching or hives, swelling in your face or hands, swelling or tingling in your mouth or throat, chest tightness, trouble breathing · Blistering, peeling, red skin rash · Chest pain, trouble breathing, fast, slow, or uneven heartbeat · Eye pain, vision changes, seeing halos around lights · Muscle or joint pain, fever with rash · Seeing or hearing things that are not there, feeling like people are against you · Seizures · Sudden increase in energy, racing thoughts, trouble sleeping · Thoughts of hurting yourself, worsening depression, severe agitation or confusion If you notice these less serious side effects, talk with your doctor: · Dry mouth 
· Headache, dizziness · Nausea, vomiting, constipation, diarrhea, gas, stomach pain · Weight gain or loss If you notice other side effects that you think are caused by this medicine, tell your doctor. Call your doctor for medical advice about side effects. You may report side effects to FDA at 5-038-FDA-9374 © 2017 [facility] Information is for End User's use only and may not be sold, redistributed or otherwise used for commercial purposes. The above information is an  only. It is not intended as medical advice for individual conditions or treatments. Talk to your doctor, nurse or pharmacist before following any medical regimen to see if it is safe and effective for you. Metoprolol (By mouth) Metoprolol (met-oh-PROE-lol) Treats high blood pressure, angina (chest pain), and heart failure. May lower the risk of death after a heart attack. This medicine is a beta-blocker. Brand Name(s): Lopressor, Toprol XL There may be other brand names for this medicine. When This Medicine Should Not Be Used: This medicine is not right for everyone. Do not use if you had an allergic reaction to metoprolol or similar medicines. Do not use this medicine if you have certain blood circulation or heart problems. Ask your doctor about these problems. How to Use This Medicine:  
Tablet, Long Acting Tablet · Take your medicine as directed. Your dose may need to be changed several times to find what works best for you. · Take this medicine with a meal or right after a meal. Take this medicine the same way every day, at the same time. · Swallow the tablet whole with a glass of water. You may break the extended-release tablet in half, but do not chew or crush it. · Missed dose: Take a dose as soon as you remember. If it is almost time for your next dose, wait until then and take a regular dose. Do not take extra medicine to make up for a missed dose. · Store the medicine in a closed container at room temperature, away from heat, moisture, and direct light. Drugs and Foods to Avoid: Ask your doctor or pharmacist before using any other medicine, including over-the-counter medicines, vitamins, and herbal products. · Some medicines can affect how metoprolol works. Tell your doctor if you are taking any of the following: ¨ Digoxin, dipyridamole, hydralazine, hydroxychloroquine, methyldopa, quinidine ¨ Medicine to treat depression (such as bupropion, clomipramine, desipramine, fluoxetine, fluvoxamine, paroxetine, sertraline), medicine to treat mental illness (such as chlorpromazine, fluphenazine, haloperidol, thioridazine), medicine for heart rhythm problems (such as propafenone), HIV/AIDS medicine (such as ritonavir), medicine to treat a fungus infection (such as terbinafine), a monoamine oxidase inhibitor (MAOI), an ergot medicine for headaches, a calcium channel blocker (such as amlodipine, diltiazem, verapamil), or an alpha blocker (such as clonidine, prazosin, reserpine, guanethidine) Warnings While Using This Medicine: · Tell your doctor if you are pregnant or breastfeeding, or if you have blood vessel, heart, or circulation problems (such as heart failure, rhythm problems, or slow heartbeat). Tell your doctor if you have kidney disease, liver disease, diabetes, lung disease (such as asthma), an overactive thyroid, or a history of allergies. · This medicine may cause worse symptoms of heart failure while the dose is being adjusted. · Do not stop using this medicine suddenly. Your doctor will need to slowly decrease your dose before you stop it completely. · Tell any doctor or dentist who treats you that you are using this medicine. You may need to stop using this medicine several days before you have surgery or medical tests. · This medicine could lower your blood pressure too much, especially when you first use it or if you are dehydrated. Stand or sit up slowly if you feel lightheaded or dizzy. · This medicine may make you dizzy or drowsy. Do not drive or do anything else that could be dangerous until you know how this medicine affects you. · Your doctor will check your progress and the effects of this medicine at regular visits. Keep all appointments. · Keep all medicine out of the reach of children. Never share your medicine with anyone. Possible Side Effects While Using This Medicine:  
Call your doctor right away if you notice any of these side effects: · Allergic reaction: Itching or hives, swelling in your face or hands, swelling or tingling in your mouth or throat, chest tightness, trouble breathing · Lightheadedness, dizziness, or fainting · Slow heartbeat · Swelling in your hands, ankles, or feet, trouble breathing, tiredness · Worsening chest pain If you notice these less serious side effects, talk with your doctor: · Diarrhea · Mild dizziness or tiredness If you notice other side effects that you think are caused by this medicine, tell your doctor. Call your doctor for medical advice about side effects. You may report side effects to FDA at 5-209-FDA-3937 © 2017 [address] Information is for End User's use only and may not be sold, redistributed or otherwise used for commercial purposes. The above information is an  only. It is not intended as medical advice for individual conditions or treatments. Talk to your doctor, nurse or pharmacist before following any medical regimen to see if it is safe and effective for you. Anxiety Disorder: Care Instructions Your Care Instructions Anxiety is a normal reaction to stress. Difficult situations can cause you to have symptoms such as sweaty palms and a nervous feeling. In an anxiety disorder, the symptoms are far more severe. Constant worry, muscle tension, trouble sleeping, nausea and diarrhea, and other symptoms can make normal daily activities difficult or impossible. These symptoms may occur for no reason, and they can affect your work, school, or social life. Medicines, counseling, and self-care can all help. Follow-up care is a key part of your treatment and safety. Be sure to make and go to all appointments, and call your doctor if you are having problems. It's also a good idea to know your test results and keep a list of the medicines you take. How can you care for yourself at home? · Take medicines exactly as directed. Call your doctor if you think you are having a problem with your medicine. · Go to your counseling sessions and follow-up appointments. · Recognize and accept your anxiety.  Then, when you are in a situation that makes you anxious, say to yourself, \"This is not an emergency. I feel uncomfortable, but I am not in danger. I can keep going even if I feel anxious. \" · Be kind to your body: ¨ Relieve tension with exercise or a massage. ¨ Get enough rest. 
¨ Avoid alcohol, caffeine, nicotine, and illegal drugs. They can increase your anxiety level and cause sleep problems. ¨ Learn and do relaxation techniques. See below for more about these techniques. · Engage your mind. Get out and do something you enjoy. Go to a [employer] movie, or take a walk or hike. Plan your day. Having too much or too little to do can make you anxious. · Keep a record of your symptoms. Discuss your fears with a good friend or family member, or join a support group for people with similar problems. Talking to others sometimes relieves stress. · Get involved in social groups, or volunteer to help others. Being alone sometimes makes things seem worse than they are. · Get at least 30 minutes of exercise on most days of the week to relieve stress. Walking is a good choice. You also may want to do other activities, such as running, swimming, cycling, or playing tennis or team sports. Relaxation techniques Do relaxation exercises 10 to 20 minutes a day. You can play soothing, relaxing music while you do them, if you wish. · Tell others in your house that you are going to do your relaxation exercises. Ask them not to disturb you. · Find a comfortable place, away from all distractions and noise. · Lie down on your back, or sit with your back straight. · Focus on your breathing. Make it slow and steady. · Breathe in through your nose. Breathe out through either your nose or mouth. · Breathe deeply, filling up the area between your navel and your rib cage. Breathe so that your belly goes up and down. · Do not hold your breath. · Breathe like this for 5 to 10 minutes. Notice the feeling of calmness throughout your whole body. As you continue to breathe slowly and deeply, relax by doing the following for another 5 to 10 minutes: · Tighten and relax each muscle group in your body. You can begin at your toes and work your way up to your head. · Imagine your muscle groups relaxing and becoming heavy. · Empty your mind of all thoughts. · Let yourself relax more and more deeply. · Become aware of the state of calmness that surrounds you. · When your relaxation time is over, you can bring yourself back to alertness by moving your fingers and toes and then your hands and feet and then stretching and moving your entire body. Sometimes people fall asleep during relaxation, but they usually wake up shortly afterward. · Always give yourself time to return to full alertness before you drive a car or do anything that might cause an accident if you are not fully alert. Never play a relaxation tape while you drive a car. When should you call for help? Call 911 anytime you think you may need emergency care. For example, call if: 
? · You feel you cannot stop from hurting yourself or someone else. ? Keep the numbers for these national suicide hotlines: 3-566-152-TALK (0-895.805.4673) and 2-089-FWRESIT (6-421.455.9428). If you or someone you know talks about suicide or feeling hopeless, get help right away. ? Watch closely for changes in your health, and be sure to contact your doctor if: 
? · You have anxiety or fear that affects your life. ? · You have symptoms of anxiety that are new or different from those you had before. Where can you learn more? Go to http://jacklyn-denice.info/. Enter P754 in the search box to learn more about \"Anxiety Disorder: Care Instructions. \" Current as of: May 12, 2017 Content Version: 11.4 © 6002-5079 Healthwise, Incorporated.  Care instructions adapted under license by [employer] (which disclaims liability or warranty for this information). If you have questions about a medical condition or this instruction, always ask your healthcare professional. [address] any warranty or liability for your use of this information. Check out the [employer] Youtube channel: Ketogenic Diet Reducing dietary intake of carbohydrates can be an effective way of losing weight and improving metabolic diseases such as diabetes, pre-diabetes, hypertension, and high cholesterol. Although some end up eating fewer calories on this type of diet, it is not a calorie-restricted diet per se. Most people will need to replace the calories from carbohydrates with healthy fats and protein in order to remain satisfied and maintain or increase their metabolism. Here are some great articles on low-carb diets:  
https://FlexEl/low-carb-diet-meal-plan-and-menu/ 
https://FlexEl/apn-eaad-ckcle-per-day-to-lose-weight/ 
https://[employer]Cell Biosystems/diabetes-carbs-per-day/ 
https://[employer]/21-best-low-carb-vegetables/ 
 
Nutritional ketosis is a state in which your body burns fat (in stead of carbohydrates) as its primary fuel source. Done properly, this type of eating can lead to significant weight loss as well as improvements in blood lipids and insulin resistance. Here is a video by Vini Agrawal explaining how this can work: 
http://Appography. [employer]/dA8exFutcNw Here Bridget Mateo provides a great Kevin Talk on low-carb high-fat eating: 
Marissa.is Dr Prema Grewal has had significant success with the ketogenic diet, and explains his approach in this video:  
Https://Appography. [employer]/cIIa5uiFyUS Here are some recipes:  
https://[employer]/diets/keto/ 
https://SecureKey Technologies/keto-recipes/ 
 
 
 
 
  
Introducing [facility]!    
 [facility] introduces Datanomic patient portal. Now you can access parts of your medical record, email your doctor's office, and request medication refills online. 1. In your internet browser, go to [email or web address]. Friends [email or web address]/[email or web address] Gaxiola 2. Click on the First Time User? Click Here link in the Sign In box. You will see the New Member Sign Up page. 3. Enter your Kutoto Access Code exactly as it appears below. You will not need to use this code after youve completed the sign-up process. If you do not sign up before the expiration date, you must request a new code. · Kutoto Access Code: [email or web address] Expires: 2/26/2018  9:31 AM 
 
4. Enter the last four digits of your Social Security Number (xxxx) and Date of Birth (mm/dd/yyyy) as indicated and click Submit. You will be taken to the next sign-up page. 5. Create a Kutoto ID. This will be your Kutoto login ID and cannot be changed, so think of one that is secure and easy to remember. 6. Create a Kutoto password. You can change your password at any time. 7. Enter your Password Reset Question and Answer. This can be used at a later time if you forget your password. 8. Enter your e-mail address. You will receive e-mail notification when new information is available in [address] 9. Click Sign Up. You can now view and download portions of your medical record. 10. Click the Download Summary menu link to download a portable copy of your medical information. If you have questions, please visit the Frequently Asked Questions section of the Kutoto website. Remember, Kutoto is NOT to be used for urgent needs. For medical emergencies, dial 911. Now available from your iPhone and Android! Please provide this summary of care documentation to your next provider. Your primary care clinician is listed as Phys Other. If you have any questions after today's visit, please call [phone].

## 2017-11-29 PROBLEM — R11.2 INTRACTABLE VOMITING WITH NAUSEA: Status: RESOLVED | Noted: 2017-05-08 | Resolved: 2017-11-29

## 2017-11-29 NOTE — ASSESSMENT & PLAN NOTE
Chronic Conditions Addressed Today     1. Hypertension - Primary     Relevant Medications     metoprolol succinate (TOPROL-XL) 50 mg XL tablet    2. Diabetes mellitus (HCC)        Key Antihyperglycemic Medications             JANUVIA 50 mg tablet  (Taking) TAKE 1 TABLET BY MOUTH DAILY        Other Key Diabetic Medications     Patient is on no other key diabetic meds. Lab Results   Component Value Date/Time    Hemoglobin A1c 6.8 06/09/2017 04:29 PM    Hemoglobin A1c (POC) 8.0 03/03/2017 02:11 PM    Glucose 121 10/22/2017 08:30 PM    Creatinine 1.11 10/22/2017 08:30 PM    Creatinine, POC 1.7 06/07/2017 08:15 PM    Microalbumin/Creat ratio (mg/g creat) 56 09/05/2017 12:00 PM    Microalbumin,urine random 3.80 09/05/2017 12:00 PM    Cholesterol, total 224 07/11/2017 09:13 AM    HDL Cholesterol 37 07/11/2017 09:13 AM    LDL, calculated 107 07/11/2017 09:13 AM    Triglyceride 400 07/11/2017 09:13 AM     Diabetic Foot and Eye Exam HM Status   Topic Date Due    Eye Exam  12/28/1984    Diabetic Foot Care  09/05/2018            3.  Anxiety     Relevant Medications     metoprolol succinate (TOPROL-XL) 50 mg XL tablet     buPROPion SR (WELLBUTRIN SR) 150 mg SR tablet      Other Problems Addressed Today     Depression, unspecified depression type            Relevant Medications        buPROPion SR (WELLBUTRIN SR) 150 mg SR tablet    Sciatica of left side            Relevant Medications        buPROPion SR (WELLBUTRIN SR) 150 mg SR tablet        cyclobenzaprine (FLEXERIL) 5 mg tablet

## 2017-11-29 NOTE — ASSESSMENT & PLAN NOTE
Key Antihyperglycemic Medications             JANUVIA 50 mg tablet  (Taking) TAKE 1 TABLET BY MOUTH DAILY        Other Key Diabetic Medications     Patient is on no other key diabetic meds.         Lab Results   Component Value Date/Time    Hemoglobin A1c 6.8 06/09/2017 04:29 PM    Hemoglobin A1c (POC) 8.0 03/03/2017 02:11 PM    Glucose 121 10/22/2017 08:30 PM    Creatinine 1.11 10/22/2017 08:30 PM    Creatinine, POC 1.7 06/07/2017 08:15 PM    Microalbumin/Creat ratio (mg/g creat) 56 09/05/2017 12:00 PM    Microalbumin,urine random 3.80 09/05/2017 12:00 PM    Cholesterol, total 224 07/11/2017 09:13 AM    HDL Cholesterol 37 07/11/2017 09:13 AM    LDL, calculated 107 07/11/2017 09:13 AM    Triglyceride 400 07/11/2017 09:13 AM     Diabetic Foot and Eye Exam HM Status   Topic Date Due    Eye Exam  12/28/1984    Diabetic Foot Care  09/05/2018

## 2017-12-04 ENCOUNTER — TELEPHONE (OUTPATIENT)
Dept: FAMILY MEDICINE CLINIC | Age: 42
End: 2017-12-04

## 2017-12-04 DIAGNOSIS — G89.29 CHRONIC LOW BACK PAIN WITH SCIATICA, SCIATICA LATERALITY UNSPECIFIED, UNSPECIFIED BACK PAIN LATERALITY: Primary | ICD-10-CM

## 2017-12-04 DIAGNOSIS — M54.40 CHRONIC LOW BACK PAIN WITH SCIATICA, SCIATICA LATERALITY UNSPECIFIED, UNSPECIFIED BACK PAIN LATERALITY: Primary | ICD-10-CM

## 2017-12-05 NOTE — TELEPHONE ENCOUNTER
Pt would like some sort of imaging done for his back. MRI, X-ray, whatever you would recommend. (Routing comment)              Pt has had A LOT of imaging these past few years, including an MRI in April 2015 that was stable from the previous MRI in 2012. There have been multiple X-rays and CT scans since that time. I don't recommend any further imaging at this time, but would be happy to refer him to physical therapy. Please let him know.

## 2017-12-05 NOTE — TELEPHONE ENCOUNTER
Patient called the office. Patient was informed MATHEUS Orozco reviewed his previous MRI and it indicated it was stable since the 2012 MRI. Patient was also advised there has been several xrays and CT scans done since then. Patient was advised MATHEUS Orozco does not recommend any further imaging but he can place a referral to physical therapy. Patient stated he is unable to attend physical therapy and believes there is something wrong with his back. Patient was advised MATHEUS Orozco can refer him to the spine specialist. Patient verbalized understanding and had no further questions.

## 2017-12-13 ENCOUNTER — OFFICE VISIT (OUTPATIENT)
Dept: FAMILY MEDICINE CLINIC | Age: 42
End: 2017-12-13

## 2017-12-13 VITALS
HEART RATE: 68 BPM | OXYGEN SATURATION: 99 % | HEIGHT: 70 IN | RESPIRATION RATE: 14 BRPM | DIASTOLIC BLOOD PRESSURE: 101 MMHG | TEMPERATURE: 97.2 F | SYSTOLIC BLOOD PRESSURE: 121 MMHG | WEIGHT: 214 LBS | BODY MASS INDEX: 30.64 KG/M2

## 2017-12-13 DIAGNOSIS — I10 ESSENTIAL HYPERTENSION: Primary | ICD-10-CM

## 2017-12-13 DIAGNOSIS — M54.42 CHRONIC BILATERAL LOW BACK PAIN WITH LEFT-SIDED SCIATICA: ICD-10-CM

## 2017-12-13 DIAGNOSIS — G89.29 CHRONIC BILATERAL LOW BACK PAIN WITH LEFT-SIDED SCIATICA: ICD-10-CM

## 2017-12-13 NOTE — PROGRESS NOTES
HISTORY OF PRESENT ILLNESS  Gilma Sandoval is a 42 y.o. male. HPI Comments: Sunny Carrion is here for his scheduled follow up. Greyson Rodriguez started him on Toprol XL and he feels better. His heart doesn't skip beats, he feels calmer. His back still bothers him. 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 Pain is lower back, radiates down L leg. Hypertension    Pertinent negatives include no chest pain, no blurred vision, no dizziness, no nausea and no vomiting. Review of Systems   Constitutional: Negative for fever. HENT: Negative for hearing loss and sore throat. Eyes: Negative for blurred vision. Respiratory: Negative for cough. Cardiovascular: Negative for chest pain. Gastrointestinal: Negative for heartburn, nausea and vomiting. Genitourinary: Negative for dysuria, frequency and urgency. Musculoskeletal: Positive for back pain. Neurological: Negative for dizziness, tingling, sensory change and focal weakness. Psychiatric/Behavioral: Negative for depression. Physical Exam   Constitutional: He is oriented to person, place, and time. He appears well-developed and well-nourished. Eyes: Pupils are equal, round, and reactive to light. Neck: Neck supple. No thyromegaly present. Cardiovascular: Normal rate and regular rhythm. Pulmonary/Chest: Effort normal and breath sounds normal. No respiratory distress. He has no wheezes. He has no rales. Abdominal: Soft. There is no tenderness. Musculoskeletal:   Back non-tender to palpation. Hamstrings are very tight. Lymphadenopathy:     He has no cervical adenopathy. Neurological: He is alert and oriented to person, place, and time. Nursing note and vitals reviewed. ASSESSMENT and PLAN    ICD-10-CM ICD-9-CM    1. Essential hypertension I10 401.9    2.  Chronic bilateral low back pain with left-sided sciatica M54.42 724.2 REFERRAL TO ORTHOPEDICS    G89.29 724.3      338.29 BP still high but we need to keeps tabs on it. Will refer to ortho. In the meantime, I showed him exercises to stretch his back and hamstrings. This should help long-term.

## 2017-12-13 NOTE — PROGRESS NOTES
Patient presents to the clinic for a follow up on his blood pressure. Patient would also like a refill to ortho and a refill of his medications. Flu Shot Requested: received    Depression Screening:  PHQ over the last two weeks 12/13/2017 11/28/2017 5/23/2017 5/16/2017 3/20/2017 3/3/2017 2/17/2017   PHQ Not Done - - - - - - -   Little interest or pleasure in doing things - Not at all Not at all Not at all Not at all Not at all Not at all   Feeling down, depressed or hopeless More than half the days Not at all Not at all Not at all Not at all Not at all Not at all   Total Score PHQ 2 - 0 0 0 0 0 0       Learning Assessment:  Learning Assessment 3/3/2017 4/27/2016 3/16/2016   PRIMARY LEARNER Patient Patient Patient   HIGHEST LEVEL OF EDUCATION - PRIMARY LEARNER  GRADUATED HIGH SCHOOL OR GED GRADUATED HIGH SCHOOL OR GED GRADUATED HIGH SCHOOL OR GED   BARRIERS PRIMARY LEARNER NONE - -   CO-LEARNER CAREGIVER No - -   PRIMARY LANGUAGE ENGLISH ENGLISH ENGLISH   LEARNER PREFERENCE PRIMARY READING DEMONSTRATION READING   ANSWERED BY patient patient DARIAN Gaytan   RELATIONSHIP SELF SELF SELF       Abuse Screening:  Abuse Screening Questionnaire 3/16/2016   Do you ever feel afraid of your partner? N   Are you in a relationship with someone who physically or mentally threatens you? N   Is it safe for you to go home? Y       Health Maintenance reviewed and discussed per provider: yes     Coordination of Care:    1. Have you been to the ER, urgent care clinic since your last visit? Hospitalized since your last visit? no    2. Have you seen or consulted any other health care providers outside of the [address] [place] [address] since your last visit? Include any pap smears or colon screening.  no

## 2017-12-13 NOTE — PATIENT INSTRUCTIONS
Monitor BP-find a pharmacy with a machine and check once or twice weekly. Record the readings and recheck if it isn't consistently below 140/90    I have referred you to a back specialist in [facility]. They will contact you. In the meantime, do the exercises I showed you every day of your life. Your back will be much happier. Low Back Pain: Exercises  Your Care Instructions  Here are some examples of typical rehabilitation exercises for your condition. Start each exercise slowly. Ease off the exercise if you start to have pain. Your doctor or physical therapist will tell you when you can start these exercises and which ones will work best for you. How to do the exercises  Press-up    1. Lie on your stomach, supporting your body with your forearms. 2. Press your elbows down into the floor to raise your upper back. As you do this, relax your stomach muscles and allow your back to arch without using your back muscles. As your press up, do not let your hips or pelvis come off the floor. 3. Hold for 15 to 30 seconds, then relax. 4. Repeat 2 to 4 times. Alternate arm and leg (bird dog) exercise    Do this exercise slowly. Try to keep your body straight at all times, and do not let one hip drop lower than the other. 1. Start on the floor, on your hands and knees. 2. Tighten your belly muscles. 3. Raise one leg off the floor, and hold it straight out behind you. Be careful not to let your hip drop down, because that will twist your trunk. 4. Hold for about 6 seconds, then lower your leg and switch to the other leg. 5. Repeat 8 to 12 times on each leg. 6. Over time, work up to holding for 10 to 30 seconds each time. 7. If you feel stable and secure with your leg raised, try raising the opposite arm straight out in front of you at the same time. Knee-to-chest exercise    1. Lie on your back with your knees bent and your feet flat on the floor.   2. Bring one knee to your chest, keeping the other foot flat on the floor (or keeping the other leg straight, whichever feels better on your lower back). 3. Keep your lower back pressed to the floor. Hold for at least 15 to 30 seconds. 4. Relax, and lower the knee to the starting position. 5. Repeat with the other leg. Repeat 2 to 4 times with each leg. 6. To get more stretch, put your other leg flat on the floor while pulling your knee to your chest.  Curl-ups    1. Lie on the floor on your back with your knees bent at a 90-degree angle. Your feet should be flat on the floor, about 12 inches from your buttocks. 2. Cross your arms over your chest. If this bothers your neck, try putting your hands behind your neck (not your head), with your elbows spread apart. 3. Slowly tighten your belly muscles and raise your shoulder blades off the floor. 4. Keep your head in line with your body, and do not press your chin to your chest.  5. Hold this position for 1 or 2 seconds, then slowly lower yourself back down to the floor. 6. Repeat 8 to 12 times. Pelvic tilt exercise    1. Lie on your back with your knees bent. 2. \"Brace\" your stomach. This means to tighten your muscles by pulling in and imagining your belly button moving toward your spine. You should feel like your back is pressing to the floor and your hips and pelvis are rocking back. 3. Hold for about 6 seconds while you breathe smoothly. 4. Repeat 8 to 12 times. Heel dig bridging    1. Lie on your back with both knees bent and your ankles bent so that only your heels are digging into the floor. Your knees should be bent about 90 degrees. 2. Then push your heels into the floor, squeeze your buttocks, and lift your hips off the floor until your shoulders, hips, and knees are all in a straight line. 3. Hold for about 6 seconds as you continue to breathe normally, and then slowly lower your hips back down to the floor and rest for up to 10 seconds. 4. Do 8 to 12 repetitions.   Hamstring stretch in doorway    1. Lie on your back in a doorway, with one leg through the open door. 2. Slide your leg up the wall to straighten your knee. You should feel a gentle stretch down the back of your leg. 3. Hold the stretch for at least 15 to 30 seconds. Do not arch your back, point your toes, or bend either knee. Keep one heel touching the floor and the other heel touching the wall. 4. Repeat with your other leg. 5. Do 2 to 4 times for each leg. Hip flexor stretch    1. Kneel on the floor with one knee bent and one leg behind you. Place your forward knee over your foot. Keep your other knee touching the floor. 2. Slowly push your hips forward until you feel a stretch in the upper thigh of your rear leg. 3. Hold the stretch for at least 15 to 30 seconds. Repeat with your other leg. 4. Do 2 to 4 times on each side. Wall sit    1. Stand with your back 10 to 12 inches away from a wall. 2. Lean into the wall until your back is flat against it. 3. Slowly slide down until your knees are slightly bent, pressing your lower back into the wall. 4. Hold for about 6 seconds, then slide back up the wall. 5. Repeat 8 to 12 times. Follow-up care is a key part of your treatment and safety. Be sure to make and go to all appointments, and call your doctor if you are having problems. It's also a good idea to know your test results and keep a list of the medicines you take. Where can you learn more? Go to http://jacklyn-denice.info/. Enter D693 in the search box to learn more about \"Low Back Pain: Exercises. \"  Current as of: March 21, 2017  Content Version: 11.4  © 9786-3062 [employer]. Care instructions adapted under license by [employer] (which disclaims liability or warranty for this information).  If you have questions about a medical condition or this instruction, always ask your healthcare professional. [address] any warranty or liability for your use of this information.

## 2017-12-13 NOTE — LETTER
NOTIFICATION RETURN TO WORK  
 
12/13/2017 4:19 PM 
 
Mr. Shaniqua Tiwari [address] [id number] To Whom It May Concern: 
 
Shaniqua Tiwari is currently under the care of [facility]. He will return to work on: 12/14/17. Excuse today for a medical office visit If there are questions or concerns please have the patient contact our office. Sincerely, Yisel Narayanan MD

## 2017-12-13 NOTE — MR AVS SNAPSHOT
Visit Information Date & Time Provider Department Dept. Phone Encounter #  
 12/13/2017  3:15 PM Rafal Parsons, [employer] [phone] [id number] Follow-up Instructions Return in about 6 weeks (around 1/24/2018), or if symptoms worsen or fail to improve. Upcoming Health Maintenance Date Due HEMOGLOBIN A1C Q6M 12/9/2017 EYE EXAM RETINAL OR DILATED Q1 12/13/2017* LIPID PANEL Q1 7/11/2018 FOOT EXAM Q1 9/5/2018 MICROALBUMIN Q1 9/5/2018 DTaP/Tdap/Td series (2 - Td) 1/22/2023 *Topic was postponed. The date shown is not the original due date. Allergies as of 12/13/2017  Review Complete On: 12/13/2017 By: Rafal Parsons MD  
 No Known Allergies Current Immunizations  Reviewed on 9/2/2015 Name Date Influenza Vaccine 1/22/2013 12:00 AM, 1/6/2010 12:00 AM  
 Influenza Vaccine (Quad) PF 9/5/2017, 9/2/2015 Influenza Vaccine Whole 12/1/2009 Pneumococcal Polysaccharide (PPSV-23) 4/28/2017 12:00 AM  
 Tdap 1/22/2013 12:00 AM  
  
 Not reviewed this visit You Were Diagnosed With   
  
 Codes Comments Essential hypertension    -  Primary ICD-10-CM: I10 
ICD-9-CM: 401.9 Chronic bilateral low back pain with left-sided sciatica     ICD-10-CM: M54.42, G89.29 ICD-9-CM: 724.2, 724.3, 338.29 Vitals BP Pulse Temp Resp Height(growth percentile) Weight(growth percentile) (!) 121/101 (BP 1 Location: Right arm, BP Patient Position: Sitting) 68 97.2 °F (36.2 °C) (Oral) 14 5' 10\" (1.778 m) 214 lb (97.1 kg) SpO2 BMI Smoking Status 99% 30.71 kg/m2 Never Smoker BMI and BSA Data Body Mass Index Body Surface Area 30.71 kg/m 2 2.19 m 2 Preferred Pharmacy Pharmacy Name Phone [facility] [address] [phone] Your Updated Medication List  
  
   
 This list is accurate as of: 12/13/17  4:14 PM.  Always use your most recent med list.  
  
  
  
  
 ALEVE 220 mg tablet Generic drug:  naproxen sodium Take 220 mg by mouth two (2) times daily (with meals). Blood-Glucose Meter monitoring kit Check fasting blood sugar once daily buPROPion  mg SR tablet Commonly known as:  Martina Shirts Take 1 Tab by mouth two (2) times a day. Indications: ANXIETY WITH DEPRESSION  
  
 * glucose blood VI test strips strip Commonly known as:  RELION PRIME TEST STRIPS Check blood sugars twice daily * glucose blood VI test strips strip Commonly known as:  ASCENSIA AUTODISC VI, ONE TOUCH ULTRA TEST VI Check fasting blood sugar once daily JANUVIA 50 mg tablet Generic drug:  SITagliptin TAKE 1 TABLET BY MOUTH DAILY Lancets Misc Check fasting blood sugar once daily  
  
 metoprolol succinate 50 mg XL tablet Commonly known as:  TOPROL-XL Take 1 Tab by mouth daily. * Notice: This list has 2 medication(s) that are the same as other medications prescribed for you. Read the directions carefully, and ask your doctor or other care provider to review them with you. We Performed the Following REFERRAL TO ORTHOPEDICS [IOY293 Custom] Follow-up Instructions Return in about 6 weeks (around 1/24/2018), or if symptoms worsen or fail to improve. Referral Information Referral ID Referred By Referred To  
  
 [id number] FEL[facility], [address] [place] [address] [address] Phone: [phone] Fax: [phone] Visits Status Start Date End Date 1 New Request 12/13/17 12/13/18 If your referral has a status of pending review or denied, additional information will be sent to support the outcome of this decision. Patient Instructions Monitor BP-find a pharmacy with a machine and check once or twice weekly. Record the readings and recheck if it isn't consistently below 140/90 I have referred you to a back specialist in [place]. They will contact you. In the meantime, do the exercises I showed you every day of your life. Your back will be much happier. Low Back Pain: Exercises Your Care Instructions Here are some examples of typical rehabilitation exercises for your condition. Start each exercise slowly. Ease off the exercise if you start to have pain. Your doctor or physical therapist will tell you when you can start these exercises and which ones will work best for you. How to do the exercises Press-up 1. Lie on your stomach, supporting your body with your forearms. 2. Press your elbows down into the floor to raise your upper back. As you do this, relax your stomach muscles and allow your back to arch without using your back muscles. As your press up, do not let your hips or pelvis come off the floor. 3. Hold for 15 to 30 seconds, then relax. 4. Repeat 2 to 4 times. Alternate arm and leg (bird dog) exercise Do this exercise slowly. Try to keep your body straight at all times, and do not let one hip drop lower than the other. 1. Start on the floor, on your hands and knees. 2. Tighten your belly muscles. 3. Raise one leg off the floor, and hold it straight out behind you. Be careful not to let your hip drop down, because that will twist your trunk. 4. Hold for about 6 seconds, then lower your leg and switch to the other leg. 5. Repeat 8 to 12 times on each leg. 6. Over time, work up to holding for 10 to 30 seconds each time. 7. If you feel stable and secure with your leg raised, try raising the opposite arm straight out in front of you at the same time. Knee-to-chest exercise 1. Lie on your back with your knees bent and your feet flat on the floor.  
2. Bring one knee to your chest, keeping the other foot flat on the floor (or keeping the other leg straight, whichever feels better on your lower back). 3. Keep your lower back pressed to the floor. Hold for at least 15 to 30 seconds. 4. Relax, and lower the knee to the starting position. 5. Repeat with the other leg. Repeat 2 to 4 times with each leg. 6. To get more stretch, put your other leg flat on the floor while pulling your knee to your chest. 
Curl-ups 1. Lie on the floor on your back with your knees bent at a 90-degree angle. Your feet should be flat on the floor, about 12 inches from your buttocks. 2. Cross your arms over your chest. If this bothers your neck, try putting your hands behind your neck (not your head), with your elbows spread apart. 3. Slowly tighten your belly muscles and raise your shoulder blades off the floor. 4. Keep your head in line with your body, and do not press your chin to your chest. 
5. Hold this position for 1 or 2 seconds, then slowly lower yourself back down to the floor. 6. Repeat 8 to 12 times. Pelvic tilt exercise 1. Lie on your back with your knees bent. 2. \"Brace\" your stomach. This means to tighten your muscles by pulling in and imagining your belly button moving toward your spine. You should feel like your back is pressing to the floor and your hips and pelvis are rocking back. 3. Hold for about 6 seconds while you breathe smoothly. 4. Repeat 8 to 12 times. Heel dig bridging 1. Lie on your back with both knees bent and your ankles bent so that only your heels are digging into the floor. Your knees should be bent about 90 degrees. 2. Then push your heels into the floor, squeeze your buttocks, and lift your hips off the floor until your shoulders, hips, and knees are all in a straight line. 3. Hold for about 6 seconds as you continue to breathe normally, and then slowly lower your hips back down to the floor and rest for up to 10 seconds. 4. Do 8 to 12 repetitions. Hamstring stretch in doorway 1. Lie on your back in a doorway, with one leg through the open door. 2. Slide your leg up the wall to straighten your knee. You should feel a gentle stretch down the back of your leg. 3. Hold the stretch for at least 15 to 30 seconds. Do not arch your back, point your toes, or bend either knee. Keep one heel touching the floor and the other heel touching the wall. 4. Repeat with your other leg. 5. Do 2 to 4 times for each leg. Hip flexor stretch 1. Kneel on the floor with one knee bent and one leg behind you. Place your forward knee over your foot. Keep your other knee touching the floor. 2. Slowly push your hips forward until you feel a stretch in the upper thigh of your rear leg. 3. Hold the stretch for at least 15 to 30 seconds. Repeat with your other leg. 4. Do 2 to 4 times on each side. Wall sit 1. Stand with your back 10 to 12 inches away from a wall. 2. Lean into the wall until your back is flat against it. 3. Slowly slide down until your knees are slightly bent, pressing your lower back into the wall. 4. Hold for about 6 seconds, then slide back up the wall. 5. Repeat 8 to 12 times. Follow-up care is a key part of your treatment and safety. Be sure to make and go to all appointments, and call your doctor if you are having problems. It's also a good idea to know your test results and keep a list of the medicines you take. Where can you learn more? Go to http://jacklyn-denice.info/. Enter O828 in the search box to learn more about \"Low Back Pain: Exercises. \" Current as of: March 21, 2017 Content Version: 11.4 © 0051-9965 [employer]. Care instructions adapted under license by [employer] (which disclaims liability or warranty for this information).  If you have questions about a medical condition or this instruction, always ask your healthcare professional. [place] [address] any warranty or liability for your use of this information. Introducing [facility] [place] [facility]! [facility] introduces [employer] patient portal. Now you can access parts of your medical record, email your doctor's office, and request medication refills online. 1. In your internet browser, go to [email or web address] 2. Click on the First Time User? Click Here link in the Sign In box. You will see the New Member Sign Up page. 3. Enter your [employer] Access Code exactly as it appears below. You will not need to use this code after youve completed the sign-up process. If you do not sign up before the expiration date, you must request a new code. · [employer] Access Code: [email or web address] Expires: 2/26/2018  9:31 AM 
 
4. Enter the last four digits of your Social Security Number (xxxx) and Date of Birth (mm/dd/yyyy) as indicated and click Submit. You will be taken to the next sign-up page. 5. Create a [employer] ID. This will be your [employer] login ID and cannot be changed, so think of one that is secure and easy to remember. 6. Create a [employer] password. You can change your password at any time. 7. Enter your Password Reset Question and Answer. This can be used at a later time if you forget your password. 8. Enter your e-mail address. You will receive e-mail notification when new information is available in [address] 9. Click Sign Up. You can now view and download portions of your medical record. 10. Click the Download Summary menu link to download a portable copy of your medical information. If you have questions, please visit the Frequently Asked Questions section of the [employer] website. Remember, [employer] is NOT to be used for urgent needs. For medical emergencies, dial 911. Now available from your iPhone and Android! Please provide this summary of care documentation to your next provider. Your primary care clinician is listed as Phys Other.  If you have any questions after today's visit, please call [phone].

## 2017-12-21 ENCOUNTER — TELEPHONE (OUTPATIENT)
Dept: FAMILY MEDICINE CLINIC | Age: 42
End: 2017-12-21

## 2017-12-21 NOTE — TELEPHONE ENCOUNTER
Pt stated that he needed refill on prescription. Priscilla Lovell that he has an nirmal with orthopedic on Jan 2nd for the prescription.

## 2017-12-22 ENCOUNTER — TELEPHONE (OUTPATIENT)
Dept: FAMILY MEDICINE CLINIC | Age: 42
End: 2017-12-22

## 2017-12-22 RX ORDER — CYCLOBENZAPRINE HCL 10 MG
10 TABLET ORAL
Qty: 30 TAB | Refills: 1 | Status: SHIPPED | OUTPATIENT
Start: 2017-12-22 | End: 2017-12-22 | Stop reason: DRUGHIGH

## 2017-12-22 RX ORDER — CYCLOBENZAPRINE HCL 5 MG
5 TABLET ORAL
Qty: 30 TAB | Refills: 2 | Status: SHIPPED | OUTPATIENT
Start: 2017-12-22 | End: 2018-01-15

## 2017-12-22 NOTE — TELEPHONE ENCOUNTER
Pt called and stated that the pharmacy said he needed a prior auth for his med of Flexeril.  Stated that maybe because it was written for 10mg and if changing it to 5mg make a difference for it to be approved for him to get without prior auth

## 2017-12-22 NOTE — TELEPHONE ENCOUNTER
Spoke with pt on 12/21/17. Patient is requesting a refill of Flexeril. Patient stated he was told to make an appointment with ortho before getting a refill. Patient was advised a message will be sent to Dr. Pau Levine. Patient verbalized understanding and had no further questions.

## 2018-01-15 ENCOUNTER — HOSPITAL ENCOUNTER (EMERGENCY)
Age: 43
Discharge: HOME | End: 2018-01-15
Attending: EMERGENCY MEDICINE
Payer: SELF-PAY

## 2018-01-15 VITALS
DIASTOLIC BLOOD PRESSURE: 109 MMHG | RESPIRATION RATE: 16 BRPM | BODY MASS INDEX: 31.1 KG/M2 | HEART RATE: 80 BPM | OXYGEN SATURATION: 100 % | SYSTOLIC BLOOD PRESSURE: 154 MMHG | HEIGHT: 69 IN | TEMPERATURE: 98.9 F | WEIGHT: 210 LBS

## 2018-01-15 DIAGNOSIS — M54.5 LEFT LOW BACK PAIN, UNSPECIFIED CHRONICITY, WITH SCIATICA PRESENCE UNSPECIFIED: Primary | ICD-10-CM

## 2018-01-15 PROCEDURE — 99282 EMERGENCY DEPT VISIT SF MDM: CPT

## 2018-01-15 RX ORDER — METAXALONE 800 MG/1
800 TABLET ORAL 4 TIMES DAILY
Qty: 20 TAB | Refills: 0 | Status: SHIPPED | OUTPATIENT
Start: 2018-01-15 | End: 2018-01-20

## 2018-01-15 RX ORDER — PREDNISONE 10 MG/1
10 TABLET ORAL SEE ADMIN INSTRUCTIONS
Qty: 48 TAB | Refills: 0 | Status: SHIPPED | OUTPATIENT
Start: 2018-01-15 | End: 2018-01-29

## 2018-01-15 RX ORDER — NAPROXEN 500 MG/1
500 TABLET ORAL 2 TIMES DAILY WITH MEALS
Qty: 20 TAB | Refills: 0 | Status: SHIPPED | OUTPATIENT
Start: 2018-01-15 | End: 2018-01-25

## 2018-01-15 NOTE — ED TRIAGE NOTES
\"About three weeks ago I was at work and I tripped. I hurt my back when I tried to catch myself from falling. \"

## 2018-01-15 NOTE — ED PROVIDER NOTES
HPI Comments: Ema Cordova is a 36 y.o. Male with a history of drug seeking behavior, depression, HTN, and chronic back pain who presents today for a 3 weeks history of left sided back pain, that is located at the left SI joint and does not radiate anywhere else. 3 weeks ago he was at work and lost his footing, but states he was able to catch himself before falling. He feels this exacerbated his chronic back issues. He reports having an appointment with ortho today but states they canceled it due to paperwork issues. He came today for relief because the appointment couldn't be rescheduled until the 23rd of Jan. Reports having not tried anything for this, nothing makes it better and movement makes it worse. Denies loss of bowel or bladder, IVDA, and history of CA. Pt denies any fevers or chills, headache, dizziness or light headedness, ENT issues, CP or discomfort, SOB, cough, n/v/d/c, abd pain, diaphoresis, melena/hematochezia, dysuria, hematuria, frequency, focal weakness/numbness/tingling, or rash. Patient has no other complaints at this time. Patient is a 36 y.o. male presenting with fall and back pain. The history is provided by the patient. Fall   Pertinent negatives include no fever, no abdominal pain, no nausea, no vomiting, no hematuria and no headaches. Back Pain    Pertinent negatives include no chest pain, no fever, no headaches, no abdominal pain and no dysuria.         Past Medical History:   Diagnosis Date    Depression     Diabetes ([facility] [address]     Drug-seeking behavior     56 prescriptions from 32 different prescribers at 6 different pharmacies in last 12 months    Herniated lumbar intervertebral disc     Hypertension     Neurological disorder L3,4,5 torn Herniated disc       Past Surgical History:   Procedure Laterality Date    HX HERNIA REPAIR Bilateral 06/02/2017    robotic repair of bilateral indirect inguinal hernias with placement of mesh    HX ORTHOPAEDIC  trigger finger release         Family History:   Problem Relation Age of Onset    Hypertension Mother     Diabetes Mother     Heart Failure Mother     COPD Mother     Heart Disease Mother     Hypertension Father     Alcohol abuse Father        Social History     Social History    Marital status:      Spouse name: N/A    Number of children: N/A    Years of education: N/A     Occupational History    Not on file. Social History Main Topics    Smoking status: Never Smoker    Smokeless tobacco: Never Used    Alcohol use Yes      Comment: rarely    Drug use: No    Sexual activity: Yes     Partners: Female     Birth control/ protection: Condom     Other Topics Concern    Not on file     Social History Narrative         ALLERGIES: Review of patient's allergies indicates no known allergies. Review of Systems   Constitutional: Negative for chills and fever. HENT: Negative for congestion, rhinorrhea and sore throat. Eyes: Negative. Respiratory: Negative for cough and shortness of breath. Cardiovascular: Negative for chest pain. Gastrointestinal: Negative for abdominal pain, blood in stool, constipation, diarrhea, nausea and vomiting. Endocrine: Negative. Genitourinary: Negative for dysuria, frequency and hematuria. Musculoskeletal: Positive for back pain. Negative for gait problem, myalgias, neck pain and neck stiffness. Skin: Negative for rash and wound. Allergic/Immunologic: Negative. Neurological: Negative for dizziness and headaches. All other systems reviewed and are negative. Vitals:    01/15/18 1118   BP: (!) 154/109   Pulse: 80   Resp: 16   Temp: 98.9 °F (37.2 °C)   SpO2: 100%   Weight: 95.3 kg (210 lb)   Height: 5' 9\" (1.753 m)            Physical Exam   Constitutional: He is oriented to person, place, and time. He appears well-developed and well-nourished. No distress. HENT:   Head: Normocephalic and atraumatic.    Eyes: Conjunctivae are normal.   Neck: Normal range of motion. Neck supple. Cardiovascular: Normal rate, regular rhythm and normal heart sounds. Pulmonary/Chest: Effort normal and breath sounds normal. No respiratory distress. He exhibits no tenderness. Abdominal: Soft. Bowel sounds are normal. He exhibits no distension. There is no tenderness. There is no rebound and no guarding. Musculoskeletal: Normal range of motion. He exhibits no edema or deformity. Lumbar back: He exhibits tenderness. He exhibits normal range of motion, no bony tenderness, no swelling, no edema, no deformity, no laceration and no spasm. Tenderness to palpation to the left of the lumbar spine possible SI joint tenderness. Full ROM, he his neurovascularly  intact. Negative straight leg raise. Ambulating without difficulties    Neurological: He is alert and oriented to person, place, and time. Skin: Skin is warm and dry. He is not diaphoretic. Psychiatric: He has a normal mood and affect. His behavior is normal.   Nursing note and vitals reviewed. MDM  Number of Diagnoses or Management Options  Left low back pain, unspecified chronicity, with sciatica presence unspecified:   Diagnosis management comments: Differential: chronic back pain, muscle strain, laceration, abrasion, fracture     Plan: Given the benign Physical exam will discharge home with pain medication, muscle relaxants and a short does of steroids. I have suggested he call and see if Ortho can see him sooner than his next appointment is scheduled for. I have suggested he return if condition  worsens or does not improve. I have explained I can only provide a 3 day work note and if at that time the condition has not improved he will need to be seen for reevaluation. Patient agrees with the plan and management and states all questions have been thoroughly answered and there are no more remaining questions.        Risk of Complications, Morbidity, and/or Mortality  Presenting problems: low  Diagnostic procedures: low  Management options: low      ED Course       Procedures

## 2018-01-15 NOTE — LETTER
[facility] EMERGENCY DEPT 
[facility] [address] [phone] 
[phone] Work/School Note Date: 1/15/2018 To Whom It May concern: 
 
Gene Sims was seen and treated today in the emergency room by the following provider(s): 
Attending Provider: Edwar García MD 
Physician Assistant: MATHEUS Penn. Gene Sims may return to work on 1/18/18 Sincerely, Silver Penn

## 2018-01-19 ENCOUNTER — HOSPITAL ENCOUNTER (EMERGENCY)
Age: 43
Discharge: HOME | End: 2018-01-19
Attending: EMERGENCY MEDICINE
Payer: SELF-PAY

## 2018-01-19 VITALS
HEIGHT: 70 IN | SYSTOLIC BLOOD PRESSURE: 146 MMHG | WEIGHT: 215 LBS | RESPIRATION RATE: 10 BRPM | OXYGEN SATURATION: 95 % | HEART RATE: 71 BPM | DIASTOLIC BLOOD PRESSURE: 103 MMHG | BODY MASS INDEX: 30.78 KG/M2

## 2018-01-19 DIAGNOSIS — H53.8 BLURRED VISION: ICD-10-CM

## 2018-01-19 DIAGNOSIS — R51.9 ACUTE NONINTRACTABLE HEADACHE, UNSPECIFIED HEADACHE TYPE: Primary | ICD-10-CM

## 2018-01-19 LAB
ALBUMIN SERPL-MCNC: 3.8 G/DL (ref 3.4–5)
ALBUMIN/GLOB SERPL: 1 {RATIO} (ref 0.8–1.7)
ALP SERPL-CCNC: 67 U/L (ref 45–117)
ALT SERPL-CCNC: 29 U/L (ref 16–61)
ANION GAP SERPL CALC-SCNC: 7 MMOL/L (ref 3–18)
AST SERPL-CCNC: 23 U/L (ref 15–37)
ATRIAL RATE: 78 BPM
BASOPHILS # BLD: 0 K/UL (ref 0–0.06)
BASOPHILS NFR BLD: 0 % (ref 0–2)
BILIRUB SERPL-MCNC: 0.2 MG/DL (ref 0.2–1)
BUN SERPL-MCNC: 11 MG/DL (ref 7–18)
BUN/CREAT SERPL: 10 (ref 12–20)
CALCIUM SERPL-MCNC: 9.7 MG/DL (ref 8.5–10.1)
CALCULATED P AXIS, ECG09: 52 DEGREES
CALCULATED R AXIS, ECG10: -8 DEGREES
CALCULATED T AXIS, ECG11: 21 DEGREES
CHLORIDE SERPL-SCNC: 100 MMOL/L (ref 100–108)
CO2 SERPL-SCNC: 31 MMOL/L (ref 21–32)
CREAT SERPL-MCNC: 1.06 MG/DL (ref 0.6–1.3)
DIAGNOSIS, 93000: NORMAL
DIFFERENTIAL METHOD BLD: NORMAL
EOSINOPHIL # BLD: 0.1 K/UL (ref 0–0.4)
EOSINOPHIL NFR BLD: 1 % (ref 0–5)
ERYTHROCYTE [DISTWIDTH] IN BLOOD BY AUTOMATED COUNT: 13 % (ref 11.6–14.5)
GLOBULIN SER CALC-MCNC: 3.9 G/DL (ref 2–4)
GLUCOSE BLD STRIP.AUTO-MCNC: 129 MG/DL (ref 70–110)
GLUCOSE SERPL-MCNC: 140 MG/DL (ref 74–99)
HCT VFR BLD AUTO: 41.6 % (ref 36–48)
HGB BLD-MCNC: 14.4 G/DL (ref 13–16)
LYMPHOCYTES # BLD: 1.6 K/UL (ref 0.9–3.6)
LYMPHOCYTES NFR BLD: 27 % (ref 21–52)
MAGNESIUM SERPL-MCNC: 1.9 MG/DL (ref 1.6–2.6)
MCH RBC QN AUTO: 29 PG (ref 24–34)
MCHC RBC AUTO-ENTMCNC: 34.6 G/DL (ref 31–37)
MCV RBC AUTO: 83.7 FL (ref 74–97)
MONOCYTES # BLD: 0.5 K/UL (ref 0.05–1.2)
MONOCYTES NFR BLD: 9 % (ref 3–10)
NEUTS SEG # BLD: 3.7 K/UL (ref 1.8–8)
NEUTS SEG NFR BLD: 63 % (ref 40–73)
P-R INTERVAL, ECG05: 156 MS
PLATELET # BLD AUTO: 240 K/UL (ref 135–420)
PMV BLD AUTO: 9.8 FL (ref 9.2–11.8)
POTASSIUM SERPL-SCNC: 3.8 MMOL/L (ref 3.5–5.5)
PROT SERPL-MCNC: 7.7 G/DL (ref 6.4–8.2)
Q-T INTERVAL, ECG07: 352 MS
QRS DURATION, ECG06: 106 MS
QTC CALCULATION (BEZET), ECG08: 401 MS
RBC # BLD AUTO: 4.97 M/UL (ref 4.7–5.5)
SODIUM SERPL-SCNC: 138 MMOL/L (ref 136–145)
VENTRICULAR RATE, ECG03: 78 BPM
WBC # BLD AUTO: 6 K/UL (ref 4.6–13.2)

## 2018-01-19 PROCEDURE — 96374 THER/PROPH/DIAG INJ IV PUSH: CPT

## 2018-01-19 PROCEDURE — 74011250636 HC RX REV CODE- 250/636: Performed by: EMERGENCY MEDICINE

## 2018-01-19 PROCEDURE — 85025 COMPLETE CBC W/AUTO DIFF WBC: CPT | Performed by: EMERGENCY MEDICINE

## 2018-01-19 PROCEDURE — 93005 ELECTROCARDIOGRAM TRACING: CPT

## 2018-01-19 PROCEDURE — 82962 GLUCOSE BLOOD TEST: CPT

## 2018-01-19 PROCEDURE — 99285 EMERGENCY DEPT VISIT HI MDM: CPT

## 2018-01-19 PROCEDURE — 96375 TX/PRO/DX INJ NEW DRUG ADDON: CPT

## 2018-01-19 PROCEDURE — 80053 COMPREHEN METABOLIC PANEL: CPT | Performed by: EMERGENCY MEDICINE

## 2018-01-19 PROCEDURE — 83735 ASSAY OF MAGNESIUM: CPT | Performed by: EMERGENCY MEDICINE

## 2018-01-19 RX ORDER — METOCLOPRAMIDE HYDROCHLORIDE 5 MG/ML
10 INJECTION INTRAMUSCULAR; INTRAVENOUS
Status: COMPLETED | OUTPATIENT
Start: 2018-01-19 | End: 2018-01-19

## 2018-01-19 RX ORDER — KETOROLAC TROMETHAMINE 30 MG/ML
15 INJECTION, SOLUTION INTRAMUSCULAR; INTRAVENOUS
Status: COMPLETED | OUTPATIENT
Start: 2018-01-19 | End: 2018-01-19

## 2018-01-19 RX ADMIN — KETOROLAC TROMETHAMINE 15 MG: 30 INJECTION, SOLUTION INTRAMUSCULAR at 13:50

## 2018-01-19 RX ADMIN — METOCLOPRAMIDE 10 MG: 5 INJECTION, SOLUTION INTRAMUSCULAR; INTRAVENOUS at 13:50

## 2018-01-19 NOTE — ED TRIAGE NOTES
Patient complains of dizziness, headache and not sleeping well for the past 3 days. Patient also report blurred vision that started 3 hours ago.

## 2018-01-19 NOTE — ED PROVIDER NOTES
EMERGENCY DEPARTMENT HISTORY AND PHYSICAL EXAM    2:50 PM      Date: 1/19/2018  Patient Name: Nova Beckwith    History of Presenting Illness     Chief Complaint   Patient presents with    Headache    Blurred Vision    Dizziness         History Provided By: Patient    Chief Complaint: HA  Duration: 3 Hours  Timing:  Constant, sudden onset  Quality: Nonradiating  Location: frontal   Severity: Moderate  Associated Symptoms: blurred vision  (not new) & gait changes      Additional History (Context): Nova Beckwith, a 36 y.o. Male, nonsmoker, rare alcohol drinker with h/o HTN, and DM, with chronic bilateral LE numbness secondary to DM, currently on Januvia, metoprolol, and flexeril, presents to the ED with sudden onset, constant, non-radiating, moderate severity, frontal HA, onset 3 hours ago, that is associated with gait changes and blurred vision. Pt explains his visual disturbances are not new and that he has been trying to make an appointment with opthalmology for an evaluation. Pt, under a lot of stress, admits to need for \"calming\" medication. No other complaints or concerns are reported at this time. PCP: Tabby Schwartz MD    Current Outpatient Prescriptions   Medication Sig Dispense Refill    metaxalone (SKELAXIN) 800 mg tablet Take 1 Tab by mouth four (4) times daily for 5 days. 20 Tab 0    naproxen (NAPROSYN) 500 mg tablet Take 1 Tab by mouth two (2) times daily (with meals) for 10 days. 20 Tab 0    predniSONE (STERAPRED DS) 10 mg dose pack Take 1 Tab by mouth See Admin Instructions. 48 Tab 0    JANUVIA 50 mg tablet TAKE 1 TABLET BY MOUTH DAILY 30 Tab 5    metoprolol succinate (TOPROL-XL) 50 mg XL tablet Take 1 Tab by mouth daily. 60 Tab 2    buPROPion SR (WELLBUTRIN SR) 150 mg SR tablet Take 1 Tab by mouth two (2) times a day.  Indications: ANXIETY WITH DEPRESSION 60 Tab 2    Blood-Glucose Meter monitoring kit Check fasting blood sugar once daily 1 Kit 0    glucose blood VI test strips (ASCENSIA AUTODISC VI, ONE TOUCH ULTRA TEST VI) strip Check fasting blood sugar once daily 100 Strip 3    Lancets misc Check fasting blood sugar once daily 1 Each 11    glucose blood VI test strips (RELION PRIME TEST STRIPS) strip Check blood sugars twice daily 100 Strip 2       Past History     Past Medical History:  Past Medical History:   Diagnosis Date    Depression     Diabetes ([address]     Drug-seeking behavior     56 prescriptions from 32 different prescribers at 6 different pharmacies in last 12 months    Herniated lumbar intervertebral disc     Hypertension     Neurological disorder L3,4,5 torn Herniated disc       Past Surgical History:  Past Surgical History:   Procedure Laterality Date    HX HERNIA REPAIR Bilateral 06/02/2017    robotic repair of bilateral indirect inguinal hernias with placement of mesh    HX ORTHOPAEDIC  trigger finger release       Family History:  Family History   Problem Relation Age of Onset    Hypertension Mother     Diabetes Mother     Heart Failure Mother     COPD Mother     Heart Disease Mother     Hypertension Father     Alcohol abuse Father        Social History:  Social History   Substance Use Topics    Smoking status: Never Smoker    Smokeless tobacco: Never Used    Alcohol use Yes      Comment: rarely       Allergies:  No Known Allergies      Review of Systems     Review of Systems   Eyes: Positive for visual disturbance (blurred vision, bilaterally ). Neurological: Positive for headaches (frontal). No occipital HA  Gait changes   All other systems reviewed and are negative. Physical Exam     Visit Vitals    BP (!) 146/103    Pulse 71    Resp 10    Ht 5' 10\" (1.778 m)    Wt 97.5 kg (215 lb)    SpO2 95%    BMI 30.85 kg/m2       Physical Exam   Constitutional: He is oriented to person, place, and time. He appears well-developed and well-nourished. HENT:   Head: Normocephalic and atraumatic.    Eyes: EOM are normal. Pupils are equal, round, and reactive to light. Neck: Neck supple. No JVD present. Cardiovascular: Normal rate and regular rhythm. Pulmonary/Chest: Effort normal and breath sounds normal. No respiratory distress. Abdominal: Soft. He exhibits no distension. There is no tenderness. There is no rebound and no guarding. Musculoskeletal: He exhibits no edema. no nystagmus  no cranial nerve deficit  finger to nose intact  rapid alternating movements intact bilaterally  appears anxious  Gross sensation intact bilaterally  strength 5/5 bilateral upper extremities. Neurological: He is alert and oriented to person, place, and time. Skin: Skin is warm and dry. No erythema. Psychiatric: Judgment normal.         Diagnostic Study Results     Labs -  Recent Results (from the past 12 hour(s))   GLUCOSE, POC    Collection Time: 01/19/18  1:02 PM   Result Value Ref Range    Glucose (POC) 129 (H) 70 - 110 mg/dL   CBC WITH AUTOMATED DIFF    Collection Time: 01/19/18  1:15 PM   Result Value Ref Range    WBC 6.0 4.6 - 13.2 K/uL    RBC 4.97 4.70 - 5.50 M/uL    HGB 14.4 13.0 - 16.0 g/dL    HCT 41.6 36.0 - 48.0 %    MCV 83.7 74.0 - 97.0 FL    MCH 29.0 24.0 - 34.0 PG    MCHC 34.6 31.0 - 37.0 g/dL    RDW 13.0 11.6 - 14.5 %    PLATELET 806 756 - 129 K/uL    MPV 9.8 9.2 - 11.8 FL    NEUTROPHILS 63 40 - 73 %    LYMPHOCYTES 27 21 - 52 %    MONOCYTES 9 3 - 10 %    EOSINOPHILS 1 0 - 5 %    BASOPHILS 0 0 - 2 %    ABS. NEUTROPHILS 3.7 1.8 - 8.0 K/UL    ABS. LYMPHOCYTES 1.6 0.9 - 3.6 K/UL    ABS. MONOCYTES 0.5 0.05 - 1.2 K/UL    ABS. EOSINOPHILS 0.1 0.0 - 0.4 K/UL    ABS.  BASOPHILS 0.0 0.0 - 0.06 K/UL    DF AUTOMATED     METABOLIC PANEL, COMPREHENSIVE    Collection Time: 01/19/18  1:15 PM   Result Value Ref Range    Sodium 138 136 - 145 mmol/L    Potassium 3.8 3.5 - 5.5 mmol/L    Chloride 100 100 - 108 mmol/L    CO2 31 21 - 32 mmol/L    Anion gap 7 3.0 - 18 mmol/L    Glucose 140 (H) 74 - 99 mg/dL    BUN 11 7.0 - 18 MG/DL    Creatinine 1.06 0.6 - 1.3 MG/DL    BUN/Creatinine ratio 10 (L) 12 - 20      GFR est AA >60 >60 ml/min/1.73m2    GFR est non-AA >60 >60 ml/min/1.73m2    Calcium 9.7 8.5 - 10.1 MG/DL    Bilirubin, total 0.2 0.2 - 1.0 MG/DL    ALT (SGPT) 29 16 - 61 U/L    AST (SGOT) 23 15 - 37 U/L    Alk. phosphatase 67 45 - 117 U/L    Protein, total 7.7 6.4 - 8.2 g/dL    Albumin 3.8 3.4 - 5.0 g/dL    Globulin 3.9 2.0 - 4.0 g/dL    A-G Ratio 1.0 0.8 - 1.7     MAGNESIUM    Collection Time: 01/19/18  1:15 PM   Result Value Ref Range    Magnesium 1.9 1.6 - 2.6 mg/dL   EKG, 12 LEAD, INITIAL    Collection Time: 01/19/18  1:37 PM   Result Value Ref Range    Ventricular Rate 78 BPM    Atrial Rate 78 BPM    P-R Interval 156 ms    QRS Duration 106 ms    Q-T Interval 352 ms    QTC Calculation (Bezet) 401 ms    Calculated P Axis 52 degrees    Calculated R Axis -8 degrees    Calculated T Axis 21 degrees    Diagnosis       Normal sinus rhythm  Possible Left atrial enlargement  Borderline ECG  When compared with ECG of 16-JUN-2017 14:20,  No significant change was found         Medical Decision Making   I am the first provider for this patient. I reviewed the vital signs, available nursing notes, past medical history, past surgical history, family history and social history. Vital Signs-Reviewed the patient's vital signs. Pulse Oximetry Analysis -  97% on room air (Interpretation) Non-hypoxic     Cardiac Monitor:  Rate: 79  Rhythm:  Normal Sinus Rhythm     EKG: Interpreted by the EP. Time Interpreted: 13:37   Rate: 78   Rhythm: Normal Sinus Rhythm    Interpretation: left axis, normal intervals, no ST changes    Records Reviewed: Nursing Notes and Old Medical Records (Time of Review: 2:50 PM)    ED Course: Progress Notes, Reevaluation, and Consults:  2:51 PM pt is feeling better, HA has resolved, vision has improved. Discussed importance of following up with eye doctor and pcp. discussed return cautions.      Provider Notes (Medical Decision Making): pt presents with mild HA and blurred vision, will check glucose and basic labs, normal neuro exam, no focal deficit, no need for imaging. HA not severe or maximal intensity; doubt SAH. Diagnosis     Clinical Impression:   1. Acute nonintractable headache, unspecified headache type    2. Blurred vision        Disposition: Discharged    Follow-up Information     Follow up With Details Comments Andreia Guillory MD Schedule an appointment as soon as possible for a visit in 2 days  [facility] [address] [address] [address] [place] [address] EMERGENCY DEPT  As needed [address]  [phone]           Discharge Medication List as of 1/19/2018  2:51 PM      CONTINUE these medications which have NOT CHANGED    Details   metaxalone (SKELAXIN) 800 mg tablet Take 1 Tab by mouth four (4) times daily for 5 days. , Print, Disp-20 Tab, R-0      naproxen (NAPROSYN) 500 mg tablet Take 1 Tab by mouth two (2) times daily (with meals) for 10 days. , Print, Disp-20 Tab, R-0      predniSONE (STERAPRED DS) 10 mg dose pack Take 1 Tab by mouth See Admin Instructions. , Print, Disp-48 Tab, R-0      JANUVIA 50 mg tablet TAKE 1 TABLET BY MOUTH DAILY, Print, Disp-30 Tab, R-5      metoprolol succinate (TOPROL-XL) 50 mg XL tablet Take 1 Tab by mouth daily. , Normal, Disp-60 Tab, R-2      buPROPion SR (WELLBUTRIN SR) 150 mg SR tablet Take 1 Tab by mouth two (2) times a day.  Indications: ANXIETY WITH DEPRESSION, Normal, Disp-60 Tab, R-2      Blood-Glucose Meter monitoring kit Check fasting blood sugar once daily, Normal, Disp-1 Kit, R-0      !! glucose blood VI test strips (ASCENSIA AUTODISC VI, ONE TOUCH ULTRA TEST VI) strip Check fasting blood sugar once daily, Normal, Disp-100 Strip, R-3      Lancets misc Check fasting blood sugar once daily, Normal, Disp-1 Each, R-11      !! glucose blood VI test strips (RELION PRIME TEST STRIPS) strip Check blood sugars twice daily, Normal, Disp-100 Strip, R-2       !! - Potential duplicate medications found. Please discuss with provider.        _______________________________    Attestations:  Scribe Attestation     Ronnie Jimenez acting as a scribe for and in the presence of Natalie Enrique, DO      January 19, 2018 at 2:50 PM       Provider Attestation:      I personally performed the services described in the documentation, reviewed the documentation, as recorded by the scribe in my presence, and it accurately and completely records my words and actions.  January 19, 2018 at 2:50 PM - Natalie Enrique DO    _______________________________

## 2018-01-19 NOTE — DISCHARGE INSTRUCTIONS

## 2018-01-29 ENCOUNTER — OFFICE VISIT (OUTPATIENT)
Dept: FAMILY MEDICINE CLINIC | Age: 43
End: 2018-01-29

## 2018-01-29 VITALS
BODY MASS INDEX: 32.35 KG/M2 | OXYGEN SATURATION: 99 % | TEMPERATURE: 98.2 F | RESPIRATION RATE: 20 BRPM | HEART RATE: 80 BPM | SYSTOLIC BLOOD PRESSURE: 146 MMHG | WEIGHT: 226 LBS | DIASTOLIC BLOOD PRESSURE: 96 MMHG | HEIGHT: 70 IN

## 2018-01-29 DIAGNOSIS — F32.A ANXIETY AND DEPRESSION: ICD-10-CM

## 2018-01-29 DIAGNOSIS — E11.9 CONTROLLED TYPE 2 DIABETES MELLITUS WITHOUT COMPLICATION, WITHOUT LONG-TERM CURRENT USE OF INSULIN (HCC): Primary | ICD-10-CM

## 2018-01-29 DIAGNOSIS — M51.36 DEGENERATIVE DISC DISEASE, LUMBAR: ICD-10-CM

## 2018-01-29 DIAGNOSIS — F41.9 ANXIETY AND DEPRESSION: ICD-10-CM

## 2018-01-29 PROBLEM — K40.20 BILATERAL INGUINAL HERNIA: Status: RESOLVED | Noted: 2017-05-08 | Resolved: 2018-01-29

## 2018-01-29 PROBLEM — N28.9 KIDNEY FUNCTION ABNORMAL: Status: RESOLVED | Noted: 2017-06-09 | Resolved: 2018-01-29

## 2018-01-29 LAB — HBA1C MFR BLD HPLC: 6.7 %

## 2018-01-29 RX ORDER — LORAZEPAM 1 MG/1
1 TABLET ORAL
Qty: 15 TAB | Refills: 0 | Status: SHIPPED | OUTPATIENT
Start: 2018-01-29 | End: 2018-04-10

## 2018-01-29 RX ORDER — CYCLOBENZAPRINE HCL 10 MG
10 TABLET ORAL
Qty: 30 TAB | Refills: 1 | Status: SHIPPED | OUTPATIENT
Start: 2018-01-29 | End: 2018-02-08 | Stop reason: SDUPTHER

## 2018-01-29 NOTE — ASSESSMENT & PLAN NOTE
Well Controlled, based on history, physical exam and review of pertinent labs, studies and medications; meds reconciled; continue current treatment plan. Key Antihyperglycemic Medications             JANUVIA 50 mg tablet  (Taking) TAKE 1 TABLET BY MOUTH DAILY        Other Key Diabetic Medications     Patient is on no other key diabetic meds.         Lab Results   Component Value Date/Time    Hemoglobin A1c 6.8 06/09/2017 04:29 PM    Hemoglobin A1c (POC) 6.7 01/29/2018 01:45 PM    Glucose 140 01/19/2018 01:15 PM    Creatinine 1.06 01/19/2018 01:15 PM    Creatinine, POC 1.7 06/07/2017 08:15 PM    Microalbumin/Creat ratio (mg/g creat) 56 09/05/2017 12:00 PM    Microalbumin,urine random 3.80 09/05/2017 12:00 PM    Cholesterol, total 224 07/11/2017 09:13 AM    HDL Cholesterol 37 07/11/2017 09:13 AM    LDL, calculated 107 07/11/2017 09:13 AM    Triglyceride 400 07/11/2017 09:13 AM     Diabetic Foot and Eye Exam HM Status   Topic Date Due    Eye Exam  12/28/1984    Diabetic Foot Care  09/05/2018

## 2018-01-29 NOTE — PROGRESS NOTES
HISTORY OF PRESENT ILLNESS  Carroll Marlow is a 36 y.o. male. HPI Comments: Harsha Stein is here to talk about his back and to review his recent ED trip. He went to the ED because of RLQ abs pain and CT scan of abd showed possible LLL infiltrate in lungs so he was discharged on Doxy. He hasn't been coughing at all, has no respiratory sx, not even sure this is pneumonia. His back is his main concern. He can't work because of it, is running out of money and is becoming increasingly stressed and depressed. He has seen a back specialist in [place] and a MRI was ordered. When he went in the machine, he vomited, and then the same thing happened a second time. He has no history of claustrophobia. He still wants to get ut done so he can figure out what he needs to do. Pain shoots down his R leg. Anxiety   Associated symptoms include abdominal pain. Pertinent negatives include no chest pain, no headaches and no shortness of breath. LOW BACK PAIN   Associated symptoms include abdominal pain. Pertinent negatives include no chest pain, no headaches and no shortness of breath. Review of Systems   Constitutional: Negative for chills and fever. Eyes: Negative for blurred vision and double vision. Respiratory: Negative for cough and shortness of breath. Cardiovascular: Negative for chest pain and palpitations. Gastrointestinal: Positive for abdominal pain. Negative for constipation, diarrhea, nausea and vomiting. Genitourinary: Negative for dysuria and urgency. Musculoskeletal: Positive for back pain and myalgias. Neurological: Positive for tingling. Negative for headaches. Physical Exam   Constitutional: He is oriented to person, place, and time. He appears well-developed and well-nourished. Eyes: Pupils are equal, round, and reactive to light. Neck: Neck supple. No thyromegaly present. Cardiovascular: Normal rate, regular rhythm and normal heart sounds.     Pulmonary/Chest: Effort normal and breath sounds normal. No respiratory distress. He has no wheezes. He has no rales. Abdominal: Soft. There is no tenderness. Musculoskeletal:   Tender lower back diffusely, neg SLR. Decreased ROM   Lymphadenopathy:     He has no cervical adenopathy. Neurological: He is alert and oriented to person, place, and time. Nursing note and vitals reviewed. Results for orders placed or performed in visit on 01/29/18   AMB POC HEMOGLOBIN A1C   Result Value Ref Range    Hemoglobin A1c (POC) 6.7 %       ASSESSMENT and PLAN    ICD-10-CM ICD-9-CM    1. Controlled type 2 diabetes mellitus without complication, without long-term current use of insulin (HCC) E11.9 250.00 AMB POC HEMOGLOBIN A1C   2. Anxiety and depression F41.8 300.00 LORazepam (ATIVAN) 1 mg tablet     311    3. Degenerative disc disease, lumbar M51.36 722. 52            See orders.     AVS instructions reviewed with patient, pt verbalized understanding

## 2018-01-29 NOTE — PROGRESS NOTES
Rm: 3    Chief Complaint   Patient presents with    Anxiety    LOW BACK PAIN     left side     Flu Shot Requested: no    Depression Screening:  PHQ over the last two weeks 1/29/2018 12/13/2017 11/28/2017 5/23/2017 5/16/2017 3/20/2017 3/3/2017   PHQ Not Done - - - - - - -   Little interest or pleasure in doing things Not at all - Not at all Not at all Not at all Not at all Not at all   Feeling down, depressed or hopeless Not at all More than half the days Not at all Not at all Not at all Not at all Not at all   Total Score PHQ 2 0 - 0 0 0 0 0       Learning Assessment:  Learning Assessment 3/3/2017 4/27/2016 3/16/2016   PRIMARY LEARNER Patient Patient Patient   HIGHEST LEVEL OF EDUCATION - PRIMARY LEARNER  GRADUATED HIGH SCHOOL OR GED GRADUATED HIGH SCHOOL OR GED GRADUATED HIGH SCHOOL OR GED   BARRIERS PRIMARY LEARNER NONE - -   CO-LEARNER CAREGIVER No - -   PRIMARY LANGUAGE ENGLISH ENGLISH ENGLISH   LEARNER PREFERENCE PRIMARY READING DEMONSTRATION READING   ANSWERED BY patient patient DARIAN Gaytan   RELATIONSHIP SELF SELF SELF       Abuse Screening:  Abuse Screening Questionnaire 3/16/2016   Do you ever feel afraid of your partner? N   Are you in a relationship with someone who physically or mentally threatens you? N   Is it safe for you to go home? Y       Health Maintenance reviewed and discussed per provider: yes     Coordination of Care:    1. Have you been to the ER, urgent care clinic since your last visit? Hospitalized since your last visit? no    2. Have you seen or consulted any other health care providers outside of the [address] [place] [address] since your last visit? Include any pap smears or colon screening.  no

## 2018-01-29 NOTE — PATIENT INSTRUCTIONS
A one time prescription of Ativan given to get you through the MRI and occasional other stressful situations while you're still off of work. Take 1 tablet 1 hour before the MRI is scheduled. Follow up with the back sapna after MRI is done. Continue Wellbutrin, it's probably helping more than you think. Get your overdue eye exam while you're off of work.

## 2018-01-29 NOTE — MR AVS SNAPSHOT
[address] [place] [phone] 
[phone] Patient: Keo Barraza MRN: [id number] :1975 Visit Information Date & Time Provider Department Dept. Phone Encounter #  
 2018  1:15 PM Dandy Tierney, [address] [phone] [id number] Follow-up Instructions Return if symptoms worsen or fail to improve. Upcoming Health Maintenance Date Due  
 EYE EXAM RETINAL OR DILATED Q1 1984 HEMOGLOBIN A1C Q6M 2017 LIPID PANEL Q1 2018 FOOT EXAM Q1 2018 MICROALBUMIN Q1 2018 DTaP/Tdap/Td series (2 - Td) 2023 Allergies as of 2018  Review Complete On: 2018 By: Dandy Tierney MD  
 No Known Allergies Current Immunizations  Reviewed on 2015 Name Date Influenza Vaccine 2013 12:00 AM, 2010 12:00 AM  
 Influenza Vaccine (Quad) PF 2017, 2015 Influenza Vaccine Whole 2009 Pneumococcal Polysaccharide (PPSV-23) 2017 12:00 AM  
 Tdap 2013 12:00 AM  
  
 Not reviewed this visit You Were Diagnosed With   
  
 Codes Comments Controlled type 2 diabetes mellitus without complication, without long-term current use of insulin ([facility]    -  Primary ICD-10-CM: E11.9 ICD-9-CM: 250.00 Anxiety and depression     ICD-10-CM: F41.8 ICD-9-CM: 300.00, 311 Degenerative disc disease, lumbar     ICD-10-CM: M51.36 
ICD-9-CM: 722.52 Vitals BP Pulse Temp Resp Height(growth percentile) Weight(growth percentile) (!) 146/96 (BP 1 Location: Left arm, BP Patient Position: Sitting) 80 98.2 °F (36.8 °C) (Oral) 20 5' 10\" (1.778 m) 226 lb (102.5 kg) SpO2 BMI Smoking Status 99% 32.43 kg/m2 Never Smoker Vitals History BMI and BSA Data Body Mass Index Body Surface Area  
 32.43 kg/m 2 2.25 m 2 Preferred Pharmacy Pharmacy Name Phone [place] [address] [address] [phone] Your Updated Medication List  
  
   
This list is accurate as of: 1/29/18  1:39 PM.  Always use your most recent med list.  
  
  
  
  
 Blood-Glucose Meter monitoring kit Check fasting blood sugar once daily buPROPion  mg SR tablet Commonly known as:  Aurther Shear Take 1 Tab by mouth two (2) times a day. Indications: ANXIETY WITH DEPRESSION  
  
 * glucose blood VI test strips strip Commonly known as:  RELION PRIME TEST STRIPS Check blood sugars twice daily * glucose blood VI test strips strip Commonly known as:  ASCENSIA AUTODISC VI, ONE TOUCH ULTRA TEST VI Check fasting blood sugar once daily JANUVIA 50 mg tablet Generic drug:  SITagliptin TAKE 1 TABLET BY MOUTH DAILY Lancets Misc Check fasting blood sugar once daily LORazepam 1 mg tablet Commonly known as:  ATIVAN Take 1 Tab by mouth two (2) times daily as needed for Anxiety. Max Daily Amount: 2 mg.  
  
 metoprolol succinate 50 mg XL tablet Commonly known as:  TOPROL-XL Take 1 Tab by mouth daily. * Notice: This list has 2 medication(s) that are the same as other medications prescribed for you. Read the directions carefully, and ask your doctor or other care provider to review them with you. Prescriptions Printed Refills LORazepam (ATIVAN) 1 mg tablet 0 Sig: Take 1 Tab by mouth two (2) times daily as needed for Anxiety. Max Daily Amount: 2 mg. Class: Print Route: Oral  
  
We Performed the Following AMB POC HEMOGLOBIN A1C [40938 CPT(R)] Follow-up Instructions Return if symptoms worsen or fail to improve. Patient Instructions A one time prescription of Ativan given to get you through the MRI and occasional other stressful situations while you're still off of work. Take 1 tablet 1 hour before the MRI is scheduled. Follow up with the back sapna after MRI is done. Continue Wellbutrin, it's probably helping more than you think. Get your overdue eye exam while you're off of work. Introducing [facility]! Vaishnavi Gandhi introduces [employer] patient portal. Now you can access parts of your medical record, email your doctor's office, and request medication refills online. 1. In your internet browser, go to [email or web address] 2. Click on the First Time User? Click Here link in the Sign In box. You will see the New Member Sign Up page. 3. Enter your Clipik Access Code exactly as it appears below. You will not need to use this code after youve completed the sign-up process. If you do not sign up before the expiration date, you must request a new code. · [employer]ipik Access Code: [email or web address] Expires: 2/26/2018  9:31 AM 
 
4. Enter the last four digits of your Social Security Number (xxxx) and Date of Birth (mm/dd/yyyy) as indicated and click Submit. You will be taken to the next sign-up page. 5. Create a Clipik ID. This will be your Clipik login ID and cannot be changed, so think of one that is secure and easy to remember. 6. Create a Clipik password. You can change your password at any time. 7. Enter your Password Reset Question and Answer. This can be used at a later time if you forget your password. 8. Enter your e-mail address. You will receive e-mail notification when new information is available in [address] 9. Click Sign Up. You can now view and download portions of your medical record. 10. Click the Download Summary menu link to download a portable copy of your medical information. If you have questions, please visit the Frequently Asked Questions section of the [employer] website. Remember, [employer]ik is NOT to be used for urgent needs. For medical emergencies, dial 911. Now available from your iPhone and Android! Please provide this summary of care documentation to your next provider. Your primary care clinician is listed as Driss Espinal. If you have any questions after today's visit, please call [phone].

## 2018-01-31 ENCOUNTER — TELEPHONE (OUTPATIENT)
Dept: FAMILY MEDICINE CLINIC | Age: 43
End: 2018-01-31

## 2018-02-02 NOTE — TELEPHONE ENCOUNTER
Spoke with pt yesterday around 12 noon and informed him that he needs to contact the back specialist in regards to the lumbar puncture. Pt stated he tried recently but would try again. Pt verbalized understanding.

## 2018-02-05 ENCOUNTER — HOSPITAL ENCOUNTER (EMERGENCY)
Age: 43
Discharge: HOME | End: 2018-02-05
Attending: EMERGENCY MEDICINE
Payer: SELF-PAY

## 2018-02-05 ENCOUNTER — TELEPHONE (OUTPATIENT)
Dept: FAMILY MEDICINE CLINIC | Age: 43
End: 2018-02-05

## 2018-02-05 VITALS
TEMPERATURE: 98.7 F | DIASTOLIC BLOOD PRESSURE: 106 MMHG | BODY MASS INDEX: 30.21 KG/M2 | SYSTOLIC BLOOD PRESSURE: 166 MMHG | HEIGHT: 70 IN | HEART RATE: 92 BPM | RESPIRATION RATE: 18 BRPM | OXYGEN SATURATION: 97 % | WEIGHT: 211 LBS

## 2018-02-05 DIAGNOSIS — M54.42 CHRONIC LOW BACK PAIN WITH BILATERAL SCIATICA, UNSPECIFIED BACK PAIN LATERALITY: Primary | ICD-10-CM

## 2018-02-05 DIAGNOSIS — G89.29 CHRONIC LOW BACK PAIN WITH BILATERAL SCIATICA, UNSPECIFIED BACK PAIN LATERALITY: Primary | ICD-10-CM

## 2018-02-05 DIAGNOSIS — F32.A ANXIETY AND DEPRESSION: ICD-10-CM

## 2018-02-05 DIAGNOSIS — F41.9 ANXIETY AND DEPRESSION: ICD-10-CM

## 2018-02-05 DIAGNOSIS — M54.41 CHRONIC LOW BACK PAIN WITH BILATERAL SCIATICA, UNSPECIFIED BACK PAIN LATERALITY: Primary | ICD-10-CM

## 2018-02-05 PROCEDURE — 99282 EMERGENCY DEPT VISIT SF MDM: CPT

## 2018-02-05 RX ORDER — PREDNISONE 10 MG/1
10 TABLET ORAL SEE ADMIN INSTRUCTIONS
Qty: 48 TAB | Refills: 0 | Status: SHIPPED | OUTPATIENT
Start: 2018-02-05 | End: 2018-03-21

## 2018-02-05 RX ORDER — BUSPIRONE HYDROCHLORIDE 7.5 MG/1
7.5 TABLET ORAL 2 TIMES DAILY
Qty: 60 TAB | Refills: 1 | Status: SHIPPED | OUTPATIENT
Start: 2018-02-05 | End: 2018-03-21

## 2018-02-05 RX ORDER — METAXALONE 800 MG/1
800 TABLET ORAL 4 TIMES DAILY
Qty: 20 TAB | Refills: 0 | Status: SHIPPED | OUTPATIENT
Start: 2018-02-05 | End: 2018-02-08 | Stop reason: ALTCHOICE

## 2018-02-05 RX ORDER — NAPROXEN 500 MG/1
500 TABLET ORAL 2 TIMES DAILY WITH MEALS
Qty: 20 TAB | Refills: 0 | Status: SHIPPED | OUTPATIENT
Start: 2018-02-05 | End: 2018-02-15

## 2018-02-05 RX ORDER — LORAZEPAM 1 MG/1
1 TABLET ORAL
Qty: 15 TAB | Refills: 0 | OUTPATIENT
Start: 2018-02-05

## 2018-02-05 NOTE — ED PROVIDER NOTES
EMERGENCY DEPARTMENT HISTORY AND PHYSICAL EXAM    Date: 2/5/2018  Patient Name: Bita Marquez    History of Presenting Illness     No chief complaint on file. History Provided By: Patient    Chief Complaint: Lower Back pain  Duration: Chronic   Timing:  Constant  Location: Lower mid back   Quality: Aching  Severity: 9 out of 10  Modifying Factors: Norco, muscle relaxants and motrin   Associated Symptoms: denies any other associated signs or symptoms      Additional History (Context): Bita Marquez is a 36 y.o. male with a history of diabetes, HTN, depression, herniated disk who presents today for a multiple month chronic history of low back pain. He states he has an appointment for an epidural coming up, but the pain was too much today. He called his ortho doctor and was told to come to the ED. He has tried norco, and muscle relaxants at home that provided some relief. The pain is a 9/10 and radiated down the back of both legs. Pt denies any fevers or chills, headache, dizziness or light headedness, ENT issues, CP or discomfort, SOB, cough, n/v/d/c, abd pain, diaphoresis, melena/hematochezia, dysuria, hematuria, frequency, focal weakness/numbness/tingling, or rash. Patient has no other complaints at this time. PCP: Divine Milian MD    Current Outpatient Prescriptions   Medication Sig Dispense Refill    busPIRone (BUSPAR) 7.5 mg tablet Take 1 Tab by mouth two (2) times a day. 60 Tab 1    LORazepam (ATIVAN) 1 mg tablet Take 1 Tab by mouth two (2) times daily as needed for Anxiety. Max Daily Amount: 2 mg. 15 Tab 0    cyclobenzaprine (FLEXERIL) 10 mg tablet Take 1 Tab by mouth three (3) times daily as needed for Muscle Spasm(s). 30 Tab 1    JANUVIA 50 mg tablet TAKE 1 TABLET BY MOUTH DAILY 30 Tab 5    metoprolol succinate (TOPROL-XL) 50 mg XL tablet Take 1 Tab by mouth daily. 60 Tab 2    buPROPion SR (WELLBUTRIN SR) 150 mg SR tablet Take 1 Tab by mouth two (2) times a day.  Indications: ANXIETY WITH DEPRESSION 60 Tab 2    Blood-Glucose Meter monitoring kit Check fasting blood sugar once daily 1 Kit 0    glucose blood VI test strips (ASCENSIA AUTODISC VI, ONE TOUCH ULTRA TEST VI) strip Check fasting blood sugar once daily 100 Strip 3    Lancets misc Check fasting blood sugar once daily 1 Each 11    glucose blood VI test strips (RELION PRIME TEST STRIPS) strip Check blood sugars twice daily 100 Strip 2       Past History     Past Medical History:  Past Medical History:   Diagnosis Date    Depression     Diabetes ([address]     Drug-seeking behavior     56 prescriptions from 32 different prescribers at 6 different pharmacies in last 12 months    Herniated lumbar intervertebral disc     Hypertension     Neurological disorder L3,4,5 torn Herniated disc       Past Surgical History:  Past Surgical History:   Procedure Laterality Date    HX HERNIA REPAIR Bilateral 06/02/2017    robotic repair of bilateral indirect inguinal hernias with placement of mesh    HX ORTHOPAEDIC  trigger finger release       Family History:  Family History   Problem Relation Age of Onset    Hypertension Mother     Diabetes Mother     Heart Failure Mother     COPD Mother     Heart Disease Mother     Hypertension Father     Alcohol abuse Father        Social History:  Social History   Substance Use Topics    Smoking status: Never Smoker    Smokeless tobacco: Never Used    Alcohol use Yes      Comment: rarely       Allergies:  No Known Allergies      Review of Systems   Review of Systems   Constitutional: Negative for chills and fever. HENT: Negative for congestion, rhinorrhea and sore throat. Respiratory: Negative for cough and shortness of breath. Cardiovascular: Negative for chest pain. Gastrointestinal: Negative for abdominal pain, blood in stool, constipation, diarrhea, nausea and vomiting. Genitourinary: Negative for dysuria, frequency and hematuria. Musculoskeletal: Positive for back pain. Negative for myalgias. Skin: Negative for rash and wound. Neurological: Negative for dizziness and headaches. All other systems reviewed and are negative. All Other Systems Negative  Physical Exam     Vitals:    02/05/18 1705   BP: (!) 166/106   Pulse: 92   Resp: 18   Temp: 98.7 °F (37.1 °C)   SpO2: 97%   Weight: 95.7 kg (211 lb)   Height: 5' 10\" (1.778 m)     Physical Exam   Constitutional: He is oriented to person, place, and time. He appears well-developed and well-nourished. No distress. HENT:   Head: Normocephalic and atraumatic. Eyes: Conjunctivae are normal.   Neck: Normal range of motion. Neck supple. Cardiovascular: Normal rate, regular rhythm and normal heart sounds. Pulmonary/Chest: Effort normal and breath sounds normal. No respiratory distress. He exhibits no tenderness. Abdominal: Soft. Bowel sounds are normal. He exhibits no distension. There is no tenderness. There is no rebound and no guarding. Musculoskeletal: Normal range of motion. He exhibits no edema or deformity. Lumbar back: He exhibits tenderness. He exhibits normal range of motion, no bony tenderness, no swelling, no edema, no deformity and no laceration. Neurological: He is alert and oriented to person, place, and time. Skin: Skin is warm and dry. He is not diaphoretic. Psychiatric: He has a normal mood and affect. Nursing note and vitals reviewed. Diagnostic Study Results     Labs -   No results found for this or any previous visit (from the past 12 hour(s)). Radiologic Studies -   No orders to display     CT Results  (Last 48 hours)    None        CXR Results  (Last 48 hours)    None            Medical Decision Making   I am the first provider for this patient. I reviewed the vital signs, available nursing notes, past medical history, past surgical history, family history and social history. Vital Signs-Reviewed the patient's vital signs.       Pulse Oximetry Analysis -  97 % RA      Records Reviewed: Nursing Notes and Old Medical Records    Procedures:  Procedures none     Provider Notes (Medical Decision Making):     Differential: herniated disk, chronic back pain, malingering     Plan: given patient benign PE do not feel imaging or further intervention is needed at this time. I will give muscle relaxants and short course of steroids. Patient agrees to monitor BG levels at home during time of steroids. MED RECONCILIATION:  No current facility-administered medications for this encounter. Current Outpatient Prescriptions   Medication Sig    busPIRone (BUSPAR) 7.5 mg tablet Take 1 Tab by mouth two (2) times a day.  LORazepam (ATIVAN) 1 mg tablet Take 1 Tab by mouth two (2) times daily as needed for Anxiety. Max Daily Amount: 2 mg.  cyclobenzaprine (FLEXERIL) 10 mg tablet Take 1 Tab by mouth three (3) times daily as needed for Muscle Spasm(s).  JANUVIA 50 mg tablet TAKE 1 TABLET BY MOUTH DAILY    metoprolol succinate (TOPROL-XL) 50 mg XL tablet Take 1 Tab by mouth daily.  buPROPion SR (WELLBUTRIN SR) 150 mg SR tablet Take 1 Tab by mouth two (2) times a day. Indications: ANXIETY WITH DEPRESSION    Blood-Glucose Meter monitoring kit Check fasting blood sugar once daily    glucose blood VI test strips (ASCENSIA AUTODISC VI, ONE TOUCH ULTRA TEST VI) strip Check fasting blood sugar once daily    Lancets misc Check fasting blood sugar once daily    glucose blood VI test strips (RELION PRIME TEST STRIPS) strip Check blood sugars twice daily       Disposition:  Home     DISCHARGE NOTE:   Pt has been reexamined. Patient has no new complaints, changes, or physical findings. Care plan outlined and precautions discussed. All medications were reviewed with the patient; will d/c home with steroids and muscle relaxants. All of pt's questions and concerns were addressed.  Patient was instructed and agrees to follow up with PCP and Ortho , as well as to return to the ED upon further deterioration. Patient is ready to go home. Follow-up Information     Follow up With Details Comments Contact Info    [facility] EMERGENCY DEPT  As needed [address]  [phone]    Brandt Nascimento MD In 2 days  [place] [address]  [phone]            Current Discharge Medication List      START taking these medications    Details   predniSONE (STERAPRED DS) 10 mg dose pack Take 1 Tab by mouth See Admin Instructions. Qty: 48 Tab, Refills: 0      naproxen (NAPROSYN) 500 mg tablet Take 1 Tab by mouth two (2) times daily (with meals) for 10 days. Qty: 20 Tab, Refills: 0      metaxalone (SKELAXIN) 800 mg tablet Take 1 Tab by mouth four (4) times daily for 5 days. Qty: 20 Tab, Refills: 0               Diagnosis     Clinical Impression:   1.  Chronic low back pain with bilateral sciatica, unspecified back pain laterality

## 2018-02-05 NOTE — DISCHARGE INSTRUCTIONS
Learning About Relief for Back Pain  What is back tension and strain? Back strain happens when you overstretch, or pull, a muscle in your back. You may hurt your back in an accident or when you exercise or lift something. Most back pain will get better with rest and time. You can take care of yourself at home to help your back heal.  What can you do first to relieve back pain? When you first feel back pain, try these steps:  · Walk. Take a short walk (10 to 20 minutes) on a level surface (no slopes, hills, or stairs) every 2 to 3 hours. Walk only distances you can manage without pain, especially leg pain. · Relax. Find a comfortable position for rest. Some people are comfortable on the floor or a medium-firm bed with a small pillow under their head and another under their knees. Some people prefer to lie on their side with a pillow between their knees. Don't stay in one position for too long. · Try heat or ice. Try using a heating pad on a low or medium setting, or take a warm shower, for 15 to 20 minutes every 2 to 3 hours. Or you can buy single-use heat wraps that last up to 8 hours. You can also try an ice pack for 10 to 15 minutes every 2 to 3 hours. You can use an ice pack or a bag of frozen vegetables wrapped in a thin towel. There is not strong evidence that either heat or ice will help, but you can try them to see if they help. You may also want to try switching between heat and cold. · Take pain medicine exactly as directed. ¨ If the doctor gave you a prescription medicine for pain, take it as prescribed. ¨ If you are not taking a prescription pain medicine, ask your doctor if you can take an over-the-counter medicine. What else can you do? · Stretch and exercise. Exercises that increase flexibility may relieve your pain and make it easier for your muscles to keep your spine in a good, neutral position. And don't forget to keep walking. · Do self-massage.  You can use self-massage to unwind after work or school or to energize yourself in the morning. You can easily massage your feet, hands, or neck. Self-massage works best if you are in comfortable clothes and are sitting or lying in a comfortable position. Use oil or lotion to massage bare skin. · Reduce stress. Back pain can lead to a vicious Stebbins: Distress about the pain tenses the muscles in your back, which in turn causes more pain. Learn how to relax your mind and your muscles to lower your stress. Where can you learn more? Go to http://jacklyn-denice.info/. Enter W110 in the search box to learn more about \"Learning About Relief for Back Pain. \"  Current as of: March 21, 2017  Content Version: 11.5  © 6203-7077 [employer]wise, [employer]. Care instructions adapted under license by [employer] (which disclaims liability or warranty for this information). If you have questions about a medical condition or this instruction, always ask your healthcare professional. [place] [address] any warranty or liability for your use of this information.

## 2018-02-08 RX ORDER — CYCLOBENZAPRINE HCL 10 MG
10 TABLET ORAL
Qty: 30 TAB | Refills: 1 | Status: SHIPPED | OUTPATIENT
Start: 2018-02-08 | End: 2018-02-22 | Stop reason: SDUPTHER

## 2018-02-08 NOTE — TELEPHONE ENCOUNTER
Requested Prescriptions     Pending Prescriptions Disp Refills    cyclobenzaprine (FLEXERIL) 10 mg tablet 30 Tab 1     Sig: Take 1 Tab by mouth three (3) times daily as needed for Muscle Spasm(s).

## 2018-02-19 ENCOUNTER — APPOINTMENT (OUTPATIENT)
Dept: CT IMAGING | Age: 43
End: 2018-02-19
Attending: EMERGENCY MEDICINE
Payer: SELF-PAY

## 2018-02-19 ENCOUNTER — HOSPITAL ENCOUNTER (EMERGENCY)
Age: 43
Discharge: HOME | End: 2018-02-19
Attending: EMERGENCY MEDICINE
Payer: SELF-PAY

## 2018-02-19 ENCOUNTER — TELEPHONE (OUTPATIENT)
Dept: FAMILY MEDICINE CLINIC | Age: 43
End: 2018-02-19

## 2018-02-19 VITALS
RESPIRATION RATE: 18 BRPM | WEIGHT: 214 LBS | TEMPERATURE: 98.1 F | DIASTOLIC BLOOD PRESSURE: 94 MMHG | SYSTOLIC BLOOD PRESSURE: 141 MMHG | HEART RATE: 76 BPM | BODY MASS INDEX: 30.71 KG/M2 | OXYGEN SATURATION: 96 %

## 2018-02-19 DIAGNOSIS — R10.31 RLQ ABDOMINAL PAIN: Primary | ICD-10-CM

## 2018-02-19 LAB
ANION GAP SERPL CALC-SCNC: 9 MMOL/L (ref 3–18)
BASOPHILS # BLD: 0 K/UL (ref 0–0.06)
BASOPHILS NFR BLD: 0 % (ref 0–2)
BUN SERPL-MCNC: 14 MG/DL (ref 7–18)
BUN/CREAT SERPL: 12 (ref 12–20)
CALCIUM SERPL-MCNC: 9.6 MG/DL (ref 8.5–10.1)
CHLORIDE SERPL-SCNC: 99 MMOL/L (ref 100–108)
CO2 SERPL-SCNC: 28 MMOL/L (ref 21–32)
CREAT SERPL-MCNC: 1.14 MG/DL (ref 0.6–1.3)
DIFFERENTIAL METHOD BLD: ABNORMAL
EOSINOPHIL # BLD: 0.1 K/UL (ref 0–0.4)
EOSINOPHIL NFR BLD: 1 % (ref 0–5)
ERYTHROCYTE [DISTWIDTH] IN BLOOD BY AUTOMATED COUNT: 12.9 % (ref 11.6–14.5)
GLUCOSE SERPL-MCNC: 217 MG/DL (ref 74–99)
HCT VFR BLD AUTO: 43 % (ref 36–48)
HGB BLD-MCNC: 15.3 G/DL (ref 13–16)
LYMPHOCYTES # BLD: 1.8 K/UL (ref 0.9–3.6)
LYMPHOCYTES NFR BLD: 20 % (ref 21–52)
MCH RBC QN AUTO: 29.6 PG (ref 24–34)
MCHC RBC AUTO-ENTMCNC: 35.6 G/DL (ref 31–37)
MCV RBC AUTO: 83.2 FL (ref 74–97)
MONOCYTES # BLD: 0.5 K/UL (ref 0.05–1.2)
MONOCYTES NFR BLD: 6 % (ref 3–10)
NEUTS SEG # BLD: 6.7 K/UL (ref 1.8–8)
NEUTS SEG NFR BLD: 73 % (ref 40–73)
PLATELET # BLD AUTO: 291 K/UL (ref 135–420)
PMV BLD AUTO: 9.4 FL (ref 9.2–11.8)
POTASSIUM SERPL-SCNC: 3.9 MMOL/L (ref 3.5–5.5)
RBC # BLD AUTO: 5.17 M/UL (ref 4.7–5.5)
SODIUM SERPL-SCNC: 136 MMOL/L (ref 136–145)
WBC # BLD AUTO: 9.1 K/UL (ref 4.6–13.2)

## 2018-02-19 PROCEDURE — 96375 TX/PRO/DX INJ NEW DRUG ADDON: CPT

## 2018-02-19 PROCEDURE — 99283 EMERGENCY DEPT VISIT LOW MDM: CPT

## 2018-02-19 PROCEDURE — 74011250636 HC RX REV CODE- 250/636: Performed by: EMERGENCY MEDICINE

## 2018-02-19 PROCEDURE — 96376 TX/PRO/DX INJ SAME DRUG ADON: CPT

## 2018-02-19 PROCEDURE — 74011000258 HC RX REV CODE- 258: Performed by: EMERGENCY MEDICINE

## 2018-02-19 PROCEDURE — 96365 THER/PROPH/DIAG IV INF INIT: CPT

## 2018-02-19 PROCEDURE — 96361 HYDRATE IV INFUSION ADD-ON: CPT

## 2018-02-19 PROCEDURE — 74011636320 HC RX REV CODE- 636/320: Performed by: EMERGENCY MEDICINE

## 2018-02-19 PROCEDURE — 80048 BASIC METABOLIC PNL TOTAL CA: CPT | Performed by: EMERGENCY MEDICINE

## 2018-02-19 PROCEDURE — 85025 COMPLETE CBC W/AUTO DIFF WBC: CPT | Performed by: EMERGENCY MEDICINE

## 2018-02-19 PROCEDURE — 74177 CT ABD & PELVIS W/CONTRAST: CPT

## 2018-02-19 RX ORDER — MORPHINE SULFATE 2 MG/ML
4 INJECTION, SOLUTION INTRAMUSCULAR; INTRAVENOUS ONCE
Status: COMPLETED | OUTPATIENT
Start: 2018-02-19 | End: 2018-02-19

## 2018-02-19 RX ADMIN — PIPERACILLIN SODIUM,TAZOBACTAM SODIUM 3.38 G: 3; .375 INJECTION, POWDER, FOR SOLUTION INTRAVENOUS at 15:33

## 2018-02-19 RX ADMIN — MORPHINE SULFATE 4 MG: 2 INJECTION, SOLUTION INTRAMUSCULAR; INTRAVENOUS at 15:30

## 2018-02-19 RX ADMIN — IOPAMIDOL 96 ML: 612 INJECTION, SOLUTION INTRAVENOUS at 15:08

## 2018-02-19 RX ADMIN — MORPHINE SULFATE 4 MG: 2 INJECTION, SOLUTION INTRAMUSCULAR; INTRAVENOUS at 12:46

## 2018-02-19 RX ADMIN — MORPHINE SULFATE 4 MG: 2 INJECTION, SOLUTION INTRAMUSCULAR; INTRAVENOUS at 13:35

## 2018-02-19 RX ADMIN — SODIUM CHLORIDE 1000 ML: 900 INJECTION, SOLUTION INTRAVENOUS at 12:44

## 2018-02-19 NOTE — DISCHARGE INSTRUCTIONS
Abdominal Pain: Care Instructions  Your Care Instructions    Abdominal pain has many possible causes. Some aren't serious and get better on their own in a few days. Others need more testing and treatment. If your pain continues or gets worse, you need to be rechecked and may need more tests to find out what is wrong. You may need surgery to correct the problem. Don't ignore new symptoms, such as fever, nausea and vomiting, urination problems, pain that gets worse, and dizziness. These may be signs of a more serious problem. Your doctor may have recommended a follow-up visit in the next 8 to 12 hours. If you are not getting better, you may need more tests or treatment. The doctor has checked you carefully, but problems can develop later. If you notice any problems or new symptoms, get medical treatment right away. Follow-up care is a key part of your treatment and safety. Be sure to make and go to all appointments, and call your doctor if you are having problems. It's also a good idea to know your test results and keep a list of the medicines you take. How can you care for yourself at home? · Rest until you feel better. · To prevent dehydration, drink plenty of fluids, enough so that your urine is light yellow or clear like water. Choose water and other caffeine-free clear liquids until you feel better. If you have kidney, heart, or liver disease and have to limit fluids, talk with your doctor before you increase the amount of fluids you drink. · If your stomach is upset, eat mild foods, such as rice, dry toast or crackers, bananas, and applesauce. Try eating several small meals instead of two or three large ones. · Wait until 48 hours after all symptoms have gone away before you have spicy foods, alcohol, and drinks that contain caffeine. · Do not eat foods that are high in fat. · Avoid anti-inflammatory medicines such as aspirin, ibuprofen (Advil, Motrin), and naproxen (Aleve).  These can cause stomach upset. Talk to your doctor if you take daily aspirin for another health problem. When should you call for help? Call 911 anytime you think you may need emergency care. For example, call if:  ? · You passed out (lost consciousness). ? · You pass maroon or very bloody stools. ? · You vomit blood or what looks like coffee grounds. ? · You have new, severe belly pain. ?Call your doctor now or seek immediate medical care if:  ? · Your pain gets worse, especially if it becomes focused in one area of your belly. ? · You have a new or higher fever. ? · Your stools are black and look like tar, or they have streaks of blood. ? · You have unexpected vaginal bleeding. ? · You have symptoms of a urinary tract infection. These may include:  ¨ Pain when you urinate. ¨ Urinating more often than usual.  ¨ Blood in your urine. ? · You are dizzy or lightheaded, or you feel like you may faint. ? Watch closely for changes in your health, and be sure to contact your doctor if:  ? · You are not getting better after 1 day (24 hours). Where can you learn more? Go to http://jacklyn-denice.info/. Enter U393 in the search box to learn more about \"Abdominal Pain: Care Instructions. \"  Current as of: March 20, 2017  Content Version: 11.4  © 0621-3687 [employer]. Care instructions adapted under license by [employer] (which disclaims liability or warranty for this information). If you have questions about a medical condition or this instruction, always ask your healthcare professional. [place] [address] any warranty or liability for your use of this information.

## 2018-02-19 NOTE — ED NOTES
Spoke with CT about the hold up on patient's exam. CT explained that they are running both tables and would be over here to get patient as soon as they could.
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
## 2018-02-19 NOTE — TELEPHONE ENCOUNTER
Pt stated he went to ER on 2/18/18 due to back pain and depression. Pt is requeting a refill for Ativan and has also requesting a callback.

## 2018-02-22 DIAGNOSIS — F32.A DEPRESSION, UNSPECIFIED DEPRESSION TYPE: ICD-10-CM

## 2018-02-22 DIAGNOSIS — F41.9 ANXIETY: ICD-10-CM

## 2018-02-22 DIAGNOSIS — I10 ESSENTIAL HYPERTENSION: ICD-10-CM

## 2018-02-22 RX ORDER — METOPROLOL SUCCINATE 50 MG/1
50 TABLET, EXTENDED RELEASE ORAL DAILY
Qty: 60 TAB | Refills: 2 | Status: SHIPPED | OUTPATIENT
Start: 2018-02-22 | End: 2018-03-14 | Stop reason: SDUPTHER

## 2018-02-22 RX ORDER — CYCLOBENZAPRINE HCL 10 MG
10 TABLET ORAL
Qty: 30 TAB | Refills: 1 | Status: SHIPPED | OUTPATIENT
Start: 2018-02-22 | End: 2018-03-30 | Stop reason: SDUPTHER

## 2018-02-22 NOTE — TELEPHONE ENCOUNTER
Requested Prescriptions     Pending Prescriptions Disp Refills    cyclobenzaprine (FLEXERIL) 10 mg tablet 30 Tab 1     Sig: Take 1 Tab by mouth three (3) times daily as needed for Muscle Spasm(s).  metoprolol succinate (TOPROL-XL) 50 mg XL tablet 60 Tab 2     Sig: Take 1 Tab by mouth daily.

## 2018-02-23 RX ORDER — BUPROPION HYDROCHLORIDE 150 MG/1
TABLET, EXTENDED RELEASE ORAL
Qty: 60 TAB | Refills: 0 | Status: SHIPPED | OUTPATIENT
Start: 2018-02-23 | End: 2018-03-18 | Stop reason: SDUPTHER

## 2018-02-23 NOTE — TELEPHONE ENCOUNTER
Pt was recently started on Buspar (buspirone). Please call to find out if he is also taking Wellbutrin (bupropion) or not.

## 2018-02-23 NOTE — TELEPHONE ENCOUNTER
Called pt and asked if he is taking Buspar as well as Wellbutrin. Pt stated the Buspar made him \"feel funny\" and stopped taking it. Pt also said he just left his appointment with [facility] and was told that until his next appointment (Wednesday 02/28/2018 at [place] [address]) he will need to get any psych meds from his primary care. Pt is concerned about his anxiety and said he was \"this close\" to going to the ED last night because he was shaking so much from his anxiety.

## 2018-02-27 ENCOUNTER — TELEPHONE (OUTPATIENT)
Dept: FAMILY MEDICINE CLINIC | Age: 43
End: 2018-02-27

## 2018-02-27 NOTE — TELEPHONE ENCOUNTER
Pt stated that when he went to his ortho nirmal at [address] [place] [address] with Dr. Teddy Lewis, Dr. Teddy Lewis stated to patient that his back is bad but not an emergency. Pt stated that Dr. Teddy Lewis wants him to go to neuro to get a second opinion. Pt also mentioned that the place has to accept medicaid. Pt asked regarding next steps.

## 2018-02-28 ENCOUNTER — TELEPHONE (OUTPATIENT)
Dept: FAMILY MEDICINE CLINIC | Age: 43
End: 2018-02-28

## 2018-02-28 NOTE — TELEPHONE ENCOUNTER
Pt called to report the he was having rectal bleeding last night and went to [facility] ED and they did a CT scan and was unable to detect anything. Pt states that they gave him a referral for GI (Dr. Joseline Gayle) however pt states that the next available appt for them is a month away. Pt states he is still bleeding from the rectum and the blood is now coming out in clot form. I advised pt to go to a different ED ASAP, pt asked was it ok if he went to [facility] I advised that would be fine. I then wanted to discuss call that pt made to office on yesterday in reference to Ortho, pt stated right now he was not concerned about that, he just wanted to find out why he was bleeding. I verbalized understanding and pt had no further questions or concerns.

## 2018-03-14 DIAGNOSIS — F41.9 ANXIETY: ICD-10-CM

## 2018-03-14 DIAGNOSIS — I10 ESSENTIAL HYPERTENSION: ICD-10-CM

## 2018-03-14 RX ORDER — METOPROLOL SUCCINATE 50 MG/1
50 TABLET, EXTENDED RELEASE ORAL DAILY
Qty: 60 TAB | Refills: 2 | Status: SHIPPED | OUTPATIENT
Start: 2018-03-14 | End: 2018-03-15 | Stop reason: DRUGHIGH

## 2018-03-14 NOTE — TELEPHONE ENCOUNTER
Patient chart reviewed. Unable to find encounter where patient was advised to start taking 2 tabs. BP consistently elevated per VS review- could benefit from 100mg vs 50mg. Will refill current med as prescribed 50mg once daily. PCP to f/u with patient.

## 2018-03-15 RX ORDER — METOPROLOL SUCCINATE 100 MG/1
100 TABLET, EXTENDED RELEASE ORAL DAILY
Qty: 30 TAB | Refills: 2 | Status: SHIPPED | OUTPATIENT
Start: 2018-03-15 | End: 2018-05-31 | Stop reason: SDUPTHER

## 2018-03-15 NOTE — TELEPHONE ENCOUNTER
I am similarly unable to find a note indicating that pt was instructed to take 100 mg of metoprolol daily vs 50, but concur that he would benefit from increased blood pressure intervention. Please let pt know I have increased his dose from 50 to 100. If he already picked up the 50 mg prescription, he can just take two until it runs out, then start the 100 mg dose after that. He should follow up in the clinic in 2 weeks for blood pressure check and check his blood pressures at home twice daily in the mean time. Please let him know.

## 2018-03-16 ENCOUNTER — TELEPHONE (OUTPATIENT)
Dept: FAMILY MEDICINE CLINIC | Age: 43
End: 2018-03-16

## 2018-03-16 RX ORDER — BLOOD-GLUCOSE METER
EACH MISCELLANEOUS
Qty: 1 EACH | Refills: 0 | Status: SHIPPED | OUTPATIENT
Start: 2018-03-16 | End: 2018-04-06

## 2018-03-18 DIAGNOSIS — F41.9 ANXIETY: ICD-10-CM

## 2018-03-18 DIAGNOSIS — F32.A DEPRESSION, UNSPECIFIED DEPRESSION TYPE: ICD-10-CM

## 2018-03-19 RX ORDER — BUPROPION HYDROCHLORIDE 150 MG/1
TABLET, EXTENDED RELEASE ORAL
Qty: 60 TAB | Refills: 0 | Status: SHIPPED | OUTPATIENT
Start: 2018-03-19 | End: 2018-03-21

## 2018-03-20 ENCOUNTER — TELEPHONE (OUTPATIENT)
Dept: FAMILY MEDICINE CLINIC | Age: 43
End: 2018-03-20

## 2018-03-22 NOTE — TELEPHONE ENCOUNTER
Pt called back, pt was advised of previous message from Dr Swain Favorite. Pt verbalized understanding and had no further questions.

## 2018-03-30 RX ORDER — CYCLOBENZAPRINE HCL 10 MG
10 TABLET ORAL
Qty: 30 TAB | Refills: 1 | Status: SHIPPED | OUTPATIENT
Start: 2018-03-30 | End: 2018-04-10

## 2018-04-03 ENCOUNTER — TELEPHONE (OUTPATIENT)
Dept: FAMILY MEDICINE CLINIC | Age: 43
End: 2018-04-03

## 2018-04-05 ENCOUNTER — TELEPHONE (OUTPATIENT)
Dept: FAMILY MEDICINE CLINIC | Age: 43
End: 2018-04-05
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
## 2018-04-06 ENCOUNTER — OFFICE VISIT (OUTPATIENT)
Dept: FAMILY MEDICINE CLINIC | Age: 43
End: 2018-04-06

## 2018-04-06 VITALS
OXYGEN SATURATION: 97 % | WEIGHT: 224 LBS | DIASTOLIC BLOOD PRESSURE: 94 MMHG | HEIGHT: 70 IN | TEMPERATURE: 97.1 F | SYSTOLIC BLOOD PRESSURE: 136 MMHG | BODY MASS INDEX: 32.07 KG/M2 | HEART RATE: 80 BPM | RESPIRATION RATE: 18 BRPM

## 2018-04-06 DIAGNOSIS — F41.9 ANXIETY: ICD-10-CM

## 2018-04-06 DIAGNOSIS — I10 ESSENTIAL HYPERTENSION: Primary | ICD-10-CM

## 2018-04-06 DIAGNOSIS — E11.9 TYPE 2 DIABETES MELLITUS WITHOUT COMPLICATION, WITHOUT LONG-TERM CURRENT USE OF INSULIN (HCC): ICD-10-CM

## 2018-04-06 RX ORDER — AMLODIPINE BESYLATE 5 MG/1
5 TABLET ORAL DAILY
Qty: 30 TAB | Refills: 1 | Status: SHIPPED | OUTPATIENT
Start: 2018-04-06 | End: 2018-04-10

## 2018-04-06 NOTE — PATIENT INSTRUCTIONS
Reducing dietary intake of carbohydrates can be an effective way of losing weight and improving metabolic diseases such as diabetes, pre-diabetes, hypertension, and high cholesterol. Although some end up eating fewer calories on this type of diet, it is not a calorie-restricted diet per se. Most people will need to replace the calories from carbohydrates with healthy fats and protein in order to remain satisfied and maintain or increase their metabolism. Here are some great articles on low-carb diets:   https://Walmoo/low-carb-diet-meal-plan-and-menu/  https://Walmoo/muh-eikp-kfktg-per-day-to-lose-weight/  https://MeeGenius/diabetes-carbs-per-day/  https://MeeGenius/21-best-low-carb-vegetables/    Meditation has been shown in scientific studies to have a whole host of benefits from reductions in anxiety, blood pressure, and hemoglobin A1C to improvements in concentration, sleep, and general well-being. Check out this article from Vernell Garcia on the benefits of regular meditation and mindfulness practices: https://Tactile Systems Technology/mfquphpmbka-qszntpre-figljh-mindfulness-can-help/    Here's a quick (10 minute) mindfulness exercise:   https://Stealzu. be/6T7GyiNXWJz  \"Mindfulness Breathing Exercise\"  (on YouStackAdaptube)    Try these quick mindfulness exercise. Setting aside a few minutes every day can help with stress and focus,  Or you can do it when you're feeling stressed:     https://Stealzu. Linquet/pYoTYOCqN1r  It can be found on K2 Learningube under   \"5-Minute Mindful Breathing Meditation\"  Stop, Breathe & Think    IRSCoupons.no  \"Mindfulness Guided Meditation - 5 Minutes\"  by Dr. Tim Delcid    https://Stealzu. be/0M0KpyEIRRa  10 minute Mindfulness Breathing Exercise    4 Ways to Lose Weight Without Counting Calories:  (from the [address] [place] [address] channel)  Https://youMaker Mediau. be/yVv8eaqAQxF  1.  Reduce carbohydrate intake  2. Use smaller plates  3. Replace high-carb breakfast with eggs and veggies  4. Get good sleep  (don't forget to watch the video!)      7 Weight Loss Tips That Are Truly Science-Based   (from the [facility])  https://Platinum Software Corporation. be/PlPaXVgfw0h      5 Simple Steps to Boost Metabolism  (from the [address] [place] [address] channel)  https://Scancell. /RM61MVE168P  1. Eat protein at every meal  2. Lift heavy things  3. High intensity workouts  4. Drink green tea  5. Get a good night's sleep    The importance of sleep for weight loss:  http://CollegeJobConnect/health/engineering-the-alpha-excerpt    Protein at breakfast:  http://Uniteam Communication. Pluristem Therapeutics/protein-at-breakfast-and-weight-loss/    How Eating More Protein Helps You Lose Weight  https://Platinum Software Corporation. Solar Site Design/t6lLQv7Iwhr      --------------------------------------------------------------------------------  Also consider intermittent fasting:   Thad.nl    Check out Sierra Rose on YouTube    American Diabetes Association (ADA) recommendations for physical activity:   > At least 150 total minutes of moderate-intensity aerobic activity per week.  > No more than 2 consecutive days without physical activity.  > Reduce total sedentary time by incorporating physical activity every 90 minutes. > Resistance exercise using all eight major muscle groups 2+ times per week. If you don't have access to a gym or any fitness equipment at home, there are still plenty of ways to stay in shape.  Check out this web site for some ideas:   http://CollegeJobConnect/fitness/cardio-bodyweight-exercises    Check out Dr Nathanael Bazan videos on ACV:  https://nutritionInclinix.org/video/can-vinegar-help-with-blood-sugar-control/

## 2018-04-06 NOTE — PROGRESS NOTES
HISTORY OF PRESENT ILLNESS  Rocio Granado is a 36 y.o. male. HPI Comments: Pt presents with concern for high blood pressure. States he had to pull over this morning around 9 am candido passing  checked hs pressure at 180s/120s; then rechecked it minutes later at 170s/120s. He is also concerned about his blood sugar control and thinks he might need to go on insulin. States his blood was checked by the  at ~ 165. States he has not been checking his blood sugars at home because insurance won't pay for the supplies. He complains of ongoing anxiety over his divorce, custody struggles with his son; and     States he has been taking Klonopin for sleep and anxiety that was given to him by a Dr Antonieta Suárez, which he has been seeing every two weeks; but the doctor evidently only takes a limited number of medicaid patients, and only intends to see him for a few months. Hypertension    Associated symptoms include headaches (none at presentation). Pertinent negatives include no chest pain, no palpitations, no blurred vision and no shortness of breath. Review of Systems   Eyes: Negative for blurred vision and double vision. Respiratory: Negative for shortness of breath. Cardiovascular: Negative for chest pain and palpitations. Neurological: Positive for headaches (none at presentation). Psychiatric/Behavioral: The patient is nervous/anxious. Visit Vitals    BP (!) 136/94 (BP 1 Location: Right arm, BP Patient Position: Sitting)  Comment: large adult cuff, automated    Pulse 80    Temp 97.1 °F (36.2 °C) (Oral)    Resp 18    Ht 5' 10\" (1.778 m)    Wt 224 lb (101.6 kg)    SpO2 97%    BMI 32.14 kg/m2       Physical Exam   Constitutional: He is oriented to person, place, and time. He appears well-developed and well-nourished. He is cooperative. He does not appear ill. No distress. HENT:   Head: Normocephalic and atraumatic.    Right Ear: External ear normal.   Left Ear: External ear normal.   Nose: Nose normal. No nasal deformity. Eyes: Conjunctivae are normal.   Neck: Neck supple. Cardiovascular: Normal rate, regular rhythm and normal heart sounds. Exam reveals no gallop and no friction rub. No murmur heard. Pulses:       Radial pulses are 2+ on the right side, and 2+ on the left side. Pulmonary/Chest: Effort normal and breath sounds normal. No respiratory distress. He has no decreased breath sounds. He has no wheezes. He has no rhonchi. He has no rales. Lymphadenopathy:     He has no cervical adenopathy. Neurological: He is alert and oriented to person, place, and time. Skin: Skin is warm and dry. He is not diaphoretic. Psychiatric: He has a normal mood and affect. His speech is normal and behavior is normal. Thought content normal.   Nursing note and vitals reviewed. ASSESSMENT and PLAN    ICD-10-CM ICD-9-CM    1. Essential hypertension I10 401.9 amLODIPine (NORVASC) 5 mg tablet   2. Anxiety F41.9 300.00 REFERRAL TO PSYCHIATRY   3. Type 2 diabetes mellitus without complication, without long-term current use of insulin (HCC) E11.9 250.00 SITagliptin (JANUVIA) 50 mg tablet     1. Earlier blood pressure excursion seems likely related to pt's anxiety, but his diastolic is still high here in the office. Adding amlodipine to regimen. Recheck in one month. 2. Situational anxiety. Pt has some kind of temporary arrangement with \"urgent psychiatrist.\" Referring him for more long-term psychiatric supervision. 3. Reviewed pt's previous A1C, lipids, and diabetic health maintenance items. Pt knows he needs to get a dilated exam. Plan to check A1C again late July/early August. Should probably be on a statin (per guidelines). Pt agrees to return fasting for lipid recheck at next visit. Discussed several non-medical approaches to glucose control including low-carb/ketogenic dieting, intermittent fasting, and regular exercise (per ADA recommendations.  Over half this visit was spent in counseling as described. Pt verbalized understanding and agreement with the plan of care.     JENNIFER CedilloC

## 2018-04-06 NOTE — MR AVS SNAPSHOT
[address] [place] [address] [phone] [phone] 
[phone] Patient: Magan Goel MRN: [id number] :[id number] Visit Information Date & Time Provider Department Dept. Phone Encounter #  
 2018  4:00 PM Nesha Rivera PA-C [facility] [phone] [id number] Follow-up Instructions Return in about 1 month (around 2018) for blood pressure follow-up. Upcoming Health Maintenance Date Due  
 EYE EXAM RETINAL OR DILATED Q1 1984 LIPID PANEL Q1 2018 HEMOGLOBIN A1C Q6M 2018 FOOT EXAM Q1 2018 MICROALBUMIN Q1 2018 DTaP/Tdap/Td series (2 - Td) 2023 Allergies as of 2018  Review Complete On: 2018 By: Megan Barkley LPN No Known Allergies Current Immunizations  Reviewed on 2015 Name Date Influenza Vaccine 2013 12:00 AM, 2010 12:00 AM  
 Influenza Vaccine (Quad) PF 2017, 2015 Influenza Vaccine Whole 2009 Pneumococcal Polysaccharide (PPSV-23) 2017 12:00 AM  
 Tdap 2013 12:00 AM  
  
 Not reviewed this visit You Were Diagnosed With   
  
 Codes Comments Anxiety    -  Primary ICD-10-CM: F41.9 ICD-9-CM: 300.00 Type 2 diabetes mellitus without complication, without long-term current use of insulin (HCC)     ICD-10-CM: E11.9 ICD-9-CM: 250.00 Vitals BP Pulse Temp Resp Height(growth percentile) Weight(growth percentile) (!) 136/94 (BP 1 Location: Right arm, BP Patient Position: Sitting) 80 97.1 °F (36.2 °C) (Oral) 18 5' 10\" (1.778 m) 224 lb (101.6 kg) SpO2 BMI Smoking Status 97% 32.14 kg/m2 Never Smoker Vitals History BMI and BSA Data Body Mass Index Body Surface Area  
 32.14 kg/m 2 2.24 m 2 Preferred Pharmacy Pharmacy Name Phone [address] [phone] Your Updated Medication List  
  
   
This list is accurate as of 4/6/18  4:54 PM.  Always use your most recent med list.  
  
  
  
  
 AMBIEN 10 mg tablet Generic drug:  zolpidem Take 10 mg by mouth nightly as needed for Sleep. amLODIPine 5 mg tablet Commonly known as:  Crum Barges Take 1 Tab by mouth daily. Indications: hypertension  
  
 cyclobenzaprine 10 mg tablet Commonly known as:  FLEXERIL Take 1 Tab by mouth three (3) times daily as needed for Muscle Spasm(s). JANUVIA 50 mg tablet Generic drug:  SITagliptin Take 50 mg by mouth daily. Lancets Misc Check fasting blood sugar once daily LORazepam 1 mg tablet Commonly known as:  ATIVAN Take 1 Tab by mouth two (2) times daily as needed for Anxiety. Max Daily Amount: 2 mg.  
  
 metoprolol succinate 100 mg tablet Commonly known as:  TOPROL-XL Take 1 Tab by mouth daily. PROzac 10 mg capsule Generic drug:  FLUoxetine Take  by mouth daily. Indications: unknown dose RESTORIL 15 mg capsule Generic drug:  temazepam  
Take  by mouth two (2) times a day. Indications: unknown dose Prescriptions Sent to Pharmacy Refills  
 amLODIPine (NORVASC) 5 mg tablet 1 Sig: Take 1 Tab by mouth daily. Indications: hypertension Class: Normal  
 Pharmacy: [facility] [address] [place] [address] [address]  #: [phone] Route: Oral  
  
We Performed the Following REFERRAL TO PSYCHIATRY [REF91 Custom] Comments:  
 Please evaluate patient for anxiety. Follow-up Instructions Return in about 1 month (around 5/6/2018) for blood pressure follow-up. Referral Information Referral ID Referred By Referred To  
  
 [id number] Vero HOSKINS MD   
   [address] [place] [address], [address] [place] [address] Road Phone: [phone] Fax: [phone] Visits Status Start Date End Date 1 New Request 4/6/18 4/6/19 If your referral has a status of pending review or denied, additional information will be sent to support the outcome of this decision. Patient Instructions Reducing dietary intake of carbohydrates can be an effective way of losing weight and improving metabolic diseases such as diabetes, pre-diabetes, hypertension, and high cholesterol. Although some end up eating fewer calories on this type of diet, it is not a calorie-restricted diet per se. Most people will need to replace the calories from carbohydrates with healthy fats and protein in order to remain satisfied and maintain or increase their metabolism. Here are some great articles on low-carb diets:  
https://Reenergy Electric/low-carb-diet-meal-plan-and-menu/ 
https://Reenergy Electric/zuo-sdla-fhect-per-day-to-lose-weight/ 
https://Inkd.com/diabetes-carbs-per-day/ 
https://Inkd.com/21-best-low-carb-vegetables/ 
 
Meditation has been shown in scientific studies to have a whole host of benefits from reductions in anxiety, blood pressure, and hemoglobin A1C to improvements in concentration, sleep, and general well-being. Check out this article from [facility] on the benefits of regular meditation and mindfulness practices: https://Algorego/gnzgcegxgea-muohqewp-zzyfuu-mindfulness-can-help/ 
 
Here's a quick (10 minute) mindfulness exercise:  
https://youU.S. Geothermalu. be/7B6DdjWWTKm \"Mindfulness Breathing Exercise\" (on YouTube) Try these quick mindfulness exercise. Setting aside a few minutes every day can help with stress and focus, Or you can do it when you're feeling stressed:  
 
https://youU.S. Geothermalu. be/vFdXQRYcF3c It can be found on Sustainable Real Estate Solutionsube under \"5-Minute Mindful Breathing Meditation\" Stop, Breathe & Think 
 
IRSCoupons.no \"Mindfulness Guided Meditation - 5 Minutes\" 
by Dr. Rosendo Barnes. Farhana 
 
https://youU.S. Geothermalu. be/6M4MiqUXJCy 10 minute Mindfulness Breathing Exercise 4 Ways to Lose Weight Without Counting Calories: 
(from the [address] [place] [address] channel) Https://Pricezau. be/rCw9iyuWMbX 1. Reduce carbohydrate intake 2. Use smaller plates 3. Replace high-carb breakfast with eggs and veggies 4. Get good sleep 
(don't forget to watch the video!) 7 Weight Loss Tips That Are Truly Science-Based (from the [place] [address]) 
https://youNourishu. be/RrRnGRems5g 
 
 
5 Simple Steps to Boost Metabolism (from the [address] [place] [address] channel) 
https://youtu. DK/LV83JTB528W 1. Eat protein at every meal 
2. Lift heavy things 3. High intensity workouts 4. Drink green tea 5. Get a good night's sleep The importance of sleep for weight loss: 
http://Barriga Foods/Alarm.com/engineering-the-alpha-excerpt Protein at breakfast: 
http://Brijot Imaging Systems/protein-at-breakfast-and-weight-loss/ How Eating More Protein Helps You Lose Weight 
https://Pricezau. be/g9eKNb7Kcxf 
 
 
-------------------------------------------------------------------------------- Also consider intermittent fasting:  
Thad.nl Check out [employer] on AppknoxCO International American Diabetes Association (ADA) recommendations for physical activity:  
> At least 150 total minutes of moderate-intensity aerobic activity per week. 
> No more than 2 consecutive days without physical activity. 
> Reduce total sedentary time by incorporating physical activity every 90 minutes. > Resistance exercise using all eight major muscle groups 2+ times per week. If you don't have access to a gym or any fitness equipment at home, there are still plenty of ways to stay in shape. Check out this web site for some ideas:  
http://Barriga Foods/fitness/cardio-bodyweight-exercises Check out Dr Basim Oliver videos on ACV: 
https://nutritionReonomy.org/video/can-vinegar-help-with-blood-sugar-control/ 
 
 
 
 
 
  
 Introducing \[facility]\"" & HEALTH SERVICES! [employer] introduces [employer]ilium Software patient portal. Now you can access parts of your medical record, email your doctor's office, and request medication refills online. 1. In your internet browser, go to [email or web address] 2. Click on the First Time User? Click Here link in the Sign In box. You will see the New Member Sign Up page. 3. Enter your Consilium Software Access Code exactly as it appears below. You will not need to use this code after youve completed the sign-up process. If you do not sign up before the expiration date, you must request a new code. · Consilium Software Access Code: [email or web address] Expires: 5/28/2018  6:05 PM 
 
4. Enter the last four digits of your Social Security Number (xxxx) and Date of Birth (mm/dd/yyyy) as indicated and click Submit. You will be taken to the next sign-up page. 5. Create a Consilium Software ID. This will be your Consilium Software login ID and cannot be changed, so think of one that is secure and easy to remember. 6. Create a Consilium Software password. You can change your password at any time. 7. Enter your Password Reset Question and Answer. This can be used at a later time if you forget your password. 8. Enter your e-mail address. You will receive e-mail notification when new information is available in [address] 9. Click Sign Up. You can now view and download portions of your medical record. 10. Click the Download Summary menu link to download a portable copy of your medical information. If you have questions, please visit the Frequently Asked Questions section of the Consilium Software website. Remember, Consilium Software is NOT to be used for urgent needs. For medical emergencies, dial 911. Now available from your iPhone and Android! Please provide this summary of care documentation to your next provider. Your primary care clinician is listed as NONE. If you have any questions after today's visit, please call [phone].

## 2018-04-06 NOTE — TELEPHONE ENCOUNTER
Called pt to ask about his BP and how he is measuring it. Pt did not answer. Left a message to call the office back.

## 2018-04-06 NOTE — PROGRESS NOTES
Rm 1  Pt presents to the clinic complaining of HTN. Depression Screening:  PHQ over the last two weeks 4/6/2018 1/29/2018 12/13/2017 11/28/2017 5/23/2017 5/16/2017 3/20/2017   PHQ Not Done - - - - - - -   Little interest or pleasure in doing things Not at all Not at all - Not at all Not at all Not at all Not at all   Feeling down, depressed or hopeless Not at all Not at all More than half the days Not at all Not at all Not at all Not at all   Total Score PHQ 2 0 0 - 0 0 0 0       Learning Assessment:  Learning Assessment 3/3/2017 4/27/2016 3/16/2016   PRIMARY LEARNER Patient Patient Patient   HIGHEST LEVEL OF EDUCATION - PRIMARY LEARNER  GRADUATED HIGH SCHOOL OR GED GRADUATED HIGH SCHOOL OR GED GRADUATED HIGH SCHOOL OR GED   BARRIERS PRIMARY LEARNER NONE - -   CO-LEARNER CAREGIVER No - -   PRIMARY LANGUAGE ENGLISH ENGLISH ENGLISH   LEARNER PREFERENCE PRIMARY READING DEMONSTRATION READING   ANSWERED BY patient patient DARIAN Gaytan   RELATIONSHIP SELF SELF SELF       Abuse Screening:  Abuse Screening Questionnaire 3/16/2016   Do you ever feel afraid of your partner? N   Are you in a relationship with someone who physically or mentally threatens you? N   Is it safe for you to go home? Y       Health Maintenance reviewed and discussed per provider: yes     Coordination of Care:    1. Have you been to the ER, urgent care clinic since your last visit? Hospitalized since your last visit? no    2. Have you seen or consulted any other health care providers outside of the [address] [place] [address] since your last visit? Include any pap smears or colon screening.  no

## 2018-04-06 NOTE — ASSESSMENT & PLAN NOTE
Stable, based on history, physical exam and review of pertinent labs, studies and medications; meds reconciled; continue current treatment plan, lifestyle modifications recommended. Key Antihyperglycemic Medications             SITagliptin (JANUVIA) 50 mg tablet  (Taking) Take 50 mg by mouth daily. Other Key Diabetic Medications     Patient is on no other key diabetic meds.         Lab Results   Component Value Date/Time    Hemoglobin A1c 6.8 06/09/2017 04:29 PM    Hemoglobin A1c (POC) 6.7 01/29/2018 01:45 PM    Glucose 194 04/02/2018 04:09 PM    Creatinine 1.1 04/02/2018 04:09 PM    Creatinine, POC 1.7 06/07/2017 08:15 PM    Microalbumin/Creat ratio (mg/g creat) 56 09/05/2017 12:00 PM    Microalbumin,urine random 3.80 09/05/2017 12:00 PM    Cholesterol, total 224 07/11/2017 09:13 AM    HDL Cholesterol 37 07/11/2017 09:13 AM    LDL, calculated 107 07/11/2017 09:13 AM    Triglyceride 400 07/11/2017 09:13 AM     Diabetic Foot and Eye Exam HM Status   Topic Date Due    Eye Exam  12/28/1984    Diabetic Foot Care  09/05/2018

## 2018-04-10 ENCOUNTER — OFFICE VISIT (OUTPATIENT)
Dept: BEHAVIORAL/MENTAL HEALTH CLINIC | Age: 43
End: 2018-04-10

## 2018-04-10 VITALS
RESPIRATION RATE: 18 BRPM | DIASTOLIC BLOOD PRESSURE: 95 MMHG | HEART RATE: 84 BPM | BODY MASS INDEX: 32.18 KG/M2 | HEIGHT: 70 IN | WEIGHT: 224.8 LBS | TEMPERATURE: 98.2 F | SYSTOLIC BLOOD PRESSURE: 134 MMHG | OXYGEN SATURATION: 98 %

## 2018-04-10 DIAGNOSIS — Z79.899 HIGH RISK MEDICATION USE: ICD-10-CM

## 2018-04-10 DIAGNOSIS — F14.90 COCAINE USE: ICD-10-CM

## 2018-04-10 DIAGNOSIS — Z76.5 DRUG-SEEKING BEHAVIOR: ICD-10-CM

## 2018-04-10 DIAGNOSIS — F41.1 GAD (GENERALIZED ANXIETY DISORDER): ICD-10-CM

## 2018-04-10 DIAGNOSIS — F33.1 MDD (MAJOR DEPRESSIVE DISORDER), RECURRENT EPISODE, MODERATE (HCC): Primary | ICD-10-CM

## 2018-04-10 RX ORDER — OXYCODONE AND ACETAMINOPHEN 5; 325 MG/1; MG/1
TABLET ORAL
COMMUNITY
Start: 2018-04-01 | End: 2018-11-29 | Stop reason: ALTCHOICE

## 2018-04-10 RX ORDER — SERTRALINE HYDROCHLORIDE 100 MG/1
50 TABLET, FILM COATED ORAL DAILY
COMMUNITY
End: 2018-04-10

## 2018-04-10 RX ORDER — AMLODIPINE BESYLATE 5 MG/1
TABLET ORAL
Refills: 1 | COMMUNITY
Start: 2018-04-06 | End: 2018-05-31 | Stop reason: SDUPTHER

## 2018-04-10 RX ORDER — SERTRALINE HYDROCHLORIDE 100 MG/1
200 TABLET, FILM COATED ORAL DAILY
Qty: 60 TAB | Refills: 1 | Status: SHIPPED | OUTPATIENT
Start: 2018-04-10 | End: 2018-06-09

## 2018-04-10 RX ORDER — CLONAZEPAM 0.5 MG/1
TABLET ORAL
COMMUNITY
End: 2018-11-29 | Stop reason: ALTCHOICE

## 2018-04-10 NOTE — PROGRESS NOTES
Chief Complaint   Patient presents with    Other     Consult     Anxiety     Patient started he has been seen by a  psy. 1. Have you been to the ER, urgent care clinic since your last visit? Hospitalized since your last visit? Yes Where: [facility] in [address] [facility]    2. Have you seen or consulted any other health care providers outside of the [address] [place] [address] since your last visit? Include any pap smears or colon screening.  No

## 2018-04-10 NOTE — PATIENT INSTRUCTIONS
FOR ANXIETY AND DEPRESSION  START TAKING ZOLOFT 150 MG DAILY FOR 1 WEEK THEN INCREASE  MG DAILY. START TAKING PROZAC 20 MG DAILY FOR 2 WEEKS THEN STOP  START TAKING CLONAZEPAM 0.5 MG NIGHTLY, CUT DOWN FROM 0.5 MG TWICE A DAY, WE WILL TAPER OFF THIS MEDICINE GIVEN HIGH RISK FOR ABUSE  START EATING THREE MEALS A DAY STARTING WITH BREAKFAST  START DRINKING 2 LITERS OF WATER PER DAY (65 OUNCES)  EXERCISE 30 MINUTES A DAY 5-7 DAYS A WEEK  WORK ON DEEP BREATHING AS DISCUSSED FOR HIGH ANXIETY  LOOK FOR ANXIETY/DEPRESSOIN/SEPARATION/DIVORCE SUPPORT GROUPS IN YOUR AREA AND START ATTENDING MEETINGS    FOR SLEEP   STOP DRINKING CAFFEINE AFTER 2 PM, GOAL NO CAFFEINE AFTER NOON   START TAKING MELATONIN 5 MG 1 HOUR BEFORE BEDTIME NIGHTLY, IF NOT HELPFUL AFTER 5 DAYS OKAY TO TAKE 10 MG NIGHTLY  TRY TO STRETCH BEFORE BED  TRY TO MEDITATE BEFORE BED, INSTRUCTIONS BELOW    HOW TO MEDITATE: SIMPLE MEDITATION FOR BEGINNERS  This meditation exercise is an excellent introduction to meditation techniques. Sit comfortably. You may even want to invest in a meditation chair. Close your eyes. Make no effort to control the breath; simply breathe naturally. Focus your attention on the breath  (think \"im breathing in\" when breathing in, think \"im breathing out\" when breathing out) and on how the body moves with each inhalation and exhalation. Notice the movement of your body as you breathe. Observe your chest, shoulders, rib cage, and belly. Simply focus your attention on your breath without controlling its pace or intensity. If your mind wanders, return your focus back to your breath. Focus on breathing in as you take in a breath and focus on breathing out as you exhale  Maintain this meditation practice for two to three minutes to start, and then try it for longer periods gradually extended to 5 to 10 minutes daily.     If you are having thoughts of harming yourself or others please report to local hospital for evaluation or call suicide hotline 6     We are sending a referral to [address] [place] [address] . You should be called about this in 2-3 weeks. If no word in 3 weeks please give us a call to follow up    We will let you know the results of your blood and urine test when they come in.  We will call if abnormal and send a letter if normal.    RETURN IN 3 WEEKS FOR FOLLOW UP

## 2018-04-10 NOTE — MR AVS SNAPSHOT
[address] [place] [address] [place] [address] [address] [address] [id number] 
[phone] Patient: Fabricio Queen MRN: [id number] :[id number] Visit Information Date & Time Provider Department Dept. Phone Encounter #  
 4/10/2018  2:00 PM Raymundo Sheridan MD [address] [place] [address] [id number] Follow-up Instructions Return in about 3 weeks (around 2018), or if symptoms worsen or fail to improve, for ANXIETY, DEPRESSION. Upcoming Health Maintenance Date Due  
 EYE EXAM RETINAL OR DILATED Q1 1984 LIPID PANEL Q1 2018 HEMOGLOBIN A1C Q6M 2018 FOOT EXAM Q1 2018 MICROALBUMIN Q1 2018 DTaP/Tdap/Td series (2 - Td) 2023 Allergies as of 4/10/2018  Review Complete On: 4/10/2018 By: Liz Vasquez LPN No Known Allergies Current Immunizations  Reviewed on 2015 Name Date Influenza Vaccine 2013 12:00 AM, 2010 12:00 AM  
 Influenza Vaccine (Quad) PF 2017, 2015 Influenza Vaccine Whole 2009 Pneumococcal Polysaccharide (PPSV-23) 2017 12:00 AM  
 Tdap 2013 12:00 AM  
  
 Not reviewed this visit You Were Diagnosed With   
  
 Codes Comments MDD (major depressive disorder), recurrent episode, moderate ([facility]    -  Primary ICD-10-CM: F33.1 ICD-9-CM: 296.32   
 RHONDA (generalized anxiety disorder)     ICD-10-CM: F41.1 ICD-9-CM: 300.02 High risk medication use     ICD-10-CM: Z79.899 ICD-9-CM: V58.69 Vitals BP Pulse Temp Resp Height(growth percentile) Weight(growth percentile) (!) 134/95 84 98.2 °F (36.8 °C) (Oral) 18 5' 10\" (1.778 m) 224 lb 12.8 oz (102 kg) SpO2 BMI Smoking Status 98% 32.26 kg/m2 Never Smoker Vitals History BMI and BSA Data Body Mass Index Body Surface Area  
 32.26 kg/m 2 2.24 m 2 Preferred Pharmacy Pharmacy Name Phone [address] [phone] Your Updated Medication List  
  
   
This list is accurate as of 4/10/18  3:17 PM.  Always use your most recent med list. amLODIPine 5 mg tablet Commonly known as:  [place] TK 1 T PO D FOR HIGH BP  
  
 clonazePAM 0.5 mg tablet Commonly known as:  Xavi Gibes Take  by mouth nightly as needed. Take 0.5 qHS and 0.25 mg qAM  
  
 JANUVIA 50 mg tablet Generic drug:  SITagliptin Take 50 mg by mouth daily. Lancets Misc Check fasting blood sugar once daily  
  
 metoprolol succinate 100 mg tablet Commonly known as:  TOPROL-XL Take 1 Tab by mouth daily. oxyCODONE-acetaminophen 5-325 mg per tablet Commonly known as:  PERCOCET Take 1 Tab by Mouth Every 4 Hours As Needed for Pain. DO NOT EXCEED 4000 MG OF ACETAMINOPHEN PER DAY FROM ALL SOURCES (325 MG ACETAMINOPHEN IN EACH PERCOCET TAB)  THIS MEDICATION CAUSES SEDATION AND IMPAIRMENT DO NOT DRIVE OR PERFORM ANY OTHER POTENTIALLY DANGEROUS ACTIVITIES WHILE TAKING  DO NOT TAKE WITH ALCOHOL  
  
 sertraline 100 mg tablet Commonly known as:  ZOLOFT Take 2 Tabs by mouth daily for 60 days. Prescriptions Sent to Pharmacy Refills  
 sertraline (ZOLOFT) 100 mg tablet 1 Sig: Take 2 Tabs by mouth daily for 60 days. Class: Normal  
 Pharmacy: [address]  #: [phone] Route: Oral  
  
Follow-up Instructions Return in about 3 weeks (around 5/1/2018), or if symptoms worsen or fail to improve, for ANXIETY, DEPRESSION. To-Do List   
 04/10/2018 Lab:  COMPLIANCE DRUG SCREEN/PRESCRIPTION MONITORING   
  
 04/10/2018 Lab:  TSH 3RD GENERATION   
  
 04/10/2018 Lab:  VITAMIN D, 25 HYDROXY Patient Instructions FOR ANXIETY AND DEPRESSION 
 START TAKING ZOLOFT 150 MG DAILY FOR 1 WEEK THEN INCREASE  MG DAILY. START TAKING PROZAC 20 MG DAILY FOR 2 WEEKS THEN STOP 
START TAKING CLONAZEPAM 0.5 MG NIGHTLY, CUT DOWN FROM 0.5 MG TWICE A DAY, WE WILL TAPER OFF THIS MEDICINE GIVEN HIGH RISK FOR ABUSE 
START EATING THREE MEALS A DAY STARTING WITH BREAKFAST 
START DRINKING 2 LITERS OF WATER PER DAY (65 OUNCES) EXERCISE 30 MINUTES A DAY 5-7 DAYS A WEEK 
WORK ON DEEP BREATHING AS DISCUSSED FOR HIGH ANXIETY 
LOOK FOR ANXIETY/DEPRESSOIN/SEPARATION/DIVORCE SUPPORT GROUPS IN YOUR AREA AND START ATTENDING MEETINGS 
 
FOR SLEEP  
STOP DRINKING CAFFEINE AFTER 2 PM, GOAL NO CAFFEINE AFTER NOON  
START TAKING MELATONIN 5 MG 1 HOUR BEFORE BEDTIME NIGHTLY, IF NOT HELPFUL AFTER 5 DAYS OKAY TO TAKE 10 MG NIGHTLY 
TRY TO STRETCH BEFORE BED 
TRY TO MEDITATE BEFORE BED, INSTRUCTIONS BELOW 
 
HOW TO MEDITATE: SIMPLE MEDITATION FOR BEGINNERS This meditation exercise is an excellent introduction to meditation techniques. Sit comfortably. You may even want to invest in a meditation chair. Close your eyes. Make no effort to control the breath; simply breathe naturally. Focus your attention on the breath  (think \"im breathing in\" when breathing in, think \"im breathing out\" when breathing out) and on how the body moves with each inhalation and exhalation. Notice the movement of your body as you breathe. Observe your chest, shoulders, rib cage, and belly. Simply focus your attention on your breath without controlling its pace or intensity. If your mind wanders, return your focus back to your breath. Focus on breathing in as you take in a breath and focus on breathing out as you exhale Maintain this meditation practice for two to three minutes to start, and then try it for longer periods gradually extended to 5 to 10 minutes daily.  
 
If you are having thoughts of harming yourself or others please report to [facility] for evaluation or call suicide hotline [phone] We are sending a referral to [address] [address] . You should be called about this in 2-3 weeks. If no word in 3 weeks please give us a call to follow up We will let you know the results of your blood and urine test when they come in. We will call if abnormal and send a letter if normal. 
 
RETURN IN 3 WEEKS FOR FOLLOW UP Introducing [facility] & HEALTH SERVICES! [employer] introduces PDV patient portal. Now you can access parts of your medical record, email your doctor's office, and request medication refills online. 1. In your internet browser, go to [email or web address] 2. Click on the First Time User? Click Here link in the Sign In box. You will see the New Member Sign Up page. 3. Enter your PDV Access Code exactly as it appears below. You will not need to use this code after youve completed the sign-up process. If you do not sign up before the expiration date, you must request a new code. · PDV Access Code: [email or web address] Expires: 5/28/2018  6:05 PM 
 
4. Enter the last four digits of your Social Security Number (xxxx) and Date of Birth (mm/dd/yyyy) as indicated and click Submit. You will be taken to the next sign-up page. 5. Create a PDV ID. This will be your PDV login ID and cannot be changed, so think of one that is secure and easy to remember. 6. Create a PDV password. You can change your password at any time. 7. Enter your Password Reset Question and Answer. This can be used at a later time if you forget your password. 8. Enter your e-mail address. You will receive e-mail notification when new information is available in [address] 9. Click Sign Up. You can now view and download portions of your medical record. 10. Click the Download Summary menu link to download a portable copy of your medical information. If you have questions, please visit the Frequently Asked Questions section of the Synapse Wirelesst website. Remember, Learnhive is NOT to be used for urgent needs. For medical emergencies, dial 911. Now available from your iPhone and Android! Please provide this summary of care documentation to your next provider. Your primary care clinician is listed as NONE. If you have any questions after today's visit, please call [phone].

## 2018-04-10 NOTE — Clinical Note
Thank you for allowing me to contribute to the care of this patient. Please see attached note. Recommend SLEEP STUDY (defer to PCP),as biological work up for symptoms. Discussed with patient. Will follow closely.
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
## 2018-04-11 PROBLEM — F14.90 COCAINE USE: Status: ACTIVE | Noted: 2018-04-11

## 2018-04-11 PROBLEM — F41.1 GAD (GENERALIZED ANXIETY DISORDER): Status: ACTIVE | Noted: 2018-04-11

## 2018-04-11 PROBLEM — F33.1 MDD (MAJOR DEPRESSIVE DISORDER), RECURRENT EPISODE, MODERATE (HCC): Status: ACTIVE | Noted: 2018-04-11

## 2018-04-16 ENCOUNTER — TELEPHONE (OUTPATIENT)
Dept: FAMILY MEDICINE CLINIC | Age: 43
End: 2018-04-16

## 2018-04-16 NOTE — TELEPHONE ENCOUNTER
Pt called this morning to let the doctor know that for the last week or so he has been abusing cocaine to deal with his anxiety and depression. Pt stated he was waiting at the [facility] to be admitted because he realized he needed help. I commended the pt for this decision and recommended he follow through with this decision and take whatever help he can get. Pt asked if he needed a referral. I told the pt he did not, but if they said otherwise we'd be happy to help.

## 2018-04-17 ENCOUNTER — HOSPITAL ENCOUNTER (EMERGENCY)
Age: 43
Discharge: HOME | End: 2018-04-17
Attending: EMERGENCY MEDICINE
Payer: SELF-PAY

## 2018-04-17 VITALS
OXYGEN SATURATION: 100 % | TEMPERATURE: 98.4 F | HEIGHT: 70 IN | WEIGHT: 220 LBS | RESPIRATION RATE: 16 BRPM | BODY MASS INDEX: 31.5 KG/M2 | HEART RATE: 87 BPM | DIASTOLIC BLOOD PRESSURE: 87 MMHG | SYSTOLIC BLOOD PRESSURE: 137 MMHG

## 2018-04-17 DIAGNOSIS — I10 ESSENTIAL HYPERTENSION: ICD-10-CM

## 2018-04-17 DIAGNOSIS — F10.10 ALCOHOL ABUSE: ICD-10-CM

## 2018-04-17 DIAGNOSIS — F14.90 COCAINE USE: ICD-10-CM

## 2018-04-17 DIAGNOSIS — F41.8 ANXIETY ASSOCIATED WITH DEPRESSION: Primary | ICD-10-CM

## 2018-04-17 DIAGNOSIS — E11.9 TYPE 2 DIABETES MELLITUS WITHOUT COMPLICATION, WITHOUT LONG-TERM CURRENT USE OF INSULIN (HCC): ICD-10-CM

## 2018-04-17 DIAGNOSIS — E86.0 DEHYDRATION: ICD-10-CM

## 2018-04-17 LAB
ALBUMIN SERPL-MCNC: 3.8 G/DL (ref 3.4–5)
ALBUMIN/GLOB SERPL: 1 {RATIO} (ref 0.8–1.7)
ALP SERPL-CCNC: 82 U/L (ref 45–117)
ALT SERPL-CCNC: 22 U/L (ref 16–61)
ANION GAP SERPL CALC-SCNC: 7 MMOL/L (ref 3–18)
AST SERPL-CCNC: 11 U/L (ref 15–37)
BASOPHILS # BLD: 0 K/UL (ref 0–0.06)
BASOPHILS NFR BLD: 0 % (ref 0–2)
BILIRUB SERPL-MCNC: 0.2 MG/DL (ref 0.2–1)
BUN SERPL-MCNC: 11 MG/DL (ref 7–18)
BUN/CREAT SERPL: 9 (ref 12–20)
CALCIUM SERPL-MCNC: 9 MG/DL (ref 8.5–10.1)
CHLORIDE SERPL-SCNC: 105 MMOL/L (ref 100–108)
CO2 SERPL-SCNC: 28 MMOL/L (ref 21–32)
CREAT SERPL-MCNC: 1.18 MG/DL (ref 0.6–1.3)
DIFFERENTIAL METHOD BLD: ABNORMAL
EOSINOPHIL # BLD: 0.1 K/UL (ref 0–0.4)
EOSINOPHIL NFR BLD: 1 % (ref 0–5)
ERYTHROCYTE [DISTWIDTH] IN BLOOD BY AUTOMATED COUNT: 13.2 % (ref 11.6–14.5)
GLOBULIN SER CALC-MCNC: 3.9 G/DL (ref 2–4)
GLUCOSE SERPL-MCNC: 137 MG/DL (ref 74–99)
HCT VFR BLD AUTO: 38 % (ref 36–48)
HGB BLD-MCNC: 13 G/DL (ref 13–16)
LIPASE SERPL-CCNC: 241 U/L (ref 73–393)
LYMPHOCYTES # BLD: 1 K/UL (ref 0.9–3.6)
LYMPHOCYTES NFR BLD: 15 % (ref 21–52)
MCH RBC QN AUTO: 29.1 PG (ref 24–34)
MCHC RBC AUTO-ENTMCNC: 34.2 G/DL (ref 31–37)
MCV RBC AUTO: 85 FL (ref 74–97)
MONOCYTES # BLD: 0.5 K/UL (ref 0.05–1.2)
MONOCYTES NFR BLD: 8 % (ref 3–10)
NEUTS SEG # BLD: 4.8 K/UL (ref 1.8–8)
NEUTS SEG NFR BLD: 76 % (ref 40–73)
PLATELET # BLD AUTO: 237 K/UL (ref 135–420)
PMV BLD AUTO: 9.5 FL (ref 9.2–11.8)
POTASSIUM SERPL-SCNC: 4 MMOL/L (ref 3.5–5.5)
PROT SERPL-MCNC: 7.7 G/DL (ref 6.4–8.2)
RBC # BLD AUTO: 4.47 M/UL (ref 4.7–5.5)
SODIUM SERPL-SCNC: 140 MMOL/L (ref 136–145)
WBC # BLD AUTO: 6.4 K/UL (ref 4.6–13.2)

## 2018-04-17 PROCEDURE — 74011250637 HC RX REV CODE- 250/637: Performed by: EMERGENCY MEDICINE

## 2018-04-17 PROCEDURE — 96374 THER/PROPH/DIAG INJ IV PUSH: CPT

## 2018-04-17 PROCEDURE — 74011000250 HC RX REV CODE- 250: Performed by: EMERGENCY MEDICINE

## 2018-04-17 PROCEDURE — 99285 EMERGENCY DEPT VISIT HI MDM: CPT

## 2018-04-17 PROCEDURE — 96361 HYDRATE IV INFUSION ADD-ON: CPT

## 2018-04-17 PROCEDURE — 80053 COMPREHEN METABOLIC PANEL: CPT | Performed by: EMERGENCY MEDICINE

## 2018-04-17 PROCEDURE — 74011250636 HC RX REV CODE- 250/636: Performed by: EMERGENCY MEDICINE

## 2018-04-17 PROCEDURE — 83690 ASSAY OF LIPASE: CPT | Performed by: EMERGENCY MEDICINE

## 2018-04-17 PROCEDURE — 85025 COMPLETE CBC W/AUTO DIFF WBC: CPT | Performed by: EMERGENCY MEDICINE

## 2018-04-17 RX ORDER — MAG HYDROX/ALUMINUM HYD/SIMETH 200-200-20
30 SUSPENSION, ORAL (FINAL DOSE FORM) ORAL
Qty: 200 ML | Refills: 0 | Status: SHIPPED | OUTPATIENT
Start: 2018-04-17 | End: 2018-11-20 | Stop reason: ALTCHOICE

## 2018-04-17 RX ORDER — MAG HYDROX/ALUMINUM HYD/SIMETH 200-200-20
30 SUSPENSION, ORAL (FINAL DOSE FORM) ORAL
Status: COMPLETED | OUTPATIENT
Start: 2018-04-17 | End: 2018-04-17

## 2018-04-17 RX ORDER — FAMOTIDINE 20 MG/1
20 TABLET, FILM COATED ORAL 2 TIMES DAILY
Qty: 20 TAB | Refills: 0 | Status: SHIPPED | OUTPATIENT
Start: 2018-04-17 | End: 2018-04-27

## 2018-04-17 RX ORDER — FAMOTIDINE 10 MG/ML
20 INJECTION INTRAVENOUS
Status: COMPLETED | OUTPATIENT
Start: 2018-04-17 | End: 2018-04-17

## 2018-04-17 RX ADMIN — ALUMINUM HYDROXIDE, MAGNESIUM HYDROXIDE, AND SIMETHICONE 30 ML: 200; 200; 20 SUSPENSION ORAL at 11:34

## 2018-04-17 RX ADMIN — FAMOTIDINE 20 MG: 10 INJECTION INTRAVENOUS at 11:34

## 2018-04-17 RX ADMIN — SODIUM CHLORIDE 1000 ML: 900 INJECTION, SOLUTION INTRAVENOUS at 11:34

## 2018-04-17 NOTE — ED NOTES
Assume care of patient, patient alert and oriented x 4, patient states that he has been binge drinking for the last 2 weeks, here wanting help, patient with no thoughts of SI or HI

## 2018-04-17 NOTE — DISCHARGE INSTRUCTIONS
High Blood Pressure: Care Instructions  Your Care Instructions    If your blood pressure is usually above 140/90, you have high blood pressure, or hypertension. That means the top number is 140 or higher or the bottom number is 90 or higher, or both. Despite what a lot of people think, high blood pressure usually doesn't cause headaches or make you feel dizzy or lightheaded. It usually has no symptoms. But it does increase your risk for heart attack, stroke, and kidney or eye damage. The higher your blood pressure, the more your risk increases. Your doctor will give you a goal for your blood pressure. Your goal will be based on your health and your age. An example of a goal is to keep your blood pressure below 140/90. Lifestyle changes, such as eating healthy and being active, are always important to help lower blood pressure. You might also take medicine to reach your blood pressure goal.  Follow-up care is a key part of your treatment and safety. Be sure to make and go to all appointments, and call your doctor if you are having problems. It's also a good idea to know your test results and keep a list of the medicines you take. How can you care for yourself at home? Medical treatment  · If you stop taking your medicine, your blood pressure will go back up. You may take one or more types of medicine to lower your blood pressure. Be safe with medicines. Take your medicine exactly as prescribed. Call your doctor if you think you are having a problem with your medicine. · Talk to your doctor before you start taking aspirin every day. Aspirin can help certain people lower their risk of a heart attack or stroke. But taking aspirin isn't right for everyone, because it can cause serious bleeding. · See your doctor regularly. You may need to see the doctor more often at first or until your blood pressure comes down.   · If you are taking blood pressure medicine, talk to your doctor before you take decongestants or anti-inflammatory medicine, such as ibuprofen. Some of these medicines can raise blood pressure. · Learn how to check your blood pressure at home. Lifestyle changes  · Stay at a healthy weight. This is especially important if you put on weight around the waist. Losing even 10 pounds can help you lower your blood pressure. · If your doctor recommends it, get more exercise. Walking is a good choice. Bit by bit, increase the amount you walk every day. Try for at least 30 minutes on most days of the week. You also may want to swim, bike, or do other activities. · Avoid or limit alcohol. Talk to your doctor about whether you can drink any alcohol. · Try to limit how much sodium you eat to less than 2,300 milligrams (mg) a day. Your doctor may ask you to try to eat less than 1,500 mg a day. · Eat plenty of fruits (such as bananas and oranges), vegetables, legumes, whole grains, and low-fat dairy products. · Lower the amount of saturated fat in your diet. Saturated fat is found in animal products such as milk, cheese, and meat. Limiting these foods may help you lose weight and also lower your risk for heart disease. · Do not smoke. Smoking increases your risk for heart attack and stroke. If you need help quitting, talk to your doctor about stop-smoking programs and medicines. These can increase your chances of quitting for good. When should you call for help? Call 911 anytime you think you may need emergency care. This may mean having symptoms that suggest that your blood pressure is causing a serious heart or blood vessel problem. Your blood pressure may be over 180/110. ? For example, call 911 if:  ? · You have symptoms of a heart attack. These may include:  ¨ Chest pain or pressure, or a strange feeling in the chest.  ¨ Sweating. ¨ Shortness of breath. ¨ Nausea or vomiting.   ¨ Pain, pressure, or a strange feeling in the back, neck, jaw, or upper belly or in one or both shoulders or arms.  ¨ Lightheadedness or sudden weakness. ¨ A fast or irregular heartbeat. ? · You have symptoms of a stroke. These may include:  ¨ Sudden numbness, tingling, weakness, or loss of movement in your face, arm, or leg, especially on only one side of your body. ¨ Sudden vision changes. ¨ Sudden trouble speaking. ¨ Sudden confusion or trouble understanding simple statements. ¨ Sudden problems with walking or balance. ¨ A sudden, severe headache that is different from past headaches. ? · You have severe back or belly pain. ?Do not wait until your blood pressure comes down on its own. Get help right away. ?Call your doctor now or seek immediate care if:  ? · Your blood pressure is much higher than normal (such as 180/110 or higher), but you don't have symptoms. ? · You think high blood pressure is causing symptoms, such as:  ¨ Severe headache. ¨ Blurry vision. ? Watch closely for changes in your health, and be sure to contact your doctor if:  ? · Your blood pressure measures 140/90 or higher at least 2 times. That means the top number is 140 or higher or the bottom number is 90 or higher, or both. ? · You think you may be having side effects from your blood pressure medicine. ? · Your blood pressure is usually normal, but it goes above normal at least 2 times. Where can you learn more? Go to http://jacklyn-denice.info/. Enter D687 in the search box to learn more about \"High Blood Pressure: Care Instructions. \"  Current as of: September 21, 2016  Content Version: 11.4  © 2036-2361 [employer]. Care instructions adapted under license by [employer] (which disclaims liability or warranty for this information). If you have questions about a medical condition or this instruction, always ask your healthcare professional. [place] [address] any warranty or liability for your use of this information.          Dehydration: Care Instructions  Your Care Instructions  Dehydration happens when your body loses too much fluid. This might happen when you do not drink enough water or you lose large amounts of fluids from your body because of diarrhea, vomiting, or sweating. Severe dehydration can be life-threatening. Water and minerals called electrolytes help put your body fluids back in balance. Learn the early signs of fluid loss, and drink more fluids to prevent dehydration. Follow-up care is a key part of your treatment and safety. Be sure to make and go to all appointments, and call your doctor if you are having problems. It's also a good idea to know your test results and keep a list of the medicines you take. How can you care for yourself at home? · To prevent dehydration, drink plenty of fluids, enough so that your urine is light yellow or clear like water. Choose water and other caffeine-free clear liquids until you feel better. If you have kidney, heart, or liver disease and have to limit fluids, talk with your doctor before you increase the amount of fluids you drink. · If you do not feel like eating or drinking, try taking small sips of water, sports drinks, or other rehydration drinks. · Get plenty of rest.  To prevent dehydration  · Add more fluids to your diet and daily routine, unless your doctor has told you not to. · During hot weather, drink more fluids. Drink even more fluids if you exercise a lot. Stay away from drinks with alcohol or caffeine. · Watch for the symptoms of dehydration. These include:  ¨ A dry, sticky mouth. ¨ Dark yellow urine, and not much of it. ¨ Dry and sunken eyes. ¨ Feeling very tired. · Learn what problems can lead to dehydration. These include:  ¨ Diarrhea, fever, and vomiting. ¨ Any illness with a fever, such as pneumonia or the flu. ¨ Activities that cause heavy sweating, such as endurance races and heavy outdoor work in hot or humid weather.   ¨ Alcohol or drug abuse or withdrawal.  ¨ Certain medicines, such as cold and allergy pills (antihistamines), diet pills (diuretics), and laxatives. ¨ Certain diseases, such as diabetes, cancer, and heart or kidney disease. When should you call for help? Call 911 anytime you think you may need emergency care. For example, call if:  ? · You passed out (lost consciousness). ?Call your doctor now or seek immediate medical care if:  ? · You are confused and cannot think clearly. ? · You are dizzy or lightheaded, or you feel like you may faint. ? · You have signs of needing more fluids. You have sunken eyes and a dry mouth, and you pass only a little dark urine. ? · You cannot keep fluids down. ? Watch closely for changes in your health, and be sure to contact your doctor if:  ? · You are not making tears. ? · Your skin is very dry and sags slowly back into place after you pinch it. ? · Your mouth and eyes are very dry. Where can you learn more? Go to http://jacklyn-denice.info/. Enter Y991 in the search box to learn more about \"Dehydration: Care Instructions. \"  Current as of: March 20, 2017  Content Version: 11.4  © 3518-6824 [employer]. Care instructions adapted under license by [employer] (which disclaims liability or warranty for this information). If you have questions about a medical condition or this instruction, always ask your healthcare professional. [place] [address] any warranty or liability for your use of this information. Alcohol and Drug Problems: Care Instructions  Your Care Instructions    You can improve your life and health by stopping your use of alcohol or drugs. Ending dependency on alcohol or drugs may help you feel and sleep better. You may get along better with your family, friends, and coworkers. There are medicines and programs that can help. Follow-up care is a key part of your treatment and safety.  Be sure to make and go to all appointments, and call your doctor if you are having problems. It's also a good idea to know your test results and keep a list of the medicines you take. How can you care for yourself at home? · If you have been given medicine to help keep you sober or reduce your cravings, be sure to take it as prescribed. · Talk to your doctor about programs that can help you stop using drugs or drinking alcohol. · If your doctor prescribes disulfiram (Antabuse), do not drink any alcohol while you are taking this medicine. You may have severe, even life-threatening, side effects from even small amounts of alcohol. · Do not tempt yourself by keeping alcohol or drugs in your home. · Learn how to say no when other people drink or use drugs. Or don't spend time with people who drink or use drugs. · Use the time and money spent on drinking or drugs to do something fun with your family or friends. Preventing a relapse  · Do not drink alcohol or use drugs at all. Using any amount of alcohol or drugs greatly increases your risk for relapse. · Seek help from organizations such as Alcoholics Anonymous, Narcotics Anonymous, or treatment facilities if you feel the need to drink alcohol or use drugs again. · Remember that recovery is a lifelong process. · Stay away from situations, friends, or places that may lead you to drink or use drugs. · Have a plan to spot and deal with relapse. Learn to recognize changes in your thinking that lead you to drink or use drugs. These are warning signs. Get help before you start to drink or use drugs again. · Get help as soon as you can if you relapse. Some people make a plan with another person that outlines what they want that person to do for them if they relapse. The plan usually includes how to handle the relapse and who to notify in case of relapse. · Don't give up. Remember that a relapse does not mean that you have failed. Use the experience to learn the triggers that lead you to drink or use drugs. Then quit again.  Many people have several relapses before they are able to quit for good. When should you call for help? Call 911 anytime you think you may need emergency care. For example, call if:  ? · You feel you cannot stop from hurting yourself or someone else. ?Call your doctor now or seek immediate medical care if:  ? · You have serious withdrawal symptoms such as confusion, hallucinations, or severe trembling. ? Watch closely for changes in your health, and be sure to contact your doctor if:  ? · You have a relapse. ? · You need more help or support to stop. Where can you learn more? Go to http://jacklynWeddingfuldenice.info/. Enter [id number] in the search box to learn more about \"Alcohol and Drug Problems: Care Instructions. \"  Current as of: November 3, 2016  Content Version: 11.4  © 9654-8189 [employer]. Care instructions adapted under license by [employer] (which disclaims liability or warranty for this information). If you have questions about a medical condition or this instruction, always ask your healthcare professional. [place] [address] any warranty or liability for your use of this information. Anxiety Disorder: Care Instructions  Your Care Instructions    Anxiety is a normal reaction to stress. Difficult situations can cause you to have symptoms such as sweaty palms and a nervous feeling. In an anxiety disorder, the symptoms are far more severe. Constant worry, muscle tension, trouble sleeping, nausea and diarrhea, and other symptoms can make normal daily activities difficult or impossible. These symptoms may occur for no reason, and they can affect your work, school, or social life. Medicines, counseling, and self-care can all help. Follow-up care is a key part of your treatment and safety. Be sure to make and go to all appointments, and call your doctor if you are having problems.  It's also a good idea to know your test results and keep a list of the medicines you take. How can you care for yourself at home? · Take medicines exactly as directed. Call your doctor if you think you are having a problem with your medicine. · Go to your counseling sessions and follow-up appointments. · Recognize and accept your anxiety. Then, when you are in a situation that makes you anxious, say to yourself, \"This is not an emergency. I feel uncomfortable, but I am not in danger. I can keep going even if I feel anxious. \"  · Be kind to your body:  ¨ Relieve tension with exercise or a massage. ¨ Get enough rest.  ¨ Avoid alcohol, caffeine, nicotine, and illegal drugs. They can increase your anxiety level and cause sleep problems. ¨ Learn and do relaxation techniques. See below for more about these techniques. · Engage your mind. Get out and do something you enjoy. Go to a funny movie, or take a walk or hike. Plan your day. Having too much or too little to do can make you anxious. · Keep a record of your symptoms. Discuss your fears with a good friend or family member, or join a support group for people with similar problems. Talking to others sometimes relieves stress. · Get involved in social groups, or volunteer to help others. Being alone sometimes makes things seem worse than they are. · Get at least 30 minutes of exercise on most days of the week to relieve stress. Walking is a good choice. You also may want to do other activities, such as running, swimming, cycling, or playing tennis or team sports. Relaxation techniques  Do relaxation exercises 10 to 20 minutes a day. You can play soothing, relaxing music while you do them, if you wish. · Tell others in your house that you are going to do your relaxation exercises. Ask them not to disturb you. · Find a comfortable place, away from all distractions and noise. · Lie down on your back, or sit with your back straight. · Focus on your breathing. Make it slow and steady. · Breathe in through your nose.  Breathe out through either your nose or mouth. · Breathe deeply, filling up the area between your navel and your rib cage. Breathe so that your belly goes up and down. · Do not hold your breath. · Breathe like this for 5 to 10 minutes. Notice the feeling of calmness throughout your whole body. As you continue to breathe slowly and deeply, relax by doing the following for another 5 to 10 minutes:  · Tighten and relax each muscle group in your body. You can begin at your toes and work your way up to your head. · Imagine your muscle groups relaxing and becoming heavy. · Empty your mind of all thoughts. · Let yourself relax more and more deeply. · Become aware of the state of calmness that surrounds you. · When your relaxation time is over, you can bring yourself back to alertness by moving your fingers and toes and then your hands and feet and then stretching and moving your entire body. Sometimes people fall asleep during relaxation, but they usually wake up shortly afterward. · Always give yourself time to return to full alertness before you drive a car or do anything that might cause an accident if you are not fully alert. Never play a relaxation tape while you drive a car. When should you call for help? Call 911 anytime you think you may need emergency care. For example, call if:  ? · You feel you cannot stop from hurting yourself or someone else. ? Keep the numbers for these national suicide hotlines: 6-922-532-TALK (3-369.998.9735) and 0-669-KNMUJAF (4-646.146.5036). If you or someone you know talks about suicide or feeling hopeless, get help right away. ? Watch closely for changes in your health, and be sure to contact your doctor if:  ? · You have anxiety or fear that affects your life. ? · You have symptoms of anxiety that are new or different from those you had before. Where can you learn more? Go to http://jacklyn-denice.info/.   Enter P754 in the search box to learn more about \"Anxiety Disorder: Care Instructions. \"  Current as of: May 12, 2017  Content Version: 11.4  © 1760-2271 [employer]. Care instructions adapted under license by [employer] (which disclaims liability or warranty for this information). If you have questions about a medical condition or this instruction, always ask your healthcare professional. [place] [address] any warranty or liability for your use of this information.

## 2018-04-17 NOTE — ED NOTES
Patient discharged to home, patient is not suicidal or homicidal at this time, will follow up with [facility]

## 2018-04-17 NOTE — ED PROVIDER NOTES
EMERGENCY DEPARTMENT HISTORY AND PHYSICAL EXAM    11:11 AM      Date: 4/17/2018  Patient Name: Rubens Adame    History of Presenting Illness     Chief Complaint   Patient presents with    Addiction problem         History Provided By: Patient    Chief Complaint: Detox  Duration:  2 weeks  Timing:  Progressive and Worsening  Location: Generalized  Quality: N/A  Severity: N/A  Modifying Factors: None  Associated Symptoms: depression, anxiety      Additional History (Context): Rubens Adame is a 36 y.o. male with PMHx of HTN, depression, DM and obesity who presents to the ED with request of ETOH and cocaine detox. Reports he has been using for the past 2 weeks after not using for 20 years, \"I need to be away from it again and get it out of my system. \" Associated symptoms include depression and anxiety, notes he is undergoing a divorce. Denies SI, HA or A/V hallucinations. Denies smoking or other illicit drug use. No modifying or aggravating factors were reported. No other symptoms or concerns were expressed. PCP: None    Current Outpatient Prescriptions   Medication Sig Dispense Refill    alum-mag hydroxide-simeth (MYLANTA) 200-200-20 mg/5 mL susp Take 30 mL by mouth four (4) times daily as needed. 200 mL 0    famotidine (PEPCID) 20 mg tablet Take 1 Tab by mouth two (2) times a day for 10 days. 20 Tab 0    clonazePAM (KLONOPIN) 0.5 mg tablet Take  by mouth nightly as needed. Take 0.5 qHS and 0.25 mg qAM      oxyCODONE-acetaminophen (PERCOCET) 5-325 mg per tablet Take 1 Tab by Mouth Every 4 Hours As Needed for Pain.  DO NOT EXCEED 4000 MG OF ACETAMINOPHEN PER DAY FROM ALL SOURCES (325 MG ACETAMINOPHEN IN EACH PERCOCET TAB)    THIS MEDICATION CAUSES SEDATION AND IMPAIRMENT DO NOT DRIVE OR PERFORM ANY OTHER POTENTIALLY DANGEROUS ACTIVITIES WHILE TAKING    DO NOT TAKE WITH ALCOHOL      amLODIPine (NORVASC) 5 mg tablet TK 1 T PO D FOR HIGH BP  1    sertraline (ZOLOFT) 100 mg tablet Take 2 Tabs by mouth daily for 60 days. 60 Tab 1    SITagliptin (JANUVIA) 50 mg tablet Take 50 mg by mouth daily.  metoprolol succinate (TOPROL-XL) 100 mg tablet Take 1 Tab by mouth daily. 30 Tab 2    Lancets misc Check fasting blood sugar once daily 1 Each 11       Past History     Past Medical History:  Past Medical History:   Diagnosis Date    Depression     Diabetes ([address]     Drug-seeking behavior     56 prescriptions from 32 different prescribers at 6 different pharmacies in last 12 months    Herniated lumbar intervertebral disc     Hypertension     Neurological disorder L3,4,5 torn Herniated disc    Obesity (BMI 30.0-34. 9)        Past Surgical History:  Past Surgical History:   Procedure Laterality Date    HX HERNIA REPAIR Bilateral 06/02/2017    robotic repair of bilateral indirect inguinal hernias with placement of mesh    HX ORTHOPAEDIC  trigger finger release       Family History:  Family History   Problem Relation Age of Onset    Hypertension Mother     Diabetes Mother     Heart Failure Mother     COPD Mother     Heart Disease Mother     Hypertension Father     Alcohol abuse Father        Social History:  Social History   Substance Use Topics    Smoking status: Never Smoker    Smokeless tobacco: Never Used    Alcohol use Yes      Comment: rarely       Allergies:  No Known Allergies      Review of Systems       Review of Systems   Constitutional: Negative for activity change, fatigue and fever. HENT: Negative for congestion and rhinorrhea. Eyes: Negative for visual disturbance. Respiratory: Negative for shortness of breath. Cardiovascular: Negative for chest pain and palpitations. Gastrointestinal: Negative for abdominal pain, diarrhea, nausea and vomiting. Genitourinary: Negative for dysuria and hematuria. Musculoskeletal: Negative for back pain. Skin: Negative for rash. Neurological: Negative for dizziness, weakness and light-headedness.    Psychiatric/Behavioral: Positive for dysphoric mood. Negative for agitation, hallucinations, sleep disturbance and suicidal ideas. The patient is nervous/anxious. All other systems reviewed and are negative. Physical Exam     Visit Vitals    /87 (BP 1 Location: Right arm, BP Patient Position: Sitting)    Pulse 87    Temp 98.4 °F (36.9 °C)    Resp 16    Ht 5' 10\" (1.778 m)    Wt 99.8 kg (220 lb)    SpO2 100%    BMI 31.57 kg/m2         Physical Exam   Constitutional: He is oriented to person, place, and time. He appears well-developed and well-nourished. No distress. HENT:   Head: Normocephalic. Mouth/Throat: Mucous membranes are dry. Eyes: Conjunctivae are normal.   Neck: Normal range of motion. Neck supple. Cardiovascular: Normal rate, regular rhythm and normal heart sounds. Pulmonary/Chest: Effort normal and breath sounds normal. No respiratory distress. He has no wheezes. Abdominal: Soft. Bowel sounds are normal. He exhibits no distension. There is no tenderness. There is no rebound. Musculoskeletal: Normal range of motion. He exhibits no edema or tenderness. Neurological: He is alert and oriented to person, place, and time. No cranial nerve deficit. Skin: Skin is warm and dry. He is not diaphoretic. Diagnostic Study Results     Labs -  Recent Results (from the past 12 hour(s))   CBC WITH AUTOMATED DIFF    Collection Time: 04/17/18 11:00 AM   Result Value Ref Range    WBC 6.4 4.6 - 13.2 K/uL    RBC 4.47 (L) 4.70 - 5.50 M/uL    HGB 13.0 13.0 - 16.0 g/dL    HCT 38.0 36.0 - 48.0 %    MCV 85.0 74.0 - 97.0 FL    MCH 29.1 24.0 - 34.0 PG    MCHC 34.2 31.0 - 37.0 g/dL    RDW 13.2 11.6 - 14.5 %    PLATELET 317 255 - 844 K/uL    MPV 9.5 9.2 - 11.8 FL    NEUTROPHILS 76 (H) 40 - 73 %    LYMPHOCYTES 15 (L) 21 - 52 %    MONOCYTES 8 3 - 10 %    EOSINOPHILS 1 0 - 5 %    BASOPHILS 0 0 - 2 %    ABS. NEUTROPHILS 4.8 1.8 - 8.0 K/UL    ABS. LYMPHOCYTES 1.0 0.9 - 3.6 K/UL    ABS. MONOCYTES 0.5 0.05 - 1.2 K/UL    ABS. EOSINOPHILS 0.1 0.0 - 0.4 K/UL    ABS. BASOPHILS 0.0 0.0 - 0.06 K/UL    DF AUTOMATED     METABOLIC PANEL, COMPREHENSIVE    Collection Time: 04/17/18 11:00 AM   Result Value Ref Range    Sodium 140 136 - 145 mmol/L    Potassium 4.0 3.5 - 5.5 mmol/L    Chloride 105 100 - 108 mmol/L    CO2 28 21 - 32 mmol/L    Anion gap 7 3.0 - 18 mmol/L    Glucose 137 (H) 74 - 99 mg/dL    BUN 11 7.0 - 18 MG/DL    Creatinine 1.18 0.6 - 1.3 MG/DL    BUN/Creatinine ratio 9 (L) 12 - 20      GFR est AA >60 >60 ml/min/1.73m2    GFR est non-AA >60 >60 ml/min/1.73m2    Calcium 9.0 8.5 - 10.1 MG/DL    Bilirubin, total 0.2 0.2 - 1.0 MG/DL    ALT (SGPT) 22 16 - 61 U/L    AST (SGOT) 11 (L) 15 - 37 U/L    Alk. phosphatase 82 45 - 117 U/L    Protein, total 7.7 6.4 - 8.2 g/dL    Albumin 3.8 3.4 - 5.0 g/dL    Globulin 3.9 2.0 - 4.0 g/dL    A-G Ratio 1.0 0.8 - 1.7     LIPASE    Collection Time: 04/17/18 11:00 AM   Result Value Ref Range    Lipase 241 73 - 393 U/L       Medical Decision Making   I am the first provider for this patient. I reviewed the vital signs, available nursing notes, past medical history, past surgical history, family history and social history. Vital Signs-Reviewed the patient's vital signs. Pulse Oximetry Analysis -  100% on room air, stable    Records Reviewed: Nursing Notes and Old Medical Records (Time of Review: 11:11 AM)    ED Course: Progress Notes, Reevaluation, and Consults:  12:38 PM: Patient called me into the room, states he feels much better and feels comfortable going home with outpatient resources, does not want case management to help him with ETOH use. Patient has no current complaints, no SI, HI or plan. Provider Notes (Medical Decision Making): 36 y.o. male presents with ETOH and cocaine use, depression and family stressors with divorce. No plan to hurt himself, workup for electrolyte imbalance. If negative, will discharge home with outpatient psych follow-up.     Diagnosis     Clinical Impression: 1. Anxiety associated with depression    2. Cocaine use    3. Type 2 diabetes mellitus without complication, without long-term current use of insulin ([address]    4. Essential hypertension    5. Alcohol abuse    6. Dehydration        Disposition: Discharge. 12:39 PM I have assessed the patient who will be discharged in stable condition. Patient is to return to emergency department if any new or worsening condition. I have given the patient instructions for discharge and follow-up. Patient understands and verbalizes agreement with plan, diagnosis, discharge, and follow-up. Follow-up Information     Follow up With Details Comments Contact Info    Dillon Webster MD Call in 1 day Follow Up From Emergency [address]      [address] EMERGENCY DEPT  If symptoms worsen [address] [place] [address] Call in 1 day Follow Up From Emergency Department [address] [facility]  [phone]           Patient's Medications   Start Taking    ALUM-MAG HYDROXIDE-SIMETH (MYLANTA) 200-200-20 MG/5 ML SUSP    Take 30 mL by mouth four (4) times daily as needed. FAMOTIDINE (PEPCID) 20 MG TABLET    Take 1 Tab by mouth two (2) times a day for 10 days. Continue Taking    AMLODIPINE (NORVASC) 5 MG TABLET    TK 1 T PO D FOR HIGH BP    CLONAZEPAM (KLONOPIN) 0.5 MG TABLET    Take  by mouth nightly as needed. Take 0.5 qHS and 0.25 mg qAM    LANCETS MISC    Check fasting blood sugar once daily    METOPROLOL SUCCINATE (TOPROL-XL) 100 MG TABLET    Take 1 Tab by mouth daily. OXYCODONE-ACETAMINOPHEN (PERCOCET) 5-325 MG PER TABLET    Take 1 Tab by Mouth Every 4 Hours As Needed for Pain.  DO NOT EXCEED 4000 MG OF ACETAMINOPHEN PER DAY FROM ALL SOURCES (325 MG ACETAMINOPHEN IN EACH PERCOCET TAB)    THIS MEDICATION CAUSES SEDATION AND IMPAIRMENT DO NOT DRIVE OR PERFORM ANY OTHER POTENTIALLY DANGEROUS ACTIVITIES WHILE TAKING    DO NOT TAKE WITH ALCOHOL    SERTRALINE (ZOLOFT) 100 MG TABLET    Take 2 Tabs by mouth daily for 60 days. SITAGLIPTIN (JANUVIA) 50 MG TABLET    Take 50 mg by mouth daily. These Medications have changed    No medications on file   Stop Taking    No medications on file     _______________________________    Attestations:  Don Adler acting as a scribe for and in the presence of Be Castle MD      April 17, 2018 at 11:11 AM       Provider Attestation:      I personally performed the services described in the documentation, reviewed the documentation, as recorded by the scribe in my presence, and it accurately and completely records my words and actions.  April 17, 2018 at 11:11 AM - Be Castle MD    _______________________________

## 2018-04-19 RX ORDER — CLONAZEPAM 0.5 MG/1
TABLET ORAL
OUTPATIENT
Start: 2018-04-19

## 2018-04-19 NOTE — TELEPHONE ENCOUNTER
Requested Prescriptions     Pending Prescriptions Disp Refills    clonazePAM (KLONOPIN) 0.5 mg tablet       Sig: Take  by mouth nightly as needed.  Take 0.5 qHS and 0.25 mg qAM

## 2018-04-19 NOTE — TELEPHONE ENCOUNTER
Called to discuss clonazepam refill. No answer. Left VM for patient to return call to discuss. Awaiting call back. Dmitri Delarosa M.D.   [address], [address] [place] [address] [phone]  [place] [phone]

## 2018-04-20 ENCOUNTER — TELEPHONE (OUTPATIENT)
Dept: FAMILY MEDICINE CLINIC | Age: 43
End: 2018-04-20

## 2018-04-23 NOTE — TELEPHONE ENCOUNTER
Clonazepam last dose 4 days ago. No seizures or severe tremors. Madeline Racer is out of control. \" last drink of alcohol Monday or Tuesday of last week, same time last cocaine use. Pt reports stopping cocaine and alcohol. Declined assistance for alcohol use in ED visit 4/17/2018 because \"if I don't use cocaine I don't drink alcohol. \"  Encouraged 90 meetings in 90 days AA/NA for support for polysubstance use disorder, severe. Recommend cessation of controlled substances given risk of abuse/mis-use. Discussed importance of scheduling follow up with psychiatry. Given urgent report to hospital precautions. All questions answered. Yoan Cuellar M.D.   [address], [address] [place] [address], [address] [place], [place] [id number] [phone]  DUE - [phone]

## 2018-05-31 DIAGNOSIS — I10 ESSENTIAL HYPERTENSION: ICD-10-CM

## 2018-05-31 RX ORDER — METOPROLOL SUCCINATE 100 MG/1
100 TABLET, EXTENDED RELEASE ORAL DAILY
Qty: 30 TAB | Refills: 2 | Status: SHIPPED | OUTPATIENT
Start: 2018-05-31 | End: 2018-09-02 | Stop reason: SDUPTHER

## 2018-05-31 RX ORDER — METOPROLOL SUCCINATE 100 MG/1
TABLET, EXTENDED RELEASE ORAL
Qty: 30 TAB | Refills: 0 | Status: CANCELLED | OUTPATIENT
Start: 2018-05-31

## 2018-05-31 RX ORDER — CYCLOBENZAPRINE HCL 5 MG
5 TABLET ORAL
Qty: 30 TAB | Refills: 2 | Status: SHIPPED | OUTPATIENT
Start: 2018-05-31 | End: 2018-06-14 | Stop reason: DRUGHIGH

## 2018-05-31 RX ORDER — AMLODIPINE BESYLATE 5 MG/1
TABLET ORAL
Qty: 30 TAB | Refills: 0 | Status: SHIPPED | OUTPATIENT
Start: 2018-05-31 | End: 2018-07-04 | Stop reason: SDUPTHER

## 2018-05-31 NOTE — TELEPHONE ENCOUNTER
Requested Prescriptions     Pending Prescriptions Disp Refills    metoprolol succinate (TOPROL-XL) 100 mg tablet 30 Tab 2     Sig: Take 1 Tab by mouth daily.  cyclobenzaprine (FLEXERIL) 5 mg tablet 30 Tab 2     Sig: Take 1 Tab by mouth three (3) times daily as needed for Muscle Spasm(s).

## 2018-06-10 ENCOUNTER — HOSPITAL ENCOUNTER (EMERGENCY)
Age: 43
Discharge: HOME | End: 2018-06-11
Attending: EMERGENCY MEDICINE | Admitting: EMERGENCY MEDICINE
Payer: SELF-PAY

## 2018-06-10 DIAGNOSIS — R10.84 ABDOMINAL PAIN, GENERALIZED: ICD-10-CM

## 2018-06-10 DIAGNOSIS — R11.0 NAUSEA WITHOUT VOMITING: ICD-10-CM

## 2018-06-10 DIAGNOSIS — K62.5 RECTAL BLEEDING: Primary | ICD-10-CM

## 2018-06-10 LAB
ALBUMIN SERPL-MCNC: 3.6 G/DL (ref 3.4–5)
ALBUMIN/GLOB SERPL: 0.9 {RATIO} (ref 0.8–1.7)
ALP SERPL-CCNC: 71 U/L (ref 45–117)
ALT SERPL-CCNC: 23 U/L (ref 16–61)
ANION GAP SERPL CALC-SCNC: 7 MMOL/L (ref 3–18)
APPEARANCE UR: CLEAR
AST SERPL-CCNC: 13 U/L (ref 15–37)
BASOPHILS # BLD: 0 K/UL (ref 0–0.06)
BASOPHILS NFR BLD: 1 % (ref 0–2)
BILIRUB SERPL-MCNC: 0.2 MG/DL (ref 0.2–1)
BILIRUB UR QL: NEGATIVE
BUN SERPL-MCNC: 7 MG/DL (ref 7–18)
BUN/CREAT SERPL: 6 (ref 12–20)
CALCIUM SERPL-MCNC: 9.2 MG/DL (ref 8.5–10.1)
CHLORIDE SERPL-SCNC: 103 MMOL/L (ref 100–108)
CO2 SERPL-SCNC: 26 MMOL/L (ref 21–32)
COLOR UR: YELLOW
CREAT SERPL-MCNC: 1.18 MG/DL (ref 0.6–1.3)
DIFFERENTIAL METHOD BLD: ABNORMAL
EOSINOPHIL # BLD: 0.1 K/UL (ref 0–0.4)
EOSINOPHIL NFR BLD: 2 % (ref 0–5)
ERYTHROCYTE [DISTWIDTH] IN BLOOD BY AUTOMATED COUNT: 13.2 % (ref 11.6–14.5)
GLOBULIN SER CALC-MCNC: 3.9 G/DL (ref 2–4)
GLUCOSE SERPL-MCNC: 215 MG/DL (ref 74–99)
GLUCOSE UR STRIP.AUTO-MCNC: >1000 MG/DL
HCT VFR BLD AUTO: 38.4 % (ref 36–48)
HGB BLD-MCNC: 13.3 G/DL (ref 13–16)
HGB UR QL STRIP: NEGATIVE
KETONES UR QL STRIP.AUTO: ABNORMAL MG/DL
LEUKOCYTE ESTERASE UR QL STRIP.AUTO: NEGATIVE
LYMPHOCYTES # BLD: 1.7 K/UL (ref 0.9–3.6)
LYMPHOCYTES NFR BLD: 31 % (ref 21–52)
MCH RBC QN AUTO: 29.5 PG (ref 24–34)
MCHC RBC AUTO-ENTMCNC: 34.6 G/DL (ref 31–37)
MCV RBC AUTO: 85.1 FL (ref 74–97)
MONOCYTES # BLD: 0.5 K/UL (ref 0.05–1.2)
MONOCYTES NFR BLD: 9 % (ref 3–10)
NEUTS SEG # BLD: 3.1 K/UL (ref 1.8–8)
NEUTS SEG NFR BLD: 57 % (ref 40–73)
NITRITE UR QL STRIP.AUTO: NEGATIVE
PH UR STRIP: 7 [PH] (ref 5–8)
PLATELET # BLD AUTO: 247 K/UL (ref 135–420)
PMV BLD AUTO: 9.9 FL (ref 9.2–11.8)
POTASSIUM SERPL-SCNC: 4.3 MMOL/L (ref 3.5–5.5)
PROT SERPL-MCNC: 7.5 G/DL (ref 6.4–8.2)
PROT UR STRIP-MCNC: NEGATIVE MG/DL
RBC # BLD AUTO: 4.51 M/UL (ref 4.7–5.5)
SODIUM SERPL-SCNC: 136 MMOL/L (ref 136–145)
SP GR UR REFRACTOMETRY: 1.02 (ref 1–1.03)
UROBILINOGEN UR QL STRIP.AUTO: 0.2 EU/DL (ref 0.2–1)
WBC # BLD AUTO: 5.5 K/UL (ref 4.6–13.2)

## 2018-06-10 PROCEDURE — 96374 THER/PROPH/DIAG INJ IV PUSH: CPT

## 2018-06-10 PROCEDURE — 80053 COMPREHEN METABOLIC PANEL: CPT | Performed by: EMERGENCY MEDICINE

## 2018-06-10 PROCEDURE — 74011250636 HC RX REV CODE- 250/636: Performed by: EMERGENCY MEDICINE

## 2018-06-10 PROCEDURE — 96375 TX/PRO/DX INJ NEW DRUG ADDON: CPT

## 2018-06-10 PROCEDURE — 99284 EMERGENCY DEPT VISIT MOD MDM: CPT

## 2018-06-10 PROCEDURE — 81003 URINALYSIS AUTO W/O SCOPE: CPT | Performed by: EMERGENCY MEDICINE

## 2018-06-10 PROCEDURE — 85025 COMPLETE CBC W/AUTO DIFF WBC: CPT | Performed by: EMERGENCY MEDICINE

## 2018-06-10 RX ORDER — MORPHINE SULFATE 10 MG/ML
4 INJECTION, SOLUTION INTRAMUSCULAR; INTRAVENOUS
Status: COMPLETED | OUTPATIENT
Start: 2018-06-10 | End: 2018-06-10

## 2018-06-10 RX ORDER — ONDANSETRON 2 MG/ML
4 INJECTION INTRAMUSCULAR; INTRAVENOUS
Status: COMPLETED | OUTPATIENT
Start: 2018-06-10 | End: 2018-06-10

## 2018-06-10 RX ADMIN — MORPHINE SULFATE 4 MG: 10 INJECTION INTRAMUSCULAR; INTRAVENOUS; SUBCUTANEOUS at 23:59

## 2018-06-10 RX ADMIN — ONDANSETRON 4 MG: 2 INJECTION INTRAMUSCULAR; INTRAVENOUS at 23:58

## 2018-06-11 VITALS
WEIGHT: 222 LBS | RESPIRATION RATE: 14 BRPM | SYSTOLIC BLOOD PRESSURE: 129 MMHG | DIASTOLIC BLOOD PRESSURE: 81 MMHG | BODY MASS INDEX: 31.78 KG/M2 | HEIGHT: 70 IN | OXYGEN SATURATION: 95 % | HEART RATE: 90 BPM | TEMPERATURE: 98.6 F

## 2018-06-11 RX ORDER — ONDANSETRON 4 MG/1
8 TABLET, FILM COATED ORAL
Qty: 12 TAB | Refills: 0 | Status: SHIPPED | OUTPATIENT
Start: 2018-06-11 | End: 2018-11-20 | Stop reason: ALTCHOICE

## 2018-06-11 NOTE — DISCHARGE INSTRUCTIONS
SPECIFIC PATIENT INSTRUCTIONS FROM THE PHYSICIAN WHO TREATED YOU IN THE ER TODAY:  1. Return if any concerns or worsening of condition(s). 2. Follow up with your primary doctor within the next 1-2 days as a walk in patient to their clinic. 3. Zofran as prescribed for nausea and/or vomiting. Rectal Bleeding: Care Instructions  Your Care Instructions    Rectal bleeding in small amounts is common. You may see red spotting on toilet paper or drops of blood in the toilet. Rectal bleeding has many possible causes, from something as minor as hemorrhoids to something as serious as colon cancer. You may need more tests to find the cause of your bleeding. Follow-up care is a key part of your treatment and safety. Be sure to make and go to all appointments, and call your doctor if you are having problems. It's also a good idea to know your test results and keep a list of the medicines you take. How can you care for yourself at home? · Avoid aspirin and other nonsteroidal anti-inflammatory drugs (NSAIDs), such as ibuprofen (Advil, Motrin) and naproxen (Aleve). They can cause you to bleed more. Ask your doctor if you can take acetaminophen (Tylenol). Read and follow all instructions on the label. · Use a stool softener that contains bran or psyllium. You can save money by buying bran or psyllium (available in bulk at most health food stores) and sprinkling it on foods or stirring it into fruit juice. You can also use a product such as Metamucil or Citrucel. · Take your medicines exactly as directed. Call your doctor if you think you are having a problem with your medicine. When should you call for help? Call 911 anytime you think you may need emergency care. For example, call if:  ? · You passed out (lost consciousness). ?Call your doctor now or seek immediate medical care if:  ? · You have new or worse pain. ? · You have new or worse bleeding from the rectum.    ? · You are dizzy or light-headed, or you feel like you may faint. ? Watch closely for changes in your health, and be sure to contact your doctor if:  ? · You cannot pass stools or gas. ? · You do not get better as expected. Where can you learn more? Go to http://jacklyn-denice.info/. Enter A902 in the search box to learn more about \"Rectal Bleeding: Care Instructions. \"  Current as of: May 12, 2017  Content Version: 11.4  © 0824-8127 [employer]. Care instructions adapted under license by [employer] (which disclaims liability or warranty for this information). If you have questions about a medical condition or this instruction, always ask your healthcare professional. [address] any warranty or liability for your use of this information. Nausea and Vomiting: Care Instructions  Your Care Instructions    When you are nauseated, you may feel weak and sweaty and notice a lot of saliva in your mouth. Nausea often leads to vomiting. Most of the time you do not need to worry about nausea and vomiting, but they can be signs of other illnesses. Two common causes of nausea and vomiting are stomach flu and food poisoning. Nausea and vomiting from viral stomach flu will usually start to improve within 24 hours. Nausea and vomiting from food poisoning may last from 12 to 48 hours. The doctor has checked you carefully, but problems can develop later. If you notice any problems or new symptoms, get medical treatment right away. Follow-up care is a key part of your treatment and safety. Be sure to make and go to all appointments, and call your doctor if you are having problems. It's also a good idea to know your test results and keep a list of the medicines you take. How can you care for yourself at home? · To prevent dehydration, drink plenty of fluids, enough so that your urine is light yellow or clear like water. Choose water and other caffeine-free clear liquids until you feel better. If you have kidney, heart, or liver disease and have to limit fluids, talk with your doctor before you increase the amount of fluids you drink. · Rest in bed until you feel better. · When you are able to eat, try clear soups, mild foods, and liquids until all symptoms are gone for 12 to 48 hours. Other good choices include dry toast, crackers, cooked cereal, and gelatin dessert, such as Jell-O. When should you call for help? Call 911 anytime you think you may need emergency care. For example, call if:  ? · You passed out (lost consciousness). ?Call your doctor now or seek immediate medical care if:  ? · You have symptoms of dehydration, such as:  ¨ Dry eyes and a dry mouth. ¨ Passing only a little dark urine. ¨ Feeling thirstier than usual.   ? · You have new or worsening belly pain. ? · You have a new or higher fever. ? · You vomit blood or what looks like coffee grounds. ? Watch closely for changes in your health, and be sure to contact your doctor if:  ? · You have ongoing nausea and vomiting. ? · Your vomiting is getting worse. ? · Your vomiting lasts longer than 2 days. ? · You are not getting better as expected. Where can you learn more? Go to http://jacklyn-denice.info/. Enter 25 [id number] in the search box to learn more about \"Nausea and Vomiting: Care Instructions. \"  Current as of: March 20, 2017  Content Version: 11.4  © 5474-8361 [employer]. Care instructions adapted under license by "[employer]" (which disclaims liability or warranty for this information). If you have questions about a medical condition or this instruction, always ask your healthcare professional. [place] [address] any warranty or liability for your use of this information. Abdominal Pain: Care Instructions  Your Care Instructions    Abdominal pain has many possible causes. Some aren't serious and get better on their own in a few days.  Others need more testing and treatment. If your pain continues or gets worse, you need to be rechecked and may need more tests to find out what is wrong. You may need surgery to correct the problem. Don't ignore new symptoms, such as fever, nausea and vomiting, urination problems, pain that gets worse, and dizziness. These may be signs of a more serious problem. Your doctor may have recommended a follow-up visit in the next 8 to 12 hours. If you are not getting better, you may need more tests or treatment. The doctor has checked you carefully, but problems can develop later. If you notice any problems or new symptoms, get medical treatment right away. Follow-up care is a key part of your treatment and safety. Be sure to make and go to all appointments, and call your doctor if you are having problems. It's also a good idea to know your test results and keep a list of the medicines you take. How can you care for yourself at home? · Rest until you feel better. · To prevent dehydration, drink plenty of fluids, enough so that your urine is light yellow or clear like water. Choose water and other caffeine-free clear liquids until you feel better. If you have kidney, heart, or liver disease and have to limit fluids, talk with your doctor before you increase the amount of fluids you drink. · If your stomach is upset, eat mild foods, such as rice, dry toast or crackers, bananas, and applesauce. Try eating several small meals instead of two or three large ones. · Wait until 48 hours after all symptoms have gone away before you have spicy foods, alcohol, and drinks that contain caffeine. · Do not eat foods that are high in fat. · Avoid anti-inflammatory medicines such as aspirin, ibuprofen (Advil, Motrin), and naproxen (Aleve). These can cause stomach upset. Talk to your doctor if you take daily aspirin for another health problem. When should you call for help? Call 911 anytime you think you may need emergency care.  For example, call if:  ? · You passed out (lost consciousness). ? · You pass maroon or very bloody stools. ? · You vomit blood or what looks like coffee grounds. ? · You have new, severe belly pain. ?Call your doctor now or seek immediate medical care if:  ? · Your pain gets worse, especially if it becomes focused in one area of your belly. ? · You have a new or higher fever. ? · Your stools are black and look like tar, or they have streaks of blood. ? · You have unexpected vaginal bleeding. ? · You have symptoms of a urinary tract infection. These may include:  ¨ Pain when you urinate. ¨ Urinating more often than usual.  ¨ Blood in your urine. ? · You are dizzy or lightheaded, or you feel like you may faint. ? Watch closely for changes in your health, and be sure to contact your doctor if:  ? · You are not getting better after 1 day (24 hours). Where can you learn more? Go to http://jacklyn-denice.info/. Enter O925 in the search box to learn more about \"Abdominal Pain: Care Instructions. \"  Current as of: March 20, 2017  Content Version: 11.4  © 1982-0532 [email or web address]. Care instructions adapted under license by [employer] (which disclaims liability or warranty for this information). If you have questions about a medical condition or this instruction, always ask your healthcare professional. [place] [address] any warranty or liability for your use of this information. crobo Activation    Thank you for requesting access to crobo. Please follow the instructions below to securely access and download your online medical record. crobo allows you to send messages to your doctor, view your test results, renew your prescriptions, schedule appointments, and more. How Do I Sign Up? 1. In your internet browser, go to [email or web address]. 2. Click on the First Time User? Click Here link in the Sign In box.  You will see the New Member Sign Up page. 3. Enter your Casual Steps Access Code exactly as it appears below. You will not need to use this code after youve completed the sign-up process. If you do not sign up before the expiration date, you must request a new code. Casual Steps Access Code: [email or web address]  Expires: 2018  9:45 PM (This is the date your Casual Steps access code will )    4. Enter the last four digits of your Social Security Number (xxxx) and Date of Birth (mm/dd/yyyy) as indicated and click Submit. You will be taken to the next sign-up page. 5. Create a Casual Steps ID. This will be your Casual Steps login ID and cannot be changed, so think of one that is secure and easy to remember. 6. Create a Casual Steps password. You can change your password at any time. 7. Enter your Password Reset Question and Answer. This can be used at a later time if you forget your password. 8. Enter your e-mail address. You will receive e-mail notification when new information is available in [address]. 9. Click Sign Up. You can now view and download portions of your medical record. 10. Click the Download Summary menu link to download a portable copy of your medical information. Additional Information    If you have questions, please visit the Frequently Asked Questions section of the Casual Steps website at [email or web address] Remember, Casual Steps is NOT to be used for urgent needs. For medical emergencies, dial 911.

## 2018-06-11 NOTE — ED TRIAGE NOTES
C/o aching, sharp lower abdominal pain with nausea and bright red rectal bleeding with bowel movement x1 day. Blood filled the toilet.

## 2018-06-11 NOTE — ED PROVIDER NOTES
[facility] EMERGENCY DEPT      11:34 PM    Date: 6/10/2018  Patient Name: Jace Zhang    History of Presenting Illness     Chief Complaint   Patient presents with    Rectal Bleeding    Abdominal Pain       History Provided By: Patient    Chief Complaint: abdominal pain  Duration:  Hours  Timing:  Constant  Location: GI  Quality: Aching  Severity: 8 out of 10  Modifying Factors: none  Associated Symptoms: bloody stools and nausea    36 y.o. male with noted past medical history who presents to the emergency department c/o moderate to severe abdominal pain though out the day with 2 episodes of bloody stools. The pt reports his abdominal pain started this morning and has been constant. He also reports feeling nauseated but has not had any episode of vomiting. Pt had bloody stool after a BM around 830PM and just before presenting to the ED. Reports he has had bloody stools about 4 months ago but it was not accompanied by abdominal pains. Social drinker. Denies CP, SOB, fever, chills, vomiting, back pain, weakness, numbness, rectal pain, syncopal episode, or any other associated sx. No other complaints. Nursing notes regarding the HPI and triage nursing notes were reviewed. Prior medical records were reviewed. Current Outpatient Prescriptions   Medication Sig Dispense Refill    amLODIPine (NORVASC) 5 mg tablet TAKE 1 TABLET BY MOUTH DAILY FOR HIGH BLOOD PRESSURE 30 Tab 0    metoprolol succinate (TOPROL-XL) 100 mg tablet Take 1 Tab by mouth daily. 30 Tab 2    cyclobenzaprine (FLEXERIL) 5 mg tablet Take 1 Tab by mouth three (3) times daily as needed for Muscle Spasm(s). 30 Tab 2    alum-mag hydroxide-simeth (MYLANTA) 200-200-20 mg/5 mL susp Take 30 mL by mouth four (4) times daily as needed. 200 mL 0    clonazePAM (KLONOPIN) 0.5 mg tablet Take  by mouth nightly as needed.  Take 0.5 qHS and 0.25 mg qAM      oxyCODONE-acetaminophen (PERCOCET) 5-325 mg per tablet Take 1 Tab by Mouth Every 4 Hours As Needed for Pain. DO NOT EXCEED 4000 MG OF ACETAMINOPHEN PER DAY FROM ALL SOURCES (325 MG ACETAMINOPHEN IN EACH PERCOCET TAB)    THIS MEDICATION CAUSES SEDATION AND IMPAIRMENT DO NOT DRIVE OR PERFORM ANY OTHER POTENTIALLY DANGEROUS ACTIVITIES WHILE TAKING    DO NOT TAKE WITH ALCOHOL      SITagliptin (JANUVIA) 50 mg tablet Take 50 mg by mouth daily.  Lancets misc Check fasting blood sugar once daily 1 Each 11       Past History     Past Medical History:  Past Medical History:   Diagnosis Date    Depression     Diabetes ([facility] [address]     Drug-seeking behavior     56 prescriptions from 32 different prescribers at 6 different pharmacies in last 12 months    Herniated lumbar intervertebral disc     Hypertension     Neurological disorder L3,4,5 torn Herniated disc    Obesity (BMI 30.0-34. 9)        Past Surgical History:  Past Surgical History:   Procedure Laterality Date    HX HERNIA REPAIR Bilateral 06/02/2017    robotic repair of bilateral indirect inguinal hernias with placement of mesh    HX ORTHOPAEDIC  trigger finger release       Family History:  Family History   Problem Relation Age of Onset    Hypertension Mother     Diabetes Mother     Heart Failure Mother     COPD Mother     Heart Disease Mother     Hypertension Father     Alcohol abuse Father        Social History:  Social History   Substance Use Topics    Smoking status: Never Smoker    Smokeless tobacco: Never Used    Alcohol use Yes      Comment: rarely       Allergies:  No Known Allergies    Patient's primary care provider (as noted in EPIC):  None    Review of Systems   Constitutional: Negative for chills and fever. HENT: Negative. Negative for sore throat. Eyes: Negative. Respiratory: Negative for shortness of breath. Cardiovascular: Negative for chest pain and palpitations. Gastrointestinal: Positive for abdominal pain, blood in stool and nausea. Negative for diarrhea and vomiting.    Genitourinary: Negative for dysuria. Musculoskeletal: Negative. Skin: Negative. Neurological: Negative for dizziness, syncope, weakness and numbness. Psychiatric/Behavioral: Negative. All other systems reviewed and are negative. Visit Vitals    /82    Pulse 90    Temp 98.6 °F (37 °C)    Resp 14    Ht 5' 10\" (1.778 m)    Wt 100.7 kg (222 lb)    SpO2 97%    BMI 31.85 kg/m2       PHYSICAL EXAM:    CONSTITUTIONAL:  Alert, in no apparent distress;  well developed;  well nourished. HEAD:  Normocephalic, atraumatic. EYES:  EOMI. Non-icteric sclera. Normal conjunctiva. ENTM:  Nose:  no rhinorrhea. Throat:  no erythema or exudate, mucous membranes moist.  NECK:  No JVD. Supple  RESPIRATORY:  Chest clear, equal breath sounds, good air movement. CARDIOVASCULAR:  Regular rate and rhythm. No murmurs, rubs, or gallops. GI:  Normal bowel sounds, abdomen soft and non-tender. No rebound or guarding. Rectal:  Good tone. No external hemorrhoids. Patient deferred rectal exam.     BACK:  Non-tender. UPPER EXT:  Normal inspection. LOWER EXT:  No edema, no calf tenderness. Distal pulses intact. NEURO:  Moves all four extremities, and grossly normal motor exam.  SKIN:  No rashes;  Normal for age. PSYCH:  Alert and normal affect. DIFFERENTIAL DIAGNOSES/ MEDICAL DECISION MAKING:  Rectal/LGIB etiologies include upper GI bleed etiologies, arterial-venous malformation, diverticulosis, colon carcinoma, internal and/or external hemorrhoids, anal fissure, other etiologies or a combination of the above.     Diagnostic Study Results     Abnormal lab results from this emergency department encounter:  Labs Reviewed   CBC WITH AUTOMATED DIFF - Abnormal; Notable for the following:        Result Value    RBC 4.51 (*)     All other components within normal limits   METABOLIC PANEL, COMPREHENSIVE - Abnormal; Notable for the following:     Glucose 215 (*)     BUN/Creatinine ratio 6 (*)     AST (SGOT) 13 (*)     All other components within normal limits   URINALYSIS W/ RFLX MICROSCOPIC - Abnormal; Notable for the following:     Glucose >1000 (*)     Ketone TRACE (*)     All other components within normal limits       Lab values for this patient within approximately the last 12 hours:  Recent Results (from the past 12 hour(s))   CBC WITH AUTOMATED DIFF    Collection Time: 06/10/18 10:25 PM   Result Value Ref Range    WBC 5.5 4.6 - 13.2 K/uL    RBC 4.51 (L) 4.70 - 5.50 M/uL    HGB 13.3 13.0 - 16.0 g/dL    HCT 38.4 36.0 - 48.0 %    MCV 85.1 74.0 - 97.0 FL    MCH 29.5 24.0 - 34.0 PG    MCHC 34.6 31.0 - 37.0 g/dL    RDW 13.2 11.6 - 14.5 %    PLATELET 307 020 - 748 K/uL    MPV 9.9 9.2 - 11.8 FL    NEUTROPHILS 57 40 - 73 %    LYMPHOCYTES 31 21 - 52 %    MONOCYTES 9 3 - 10 %    EOSINOPHILS 2 0 - 5 %    BASOPHILS 1 0 - 2 %    ABS. NEUTROPHILS 3.1 1.8 - 8.0 K/UL    ABS. LYMPHOCYTES 1.7 0.9 - 3.6 K/UL    ABS. MONOCYTES 0.5 0.05 - 1.2 K/UL    ABS. EOSINOPHILS 0.1 0.0 - 0.4 K/UL    ABS. BASOPHILS 0.0 0.0 - 0.06 K/UL    DF AUTOMATED     METABOLIC PANEL, COMPREHENSIVE    Collection Time: 06/10/18 10:25 PM   Result Value Ref Range    Sodium 136 136 - 145 mmol/L    Potassium 4.3 3.5 - 5.5 mmol/L    Chloride 103 100 - 108 mmol/L    CO2 26 21 - 32 mmol/L    Anion gap 7 3.0 - 18 mmol/L    Glucose 215 (H) 74 - 99 mg/dL    BUN 7 7.0 - 18 MG/DL    Creatinine 1.18 0.6 - 1.3 MG/DL    BUN/Creatinine ratio 6 (L) 12 - 20      GFR est AA >60 >60 ml/min/1.73m2    GFR est non-AA >60 >60 ml/min/1.73m2    Calcium 9.2 8.5 - 10.1 MG/DL    Bilirubin, total 0.2 0.2 - 1.0 MG/DL    ALT (SGPT) 23 16 - 61 U/L    AST (SGOT) 13 (L) 15 - 37 U/L    Alk.  phosphatase 71 45 - 117 U/L    Protein, total 7.5 6.4 - 8.2 g/dL    Albumin 3.6 3.4 - 5.0 g/dL    Globulin 3.9 2.0 - 4.0 g/dL    A-G Ratio 0.9 0.8 - 1.7     URINALYSIS W/ RFLX MICROSCOPIC    Collection Time: 06/10/18 10:25 PM   Result Value Ref Range    Color YELLOW      Appearance CLEAR      Specific gravity 1.022 1.005 - 1.030 pH (UA) 7.0 5.0 - 8.0      Protein NEGATIVE  NEG mg/dL    Glucose >1000 (A) NEG mg/dL    Ketone TRACE (A) NEG mg/dL    Bilirubin NEGATIVE  NEG      Blood NEGATIVE  NEG      Urobilinogen 0.2 0.2 - 1.0 EU/dL    Nitrites NEGATIVE  NEG      Leukocyte Esterase NEGATIVE  NEG         Radiologist and cardiologist interpretations if available at time of this note:  No results found. Medication(s) ordered for patient during this emergency visit encounter:  Medications   morphine injection 4 mg (4 mg IntraVENous Given 6/10/18 2359)   ondansetron (ZOFRAN) injection 4 mg (4 mg IntraVENous Given 6/10/18 2359)       Medical Decision Making     I am the first provider for this patient. I reviewed the vital signs, available nursing notes, past medical history, past surgical history, family history and social history. Vital Signs:  Reviewed the patient's vital signs. ED COURSE:      IMPRESSION AND MEDICAL DECISION MAKING:  Based upon the patient's presentation with noted HPI and PE, along with the work   up done in the emergency department, I believe that the patient is having rectal bleeding of uncertain etiology. However, given presentation with stable vital signs and no signs or symptoms of significant blood loss, I am comfortable with discharge home. I believe that the patient can be discharged home and can follow up as an outpatient with patient's primary care doctor and/or gastroenterology. DIAGNOSIS:  1. Rectal bleeding    SPECIFIC PATIENT INSTRUCTIONS FROM THE PHYSICIAN WHO TREATED YOU IN THE ER TODAY:  1. Return if any concerns or worsening of condition(s). 2. Follow up with your primary doctor within the next 1-2 days as a walk in patient to their clinic. 3. Zofran as prescribed for nausea and/or vomiting. Patient is improved, resting quietly and comfortably. The patient will be discharged home.      The patient was reassured that these symptoms do not appear to represent a serious or life threatening condition at this time. Warning signs of worsening condition were discussed and understood by the patient. Based on patient's age, coexisting illness, exam, and the results of this ED evaluation, the decision to treat as an outpatient was made. Based on the information available at time of discharge, acute pathology requiring immediate intervention was deemed relative unlikely. While it is impossible to completely exclude the possibility of underlying serious disease or worsening of condition, I feel the relative likelihood is extremely low. I discussed this uncertainty with the patient, who understood ED evaluation and treatment and felt comfortable with the outpatient treatment plan. All questions regarding care, test results, and follow up were answered. The patient is stable and appropriate to discharge. They understand that they should return to the emergency department for any new or worsening symptoms. I stressed the importance of follow up for repeat assessment and possibly further evaluation/treatment. Coding Diagnoses     Clinical Impression:   1. Rectal bleeding    2. Abdominal pain, generalized    3. Nausea without vomiting        Disposition     Disposition:  Home. Rodolfo Severino M.D. PATRICA Board Certified Emergency Physician    Provider Attestation:  If a scribe was utilized in generation of this patient record, I personally performed the services described in the documentation, reviewed the documentation, as recorded by the scribe in my presence, and it accurately records the patient's history of presenting illness, review of systems, patient physical examination, and procedures performed by me as the attending physician. RUPESH Peterson Board Certified Emergency Physician  6/10/2018.  11:35 PM    Scribdiaz Navarrete acting as a scribe for and in the presence of Jose Mitchell MD      Senia 10, 2018 at 11:45 PM

## 2018-06-12 ENCOUNTER — TELEPHONE (OUTPATIENT)
Dept: FAMILY MEDICINE CLINIC | Age: 43
End: 2018-06-12

## 2018-06-12 NOTE — TELEPHONE ENCOUNTER
Patient is requesting if the mg can be raised for the prescription of cyclobenzaprine.  Stated that the 10mg he is taking now is not doing much anymore

## 2018-06-14 ENCOUNTER — TELEPHONE (OUTPATIENT)
Dept: FAMILY MEDICINE CLINIC | Age: 43
End: 2018-06-14

## 2018-06-14 RX ORDER — CYCLOBENZAPRINE HCL 5 MG
5 TABLET ORAL
Qty: 30 TAB | Refills: 2 | OUTPATIENT
Start: 2018-06-14

## 2018-06-14 RX ORDER — CYCLOBENZAPRINE HCL 10 MG
10 TABLET ORAL
Qty: 90 TAB | Refills: 1 | Status: SHIPPED | OUTPATIENT
Start: 2018-06-14 | End: 2018-08-08 | Stop reason: SDUPTHER

## 2018-06-14 NOTE — TELEPHONE ENCOUNTER
Requested Prescriptions     Pending Prescriptions Disp Refills    cyclobenzaprine (FLEXERIL) 5 mg tablet 30 Tab 2     Sig: Take 1 Tab by mouth three (3) times daily as needed for Muscle Spasm(s). [id number] called on behalf of patient. Stated that they need an approval to raise the prescription of FLEXERIL to 10mg. Stated that patient is going on vacation tomorrow and is wondering if he'll be able to get it today.

## 2018-06-14 NOTE — TELEPHONE ENCOUNTER
Pt is requesting 10 mg Flexeril. Pt states 5 mg is no longer working for him. I advised pt that I would send request to Dr. Greta Urias.  Pt verbalized understanding and had no further questions or concerns

## 2018-06-30 ENCOUNTER — HOSPITAL ENCOUNTER (EMERGENCY)
Age: 43
Discharge: HOME | End: 2018-06-30
Attending: EMERGENCY MEDICINE
Payer: SELF-PAY

## 2018-06-30 VITALS
RESPIRATION RATE: 16 BRPM | HEART RATE: 94 BPM | OXYGEN SATURATION: 100 % | TEMPERATURE: 98.6 F | HEIGHT: 70 IN | BODY MASS INDEX: 32.93 KG/M2 | SYSTOLIC BLOOD PRESSURE: 132 MMHG | WEIGHT: 230 LBS | DIASTOLIC BLOOD PRESSURE: 79 MMHG

## 2018-06-30 DIAGNOSIS — R21 RASH AND OTHER NONSPECIFIC SKIN ERUPTION: ICD-10-CM

## 2018-06-30 DIAGNOSIS — M13.0 POLYARTHRITIS: Primary | ICD-10-CM

## 2018-06-30 LAB
ALBUMIN SERPL-MCNC: 3.8 G/DL (ref 3.4–5)
ALBUMIN/GLOB SERPL: 0.8 {RATIO} (ref 0.8–1.7)
ALP SERPL-CCNC: 83 U/L (ref 45–117)
ALT SERPL-CCNC: 21 U/L (ref 16–61)
ANION GAP SERPL CALC-SCNC: 4 MMOL/L (ref 3–18)
AST SERPL-CCNC: 13 U/L (ref 15–37)
BASOPHILS # BLD: 0 K/UL (ref 0–0.06)
BASOPHILS NFR BLD: 0 % (ref 0–2)
BILIRUB SERPL-MCNC: 0.3 MG/DL (ref 0.2–1)
BUN SERPL-MCNC: 8 MG/DL (ref 7–18)
BUN/CREAT SERPL: 7 (ref 12–20)
CALCIUM SERPL-MCNC: 8.9 MG/DL (ref 8.5–10.1)
CHLORIDE SERPL-SCNC: 103 MMOL/L (ref 100–108)
CO2 SERPL-SCNC: 28 MMOL/L (ref 21–32)
CREAT SERPL-MCNC: 1.12 MG/DL (ref 0.6–1.3)
CRP SERPL-MCNC: 0.4 MG/DL (ref 0–0.3)
DIFFERENTIAL METHOD BLD: ABNORMAL
EOSINOPHIL # BLD: 0.1 K/UL (ref 0–0.4)
EOSINOPHIL NFR BLD: 1 % (ref 0–5)
ERYTHROCYTE [DISTWIDTH] IN BLOOD BY AUTOMATED COUNT: 13.6 % (ref 11.6–14.5)
ERYTHROCYTE [SEDIMENTATION RATE] IN BLOOD: 42 MM/HR (ref 0–15)
GLOBULIN SER CALC-MCNC: 4.6 G/DL (ref 2–4)
GLUCOSE SERPL-MCNC: 159 MG/DL (ref 74–99)
HCT VFR BLD AUTO: 40.8 % (ref 36–48)
HGB BLD-MCNC: 14 G/DL (ref 13–16)
LYMPHOCYTES # BLD: 1.5 K/UL (ref 0.9–3.6)
LYMPHOCYTES NFR BLD: 22 % (ref 21–52)
MCH RBC QN AUTO: 29.4 PG (ref 24–34)
MCHC RBC AUTO-ENTMCNC: 34.3 G/DL (ref 31–37)
MCV RBC AUTO: 85.7 FL (ref 74–97)
MONOCYTES # BLD: 0.5 K/UL (ref 0.05–1.2)
MONOCYTES NFR BLD: 7 % (ref 3–10)
NEUTS SEG # BLD: 4.7 K/UL (ref 1.8–8)
NEUTS SEG NFR BLD: 70 % (ref 40–73)
PLATELET # BLD AUTO: 273 K/UL (ref 135–420)
PMV BLD AUTO: 9.1 FL (ref 9.2–11.8)
POTASSIUM SERPL-SCNC: 4.2 MMOL/L (ref 3.5–5.5)
PROT SERPL-MCNC: 8.4 G/DL (ref 6.4–8.2)
RBC # BLD AUTO: 4.76 M/UL (ref 4.7–5.5)
SODIUM SERPL-SCNC: 135 MMOL/L (ref 136–145)
WBC # BLD AUTO: 6.8 K/UL (ref 4.6–13.2)

## 2018-06-30 PROCEDURE — 74011250637 HC RX REV CODE- 250/637: Performed by: EMERGENCY MEDICINE

## 2018-06-30 PROCEDURE — 86140 C-REACTIVE PROTEIN: CPT | Performed by: EMERGENCY MEDICINE

## 2018-06-30 PROCEDURE — 86618 LYME DISEASE ANTIBODY: CPT | Performed by: EMERGENCY MEDICINE

## 2018-06-30 PROCEDURE — 74011250636 HC RX REV CODE- 250/636: Performed by: EMERGENCY MEDICINE

## 2018-06-30 PROCEDURE — 85025 COMPLETE CBC W/AUTO DIFF WBC: CPT | Performed by: NURSE PRACTITIONER

## 2018-06-30 PROCEDURE — 86757 RICKETTSIA ANTIBODY: CPT | Performed by: EMERGENCY MEDICINE

## 2018-06-30 PROCEDURE — 99283 EMERGENCY DEPT VISIT LOW MDM: CPT

## 2018-06-30 PROCEDURE — 80053 COMPREHEN METABOLIC PANEL: CPT | Performed by: NURSE PRACTITIONER

## 2018-06-30 PROCEDURE — 85652 RBC SED RATE AUTOMATED: CPT | Performed by: EMERGENCY MEDICINE

## 2018-06-30 RX ORDER — DOXYCYCLINE 100 MG/1
100 CAPSULE ORAL
Status: COMPLETED | OUTPATIENT
Start: 2018-06-30 | End: 2018-06-30

## 2018-06-30 RX ORDER — DOXYCYCLINE 100 MG/1
100 CAPSULE ORAL 2 TIMES DAILY
Qty: 14 CAP | Refills: 0 | Status: SHIPPED | OUTPATIENT
Start: 2018-06-30 | End: 2018-07-10

## 2018-06-30 RX ORDER — IBUPROFEN 400 MG/1
800 TABLET ORAL
Status: COMPLETED | OUTPATIENT
Start: 2018-06-30 | End: 2018-06-30

## 2018-06-30 RX ORDER — DEXAMETHASONE 4 MG/1
10 TABLET ORAL
Status: COMPLETED | OUTPATIENT
Start: 2018-06-30 | End: 2018-06-30

## 2018-06-30 RX ADMIN — DEXAMETHASONE 10 MG: 4 TABLET ORAL at 17:17

## 2018-06-30 RX ADMIN — IBUPROFEN 800 MG: 400 TABLET ORAL at 17:46

## 2018-06-30 RX ADMIN — DOXYCYCLINE 100 MG: 100 CAPSULE ORAL at 17:16

## 2018-06-30 NOTE — ED PROVIDER NOTES
EMERGENCY DEPARTMENT HISTORY AND PHYSICAL EXAM    4:45 PM      Date: 6/30/2018  Patient Name: Kaykay Wagner    History of Presenting Illness     Chief Complaint   Patient presents with    Rash    Joint Pain    Ankle swelling         History Provided By: Patient    Chief Complaint: Rash  Duration: Yesterday  Timing:  Worsening  Location: Feet, ankles, knees bilaterally  Quality: Throbbing. Not itchy  Severity: N/A  Modifying Factors:   Associated Symptoms: Left flank pain. Denies fever and vomiting      Additional History (Context): Kayaky Wagner is a 36 y.o. male who presents with a worsening rash yesterday of the feet, ankles, and knees bilaterally. The patient states his joints feel like they are throbbing and states his rash is not itchy. He has an associated sign of left flank pain. He denies fever and vomiting. PCP: None    Current Outpatient Prescriptions   Medication Sig Dispense Refill    doxycycline (MONODOX) 100 mg capsule Take 1 Cap by mouth two (2) times a day for 10 days. 14 Cap 0    cyclobenzaprine (FLEXERIL) 10 mg tablet Take 1 Tab by mouth three (3) times daily as needed for Muscle Spasm(s). 90 Tab 1    ondansetron hcl (ZOFRAN) 4 mg tablet Take 2 Tabs by mouth every eight (8) hours as needed for Nausea. 12 Tab 0    amLODIPine (NORVASC) 5 mg tablet TAKE 1 TABLET BY MOUTH DAILY FOR HIGH BLOOD PRESSURE 30 Tab 0    metoprolol succinate (TOPROL-XL) 100 mg tablet Take 1 Tab by mouth daily. 30 Tab 2    alum-mag hydroxide-simeth (MYLANTA) 200-200-20 mg/5 mL susp Take 30 mL by mouth four (4) times daily as needed. 200 mL 0    clonazePAM (KLONOPIN) 0.5 mg tablet Take  by mouth nightly as needed. Take 0.5 qHS and 0.25 mg qAM      oxyCODONE-acetaminophen (PERCOCET) 5-325 mg per tablet Take 1 Tab by Mouth Every 4 Hours As Needed for Pain.  DO NOT EXCEED 4000 MG OF ACETAMINOPHEN PER DAY FROM ALL SOURCES (325 MG ACETAMINOPHEN IN EACH PERCOCET TAB)    THIS MEDICATION CAUSES SEDATION AND IMPAIRMENT DO NOT DRIVE OR PERFORM ANY OTHER POTENTIALLY DANGEROUS ACTIVITIES WHILE TAKING    DO NOT TAKE WITH ALCOHOL      SITagliptin (JANUVIA) 50 mg tablet Take 50 mg by mouth daily.  Lancets misc Check fasting blood sugar once daily 1 Each 11       Past History     Past Medical History:  Past Medical History:   Diagnosis Date    Depression     Diabetes ([address]     Drug-seeking behavior     56 prescriptions from 32 different prescribers at 6 different pharmacies in last 12 months    Herniated lumbar intervertebral disc     Hypertension     Neurological disorder L3,4,5 torn Herniated disc    Obesity (BMI 30.0-34. 9)        Past Surgical History:  Past Surgical History:   Procedure Laterality Date    HX HERNIA REPAIR Bilateral 06/02/2017    robotic repair of bilateral indirect inguinal hernias with placement of mesh    HX ORTHOPAEDIC  trigger finger release       Family History:  Family History   Problem Relation Age of Onset    Hypertension Mother     Diabetes Mother     Heart Failure Mother     COPD Mother     Heart Disease Mother     Hypertension Father     Alcohol abuse Father        Social History:  Social History   Substance Use Topics    Smoking status: Never Smoker    Smokeless tobacco: Never Used    Alcohol use Yes      Comment: rarely       Allergies:  No Known Allergies      Review of Systems       Review of Systems   Constitutional: Positive for appetite change. Negative for fever. Gastrointestinal: Negative for vomiting. Skin: Positive for rash. All other systems reviewed and are negative. Physical Exam     Visit Vitals    /79    Pulse 94    Temp 98.6 °F (37 °C)    Resp 16    Ht 5' 10\" (1.778 m)    Wt 104.3 kg (230 lb)    SpO2 100%    BMI 33 kg/m2         Physical Exam   Constitutional: He is oriented to person, place, and time. He appears well-developed and well-nourished. No distress. HENT:   Head: Normocephalic and atraumatic.    Mouth/Throat: Oropharynx is clear and moist.   Eyes: Conjunctivae and EOM are normal. Pupils are equal, round, and reactive to light. No scleral icterus. Neck: Normal range of motion. Neck supple. Cardiovascular: Normal rate, regular rhythm and normal heart sounds. No murmur heard. Pulmonary/Chest: Effort normal and breath sounds normal. No respiratory distress. Abdominal: Soft. Bowel sounds are normal. He exhibits no distension. There is no tenderness. Musculoskeletal: He exhibits no edema. Bilaterally ankle tenderness. Lymphadenopathy:     He has no cervical adenopathy. Neurological: He is alert and oriented to person, place, and time. Coordination normal.   Skin: Skin is warm and dry. Petechiae rash across feet bilaterally. Macular blanching rash across legs bilaterally. Minimal warmth. Psychiatric: He has a normal mood and affect. His behavior is normal.   Nursing note and vitals reviewed. Diagnostic Study Results     Labs -  Recent Results (from the past 12 hour(s))   CBC WITH AUTOMATED DIFF    Collection Time: 06/30/18  3:15 PM   Result Value Ref Range    WBC 6.8 4.6 - 13.2 K/uL    RBC 4.76 4.70 - 5.50 M/uL    HGB 14.0 13.0 - 16.0 g/dL    HCT 40.8 36.0 - 48.0 %    MCV 85.7 74.0 - 97.0 FL    MCH 29.4 24.0 - 34.0 PG    MCHC 34.3 31.0 - 37.0 g/dL    RDW 13.6 11.6 - 14.5 %    PLATELET 854 441 - 825 K/uL    MPV 9.1 (L) 9.2 - 11.8 FL    NEUTROPHILS 70 40 - 73 %    LYMPHOCYTES 22 21 - 52 %    MONOCYTES 7 3 - 10 %    EOSINOPHILS 1 0 - 5 %    BASOPHILS 0 0 - 2 %    ABS. NEUTROPHILS 4.7 1.8 - 8.0 K/UL    ABS. LYMPHOCYTES 1.5 0.9 - 3.6 K/UL    ABS. MONOCYTES 0.5 0.05 - 1.2 K/UL    ABS. EOSINOPHILS 0.1 0.0 - 0.4 K/UL    ABS.  BASOPHILS 0.0 0.0 - 0.06 K/UL    DF AUTOMATED     METABOLIC PANEL, COMPREHENSIVE    Collection Time: 06/30/18  3:15 PM   Result Value Ref Range    Sodium 135 (L) 136 - 145 mmol/L    Potassium 4.2 3.5 - 5.5 mmol/L    Chloride 103 100 - 108 mmol/L    CO2 28 21 - 32 mmol/L    Anion gap 4 3.0 - 18 mmol/L    Glucose 159 (H) 74 - 99 mg/dL    BUN 8 7.0 - 18 MG/DL    Creatinine 1.12 0.6 - 1.3 MG/DL    BUN/Creatinine ratio 7 (L) 12 - 20      GFR est AA >60 >60 ml/min/1.73m2    GFR est non-AA >60 >60 ml/min/1.73m2    Calcium 8.9 8.5 - 10.1 MG/DL    Bilirubin, total 0.3 0.2 - 1.0 MG/DL    ALT (SGPT) 21 16 - 61 U/L    AST (SGOT) 13 (L) 15 - 37 U/L    Alk. phosphatase 83 45 - 117 U/L    Protein, total 8.4 (H) 6.4 - 8.2 g/dL    Albumin 3.8 3.4 - 5.0 g/dL    Globulin 4.6 (H) 2.0 - 4.0 g/dL    A-G Ratio 0.8 0.8 - 1.7     SED RATE (ESR)    Collection Time: 06/30/18  5:10 PM   Result Value Ref Range    Sed rate, automated 42 (H) 0 - 15 mm/hr   C REACTIVE PROTEIN, QT    Collection Time: 06/30/18  5:10 PM   Result Value Ref Range    C-Reactive protein 0.4 (H) 0 - 0.3 mg/dL       Radiologic Studies -   No orders to display         Medical Decision Making   I am the first provider for this patient. I reviewed the vital signs, available nursing notes, past medical history, past surgical history, family history and social history. Vital Signs-Reviewed the patient's vital signs.     Pulse Oximetry Analysis -  100% on room air (Interpretation) nl    Cardiac Monitor:  Rate:   Rhythm:  Normal Sinus Rhythm         Records Reviewed: Nursing Notes and Old Medical Records (Time of Review: 4:45 PM)    ED Course: Progress Notes, Reevaluation, and Consults:  Stable in ED     Provider Notes (Medical Decision Making):   MDM  Number of Diagnoses or Management Options  Polyarthritis:   Rash and other nonspecific skin eruption:   Diagnosis management comments: Petechial rash approx 3 -4 cm across 1st metatarsal R foot mild first metatarsal L foot with macular rash across knees and shin R with arthritic pain both ankles and knees denies known insect bite fevers or systemic symptoms     Unclear etiology sed rate crp lyme and RMSF sent treated prophylactically with doxycycline until titers and medical follow up can be obtained Amount and/or Complexity of Data Reviewed  Clinical lab tests: ordered            Diagnosis     Clinical Impression:   1. Polyarthritis    2. Rash and other nonspecific skin eruption        Disposition: home     Follow-up Information     Follow up With Details Comments Contact Info    [facility] EMERGENCY DEPT  As needed, If symptoms worsen [address] [place] [address]    Randolph Stover PA-C Schedule an appointment as soon as possible for a visit for ED follow up appointment [phone]  [phone]             Patient's Medications   Start Taking    DOXYCYCLINE (MONODOX) 100 MG CAPSULE    Take 1 Cap by mouth two (2) times a day for 10 days. Continue Taking    ALUM-MAG HYDROXIDE-SIMETH (MYLANTA) 200-200-20 MG/5 ML SUSP    Take 30 mL by mouth four (4) times daily as needed. AMLODIPINE (NORVASC) 5 MG TABLET    TAKE 1 TABLET BY MOUTH DAILY FOR HIGH BLOOD PRESSURE    CLONAZEPAM (KLONOPIN) 0.5 MG TABLET    Take  by mouth nightly as needed. Take 0.5 qHS and 0.25 mg qAM    CYCLOBENZAPRINE (FLEXERIL) 10 MG TABLET    Take 1 Tab by mouth three (3) times daily as needed for Muscle Spasm(s). LANCETS MISC    Check fasting blood sugar once daily    METOPROLOL SUCCINATE (TOPROL-XL) 100 MG TABLET    Take 1 Tab by mouth daily. ONDANSETRON HCL (ZOFRAN) 4 MG TABLET    Take 2 Tabs by mouth every eight (8) hours as needed for Nausea. OXYCODONE-ACETAMINOPHEN (PERCOCET) 5-325 MG PER TABLET    Take 1 Tab by Mouth Every 4 Hours As Needed for Pain. DO NOT EXCEED 4000 MG OF ACETAMINOPHEN PER DAY FROM ALL SOURCES (325 MG ACETAMINOPHEN IN EACH PERCOCET TAB)    THIS MEDICATION CAUSES SEDATION AND IMPAIRMENT DO NOT DRIVE OR PERFORM ANY OTHER POTENTIALLY DANGEROUS ACTIVITIES WHILE TAKING    DO NOT TAKE WITH ALCOHOL    SITAGLIPTIN (JANUVIA) 50 MG TABLET    Take 50 mg by mouth daily.    These Medications have changed    No medications on file   Stop Taking No medications on file     _______________________________    Attestations:  Jayjay [id number] [address] acting as a scribe for and in the presence of Jovana Schwartz MD      June 30, 2018 at 5:50 PM       Provider Attestation:      I personally performed the services described in the documentation, reviewed the documentation, as recorded by the scribe in my presence, and it accurately and completely records my words and actions.  June 30, 2018 at 5:50 PM - Jovana Schwartz MD    _______________________________

## 2018-06-30 NOTE — DISCHARGE INSTRUCTIONS
Rash: Care Instructions  Your Care Instructions  A rash is any irritation or inflammation of the skin. Rashes have many possible causes, including allergy, infection, illness, heat, and emotional stress. Follow-up care is a key part of your treatment and safety. Be sure to make and go to all appointments, and call your doctor if you are having problems. It's also a good idea to know your test results and keep a list of the medicines you take. How can you care for yourself at home? · Wash the area with water only. Soap can make dryness and itching worse. Pat dry. · Put cold, wet cloths on the rash to reduce itching. · Keep cool, and stay out of the sun. · Leave the rash open to the air as much of the time as possible. · Sometimes petroleum jelly (Vaseline) can help relieve the discomfort caused by a rash. A moisturizing lotion, such as Cetaphil, also may help. Calamine lotion may help for rashes caused by contact with something (such as a plant or soap) that irritated the skin. Use it 3 or 4 times a day. · If your doctor prescribed a cream, use it as directed. If your doctor prescribed medicine, take it exactly as directed. · If your rash itches so badly that it interferes with your normal activities, take an over-the-counter antihistamine, such as diphenhydramine (Benadryl) or loratadine (Claritin). Read and follow all instructions on the label. When should you call for help? Call your doctor now or seek immediate medical care if:  ? · You have signs of infection, such as:  ¨ Increased pain, swelling, warmth, or redness. ¨ Red streaks leading from the area. ¨ Pus draining from the area. ¨ A fever. ? · You have joint pain along with the rash. ? Watch closely for changes in your health, and be sure to contact your doctor if:  ? · Your rash is changing or getting worse. For example, call if you have pain along with the rash, the rash is spreading, or you have new blisters.    ? · You do not get better after 1 week. Where can you learn more? Go to http://jacklyn-denice.info/. Enter M612 in the search box to learn more about \"Rash: Care Instructions. \"  Current as of: October 13, 2016  Content Version: 11.4  © 3348-2566 [employer]. Care instructions adapted under license by [employer] (which disclaims liability or warranty for this information). If you have questions about a medical condition or this instruction, always ask your healthcare professional. [address] any warranty or liability for your use of this information.

## 2018-06-30 NOTE — ED NOTES
I performed a brief evaluation, including history and physical, of the patient here in triage and I have determined that pt will need further treatment and evaluation from the main side ER physician. I have placed initial orders to help in expediting patients care.      June 30, 2018 at 2:57 PM - Viktoriya Rondon NP        Visit Vitals    /85 (BP 1 Location: Right arm, BP Patient Position: At rest)    Pulse 94    Temp 98.6 °F (37 °C)    Resp 16    Ht 5' 10\" (1.778 m)    Wt 104.3 kg (230 lb)    SpO2 100%    BMI 33 kg/m2

## 2018-07-04 LAB
B BURGDOR IGG+IGM SER-ACNC: <0.91 ISR (ref 0–0.9)
R RICKETTSI IGM SER-ACNC: 1.01 INDEX (ref 0–0.89)

## 2018-08-09 RX ORDER — CYCLOBENZAPRINE HCL 10 MG
TABLET ORAL
Qty: 90 TAB | Refills: 0 | Status: SHIPPED | OUTPATIENT
Start: 2018-08-09 | End: 2018-09-05 | Stop reason: SDUPTHER

## 2018-08-14 ENCOUNTER — DOCUMENTATION ONLY (OUTPATIENT)
Dept: FAMILY MEDICINE CLINIC | Age: 43
End: 2018-08-14

## 2018-08-14 NOTE — PROGRESS NOTES
Prior Authorization for Umesh started on 8/14/2018 via faxed form to [place] [employer]. Awaiting reply from pt's insurance company via fax/e-mail. Allow 48-72 hours.

## 2018-08-16 ENCOUNTER — DOCUMENTATION ONLY (OUTPATIENT)
Dept: FAMILY MEDICINE CLINIC | Age: 43
End: 2018-08-16

## 2018-08-16 NOTE — PROGRESS NOTES
Prior Authorization for Januvia completed and approved by patient's insurance from 08/14/18 through 08/14/19.

## 2018-08-26 ENCOUNTER — HOSPITAL ENCOUNTER (EMERGENCY)
Age: 43
Discharge: HOME | End: 2018-08-27
Attending: EMERGENCY MEDICINE | Admitting: EMERGENCY MEDICINE
Payer: SELF-PAY

## 2018-08-26 DIAGNOSIS — R10.84 ABDOMINAL PAIN, GENERALIZED: Primary | ICD-10-CM

## 2018-08-26 LAB
APPEARANCE UR: CLEAR
BASOPHILS # BLD: 0 K/UL (ref 0–0.1)
BASOPHILS NFR BLD: 0 % (ref 0–2)
BILIRUB UR QL: NEGATIVE
COLOR UR: YELLOW
DIFFERENTIAL METHOD BLD: NORMAL
EOSINOPHIL # BLD: 0.1 K/UL (ref 0–0.4)
EOSINOPHIL NFR BLD: 1 % (ref 0–5)
ERYTHROCYTE [DISTWIDTH] IN BLOOD BY AUTOMATED COUNT: 13 % (ref 11.6–14.5)
GLUCOSE UR STRIP.AUTO-MCNC: 500 MG/DL
HCT VFR BLD AUTO: 40.4 % (ref 36–48)
HGB BLD-MCNC: 14.1 G/DL (ref 13–16)
HGB UR QL STRIP: NEGATIVE
KETONES UR QL STRIP.AUTO: ABNORMAL MG/DL
LEUKOCYTE ESTERASE UR QL STRIP.AUTO: NEGATIVE
LYMPHOCYTES # BLD: 1.7 K/UL (ref 0.9–3.6)
LYMPHOCYTES NFR BLD: 27 % (ref 21–52)
MCH RBC QN AUTO: 29.7 PG (ref 24–34)
MCHC RBC AUTO-ENTMCNC: 34.9 G/DL (ref 31–37)
MCV RBC AUTO: 85.1 FL (ref 74–97)
MONOCYTES # BLD: 0.3 K/UL (ref 0.05–1.2)
MONOCYTES NFR BLD: 5 % (ref 3–10)
NEUTS SEG # BLD: 4.2 K/UL (ref 1.8–8)
NEUTS SEG NFR BLD: 67 % (ref 40–73)
NITRITE UR QL STRIP.AUTO: NEGATIVE
PH UR STRIP: 6.5 [PH] (ref 5–8)
PLATELET # BLD AUTO: 255 K/UL (ref 135–420)
PMV BLD AUTO: 9.5 FL (ref 9.2–11.8)
PROT UR STRIP-MCNC: NEGATIVE MG/DL
RBC # BLD AUTO: 4.75 M/UL (ref 4.7–5.5)
SP GR UR REFRACTOMETRY: 1.02 (ref 1–1.03)
UROBILINOGEN UR QL STRIP.AUTO: 0.2 EU/DL (ref 0.2–1)
WBC # BLD AUTO: 6.3 K/UL (ref 4.6–13.2)

## 2018-08-26 PROCEDURE — 80053 COMPREHEN METABOLIC PANEL: CPT | Performed by: EMERGENCY MEDICINE

## 2018-08-26 PROCEDURE — 99283 EMERGENCY DEPT VISIT LOW MDM: CPT

## 2018-08-26 PROCEDURE — 83690 ASSAY OF LIPASE: CPT | Performed by: EMERGENCY MEDICINE

## 2018-08-26 PROCEDURE — 81003 URINALYSIS AUTO W/O SCOPE: CPT | Performed by: EMERGENCY MEDICINE

## 2018-08-26 PROCEDURE — 85025 COMPLETE CBC W/AUTO DIFF WBC: CPT | Performed by: EMERGENCY MEDICINE

## 2018-08-27 VITALS
BODY MASS INDEX: 31.92 KG/M2 | OXYGEN SATURATION: 98 % | HEIGHT: 70 IN | SYSTOLIC BLOOD PRESSURE: 140 MMHG | WEIGHT: 223 LBS | DIASTOLIC BLOOD PRESSURE: 98 MMHG | RESPIRATION RATE: 15 BRPM | TEMPERATURE: 98.2 F | HEART RATE: 108 BPM

## 2018-08-27 LAB
ALBUMIN SERPL-MCNC: 3.5 G/DL (ref 3.4–5)
ALBUMIN/GLOB SERPL: 0.8 {RATIO} (ref 0.8–1.7)
ALP SERPL-CCNC: 91 U/L (ref 45–117)
ALT SERPL-CCNC: 25 U/L (ref 16–61)
ANION GAP SERPL CALC-SCNC: 8 MMOL/L (ref 3–18)
AST SERPL-CCNC: 18 U/L (ref 15–37)
BILIRUB SERPL-MCNC: 0.2 MG/DL (ref 0.2–1)
BUN SERPL-MCNC: 13 MG/DL (ref 7–18)
BUN/CREAT SERPL: 13 (ref 12–20)
CALCIUM SERPL-MCNC: 8.7 MG/DL (ref 8.5–10.1)
CHLORIDE SERPL-SCNC: 105 MMOL/L (ref 100–108)
CO2 SERPL-SCNC: 25 MMOL/L (ref 21–32)
CREAT SERPL-MCNC: 1.03 MG/DL (ref 0.6–1.3)
GLOBULIN SER CALC-MCNC: 4.3 G/DL (ref 2–4)
GLUCOSE SERPL-MCNC: 199 MG/DL (ref 74–99)
LIPASE SERPL-CCNC: 238 U/L (ref 73–393)
POTASSIUM SERPL-SCNC: 3.2 MMOL/L (ref 3.5–5.5)
PROT SERPL-MCNC: 7.8 G/DL (ref 6.4–8.2)
SODIUM SERPL-SCNC: 138 MMOL/L (ref 136–145)

## 2018-08-27 PROCEDURE — 74011250637 HC RX REV CODE- 250/637: Performed by: EMERGENCY MEDICINE

## 2018-08-27 RX ORDER — DIPHENHYDRAMINE HCL 50 MG
50 CAPSULE ORAL
Status: DISCONTINUED | OUTPATIENT
Start: 2018-08-27 | End: 2018-08-27

## 2018-08-27 RX ORDER — DICYCLOMINE HYDROCHLORIDE 10 MG/1
20 CAPSULE ORAL 4 TIMES DAILY
Qty: 16 CAP | Refills: 0 | Status: SHIPPED | OUTPATIENT
Start: 2018-08-27 | End: 2018-11-20 | Stop reason: ALTCHOICE

## 2018-08-27 RX ORDER — DICYCLOMINE HYDROCHLORIDE 10 MG/1
20 CAPSULE ORAL
Status: COMPLETED | OUTPATIENT
Start: 2018-08-27 | End: 2018-08-27

## 2018-08-27 RX ADMIN — DICYCLOMINE HYDROCHLORIDE 20 MG: 10 CAPSULE ORAL at 02:07

## 2018-08-27 NOTE — ED PROVIDER NOTES
EMERGENCY DEPARTMENT HISTORY AND PHYSICAL EXAM    1:50 AM      Date: 8/26/2018  Patient Name: Kaykay Wagner    History of Presenting Illness     Chief Complaint   Patient presents with    Abdominal Pain         History Provided By: Patient    Chief Complaint: Abd pain  Duration:  4 days  Timing:  Worsening  Location: lower abd  Quality: Cramping  Severity: 9 out of 10  Modifying Factors: v  Associated Symptoms: nausea, diarrhea, low grade fever. Denies vomiting and dysuria. Additional History (Context): Kaykay Wagner is a 36 y.o. male with diabetes, hypertension and obesity who presents with 9/10 lower abd pain and cramping that worsened 4 days ago . Associated sx are nausea, diarrhea, low grade fever. Denies vomiting and dysuria. He saw Dr. Gricelda Vee 3 months ago. He was given bentyl a few months ago after his colonoscopy. He has taken Ibuprofen, tylenol, and aleve with no relief of sx. Pt drinks but does not smoke. PCP: None      Past History     Past Medical History:  Past Medical History:   Diagnosis Date    Depression     Diabetes ([facility] [address]     Drug-seeking behavior     56 prescriptions from 32 different prescribers at 6 different pharmacies in last 12 months    Herniated lumbar intervertebral disc     Hypertension     Neurological disorder L3,4,5 torn Herniated disc    Obesity (BMI 30.0-34. 9)        Past Surgical History:  Past Surgical History:   Procedure Laterality Date    HX HERNIA REPAIR Bilateral 06/02/2017    robotic repair of bilateral indirect inguinal hernias with placement of mesh    HX ORTHOPAEDIC  trigger finger release       Family History:  Family History   Problem Relation Age of Onset    Hypertension Mother     Diabetes Mother     Heart Failure Mother     COPD Mother     Heart Disease Mother     Hypertension Father     Alcohol abuse Father        Social History:  Social History   Substance Use Topics    Smoking status: Never Smoker    Smokeless tobacco: Never Used    Alcohol use Yes      Comment: rarely       Allergies:  No Known Allergies      Review of Systems       Review of Systems   Constitutional: Positive for fever. HENT: Negative for trouble swallowing. Respiratory: Negative for shortness of breath. Cardiovascular: Negative for chest pain. Gastrointestinal: Positive for abdominal pain, diarrhea and nausea. Negative for vomiting. Genitourinary: Negative for difficulty urinating and dysuria. Musculoskeletal: Negative for back pain and neck pain. Skin: Negative for wound. Neurological: Negative for syncope. Psychiatric/Behavioral: Negative for behavioral problems. All other systems reviewed and are negative. Physical Exam     Visit Vitals    BP (!) 147/120 (BP 1 Location: Right arm, BP Patient Position: Sitting)    Pulse (!) 130    Temp 98.2 °F (36.8 °C)    Resp 15    Ht 5' 10\" (1.778 m)    Wt 101.2 kg (223 lb)    SpO2 97%    BMI 32 kg/m2         Physical Exam   Constitutional: He is oriented to person, place, and time. He appears well-developed. No distress. well-appearing, nad   HENT:   Head: Normocephalic and atraumatic. Eyes: EOM are normal.   Neck: Normal range of motion. Cardiovascular: Normal rate and intact distal pulses. Pulmonary/Chest: Effort normal and breath sounds normal. No respiratory distress. Abdominal: Soft. Distention: mild, generalized. There is tenderness. Musculoskeletal: Normal range of motion. Mechanically stable   Neurological: He is alert and oriented to person, place, and time. No focal deficits noted   Skin: Skin is warm. Psychiatric: His behavior is normal.   Nursing note and vitals reviewed.         Diagnostic Study Results     Labs -  Recent Results (from the past 12 hour(s))   CBC WITH AUTOMATED DIFF    Collection Time: 08/26/18 11:30 PM   Result Value Ref Range    WBC 6.3 4.6 - 13.2 K/uL    RBC 4.75 4.70 - 5.50 M/uL    HGB 14.1 13.0 - 16.0 g/dL    HCT 40.4 36.0 - 48.0 %    MCV 85.1 74.0 - 97.0 FL    MCH 29.7 24.0 - 34.0 PG    MCHC 34.9 31.0 - 37.0 g/dL    RDW 13.0 11.6 - 14.5 %    PLATELET 432 845 - 930 K/uL    MPV 9.5 9.2 - 11.8 FL    NEUTROPHILS 67 40 - 73 %    LYMPHOCYTES 27 21 - 52 %    MONOCYTES 5 3 - 10 %    EOSINOPHILS 1 0 - 5 %    BASOPHILS 0 0 - 2 %    ABS. NEUTROPHILS 4.2 1.8 - 8.0 K/UL    ABS. LYMPHOCYTES 1.7 0.9 - 3.6 K/UL    ABS. MONOCYTES 0.3 0.05 - 1.2 K/UL    ABS. EOSINOPHILS 0.1 0.0 - 0.4 K/UL    ABS. BASOPHILS 0.0 0.0 - 0.1 K/UL    DF AUTOMATED     METABOLIC PANEL, COMPREHENSIVE    Collection Time: 08/26/18 11:30 PM   Result Value Ref Range    Sodium 138 136 - 145 mmol/L    Potassium 3.2 (L) 3.5 - 5.5 mmol/L    Chloride 105 100 - 108 mmol/L    CO2 25 21 - 32 mmol/L    Anion gap 8 3.0 - 18 mmol/L    Glucose 199 (H) 74 - 99 mg/dL    BUN 13 7.0 - 18 MG/DL    Creatinine 1.03 0.6 - 1.3 MG/DL    BUN/Creatinine ratio 13 12 - 20      GFR est AA >60 >60 ml/min/1.73m2    GFR est non-AA >60 >60 ml/min/1.73m2    Calcium 8.7 8.5 - 10.1 MG/DL    Bilirubin, total 0.2 0.2 - 1.0 MG/DL    ALT (SGPT) 25 16 - 61 U/L    AST (SGOT) 18 15 - 37 U/L    Alk. phosphatase 91 45 - 117 U/L    Protein, total 7.8 6.4 - 8.2 g/dL    Albumin 3.5 3.4 - 5.0 g/dL    Globulin 4.3 (H) 2.0 - 4.0 g/dL    A-G Ratio 0.8 0.8 - 1.7     LIPASE    Collection Time: 08/26/18 11:30 PM   Result Value Ref Range    Lipase 238 73 - 393 U/L   URINALYSIS W/ RFLX MICROSCOPIC    Collection Time: 08/26/18 11:30 PM   Result Value Ref Range    Color YELLOW      Appearance CLEAR      Specific gravity 1.018 1.005 - 1.030      pH (UA) 6.5 5.0 - 8.0      Protein NEGATIVE  NEG mg/dL    Glucose 500 (A) NEG mg/dL    Ketone TRACE (A) NEG mg/dL    Bilirubin NEGATIVE  NEG      Blood NEGATIVE  NEG      Urobilinogen 0.2 0.2 - 1.0 EU/dL    Nitrites NEGATIVE  NEG      Leukocyte Esterase NEGATIVE  NEG         Radiologic Studies -   No orders to display         Medical Decision Making   I am the first provider for this patient.     I reviewed the vital signs, available nursing notes, past medical history, past surgical history, family history and social history. Vital Signs-Reviewed the patient's vital signs. Pulse Oximetry Analysis -  97% on room air    Records Reviewed: Nursing Notes (Time of Review: 1:50 AM)    Provider Notes (Medical Decision Making):  MDM  Number of Diagnoses or Management Options  Abdominal pain, generalized:   Diagnosis management comments: 35 yo CM with PMHx HTN, DM presents with chronic abdominal pain. Examination with mild, generalized tenderness but otherwise unremarkable. Work-up unremarkable. I do not suspect emergent medical condition clinically and pt had negative CT s/p a couple months ago for same symptoms. Discussed results and poc for dc home, trial of bentyl, symptom management, follow-up, return precautions. Amount and/or Complexity of Data Reviewed  Clinical lab tests: ordered and reviewed  Tests in the radiology section of CPT®: reviewed  Review and summarize past medical records: yes  Independent visualization of images, tracings, or specimens: yes    Patient Progress  Patient progress: stable      Medications   dicyclomine (BENTYL) capsule 20 mg (20 mg Oral Given 8/27/18 0207)       Procedures:   Procedures      ED Course: Progress Notes, Reevaluation, and Consults:        Diagnosis     Clinical Impression:   1.  Abdominal pain, generalized        Disposition: HOME    Follow-up Information     Follow up With Details Comments Contact Info    [facility] EMERGENCY DEPT Go to As needed, If symptoms worsen [address] [address] [phone]    [facility] Go in 2 days For Follow up [address]    Lucas Lomeli MD Schedule an appointment as soon as possible for a visit in 2 days For Follow up [id number] [place] [address]  [phone]             Patient's Medications   Start Taking    DICYCLOMINE (BENTYL) 10 MG CAPSULE    Take 2 Caps by mouth four (4) times daily. Continue Taking    ALUM-MAG HYDROXIDE-SIMETH (MYLANTA) 200-200-20 MG/5 ML SUSP    Take 30 mL by mouth four (4) times daily as needed. AMLODIPINE (NORVASC) 5 MG TABLET    TAKE 1 TABLET BY MOUTH DAILY FOR HIGH BLOOD PRESSURE    CLONAZEPAM (KLONOPIN) 0.5 MG TABLET    Take  by mouth nightly as needed. Take 0.5 qHS and 0.25 mg qAM    CYCLOBENZAPRINE (FLEXERIL) 10 MG TABLET    TAKE 1 TABLET BY MOUTH THREE TIMES DAILY AS NEEDED FOR MUSCLE SPASMS    LANCETS MISC    Check fasting blood sugar once daily    METOPROLOL SUCCINATE (TOPROL-XL) 100 MG TABLET    Take 1 Tab by mouth daily. ONDANSETRON HCL (ZOFRAN) 4 MG TABLET    Take 2 Tabs by mouth every eight (8) hours as needed for Nausea. OXYCODONE-ACETAMINOPHEN (PERCOCET) 5-325 MG PER TABLET    Take 1 Tab by Mouth Every 4 Hours As Needed for Pain. DO NOT EXCEED 4000 MG OF ACETAMINOPHEN PER DAY FROM ALL SOURCES (325 MG ACETAMINOPHEN IN EACH PERCOCET TAB)    THIS MEDICATION CAUSES SEDATION AND IMPAIRMENT DO NOT DRIVE OR PERFORM ANY OTHER POTENTIALLY DANGEROUS ACTIVITIES WHILE TAKING    DO NOT TAKE WITH ALCOHOL    SITAGLIPTIN (JANUVIA) 50 MG TABLET    Take 50 mg by mouth daily. These Medications have changed    No medications on file   Stop Taking    No medications on file     _______________________________    Attestations:  [address] acting as a scribe for and in the presence of Cleveland Nascimento MD      August 27, 2018 at 1:50 AM       Provider Attestation:      I personally performed the services described in the documentation, reviewed the documentation, as recorded by the scribe in my presence, and it accurately and completely records my words and actions.  August 27, 2018 at 1:50 AM - Cleveland Nascimento MD    _______________________________

## 2018-08-27 NOTE — DISCHARGE INSTRUCTIONS

## 2018-09-02 RX ORDER — METOPROLOL SUCCINATE 100 MG/1
TABLET, EXTENDED RELEASE ORAL
Qty: 30 TAB | Refills: 0 | Status: SHIPPED | COMMUNITY
Start: 2018-09-02 | End: 2018-12-24 | Stop reason: SDUPTHER

## 2018-09-05 RX ORDER — CYCLOBENZAPRINE HCL 10 MG
TABLET ORAL
Qty: 90 TAB | Refills: 0 | Status: SHIPPED | OUTPATIENT
Start: 2018-09-05 | End: 2018-10-14 | Stop reason: SDUPTHER

## 2018-09-05 NOTE — TELEPHONE ENCOUNTER
Requested Prescriptions     Pending Prescriptions Disp Refills    cyclobenzaprine (FLEXERIL) 10 mg tablet [Pharmacy Med Name: CYCLOBENZAPRINE 10MG TABLETS] 90 Tab 0     Sig: TAKE 1 TABLET BY MOUTH THREE TIMES DAILY AS NEEDED FOR MUSCLE SPASMS

## 2018-09-14 ENCOUNTER — APPOINTMENT (OUTPATIENT)
Dept: GENERAL RADIOLOGY | Age: 43
End: 2018-09-14
Attending: EMERGENCY MEDICINE
Payer: SELF-PAY

## 2018-09-14 ENCOUNTER — HOSPITAL ENCOUNTER (EMERGENCY)
Age: 43
Discharge: HOME | End: 2018-09-14
Attending: EMERGENCY MEDICINE
Payer: SELF-PAY

## 2018-09-14 VITALS
HEART RATE: 98 BPM | DIASTOLIC BLOOD PRESSURE: 108 MMHG | SYSTOLIC BLOOD PRESSURE: 152 MMHG | OXYGEN SATURATION: 98 % | HEIGHT: 70 IN | BODY MASS INDEX: 31.5 KG/M2 | RESPIRATION RATE: 16 BRPM | WEIGHT: 220 LBS | TEMPERATURE: 98.3 F

## 2018-09-14 DIAGNOSIS — M79.671 RIGHT FOOT PAIN: Primary | ICD-10-CM

## 2018-09-14 PROCEDURE — 99283 EMERGENCY DEPT VISIT LOW MDM: CPT

## 2018-09-14 PROCEDURE — 74011250637 HC RX REV CODE- 250/637: Performed by: EMERGENCY MEDICINE

## 2018-09-14 PROCEDURE — 73620 X-RAY EXAM OF FOOT: CPT

## 2018-09-14 RX ORDER — ACETAMINOPHEN 325 MG/1
975 TABLET ORAL
Status: COMPLETED | OUTPATIENT
Start: 2018-09-14 | End: 2018-09-14

## 2018-09-14 RX ADMIN — ACETAMINOPHEN 975 MG: 325 TABLET, FILM COATED ORAL at 14:25

## 2018-09-14 NOTE — ED NOTES
I have reviewed discharge instructions with the patient. The patient verbalized understanding. All questions answered. Patient armband removed and shredded.  patient ambulated independently out of care area

## 2018-09-14 NOTE — ED PROVIDER NOTES
EMERGENCY DEPARTMENT HISTORY AND PHYSICAL EXAM    1:53 PM      Date: 9/14/2018  Patient Name: Renetta Rice    History of Presenting Illness     Chief Complaint   Patient presents with    Rash    Foot Pain         HPI: Renetta Rice is a 36 y.o. male with a PMHx of DM and HTN that the pt states is controlled who presents with intermittent, moderate, sharp/shooting right foot pain onset x 1.5 months ago with no associated Sx. Pt states the pain occasionally radiates up to the ankle. Pt has used 800mg of ibuprofen and ice with no significant relief. Pt denies foot trauma/ injury and the pain does not radiate up to the knee. Pt currently takes gabapentin everyday. Denies swelling of foot, SOB, and fever. No further concerns or complaints at this time. PCP: None    Current Outpatient Prescriptions   Medication Sig Dispense Refill    cyclobenzaprine (FLEXERIL) 10 mg tablet TAKE 1 TABLET BY MOUTH THREE TIMES DAILY AS NEEDED FOR MUSCLE SPASMS 90 Tab 0    metoprolol succinate (TOPROL-XL) 100 mg tablet TAKE 1 TABLET BY MOUTH DAILY 30 Tab 0    dicyclomine (BENTYL) 10 mg capsule Take 2 Caps by mouth four (4) times daily. 16 Cap 0    amLODIPine (NORVASC) 5 mg tablet TAKE 1 TABLET BY MOUTH DAILY FOR HIGH BLOOD PRESSURE 30 Tab 5    ondansetron hcl (ZOFRAN) 4 mg tablet Take 2 Tabs by mouth every eight (8) hours as needed for Nausea. 12 Tab 0    alum-mag hydroxide-simeth (MYLANTA) 200-200-20 mg/5 mL susp Take 30 mL by mouth four (4) times daily as needed. 200 mL 0    clonazePAM (KLONOPIN) 0.5 mg tablet Take  by mouth nightly as needed. Take 0.5 qHS and 0.25 mg qAM      oxyCODONE-acetaminophen (PERCOCET) 5-325 mg per tablet Take 1 Tab by Mouth Every 4 Hours As Needed for Pain.  DO NOT EXCEED 4000 MG OF ACETAMINOPHEN PER DAY FROM ALL SOURCES (325 MG ACETAMINOPHEN IN EACH PERCOCET TAB)    THIS MEDICATION CAUSES SEDATION AND IMPAIRMENT DO NOT DRIVE OR PERFORM ANY OTHER POTENTIALLY DANGEROUS ACTIVITIES WHILE TAKING    DO NOT TAKE WITH ALCOHOL      SITagliptin (JANUVIA) 50 mg tablet Take 50 mg by mouth daily.  Lancets misc Check fasting blood sugar once daily 1 Each 11       Past History     Past Medical History:  Past Medical History:   Diagnosis Date    Depression     Diabetes ([address]     Drug-seeking behavior     56 prescriptions from 32 different prescribers at 6 different pharmacies in last 12 months    Herniated lumbar intervertebral disc     Hypertension     Neurological disorder L3,4,5 torn Herniated disc    Obesity (BMI 30.0-34. 9)        Past Surgical History:  Past Surgical History:   Procedure Laterality Date    HX HERNIA REPAIR Bilateral 06/02/2017    robotic repair of bilateral indirect inguinal hernias with placement of mesh    HX ORTHOPAEDIC  trigger finger release       Family History:  Family History   Problem Relation Age of Onset    Hypertension Mother     Diabetes Mother     Heart Failure Mother     COPD Mother     Heart Disease Mother     Hypertension Father     Alcohol abuse Father        Social History:  Social History   Substance Use Topics    Smoking status: Never Smoker    Smokeless tobacco: Never Used    Alcohol use Yes      Comment: rarely       Allergies:  No Known Allergies      Review of Systems       Review of Systems   Constitutional: Negative for fever. Respiratory: Negative for shortness of breath. Musculoskeletal: Positive for myalgias (right foot). All other systems reviewed and are negative. Physical Exam     Visit Vitals    BP (!) 152/108 (BP 1 Location: Right arm, BP Patient Position: Sitting)    Pulse 98    Temp 98.3 °F (36.8 °C)    Resp 16    Ht 5' 10\" (1.778 m)    Wt 99.8 kg (220 lb)    SpO2 98%    BMI 31.57 kg/m2         Physical Exam   Constitutional: He is oriented to person, place, and time. He appears well-developed and well-nourished. No distress. HENT:   Head: Normocephalic and atraumatic.    Mouth/Throat: Mucous membranes are normal.   Eyes: Conjunctivae are normal. Pupils are equal, round, and reactive to light. Neck: Normal range of motion. Neck supple. Cardiovascular: Normal rate and regular rhythm. Exam reveals no gallop and no friction rub. No murmur heard. Pulses:       Dorsalis pedis pulses are 1+ on the right side        Posterior tibial pulses are 1+ on the right side   Capillary refill is less than 2 seconds. Pulmonary/Chest: Effort normal and breath sounds normal. No respiratory distress. He has no wheezes. He has no rales. Abdominal: Soft. Bowel sounds are normal. He exhibits no distension. There is no tenderness. Musculoskeletal: Normal range of motion. He exhibits no edema. Left ankle: Tenderness. Lateral malleolus (Less tender) and medial malleolus tenderness found. Right foot: There is tenderness (Navicular. Less tender to the 5th metatarsal. ). Neurological: He is alert and oriented to person, place, and time. No cranial nerve deficit. Pt has 5/5 strength and full ROM. Skin: Skin is warm and dry. No rash noted. Diagnostic Study Results     Labs -  No results found for this or any previous visit (from the past 12 hour(s)). Radiologic Studies -   XR FOOT RT AP/LAT   Final Result   IMPRESSION:      1. No acute bony abnormality. Stable exam.            Medical Decision Making   I am the first provider for this patient. I reviewed the vital signs, available nursing notes, past medical history, past surgical history, family history and social history. Vital Signs-Reviewed the patient's vital signs.     Records Reviewed: Nursing Notes (Time of Review: 1:53 PM)    ED Course: Progress Notes, Reevaluation, and Consults:      Provider Notes (Medical Decision Making):  MDM  Number of Diagnoses or Management Options  Right foot pain:   Diagnosis management comments: Diff dx: gout, neuropathic pain, fx    Pt has hx of foot pain for >1 month, no discoloration, warm, good cap refill, 1+ pulses, no sig edema, do not suspect cellulitis, no felon, no trauma so doubt fx. Shooting/stabbing pain sounds c/w neuropathic pain and he does have DM, already on gabapentin with no relief, may need trial of lyrica thru PCP. Will give tylenol, advise lidoderm patches. Will get xray to r/o lytic lesions or occult fxs, no suspicion for DVT, arterial occlusion    Xray negative for any occult fx or lesions, pt pointed out the 'rash' which came up after walking, appears to be varicose vein. Will dc pt, he will f/u with pcp and discuss lyrica/tramadol for neuropathic pain    Christina Gillette MD          Diagnosis     Clinical Impression:   1. Right foot pain        Disposition: home    Follow-up Information     Follow up With Details Comments Contact [facility] EMERGENCY DEPT  As needed, If symptoms worsen [address] [place] [address] [address] In 3 days  375 [facility]  [phone]           Discharge Medication List as of 9/14/2018  3:09 PM      CONTINUE these medications which have NOT CHANGED    Details   cyclobenzaprine (FLEXERIL) 10 mg tablet TAKE 1 TABLET BY MOUTH THREE TIMES DAILY AS NEEDED FOR MUSCLE SPASMS, Normal, Disp-90 Tab, R-0      metoprolol succinate (TOPROL-XL) 100 mg tablet TAKE 1 TABLET BY MOUTH DAILY, Mail Order, Disp-30 Tab, R-0      dicyclomine (BENTYL) 10 mg capsule Take 2 Caps by mouth four (4) times daily. , Print, Disp-16 Cap, R-0      amLODIPine (NORVASC) 5 mg tablet TAKE 1 TABLET BY MOUTH DAILY FOR HIGH BLOOD PRESSURE, Normal, Disp-30 Tab, R-5      ondansetron hcl (ZOFRAN) 4 mg tablet Take 2 Tabs by mouth every eight (8) hours as needed for Nausea. , Print, Disp-12 Tab, R-0      alum-mag hydroxide-simeth (MYLANTA) 200-200-20 mg/5 mL susp Take 30 mL by mouth four (4) times daily as needed. , Print, Disp-200 mL, R-0      clonazePAM (KLONOPIN) 0.5 mg tablet Take  by mouth nightly as needed.  Take 0.5 qHS and 0.25 mg qAM, Historical Med      oxyCODONE-acetaminophen (PERCOCET) 5-325 mg per tablet Take 1 Tab by Mouth Every 4 Hours As Needed for Pain. DO NOT EXCEED 4000 MG OF ACETAMINOPHEN PER DAY FROM ALL SOURCES (325 MG ACETAMINOPHEN IN EACH PERCOCET TAB)    THIS MEDICATION CAUSES SEDATION AND IMPAIRMENT DO NOT DRIVE OR PERFORM ANY OTHER POTENTIA LLY DANGEROUS ACTIVITIES WHILE TAKING    DO NOT TAKE WITH ALCOHOL, Historical Med      SITagliptin (JANUVIA) 50 mg tablet Take 50 mg by mouth daily. , Historical Med      Lancets misc Check fasting blood sugar once daily, Normal, Disp-1 Each, R-11           _______________________________    Attestations:  Scribe Attestation     Juan Richardson acting as a scribe for and in the presence of Maryann Morocho MD      September 14, 2018 at 1:53 PM       Provider Attestation:      I personally performed the services described in the documentation, reviewed the documentation, as recorded by the scribe in my presence, and it accurately and completely records my words and actions.  September 14, 2018 at 1:53 PM - Maryann Morocho MD    _______________________________

## 2018-09-14 NOTE — DISCHARGE INSTRUCTIONS

## 2018-10-01 ENCOUNTER — APPOINTMENT (OUTPATIENT)
Dept: MRI IMAGING | Age: 43
End: 2018-10-01
Attending: PHYSICIAN ASSISTANT
Payer: SELF-PAY

## 2018-10-01 ENCOUNTER — HOSPITAL ENCOUNTER (EMERGENCY)
Age: 43
Discharge: HOME | End: 2018-10-01
Attending: EMERGENCY MEDICINE
Payer: SELF-PAY

## 2018-10-01 VITALS
RESPIRATION RATE: 18 BRPM | SYSTOLIC BLOOD PRESSURE: 133 MMHG | HEIGHT: 70 IN | BODY MASS INDEX: 31.21 KG/M2 | TEMPERATURE: 98.1 F | OXYGEN SATURATION: 99 % | DIASTOLIC BLOOD PRESSURE: 86 MMHG | HEART RATE: 63 BPM | WEIGHT: 218 LBS

## 2018-10-01 DIAGNOSIS — M54.41 CHRONIC BILATERAL LOW BACK PAIN WITH BILATERAL SCIATICA: Primary | ICD-10-CM

## 2018-10-01 DIAGNOSIS — G89.29 CHRONIC BILATERAL LOW BACK PAIN WITH BILATERAL SCIATICA: Primary | ICD-10-CM

## 2018-10-01 DIAGNOSIS — M54.42 CHRONIC BILATERAL LOW BACK PAIN WITH BILATERAL SCIATICA: Primary | ICD-10-CM

## 2018-10-01 DIAGNOSIS — Z76.5 DRUG-SEEKING BEHAVIOR: ICD-10-CM

## 2018-10-01 PROCEDURE — 99285 EMERGENCY DEPT VISIT HI MDM: CPT

## 2018-10-01 PROCEDURE — 96372 THER/PROPH/DIAG INJ SC/IM: CPT

## 2018-10-01 PROCEDURE — 72148 MRI LUMBAR SPINE W/O DYE: CPT

## 2018-10-01 PROCEDURE — 74011250637 HC RX REV CODE- 250/637: Performed by: PHYSICIAN ASSISTANT

## 2018-10-01 PROCEDURE — 74011250636 HC RX REV CODE- 250/636: Performed by: PHYSICIAN ASSISTANT

## 2018-10-01 RX ORDER — ACETAMINOPHEN AND CODEINE PHOSPHATE 300; 30 MG/1; MG/1
1 TABLET ORAL
Status: COMPLETED | OUTPATIENT
Start: 2018-10-01 | End: 2018-10-01

## 2018-10-01 RX ORDER — KETOROLAC TROMETHAMINE 30 MG/ML
60 INJECTION, SOLUTION INTRAMUSCULAR; INTRAVENOUS ONCE
Status: COMPLETED | OUTPATIENT
Start: 2018-10-01 | End: 2018-10-01

## 2018-10-01 RX ORDER — DIAZEPAM 5 MG/1
5 TABLET ORAL
Status: COMPLETED | OUTPATIENT
Start: 2018-10-01 | End: 2018-10-01

## 2018-10-01 RX ORDER — PREDNISONE 10 MG/1
TABLET ORAL
Qty: 21 TAB | Refills: 0 | Status: SHIPPED | OUTPATIENT
Start: 2018-10-01 | End: 2018-11-20 | Stop reason: ALTCHOICE

## 2018-10-01 RX ORDER — OXYCODONE AND ACETAMINOPHEN 5; 325 MG/1; MG/1
1 TABLET ORAL
Status: COMPLETED | OUTPATIENT
Start: 2018-10-01 | End: 2018-10-01

## 2018-10-01 RX ORDER — HYDROCODONE BITARTRATE AND ACETAMINOPHEN 5; 325 MG/1; MG/1
1 TABLET ORAL
Status: COMPLETED | OUTPATIENT
Start: 2018-10-01 | End: 2018-10-01

## 2018-10-01 RX ADMIN — DIAZEPAM 5 MG: 5 TABLET ORAL at 16:14

## 2018-10-01 RX ADMIN — OXYCODONE HYDROCHLORIDE AND ACETAMINOPHEN 1 TABLET: 5; 325 TABLET ORAL at 20:05

## 2018-10-01 RX ADMIN — HYDROCODONE BITARTRATE AND ACETAMINOPHEN 1 TABLET: 5; 325 TABLET ORAL at 14:54

## 2018-10-01 RX ADMIN — ACETAMINOPHEN AND CODEINE PHOSPHATE 1 TABLET: 300; 30 TABLET ORAL at 11:57

## 2018-10-01 RX ADMIN — KETOROLAC TROMETHAMINE 60 MG: 30 INJECTION, SOLUTION INTRAMUSCULAR at 11:58

## 2018-10-01 NOTE — ED TRIAGE NOTES
Pt arrived through triage with c/o back pain x 3 months that goes down both legs. Pt states \"my legs feel numb. \"

## 2018-10-01 NOTE — ED PROVIDER NOTES
EMERGENCY DEPARTMENT HISTORY AND PHYSICAL EXAM    12:37 PM      Date: 10/1/2018  Patient Name: Tresa Cagle    History of Presenting Illness     Chief Complaint   Patient presents with    Back Pain         History Provided By: Patient    Chief Complaint: back pain  Duration:  3 weeks  Timing:  Worsening  Location: lower back pain  Quality: radiating  Severity: 10 out of 10  Modifying Factors: Pt says he does not have a spinal doctor as he only had medicaid now. Associated Symptoms: constipation and numbness. Additional History (Context): Tresa Cagle is a 36 y.o. male with diabetes, hypertension, obesity and herniated discs who presents with 10/10 lower back pain that has been worsening for the past 3 weeks and it radiates down his legs. Associated sx are constipation and leg numbness. Pt says he does not have a spinal doctor as he only had medicaid now. Pt drinks but does not smoke. PCP: None    Current Outpatient Prescriptions   Medication Sig Dispense Refill    cyclobenzaprine (FLEXERIL) 10 mg tablet TAKE 1 TABLET BY MOUTH THREE TIMES DAILY AS NEEDED FOR MUSCLE SPASMS 90 Tab 0    metoprolol succinate (TOPROL-XL) 100 mg tablet TAKE 1 TABLET BY MOUTH DAILY 30 Tab 0    dicyclomine (BENTYL) 10 mg capsule Take 2 Caps by mouth four (4) times daily. 16 Cap 0    amLODIPine (NORVASC) 5 mg tablet TAKE 1 TABLET BY MOUTH DAILY FOR HIGH BLOOD PRESSURE 30 Tab 5    ondansetron hcl (ZOFRAN) 4 mg tablet Take 2 Tabs by mouth every eight (8) hours as needed for Nausea. 12 Tab 0    alum-mag hydroxide-simeth (MYLANTA) 200-200-20 mg/5 mL susp Take 30 mL by mouth four (4) times daily as needed. 200 mL 0    clonazePAM (KLONOPIN) 0.5 mg tablet Take  by mouth nightly as needed. Take 0.5 qHS and 0.25 mg qAM      oxyCODONE-acetaminophen (PERCOCET) 5-325 mg per tablet Take 1 Tab by Mouth Every 4 Hours As Needed for Pain.  DO NOT EXCEED 4000 MG OF ACETAMINOPHEN PER DAY FROM ALL SOURCES (325 MG ACETAMINOPHEN IN EACH PERCOCET TAB)    THIS MEDICATION CAUSES SEDATION AND IMPAIRMENT DO NOT DRIVE OR PERFORM ANY OTHER POTENTIALLY DANGEROUS ACTIVITIES WHILE TAKING    DO NOT TAKE WITH ALCOHOL      SITagliptin (JANUVIA) 50 mg tablet Take 50 mg by mouth daily.  Lancets misc Check fasting blood sugar once daily 1 Each 11       Past History     Past Medical History:  Past Medical History:   Diagnosis Date    Depression     Diabetes ([facility] [address]     Drug-seeking behavior     56 prescriptions from 32 different prescribers at 6 different pharmacies in last 12 months    Herniated lumbar intervertebral disc     Hypertension     Neurological disorder L3,4,5 torn Herniated disc    Obesity (BMI 30.0-34. 9)        Past Surgical History:  Past Surgical History:   Procedure Laterality Date    HX HERNIA REPAIR Bilateral 06/02/2017    robotic repair of bilateral indirect inguinal hernias with placement of mesh    HX ORTHOPAEDIC  trigger finger release       Family History:  Family History   Problem Relation Age of Onset    Hypertension Mother     Diabetes Mother     Heart Failure Mother     COPD Mother     Heart Disease Mother     Hypertension Father     Alcohol abuse Father        Social History:  Social History   Substance Use Topics    Smoking status: Never Smoker    Smokeless tobacco: Never Used    Alcohol use Yes      Comment: rarely       Allergies:  No Known Allergies      Review of Systems       Review of Systems   Constitutional: Negative. Negative for chills and fever. HENT: Negative. Negative for congestion, ear pain and rhinorrhea. Eyes: Negative. Negative for pain and redness. Respiratory: Negative. Negative for cough, shortness of breath, wheezing and stridor. Cardiovascular: Negative. Negative for chest pain and leg swelling. Gastrointestinal: Positive for constipation. Negative for abdominal pain, diarrhea, nausea and vomiting. Genitourinary: Negative. Negative for dysuria and frequency. Musculoskeletal: Positive for back pain. Negative for neck pain. Skin: Negative. Negative for rash and wound. Neurological: Positive for numbness. Negative for dizziness, seizures, syncope and headaches. All other systems reviewed and are negative. Physical Exam     Visit Vitals    /86 (BP 1 Location: Left arm, BP Patient Position: Lying right side)    Pulse 63    Temp 98.1 °F (36.7 °C)    Resp 18    Ht 5' 10\" (1.778 m)    Wt 98.9 kg (218 lb)    SpO2 99%    BMI 31.28 kg/m2         Physical Exam   Constitutional: He is oriented to person, place, and time. He appears well-developed and well-nourished. He appears distressed. HENT:   Head: Normocephalic and atraumatic. Eyes: Conjunctivae are normal. Right eye exhibits no discharge. Left eye exhibits no discharge. No scleral icterus. Neck: Normal range of motion. Neck supple. Cardiovascular: Normal rate, regular rhythm and normal heart sounds. Exam reveals no gallop and no friction rub. No murmur heard. Pulmonary/Chest: Effort normal and breath sounds normal. No stridor. No respiratory distress. He has no wheezes. He has no rales. Musculoskeletal: Normal range of motion. He exhibits tenderness. He exhibits no edema or deformity. Diffuse TTP noted along the lumbar spine midline as well as the bilateral lumbar paraspinal muscles, ROM intact, increased pain into the BLE noted with assuming supine position. Neurological: He is alert and oriented to person, place, and time. Coordination normal.   Gait is steady. Able to ambulate without difficulty. Skin: Skin is warm and dry. No rash noted. He is not diaphoretic. No erythema. Psychiatric: He has a normal mood and affect. His behavior is normal. Thought content normal.   Nursing note and vitals reviewed. Diagnostic Study Results     Labs -  No results found for this or any previous visit (from the past 12 hour(s)).     Radiologic Studies -   MRI LUMB SPINE WO CONT (Results Pending)   Degenerative disc L4-L5 with annular rent laterally in the left neural foramen with small focal disc protrusion. No spinal stenosis. Medical Decision Making   I am the first provider for this patient. I reviewed the vital signs, available nursing notes, past medical history, past surgical history, family history and social history. Vital Signs-Reviewed the patient's vital signs. Pulse Oximetry Analysis -  100% on room air     Records Reviewed: Nursing Notes (Time of Review: 12:37 PM)    ED Course: Progress Notes, Reevaluation, and Consults:      Provider Notes (Medical Decision Making): Impression:  Chronic low back pain with sciatica, drug seeking behavior           Diagnosis     Clinical Impression:   1. Chronic bilateral low back pain with bilateral sciatica    2. Drug-seeking behavior        Disposition: HOME    Patient is stable for discharge at this time. Rx for prednisone given. Rest and follow-up with ortho this week. Return to the ED immediately for any new or worsening sx. Lydia Benavidez PA-C 8:04 PM     Follow-up Information     Follow up With Details Comments Contact Derian Peralta Schedule an appointment as soon as possible for a visit in 1 week  [address] [place] [address] EMERGENCY DEPT  As needed, If symptoms worsen [address]  [phone]           Patient's Medications   Start Taking    No medications on file   Continue Taking    ALUM-MAG HYDROXIDE-SIMETH (MYLANTA) 200-200-20 MG/5 ML SUSP    Take 30 mL by mouth four (4) times daily as needed. AMLODIPINE (NORVASC) 5 MG TABLET    TAKE 1 TABLET BY MOUTH DAILY FOR HIGH BLOOD PRESSURE    CLONAZEPAM (KLONOPIN) 0.5 MG TABLET    Take  by mouth nightly as needed.  Take 0.5 qHS and 0.25 mg qAM    CYCLOBENZAPRINE (FLEXERIL) 10 MG TABLET    TAKE 1 TABLET BY MOUTH THREE TIMES DAILY AS NEEDED FOR MUSCLE SPASMS    DICYCLOMINE (BENTYL) 10 MG CAPSULE    Take 2 Caps by mouth four (4) times daily. LANCETS MISC    Check fasting blood sugar once daily    METOPROLOL SUCCINATE (TOPROL-XL) 100 MG TABLET    TAKE 1 TABLET BY MOUTH DAILY    ONDANSETRON HCL (ZOFRAN) 4 MG TABLET    Take 2 Tabs by mouth every eight (8) hours as needed for Nausea. OXYCODONE-ACETAMINOPHEN (PERCOCET) 5-325 MG PER TABLET    Take 1 Tab by Mouth Every 4 Hours As Needed for Pain. DO NOT EXCEED 4000 MG OF ACETAMINOPHEN PER DAY FROM ALL SOURCES (325 MG ACETAMINOPHEN IN EACH PERCOCET TAB)    THIS MEDICATION CAUSES SEDATION AND IMPAIRMENT DO NOT DRIVE OR PERFORM ANY OTHER POTENTIALLY DANGEROUS ACTIVITIES WHILE TAKING    DO NOT TAKE WITH ALCOHOL    SITAGLIPTIN (JANUVIA) 50 MG TABLET    Take 50 mg by mouth daily. These Medications have changed    No medications on file   Stop Taking    No medications on file     _______________________________    Attestations:  [address] Katrin [address] acting as a scribe for and in the presence of Lydia Benavidez PA-C      October 01, 2018 at 12:37 PM       Provider Attestation:      I personally performed the services described in the documentation, reviewed the documentation, as recorded by the scribe in my presence, and it accurately and completely records my words and actions.  October 01, 2018 at 12:37 PM - April Reema DUONG  _______________________________

## 2018-10-01 NOTE — DISCHARGE INSTRUCTIONS
Back Pain: Care Instructions  Your Care Instructions    Back pain has many possible causes. It is often related to problems with muscles and ligaments of the back. It may also be related to problems with the nerves, discs, or bones of the back. Moving, lifting, standing, sitting, or sleeping in an awkward way can strain the back. Sometimes you don't notice the injury until later. Arthritis is another common cause of back pain. Although it may hurt a lot, back pain usually improves on its own within several weeks. Most people recover in 12 weeks or less. Using good home treatment and being careful not to stress your back can help you feel better sooner. Follow-up care is a key part of your treatment and safety. Be sure to make and go to all appointments, and call your doctor if you are having problems. It's also a good idea to know your test results and keep a list of the medicines you take. How can you care for yourself at home? · Sit or lie in positions that are most comfortable and reduce your pain. Try one of these positions when you lie down:  ¨ Lie on your back with your knees bent and supported by large pillows. ¨ Lie on the floor with your legs on the seat of a sofa or chair. Nabila Lesser on your side with your knees and hips bent and a pillow between your legs. ¨ Lie on your stomach if it does not make pain worse. · Do not sit up in bed, and avoid soft couches and twisted positions. Bed rest can help relieve pain at first, but it delays healing. Avoid bed rest after the first day of back pain. · Change positions every 30 minutes. If you must sit for long periods of time, take breaks from sitting. Get up and walk around, or lie in a comfortable position. · Try using a heating pad on a low or medium setting for 15 to 20 minutes every 2 or 3 hours. Try a warm shower in place of one session with the heating pad. · You can also try an ice pack for 10 to 15 minutes every 2 to 3 hours.  Put a thin cloth between the ice pack and your skin. · Take pain medicines exactly as directed. ¨ If the doctor gave you a prescription medicine for pain, take it as prescribed. ¨ If you are not taking a prescription pain medicine, ask your doctor if you can take an over-the-counter medicine. · Take short walks several times a day. You can start with 5 to 10 minutes, 3 or 4 times a day, and work up to longer walks. Walk on level surfaces and avoid hills and stairs until your back is better. · Return to work and other activities as soon as you can. Continued rest without activity is usually not good for your back. · To prevent future back pain, do exercises to stretch and strengthen your back and stomach. Learn how to use good posture, safe lifting techniques, and proper body mechanics. When should you call for help? Call your doctor now or seek immediate medical care if:    · You have new or worsening numbness in your legs.     · You have new or worsening weakness in your legs. (This could make it hard to stand up.)     · You lose control of your bladder or bowels.    Watch closely for changes in your health, and be sure to contact your doctor if:    · You have a fever, lose weight, or don't feel well.     · You do not get better as expected. Where can you learn more? Go to http://jacklyn-denice.info/. Enter A427 in the search box to learn more about \"Back Pain: Care Instructions. \"  Current as of: November 29, 2017  Content Version: 11.7  © 0830-1893 [employer]. Care instructions adapted under license by [employer] (which disclaims liability or warranty for this information). If you have questions about a medical condition or this instruction, always ask your healthcare professional. [address] any warranty or liability for your use of this information. MyChart Activation    Thank you for requesting access to [employer].  Please follow the instructions below to securely access and download your online medical record. [employer] allows you to send messages to your doctor, view your test results, renew your prescriptions, schedule appointments, and more. How Do I Sign Up? 1. In your internet browser, go to [email or web address]  2. Click on the First Time User? Click Here link in the Sign In box. You will be redirect to the New Member Sign Up page. 3. Enter your [employer] Access Code exactly as it appears below. You will not need to use this code after youve completed the sign-up process. If you do not sign up before the expiration date, you must request a new code. [employer] Access Code: [email or web address]  Expires: 2018  5:21 AM (This is the date your [employer] access code will )    4. Enter the last four digits of your Social Security Number (xxxx) and Date of Birth (mm/dd/yyyy) as indicated and click Submit. You will be taken to the next sign-up page. 5. Create a [employer] ID. This will be your [employer] login ID and cannot be changed, so think of one that is secure and easy to remember. 6. Create a [employer] password. You can change your password at any time. 7. Enter your Password Reset Question and Answer. This can be used at a later time if you forget your password. 8. Enter your e-mail address. You will receive e-mail notification when new information is available in [address] 9. Click Sign Up. You can now view and download portions of your medical record. 10. Click the Download Summary menu link to download a portable copy of your medical information. Additional Information    If you have questions, please visit the Frequently Asked Questions section of the [employer] website at [email or web address] Remember, [employer] is NOT to be used for urgent needs. For medical emergencies, dial 911.

## 2018-10-01 NOTE — LETTER
NOTIFICATION OF RETURN TO WORK / SCHOOL 
 
10/1/2018 Mr. Carter Harding [address] [id number] To Whom It May Concern: 
 
Carter Harding was seen in the ED on 10/1/18 and may be excused from work for 2 days. Sincerely, Lydia Cuadra [facility]

## 2018-10-02 ENCOUNTER — OFFICE VISIT (OUTPATIENT)
Dept: INTERNAL MEDICINE CLINIC | Age: 43
End: 2018-10-02

## 2018-10-02 NOTE — ED NOTES
.I have reviewed discharge instructions with the patient. The patient verbalized understanding. Pt pain level 5 out of 10. Pt was also given a taxi voucher as well. Pt was able to ambulate of his own accord without assistance

## 2018-10-15 RX ORDER — CYCLOBENZAPRINE HCL 10 MG
TABLET ORAL
Qty: 90 TAB | Refills: 0 | Status: SHIPPED | OUTPATIENT
Start: 2018-10-15 | End: 2018-11-14 | Stop reason: SDUPTHER

## 2018-11-13 RX ORDER — CYCLOBENZAPRINE HCL 10 MG
TABLET ORAL
Qty: 90 TAB | Refills: 0 | OUTPATIENT
Start: 2018-11-13

## 2018-11-13 NOTE — TELEPHONE ENCOUNTER
Pt requesting refill of Flexeril until Mondays appt. Pt knows he needs an appt, so he scheduled the appt for Monday but is wondering if he can receive a small refill until then.

## 2018-11-14 RX ORDER — CYCLOBENZAPRINE HCL 10 MG
TABLET ORAL
Qty: 90 TAB | Refills: 0 | Status: SHIPPED | OUTPATIENT
Start: 2018-11-14 | End: 2018-11-28

## 2018-11-20 ENCOUNTER — OFFICE VISIT (OUTPATIENT)
Dept: FAMILY MEDICINE CLINIC | Age: 43
End: 2018-11-20

## 2018-11-20 VITALS
TEMPERATURE: 97.2 F | DIASTOLIC BLOOD PRESSURE: 79 MMHG | RESPIRATION RATE: 12 BRPM | HEART RATE: 69 BPM | BODY MASS INDEX: 31.24 KG/M2 | HEIGHT: 70 IN | OXYGEN SATURATION: 99 % | SYSTOLIC BLOOD PRESSURE: 116 MMHG | WEIGHT: 218.2 LBS

## 2018-11-20 DIAGNOSIS — Z23 ENCOUNTER FOR IMMUNIZATION: ICD-10-CM

## 2018-11-20 DIAGNOSIS — M54.42 CHRONIC BILATERAL LOW BACK PAIN WITH LEFT-SIDED SCIATICA: ICD-10-CM

## 2018-11-20 DIAGNOSIS — G89.29 CHRONIC BILATERAL LOW BACK PAIN WITH LEFT-SIDED SCIATICA: ICD-10-CM

## 2018-11-20 DIAGNOSIS — E11.9 TYPE 2 DIABETES MELLITUS WITHOUT COMPLICATION, WITHOUT LONG-TERM CURRENT USE OF INSULIN (HCC): ICD-10-CM

## 2018-11-20 DIAGNOSIS — I10 ESSENTIAL HYPERTENSION: ICD-10-CM

## 2018-11-20 DIAGNOSIS — M51.36 DEGENERATIVE DISC DISEASE, LUMBAR: Primary | ICD-10-CM

## 2018-11-20 LAB — HBA1C MFR BLD HPLC: 7.2 %

## 2018-11-20 RX ORDER — INSULIN PUMP SYRINGE, 3 ML
EACH MISCELLANEOUS
Qty: 1 KIT | Refills: 0 | Status: SHIPPED | OUTPATIENT
Start: 2018-11-20 | End: 2018-11-28 | Stop reason: SDUPTHER

## 2018-11-20 RX ORDER — AMITRIPTYLINE HYDROCHLORIDE 10 MG/1
10 TABLET, FILM COATED ORAL
Qty: 30 TAB | Refills: 1 | Status: SHIPPED | OUTPATIENT
Start: 2018-11-20 | End: 2018-12-04

## 2018-11-20 RX ORDER — LANCETS
EACH MISCELLANEOUS
Qty: 100 EACH | Refills: 0 | Status: SHIPPED | OUTPATIENT
Start: 2018-11-20 | End: 2018-11-28 | Stop reason: SDUPTHER

## 2018-11-20 NOTE — PROGRESS NOTES
Rm 8  Patient presents to the clinic   Chief Complaint   Patient presents with    Medication Refill       Depression Screening:  PHQ over the last two weeks 4/6/2018 1/29/2018 12/13/2017 11/28/2017 5/23/2017 5/16/2017 3/20/2017   PHQ Not Done - - - - - - -   Little interest or pleasure in doing things Not at all Not at all - Not at all Not at all Not at all Not at all   Feeling down, depressed, irritable, or hopeless Not at all Not at all More than half the days Not at all Not at all Not at all Not at all   Total Score PHQ 2 0 0 - 0 0 0 0       Learning Assessment:  Learning Assessment 3/3/2017 4/27/2016 3/16/2016   PRIMARY LEARNER Patient Patient Patient   HIGHEST LEVEL OF EDUCATION - PRIMARY LEARNER  GRADUATED HIGH SCHOOL OR GED GRADUATED HIGH SCHOOL OR GED GRADUATED HIGH SCHOOL OR GED   BARRIERS PRIMARY LEARNER NONE - -   CO-LEARNER CAREGIVER No - -   PRIMARY LANGUAGE ENGLISH ENGLISH ENGLISH   LEARNER PREFERENCE PRIMARY READING DEMONSTRATION READING   ANSWERED BY patient patient DARIAN Gaytan   RELATIONSHIP SELF SELF SELF       Abuse Screening:  Abuse Screening Questionnaire 3/16/2016   Do you ever feel afraid of your partner? N   Are you in a relationship with someone who physically or mentally threatens you? N   Is it safe for you to go home? Y       Health Maintenance reviewed and discussed per provider: yes     Coordination of Care:    1. Have you been to the ER, urgent care clinic since your last visit? Hospitalized since your last visit? no    2. Have you seen or consulted any other health care providers outside of the [address] [place] [address] since your last visit? Include any pap smears or colon screening. No    Does patient want flu shot? yes      Patient received influenza vaccine 0.5ml in left deltoid. Tolerated well. No signs or symptoms of distress noted. Most current VIS given and consent signed.

## 2018-11-20 NOTE — PROGRESS NOTES
HISTORY OF PRESENT ILLNESS  Lizbeth Haskins is a 36 y.o. male. HPI   Mr. Karissa Abdalla presents today for a number of complaints. 1) Chronic lumbar back pain with L>R leg radiation and paresthesias. - He consulted with Ilana Cleveland of orthopedics in [place] early 2018. He was referred to Pain management, possibly Dr. Florin Galvin, and underwent an epidural steroid injection which did not help his pain. He has not seen either provider since his epidural.     - Most recently went to [place] [facility] ED 10/1/18. Completed MRI L-spine. Significant for stable small nonimpinging left foraminal disc protrusion with annular fissure L4/5. He was discharged with Prednisone and advised to f/u with ortho in 1 week. He states he was unaware about following up with ortho. He currently c/o a constant 7 or 8/10 pain severity. Pain is primarily in the back but does radiate to the left leg at times. Pain is more of a constant ache. C/o bilateral thigh numbness and burning, L>R, but this pain is less severe. He takes Flexeril BID due to drowsiness. He has a hx of taking NSAID but denies any significant benefit. He inquires about Amitriptyline. He states he took it in the past and it helped him rest and sleep at night. 2) DMII - taking Januvia 50 mg. Not checking glucoses at home. Denies any missed dosages of Januvia. 3) HTN - controlled. C/o daily dizziness with standing. No home BP checks. Review of Systems   Musculoskeletal: Positive for back pain. Neurological: Positive for tingling (L>R) and focal weakness (bilateral legs). /79 (BP 1 Location: Left arm, BP Patient Position: Sitting)   Pulse 69   Temp 97.2 °F (36.2 °C) (Oral)   Resp 12   Ht 5' 10\" (1.778 m)   Wt 218 lb 3.2 oz (99 kg)   SpO2 99%   BMI 31.31 kg/m²     Physical Exam   Constitutional: He is oriented to person, place, and time. He appears well-developed and well-nourished. No distress. HENT:   Head: Normocephalic and atraumatic.    Right Ear: Tympanic membrane, external ear and ear canal normal.   Left Ear: Tympanic membrane, external ear and ear canal normal.   Nose: Nose normal.   Mouth/Throat: Uvula is midline, oropharynx is clear and moist and mucous membranes are normal. No oropharyngeal exudate, posterior oropharyngeal edema, posterior oropharyngeal erythema or tonsillar abscesses. Eyes: Conjunctivae are normal. Pupils are equal, round, and reactive to light. No scleral icterus. Neck: Neck supple. Cardiovascular: Normal rate, regular rhythm and normal heart sounds. Exam reveals no gallop. No murmur heard. Pulses:       Dorsalis pedis pulses are 2+ on the right side, and 2+ on the left side. Posterior tibial pulses are 2+ on the right side, and 2+ on the left side. No pedal edema. Pulmonary/Chest: Effort normal and breath sounds normal. No respiratory distress. He has no decreased breath sounds. He has no wheezes. He has no rhonchi. He has no rales. Musculoskeletal:        Lumbar back: He exhibits tenderness (Mild TTP lumbar musculature. ). Negative SLR bilaterally. Strength 4/5 bilaterally. Sharp/dull sensation intact bilateral lower legs. L thigh with decreased sensation - unable to discern sharp/dull. Right thigh with sharp/dull sensation intact. Lymphadenopathy:        Head (right side): No submandibular and no tonsillar adenopathy present. Head (left side): No submandibular and no tonsillar adenopathy present. He has no cervical adenopathy. Right: No supraclavicular adenopathy present. Left: No supraclavicular adenopathy present. Neurological: He is alert and oriented to person, place, and time. Skin: Skin is warm and dry. Psychiatric: He has a normal mood and affect.  His speech is normal.     Results for orders placed or performed in visit on 11/20/18   AMB POC HEMOGLOBIN A1C   Result Value Ref Range    Hemoglobin A1c (POC) 7.2 %       ASSESSMENT and PLAN  Orders Placed This Encounter    TN IMMUNIZ ADMIN,1 SINGLE/COMB VAC/TOXOID    Influenza virus vaccine (QUADRIVALENT PRES FREE SYRINGE) IM (17077)    AMB POC HEMOGLOBIN A1C    Lancets misc     Sig: Check fasting blood sugar once daily     Dispense:  100 Each     Refill:  0    Blood-Glucose Meter monitoring kit     Sig: Use as directed. Dispense:  1 Kit     Refill:  0    SITagliptin (JANUVIA) 100 mg tablet     Sig: Take 1 Tab by mouth daily. Dispense:  30 Tab     Refill:  2    amitriptyline (ELAVIL) 10 mg tablet     Sig: Take 1 Tab by mouth nightly. Dispense:  30 Tab     Refill:  1    glucose blood VI test strips (BLOOD GLUCOSE TEST) strip     Sig: Use as directed. Dispense:  100 Strip     Refill:  0     Diagnoses and all orders for this visit:    1. Degenerative disc disease, lumbar  2. Chronic bilateral low back pain with left-sided sciatica  -    At this point in time, I agree that patient would not be a surgical candidate based on his MRI. - amitriptyline (ELAVIL) 10 mg tablet; Take 1 Tab by mouth nightly. - If ineffective, could consider Cymbalta in the future. - Continue prn Flexeril.   - PT offered. Patient denies. - Discussed follow up with Pain management. Patient is agreeable to this and can schedule if he so chooses. 3. Type 2 diabetes mellitus without complication, without long-term current use of insulin (HCC)  -     AMB POC HEMOGLOBIN A1C  -     Lancets misc; Check fasting blood sugar once daily  -     Blood-Glucose Meter monitoring kit; Use as directed. -     SITagliptin (JANUVIA) 100 mg tablet; Take 1 Tab by mouth daily.   - Dosage increase. Hx intolerance to Metformin - GI.   -     glucose blood VI test strips (BLOOD GLUCOSE TEST) strip; Use as directed. 4. Essential hypertension  - Asked for home BP checks. If orthostasis continues, consider dosage decrease of Toprol to 75 mg or amlodipine to 2.5 mg.  He is resistant to a decrease of Toprol due to hx of palpitations, well-controlled with Toprol. 5. Encounter for immunization  -     INFLUENZA VIRUS VAC QUAD,SPLIT,PRESV FREE SYRINGE IM  -     SC IMMUNIZ ADMIN,1 SINGLE/COMB VAC/TOXOID      Follow-up Disposition:  Return in about 1 month (around 12/20/2018).

## 2018-11-23 RX ORDER — SITAGLIPTIN 50 MG/1
TABLET, FILM COATED ORAL
Qty: 90 TAB | Refills: 0 | OUTPATIENT
Start: 2018-11-23

## 2018-11-27 ENCOUNTER — TELEPHONE (OUTPATIENT)
Dept: FAMILY MEDICINE CLINIC | Age: 43
End: 2018-11-27

## 2018-11-27 ENCOUNTER — HOSPITAL ENCOUNTER (EMERGENCY)
Age: 43
Discharge: OTHER INSTITUTION | End: 2018-12-01
Attending: EMERGENCY MEDICINE
Payer: SELF-PAY

## 2018-11-27 DIAGNOSIS — R45.851 SUICIDAL IDEATIONS: ICD-10-CM

## 2018-11-27 DIAGNOSIS — R41.82 ALTERED MENTAL STATUS, UNSPECIFIED ALTERED MENTAL STATUS TYPE: Primary | ICD-10-CM

## 2018-11-27 DIAGNOSIS — F14.10 COCAINE ABUSE (HCC): ICD-10-CM

## 2018-11-27 DIAGNOSIS — T50.901A MEDICATION OVERDOSE, ACCIDENTAL OR UNINTENTIONAL, INITIAL ENCOUNTER: ICD-10-CM

## 2018-11-27 DIAGNOSIS — E11.9 TYPE 2 DIABETES MELLITUS WITHOUT COMPLICATION, WITHOUT LONG-TERM CURRENT USE OF INSULIN (HCC): ICD-10-CM

## 2018-11-27 LAB
ALBUMIN SERPL-MCNC: 3.7 G/DL (ref 3.4–5)
ALBUMIN/GLOB SERPL: 0.8 {RATIO} (ref 0.8–1.7)
ALP SERPL-CCNC: 95 U/L (ref 45–117)
ALT SERPL-CCNC: 20 U/L (ref 16–61)
ANION GAP SERPL CALC-SCNC: 8 MMOL/L (ref 3–18)
APAP SERPL-MCNC: <2 UG/ML (ref 10–30)
AST SERPL-CCNC: 12 U/L (ref 15–37)
BASOPHILS # BLD: 0 K/UL (ref 0–0.1)
BASOPHILS NFR BLD: 1 % (ref 0–2)
BILIRUB SERPL-MCNC: 0.2 MG/DL (ref 0.2–1)
BUN SERPL-MCNC: 13 MG/DL (ref 7–18)
BUN/CREAT SERPL: 12 (ref 12–20)
CALCIUM SERPL-MCNC: 9.3 MG/DL (ref 8.5–10.1)
CHLORIDE SERPL-SCNC: 101 MMOL/L (ref 100–108)
CO2 SERPL-SCNC: 26 MMOL/L (ref 21–32)
CREAT SERPL-MCNC: 1.08 MG/DL (ref 0.6–1.3)
DIFFERENTIAL METHOD BLD: NORMAL
EOSINOPHIL # BLD: 0.1 K/UL (ref 0–0.4)
EOSINOPHIL NFR BLD: 2 % (ref 0–5)
ERYTHROCYTE [DISTWIDTH] IN BLOOD BY AUTOMATED COUNT: 13.1 % (ref 11.6–14.5)
ETHANOL SERPL-MCNC: <3 MG/DL (ref 0–3)
GLOBULIN SER CALC-MCNC: 4.8 G/DL (ref 2–4)
GLUCOSE SERPL-MCNC: 203 MG/DL (ref 74–99)
HCT VFR BLD AUTO: 40.8 % (ref 36–48)
HGB BLD-MCNC: 13.8 G/DL (ref 13–16)
LYMPHOCYTES # BLD: 1.5 K/UL (ref 0.9–3.6)
LYMPHOCYTES NFR BLD: 26 % (ref 21–52)
MAGNESIUM SERPL-MCNC: 2.1 MG/DL (ref 1.6–2.6)
MCH RBC QN AUTO: 28.7 PG (ref 24–34)
MCHC RBC AUTO-ENTMCNC: 33.8 G/DL (ref 31–37)
MCV RBC AUTO: 84.8 FL (ref 74–97)
MONOCYTES # BLD: 0.4 K/UL (ref 0.05–1.2)
MONOCYTES NFR BLD: 6 % (ref 3–10)
NEUTS SEG # BLD: 3.9 K/UL (ref 1.8–8)
NEUTS SEG NFR BLD: 65 % (ref 40–73)
PLATELET # BLD AUTO: 226 K/UL (ref 135–420)
PMV BLD AUTO: 9.4 FL (ref 9.2–11.8)
POTASSIUM SERPL-SCNC: 4 MMOL/L (ref 3.5–5.5)
PROT SERPL-MCNC: 8.5 G/DL (ref 6.4–8.2)
RBC # BLD AUTO: 4.81 M/UL (ref 4.7–5.5)
SALICYLATES SERPL-MCNC: <2.8 MG/DL (ref 2.8–20)
SODIUM SERPL-SCNC: 135 MMOL/L (ref 136–145)
WBC # BLD AUTO: 5.9 K/UL (ref 4.6–13.2)

## 2018-11-27 PROCEDURE — 80307 DRUG TEST PRSMV CHEM ANLYZR: CPT

## 2018-11-27 PROCEDURE — 93005 ELECTROCARDIOGRAM TRACING: CPT

## 2018-11-27 PROCEDURE — 83735 ASSAY OF MAGNESIUM: CPT

## 2018-11-27 PROCEDURE — 99285 EMERGENCY DEPT VISIT HI MDM: CPT

## 2018-11-27 PROCEDURE — 85025 COMPLETE CBC W/AUTO DIFF WBC: CPT

## 2018-11-27 PROCEDURE — 74011250636 HC RX REV CODE- 250/636: Performed by: PHYSICIAN ASSISTANT

## 2018-11-27 PROCEDURE — 80053 COMPREHEN METABOLIC PANEL: CPT

## 2018-11-27 RX ORDER — ZOLPIDEM TARTRATE 10 MG/1
10 TABLET ORAL
COMMUNITY
End: 2018-11-29 | Stop reason: ALTCHOICE

## 2018-11-27 RX ADMIN — SODIUM CHLORIDE 1000 ML: 900 INJECTION, SOLUTION INTRAVENOUS at 22:55

## 2018-11-27 NOTE — TELEPHONE ENCOUNTER
Pt requesting hard copies of diabetic scripts he was written. He said [facility] on file needs the actual copies of these to refill.

## 2018-11-28 LAB
AMPHET UR QL SCN: NEGATIVE
BARBITURATES UR QL SCN: NEGATIVE
BENZODIAZ UR QL: POSITIVE
CANNABINOIDS UR QL SCN: NEGATIVE
COCAINE UR QL SCN: POSITIVE
HDSCOM,HDSCOM: ABNORMAL
METHADONE UR QL: NEGATIVE
OPIATES UR QL: NEGATIVE
PCP UR QL: NEGATIVE

## 2018-11-28 PROCEDURE — 74011250637 HC RX REV CODE- 250/637: Performed by: EMERGENCY MEDICINE

## 2018-11-28 PROCEDURE — 80307 DRUG TEST PRSMV CHEM ANLYZR: CPT

## 2018-11-28 RX ORDER — ACETAMINOPHEN 500 MG
1000 TABLET ORAL
Status: DISCONTINUED | OUTPATIENT
Start: 2018-11-28 | End: 2018-12-01 | Stop reason: HOSPADM

## 2018-11-28 RX ORDER — INSULIN PUMP SYRINGE, 3 ML
EACH MISCELLANEOUS
Qty: 1 KIT | Refills: 0 | Status: SHIPPED | OUTPATIENT
Start: 2018-11-28 | End: 2018-12-04 | Stop reason: ALTCHOICE

## 2018-11-28 RX ORDER — DIPHENHYDRAMINE HCL 50 MG
50 CAPSULE ORAL
Status: DISCONTINUED | OUTPATIENT
Start: 2018-11-28 | End: 2018-12-01 | Stop reason: HOSPADM

## 2018-11-28 RX ORDER — OLANZAPINE 5 MG/1
5 TABLET ORAL
Status: DISCONTINUED | OUTPATIENT
Start: 2018-11-28 | End: 2018-11-29

## 2018-11-28 RX ORDER — LANCETS
EACH MISCELLANEOUS
Qty: 100 EACH | Refills: 0 | Status: SHIPPED | OUTPATIENT
Start: 2018-11-28 | End: 2019-01-09

## 2018-11-28 RX ORDER — NAPROXEN 250 MG/1
500 TABLET ORAL 2 TIMES DAILY WITH MEALS
Status: DISCONTINUED | OUTPATIENT
Start: 2018-11-28 | End: 2018-12-01 | Stop reason: HOSPADM

## 2018-11-28 RX ORDER — METOPROLOL SUCCINATE 50 MG/1
100 TABLET, EXTENDED RELEASE ORAL DAILY
Status: DISCONTINUED | OUTPATIENT
Start: 2018-11-28 | End: 2018-12-01 | Stop reason: HOSPADM

## 2018-11-28 RX ADMIN — OLANZAPINE 5 MG: 5 TABLET, FILM COATED ORAL at 13:45

## 2018-11-28 RX ADMIN — SITAGLIPTIN 100 MG: 100 TABLET, FILM COATED ORAL at 15:15

## 2018-11-28 RX ADMIN — NAPROXEN 500 MG: 250 TABLET ORAL at 20:28

## 2018-11-28 RX ADMIN — OLANZAPINE 5 MG: 5 TABLET, FILM COATED ORAL at 18:54

## 2018-11-28 RX ADMIN — OLANZAPINE 5 MG: 5 TABLET, FILM COATED ORAL at 06:54

## 2018-11-28 RX ADMIN — METOPROLOL SUCCINATE 100 MG: 50 TABLET, EXTENDED RELEASE ORAL at 15:15

## 2018-11-28 NOTE — DISCHARGE INSTRUCTIONS
Altered Mental Status: Care Instructions  Your Care Instructions  Altered mental status is a change in how well your brain is working. As a result, you may be confused, be less alert than usual, or act in odd ways. This may include seeing or hearing things that aren't really there (hallucinations). A mental status change has many possible causes. For example, it may be the result of an infection, an imbalance of chemicals in the body, or a chronic disease such as diabetes or COPD. It can also be caused by things such as a head injury, taking certain medicines, or using alcohol or drugs. The doctor may do tests to look for the cause. These tests may include urine tests, blood tests, and imaging tests such as a CT scan. Sometimes a clear cause isn't found. But tests can help the doctor rule out a serious cause of your symptoms. A change in mental status can be scary. But mental status will often return to normal when the cause is treated. So it is important to get any follow-up testing or treatment the doctor has suggested. The doctor has checked you carefully, but problems can develop later. If you notice any problems or new symptoms, get medical treatment right away. Follow-up care is a key part of your treatment and safety. Be sure to make and go to all appointments, and call your doctor if you are having problems. It's also a good idea to know your test results and keep a list of the medicines you take. How can you care for yourself at home? · Be safe with medicines. Take your medicines exactly as prescribed. Call your doctor if you think you are having a problem with your medicine. · Have another adult stay with you until you are better. This can help keep you safe. Ask that person to watch for signs that your mental status is getting worse. When should you call for help? Call 911 anytime you think you may need emergency care. For example, call if:  · You passed out (lost consciousness).   Call your doctor now or seek immediate medical care if:  · Your mental status is getting worse. · You have new symptoms, such as a fever, chills, or shortness of breath. · You do not feel safe. Watch closely for changes in your health, and be sure to contact your doctor if:  · You do not get better as expected. Where can you learn more? Go to Seanodes.be  Enter J452 in the search box to learn more about \"Altered Mental Status: Care Instructions. \"   © 5538-6249 Healthwise, Incorporated. Care instructions adapted under license by [employer] (which disclaims liability or warranty for this information). This care instruction is for use with your licensed healthcare professional. If you have questions about a medical condition or this instruction, always ask your healthcare professional. [address] any warranty or liability for your use of this information. Content Version: 38.7.418790; Current as of: November 20, 2015             Accidental Overdose of Medicine: Care Instructions  Your Care Instructions    Almost any medicine can cause harm if you take too much of it. You have had treatment to help your body get rid of an overdose of a medicine. This may have been an over-the-counter medicine. Or it might be one that a doctor prescribed. It may even have been a vitamin or a supplement. During treatment, the doctor may have given you fluids and medicine. You also may have had lab tests. Then the doctor made sure that you were well enough to go home. The doctor has checked you carefully, but problems can develop later. If you notice any problems or new symptoms, get medical treatment right away. Follow-up care is a key part of your treatment and safety. Be sure to make and go to all appointments, and call your doctor if you are having problems. It's also a good idea to know your test results and keep a list of the medicines you take.   How can you care for yourself at home?  Home care  · Drink plenty of fluids. If you have kidney, heart, or liver disease and have to limit fluids, talk with your doctor before you increase the amount of fluids you drink. · If you normally take medicines, ask your doctor when you can start taking them again. · Read the information that comes with any medicine. If you have questions, ask your doctor or pharmacist.  Prevention  · Be safe with medicines. Take your medicines exactly as prescribed or as the label directs. Call your doctor if you think you are having a problem with your medicine. · Keep your medicines in the containers they came in. Keep them with the original labels. · Find out what to do if you miss a dose of your medicine. When should you call for help? Call 911 anytime you think you may need emergency care. For example, call if:    · You passed out (lost consciousness).     · You have trouble breathing.     · You are sleepy or hard to wake up.   [facility] your doctor now or seek immediate medical care if:    · You are vomiting.     · You have a new or worse headache.     · You are dizzy or lightheaded or feel like you may faint.    Watch closely for changes in your health, and be sure to contact your doctor if:    · You do not get better as expected. Where can you learn more? Go to http://jacklyn-denice.info/. Enter C165 in the search box to learn more about \"Accidental Overdose of Medicine: Care Instructions. \"  Current as of: March 29, 2018  Content Version: 11.8  © 0225-5480 [employer]. Care instructions adapted under license by [employer] (which disclaims liability or warranty for this information). If you have questions about a medical condition or this instruction, always ask your healthcare professional. [place] [address] any warranty or liability for your use of this information.
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
## 2018-11-28 NOTE — ED PROVIDER NOTES
[address] EMERGENCY DEPT      11:15 PM    Date: 11/27/2018  Patient Name: Jeanette Broderick    History of Presenting Illness     Chief Complaint   Patient presents with    Alcohol Problem    Back Pain     36 y.o. male with a PMH of Etoh abuse, drug abuse, HTN, and chronic low back pain presents to the ED via EMS for c/o AMS since PTA. Per EMS, patient was [place] et[place] beach when he started acting strange. Pt states he takes Amitripyline and Flexeril, states his doctor told him he could take 20mg Flexeril at a time. Pt denies any etoh or drug use today. Pt states his low back hurts, consistent with his chronic back pain. Denies numbness, weakness, head injury, bowel/bladder function loss, CP, or other symptoms at this time. No other complaints. Nursing notes regarding the HPI and triage nursing notes were reviewed. Prior medical records were reviewed. Current Facility-Administered Medications   Medication Dose Route Frequency Provider Last Rate Last Dose    sodium chloride 0.9 % bolus infusion 1,000 mL  1,000 mL IntraVENous ONCE Payton Shirley PA         Current Outpatient Medications   Medication Sig Dispense Refill    zolpidem (AMBIEN) 10 mg tablet Take 10 mg by mouth nightly as needed for Sleep.  SITagliptin (JANUVIA) 100 mg tablet Take 1 Tab by mouth daily. 30 Tab 2    amitriptyline (ELAVIL) 10 mg tablet Take 1 Tab by mouth nightly. 30 Tab 1    metoprolol succinate (TOPROL-XL) 100 mg tablet TAKE 1 TABLET BY MOUTH DAILY 30 Tab 0    Lancets misc Check fasting blood sugar once daily 100 Each 0    Blood-Glucose Meter monitoring kit Use as directed. 1 Kit 0    glucose blood VI test strips (BLOOD GLUCOSE TEST) strip Use as directed. 100 Strip 0    amLODIPine (NORVASC) 5 mg tablet TAKE 1 TABLET BY MOUTH DAILY FOR HIGH BLOOD PRESSURE 30 Tab 5    clonazePAM (KLONOPIN) 0.5 mg tablet Take  by mouth nightly as needed.  Take 0.5 qHS and 0.25 mg qAM      oxyCODONE-acetaminophen (PERCOCET) 5-325 mg per tablet Take 1 Tab by Mouth Every 4 Hours As Needed for Pain. DO NOT EXCEED 4000 MG OF ACETAMINOPHEN PER DAY FROM ALL SOURCES (325 MG ACETAMINOPHEN IN EACH PERCOCET TAB)    THIS MEDICATION CAUSES SEDATION AND IMPAIRMENT DO NOT DRIVE OR PERFORM ANY OTHER POTENTIALLY DANGEROUS ACTIVITIES WHILE TAKING    DO NOT TAKE WITH ALCOHOL         Past History     Past Medical History:  Past Medical History:   Diagnosis Date    Depression     Diabetes ([facility] [address]     Drug-seeking behavior     56 prescriptions from 32 different prescribers at 6 different pharmacies in last 12 months    Herniated lumbar intervertebral disc     Hypertension     Neurological disorder L3,4,5 torn Herniated disc    Obesity (BMI 30.0-34. 9)        Past Surgical History:  Past Surgical History:   Procedure Laterality Date    HX HERNIA REPAIR Bilateral 06/02/2017    robotic repair of bilateral indirect inguinal hernias with placement of mesh    HX ORTHOPAEDIC  trigger finger release       Family History:  Family History   Problem Relation Age of Onset    Hypertension Mother     Diabetes Mother     Heart Failure Mother     COPD Mother     Heart Disease Mother     Hypertension Father     Alcohol abuse Father        Social History:  Social History     Tobacco Use    Smoking status: Never Smoker    Smokeless tobacco: Never Used   Substance Use Topics    Alcohol use: Yes     Comment: rarely    Drug use: No       Allergies:  No Known Allergies    Patient's primary care provider (as noted in EPIC):  None    Review of Systems   Constitutional:  Denies malaise, fever, chills. Head:  Denies injury. Neck:  Denies injury or pain. Cardiac:  Denies chest pain or palpitations. Respiratory:  Denies cough, wheezing, difficulty breathing, shortness of breath. GI/ABD:  Denies injury, pain, distention, nausea, vomiting, diarrhea. Back:  + chronic low back pain. Extremity/MS:  Denies injury or pain. Neuro:  Denies headache, LOC, dizziness, neurologic symptoms/deficits/paresthesias. Skin: Denies injury, rash, itching or skin changes. All other systems negative as reviewed. Visit Vitals  BP (!) 129/95 (BP 1 Location: Right arm, BP Patient Position: At rest)   Pulse 82   Temp 98 °F (36.7 °C)   Resp 18   Ht 5' 10\" (1.778 m)   Wt 99.8 kg (220 lb)   SpO2 99%   BMI 31.57 kg/m²       PHYSICAL EXAM:    CONSTITUTIONAL:  Somewhat somnolent but awakes for questioning;  well developed;  well nourished; smells of etoh; slurring words. HEAD:  Normocephalic, atraumatic. EYES: Pupils sluggish. EOMI. Non-icteric sclera. Normal conjunctiva. ENTM:  Mouth: mucous membranes moist.  NECK:  Supple  RESPIRATORY:  Chest clear, equal breath sounds, good air movement. CARDIOVASCULAR:  Regular rate and rhythm. No murmurs, rubs, or gallops. GI:  Normal bowel sounds, abdomen soft and non-tender. No rebound or guarding. BACK:  Non-tender. UPPER EXT:  Normal inspection. LOWER EXT:  No edema, no calf tenderness. Distal pulses intact. NEURO:  Moves all four extremities. Normal motor exam and sensation in all four extremities. Normal CN II-XII exam.    SKIN:  No rashes;  Normal for age. PSYCH:  Alert and normal affect. DIFFERENTIAL DIAGNOSES/ MEDICAL DECISION MAKING:  Hypoglycemia, acute alcohol, drug, or multipharmacy intoxication, sepsis from numerous possible sources including urosepsis, pneumonia, meningitis, seizure, electrolyte or hormonal imbalance, other etiologies, versus a combination of the above.     Recent Results (from the past 12 hour(s))   CBC WITH AUTOMATED DIFF    Collection Time: 11/27/18 10:50 PM   Result Value Ref Range    WBC 5.9 4.6 - 13.2 K/uL    RBC 4.81 4.70 - 5.50 M/uL    HGB 13.8 13.0 - 16.0 g/dL    HCT 40.8 36.0 - 48.0 %    MCV 84.8 74.0 - 97.0 FL    MCH 28.7 24.0 - 34.0 PG    MCHC 33.8 31.0 - 37.0 g/dL    RDW 13.1 11.6 - 14.5 %    PLATELET 020 297 - 220 K/uL    MPV 9.4 9.2 - 11.8 FL NEUTROPHILS 65 40 - 73 %    LYMPHOCYTES 26 21 - 52 %    MONOCYTES 6 3 - 10 %    EOSINOPHILS 2 0 - 5 %    BASOPHILS 1 0 - 2 %    ABS. NEUTROPHILS 3.9 1.8 - 8.0 K/UL    ABS. LYMPHOCYTES 1.5 0.9 - 3.6 K/UL    ABS. MONOCYTES 0.4 0.05 - 1.2 K/UL    ABS. EOSINOPHILS 0.1 0.0 - 0.4 K/UL    ABS. BASOPHILS 0.0 0.0 - 0.1 K/UL    DF AUTOMATED     MAGNESIUM    Collection Time: 11/27/18 10:50 PM   Result Value Ref Range    Magnesium 2.1 1.6 - 2.6 mg/dL   SALICYLATE    Collection Time: 11/27/18 10:50 PM   Result Value Ref Range    Salicylate level <7.2 (L) 2.8 - 20.0 MG/DL   ACETAMINOPHEN    Collection Time: 11/27/18 10:50 PM   Result Value Ref Range    Acetaminophen level <2 (L) 10.0 - 22.4 ug/mL   METABOLIC PANEL, COMPREHENSIVE    Collection Time: 11/27/18 10:50 PM   Result Value Ref Range    Sodium 135 (L) 136 - 145 mmol/L    Potassium 4.0 3.5 - 5.5 mmol/L    Chloride 101 100 - 108 mmol/L    CO2 26 21 - 32 mmol/L    Anion gap 8 3.0 - 18 mmol/L    Glucose 203 (H) 74 - 99 mg/dL    BUN 13 7.0 - 18 MG/DL    Creatinine 1.08 0.6 - 1.3 MG/DL    BUN/Creatinine ratio 12 12 - 20      GFR est AA >60 >60 ml/min/1.73m2    GFR est non-AA >60 >60 ml/min/1.73m2    Calcium 9.3 8.5 - 10.1 MG/DL    Bilirubin, total 0.2 0.2 - 1.0 MG/DL    ALT (SGPT) 20 16 - 61 U/L    AST (SGOT) 12 (L) 15 - 37 U/L    Alk.  phosphatase 95 45 - 117 U/L    Protein, total 8.5 (H) 6.4 - 8.2 g/dL    Albumin 3.7 3.4 - 5.0 g/dL    Globulin 4.8 (H) 2.0 - 4.0 g/dL    A-G Ratio 0.8 0.8 - 1.7     ETHYL ALCOHOL    Collection Time: 11/27/18 11:00 PM   Result Value Ref Range    ALCOHOL(ETHYL),SERUM <3 0 - 3 MG/DL   EKG, 12 LEAD, INITIAL    Collection Time: 11/27/18 11:12 PM   Result Value Ref Range    Ventricular Rate 79 BPM    Atrial Rate 79 BPM    P-R Interval 162 ms    QRS Duration 92 ms    Q-T Interval 364 ms    QTC Calculation (Bezet) 417 ms    Calculated P Axis 57 degrees    Calculated R Axis -9 degrees    Calculated T Axis 34 degrees    Diagnosis       Normal sinus rhythm  Junctional ST depression, probably normal  Borderline ECG  When compared with ECG of 19-JAN-2018 13:37,  ST now depressed in Anterior leads        ED COURSE AND MEDICAL DECISION MAKING:    THE PATIENT HAS NO SUICIDAL IDEATION AND NO HOMICIDAL IDEATION. I believe the patient is taking too many flexeril and ambien. He admitted that he takes about 2-3 10mg Flexeril at a time when his back is hurting and 2 10mg Ambien. This is likely the cause of his altered mental status. Pt did not have etoh in his system. Denies any other drug ingestion today, however, girlfriend called and told Valerie Elizondo RN that he was diagnosed with etoh abuse and cocaine use yesterday at [facility]. The patient's mental status improved during the ED course over serial physical exams. At time of discharge, patient was clinically sober. 1:47 AM Patient was being discharged and then walked over to my desk and informed me he is now suicidal.  He states he will hang himself. Telepsych consulted. Patient turned over to Dr. Ronald Simmons.       Ione Goodell, PA

## 2018-11-28 NOTE — ED NOTES
6:00 :Pt care assumed from Dr. Cari Pope , ED provider. Pt complaint(s), current treatment plan, progression and available diagnostic results have been discussed thoroughly. Rounding occurred: N/A  Intended Disposition: Transfer   Pending diagnostic reports and/or labs (please list): Pending placement      1800 : Pt care transferred to Dr. Cari Pope, ED provider. History of patient complaint(s), available diagnostic reports and current treatment plan has been discussed thoroughly. Bedside rounding on patient occured : N/A . Intended disposition of patient : Transfer  Pending diagnostics reports and/or labs (please list): Pending placement    Scribe Attestation     Viviana Moody acting as a scribe for and in the presence of Torsten Akhtar MD      November 28, 2018 at 6:46 AM       Provider Attestation:      I personally performed the services described in the documentation, reviewed the documentation, as recorded by the scribe in my presence, and it accurately and completely records my words and actions.  November 28, 2018 at 6:46 AM - Torsten Akhtar MD

## 2018-11-28 NOTE — ED NOTES
Pt was witnessed walking to nursing station and spoke to [address] [place] [address] and said \" Trisha Haw going to help me right? Because I don't have any place to go and I'm going to commit suicide after the conversation I had with my family and what they said to me, I don't know what to do. I walked patient back to bed. Will continue to monitor.

## 2018-11-28 NOTE — ED NOTES
Received phone call from patient's brother. Brother stated that patient had called him to let him know he was being released. Brother informed patient that he could not return to his brothers house. Patient then told his brother that he would kill himself if he was kicked out of the house. Provider notified of conversation.

## 2018-11-28 NOTE — ED TRIAGE NOTES
Patient arrived via EMS after call for \"not acting right\". Patient admits to ETOH use. Vague on medications taken along with ETOH. Patient awake and oriented  with slurred speech.

## 2018-11-28 NOTE — TELEPHONE ENCOUNTER
Rx's printed. Upon chart review, it appears patient went to [facility] ED late last night, and as of early this morning, he was to hopefully be admitted for inpatient psychiatric treatment. Recommend he f/u in the office after discharge.

## 2018-11-28 NOTE — ED NOTES
Patient medications given to Nursing Supervisor. Cyclobenzaprine 10mg x 10 tablets, Zolpidem 10mg x 24 tablets. Security pouch # [id number].

## 2018-11-29 LAB
ATRIAL RATE: 79 BPM
CALCULATED P AXIS, ECG09: 57 DEGREES
CALCULATED R AXIS, ECG10: -9 DEGREES
CALCULATED T AXIS, ECG11: 34 DEGREES
DIAGNOSIS, 93000: NORMAL
P-R INTERVAL, ECG05: 162 MS
Q-T INTERVAL, ECG07: 364 MS
QRS DURATION, ECG06: 92 MS
QTC CALCULATION (BEZET), ECG08: 417 MS
VENTRICULAR RATE, ECG03: 79 BPM

## 2018-11-29 PROCEDURE — 74011250637 HC RX REV CODE- 250/637: Performed by: EMERGENCY MEDICINE

## 2018-11-29 RX ORDER — HYDROXYZINE PAMOATE 25 MG/1
25 CAPSULE ORAL
Status: COMPLETED | OUTPATIENT
Start: 2018-11-29 | End: 2018-11-29

## 2018-11-29 RX ORDER — RISPERIDONE 1 MG/1
1 TABLET, FILM COATED ORAL
Status: COMPLETED | OUTPATIENT
Start: 2018-11-29 | End: 2018-11-29

## 2018-11-29 RX ORDER — RISPERIDONE 1 MG/1
1 TABLET, FILM COATED ORAL 2 TIMES DAILY
Status: DISCONTINUED | OUTPATIENT
Start: 2018-11-29 | End: 2018-12-01 | Stop reason: HOSPADM

## 2018-11-29 RX ADMIN — RISPERIDONE 1 MG: 1 TABLET ORAL at 15:36

## 2018-11-29 RX ADMIN — RISPERIDONE 1 MG: 1 TABLET ORAL at 19:36

## 2018-11-29 RX ADMIN — HYDROXYZINE PAMOATE 25 MG: 25 CAPSULE ORAL at 12:35

## 2018-11-29 RX ADMIN — NAPROXEN 500 MG: 250 TABLET ORAL at 19:32

## 2018-11-29 RX ADMIN — NAPROXEN 500 MG: 250 TABLET ORAL at 09:29

## 2018-11-29 RX ADMIN — METOPROLOL SUCCINATE 100 MG: 50 TABLET, EXTENDED RELEASE ORAL at 09:30

## 2018-11-29 RX ADMIN — DIPHENHYDRAMINE HYDROCHLORIDE 50 MG: 50 CAPSULE ORAL at 20:53

## 2018-11-29 RX ADMIN — SITAGLIPTIN 100 MG: 100 TABLET, FILM COATED ORAL at 09:29

## 2018-11-29 RX ADMIN — OLANZAPINE 5 MG: 5 TABLET, FILM COATED ORAL at 09:30

## 2018-11-29 NOTE — ED NOTES
Assumed care of patient, received report from [facility]. Patient resting in bed asleep, easily rousable, calm and cooperative. Hospital sitter at bedside, see scanned suicidal check sheet.

## 2018-11-29 NOTE — ED NOTES
6:00 AM :Pt care assumed from Dr. Mecca Owen , ED provider. Pt complaint(s), current treatment plan, progression and available diagnostic results have been discussed thoroughly. OD on flexeril. Rounding occurred: yes  Intended Disposition: Transfer   Pending diagnostic reports and/or labs (please list): Now SI. To CSU.              6:00 PM : Pt care transferred to Dr. Mecca Owen, ED provider. History of patient complaint(s), available diagnostic reports and current treatment plan has been discussed thoroughly. Bedside rounding on patient occured : yes . Intended disposition of patient : Transfer  Pending diagnostics reports and/or labs (please list): Pending CSU bed. Scribe [id number] [facility] acting as a scribe for and in the presence of Ray Tarango MD      November 29, 2018 at [facility]       Provider Attestation:      I personally performed the services described in the documentation, reviewed the documentation, as recorded by the scribe in my presence, and it accurately and completely records my words and actions.  November 29, 2018 at 7:25 AM - Ray Tarango MD

## 2018-11-29 NOTE — PROGRESS NOTES
Call placed to [facility] and spoke with [facility]. Per [facility], they are still searching for a CSU bed and will notify the ED when one is found.            Kirk Jackson RN, BSN, [place]  ED Outcomes Manager  Thursdays & Fridays   [phone] (phone)  [phone] (pager)

## 2018-11-29 NOTE — ED NOTES
Vitals:  Patient Vitals for the past 12 hrs:   Temp Pulse Resp BP SpO2   11/28/18 1317 98 °F (36.7 °C) 99 18 (!) 136/98 98 %         Medications ordered:   Medications   OLANZapine (ZyPREXA) tablet 5 mg (5 mg Oral Given 11/28/18 1854)   metoprolol succinate (TOPROL-XL) XL tablet 100 mg (100 mg Oral Given 11/28/18 1515)   SITagliptin (JANUVIA) tablet 100 mg (100 mg Oral Given 11/28/18 1515)   sodium chloride 0.9 % bolus infusion 1,000 mL (1,000 mL IntraVENous New Bag 11/27/18 2015)         Lab findings:  No results found for this or any previous visit (from the past 12 hour(s)). EKG interpretation by ED Physician:      X-Ray, CT or other radiology findings or impressions:  No orders to display       Progress notes, Consult notes or additional Procedure notes:   T/o from dr Bren Richard to f/u placement  Pt seen by csb and will refer to csu placement    Reevaluation of patient:   stable    Disposition:  Diagnosis:   1. Altered mental status, unspecified altered mental status type    2. Medication overdose, accidental or unintentional, initial encounter    3. Cocaine abuse ([facility] [address]    4. Suicidal ideations        Disposition: pending csu bed    Follow-up Information     Follow up With Specialties Details Why Contact Info    [address]  In 3 days  [address] [address] [phone]    [facility] EMERGENCY DEPT Emergency Medicine  If symptoms worsen [address]  [phone]               Medication List      STOP taking these medications    cyclobenzaprine 10 mg tablet  Commonly known as:  FLEXERIL        ASK your doctor about these medications    amitriptyline 10 mg tablet  Commonly known as:  ELAVIL  Take 1 Tab by mouth nightly. amLODIPine 5 mg tablet  Commonly known as:  NORVASC  TAKE 1 TABLET BY MOUTH DAILY FOR HIGH BLOOD PRESSURE     Blood-Glucose Meter monitoring kit  Use as directed.      clonazePAM 0.5 mg tablet  Commonly known as:  Elian [employer]als glucose blood VI test strips strip  Commonly known as:  blood glucose test  Use as directed. Lancets Misc  Check fasting blood sugar once daily     metoprolol succinate 100 mg tablet  Commonly known as:  TOPROL-XL  TAKE 1 TABLET BY MOUTH DAILY     oxyCODONE-acetaminophen 5-325 mg per tablet  Commonly known as:  PERCOCET     SITagliptin 100 mg tablet  Commonly known as:  JANUVIA  Take 1 Tab by mouth daily.      zolpidem 10 mg tablet  Commonly known as:  AMBIEN           Where to Get Your Medications      Information about where to get these medications is not yet available    Ask your nurse or doctor about these medications  · Blood-Glucose Meter monitoring kit  · glucose blood VI test strips strip  · Lancets Misc

## 2018-11-29 NOTE — ED NOTES
Pt stated that he wanted something for sleep. Pt cannot have Zyprexa (recently given) advised MD Mariza Lama.

## 2018-11-30 LAB — GLUCOSE BLD STRIP.AUTO-MCNC: 194 MG/DL (ref 70–110)

## 2018-11-30 PROCEDURE — 82962 GLUCOSE BLOOD TEST: CPT

## 2018-11-30 PROCEDURE — 74011250637 HC RX REV CODE- 250/637: Performed by: EMERGENCY MEDICINE

## 2018-11-30 RX ORDER — AMITRIPTYLINE HYDROCHLORIDE 10 MG/1
10 TABLET, FILM COATED ORAL
Status: DISCONTINUED | OUTPATIENT
Start: 2018-11-30 | End: 2018-12-01 | Stop reason: HOSPADM

## 2018-11-30 RX ORDER — DIPHENHYDRAMINE HCL 25 MG
25 CAPSULE ORAL
Status: DISCONTINUED | OUTPATIENT
Start: 2018-11-30 | End: 2018-12-01 | Stop reason: HOSPADM

## 2018-11-30 RX ADMIN — NAPROXEN 500 MG: 250 TABLET ORAL at 09:20

## 2018-11-30 RX ADMIN — RISPERIDONE 1 MG: 1 TABLET ORAL at 19:34

## 2018-11-30 RX ADMIN — SITAGLIPTIN 100 MG: 100 TABLET, FILM COATED ORAL at 09:20

## 2018-11-30 RX ADMIN — ACETAMINOPHEN 1000 MG: 500 TABLET, FILM COATED ORAL at 09:21

## 2018-11-30 RX ADMIN — RISPERIDONE 1 MG: 1 TABLET ORAL at 09:21

## 2018-11-30 RX ADMIN — NAPROXEN 500 MG: 250 TABLET ORAL at 19:34

## 2018-11-30 RX ADMIN — METOPROLOL SUCCINATE 100 MG: 50 TABLET, EXTENDED RELEASE ORAL at 09:21

## 2018-11-30 RX ADMIN — ACETAMINOPHEN 1000 MG: 500 TABLET, FILM COATED ORAL at 03:22

## 2018-11-30 NOTE — ED NOTES
Assumed care of pt. Pt in paper scrubs. Belongings removed, inventoried, and secured by previous shift. Sitter at bedside. Pt denies SI/HI and endorses depression. Pt medicated per MD order, see MAR. Pt assessed at 6 out of 10. Pt has no additional needs at this time. Will continue to monitor.

## 2018-11-30 NOTE — ED NOTES
6:06 AM :Pt care assumed from Dr. Kavya Foley , ED provider. Pt complaint(s), current treatment plan, progression and available diagnostic results have been discussed thoroughly. Rounding occurred: yes  Intended Disposition: Transfer   Pending diagnostic reports and/or labs (please list): Awaiting bed placement     3:00pm Patient has a bed at [facility] at 11AM tomorrow. 3:36 PM : Pt care transferred to [address] [place] [address]  ,ED provider. History of patient complaint(s), available diagnostic reports and current treatment plan has been discussed thoroughly. Bedside rounding on patient occured : yes . Intended disposition of patient : Transfer  Pending diagnostics reports and/or labs (please list):  Pending transport      [address] [place] [address] Adrien acting as a scribe for and in the presence of Jennifer Jones MD      November 30, 2018 at [facility]       Provider Attestation:      I personally performed the services described in the documentation, reviewed the documentation, as recorded by the scribe in my presence, and it accurately and completely records my words and actions.  November 30, 2018 at 6:15 AM - Jennifer Jones MD

## 2018-11-30 NOTE — ED NOTES
Vitals:  Patient Vitals for the past 12 hrs:   Temp Pulse Resp BP SpO2   11/30/18 0412 97.9 °F (36.6 °C) 64 16 119/73 97 %   11/29/18 2354 97.6 °F (36.4 °C) 72 16 115/78 95 %   11/29/18 1929 97.4 °F (36.3 °C) 73 16 (!) 135/96 97 %         Medications ordered:   Medications   metoprolol succinate (TOPROL-XL) XL tablet 100 mg (100 mg Oral Given 11/29/18 0930)   SITagliptin (JANUVIA) tablet 100 mg (100 mg Oral Given 11/29/18 0929)   naproxen (NAPROSYN) tablet 500 mg (500 mg Oral Given 11/29/18 1932)   acetaminophen (TYLENOL) tablet 1,000 mg (1,000 mg Oral Given 11/30/18 0322)   diphenhydrAMINE (BENADRYL) capsule 50 mg (50 mg Oral Given 11/29/18 2053)   risperiDONE (RisperDAL) tablet 1 mg (1 mg Oral Given 11/29/18 1936)   sodium chloride 0.9 % bolus infusion 1,000 mL (0 mL IntraVENous IV Completed 11/29/18 0320)   hydrOXYzine pamoate (VISTARIL) capsule 25 mg (25 mg Oral Given 11/29/18 1235)   risperiDONE (RisperDAL) tablet 1 mg (1 mg Oral Given 11/29/18 1536)         Lab findings:  No results found for this or any previous visit (from the past 12 hour(s)). EKG interpretation by ED Physician:      X-Ray, CT or other radiology findings or impressions:  No orders to display       Progress notes, Consult notes or additional Procedure notes:   Pt still pending csu bed. Will turn back over to dr Mallika Spencer this morning at 6am    Reevaluation of patient:   stable    Disposition:  Diagnosis:   1. Altered mental status, unspecified altered mental status type    2. Medication overdose, accidental or unintentional, initial encounter    3. Cocaine abuse ([facility] [address]    4.  Suicidal ideations        Disposition: pending    Follow-up Information     Follow up With Specialties Details Why Contact Info    [address]  In 3 days  [address]  [address] [facility]) [phone] [address] [facility] EMERGENCY DEPT Emergency Medicine  If symptoms worsen [address]  [phone] Medication List      STOP taking these medications    clonazePAM 0.5 mg tablet  Commonly known as:  KlonoPIN     cyclobenzaprine 10 mg tablet  Commonly known as:  FLEXERIL     oxyCODONE-acetaminophen 5-325 mg per tablet  Commonly known as:  PERCOCET     zolpidem 10 mg tablet  Commonly known as:  AMBIEN        ASK your doctor about these medications    amitriptyline 10 mg tablet  Commonly known as:  ELAVIL  Take 1 Tab by mouth nightly. amLODIPine 5 mg tablet  Commonly known as:  NORVASC  TAKE 1 TABLET BY MOUTH DAILY FOR HIGH BLOOD PRESSURE     Blood-Glucose Meter monitoring kit  Use as directed. glucose blood VI test strips strip  Commonly known as:  blood glucose test  Use as directed. Lancets Misc  Check fasting blood sugar once daily     metoprolol succinate 100 mg tablet  Commonly known as:  TOPROL-XL  TAKE 1 TABLET BY MOUTH DAILY     SITagliptin 100 mg tablet  Commonly known as:  JANUVIA  Take 1 Tab by mouth daily.            Where to Get Your Medications      Information about where to get these medications is not yet available    Ask your nurse or doctor about these medications  · Blood-Glucose Meter monitoring kit  · glucose blood VI test strips strip  · Lancets Misc

## 2018-11-30 NOTE — PROGRESS NOTES
Call placed to [facility] and spoke with [facility][note 2]. Chepe Lima is currently reviewing pt's information and they will contact us if they have an accepting bed.            aNtalie Patel RN, BSN, [place]  ED Outcomes Manager  Thursdays & Fridays   [phone] (phone)  [phone] (pager)

## 2018-11-30 NOTE — ED NOTES
Care of patient assumed from [address] [place] [address]. Assessment of patient done, and patient noted to be on a bedside phone with a long cord. The phone was taken away from the patient who then became agitated because he states he has had it since admission. Protocal was explained to patient and understanding verballized.

## 2018-11-30 NOTE — PROGRESS NOTES
Patient accepted at [facility].  Patient can transition on Saturday, 12/1/18 @ [address] RN, BSN, [facility]  ED Outcomes Manager  Thursdays & Fridays   [phone] (phone)  [phone] (pager)

## 2018-12-01 VITALS
TEMPERATURE: 97.7 F | WEIGHT: 220 LBS | OXYGEN SATURATION: 99 % | RESPIRATION RATE: 18 BRPM | HEART RATE: 74 BPM | HEIGHT: 70 IN | DIASTOLIC BLOOD PRESSURE: 90 MMHG | SYSTOLIC BLOOD PRESSURE: 147 MMHG | BODY MASS INDEX: 31.5 KG/M2

## 2018-12-01 LAB — GLUCOSE BLD STRIP.AUTO-MCNC: 131 MG/DL (ref 70–110)

## 2018-12-01 PROCEDURE — 74011250637 HC RX REV CODE- 250/637

## 2018-12-01 PROCEDURE — 82962 GLUCOSE BLOOD TEST: CPT

## 2018-12-01 PROCEDURE — 74011250637 HC RX REV CODE- 250/637: Performed by: EMERGENCY MEDICINE

## 2018-12-01 RX ORDER — HYDROXYZINE PAMOATE 25 MG/1
50 CAPSULE ORAL ONCE
Status: DISCONTINUED | OUTPATIENT
Start: 2018-12-01 | End: 2018-12-01

## 2018-12-01 RX ORDER — AMITRIPTYLINE HYDROCHLORIDE 10 MG/1
TABLET, FILM COATED ORAL
Status: COMPLETED
Start: 2018-12-01 | End: 2018-12-01

## 2018-12-01 RX ADMIN — ACETAMINOPHEN 1000 MG: 500 TABLET, FILM COATED ORAL at 00:11

## 2018-12-01 RX ADMIN — SITAGLIPTIN 100 MG: 100 TABLET, FILM COATED ORAL at 09:01

## 2018-12-01 RX ADMIN — METOPROLOL SUCCINATE 100 MG: 50 TABLET, EXTENDED RELEASE ORAL at 09:02

## 2018-12-01 RX ADMIN — RISPERIDONE 1 MG: 1 TABLET ORAL at 09:02

## 2018-12-01 RX ADMIN — AMITRIPTYLINE HYDROCHLORIDE 10 MG: 10 TABLET, FILM COATED ORAL at 00:19

## 2018-12-01 RX ADMIN — DIPHENHYDRAMINE HYDROCHLORIDE 50 MG: 50 CAPSULE ORAL at 00:11

## 2018-12-01 RX ADMIN — NAPROXEN 500 MG: 250 TABLET ORAL at 09:01

## 2018-12-01 NOTE — ED NOTES
Patient transferred to [facility] on [address] [place] [address] via [employer], patient states that he is missing his shoes black van, patient to belongings holding and no shoes found, no documentation of shoes on clothing list shown to patient

## 2018-12-01 NOTE — ED NOTES
TRANSFER - OUT REPORT:    Verbal report given Husam Mcwilliams, RN(name) on Saad Watson  being transferred to [facility] on [facility](unit) for routine progression of care       Report consisted of patients Situation, Background, Assessment and   Recommendations(SBAR). Information from the following report(s) ED Summary, Intake/Output and MAR was reviewed with the receiving nurse. Lines:       Opportunity for questions and clarification was provided.       Patient transported with:   [facility]

## 2018-12-01 NOTE — PROGRESS NOTES
Discharge Location  Discharge Placement: (CSU at [facility] 12/1/ @ 11am)   [address] [facility]  Accepting MD is Robert Gavin MD    Pt remains in agreement.   Updated Dr Jane Oro, Charge nurse and unit secretary  Will require stretcher transport d/t risk of self harm    Kyle Syed RN    Outcomes Manager  [phone]-[phone]  [phone]-[phone]

## 2018-12-01 NOTE — ED NOTES
6:12 AM :Pt care assumed from Dr. Susana Gray , ED provider. Pt complaint(s), current treatment plan, progression and available diagnostic results have been discussed thoroughly. Rounding occurred: yes  Intended Disposition: TBD   Pending diagnostic reports and/or labs (please list): Will go to [facility] this morning    Pt transferred to [facility].

## 2018-12-01 NOTE — ED NOTES
Patient ambulated to the nurses station asking for \"My night time medications\".   Provider aware of patients requests

## 2018-12-01 NOTE — ED NOTES
2:21 AM :Pt care assumed from Meghana Mitchell NP-C, ED provider. Pt complaint(s), current treatment plan, progression and available diagnostic results have been discussed thoroughly. Rounding occurred: yes  Intended Disposition: Transfer   Pending diagnostic reports and/or labs (please list): pending transfer to [facility] around 11 AM    Stable and sleepign quietly all night      5:55 AM : Pt care transferred to Dr. Luz Leon provider. History of patient complaint(s), available diagnostic reports and current treatment plan has been discussed thoroughly. Bedside rounding on patient occured : yes . Intended disposition of patient : Transfer  Pending diagnostics reports and/or labs (please list): pending transfer to [facility]  around [address] acting as a scribe for and in the presence of Meg Dowell MD      December 01, 2018 at 2:22 AM       Provider Attestation:      I personally performed the services described in the documentation, reviewed the documentation, as recorded by the scribe in my presence, and it accurately and completely records my words and actions.  December 01, 2018 at 2:22 AM - Meg Dowell MD

## 2018-12-01 NOTE — ED NOTES
Patients family brought patients cell phone, , wallet and clothing.   All placed in patient belonging bag and placed in locker #2

## 2018-12-03 RX ORDER — CYCLOBENZAPRINE HCL 10 MG
TABLET ORAL
Qty: 90 TAB | Refills: 0 | Status: SHIPPED | OUTPATIENT
Start: 2018-12-03 | End: 2018-12-11

## 2018-12-04 ENCOUNTER — OFFICE VISIT (OUTPATIENT)
Dept: FAMILY MEDICINE CLINIC | Age: 43
End: 2018-12-04

## 2018-12-04 VITALS
SYSTOLIC BLOOD PRESSURE: 125 MMHG | DIASTOLIC BLOOD PRESSURE: 84 MMHG | RESPIRATION RATE: 18 BRPM | OXYGEN SATURATION: 94 % | HEART RATE: 98 BPM | BODY MASS INDEX: 31.64 KG/M2 | HEIGHT: 70 IN | TEMPERATURE: 98.5 F | WEIGHT: 221 LBS

## 2018-12-04 DIAGNOSIS — E11.9 CONTROLLED TYPE 2 DIABETES MELLITUS WITHOUT COMPLICATION, WITHOUT LONG-TERM CURRENT USE OF INSULIN (HCC): ICD-10-CM

## 2018-12-04 DIAGNOSIS — F19.10 SUBSTANCE ABUSE (HCC): ICD-10-CM

## 2018-12-04 DIAGNOSIS — F32.A ANXIETY AND DEPRESSION: Primary | ICD-10-CM

## 2018-12-04 DIAGNOSIS — F41.9 ANXIETY AND DEPRESSION: Primary | ICD-10-CM

## 2018-12-04 RX ORDER — BLOOD-GLUCOSE METER
EACH MISCELLANEOUS
Qty: 1 EACH | Refills: 0 | Status: SHIPPED | OUTPATIENT
Start: 2018-12-04 | End: 2019-01-31

## 2018-12-04 RX ORDER — HYDROXYZINE PAMOATE 25 MG/1
25 CAPSULE ORAL
Qty: 30 CAP | Refills: 0 | Status: SHIPPED | OUTPATIENT
Start: 2018-12-04 | End: 2018-12-13 | Stop reason: SDUPTHER

## 2018-12-04 NOTE — PATIENT INSTRUCTIONS
Take the vistaril as needed for agitation. Follow up with Dr. Mariza Cleaning next Thursday as planned.     I have sent a Rx for a different meter and strips to your pharmacy, hopefully they will cover this one

## 2018-12-04 NOTE — PROGRESS NOTES
HISTORY OF PRESENT ILLNESS  Carlyle Cortés is a 36 y.o. male. Jeanine Vila is here because he wants Risperdal and Vistaril, he was on those in the past and they helped. He has been in the ED twice in the past week or so because of issues with alcohol and substance abuse. He was admitted to [facility] in  to detox from alcohol, but he left because he said the 4 days at [facility] was good enough for detox. He then ended up in [facility] because of his abdominal pain that he says is from his hernia mesh being stretched. His psych (Dr. Alexx Marrero) has been out of town for 3 months, but is back and he has an appt with him in 9 days. Finally, he says the meter that Linh Bauman wrote for isn't covered. He will be getting Medicaid soon, I told him that I would try something else but he may have to wait a month or two        Review of Systems   Constitutional: Negative for chills and fever. Eyes: Negative for blurred vision and double vision. Respiratory: Negative for cough and shortness of breath. Cardiovascular: Negative for chest pain and palpitations. Gastrointestinal: Positive for abdominal pain and constipation. Negative for diarrhea, melena, nausea and vomiting. Genitourinary: Negative for dysuria and urgency. Musculoskeletal: Positive for back pain. Negative for falls, joint pain and myalgias. Neurological: Negative for sensory change and headaches. Psychiatric/Behavioral: Positive for depression and substance abuse. Negative for suicidal ideas. The patient has insomnia. The patient is not nervous/anxious. Physical Exam   Constitutional: He is oriented to person, place, and time. He appears well-developed and well-nourished. Eyes: Pupils are equal, round, and reactive to light. Neck: Neck supple. Cardiovascular: Normal rate and regular rhythm. Pulmonary/Chest: Effort normal and breath sounds normal. No respiratory distress. He has no wheezes. He has no rales. Abdominal: Soft.  There is tenderness (slightly LLQ). Musculoskeletal: He exhibits no edema or tenderness. Lymphadenopathy:     He has no cervical adenopathy. Neurological: He is alert and oriented to person, place, and time. Skin: No erythema. Psychiatric: Thought content normal.   Nursing note and vitals reviewed. ASSESSMENT and PLAN    ICD-10-CM ICD-9-CM    1. Anxiety and depression F41.9 300.00 hydrOXYzine pamoate (VISTARIL) 25 mg capsule    F32.9 311    2. Substance abuse ([facility] [address] F19.10 305.90    3. Controlled type 2 diabetes mellitus without complication, without long-term current use of insulin ([facility]) E11.9 250.00 Blood-Glucose Meter (CONTOUR NEXT METER) misc      glucose blood VI test strips (CONTOUR NEXT TEST STRIPS) strip   Advised him the Risperdal needs to be prescribed by psych, I would give him Vistaril for nerves.

## 2018-12-04 NOTE — PROGRESS NOTES
Rm:2    Chief Complaint   Patient presents with    Medication Evaluation     pt presents to office requesting Risperidone and Vistaril     Depression Screening:  PHQ over the last two weeks 4/6/2018 1/29/2018 12/13/2017 11/28/2017 5/23/2017 5/16/2017 3/20/2017   PHQ Not Done - - - - - - -   Little interest or pleasure in doing things Not at all Not at all - Not at all Not at all Not at all Not at all   Feeling down, depressed, irritable, or hopeless Not at all Not at all More than half the days Not at all Not at all Not at all Not at all   Total Score PHQ 2 0 0 - 0 0 0 0       Learning Assessment:  Learning Assessment 3/3/2017 4/27/2016 3/16/2016   PRIMARY LEARNER Patient Patient Patient   HIGHEST LEVEL OF EDUCATION - PRIMARY LEARNER  GRADUATED HIGH SCHOOL OR GED GRADUATED HIGH SCHOOL OR GED GRADUATED HIGH SCHOOL OR GED   BARRIERS PRIMARY LEARNER NONE - -   CO-LEARNER CAREGIVER No - -   PRIMARY LANGUAGE ENGLISH ENGLISH ENGLISH   LEARNER PREFERENCE PRIMARY READING DEMONSTRATION READING   ANSWERED BY patient patient DARIAN Gaytan   RELATIONSHIP SELF SELF SELF       Abuse Screening:  Abuse Screening Questionnaire 3/16/2016   Do you ever feel afraid of your partner? N   Are you in a relationship with someone who physically or mentally threatens you? N   Is it safe for you to go home? Y       Health Maintenance reviewed and discussed per provider: yes     Coordination of Care:    1. Have you been to the ER, urgent care clinic since your last visit? Hospitalized since your last visit? no    2. Have you seen or consulted any other health care providers outside of the [address] [place] [address] since your last visit? Include any pap smears or colon screening.  no

## 2018-12-05 ENCOUNTER — TELEPHONE (OUTPATIENT)
Dept: FAMILY MEDICINE CLINIC | Age: 43
End: 2018-12-05

## 2018-12-05 ENCOUNTER — DOCUMENTATION ONLY (OUTPATIENT)
Dept: FAMILY MEDICINE CLINIC | Age: 43
End: 2018-12-05

## 2018-12-10 ENCOUNTER — APPOINTMENT (OUTPATIENT)
Dept: CT IMAGING | Age: 43
End: 2018-12-10
Attending: EMERGENCY MEDICINE
Payer: SELF-PAY

## 2018-12-10 ENCOUNTER — HOSPITAL ENCOUNTER (EMERGENCY)
Age: 43
Discharge: HOME | End: 2018-12-11
Attending: EMERGENCY MEDICINE
Payer: SELF-PAY

## 2018-12-10 DIAGNOSIS — R45.851 DEPRESSION WITH SUICIDAL IDEATION: Primary | ICD-10-CM

## 2018-12-10 DIAGNOSIS — F32.A DEPRESSION WITH SUICIDAL IDEATION: Primary | ICD-10-CM

## 2018-12-10 LAB
ALBUMIN SERPL-MCNC: 3.5 G/DL (ref 3.4–5)
ALBUMIN/GLOB SERPL: 0.9 {RATIO} (ref 0.8–1.7)
ALP SERPL-CCNC: 89 U/L (ref 45–117)
ALT SERPL-CCNC: 20 U/L (ref 16–61)
AMPHET UR QL SCN: NEGATIVE
ANION GAP SERPL CALC-SCNC: 8 MMOL/L (ref 3–18)
APAP SERPL-MCNC: <2 UG/ML (ref 10–30)
APPEARANCE UR: ABNORMAL
AST SERPL-CCNC: 13 U/L (ref 15–37)
BARBITURATES UR QL SCN: NEGATIVE
BASOPHILS # BLD: 0 K/UL (ref 0–0.1)
BASOPHILS NFR BLD: 0 % (ref 0–2)
BENZODIAZ UR QL: POSITIVE
BILIRUB SERPL-MCNC: 0.3 MG/DL (ref 0.2–1)
BILIRUB UR QL: NEGATIVE
BUN SERPL-MCNC: 12 MG/DL (ref 7–18)
BUN/CREAT SERPL: 11 (ref 12–20)
CALCIUM SERPL-MCNC: 9 MG/DL (ref 8.5–10.1)
CANNABINOIDS UR QL SCN: NEGATIVE
CHLORIDE SERPL-SCNC: 103 MMOL/L (ref 100–108)
CO2 SERPL-SCNC: 26 MMOL/L (ref 21–32)
COCAINE UR QL SCN: POSITIVE
COLOR UR: YELLOW
CREAT SERPL-MCNC: 1.12 MG/DL (ref 0.6–1.3)
DIFFERENTIAL METHOD BLD: ABNORMAL
EOSINOPHIL # BLD: 0.1 K/UL (ref 0–0.4)
EOSINOPHIL NFR BLD: 1 % (ref 0–5)
ERYTHROCYTE [DISTWIDTH] IN BLOOD BY AUTOMATED COUNT: 13.6 % (ref 11.6–14.5)
ETHANOL SERPL-MCNC: <3 MG/DL (ref 0–3)
GLOBULIN SER CALC-MCNC: 3.9 G/DL (ref 2–4)
GLUCOSE SERPL-MCNC: 128 MG/DL (ref 74–99)
GLUCOSE UR STRIP.AUTO-MCNC: NEGATIVE MG/DL
HCT VFR BLD AUTO: 39.2 % (ref 36–48)
HDSCOM,HDSCOM: ABNORMAL
HGB BLD-MCNC: 13.3 G/DL (ref 13–16)
HGB UR QL STRIP: NEGATIVE
KETONES UR QL STRIP.AUTO: NEGATIVE MG/DL
LEUKOCYTE ESTERASE UR QL STRIP.AUTO: NEGATIVE
LYMPHOCYTES # BLD: 1.2 K/UL (ref 0.9–3.6)
LYMPHOCYTES NFR BLD: 16 % (ref 21–52)
MCH RBC QN AUTO: 29.2 PG (ref 24–34)
MCHC RBC AUTO-ENTMCNC: 33.9 G/DL (ref 31–37)
MCV RBC AUTO: 86.2 FL (ref 74–97)
METHADONE UR QL: NEGATIVE
MONOCYTES # BLD: 0.4 K/UL (ref 0.05–1.2)
MONOCYTES NFR BLD: 6 % (ref 3–10)
NEUTS SEG # BLD: 5.8 K/UL (ref 1.8–8)
NEUTS SEG NFR BLD: 77 % (ref 40–73)
NITRITE UR QL STRIP.AUTO: NEGATIVE
OPIATES UR QL: NEGATIVE
PCP UR QL: NEGATIVE
PH UR STRIP: 8.5 [PH] (ref 5–8)
PLATELET # BLD AUTO: 237 K/UL (ref 135–420)
PMV BLD AUTO: 9 FL (ref 9.2–11.8)
POTASSIUM SERPL-SCNC: 3.8 MMOL/L (ref 3.5–5.5)
PROT SERPL-MCNC: 7.4 G/DL (ref 6.4–8.2)
PROT UR STRIP-MCNC: NEGATIVE MG/DL
RBC # BLD AUTO: 4.55 M/UL (ref 4.7–5.5)
SALICYLATES SERPL-MCNC: <2.8 MG/DL (ref 2.8–20)
SODIUM SERPL-SCNC: 137 MMOL/L (ref 136–145)
SP GR UR REFRACTOMETRY: 1.02 (ref 1–1.03)
UROBILINOGEN UR QL STRIP.AUTO: 0.2 EU/DL (ref 0.2–1)
WBC # BLD AUTO: 7.5 K/UL (ref 4.6–13.2)

## 2018-12-10 PROCEDURE — 96374 THER/PROPH/DIAG INJ IV PUSH: CPT

## 2018-12-10 PROCEDURE — 99285 EMERGENCY DEPT VISIT HI MDM: CPT

## 2018-12-10 PROCEDURE — 74011250636 HC RX REV CODE- 250/636: Performed by: EMERGENCY MEDICINE

## 2018-12-10 PROCEDURE — 74011636320 HC RX REV CODE- 636/320: Performed by: EMERGENCY MEDICINE

## 2018-12-10 PROCEDURE — 85025 COMPLETE CBC W/AUTO DIFF WBC: CPT

## 2018-12-10 PROCEDURE — 74011250637 HC RX REV CODE- 250/637: Performed by: EMERGENCY MEDICINE

## 2018-12-10 PROCEDURE — 80053 COMPREHEN METABOLIC PANEL: CPT

## 2018-12-10 PROCEDURE — 81003 URINALYSIS AUTO W/O SCOPE: CPT

## 2018-12-10 PROCEDURE — 80307 DRUG TEST PRSMV CHEM ANLYZR: CPT

## 2018-12-10 PROCEDURE — 74177 CT ABD & PELVIS W/CONTRAST: CPT

## 2018-12-10 PROCEDURE — 96375 TX/PRO/DX INJ NEW DRUG ADDON: CPT

## 2018-12-10 RX ORDER — HYDROXYZINE PAMOATE 25 MG/1
25 CAPSULE ORAL
Status: DISCONTINUED | OUTPATIENT
Start: 2018-12-10 | End: 2018-12-11 | Stop reason: HOSPADM

## 2018-12-10 RX ORDER — METOPROLOL SUCCINATE 50 MG/1
100 TABLET, EXTENDED RELEASE ORAL DAILY
Status: DISCONTINUED | OUTPATIENT
Start: 2018-12-11 | End: 2018-12-11 | Stop reason: HOSPADM

## 2018-12-10 RX ORDER — ONDANSETRON 2 MG/ML
4 INJECTION INTRAMUSCULAR; INTRAVENOUS
Status: COMPLETED | OUTPATIENT
Start: 2018-12-10 | End: 2018-12-10

## 2018-12-10 RX ORDER — KETOROLAC TROMETHAMINE 30 MG/ML
30 INJECTION, SOLUTION INTRAMUSCULAR; INTRAVENOUS ONCE
Status: COMPLETED | OUTPATIENT
Start: 2018-12-10 | End: 2018-12-10

## 2018-12-10 RX ORDER — ACETAMINOPHEN 500 MG
1000 TABLET ORAL
Status: DISCONTINUED | OUTPATIENT
Start: 2018-12-10 | End: 2018-12-11 | Stop reason: HOSPADM

## 2018-12-10 RX ORDER — RISPERIDONE 1 MG/1
1 TABLET, FILM COATED ORAL 2 TIMES DAILY
COMMUNITY
End: 2018-12-11

## 2018-12-10 RX ORDER — LORAZEPAM 1 MG/1
1 TABLET ORAL
Status: COMPLETED | OUTPATIENT
Start: 2018-12-10 | End: 2018-12-10

## 2018-12-10 RX ORDER — CLONAZEPAM 1 MG/1
1 TABLET ORAL 2 TIMES DAILY
COMMUNITY
End: 2018-12-11

## 2018-12-10 RX ORDER — RISPERIDONE 1 MG/1
1 TABLET, FILM COATED ORAL 2 TIMES DAILY
Status: DISCONTINUED | OUTPATIENT
Start: 2018-12-10 | End: 2018-12-11 | Stop reason: HOSPADM

## 2018-12-10 RX ORDER — IBUPROFEN 400 MG/1
800 TABLET ORAL
Status: DISCONTINUED | OUTPATIENT
Start: 2018-12-10 | End: 2018-12-11 | Stop reason: HOSPADM

## 2018-12-10 RX ORDER — OXYCODONE AND ACETAMINOPHEN 5; 325 MG/1; MG/1
1 TABLET ORAL
Status: COMPLETED | OUTPATIENT
Start: 2018-12-10 | End: 2018-12-10

## 2018-12-10 RX ORDER — CLONAZEPAM 0.5 MG/1
0.5 TABLET ORAL 2 TIMES DAILY
Status: DISCONTINUED | OUTPATIENT
Start: 2018-12-10 | End: 2018-12-11 | Stop reason: HOSPADM

## 2018-12-10 RX ORDER — CLONAZEPAM 0.5 MG/1
1 TABLET ORAL 2 TIMES DAILY
Status: DISCONTINUED | OUTPATIENT
Start: 2018-12-10 | End: 2018-12-10

## 2018-12-10 RX ADMIN — ACETAMINOPHEN 1000 MG: 500 TABLET, FILM COATED ORAL at 20:21

## 2018-12-10 RX ADMIN — HYDROXYZINE PAMOATE 25 MG: 25 CAPSULE ORAL at 20:21

## 2018-12-10 RX ADMIN — IBUPROFEN 800 MG: 400 TABLET ORAL at 20:21

## 2018-12-10 RX ADMIN — CLONAZEPAM 0.5 MG: 0.5 TABLET ORAL at 20:21

## 2018-12-10 RX ADMIN — IOPAMIDOL 93 ML: 612 INJECTION, SOLUTION INTRAVENOUS at 10:56

## 2018-12-10 RX ADMIN — KETOROLAC TROMETHAMINE 30 MG: 30 INJECTION, SOLUTION INTRAMUSCULAR at 10:49

## 2018-12-10 RX ADMIN — OXYCODONE AND ACETAMINOPHEN 1 TABLET: 5; 325 TABLET ORAL at 11:50

## 2018-12-10 RX ADMIN — OXYCODONE AND ACETAMINOPHEN 1 TABLET: 5; 325 TABLET ORAL at 16:37

## 2018-12-10 RX ADMIN — ONDANSETRON 4 MG: 2 INJECTION INTRAMUSCULAR; INTRAVENOUS at 10:49

## 2018-12-10 RX ADMIN — LORAZEPAM 1 MG: 1 TABLET ORAL at 14:28

## 2018-12-10 RX ADMIN — RISPERIDONE 1 MG: 1 TABLET ORAL at 20:21

## 2018-12-10 NOTE — ED TRIAGE NOTES
Patient states he left [facility] last Monday as he thought he had a job, states the job fell through, states he started drinking again and wants to go back to [facility], states \"I would be better off if I was not here\"

## 2018-12-10 NOTE — ED NOTES
Vitals:  Patient Vitals for the past 12 hrs:   Temp Pulse Resp BP SpO2   12/10/18 2247 97.8 °F (36.6 °C) 73 16 119/83 97 %   12/10/18 1541 97.3 °F (36.3 °C) 97 18 (!) 130/97 97 %         Medications ordered:   Medications   hydrOXYzine pamoate (VISTARIL) capsule 25 mg (25 mg Oral Given 12/10/18 2021)   risperiDONE (RisperDAL) tablet 1 mg (1 mg Oral Given 12/10/18 2021)   SITagliptin (JANUVIA) tablet 100 mg (not administered)   metoprolol succinate (TOPROL-XL) XL tablet 100 mg (not administered)   acetaminophen (TYLENOL) tablet 1,000 mg (1,000 mg Oral Given 12/10/18 2021)   ibuprofen (MOTRIN) tablet 800 mg (800 mg Oral Given 12/10/18 2021)   clonazePAM (KlonoPIN) tablet 0.5 mg (0.5 mg Oral Given 12/10/18 2021)   ketorolac (TORADOL) injection 30 mg (30 mg IntraVENous Given 12/10/18 1049)   ondansetron (ZOFRAN) injection 4 mg (4 mg IntraVENous Given 12/10/18 1049)   iopamidol (ISOVUE 300) 61 % contrast injection 100 mL (93 mL IntraVENous Given 12/10/18 1056)   oxyCODONE-acetaminophen (PERCOCET) 5-325 mg per tablet 1 Tab (1 Tab Oral Given 12/10/18 1150)   LORazepam (ATIVAN) tablet 1 mg (1 mg Oral Given 12/10/18 1428)   oxyCODONE-acetaminophen (PERCOCET) 5-325 mg per tablet 1 Tab (1 Tab Oral Given 12/10/18 1637)         Lab findings:  No results found for this or any previous visit (from the past 12 hour(s)). EKG interpretation by ED Physician:      X-Ray, CT or other radiology findings or impressions:  CT ABD PELV W CONT   Final Result   IMPRESSION:      1. No acute intra-abdominal or pelvic abnormality demonstrated. 2. Diverticulosis of the distal descending and sigmoid colon, as previously;   without findings to suggest diverticulitis. 3. Mild and diffuse hepatic hypoattenuation suggesting underlying steatosis.              Progress notes, Consult notes or additional Procedure notes:   T/o from dr Charli Turpin to f/u placement  Seen by csb who will pursue csu bed  Pt had req reevaluation to go home and is did not feel he needed admission  Seen again and felt appropriate for d/c  I have discussed with patient and/or family/sig other the results, interpretation of any imaging if performed, suspected diagnosis and treatment plan to include instructions regarding the diagnoses listed to which understanding was expressed with all questions answered        Reevaluation of patient:   stable    Disposition:  Diagnosis:   1. Depression with suicidal ideation        Disposition: home      Follow-up Information     Follow up With Specialties Details Why Contact Info    [employer]  Schedule an appointment as soon as possible for a visit  [address] [phone]   [facility] [facility] Schedule an appointment as soon as possible for a visit  [address]               Medication List      ASK your doctor about these medications    amLODIPine 5 mg tablet  Commonly known as:  NORVASC  TAKE 1 TABLET BY MOUTH DAILY FOR HIGH BLOOD PRESSURE     Blood-Glucose Meter Misc  Commonly known as:  CONTOUR NEXT METER  Check blood sugar twice daily     cyclobenzaprine 10 mg tablet  Commonly known as:  FLEXERIL  TAKE 1 TABLET BY MOUTH THREE TIMES DAILY AS NEEDED FOR MUSCLE SPASMS     glucose blood VI test strips strip  Commonly known as:  CONTOUR NEXT TEST STRIPS  Check blood sugar twice daily     hydrOXYzine pamoate 25 mg capsule  Commonly known as:  VISTARIL  Take 1 Cap by mouth three (3) times daily as needed for Itching. KlonoPIN 1 mg tablet  Generic drug:  clonazePAM     lancets Misc  Check fasting blood sugar once daily     metoprolol succinate 100 mg tablet  Commonly known as:  TOPROL-XL  TAKE 1 TABLET BY MOUTH DAILY     RisperDAL 1 mg tablet  Generic drug:  risperiDONE     SITagliptin 100 mg tablet  Commonly known as:  JANUVIA  Take 1 Tab by mouth daily.

## 2018-12-10 NOTE — CONSULTS
Location of patient: [facility]  Location of provider: [place]    This evaluation was conducted via telepsychiatry with the assistance of onsite staff. Reason for consult: Request for substance treatment  History of Present Illness: 36 y.o. male with history of polysubstance abuse and depression, and history of drug seeking behavior, presenting to ED with report of relapse on alcohol and cocaine and requesting to return to [facility]. Pt was there recently and left early, reportedly due to a job opportunity. 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 However, the contract was lost to someone else. Pt states that he tried to stay clean on his own, but that did not work, \"so then I turned to the drinking\" and use of drugs again. \"[facility] helped me in many many ways, [facility]\". Pt states that he wants to go back there and even if he gets another job opportunity, he would not leave this time. On chart review, pt was seen in the ED by telepsych on 11/28, with recommendation for inpatient treatment due to suicidality. Pt states that he was in the ED for about 4 days waiting for a bed, then ended up going to [facility]. He relapsed only a couple of days after leaving the program. Reports last drink was at 4 am today. Pt denies current suicidal or homicidal ideations, states that he has not felt that way since prior ED visit. Pt denies hallucinations or confusion, but states that he feels shaky and nauseated currently. He also reports feeling anxious.     Past SI/Self harm: Endorses past SI but denies history of attempts  Past HI/Violence: Pt denies  Access to firearms: Pt denies  Legal: Pt denies  Collateral: EMR, attending physician Dr. Shala Parra History/Treatment History: Reports one prior psych admission for depression, \"a long long time ago\", does not recall details. Has outpatient psychiatrist, initially reports he just saw him for the first time. Upon discussion of documentation from recent PCP visit, pt then states he has seen the psychiatrist 2 times, most recently 3 days ago. PCP note states that pt's appointment was not until this week. Pt states that he was able to have the appointment moved up. Reports that the psychiatrist said he may have bipolar disorder. Risperdal was added at most recent appt, but pt is unsure of dose. He states that he was on that medication while at [facility] but was not discharged on it because he left early. Per EMR, on arrival to ED pt reported taking Klonopin but he is not reporting that currently. He does report taking Sertraline 100 mg daily. Records indicate recent prescription for hydroxyzine as well. Drug/Alcohol History: History of excess alcohol use, typically 36 beers in a 2 day period, \"somewhat continuous but there are some days that I don't\". He also reports use of cocaine \"when I drink, but not like a lot\". \"To be completely honest, I would do like 4 little lines\". Pt relapsed last week and has used alcohol twice since then, total of about 20 beers in the past week. Reports history of withdrawal in the past but denies history of DT's or seizures. Reviewed prior psych eval with pt, he states that info is incorrect and confirms that he has not had DT's. Pt reports using cocaine last week also, \"about 4 lines\". UDS today positive for cocaine and benzo (unclear if benzo is prescribed). Medical History:   Past Medical History:   Diagnosis Date    Depression     Diabetes ([facility] [address]     Drug-seeking behavior     56 prescriptions from 32 different prescribers at 6 different pharmacies in last 12 months    Herniated lumbar intervertebral disc     Hypertension     Neurological disorder L3,4,5 torn Herniated disc    Obesity (BMI 30.0-34. 9)      Medications & Freq:     Current Facility-Administered Medications:    LORazepam (ATIVAN) tablet 1 mg, 1 mg, Oral, NOW, Minerva Shore MD    Current Outpatient Medications:     Blood-Glucose Meter (CONTOUR NEXT METER) misc, Check blood sugar twice daily, Disp: 1 Each, Rfl: 0    glucose blood VI test strips (CONTOUR NEXT TEST STRIPS) strip, Check blood sugar twice daily, Disp: 200 Strip, Rfl: 3    hydrOXYzine pamoate (VISTARIL) 25 mg capsule, Take 1 Cap by mouth three (3) times daily as needed for Itching., Disp: 30 Cap, Rfl: 0    cyclobenzaprine (FLEXERIL) 10 mg tablet, TAKE 1 TABLET BY MOUTH THREE TIMES DAILY AS NEEDED FOR MUSCLE SPASMS, Disp: 90 Tab, Rfl: 0    Lancets misc, Check fasting blood sugar once daily, Disp: 100 Each, Rfl: 0    SITagliptin (JANUVIA) 100 mg tablet, Take 1 Tab by mouth daily. , Disp: 30 Tab, Rfl: 2    metoprolol succinate (TOPROL-XL) 100 mg tablet, TAKE 1 TABLET BY MOUTH DAILY, Disp: 30 Tab, Rfl: 0    amLODIPine (NORVASC) 5 mg tablet, TAKE 1 TABLET BY MOUTH DAILY FOR HIGH BLOOD PRESSURE, Disp: 30 Tab, Rfl: 5   -Pt also reports taking Risperdal (unknown dose) and Zoloft 100 mg daily. Allergies:   No Known Allergies  Family Psych History/History of suicide: Father - alcoholic, now . Mother - history of depression. No suicides. Social History: , lives with brother and sister-in-law. States that he will have to move out soon, may end up homeless. Has a son age 6, he lives with his mother and pt sees him regularly. Education: High school graduate   Employment: Unemployed, typically works in construction   Stressors: financial strain, unstable housing, \"all kinds of stuff\"   Strengths/supports: \"sometimes\" support from brother, but he travels a lot.    Mental Status Exam:   Appearance and attire: appropriate grooming, appears stated age  Attitude and behavior: Pleasant, calm, cooperative  Speech: Normal rate/volume/tone  Mood: Appears neutral  Affect: mood-congruent  Association and thought processes: Linear, logical, goal-directed  Thought content: Denies SI or HI  Perception: No evidence of delusions or hallucinations  Sensorium and orientation: Alert, oriented to person, place, situation. Reports correct month and year. States date as 9th, then realizes that is not correct but unable to provide correct date. Memory and intellectual functioning: No gross impairments noted  Insight and judgment: Fair to poor  Impression/Risk Assessment: 35 y/o male with history of polysubstance abuse and depression, presenting to ED requesting to go back to rehab program after relapse on alcohol and cocaine. There appears to be some use of benzo as well, unclear whether prescribed. Pt denies current depressed mood, denies suicidal or homicidal ideations. He does endorse anxiety and possible symptoms of withdrawal, though he endorses only brief relapse at this point. Diagnosis: F10.20  Alcohol use disorder, severe; F14.99  Unspecified cocaine use disorder; Rule out alcohol/benzo withdrawal; Depressive disorder by history  Treatment Recommendations:  1. Disposition: Recommend transfer back to [facility] or other similar program if available. 2. Psychiatric medications:   -CIWA protocol for possible withdrawal.  -Resume home medications once meds/doses are confirmed.  -Pt should not receive additional benzo beyond CIWA dosing. The above recommendations were discussed with pt who expressed understanding, and referring provider who expressed agreement with plan.

## 2018-12-10 NOTE — PROGRESS NOTES
Spoke with Guadalupe Peñaloza from [facility] and states that a tele psych consult is needed to refer to [facility]. ED MD made aware.

## 2018-12-11 VITALS
OXYGEN SATURATION: 97 % | TEMPERATURE: 97.8 F | RESPIRATION RATE: 18 BRPM | HEART RATE: 91 BPM | WEIGHT: 220 LBS | HEIGHT: 71 IN | DIASTOLIC BLOOD PRESSURE: 100 MMHG | SYSTOLIC BLOOD PRESSURE: 149 MMHG | BODY MASS INDEX: 30.8 KG/M2

## 2018-12-11 RX ORDER — RISPERIDONE 1 MG/1
1 TABLET, FILM COATED ORAL 2 TIMES DAILY
Qty: 30 TAB | Refills: 0 | Status: SHIPPED | OUTPATIENT
Start: 2018-12-11 | End: 2019-01-05

## 2018-12-11 RX ORDER — CLONAZEPAM 1 MG/1
0.5 TABLET ORAL 2 TIMES DAILY
Qty: 20 TAB | Refills: 0 | Status: SHIPPED | OUTPATIENT
Start: 2018-12-11 | End: 2019-01-04 | Stop reason: SDUPTHER

## 2018-12-11 NOTE — CONSULTS
Name: Grant Wallace    : 73. 42M  Date:  18  . Time: 1:50am  Location of patient: [facility] ED  Location of doctor: [place]  This evaluation was conducted via telepsychiatry with the assistance of onsite staff. Chief Complaint: Depression/ substance    History of Present Illness:   Pt seen via televideo with the help of onsite staff. Pt is a 35 yo male with a hx of substance abuse, depression and recent diagnosis of bipolar disorder. Pt recently discharged from a [facility] approximately 1 week prior. Pt states that he left early as he was feeling better and wanted to resume working. States he returned to the ED today as he relapsed onto substances of abuse and has been feeling more down. Pt initially requested to return to the crisis stabilization unit however now requesting to leave and follow up as an outpt. Pt reports that he was concerned about his slip would result in a relapse. States at the [facility] he was started on new medications for bipolar disorder and feels that it is helping a great deal.  States he is no longer experiencing racing thoughts and feels more level. States he is still somewhat down due to his stressors. States this time of year is difficult for him as both of his parents are  and he is still going through a custody enriquez over his son. States he needs to work, and fears returning to the [place] [id number] may result in him loosing a potential job. States he believes he relapsed due to people places and things not his mood. On ROS, pt denies AVHs, delusions nor SI/HI. Review of chart indicates that the pt has consistently denied SI/HI nor psychotic sxs. There is no current evidence of acute dangerousness. Pt denies SIHI nor psychotic sxs. No prior hx of attempts. He is future oriented, cites strong deterrents against suicide.   Unable to reach the pts brother for collateral.  INPT: prior admission    Outpt: reports compliance    SI/Attempts: prior ideation, no attempts. Substance Use: ETOH, cocaine  Forensic Hx: denies   Weapons: denies    Med Hx: see chart   Medications & Freq: see chart  Allergies: NKDA  Family Psych History/History of suicide: none reported    Social History:      Housing: lives with brother   Employment: seasonal.    Stressors: see HPI   Strengths/supports: brother  Mental Status Exam:   Appearance and attire: Dressed in hospital attire  Attitude and behavior: cooperative. Speech: WNL  Affect and mood: better / approriate  Association and thought processes: linear  Thought content: denies delusions. Denies SI/HI  Perceptual: Denies AVHs   Sensorium, memory, and orientation AxOx3  Intellectual Functioning: unable to assess fully  Insight and judgment: impaired  Diagnosis: Unspecified Bipolar and Related Disorder, Polysubstance Use Disorders. Assessment: There is no current evidence of acute dangerousness. Pt denies SIHI nor psychotic sxs. No prior hx of attempts. He is future oriented, cites strong deterrents against suicide. Unable to reach the pts brother for collateral.  Treatment Recommendations:  Pt can be discharged from a psychiatric standpoint  Please provide outpt psychiatric referral resources upon his hospital discharge.

## 2018-12-11 NOTE — DISCHARGE INSTRUCTIONS
Recovering From Depression: Care Instructions  Your Care Instructions    Taking good care of yourself is important as you recover from depression. In time, your symptoms will fade as your treatment takes hold. Do not give up. Instead, focus your energy on getting better. Your mood will improve. It just takes some time. Focus on things that can help you feel better, such as being with friends and family, eating well, and getting enough rest. But take things slowly. Do not do too much too soon. You will begin to feel better gradually. Follow-up care is a key part of your treatment and safety. Be sure to make and go to all appointments, and call your doctor if you are having problems. It's also a good idea to know your test results and keep a list of the medicines you take. How can you care for yourself at home? Be realistic  · If you have a large task to do, break it up into smaller steps you can handle, and just do what you can. · You may want to put off important decisions until your depression has lifted. If you have plans that will have a major impact on your life, such as marriage, divorce, or a job change, try to wait a bit. Talk it over with friends and loved ones who can help you look at the overall picture first.  · Reaching out to people for help is important. Do not isolate yourself. Let your family and friends help you. Find someone you can trust and confide in, and talk to that person. · Be patient, and be kind to yourself. Remember that depression is not your fault and is not something you can overcome with willpower alone. Treatment is necessary for depression, just like for any other illness. Feeling better takes time, and your mood will improve little by little. Stay active  · Stay busy and get outside. Take a walk, or try some other light exercise. · Talk with your doctor about an exercise program. Exercise can help with mild depression. · Go to a movie or concert.  Take part in a [employer] activity or other social gathering. Go to a [employer] game. · Ask a friend to have dinner with you. Take care of yourself  · Eat a balanced diet with plenty of fresh fruits and vegetables, whole grains, and lean protein. If you have lost your appetite, eat small snacks rather than large meals. · Avoid drinking alcohol or using illegal drugs. Do not take medicines that have not been prescribed for you. They may interfere with medicines you may be taking for depression, or they may make your depression worse. · Take your medicines exactly as they are prescribed. You may start to feel better within 1 to 3 weeks of taking antidepressant medicine. But it can take as many as 6 to 8 weeks to see more improvement. If you have questions or concerns about your medicines, or if you do not notice any improvement by 3 weeks, talk to your doctor. · If you have any side effects from your medicine, tell your doctor. Antidepressants can make you feel tired, dizzy, or nervous. Some people have dry mouth, constipation, headaches, sexual problems, or diarrhea. Many of these side effects are mild and will go away on their own after you have been taking the medicine for a few weeks. Some may last longer. Talk to your doctor if side effects are bothering you too much. You might be able to try a different medicine. · Get enough sleep. If you have problems sleeping:  ? Go to bed at the same time every night, and get up at the same time every morning. ? Keep your bedroom dark and quiet. ? Do not exercise after 5:00 p.m.  ? Avoid drinks with caffeine after 5:00 p.m. · Avoid sleeping pills unless they are prescribed by the doctor treating your depression. Sleeping pills may make you groggy during the day, and they may interact with other medicine you are taking. · If you have any other illnesses, such as diabetes, heart disease, or high blood pressure, make sure to continue with your treatment.  Tell your doctor about all of the medicines you take, including those with or without a prescription. · Keep the numbers for these national suicide hotlines: 8-311-219-TALK (8-912.958.8671) and 5-062-URTLMFG (9-541.295.8480). If you or someone you know talks about suicide or feeling hopeless, get help right away. When should you call for help? Call 911 anytime you think you may need emergency care. For example, call if:    · You feel like hurting yourself or someone else.     · Someone you know has depression and is about to attempt or is attempting suicide.   [facility] your doctor now or seek immediate medical care if:    · You hear voices.     · Someone you know has depression and:  ? Starts to give away his or her possessions. ? Uses illegal drugs or drinks alcohol heavily. ? Talks or writes about death, including writing suicide notes or talking about guns, knives, or pills. ? Starts to spend a lot of time alone. ? Acts very aggressively or suddenly appears calm.    Watch closely for changes in your health, and be sure to contact your doctor if:    · You do not get better as expected. Where can you learn more? Go to http://jacklynPixabilitydenice.info/. Enter M593 in the search box to learn more about \"Recovering From Depression: Care Instructions. \"  Current as of: December 7, 2017  Content Version: 11.8  © 9473-5866 Healthwise, [employer]. Care instructions adapted under license by [employer] (which disclaims liability or warranty for this information). If you have questions about a medical condition or this instruction, always ask your healthcare professional. [place] [address] any warranty or liability for your use of this information. Preventing a Relapse of Depression: Care Instructions  Your Care Instructions    A relapse of depression means your symptoms have come back after you have gotten better. This illness often comes and goes during a lifetime.  But there are many things you can do to keep it from coming back. Follow-up care is a key part of your treatment and safety. Be sure to make and go to all appointments, and call your doctor if you are having problems. It's also a good idea to know your test results and keep a list of the medicines you take. What do you need to know? Know your risk of relapse  Talk to your doctor to find out if you are at risk of relapse. Many things can make a person more likely to relapse into depression. These include having a family member with depression, dealing with serious problems in a relationship or a job, having a serious medical condition, or abusing drugs or alcohol. It is important to know your risk and to recognize warning signs of relapse. Once you know these things, you will be better able to keep it from happening to you. Know the warning signs of relapse  The two most common signs of relapse are:  · Feeling sad or hopeless. · Losing interest in your daily activities. You may have other symptoms, such as:  · You lose or gain weight. · You sleep too much or not enough. · You feel restless and unable to sit still. · You feel unable to move. · You feel tired all the time. · You feel unworthy or guilty without an obvious reason. · You have problems concentrating, remembering, or making decisions. · You think often about death or suicide. · You feel angry or have panic attacks. How can you care for yourself at home? · Take your medicine as prescribed. Call your doctor if you have any problems with your medicine. Many people take their medicines for at least 6 months after they have recovered. This often helps keep symptoms from coming back. However, if your depression keeps coming back, you may have to take medicine for the rest of your life. · Continue counseling even after you have stopped taking medicine. · Eat healthy foods. Include fruits, vegetables, beans, and whole grains in your diet each day.   · Get at least 30 minutes of exercise on most days of the week. Walking is a good choice. You also may want to do other activities, such as running, swimming, cycling, or playing tennis or team sports. · See your doctor right away if you have new symptoms or feel that your depression is coming back. · Keep a regular sleep schedule. Try for 8 hours of sleep a night. · Avoid alcohol and illegal drugs. · Keep the numbers for these national suicide hotlines: 7-326-832-TALK (2-655.545.7274) and 0-838-WIWQMCF (7-327.315.4461). If you or someone you know talks about suicide or feeling hopeless, get help right away. When should you call for help? Call 911 anytime you think you may need emergency care. For example, call if:    · You are thinking about suicide or are threatening suicide.     · You feel you cannot stop from hurting yourself or someone else.     · You hear or see things that aren't real.     · You think or speak in a bizarre way that is not like your usual behavior.    Call your doctor now or seek immediate medical care if:    · You are drinking a lot of alcohol or using illegal drugs.     · You are talking or writing about death.    Watch closely for changes in your health, and be sure to contact your doctor if:    · You find it hard or it's getting harder to deal with school, a job, family, or friends.     · You think your treatment is not helping or you are not getting better.     · Your symptoms get worse or you get new symptoms.     · You have any problems with your antidepressant medicines, such as side effects, or you are thinking about stopping your medicine.     · You are having manic behavior, such as having very high energy, needing less sleep than normal, or showing risky behavior such as spending money you don't have or abusing others verbally or physically. Where can you learn more? Go to http://jacklyn-denice.info/.   Enter A429 in the search box to learn more about \"Preventing a Relapse of Depression: Care Instructions. \"  Current as of: December 7, 2017  Content Version: 11.8  © 5326-3757 Healthwise, [facility]. Care instructions adapted under license by [employer] (which disclaims liability or warranty for this information). If you have questions about a medical condition or this instruction, always ask your healthcare professional. [place] [address] any warranty or liability for your use of this information.

## 2018-12-13 DIAGNOSIS — F41.9 ANXIETY AND DEPRESSION: ICD-10-CM

## 2018-12-13 DIAGNOSIS — F32.A ANXIETY AND DEPRESSION: ICD-10-CM

## 2018-12-13 RX ORDER — HYDROXYZINE PAMOATE 25 MG/1
25 CAPSULE ORAL
Qty: 30 CAP | Refills: 0 | Status: SHIPPED | OUTPATIENT
Start: 2018-12-13 | End: 2019-01-05

## 2018-12-13 NOTE — TELEPHONE ENCOUNTER
refills  Pt stated after taking one another was needed after a few hours. Rx is due to run out soon.

## 2018-12-17 ENCOUNTER — TELEPHONE (OUTPATIENT)
Dept: SURGERY | Age: 43
End: 2018-12-17

## 2018-12-17 ENCOUNTER — DOCUMENTATION ONLY (OUTPATIENT)
Dept: FAMILY MEDICINE CLINIC | Age: 43
End: 2018-12-17

## 2018-12-17 NOTE — TELEPHONE ENCOUNTER
Patient called requesting to be seen today. I informed the patient that we could see him 12/26/18 at the earliest. He did request to be seen by any provider that was available but I informed him he would need to be seen by Dr. Kaitlin Sandoval being this is a recurrent issue. He requested narcotic pain medication be called in for him and I informed him that this was not possible and he would have to be seen in order to discuss a plan of care. Patient did accept his appointment on the 26 th. The call was ended.

## 2018-12-17 NOTE — PROGRESS NOTES
Prior Authorization for Hydroxyzine started on 12/17/2018 via [employer]. Awaiting reply from pt's insurance company via fax/e-mail. Allow 48-72 hours.

## 2018-12-19 ENCOUNTER — DOCUMENTATION ONLY (OUTPATIENT)
Dept: FAMILY MEDICINE CLINIC | Age: 43
End: 2018-12-19

## 2018-12-19 ENCOUNTER — TELEPHONE (OUTPATIENT)
Dept: SURGERY | Age: 43
End: 2018-12-19

## 2018-12-19 NOTE — PROGRESS NOTES
Prior Authorization for Hydroxyzine completed and approved by patient's insurance from 12- through 12-.

## 2018-12-19 NOTE — TELEPHONE ENCOUNTER
Patient called and stated that he has been having abdominal pain for the last month. Stated that he had a hernia repair by Dr. Aleksander Lawrence a year and a half ago. Stated that he has been to the ER multiple times and has been seen by his PCP as well and \"everyone \" keeps telling him to go see Dr. James Schwab". Patient has an appointment scheduled for next Wednesday Dec. 26th but wanted to know if their were any available appointments today. I notified him that there was not unfortunately. Patient stated \"Thats fine, I'll be okay until next week I Just wanted to call and check\". Verbal understanding was given to patient and patient notified to return to the ER if symptoms get worse. Patient confirmed understanding. Closing encounter.

## 2018-12-24 ENCOUNTER — TELEPHONE (OUTPATIENT)
Dept: FAMILY MEDICINE CLINIC | Age: 43
End: 2018-12-24

## 2018-12-24 RX ORDER — METOPROLOL SUCCINATE 100 MG/1
TABLET, EXTENDED RELEASE ORAL
Qty: 30 TAB | Refills: 0 | Status: SHIPPED | OUTPATIENT
Start: 2018-12-24 | End: 2019-01-31 | Stop reason: SDUPTHER

## 2018-12-24 NOTE — TELEPHONE ENCOUNTER
Requested Prescriptions     Pending Prescriptions Disp Refills    metoprolol succinate (TOPROL-XL) 100 mg tablet 30 Tab 0        Patient also asking about Flexeril

## 2018-12-26 ENCOUNTER — OFFICE VISIT (OUTPATIENT)
Dept: SURGERY | Age: 43
End: 2018-12-26

## 2018-12-26 VITALS
HEIGHT: 70 IN | BODY MASS INDEX: 30.92 KG/M2 | WEIGHT: 216 LBS | TEMPERATURE: 98.4 F | HEART RATE: 73 BPM | SYSTOLIC BLOOD PRESSURE: 131 MMHG | OXYGEN SATURATION: 97 % | DIASTOLIC BLOOD PRESSURE: 83 MMHG

## 2018-12-26 DIAGNOSIS — R10.84 GENERALIZED ABDOMINAL PAIN: Primary | ICD-10-CM

## 2018-12-26 NOTE — PROGRESS NOTES
General Surgery Consult    Oscarelyjai Vicki Jaquelinayush  Admit date: (Not on file)    MRN: [id number]     : 1975     Age: 36 y.o. Attending Physician: Shine Mcnair MD [facility]      History of Present Illness:      Aly Moody is a 36 y.o. male who is known to me. The patient has a long history of chronic back and abdominal pain. I have performed a robotic bilateral inguinal hernias repair about 1 year and a half ago. The patient did well after the surgery. However he continued to have the diffuse chronic abdominal and back pain. The patient has been to the ER more than 10 times over the last year. During these visits, he had multiple imaging studies that did not show any evidence of recurrence of his hernias. He is here today because of the abdominal pain. He said that the pain is diffuse but mainly localized on the left side of the abdomen. He denies any nausea or vomiting or any change in bowel habits . He said that he was seen earlier this year by a gastroenterologist and he had an upper and lower endoscopies that were normal.  He denies any bulge or mass in the groins. Patient Active Problem List    Diagnosis Date Noted    Diabetes ([address]     Cocaine use 2018    MDD (major depressive disorder), recurrent episode, moderate ([address] 2018    RHONDA (generalized anxiety disorder) 2018    Obesity (BMI 30.0-34. 9)     Drug-seeking behavior 2017    Anxiety 2017    ACP (advance care planning) 2016    Diabetes mellitus ([address] 2015    Chronic low back pain 2013    Hypertension      Past Medical History:   Diagnosis Date    Depression     Diabetes ([address]     Drug-seeking behavior     56 prescriptions from 32 different prescribers at 6 different pharmacies in last 12 months    Herniated lumbar intervertebral disc     Hypertension     Neurological disorder L3,4,5 torn Herniated disc    Obesity (BMI 30.0-34. 9)       Past Surgical History: Procedure Laterality Date    HX HERNIA REPAIR Bilateral 06/02/2017    robotic repair of bilateral indirect inguinal hernias with placement of mesh    HX ORTHOPAEDIC  trigger finger release      Social History     Tobacco Use    Smoking status: Never Smoker    Smokeless tobacco: Never Used   Substance Use Topics    Alcohol use: Yes     Comment: rarely      Social History     Tobacco Use   Smoking Status Never Smoker   Smokeless Tobacco Never Used     Family History   Problem Relation Age of Onset    Hypertension Mother     Diabetes Mother     Heart Failure Mother     COPD Mother     Heart Disease Mother     Hypertension Father     Alcohol abuse Father       Current Outpatient Medications   Medication Sig    metoprolol succinate (TOPROL-XL) 100 mg tablet TAKE 1 TABLET BY MOUTH DAILY    Blood-Glucose Meter (CONTOUR NEXT METER) misc Check blood sugar twice daily    glucose blood VI test strips (CONTOUR NEXT TEST STRIPS) strip Check blood sugar twice daily    SITagliptin (JANUVIA) 100 mg tablet Take 1 Tab by mouth daily.  hydrOXYzine pamoate (VISTARIL) 25 mg capsule Take 1 Cap by mouth three (3) times daily as needed for Itching.  risperiDONE (RISPERDAL) 1 mg tablet Take 1 Tab by mouth two (2) times a day.  clonazePAM (KLONOPIN) 1 mg tablet Take 0.5 Tabs by mouth two (2) times a day. Max Daily Amount: 1 mg.  Lancets misc Check fasting blood sugar once daily     No current facility-administered medications for this visit. No Known Allergies     Review of Systems:  Pertinent items are noted in the History of Present Illness.     Objective:     Visit Vitals  /83 (BP 1 Location: Left arm, BP Patient Position: Sitting)   Pulse 73   Temp 98.4 °F (36.9 °C) (Oral)   Ht 5' 10\" (1.778 m)   Wt 98 kg (216 lb)   SpO2 97%   BMI 30.99 kg/m²       Physical Exam:      General:  in no apparent distress, alert and oriented times 3   Eyes:  conjunctivae and sclerae normal, pupils equal, round, reactive to light               Abdomen:   rounded, soft, diffuse mild abdominal tenderness but no guarding or rebound, nondistended, no masses or organomegaly. Bilateral groin exam is within normal limits with no evidence of any recurrence of the hernia. Imaging and Lab Review:     CBC:   Lab Results   Component Value Date/Time    WBC 7.5 12/10/2018 10:16 AM    RBC 4.55 (L) 12/10/2018 10:16 AM    HGB 13.3 12/10/2018 10:16 AM    HCT 39.2 12/10/2018 10:16 AM    PLATELET 247 73/55/7341 10:16 AM     BMP:   Lab Results   Component Value Date/Time    Glucose 128 (H) 12/10/2018 10:16 AM    Sodium 137 12/10/2018 10:16 AM    Potassium 3.8 12/10/2018 10:16 AM    Chloride 103 12/10/2018 10:16 AM    CO2 26 12/10/2018 10:16 AM    BUN 12 12/10/2018 10:16 AM    Creatinine 1.12 12/10/2018 10:16 AM    Calcium 9.0 12/10/2018 10:16 AM     CMP:  Lab Results   Component Value Date/Time    Glucose 128 (H) 12/10/2018 10:16 AM    Sodium 137 12/10/2018 10:16 AM    Potassium 3.8 12/10/2018 10:16 AM    Chloride 103 12/10/2018 10:16 AM    CO2 26 12/10/2018 10:16 AM    BUN 12 12/10/2018 10:16 AM    Creatinine 1.12 12/10/2018 10:16 AM    Calcium 9.0 12/10/2018 10:16 AM    Anion gap 8 12/10/2018 10:16 AM    BUN/Creatinine ratio 11 (L) 12/10/2018 10:16 AM    Alk. phosphatase 89 12/10/2018 10:16 AM    Protein, total 7.4 12/10/2018 10:16 AM    Albumin 3.5 12/10/2018 10:16 AM    Globulin 3.9 12/10/2018 10:16 AM    A-G Ratio 0.9 12/10/2018 10:16 AM       No results found for this or any previous visit (from the past 24 hour(s)). images and reports reviewed    Assessment:   Mary Kay Agarwal is a 36 y.o. male is presenting with chronic abdominal pain. I explained to the patient that I am not sure what is the reason for his pain and that I will not be able to write any prescription for narcotics for his pain .   I suggested that he will follow-up with his primary care physician and I suggested to consult a pain specialist and possibly physical therapy .      Plan:     No need for any surgical intervention  Consider pain specialist and physical therapy  Follow-up with PCP  Follow-up with me as needed    Please call me if you have any questions (cell phone: [phone])     Signed By: Eduardo Castleman, MD     December 26, 2018

## 2018-12-26 NOTE — PROGRESS NOTES
Benson Brady is a 36 y.o. male (: 1975) presenting to address:    Chief Complaint   Patient presents with    Abdominal Pain     around where left side hernia was completed. Medication list and allergies have been reviewed with Benson Brady and updated as of today's date. I have gone over all Medical, Surgical and Social History with Benson Brady and updated/added the information accordingly.

## 2018-12-31 ENCOUNTER — HOSPITAL ENCOUNTER (EMERGENCY)
Age: 43
Discharge: HOME | End: 2018-12-31
Attending: EMERGENCY MEDICINE
Payer: SELF-PAY

## 2018-12-31 VITALS
DIASTOLIC BLOOD PRESSURE: 100 MMHG | HEIGHT: 70 IN | BODY MASS INDEX: 30.92 KG/M2 | HEART RATE: 84 BPM | TEMPERATURE: 98 F | SYSTOLIC BLOOD PRESSURE: 145 MMHG | OXYGEN SATURATION: 100 % | WEIGHT: 216 LBS | RESPIRATION RATE: 16 BRPM

## 2018-12-31 DIAGNOSIS — R03.0 ELEVATED BLOOD PRESSURE READING: ICD-10-CM

## 2018-12-31 DIAGNOSIS — R05.9 COUGH: ICD-10-CM

## 2018-12-31 DIAGNOSIS — B34.9 VIRAL SYNDROME: ICD-10-CM

## 2018-12-31 DIAGNOSIS — R68.89 FLU-LIKE SYMPTOMS: Primary | ICD-10-CM

## 2018-12-31 PROCEDURE — 87081 CULTURE SCREEN ONLY: CPT

## 2018-12-31 PROCEDURE — 99281 EMR DPT VST MAYX REQ PHY/QHP: CPT

## 2018-12-31 RX ORDER — OSELTAMIVIR PHOSPHATE 75 MG/1
75 CAPSULE ORAL 2 TIMES DAILY
Qty: 10 CAP | Refills: 0 | Status: SHIPPED | OUTPATIENT
Start: 2018-12-31 | End: 2019-01-05

## 2018-12-31 RX ORDER — BENZONATATE 100 MG/1
100 CAPSULE ORAL
Qty: 30 CAP | Refills: 0 | Status: SHIPPED | OUTPATIENT
Start: 2018-12-31 | End: 2019-01-05

## 2018-12-31 RX ORDER — NAPROXEN 500 MG/1
500 TABLET ORAL 2 TIMES DAILY WITH MEALS
Qty: 20 TAB | Refills: 0 | Status: SHIPPED | OUTPATIENT
Start: 2018-12-31 | End: 2019-01-31

## 2018-12-31 NOTE — ED PROVIDER NOTES
EMERGENCY DEPARTMENT HISTORY AND PHYSICAL EXAM    12:46 PM      Date: 12/31/2018  Patient Name: Medina Monsalve    History of Presenting Illness     Chief Complaint   Patient presents with    Sore Throat    Cough    Ear Pain    Generalized Body Aches         History Provided By: Patient    Chief Complaint: Sore Throat  Duration: 2 Days  Timing:  Constant  Location: N/A  Quality: Aching  Severity: Moderate  Modifying Factors: Nothing makes it better or worse  Associated Symptoms: myalgias, rhinorrhea, sinus pain, ear pain subjective fever and chills       Additional History (Context): Medina Monsalve is a 39 y.o. male with HTN, depression, neurological disorder, DM, herniated lumbar intervertebral disc and drug seeking behavior who presents with a constant, aching and moderate sore throat that started about 2 days ago. Patient states that he has not been feeling very well over the past few days and that he feels like the strep throat he has had in the past. He said that all he has taken was ibuprofen. Nothing makes it better or worse and associated signs or symptoms are myalgias, rhinorrhea, sinus pain, ad ear pain. No other concerns or symptoms at this time. PCP: None    Current Outpatient Medications   Medication Sig Dispense Refill    naproxen (NAPROSYN) 500 mg tablet Take 1 Tab by mouth two (2) times daily (with meals). 20 Tab 0    oseltamivir (TAMIFLU) 75 mg capsule Take 1 Cap by mouth two (2) times a day for 5 days. 10 Cap 0    benzonatate (TESSALON PERLES) 100 mg capsule Take 1 Cap by mouth three (3) times daily as needed for Cough for up to 7 days. 30 Cap 0    metoprolol succinate (TOPROL-XL) 100 mg tablet TAKE 1 TABLET BY MOUTH DAILY 30 Tab 0    SITagliptin (JANUVIA) 100 mg tablet Take 1 Tab by mouth daily. 30 Tab 2    hydrOXYzine pamoate (VISTARIL) 25 mg capsule Take 1 Cap by mouth three (3) times daily as needed for Itching.  30 Cap 0    risperiDONE (RISPERDAL) 1 mg tablet Take 1 Tab by mouth two (2) times a day. 30 Tab 0    clonazePAM (KLONOPIN) 1 mg tablet Take 0.5 Tabs by mouth two (2) times a day. Max Daily Amount: 1 mg. 20 Tab 0    Blood-Glucose Meter (CONTOUR NEXT METER) misc Check blood sugar twice daily 1 Each 0    glucose blood VI test strips (CONTOUR NEXT TEST STRIPS) strip Check blood sugar twice daily 200 Strip 3    Lancets misc Check fasting blood sugar once daily 100 Each 0       Past History     Past Medical History:  Past Medical History:   Diagnosis Date    Depression     Diabetes ([facility] [address]     Drug-seeking behavior     56 prescriptions from 32 different prescribers at 6 different pharmacies in last 12 months    Herniated lumbar intervertebral disc     Hypertension     Neurological disorder L3,4,5 torn Herniated disc    Obesity (BMI 30.0-34. 9)        Past Surgical History:  Past Surgical History:   Procedure Laterality Date    HX HERNIA REPAIR Bilateral 06/02/2017    robotic repair of bilateral indirect inguinal hernias with placement of mesh    HX ORTHOPAEDIC  trigger finger release       Family History:  Family History   Problem Relation Age of Onset    Hypertension Mother     Diabetes Mother     Heart Failure Mother     COPD Mother     Heart Disease Mother     Hypertension Father     Alcohol abuse Father        Social History:  Social History     Tobacco Use    Smoking status: Never Smoker    Smokeless tobacco: Never Used   Substance Use Topics    Alcohol use: Yes     Comment: rarely    Drug use: No       Allergies:  No Known Allergies      Review of Systems     Review of Systems   Constitutional: Positive for chills and fever. Negative for diaphoresis. HENT: Positive for congestion, ear pain, rhinorrhea, sinus pain and sore throat. Eyes: Negative for discharge. Respiratory: Positive for cough. Negative for stridor. Cardiovascular: Negative for palpitations. Gastrointestinal: Negative for diarrhea. Endocrine: Negative for heat intolerance. Genitourinary: Negative for flank pain. Musculoskeletal: Positive for myalgias (body aches). Negative for back pain. Neurological: Negative for weakness. Psychiatric/Behavioral: Negative for hallucinations. All other systems reviewed and are negative. Physical Exam     Visit Vitals  BP (!) 145/100 (BP 1 Location: Right arm, BP Patient Position: At rest;Sitting)   Pulse 84   Temp 98 °F (36.7 °C)   Resp 16   Ht 5' 10\" (1.778 m)   Wt 98 kg (216 lb)   SpO2 100%   BMI 30.99 kg/m²         Physical Exam   Constitutional: He is oriented to person, place, and time. He appears well-developed and well-nourished. He appears distressed. HENT:   Head: Normocephalic and atraumatic. Right Ear: External ear normal.   Left Ear: External ear normal.   Nose: Nose normal.   Mouth/Throat: Oropharynx is clear and moist. No oropharyngeal exudate. Eyes: Conjunctivae are normal. Right eye exhibits no discharge. Left eye exhibits no discharge. No scleral icterus. Neck: Normal range of motion. Neck supple. Cardiovascular: Normal rate, regular rhythm and normal heart sounds. Exam reveals no gallop and no friction rub. No murmur heard. Pulmonary/Chest: Effort normal and breath sounds normal. No stridor. No respiratory distress. He has no wheezes. He has no rales. Musculoskeletal: Normal range of motion. Neurological: He is alert and oriented to person, place, and time. Coordination normal.   Gait is steady. Able to ambulate without difficulty. Skin: Skin is warm and dry. No rash noted. He is not diaphoretic. No erythema. Psychiatric: He has a normal mood and affect. His behavior is normal. Thought content normal.   Nursing note and vitals reviewed.         Diagnostic Study Results     Labs -  Recent Results (from the past 12 hour(s))   STREP THROAT SCREEN    Collection Time: 12/31/18 12:47 PM   Result Value Ref Range    Special Requests: NO SPECIAL REQUESTS      Strep Screen NEGATIVE       Culture result: PENDING        Radiologic Studies -   No orders to display         Medical Decision Making   I am the first provider for this patient. I reviewed the vital signs, available nursing notes, past medical history, past surgical history, family history and social history. Vital Signs-Reviewed the patient's vital signs. Pulse Oximetry Analysis -  100% on room air     Cardiac Monitor:  Rate: 84 bpm    Records Reviewed: Nursing Notes and Old Medical Records (Time of Review: 12:46 PM)    ED Course: Progress Notes, Reevaluation, and Consults:      Provider Notes (Medical Decision Making): Impression:  Flu like symptoms      Diagnosis     Clinical Impression:   1. Flu-like symptoms    2. Viral syndrome    3. Cough    4. Elevated blood pressure reading        Disposition: Patient is stable for discharge at this time. Rx for tamiflu tessalon and naproxen given. Rest and follow-up with PCP this week. Return to the ED immediately for any new or worsening sx. Lydia Benavidez PA-C 1:24 PM     Follow-up Information     Follow up With Specialties Details Why Contact Derian Peralta  Schedule an appointment as soon as possible for a visit in 1 week  [facility]  [id number]  [phone]    [facility] EMERGENCY DEPT Emergency Medicine  As needed, If symptoms worsen [address]  [phone]              Medication List      START taking these medications    benzonatate 100 mg capsule  Commonly known as:  TESSALON PERLES  Take 1 Cap by mouth three (3) times daily as needed for Cough for up to 7 days. naproxen 500 mg tablet  Commonly known as:  NAPROSYN  Take 1 Tab by mouth two (2) times daily (with meals). oseltamivir 75 mg capsule  Commonly known as:  TAMIFLU  Take 1 Cap by mouth two (2) times a day for 5 days.         ASK your doctor about these medications    Blood-Glucose Meter Misc  Commonly known as:  CONTOUR NEXT METER  Check blood sugar twice daily     clonazePAM 1 mg tablet  Commonly known as:  KlonoPIN  Take 0.5 Tabs by mouth two (2) times a day. Max Daily Amount: 1 mg.     glucose blood VI test strips strip  Commonly known as:  CONTOUR NEXT TEST STRIPS  Check blood sugar twice daily     hydrOXYzine pamoate 25 mg capsule  Commonly known as:  VISTARIL  Take 1 Cap by mouth three (3) times daily as needed for Itching. lancets Misc  Check fasting blood sugar once daily     metoprolol succinate 100 mg tablet  Commonly known as:  TOPROL-XL  TAKE 1 TABLET BY MOUTH DAILY     risperiDONE 1 mg tablet  Commonly known as:  RisperDAL  Take 1 Tab by mouth two (2) times a day. SITagliptin 100 mg tablet  Commonly known as:  JANUVIA  Take 1 Tab by mouth daily. Where to Get Your Medications      Information about where to get these medications is not yet available    Ask your nurse or doctor about these medications  · benzonatate 100 mg capsule  · naproxen 500 mg tablet  · oseltamivir 75 mg capsule       _______________________________       Scribe Attestation     Benson Yung acting as a scribe for and in the presence of Lydia Benavidez PA-C      December 31, 2018 at 12:46 PM       Provider Attestation:      I personally performed the services described in the documentation, reviewed the documentation, as recorded by the scribe in my presence, and it accurately and completely records my words and actions.  December 31, 2018 at 12:46 PM - Lydia Castañeda PA-C        _______________________________

## 2018-12-31 NOTE — DISCHARGE INSTRUCTIONS
Elevated Blood Pressure: Care Instructions  Your Care Instructions    Blood pressure is a measure of how hard the blood pushes against the walls of your arteries. It's normal for blood pressure to go up and down throughout the day. But if it stays up over time, you have high blood pressure. Two numbers tell you your blood pressure. The first number is the systolic pressure. It shows how hard the blood pushes when your heart is pumping. The second number is the diastolic pressure. It shows how hard the blood pushes between heartbeats, when your heart is relaxed and filling with blood. An ideal blood pressure in adults is less than 120/80 (say \"120 over 80\"). High blood pressure is 140/90 or higher. You have high blood pressure if your top number is 140 or higher or your bottom number is 90 or higher, or both. The main test for high blood pressure is simple, fast, and painless. To diagnose high blood pressure, your doctor will test your blood pressure at different times. After testing your blood pressure, your doctor may ask you to test it again when you are home. If you are diagnosed with high blood pressure, you can work with your doctor to make a long-term plan to manage it. Follow-up care is a key part of your treatment and safety. Be sure to make and go to all appointments, and call your doctor if you are having problems. It's also a good idea to know your test results and keep a list of the medicines you take. How can you care for yourself at home? · Do not smoke. Smoking increases your risk for heart attack and stroke. If you need help quitting, talk to your doctor about stop-smoking programs and medicines. These can increase your chances of quitting for good. · Stay at a healthy weight. · Try to limit how much sodium you eat to less than 2,300 milligrams (mg) a day. Your doctor may ask you to try to eat less than 1,500 mg a day. · Be physically active.  Get at least 30 minutes of exercise on most days of the week. Walking is a good choice. You also may want to do other activities, such as running, swimming, cycling, or playing tennis or team sports. · Avoid or limit alcohol. Talk to your doctor about whether you can drink any alcohol. · Eat plenty of fruits, vegetables, and low-fat dairy products. Eat less saturated and total fats. · Learn how to check your blood pressure at home. When should you call for help? Call your doctor now or seek immediate medical care if:  ? · Your blood pressure is much higher than normal (such as 180/110 or higher). ? · You think high blood pressure is causing symptoms such as:  ¨ Severe headache. ¨ Blurry vision. ? Watch closely for changes in your health, and be sure to contact your doctor if:  ? · You do not get better as expected. Where can you learn more? Go to http://jacklyn-denice.info/. Enter A516 in the search box to learn more about \"Elevated Blood Pressure: Care Instructions. \"  Current as of: September 21, 2016  Content Version: 11.4  © 9131-4716 [employer]. Care instructions adapted under license by [employer] (which disclaims liability or warranty for this information). If you have questions about a medical condition or this instruction, always ask your healthcare professional. [address] any warranty or liability for your use of this information.

## 2019-01-02 LAB
B-HEM STREP THROAT QL CULT: NEGATIVE
BACTERIA SPEC CULT: NORMAL
SERVICE CMNT-IMP: NORMAL

## 2019-01-04 DIAGNOSIS — F32.A DEPRESSION WITH SUICIDAL IDEATION: ICD-10-CM

## 2019-01-04 DIAGNOSIS — R45.851 DEPRESSION WITH SUICIDAL IDEATION: ICD-10-CM

## 2019-01-04 RX ORDER — CLONAZEPAM 1 MG/1
0.5 TABLET ORAL 2 TIMES DAILY
Qty: 20 TAB | Refills: 0 | Status: ON HOLD | OUTPATIENT
Start: 2019-01-04 | End: 2019-01-09 | Stop reason: SDUPTHER

## 2019-01-04 NOTE — TELEPHONE ENCOUNTER
Stated that [facility] doesn't accept his new insurance and is wondering if we can refill for now.

## 2019-01-05 ENCOUNTER — HOSPITAL ENCOUNTER (EMERGENCY)
Age: 44
Discharge: OTHER INSTITUTION | End: 2019-01-05
Attending: EMERGENCY MEDICINE | Admitting: EMERGENCY MEDICINE
Payer: MEDICAID

## 2019-01-05 ENCOUNTER — HOSPITAL ENCOUNTER (INPATIENT)
Age: 44
LOS: 4 days | Discharge: HOME | End: 2019-01-09
Attending: PSYCHIATRY & NEUROLOGY | Admitting: PSYCHIATRY & NEUROLOGY
Payer: MEDICAID

## 2019-01-05 VITALS
DIASTOLIC BLOOD PRESSURE: 92 MMHG | OXYGEN SATURATION: 98 % | TEMPERATURE: 98 F | SYSTOLIC BLOOD PRESSURE: 151 MMHG | WEIGHT: 220 LBS | HEART RATE: 73 BPM | HEIGHT: 70 IN | RESPIRATION RATE: 18 BRPM | BODY MASS INDEX: 31.5 KG/M2

## 2019-01-05 DIAGNOSIS — F10.10 ETOH ABUSE: ICD-10-CM

## 2019-01-05 DIAGNOSIS — R45.851 DEPRESSION WITH SUICIDAL IDEATION: Primary | ICD-10-CM

## 2019-01-05 DIAGNOSIS — F14.10 COCAINE ABUSE (HCC): ICD-10-CM

## 2019-01-05 DIAGNOSIS — R45.851 DEPRESSION WITH SUICIDAL IDEATION: ICD-10-CM

## 2019-01-05 DIAGNOSIS — F32.A DEPRESSION WITH SUICIDAL IDEATION: Primary | ICD-10-CM

## 2019-01-05 DIAGNOSIS — F32.A DEPRESSION WITH SUICIDAL IDEATION: ICD-10-CM

## 2019-01-05 LAB
ALBUMIN SERPL-MCNC: 3.6 G/DL (ref 3.4–5)
ALBUMIN/GLOB SERPL: 0.9 {RATIO} (ref 0.8–1.7)
ALP SERPL-CCNC: 81 U/L (ref 45–117)
ALT SERPL-CCNC: 20 U/L (ref 16–61)
AMPHET UR QL SCN: NEGATIVE
ANION GAP SERPL CALC-SCNC: 9 MMOL/L (ref 3–18)
APAP SERPL-MCNC: <2 UG/ML (ref 10–30)
APPEARANCE UR: CLEAR
AST SERPL-CCNC: 13 U/L (ref 15–37)
BARBITURATES UR QL SCN: NEGATIVE
BASOPHILS # BLD: 0 K/UL (ref 0–0.1)
BASOPHILS NFR BLD: 1 % (ref 0–2)
BENZODIAZ UR QL: NEGATIVE
BILIRUB SERPL-MCNC: 0.2 MG/DL (ref 0.2–1)
BILIRUB UR QL: NEGATIVE
BUN SERPL-MCNC: 11 MG/DL (ref 7–18)
BUN/CREAT SERPL: 11 (ref 12–20)
CALCIUM SERPL-MCNC: 9 MG/DL (ref 8.5–10.1)
CANNABINOIDS UR QL SCN: NEGATIVE
CHLORIDE SERPL-SCNC: 102 MMOL/L (ref 100–108)
CO2 SERPL-SCNC: 24 MMOL/L (ref 21–32)
COCAINE UR QL SCN: POSITIVE
COLOR UR: YELLOW
CREAT SERPL-MCNC: 1.04 MG/DL (ref 0.6–1.3)
DIFFERENTIAL METHOD BLD: ABNORMAL
EOSINOPHIL # BLD: 0.1 K/UL (ref 0–0.4)
EOSINOPHIL NFR BLD: 1 % (ref 0–5)
ERYTHROCYTE [DISTWIDTH] IN BLOOD BY AUTOMATED COUNT: 13.6 % (ref 11.6–14.5)
ETHANOL SERPL-MCNC: <3 MG/DL (ref 0–3)
GLOBULIN SER CALC-MCNC: 4.1 G/DL (ref 2–4)
GLUCOSE BLD STRIP.AUTO-MCNC: 202 MG/DL (ref 70–110)
GLUCOSE BLD STRIP.AUTO-MCNC: 220 MG/DL (ref 70–110)
GLUCOSE SERPL-MCNC: 264 MG/DL (ref 74–99)
GLUCOSE UR STRIP.AUTO-MCNC: >1000 MG/DL
HCT VFR BLD AUTO: 40.8 % (ref 36–48)
HDSCOM,HDSCOM: ABNORMAL
HGB BLD-MCNC: 14 G/DL (ref 13–16)
HGB UR QL STRIP: NEGATIVE
KETONES UR QL STRIP.AUTO: ABNORMAL MG/DL
LEUKOCYTE ESTERASE UR QL STRIP.AUTO: NEGATIVE
LYMPHOCYTES # BLD: 1.2 K/UL (ref 0.9–3.6)
LYMPHOCYTES NFR BLD: 16 % (ref 21–52)
MCH RBC QN AUTO: 29.2 PG (ref 24–34)
MCHC RBC AUTO-ENTMCNC: 34.3 G/DL (ref 31–37)
MCV RBC AUTO: 85.2 FL (ref 74–97)
METHADONE UR QL: NEGATIVE
MONOCYTES # BLD: 0.6 K/UL (ref 0.05–1.2)
MONOCYTES NFR BLD: 8 % (ref 3–10)
NEUTS SEG # BLD: 5.6 K/UL (ref 1.8–8)
NEUTS SEG NFR BLD: 74 % (ref 40–73)
NITRITE UR QL STRIP.AUTO: NEGATIVE
OPIATES UR QL: NEGATIVE
PCP UR QL: NEGATIVE
PH UR STRIP: 6.5 [PH] (ref 5–8)
PLATELET # BLD AUTO: 217 K/UL (ref 135–420)
PMV BLD AUTO: 9.7 FL (ref 9.2–11.8)
POTASSIUM SERPL-SCNC: 3.9 MMOL/L (ref 3.5–5.5)
PROT SERPL-MCNC: 7.7 G/DL (ref 6.4–8.2)
PROT UR STRIP-MCNC: NEGATIVE MG/DL
RBC # BLD AUTO: 4.79 M/UL (ref 4.7–5.5)
SALICYLATES SERPL-MCNC: <2.8 MG/DL (ref 2.8–20)
SODIUM SERPL-SCNC: 135 MMOL/L (ref 136–145)
SP GR UR REFRACTOMETRY: 1.03 (ref 1–1.03)
UROBILINOGEN UR QL STRIP.AUTO: 0.2 EU/DL (ref 0.2–1)
WBC # BLD AUTO: 7.5 K/UL (ref 4.6–13.2)

## 2019-01-05 PROCEDURE — 85025 COMPLETE CBC W/AUTO DIFF WBC: CPT

## 2019-01-05 PROCEDURE — 74011636637 HC RX REV CODE- 636/637: Performed by: PSYCHIATRY & NEUROLOGY

## 2019-01-05 PROCEDURE — 74011250637 HC RX REV CODE- 250/637: Performed by: PSYCHIATRY & NEUROLOGY

## 2019-01-05 PROCEDURE — 82962 GLUCOSE BLOOD TEST: CPT

## 2019-01-05 PROCEDURE — 80053 COMPREHEN METABOLIC PANEL: CPT

## 2019-01-05 PROCEDURE — 74011250637 HC RX REV CODE- 250/637: Performed by: EMERGENCY MEDICINE

## 2019-01-05 PROCEDURE — 81003 URINALYSIS AUTO W/O SCOPE: CPT

## 2019-01-05 PROCEDURE — 80307 DRUG TEST PRSMV CHEM ANLYZR: CPT

## 2019-01-05 PROCEDURE — 65220000003 HC RM SEMIPRIVATE PSYCH

## 2019-01-05 PROCEDURE — 93005 ELECTROCARDIOGRAM TRACING: CPT

## 2019-01-05 PROCEDURE — 99285 EMERGENCY DEPT VISIT HI MDM: CPT

## 2019-01-05 RX ORDER — METOPROLOL SUCCINATE 25 MG/1
100 TABLET, EXTENDED RELEASE ORAL DAILY
Status: DISCONTINUED | OUTPATIENT
Start: 2019-01-06 | End: 2019-01-09 | Stop reason: HOSPADM

## 2019-01-05 RX ORDER — BENZTROPINE MESYLATE 1 MG/1
2 TABLET ORAL
Status: DISCONTINUED | OUTPATIENT
Start: 2019-01-05 | End: 2019-01-09 | Stop reason: HOSPADM

## 2019-01-05 RX ORDER — METOPROLOL SUCCINATE 50 MG/1
100 TABLET, EXTENDED RELEASE ORAL
Status: COMPLETED | OUTPATIENT
Start: 2019-01-05 | End: 2019-01-05

## 2019-01-05 RX ORDER — HALOPERIDOL 5 MG/1
5 TABLET ORAL
Status: DISCONTINUED | OUTPATIENT
Start: 2019-01-05 | End: 2019-01-09 | Stop reason: HOSPADM

## 2019-01-05 RX ORDER — DEXTROSE 50 % IN WATER (D50W) INTRAVENOUS SYRINGE
25-50 AS NEEDED
Status: DISCONTINUED | OUTPATIENT
Start: 2019-01-05 | End: 2019-01-09 | Stop reason: HOSPADM

## 2019-01-05 RX ORDER — BENZTROPINE MESYLATE 1 MG/ML
1 INJECTION INTRAMUSCULAR; INTRAVENOUS
Status: DISCONTINUED | OUTPATIENT
Start: 2019-01-05 | End: 2019-01-09 | Stop reason: HOSPADM

## 2019-01-05 RX ORDER — TRAZODONE HYDROCHLORIDE 50 MG/1
50 TABLET ORAL
Status: DISCONTINUED | OUTPATIENT
Start: 2019-01-05 | End: 2019-01-06

## 2019-01-05 RX ORDER — LANOLIN ALCOHOL/MO/W.PET/CERES
100 CREAM (GRAM) TOPICAL DAILY
Status: DISCONTINUED | OUTPATIENT
Start: 2019-01-06 | End: 2019-01-09 | Stop reason: HOSPADM

## 2019-01-05 RX ORDER — LORAZEPAM 1 MG/1
1 TABLET ORAL
Status: DISCONTINUED | OUTPATIENT
Start: 2019-01-05 | End: 2019-01-05 | Stop reason: HOSPADM

## 2019-01-05 RX ORDER — LORAZEPAM 1 MG/1
1 TABLET ORAL DAILY
Status: DISCONTINUED | OUTPATIENT
Start: 2019-01-08 | End: 2019-01-06

## 2019-01-05 RX ORDER — NAPROXEN 250 MG/1
500 TABLET ORAL
Status: DISCONTINUED | OUTPATIENT
Start: 2019-01-05 | End: 2019-01-09 | Stop reason: HOSPADM

## 2019-01-05 RX ORDER — BENZTROPINE MESYLATE 1 MG/1
1 TABLET ORAL
Status: DISCONTINUED | OUTPATIENT
Start: 2019-01-05 | End: 2019-01-09 | Stop reason: HOSPADM

## 2019-01-05 RX ORDER — BENZTROPINE MESYLATE 1 MG/ML
2 INJECTION INTRAMUSCULAR; INTRAVENOUS
Status: DISCONTINUED | OUTPATIENT
Start: 2019-01-05 | End: 2019-01-09 | Stop reason: HOSPADM

## 2019-01-05 RX ORDER — MAGNESIUM SULFATE 100 %
16 CRYSTALS MISCELLANEOUS AS NEEDED
Status: DISCONTINUED | OUTPATIENT
Start: 2019-01-05 | End: 2019-01-09 | Stop reason: HOSPADM

## 2019-01-05 RX ORDER — LORAZEPAM 1 MG/1
1 TABLET ORAL
Status: COMPLETED | OUTPATIENT
Start: 2019-01-05 | End: 2019-01-05

## 2019-01-05 RX ORDER — HALOPERIDOL 5 MG/ML
5 INJECTION INTRAMUSCULAR
Status: DISCONTINUED | OUTPATIENT
Start: 2019-01-05 | End: 2019-01-09 | Stop reason: HOSPADM

## 2019-01-05 RX ORDER — INSULIN LISPRO 100 [IU]/ML
INJECTION, SOLUTION INTRAVENOUS; SUBCUTANEOUS
Status: DISCONTINUED | OUTPATIENT
Start: 2019-01-05 | End: 2019-01-09 | Stop reason: HOSPADM

## 2019-01-05 RX ORDER — HYDROXYZINE 25 MG/1
50 TABLET, FILM COATED ORAL
Status: COMPLETED | OUTPATIENT
Start: 2019-01-05 | End: 2019-01-05

## 2019-01-05 RX ORDER — LORAZEPAM 1 MG/1
1 TABLET ORAL 2 TIMES DAILY
Status: DISCONTINUED | OUTPATIENT
Start: 2019-01-07 | End: 2019-01-06

## 2019-01-05 RX ORDER — AMLODIPINE BESYLATE 5 MG/1
5 TABLET ORAL
Status: COMPLETED | OUTPATIENT
Start: 2019-01-05 | End: 2019-01-05

## 2019-01-05 RX ORDER — HYDROXYZINE PAMOATE 50 MG/1
50 CAPSULE ORAL
Status: DISCONTINUED | OUTPATIENT
Start: 2019-01-05 | End: 2019-01-09 | Stop reason: HOSPADM

## 2019-01-05 RX ORDER — FOLIC ACID 1 MG/1
1 TABLET ORAL DAILY
Status: DISCONTINUED | OUTPATIENT
Start: 2019-01-06 | End: 2019-01-09 | Stop reason: HOSPADM

## 2019-01-05 RX ORDER — LORAZEPAM 1 MG/1
1 TABLET ORAL 3 TIMES DAILY
Status: DISPENSED | OUTPATIENT
Start: 2019-01-06 | End: 2019-01-07

## 2019-01-05 RX ORDER — LORAZEPAM 2 MG/ML
1 INJECTION INTRAMUSCULAR
Status: DISCONTINUED | OUTPATIENT
Start: 2019-01-05 | End: 2019-01-05 | Stop reason: HOSPADM

## 2019-01-05 RX ORDER — AMLODIPINE BESYLATE 5 MG/1
5 TABLET ORAL DAILY
Status: DISCONTINUED | OUTPATIENT
Start: 2019-01-06 | End: 2019-01-08

## 2019-01-05 RX ADMIN — INSULIN LISPRO 4 UNITS: 100 INJECTION, SOLUTION INTRAVENOUS; SUBCUTANEOUS at 22:04

## 2019-01-05 RX ADMIN — AMLODIPINE BESYLATE 5 MG: 5 TABLET ORAL at 11:23

## 2019-01-05 RX ADMIN — HYDROXYZINE HYDROCHLORIDE 50 MG: 25 TABLET, FILM COATED ORAL at 12:47

## 2019-01-05 RX ADMIN — LORAZEPAM 1 MG: 1 TABLET ORAL at 17:08

## 2019-01-05 RX ADMIN — LORAZEPAM 1 MG: 1 TABLET ORAL at 22:03

## 2019-01-05 RX ADMIN — METOPROLOL SUCCINATE 100 MG: 50 TABLET, EXTENDED RELEASE ORAL at 11:23

## 2019-01-05 NOTE — CONSULTS
Patient name & :   Erik Baez, 73  Date & time of consultation:  19, 2pm EST  Location of patient:  [facility] ED   [place]  ___________________________________________________________  Chief Complaint: Telepsychiatry consultation  This evaluation was conducted via Telepsychiatry with the assistance of onsite staff. History of Present Illness: This pt is a 39 yr old male . Chart reviewed and case discussed with treatment team. He came to the hospital reporting worsening depression, SI and alcohol abuse. Telepsychiatry was consulted for assessment and recommendations for management. Upon interview pt says that he has been feeling depressed and alone for months and he came to the hospital to get help. He endorses SI with no intent or plan but says that he just wants to 'disappear.' He says that he is going through a lot lately ie not being able to work full time, not having his own place, he lost his car. He says that he has a psychiatrist and last saw them around 3-4 weeks ago but just found out he can no longer see the psychiatrist due to insurance issues. He was recently at [facility] around 1 month ago and would like to go back there. Pt says that he drinks alcohol around 2x per week, and that when he drinks he has around 36-45 beers. His last use was within the past 24 hours. He denies current withdrawal sx but says that he has had them in the past. He denies hx of seizures. Most recent vitals are pulse 118 and /113. He also reports using cocaine around once per month, last use was around 5 days ago. Pt says that he has anxiety and in the past has also been dx with bipolar disorder. He says that his only home psych meds now are Swetha. He is requesting voluntary inpt psych admission.    Collateral:  see HPI      Psychiatric History/Treatment History:    -Inpatient: around 1 month ago   -Outpatient:  not currently due to insurance issues  -History of suicide attempts: denies     Drug/Alcohol History: UDS + cocaine. see HPI    Medical History:  -Medical problems: HTN and Dm per pt and chart  -Current medications:  pt reports that his home psych meds include klonopin and abmien  -Medication Allergies: NKDA     Family Psychiatric History: + family hx of alcohol abuse. no family hx of suicide per pt     Social History:      -Employment: part time working for a contractor  -Housing: lives wtih brother  -Stressors: many , see HPI  -Strength/supports: son     Mental Status Exam:  Appearance and attire:  hospital attire, lying in bed   Attitude and behavior: cooperative, calm,    Speech: clear, coherent, RRR, not rapid or pressured  Affect and mood: depressed   Association and thought processes: goal directed  Thought content: no overt delusions. +SI  Perception:  pt does not appear to be responding to internal stimuli. Sensorium, memory, and orientation:  AAOx3  Intellectual functioning: average  Insight and judgment: fair     Diagnosis:Unspecified depressive disorder  Unspecified anxiety disorder  Alcohol use disorder  Consider cocaine use disorder     Impression/Risk Assessment/Treatment Recommendations:  -Recommended level of care: The patient is a 39 yr old male presenting to the ED with worsening depression including SI in the context of stressors. He does have some risk factors for suicide ie hopelessness, no current outpt care, substance abuse. The patient is thus an acute danger to self and requires inpatient psychiatric hospitalization for stabilization and treatment. Recommend admission to inpt psychiatry . Pt agrees to hospitalization and treatment so can be admitted under voluntary status.  If pt changes his mind and refuses voluntary admission then have him evaluated by the [facility] and screened for a TDO.     -Recommended pharmacology/therapy/other treatments:     Recommend starting an alcohol detox protocol upon admission ie: seizure precautions, vitals with CIWA q4hrs, Ativan 2mg PO q4hrs prn for sx of withdrawal as based on elevated vitals/CIWA scores (hold Ativan for somnolence, ataxia or dysarthria), multivitamin 1 tablet PO daily, thiamine 895HJ PO daily, folic acid 1mg PO daily. Recommendations regarding the pt's psychotropic medications will be made by the inpatient psychiatry treatment team upon admission there. Since the pt will be on ativan prn for alcohol withdrawal, hold his home medication of klonopin for now. Pt expressed agreement with this plan. Case discussed with Ed physician.                   Shahzad De Los Santos MD  Telepsychiatry

## 2019-01-05 NOTE — PROGRESS NOTES
1545 -Pt info sent to [address] [place] [address] [id number]

## 2019-01-05 NOTE — ED PROVIDER NOTES
EMERGENCY DEPARTMENT HISTORY AND PHYSICAL EXAM    10:25 AM      Date: 1/5/2019  Patient Name: Kaiser Roman    History of Presenting Illness     Chief Complaint   Patient presents with    Suicidal         History Provided By: Patient    Chief Complaint: Mental health problems  Duration: One day  Timing:  Constant  Location: N/A  Quality: N/A  Severity: Moderate  Modifying Factors: Klonopin relieves Sx  Associated Symptoms: SI      10:40 AM Kaiser Roman is a 39 y.o. male with h/o HTN, DM, depression, anxiety, drug seeking behavior, and other PMhx who presents to ED complaining of constant, moderate mental health problems onset one day with associated SI. The patient reports that Klonopin is effective at decreasing his Sx, and he wants to return to [facility]. He got the Klonopin from Dr. Faith Cruz, but he cannot see them again because his insurance changed. He ran out of Klonopin. The patient reports heavy ETOH abuse. He buys 24 or 36 cans at a time, and his last drink was 30 minutes ago to stave off withdrawal. The patient also uses cocaine. Denies HI or hallucinations. As for SI, he has no formal plan, but admits that he would feel better if \"I weren't here. \" He says his son is his everything. He controls his HTN and DM with medication. No other concerns or symptoms at this time. PCP: None        Current Outpatient Medications   Medication Sig Dispense Refill    clonazePAM (KLONOPIN) 1 mg tablet Take 0.5 Tabs by mouth two (2) times a day. Max Daily Amount: 1 mg. 20 Tab 0    naproxen (NAPROSYN) 500 mg tablet Take 1 Tab by mouth two (2) times daily (with meals).  20 Tab 0    metoprolol succinate (TOPROL-XL) 100 mg tablet TAKE 1 TABLET BY MOUTH DAILY 30 Tab 0    Blood-Glucose Meter (CONTOUR NEXT METER) misc Check blood sugar twice daily 1 Each 0    glucose blood VI test strips (CONTOUR NEXT TEST STRIPS) strip Check blood sugar twice daily 200 Strip 3    Lancets misc Check fasting blood sugar once daily 100 Each 0    SITagliptin (JANUVIA) 100 mg tablet Take 1 Tab by mouth daily. 30 Tab 2       Past History     Past Medical History:  Past Medical History:   Diagnosis Date    Depression     Diabetes ([address]     Drug-seeking behavior     56 prescriptions from 32 different prescribers at 6 different pharmacies in last 12 months    Herniated lumbar intervertebral disc     Hypertension     Neurological disorder L3,4,5 torn Herniated disc    Obesity (BMI 30.0-34. 9)        Past Surgical History:  Past Surgical History:   Procedure Laterality Date    HX HERNIA REPAIR Bilateral 06/02/2017    robotic repair of bilateral indirect inguinal hernias with placement of mesh    HX ORTHOPAEDIC  trigger finger release       Family History:  Family History   Problem Relation Age of Onset    Hypertension Mother     Diabetes Mother     Heart Failure Mother     COPD Mother     Heart Disease Mother     Hypertension Father     Alcohol abuse Father        Social History:  Social History     Tobacco Use    Smoking status: Never Smoker    Smokeless tobacco: Never Used   Substance Use Topics    Alcohol use: Yes     Comment: rarely    Drug use: No       Allergies:  No Known Allergies      Review of Systems       Review of Systems   Constitutional: Negative for activity change, chills and fever. HENT: Negative for congestion, ear pain, sore throat and trouble swallowing. Eyes: Negative for visual disturbance. Respiratory: Negative for cough, shortness of breath and wheezing. Cardiovascular: Negative for chest pain, palpitations and leg swelling. Gastrointestinal: Negative for abdominal pain, diarrhea, nausea and vomiting. Genitourinary: Negative for decreased urine volume, dysuria, frequency and urgency. Musculoskeletal: Negative for arthralgias and joint swelling. Skin: Negative for rash. Neurological: Negative for weakness, numbness and headaches.    Psychiatric/Behavioral: Positive for suicidal ideas. Negative for agitation, confusion and hallucinations. All other systems reviewed and are negative. Physical Exam     Visit Vitals  BP (!) 124/91   Pulse 69   Temp 97.6 °F (36.4 °C)   Resp 18   Ht 5' 10\" (1.778 m)   Wt 99.8 kg (220 lb)   SpO2 98%   BMI 31.57 kg/m²         Physical Exam   Constitutional: He is oriented to person, place, and time. He appears well-developed and well-nourished. HENT:   Head: Normocephalic and atraumatic. Eyes: EOM are normal. Pupils are equal, round, and reactive to light. Right eye exhibits no discharge. Left eye exhibits no discharge. Neck: Normal range of motion. Neck supple. No JVD present. No tracheal deviation present. Cardiovascular: Normal rate, regular rhythm and normal heart sounds. No murmur heard. Pulmonary/Chest: Effort normal and breath sounds normal. No respiratory distress. He has no wheezes. He has no rales. Abdominal: Soft. Bowel sounds are normal. He exhibits no distension. There is no tenderness. There is no rebound. Musculoskeletal: Normal range of motion. He exhibits no tenderness or deformity. Neurological: He is alert and oriented to person, place, and time. No cranial nerve deficit. 5/5 strength UE/LE, 5/5 sensation UE/LE     Skin: Skin is warm and dry. No rash noted. No erythema. Psychiatric: He has a normal mood and affect.  His behavior is normal.         Diagnostic Study Results     Labs -  Recent Results (from the past 12 hour(s))   URINALYSIS W/ RFLX MICROSCOPIC    Collection Time: 01/05/19 10:40 AM   Result Value Ref Range    Color YELLOW      Appearance CLEAR      Specific gravity 1.027 1.005 - 1.030      pH (UA) 6.5 5.0 - 8.0      Protein NEGATIVE  NEG mg/dL    Glucose >1,000 (A) NEG mg/dL    Ketone TRACE (A) NEG mg/dL    Bilirubin NEGATIVE  NEG      Blood NEGATIVE  NEG      Urobilinogen 0.2 0.2 - 1.0 EU/dL    Nitrites NEGATIVE  NEG      Leukocyte Esterase NEGATIVE  NEG     DRUG SCREEN, URINE    Collection Time: 01/05/19 10:40 AM   Result Value Ref Range    BENZODIAZEPINES NEGATIVE  NEG      BARBITURATES NEGATIVE  NEG      THC (TH-CANNABINOL) NEGATIVE  NEG      OPIATES NEGATIVE  NEG      PCP(PHENCYCLIDINE) NEGATIVE  NEG      COCAINE POSITIVE (A) NEG      AMPHETAMINES NEGATIVE  NEG      METHADONE NEGATIVE  NEG      HDSCOM (NOTE)    CBC WITH AUTOMATED DIFF    Collection Time: 01/05/19 10:53 AM   Result Value Ref Range    WBC 7.5 4.6 - 13.2 K/uL    RBC 4.79 4.70 - 5.50 M/uL    HGB 14.0 13.0 - 16.0 g/dL    HCT 40.8 36.0 - 48.0 %    MCV 85.2 74.0 - 97.0 FL    MCH 29.2 24.0 - 34.0 PG    MCHC 34.3 31.0 - 37.0 g/dL    RDW 13.6 11.6 - 14.5 %    PLATELET 297 944 - 980 K/uL    MPV 9.7 9.2 - 11.8 FL    NEUTROPHILS 74 (H) 40 - 73 %    LYMPHOCYTES 16 (L) 21 - 52 %    MONOCYTES 8 3 - 10 %    EOSINOPHILS 1 0 - 5 %    BASOPHILS 1 0 - 2 %    ABS. NEUTROPHILS 5.6 1.8 - 8.0 K/UL    ABS. LYMPHOCYTES 1.2 0.9 - 3.6 K/UL    ABS. MONOCYTES 0.6 0.05 - 1.2 K/UL    ABS. EOSINOPHILS 0.1 0.0 - 0.4 K/UL    ABS. BASOPHILS 0.0 0.0 - 0.1 K/UL    DF AUTOMATED     METABOLIC PANEL, COMPREHENSIVE    Collection Time: 01/05/19 10:53 AM   Result Value Ref Range    Sodium 135 (L) 136 - 145 mmol/L    Potassium 3.9 3.5 - 5.5 mmol/L    Chloride 102 100 - 108 mmol/L    CO2 24 21 - 32 mmol/L    Anion gap 9 3.0 - 18 mmol/L    Glucose 264 (H) 74 - 99 mg/dL    BUN 11 7.0 - 18 MG/DL    Creatinine 1.04 0.6 - 1.3 MG/DL    BUN/Creatinine ratio 11 (L) 12 - 20      GFR est AA >60 >60 ml/min/1.73m2    GFR est non-AA >60 >60 ml/min/1.73m2    Calcium 9.0 8.5 - 10.1 MG/DL    Bilirubin, total 0.2 0.2 - 1.0 MG/DL    ALT (SGPT) 20 16 - 61 U/L    AST (SGOT) 13 (L) 15 - 37 U/L    Alk.  phosphatase 81 45 - 117 U/L    Protein, total 7.7 6.4 - 8.2 g/dL    Albumin 3.6 3.4 - 5.0 g/dL    Globulin 4.1 (H) 2.0 - 4.0 g/dL    A-G Ratio 0.9 0.8 - 1.7     ETHYL ALCOHOL    Collection Time: 01/05/19 10:53 AM   Result Value Ref Range    ALCOHOL(ETHYL),SERUM <3 0 - 3 MG/DL   ACETAMINOPHEN    Collection Time: 01/05/19 10:53 AM   Result Value Ref Range    Acetaminophen level <2 (L) 10.0 - 14.8 ug/mL   SALICYLATE    Collection Time: 01/05/19 10:53 AM   Result Value Ref Range    Salicylate level <3.0 (L) 2.8 - 20.0 MG/DL   EKG, 12 LEAD, INITIAL    Collection Time: 01/05/19 11:04 AM   Result Value Ref Range    Ventricular Rate 103 BPM    Atrial Rate 103 BPM    P-R Interval 184 ms    QRS Duration 98 ms    Q-T Interval 352 ms    QTC Calculation (Bezet) 461 ms    Calculated P Axis 57 degrees    Calculated R Axis -90 degrees    Calculated T Axis 38 degrees    Diagnosis       Sinus tachycardia  Possible Left atrial enlargement  Right superior axis deviation  Abnormal ECG  When compared with ECG of 27-NOV-2018 23:12,  QRS axis shifted left  ST no longer depressed in Anterior leads         Radiologic Studies -   No results found. Medical Decision Making   I am the first provider for this patient. I reviewed the vital signs, available nursing notes, past medical history, past surgical history, family history and social history. Vital Signs-Reviewed the patient's vital signs. Pulse Oximetry Analysis -  98% on room air (Interpretation)Normal    Records Reviewed: Nursing Notes (Time of Review: 10:25 AM)    Provider Notes (Medical Decision Making):   ASSESSMENT / PLAN:        40y/o  man, PMHx of Anxiety/Depression, HTN, DM, ETOH/Cocaine Abuse who presents to ED w/cc of anxiety/depression and ETOH abuse -requesting admission to [facility]. He reports he has been binge drinking past few days, cant count how many but at least 24-36beer/dy, last drink in parking lot before coming into ED. Some occasional cocaine use (none in 2dys), denies other drug use. Feeling more depressed and feeling like he would be better off dead but no actual plan. No Suicide attempts in past.  No HI or hallucinations. He is homeless now.   Has had some mild withdrawl from ETOH symtpoms in past but no seizures or hospitalizations for this. He repots non compliance with his Amlodipine, Metoprolol, Januvia or psychiatric meds for at least a few days. Think he may have taken his Clonipin 2dys ago. On exam, overweight  man standing at bedside, vitals with tachycardia ~115bpm, /110, rest normal. Pleasant, NAD, good eye contact. HEENT, lung, CV, abd all benign. Psych, midsized pupils, reactive bilaterally, no tremors, no rigitiy or clonus. Depressed mood, linear thougth, not responding to internal stimuli. Endorses depression, no SI, no HI or hallucinations. Seems like exacerbation of underlying Mental Health Disorder. Probably worsened by intoxication. Could have occult ingestion/overdose of meds as well. Uremia, Electrolyte disturbance, occult infection/uti, acidosis also on ddx but seem less likely. Mild tachycardia and HTN likely related to his noncmoplaince with his HTN meds and his concurrent use of ETOH and cocaine.     -1:1   -CBC, CMP  -UA  -UDS  -ETOH, APAP, ASA levels  -EKG  -Mental Health Consult (tele neuro)  -Anticipate admission        Thai Rock MD  EM-IM Physician          ED Course: Progress Notes, Reevaluation, and Consults:  Consult:  Discussed care with, Specialty: Tele[facility]. Standard discussion; including history of patients chief complaint, available diagnostic results, and treatment course. 10:56 AM, 1/5/2019         UPDATE 12:05 PM  -CBC nml  -CMP w/Glucose 264 but no AG or acidosis  -ETOH, APAP, ASA negative  -UDS positive for cocaine    -Medically cleared  -Awaiting tele psych eval    -Pt requested anxiety meds. I offered Hydroxizine but pt refused because \"that stuff doesn't work\" and requested Benzo's specifically. I am concerned with his ETOH use and want mental health to eval prior to giving meds. He became upset and threatened to \"blow up\" if he doesn't get benzo's. I encouraged him to not do this and alerted security.  Will continue to monitor and offer blankets, food, etc. Will have low threshold to chemically restrain if needed to protect staff. Reyes Ayala MD  EM-IM Physician             UPDATE 3:54 PM  -Evaluated by [facility]  -They feel volunatary admission Mental health is best  -They are unsure if he is having withdrawal symptoms from ETOH or drug seeking behavior (asking them for benzo's as well). -Will place on CIWA Scoring and prn Ativan  -Pt's /91 now w/HR 69 so unlikely ETOH withdrawal now  -Ancillary staff starting bed search for Psychiatric Bed. Reyes Ayala MD  EM-IM Physician         Diagnosis     Clinical Impression:   1. Depression with suicidal ideation    2. ETOH abuse    3. Cocaine abuse ([address]        Disposition: Transfer to [facility])    Follow-up Information    None             Medication List      ASK your doctor about these medications    Blood-Glucose Meter Misc  Commonly known as:  CONTOUR NEXT METER  Check blood sugar twice daily     clonazePAM 1 mg tablet  Commonly known as:  KlonoPIN  Take 0.5 Tabs by mouth two (2) times a day. Max Daily Amount: 1 mg.     glucose blood VI test strips strip  Commonly known as:  CONTOUR NEXT TEST STRIPS  Check blood sugar twice daily     lancets Misc  Check fasting blood sugar once daily     metoprolol succinate 100 mg tablet  Commonly known as:  TOPROL-XL  TAKE 1 TABLET BY MOUTH DAILY     naproxen 500 mg tablet  Commonly known as:  NAPROSYN  Take 1 Tab by mouth two (2) times daily (with meals). SITagliptin 100 mg tablet  Commonly known as:  JANUVIA  Take 1 Tab by mouth daily.           _______________________________    Attestations:  Scribe Attestation     Leslie Greco acting as a scribe for and in the presence of Sylvia Prabhakar MD      January 05, 2019 at 3:56 PM       Provider Attestation:      I personally performed the services described in the documentation, reviewed the documentation, as recorded by the scribe in my presence, and it accurately and completely records my words and actions.  January 05, 2019 at 3:56 PM - Josep Gaytan MD    _______________________________

## 2019-01-05 NOTE — ED NOTES
In room with NYDIA Vila to assist with patient belongings due to security being unable to assist with process, patient changed into paper scrubs, all belongs removed, placed in belongings bag, labeled and placed in locker #2

## 2019-01-05 NOTE — ED NOTES
Called and spoke with Greyson German at [facility] looking for voluntary  psychiatrist bed

## 2019-01-05 NOTE — ED TRIAGE NOTES
Binge drinking. Feels like hurting self . No plan.  Last drink 20 min ago to arango off withdrawel./

## 2019-01-06 PROBLEM — F10.10 ALCOHOL ABUSE: Status: ACTIVE | Noted: 2019-01-06

## 2019-01-06 PROBLEM — F14.10 COCAINE ABUSE (HCC): Status: ACTIVE | Noted: 2019-01-06

## 2019-01-06 PROBLEM — F34.1 DYSTHYMIA: Status: ACTIVE | Noted: 2019-01-06

## 2019-01-06 LAB
ATRIAL RATE: 103 BPM
CALCULATED P AXIS, ECG09: 57 DEGREES
CALCULATED R AXIS, ECG10: -90 DEGREES
CALCULATED T AXIS, ECG11: 38 DEGREES
DIAGNOSIS, 93000: NORMAL
GLUCOSE BLD STRIP.AUTO-MCNC: 111 MG/DL (ref 70–110)
GLUCOSE BLD STRIP.AUTO-MCNC: 166 MG/DL (ref 70–110)
GLUCOSE BLD STRIP.AUTO-MCNC: 192 MG/DL (ref 70–110)
GLUCOSE BLD STRIP.AUTO-MCNC: 219 MG/DL (ref 70–110)
P-R INTERVAL, ECG05: 184 MS
Q-T INTERVAL, ECG07: 352 MS
QRS DURATION, ECG06: 98 MS
QTC CALCULATION (BEZET), ECG08: 461 MS
VENTRICULAR RATE, ECG03: 103 BPM

## 2019-01-06 PROCEDURE — 90471 IMMUNIZATION ADMIN: CPT

## 2019-01-06 PROCEDURE — 74011250637 HC RX REV CODE- 250/637: Performed by: PSYCHIATRY & NEUROLOGY

## 2019-01-06 PROCEDURE — 82962 GLUCOSE BLOOD TEST: CPT

## 2019-01-06 PROCEDURE — 65220000003 HC RM SEMIPRIVATE PSYCH

## 2019-01-06 PROCEDURE — 90686 IIV4 VACC NO PRSV 0.5 ML IM: CPT | Performed by: PSYCHIATRY & NEUROLOGY

## 2019-01-06 PROCEDURE — 74011636637 HC RX REV CODE- 636/637: Performed by: PSYCHIATRY & NEUROLOGY

## 2019-01-06 PROCEDURE — 74011250636 HC RX REV CODE- 250/636: Performed by: PSYCHIATRY & NEUROLOGY

## 2019-01-06 RX ORDER — SUMATRIPTAN 25 MG/1
25 TABLET, FILM COATED ORAL
Status: DISCONTINUED | OUTPATIENT
Start: 2019-01-06 | End: 2019-01-07

## 2019-01-06 RX ORDER — ESCITALOPRAM OXALATE 10 MG/1
10 TABLET ORAL DAILY
Status: DISCONTINUED | OUTPATIENT
Start: 2019-01-06 | End: 2019-01-09 | Stop reason: HOSPADM

## 2019-01-06 RX ORDER — LORAZEPAM 1 MG/1
2 TABLET ORAL 3 TIMES DAILY
Status: DISCONTINUED | OUTPATIENT
Start: 2019-01-06 | End: 2019-01-07

## 2019-01-06 RX ORDER — LORAZEPAM 1 MG/1
2 TABLET ORAL 2 TIMES DAILY
Status: DISCONTINUED | OUTPATIENT
Start: 2019-01-06 | End: 2019-01-06

## 2019-01-06 RX ORDER — LORAZEPAM 1 MG/1
1 TABLET ORAL DAILY
Status: DISCONTINUED | OUTPATIENT
Start: 2019-01-08 | End: 2019-01-06

## 2019-01-06 RX ORDER — LORAZEPAM 1 MG/1
2 TABLET ORAL DAILY
Status: DISCONTINUED | OUTPATIENT
Start: 2019-01-08 | End: 2019-01-06

## 2019-01-06 RX ORDER — ACAMPROSATE CALCIUM 333 MG/1
333 TABLET, DELAYED RELEASE ORAL 3 TIMES DAILY
Status: DISCONTINUED | OUTPATIENT
Start: 2019-01-06 | End: 2019-01-09 | Stop reason: HOSPADM

## 2019-01-06 RX ORDER — LORAZEPAM 1 MG/1
2 TABLET ORAL 2 TIMES DAILY
Status: DISCONTINUED | OUTPATIENT
Start: 2019-01-06 | End: 2019-01-07

## 2019-01-06 RX ADMIN — INSULIN LISPRO 2 UNITS: 100 INJECTION, SOLUTION INTRAVENOUS; SUBCUTANEOUS at 11:49

## 2019-01-06 RX ADMIN — LORAZEPAM 2 MG: 1 TABLET ORAL at 15:00

## 2019-01-06 RX ADMIN — Medication 100 MG: at 08:26

## 2019-01-06 RX ADMIN — NAPROXEN 500 MG: 250 TABLET ORAL at 21:30

## 2019-01-06 RX ADMIN — ESCITALOPRAM OXALATE 10 MG: 10 TABLET ORAL at 11:10

## 2019-01-06 RX ADMIN — Medication 2 CAPSULE: at 06:35

## 2019-01-06 RX ADMIN — INSULIN LISPRO 4 UNITS: 100 INJECTION, SOLUTION INTRAVENOUS; SUBCUTANEOUS at 21:38

## 2019-01-06 RX ADMIN — Medication 2 CAPSULE: at 11:10

## 2019-01-06 RX ADMIN — Medication 2 CAPSULE: at 17:30

## 2019-01-06 RX ADMIN — INSULIN LISPRO 2 UNITS: 100 INJECTION, SOLUTION INTRAVENOUS; SUBCUTANEOUS at 08:27

## 2019-01-06 RX ADMIN — SUMATRIPTAN SUCCINATE 25 MG: 25 TABLET ORAL at 15:41

## 2019-01-06 RX ADMIN — ACAMPROSATE CALCIUM 333 MG: 333 TABLET, DELAYED RELEASE ORAL at 15:41

## 2019-01-06 RX ADMIN — NAPROXEN 500 MG: 250 TABLET ORAL at 13:32

## 2019-01-06 RX ADMIN — ACAMPROSATE CALCIUM 333 MG: 333 TABLET, DELAYED RELEASE ORAL at 21:30

## 2019-01-06 RX ADMIN — LORAZEPAM 2 MG: 1 TABLET ORAL at 21:29

## 2019-01-06 RX ADMIN — LORAZEPAM 1 MG: 1 TABLET ORAL at 08:25

## 2019-01-06 RX ADMIN — FOLIC ACID 1 MG: 1 TABLET ORAL at 08:26

## 2019-01-06 RX ADMIN — METOPROLOL SUCCINATE 100 MG: 25 TABLET, EXTENDED RELEASE ORAL at 08:26

## 2019-01-06 RX ADMIN — AMLODIPINE BESYLATE 5 MG: 5 TABLET ORAL at 08:26

## 2019-01-06 RX ADMIN — INFLUENZA VIRUS VACCINE 0.5 ML: 15; 15; 15; 15 SUSPENSION INTRAMUSCULAR at 17:04

## 2019-01-06 RX ADMIN — LORAZEPAM 2 MG: 1 TABLET ORAL at 11:10

## 2019-01-06 RX ADMIN — SITAGLIPTIN 100 MG: 50 TABLET, FILM COATED ORAL at 08:26

## 2019-01-06 NOTE — H&P
[address] [facility] [address] Inpatient Admission Note Date of Service:  01/06/19 Historian(s): Chris Gaytan and chart review Referral Source: ED Chief Complaint \"When I drink, I drink. Dawn Loose Dawn Loose \" History of Present Illness Rubens Adame is a 39 y.o. WHITE OR  male with a history of alcoholism, cocaine abuse, anxiety, who presented voluntarily. ED note: \"This pt is a 39 yr old male . Chart reviewed and case discussed with treatment team. He came to the hospital reporting worsening depression, SI and alcohol abuse. Telepsychiatry was consulted for assessment and recommendations for management. Upon interview pt says that he has been feeling depressed and alone for months and he came to the hospital to get help. He endorses SI with no intent or plan but says that he just wants to 'disappear.' He says that he is going through a lot lately ie not being able to work full time, not having his own place, he lost his car. He says that he has a psychiatrist and last saw them around 3-4 weeks ago but just found out he can no longer see the psychiatrist due to insurance issues. He was recently at [facility] around 1 month ago and would like to go back there. Pt says that he drinks alcohol around 2x per week, and that when he drinks he has around 36-45 beers. His last use was within the past 24 hours. He denies current withdrawal sx but says that he has had them in the past. He denies hx of seizures. Most recent vitals are pulse 118 and /113. He also reports using cocaine around once per month, last use was around 5 days ago. Pt says that he has anxiety and in the past has also been dx with bipolar disorder. He says that his only home psych meds now are Swetha. He is requesting voluntary inpt psych\" Pt confirmed above data, stated he has hx of DT, used alcohol heavily and snorted cocaine.   Pt stated he was in tx for anxiety at [facility], saw NP Katharine Elizondo, who prescribed him Clonazepam,. But his insurance was recently denied. He is now homeless and unemployed. Pt endorsed anxiety, depressed mood, recent passive SI. Psychiatric Review of Systems Depression:  yes Anxiety: yes Irritability: yes Bipolar symptoms: Denied history of decreased need for sleep associated with increased energy, racing thoughts, rapid speech and risky behavior. Abuse/Trauma/PTSD: Denied history of verbal, physical or sexual abuse. Denies avoidant behavior related to trauma triggers, flashbacks, hypervigilance or nightmares. Psychosis: Denied auditory/visual hallucinations or delusions. Medical Review of Systems Sleep: decreased Appetite: fair 10 point review of systems was completed. Significant findings are found in the HPI or MSE. Psychiatric Treatment History Self-injurious behavior/risky thoughts or behaviors (past suicidal ideation/attempt):  
Denies any prior history of thoughts of self-harm or suicidal actions. Violence/Risk to others (past homicidal ideation/attempt):  
Denies any prior history of violence or homicidal ideation. Previous psychiatric medication trials: Clonazepam 
 
Previous psychiatric hospitalizations: reported hospitalization 2 years ago to [facility] due to depression and RICARDO Current therapist: micaela Current psychiatric provider: micaela Allergies No Known Allergies Medical History CLBP; DM; HTN; migraine h.a. History of neurological illness: denied History of head injuries: denied Medication(s) Prior to Admission Medications Prescriptions Last Dose Informant Patient Reported? Taking? Blood-Glucose Meter (CONTOUR NEXT METER) misc 1/5/2019 at Unknown time  No Yes Sig: Check blood sugar twice daily Lancets misc 1/5/2019 at Unknown time  No Yes Sig: Check fasting blood sugar once daily SITagliptin (JANUVIA) 100 mg tablet 1/4/2019 at Unknown time  No Yes Sig: Take 1 Tab by mouth daily. clonazePAM (KLONOPIN) 1 mg tablet Unknown at Unknown time  No No  
Sig: Take 0.5 Tabs by mouth two (2) times a day. Max Daily Amount: 1 mg.  
glucose blood VI test strips (CONTOUR NEXT TEST STRIPS) strip 1/5/2019 at Unknown time  No Yes Sig: Check blood sugar twice daily  
metoprolol succinate (TOPROL-XL) 100 mg tablet Unknown at Unknown time  No No  
Sig: TAKE 1 TABLET BY MOUTH DAILY  
naproxen (NAPROSYN) 500 mg tablet Unknown at Unknown time  No No  
Sig: Take 1 Tab by mouth two (2) times daily (with meals). Facility-Administered Medications: None Substance Abuse History Tobacco: denied Alcohol: daily use of 24+ beers Marijuana: denied Cocaine: snorted Opiate: denied Benzodiazepine: denied Other: denied History of detox: yes History of substance abuse treatment: yes Family History Family History Problem Relation Age of Onset  Hypertension Mother  Diabetes Mother  Heart Failure Mother  COPD Mother  Heart Disease Mother  Hypertension Father  Alcohol abuse Father Psychiatric Family History Maternal: no 
Paternal: no 
 
Family history of suicide? no 
 
Social History Born/raised: [place], [place] Early development: denied abuse/neglect; denied developmental delays Education:  Living Situation: homeless  Service: n.a. Employment: worked in construction/remodeling; now unemployed Relationships/Children: ; 1 son Legal: denied Spirituality/Lutheran: n.a. Vitals/Labs Vitals:  
 01/05/19 2010 01/06/19 0322 01/06/19 5682 BP: (!) 145/93 135/89 128/86 Pulse: 77 65 64 Resp: 20  18 Temp: 98 °F (36.7 °C)  97.2 °F (36.2 °C) Weight: 101.2 kg (223 lb) Height: 5' 10\" (1.778 m) Labs:  
Results for orders placed or performed during the hospital encounter of 01/05/19 GLUCOSE, POC Result Value Ref Range Glucose (POC) 220 (H) 70 - 110 mg/dL GLUCOSE, POC  
 Result Value Ref Range Glucose (POC) 166 (H) 70 - 110 mg/dL Mental Status Examination Appearance/Hygiene 39 y.o. WHITE OR  male Hygiene: wnl  
Orientation Cognition AO to self/place/date/reason for evaluation No gross impalement of concentration/memory Behavior/Social Relatedness cooperative Musculoskeletal Gait/Station: appropriate Tone (flaccid, cogwheeling, spastic): not assessed Psychomotor (hyperkinetic, hypokinetic): hyper Involuntary movements (tics, dyskinesias, akathisa, stereotypies): none Speech   Rate, rhythm, volume, fluency and articulation are appropriate Mood   depressed Affect    anxious Thought Process Organized and goal directed Thought Content and Perceptual Disturbances Denies delusions, ideas of reference, overvalued ideas, ruminations, obsession, compulsions, and phobias Denies self-injurious behavior/thoughts (SIB), aggressive behavior or homicidal ideation (HI); had recent passive SI Denies auditory and visual hallucinations Insight  fair Judgment fair Suicide Risk Assessment Admission  Date/Time: 01/06/19 [x] Admission  [] Discharge Key Factors:  
Current admission precipitated by suicide attempt? []  Yes 2    [x]  No  
 
1 Suicide Attempt History  [] Past attempts of high lethality 
 
2 []  Past attempts of low lethality 1 [x]  No previous attempts  
 
 
0 Suicidal Ideation []  Constant suicidal thoughts 2 [x]  Intermittent or fleeting suicidal  thoughts 
1 []  Denies current suicidal thoughts 
 
0 Suicide Plan   []  Has plan with actual OR potential access to planned method 
 
2 []  Has plan without access to planned method 
 
 
1 []  No plan 
 
 
 
 
 
0 Plan Lethality []  Highly lethal plan (Carbon monoxide, gun, hanging, jumping) 2 []  Moderate lethality of plan 1 [x]  Low lethality of plan (biting, head banging, superficial scratching, pillow over face) 0 Safety Plan Agreement  []  Unwilling OR unable to agree due to impaired reality testing 2   []  Patient is ambivalent and/or guarded 1 [x]  Reliably agrees 
 
 
 
0 Current Morbid Thoughts (reunion fantasies, preoccupations with death) []  Constantly 2 []  Frequently 1 [x]  Rarely 0 Elopement Risk  []  High risk 2 []  Moderate risk 1 [x]   Low risk 0 Symptoms   
[x]  Hopeless [x]  Helpless [x]  Anhedonia  
[]  Guilt/shame 
[]  Anger/rage [x]  Anxiety [x]  Insomnia [x]  Agitation  
[x]  Impulsivity  [x]  5-6 symptoms present 2 []  3-4 symptoms present 1  []  0-2 symptoms present 
 
0 Total Score: 4-------------------------------------------------------------------------------------------------------------- Subjective Appraisal of Risk: 
[]  Patient replies not trustworthy: several non-verbal cues. []  Patient replies questionable: trustworthy: at least 1 non-verbal cue. [x]  Patient replies appear trustworthy. Protective measures (select all that apply): 
[]  Successful past responses to stress 
[]  Spiritual/Cheondoism beliefs []  Capacity for reality testing 
[]  Positive therapeutic relationships 
[]  Social supports/connections []  Positive coping skills []  Frustration tolerance/optimism 
[]  Children or pets in the home 
[]  Sense of responsibility to family 
[x]  Agrees to treatment plan and follow up High Risk Diagnoses (select all that apply): 
[]  Depression/Bipolar Disorder 
[x]  Dual Diagnosis 
[]  Cardiovascular Disease 
[]  Schizophrenia 
[]  Chronic Pain 
[]  Epilepsy 
[]  Cancer 
[]  Personality Disorder 
[]  HIV/AIDS []  Multiple Sclerosis Dangerousness Assessment (Suicide, homicide, property destruction. ..) Risk Factors reviewed and risk assessed to be:  [] low  [x] low-moderate  [] moderate 
 [] moderate-high  [] high Protection factors reviewed and risk assessed to be:  [] low  [x] low-moderate  [] moderate [] moderate-high  [] high Response to treatment and risk assessed to be:  [] low  [x] low-moderate  [] moderate 
 [] moderate-high  [] high Support reviewed and risk assessed to be:  [] low  [x] low-moderate  [] moderate 
 [] moderate-high  [] high Acceptance of Discharge and outpatient treatment reviewed and risk assessed to be: 
  [] low  [x] low-moderate  [] moderate 
 [] moderate-high  [] high Overall risk assessed to be:  [] low  [x] low-moderate  [] moderate 
 [] moderate-high  [] high Assessment and Plan Psychiatric Diagnoses:  
Alcohol Use Disorder, severe Cocaine Use Disorder, mod/sev Dysthymic Disorder Medical Diagnoses: CLBP; DM; HTN; migraine h.a. Psychosocial and contextual factors: homeless, unemployed; RICARDO Level of impairment/disability: severe Luisa Pelaez is a 39 y. o. who is currently requiring acute stabilization 1. Admit to locked inpatient behavioral health unit. Start milieu, group, art and occupation therapy. 2. CIWA; Lorazepam taper; start Lexapro; [place]; o/pt medications for [address] 3. Routine labs ordered by ED and reviewed by this provider. 4. Reviewed instructions, risks, benefits and side effects. 5. Start disposition planning; verify upcoming outpatient appointments with therapist and/or psychiatric medication prescriber. 6. Tentative date of discharge: 5 days Rainer Tamayo MD 
Psychiatrist 
[facility]

## 2019-01-06 NOTE — PROGRESS NOTES
Problem: Falls - Risk of 
Goal: *Absence of Falls Document Maura Quick Fall Risk and appropriate interventions in the flowsheet. Absent of falls daily while in hospital.  
Outcome: Progressing Towards Goal 
Patient is progressing as evidence by being free from falls during this shift. Patient has been compliant with non-skid footwear while ambulating and room is free of clutter. Problem: Crack/Cocaine Withdrawal 
Goal: *STG: Remains safe in hospital 
Remains safe while in hospital daily. Outcome: Progressing Towards Goal 
Patient is progressing as evidence by being free from harm during this nurse's shift. Patient contracts for safety at this time. Patient has appeared irritable and agitated most of day shift. Patient has asked several times if the Dr has ordered \"my percocet yet. \"  Patient has been reminded several times Dr will not be ordering percocet. Patient has received scheduled medications and continues to complain that medicine given \"wouldn't work for my dog. \"  Patient requested Imitrex for complaint of migraine headache. Patient voiced \"nothing is working\" when reassessed for effectiveness. Patient has been compliant with Q4 CIWA assessments as well as ACHS blood glucose checks. Patient has been compliant with insulin administration when needed. Patient voiced \"I've never done sugar checks and insulin before. I don't know why they're doing all this now. \"  Patient encouraged to speak with Dr about sliding scale. When checking patient's chart history, patient has received education and supplies regarding blood glucose monitoring and insulin administration. Patient has been in day room most of this shift with multiple questions regarding medications ordered and medications not ordered. Will continue to monitor and provide safe and therapeutic interventions as needed and as appropriate.

## 2019-01-06 NOTE — ROUTINE PROCESS
Patient was escorted to the unit via w/c accompanied by medical transport  and hospital . Pt was alert and oriented x 3. Sad and flat affect. Pt c/o being lonely, depressed, unable to work full time and owns no place to live. Pt reports of SI with no plan to harm self,  contract for safety. Pt cooperative during the assessment. Pt is diabetic with no allergy recorded at this time. He is diet and med compliant. He was oriented to unit and expectations related to treatment. Patient verbalized understanding of all information provided. Staff continues to monitor for safety and provide a supportive environment.

## 2019-01-06 NOTE — BH NOTES
GROUP THERAPY PROGRESS NOTE Vibha Paz is participating in [place]. Group time: 50 minutes Personal goal for participation: rules/regulations Goal orientation: community Group therapy participation: minimal 
 
Therapeutic interventions reviewed and discussed: He was educated on the discharge process during group. Impression of participation: He was cooperative during group she focused on getting discharged this shift during group.

## 2019-01-06 NOTE — BH NOTES
GROUP THERAPY PROGRESS NOTE Patient is participating in Recreational Therapy. Group time: 1 hour Personal goal for participation: Game night Goal orientation: social 
 
Group therapy participation: active Therapeutic interventions reviewed and discussed: Writer brought new board games to the unit and half of the unit played Monopoly and half of the unit watched the football game. Impression of participation: Active participattion

## 2019-01-07 LAB
GLUCOSE BLD STRIP.AUTO-MCNC: 124 MG/DL (ref 70–110)
GLUCOSE BLD STRIP.AUTO-MCNC: 172 MG/DL (ref 70–110)
GLUCOSE BLD STRIP.AUTO-MCNC: 187 MG/DL (ref 70–110)
GLUCOSE BLD STRIP.AUTO-MCNC: 98 MG/DL (ref 70–110)

## 2019-01-07 PROCEDURE — 74011636637 HC RX REV CODE- 636/637: Performed by: PSYCHIATRY & NEUROLOGY

## 2019-01-07 PROCEDURE — 74011250637 HC RX REV CODE- 250/637: Performed by: PSYCHIATRY & NEUROLOGY

## 2019-01-07 PROCEDURE — 65220000003 HC RM SEMIPRIVATE PSYCH

## 2019-01-07 PROCEDURE — 82962 GLUCOSE BLOOD TEST: CPT

## 2019-01-07 PROCEDURE — 74011250636 HC RX REV CODE- 250/636: Performed by: PSYCHIATRY & NEUROLOGY

## 2019-01-07 RX ORDER — PRAZOSIN HYDROCHLORIDE 1 MG/1
1 CAPSULE ORAL
Status: DISCONTINUED | OUTPATIENT
Start: 2019-01-07 | End: 2019-01-08

## 2019-01-07 RX ORDER — CLONAZEPAM 0.5 MG/1
1 TABLET ORAL 3 TIMES DAILY
Status: DISCONTINUED | OUTPATIENT
Start: 2019-01-07 | End: 2019-01-09 | Stop reason: HOSPADM

## 2019-01-07 RX ORDER — SUMATRIPTAN 25 MG/1
25 TABLET, FILM COATED ORAL
Status: DISCONTINUED | OUTPATIENT
Start: 2019-01-07 | End: 2019-01-09 | Stop reason: HOSPADM

## 2019-01-07 RX ADMIN — Medication 2 CAPSULE: at 11:28

## 2019-01-07 RX ADMIN — SITAGLIPTIN 100 MG: 50 TABLET, FILM COATED ORAL at 08:25

## 2019-01-07 RX ADMIN — INSULIN LISPRO 2 UNITS: 100 INJECTION, SOLUTION INTRAVENOUS; SUBCUTANEOUS at 11:39

## 2019-01-07 RX ADMIN — SUMATRIPTAN SUCCINATE 25 MG: 25 TABLET ORAL at 08:29

## 2019-01-07 RX ADMIN — CLONAZEPAM 1 MG: 0.5 TABLET ORAL at 13:51

## 2019-01-07 RX ADMIN — Medication 100 MG: at 08:25

## 2019-01-07 RX ADMIN — AMLODIPINE BESYLATE 5 MG: 5 TABLET ORAL at 08:25

## 2019-01-07 RX ADMIN — ACAMPROSATE CALCIUM 333 MG: 333 TABLET, DELAYED RELEASE ORAL at 08:26

## 2019-01-07 RX ADMIN — ACAMPROSATE CALCIUM 333 MG: 333 TABLET, DELAYED RELEASE ORAL at 13:04

## 2019-01-07 RX ADMIN — INSULIN LISPRO 2 UNITS: 100 INJECTION, SOLUTION INTRAVENOUS; SUBCUTANEOUS at 20:07

## 2019-01-07 RX ADMIN — PRAZOSIN HYDROCHLORIDE 1 MG: 1 CAPSULE ORAL at 20:05

## 2019-01-07 RX ADMIN — ESCITALOPRAM OXALATE 10 MG: 10 TABLET ORAL at 08:26

## 2019-01-07 RX ADMIN — FOLIC ACID 1 MG: 1 TABLET ORAL at 08:26

## 2019-01-07 RX ADMIN — CLONAZEPAM 1 MG: 0.5 TABLET ORAL at 20:05

## 2019-01-07 RX ADMIN — METOPROLOL SUCCINATE 100 MG: 25 TABLET, EXTENDED RELEASE ORAL at 08:25

## 2019-01-07 RX ADMIN — HYDROXYZINE PAMOATE 50 MG: 50 CAPSULE ORAL at 17:53

## 2019-01-07 RX ADMIN — ACAMPROSATE CALCIUM 333 MG: 333 TABLET, DELAYED RELEASE ORAL at 20:05

## 2019-01-07 RX ADMIN — HALOPERIDOL LACTATE 5 MG: 5 INJECTION INTRAMUSCULAR at 11:23

## 2019-01-07 RX ADMIN — Medication 2 CAPSULE: at 16:28

## 2019-01-07 RX ADMIN — LORAZEPAM 2 MG: 1 TABLET ORAL at 08:25

## 2019-01-07 NOTE — BH NOTES
Pt given 50 mg hydroxyzine po for c/o  Anxiety. Pt has been irritable much of the evening. Pt ate all meals. Pt present at groups. Pt states that he feels angry for no reason but that he feels at present he is able to control it. Pt advised to come to staff if he feels like he can no longer control it. Pt denies SI/HI/AVH at this time and is able to contract for safety. Will continue to monitor and provide intervention as it becomes necessary.

## 2019-01-07 NOTE — BH NOTES
Pt states to this nurse \" I just want to know why someone had to sleep on a mat. I'll beat the living shit of out someone who tries to do that to me. I'm begging you not to do that to anyone, I'm not a mean sapna but I'll stick up for someone in a heart beat. \"

## 2019-01-07 NOTE — PROGRESS NOTES
Problem: Falls - Risk of 
Goal: *Absence of Falls Document [place] Officer Fall Risk and appropriate interventions in the flowsheet. Absent of falls daily while in hospital.  
Outcome: Progressing Towards Goal 
Fall Risk Interventions: 
  
 
  
 
Medication Interventions: Teach patient to arise slowly Problem: Crack/Cocaine Withdrawal 
Goal: *STG: Attends activities and groups Attends activities and groups daily while in hospital.  
Outcome: Progressing Towards Goal 
Attending some groups Problem: Suicide/Homicide (Adult/Pediatric) Goal: *STG: Remains safe in hospital 
Remains safe daily while in hospital.  
Outcome: Progressing Towards Goal 
No attempts to harm self or others Goal: *STG: Seeks staff when feelings of self harm or harm towards others arise Seeks staff when feelings of self harm or harm towards others arises while in hospital daily. Outcome: Progressing Towards Goal 
Verbally contracts for safety Goal: *STG/LTG:  No longer expresses self destructive or suicidal/homicidal thoughts No longer expresses self destructive or suicidal/homicidal thoughts daily while in hospital.  
Outcome: Not Progressing Towards Goal 
Variance: Patient Condition Comments: Endorsing suicidal ideations but verbally contracts for safety Comments: Pt states he is somewhat suicidal but he verbally contracts for safety. Pt is constantly coming to the desk asking for medications and some medications that are not ordered. Pt stated he needs adderall because people have told him he needs it due to inability to focus. Pt is intrusive with what is going on on the unit and accusing staff of mistreating pts. He does not follow direction standing in front of the door or there desk when asked not to and trying to watch what was going on with an adolescent pt. He came to the desk stating he was going to blow up and go off in here and needed medication to prevent that. Pt was medicated with 5 mg IM Haldol

## 2019-01-07 NOTE — PROGRESS NOTES
[address] [facility] [address] Inpatient Progress Note Date of Service: 01/07/19 Hospital Day: 2 Subjective/Interval History 01/07/19 Treatment Team Notes:  Notes reviewed and/or discussed and report that Don Monterroso is a 39 y/owith a history of alcoholism, cocaine abuse, anxiety, who presented voluntarily. Per nursing report, pt has been very medication seeking since admission. He has requested Xanax and Percocet and was asking if he could be prescribed Adderall this morning for ADHD. He complains of insomnia but staff documented 7 hours of sleep last night. Patient interview: Don Monterroso was interviewed by this writer today. Pt states he is here due to binge drinking, depression and anxiety. He states two weeks ago, he saw a child's dead body wash up the beach and it has meseed up his mind. He states he is having severe nightmares and is unable to sleep more than 3 hours at night. He also complains of severe anxiety and states he was prescribed Klonopin 2mg TID by outpatient Provider and [facility]. He is requesting to be put back on Klonopin 2mg TID in place of Ativan. Pt states he feels very tense \"like I'm going to explode. \" 
 
Pt reports he has taken \"thousands of medications in the past. You name it. I've tried it\". He says he has trials of Zoloft 200mg, Effexor, Depakote, numerous SSRIs which have not helped his anxiety. Klonopin has been the most effective. He endorses poor concentration and asks for medication for ADHD. Also states he needs something for nightmares. Pt says he plans on stopping alcohol use once discharged. He is interested in rehab placement if available. Longest period of sobriety is 9 years and he says he quit cold turkey on his own at that time. Pt denies SI or psychotic symptoms. Objective Visit Vitals /88 (BP 1 Location: Right arm, BP Patient Position: Sitting) Pulse 69 Temp 97 °F (36.1 °C) Resp 18 Ht 5' 10\" (1.778 m) Wt 101.2 kg (223 lb) BMI 32.00 kg/m² Recent Results (from the past 24 hour(s)) GLUCOSE, POC Collection Time: 01/06/19 11:17 AM  
Result Value Ref Range Glucose (POC) 192 (H) 70 - 110 mg/dL GLUCOSE, POC Collection Time: 01/06/19  4:07 PM  
Result Value Ref Range Glucose (POC) 111 (H) 70 - 110 mg/dL GLUCOSE, POC Collection Time: 01/06/19  7:57 PM  
Result Value Ref Range Glucose (POC) 219 (H) 70 - 110 mg/dL GLUCOSE, POC Collection Time: 01/07/19  6:15 AM  
Result Value Ref Range Glucose (POC) 124 (H) 70 - 110 mg/dL Mental Status Examination Appearance/Hygiene 39 y.o. WHITE OR  male Hygiene: Good Behavior/Social Relatedness Appropriate Musculoskeletal Gait/Station: appropriate Tone (flaccid, cogwheeling, spastic): not assessed Psychomotor (hyperkinetic, hypokinetic): calm Involuntary movements (tics, dyskinesias, akathisa, stereotypies): none Speech   Rate, rhythm, volume, fluency and articulation are appropriate Mood   depressed Affect    incongruent Thought Process Linear and goal directed Thought Content and Perceptual Disturbances Denies self-injurious behavior (SIB), suicidal ideation (SI), aggressive behavior or homicidal ideation (HI) Denies auditory and visual hallucinations Sensorium and Cognition  Grossly intact Insight  limited Judgment limited Assessment/Plan Psychiatric Diagnoses:  
Patient Active Problem List  
Diagnosis Code  Hypertension I10  Chronic low back pain M54.5, G89.29  
 Diabetes mellitus ([facility] E11.9  ACP (advance care planning) Z71.89  
 Anxiety F41.9  Drug-seeking behavior Z76.5  Obesity (BMI 30.0-34. 9) E66.9  
 Cocaine use F14.90  MDD (major depressive disorder), recurrent episode, moderate (HCC) F33.1  RHONDA (generalized anxiety disorder) F41.1  Diabetes ([facility] E11.9  Alcohol abuse F10.10  Cocaine abuse ([facility] F14.10  Dysthymia F34.1 Psychosocial and contextual factors: Recent separation from wife, custody enriquez Level of impairment/disability: Jeovany Grigsby is a 39 y. o. who is currently admitted for alcohol withdrawal, depression and SI.   
 
1.  D/c scheduled Ativan. Start Klonopin 1mg TID for anxiety and alcohol withdrawal. 
2. Star Prazosin 1mg qhs for nightmares 3. Continue Lexapro 10mg qhs for anxiety and depression. 4.  Reviewed instructions, risks, benefits and side effects of medications 5. Disposition/Discharge Date: self-care/home, to be determined. Trudi Owens MD [facility] [place] [facility]

## 2019-01-07 NOTE — BSMART NOTE
SW made contact with patient as he remained in bed. Patient appeared to be asleep and did not respond to this writers efforts. SW will continue to monitor patient during hospitalization. BRIGHT Cannon, LCSW-E

## 2019-01-07 NOTE — ROUTINE PROCESS
Pt standing at nurse's station upon this writer's arrival. Pt alert and oriented to self,  time, place and situation. When pt was asked if he needed anything, he responded by asking for his percocet,  this writers checked pt's medication list but there was no order for percocet and pt was informed of this information. Pt demanded this writer to call on call MD immediately for his medication ( percocet), but pt was encouraged to talk to his doctor tomorrow regarding his medication. Pt asked if he could talk to the supervisor, supervisor was notified of pt request and she was on the unit immediately to see the pt. Pt was told percocet can not be ordered over the phone and he needs to discuss with his doctor. Pt got agitated exposed self by pulled his pants down and pointing where he was hurting. Pt  demanded to go to ER to get injection for his back pain. Pt was encouraged to take his prescribed pain meds. Pt compliant with his hs medication, his BG was 219 this evening, insulin given per protocol. Will monitor.

## 2019-01-07 NOTE — BSMART NOTE
ACTIVITIES THERAPY PROGRESS NOTE Group time:1530 Sitting in day area at start of group at separate table, declined to join group, but did participate briefly when called on for about 1/2 of group. Appeared sedated and unfocused.

## 2019-01-07 NOTE — BH NOTES
Patient given flu vaccine to left deltoid by this nurse. Patient information sheet placed in patient's locker. No adverse effects noted at this time.

## 2019-01-07 NOTE — BH NOTES
Pt has been agitated all morning. Pt approached nursing station with fists clenched and stated I feel like I'm going to blow up. Pt agreed to receive 5 mg IM haldol.

## 2019-01-08 LAB
GLUCOSE BLD STRIP.AUTO-MCNC: 121 MG/DL (ref 70–110)
GLUCOSE BLD STRIP.AUTO-MCNC: 130 MG/DL (ref 70–110)

## 2019-01-08 PROCEDURE — 82962 GLUCOSE BLOOD TEST: CPT

## 2019-01-08 PROCEDURE — 65220000003 HC RM SEMIPRIVATE PSYCH

## 2019-01-08 PROCEDURE — 74011250637 HC RX REV CODE- 250/637: Performed by: PSYCHIATRY & NEUROLOGY

## 2019-01-08 PROCEDURE — 74011250636 HC RX REV CODE- 250/636: Performed by: PSYCHIATRY & NEUROLOGY

## 2019-01-08 RX ORDER — CLONIDINE HYDROCHLORIDE 0.1 MG/1
0.1 TABLET ORAL
Status: DISCONTINUED | OUTPATIENT
Start: 2019-01-08 | End: 2019-01-09 | Stop reason: HOSPADM

## 2019-01-08 RX ADMIN — Medication 2 CAPSULE: at 06:54

## 2019-01-08 RX ADMIN — METOPROLOL SUCCINATE 100 MG: 25 TABLET, EXTENDED RELEASE ORAL at 08:31

## 2019-01-08 RX ADMIN — Medication 100 MG: at 08:30

## 2019-01-08 RX ADMIN — FOLIC ACID 1 MG: 1 TABLET ORAL at 08:31

## 2019-01-08 RX ADMIN — Medication 2 CAPSULE: at 16:09

## 2019-01-08 RX ADMIN — HALOPERIDOL LACTATE 5 MG: 5 INJECTION INTRAMUSCULAR at 04:16

## 2019-01-08 RX ADMIN — ACAMPROSATE CALCIUM 333 MG: 333 TABLET, DELAYED RELEASE ORAL at 13:20

## 2019-01-08 RX ADMIN — CLONAZEPAM 1 MG: 0.5 TABLET ORAL at 13:20

## 2019-01-08 RX ADMIN — AMLODIPINE BESYLATE 5 MG: 5 TABLET ORAL at 08:31

## 2019-01-08 RX ADMIN — HYDROXYZINE PAMOATE 50 MG: 50 CAPSULE ORAL at 17:24

## 2019-01-08 RX ADMIN — HALOPERIDOL 5 MG: 5 TABLET ORAL at 11:13

## 2019-01-08 RX ADMIN — CLONAZEPAM 1 MG: 0.5 TABLET ORAL at 20:18

## 2019-01-08 RX ADMIN — ACAMPROSATE CALCIUM 333 MG: 333 TABLET, DELAYED RELEASE ORAL at 08:31

## 2019-01-08 RX ADMIN — ESCITALOPRAM OXALATE 10 MG: 10 TABLET ORAL at 08:31

## 2019-01-08 RX ADMIN — SUMATRIPTAN SUCCINATE 25 MG: 25 TABLET ORAL at 02:14

## 2019-01-08 RX ADMIN — HYDROXYZINE PAMOATE 50 MG: 50 CAPSULE ORAL at 03:42

## 2019-01-08 RX ADMIN — SITAGLIPTIN 100 MG: 50 TABLET, FILM COATED ORAL at 08:31

## 2019-01-08 RX ADMIN — ACAMPROSATE CALCIUM 333 MG: 333 TABLET, DELAYED RELEASE ORAL at 20:18

## 2019-01-08 RX ADMIN — CLONAZEPAM 1 MG: 0.5 TABLET ORAL at 08:30

## 2019-01-08 NOTE — PROGRESS NOTES
[address] Inpatient Progress Note Date of Service: 01/08/19 Hospital Day: 3 Subjective/Interval History 01/08/19 Treatment Team Notes:  Notes reviewed and/or discussed and report that Vibha Paz is a 39 y/owith a history of alcoholism, cocaine abuse, anxiety, who presented voluntarily. Per nursing report, pt appeared to be doing better yesterday evening in terms of med seeking behavior. However, pt has bouts of agitation. He received haldol 5mg around 11 am this morning due to anger outburst and threatening to explode. Pt confronted on reason for anger outburst earlier and he said he couldn't remember. He just has periods of intense irritability and anger. Pt still complaining of insomnia and \"terrible nightmares\". Prazosin does not help and he has taken that in the past. States he only slept 2 hours last night. Withdrawal symptoms are improving. Mood is still depressed. Rates depression as  7/10 with 10 being suicidal. 
Objective Visit Vitals /82 (BP 1 Location: Right arm, BP Patient Position: Sitting) Pulse 65 Temp 96.7 °F (35.9 °C) Resp 18 Ht 5' 10\" (1.778 m) Wt 101.2 kg (223 lb) BMI 32.00 kg/m² Recent Results (from the past 24 hour(s)) GLUCOSE, POC Collection Time: 01/07/19  7:58 PM  
Result Value Ref Range Glucose (POC) 172 (H) 70 - 110 mg/dL GLUCOSE, POC Collection Time: 01/08/19  5:30 AM  
Result Value Ref Range Glucose (POC) 121 (H) 70 - 110 mg/dL GLUCOSE, POC Collection Time: 01/08/19  3:57 PM  
Result Value Ref Range Glucose (POC) 130 (H) 70 - 110 mg/dL Mental Status Examination Appearance/Hygiene 39 y.o. WHITE OR  male Hygiene: Good Behavior/Social Relatedness Appropriate Musculoskeletal Gait/Station: appropriate Tone (flaccid, cogwheeling, spastic): not assessed Psychomotor (hyperkinetic, hypokinetic): calm Involuntary movements (tics, dyskinesias, akathisa, stereotypies): none Speech   Rate, rhythm, volume, fluency and articulation are appropriate Mood   depressed Affect    congruent Thought Process Linear and goal directed Thought Content and Perceptual Disturbances Denies self-injurious behavior (SIB), suicidal ideation (SI), aggressive behavior or homicidal ideation (HI) Denies auditory and visual hallucinations Sensorium and Cognition  Grossly intact Insight  limited Judgment limited Assessment/Plan Psychiatric Diagnoses:  
Patient Active Problem List  
Diagnosis Code  Hypertension I10  Chronic low back pain M54.5, G89.29  
 Diabetes mellitus ([facility] [address] E11.9  ACP (advance care planning) Z71.89  
 Anxiety F41.9  Drug-seeking behavior Z76.5  Obesity (BMI 30.0-34. 9) E66.9  
 Cocaine use F14.90  MDD (major depressive disorder), recurrent episode, moderate (HCC) F33.1  RHONDA (generalized anxiety disorder) F41.1  Diabetes ([facility] [address] E11.9  Alcohol abuse F10.10  Cocaine abuse ([facility] [address] F14.10  Dysthymia F34.1 Psychosocial and contextual factors: Recent separation from wife, custody enriquez Level of impairment/disability: Jeovany Villagomez is a 39 y. o. who is currently admitted for alcohol withdrawal, depression and SI.   
 
1.  Continue current medication regimen. D/C Prazosin. Start Clonidine 0.1mg qhs for nightmares 2. D/c Norvasc since starting Clonidine. Monitor BP. 3. Continue Lexapro 10mg qhs for anxiety and depression. 4.  Reviewed instructions, risks, benefits and side effects of medications 5. Disposition/Discharge Date: self-care/home, to be determined. Jayce Sloan MD [facility]

## 2019-01-08 NOTE — BH NOTES
Jace Zhang is not participating in [facility]. Group time: 1 Personal goal for participation: Repairs. Community Concerns Goal orientation: personal 
 
Group therapy participation: refuse Therapeutic interventions reviewed and discussed: Staff encouraged Pt to report repairs and Community concerns Impression of participation: Pt refuse, chose to rest in bed despite staff encouragement

## 2019-01-08 NOTE — BSMART NOTE
ART THERAPY GROUP PROGRESS NOTE PATIENT SCHEDULED FOR GROUP AT: 10:15 
 
ATTENDANCE: Full PARTICIPATION LEVEL:Needs only minimal encouragement ATTENTION LEVEL : Able to focus on task FOCUS: Goals SYMBOLIC & THEMATIC CONTENT AS NOTED IN IMAGERY: he was indifferent and initially lacked investment in group discussion, claiming the topic on change \"didn't really apply\" to him. He later shared that he needs to \"stop using alcohol and cocaine, because it's destroying relationships\" among other things. He stated: \"I went to AA once, but there was a bar right beside it and I went there afterwards. \" He claimed that he was willing to try AA and NA once more

## 2019-01-08 NOTE — BSMART NOTE
SW made contact with patient as he engaged within the milieu. Patient expressed no major issues or concerns. Patient endorses SI. Denies HI/AVH. Patient was encouraged to engage in group activities as well as positive peer interaction. SW will continue to monitor patient during hospitalization. BRIGHT Figueredo, LCSW-E

## 2019-01-08 NOTE — BH NOTES
0214 patient requested and received imitrex 25 mg PO for migraine. Patient went back to bed after taking medication. Medication effective for migraine headache. 
0342 patient requested and received vistaril 50 mg PO for anxiety. Patient went back to bed after taking medication. Patient stated the medicine \"did not help with anxiety, I feel agitated, I feel like I will blow up, I am tense, 100 percent anxiety. \"   
8798 patient requested and received haldol 5 mg IM for the above symptoms. Patient in bed.

## 2019-01-08 NOTE — PROGRESS NOTES
Problem: Crack/Cocaine Withdrawal 
Goal: *STG: Remains safe in hospital 
Remains safe while in hospital daily. Outcome: Progressing Towards Goal 
Remains safe Goal: *STG: Complies with medication therapy Complies with medication therapy daily while in hospital.  
Outcome: Progressing Towards Goal 
Taking medications. Goal: *STG: Attends activities and groups Attends activities and groups daily while in hospital.  
Outcome: Not Progressing Towards Goal 
Refused groups. Comments: Patient was calm this morning but then he became angry and demanding to change doctors. Attempted several times to explain protocol but he escalated  And start to curse and make threatening statements. \" You don't want to make me mad, you\"ll see\". offerd patient vistaril for anxiety , stated \" that doesn't work\". Patient agreed to take Haldol 5 mg for agitation. He went to his room  at that point.

## 2019-01-08 NOTE — BH NOTES
GROUP THERAPY PROGRESS NOTE Rocio Granado was encouraged by staff but refused to participate in  Community.

## 2019-01-09 VITALS
DIASTOLIC BLOOD PRESSURE: 80 MMHG | TEMPERATURE: 97.7 F | BODY MASS INDEX: 31.92 KG/M2 | RESPIRATION RATE: 18 BRPM | WEIGHT: 223 LBS | SYSTOLIC BLOOD PRESSURE: 123 MMHG | HEART RATE: 63 BPM | HEIGHT: 70 IN

## 2019-01-09 LAB — GLUCOSE BLD STRIP.AUTO-MCNC: 115 MG/DL (ref 70–110)

## 2019-01-09 PROCEDURE — 74011250637 HC RX REV CODE- 250/637: Performed by: PSYCHIATRY & NEUROLOGY

## 2019-01-09 PROCEDURE — 82962 GLUCOSE BLOOD TEST: CPT

## 2019-01-09 RX ORDER — ACAMPROSATE CALCIUM 333 MG/1
333 TABLET, DELAYED RELEASE ORAL 3 TIMES DAILY
Qty: 90 TAB | Refills: 0 | Status: SHIPPED | OUTPATIENT
Start: 2019-01-09 | End: 2019-01-31

## 2019-01-09 RX ORDER — CLONAZEPAM 0.5 MG/1
0.5 TABLET ORAL
Qty: 90 TAB | Refills: 0 | Status: SHIPPED | OUTPATIENT
Start: 2019-01-09 | End: 2020-06-03

## 2019-01-09 RX ORDER — ESCITALOPRAM OXALATE 10 MG/1
10 TABLET ORAL DAILY
Qty: 30 TAB | Refills: 0 | Status: SHIPPED | OUTPATIENT
Start: 2019-01-10 | End: 2019-01-31 | Stop reason: SDUPTHER

## 2019-01-09 RX ADMIN — ESCITALOPRAM OXALATE 10 MG: 10 TABLET ORAL at 08:52

## 2019-01-09 RX ADMIN — ACAMPROSATE CALCIUM 333 MG: 333 TABLET, DELAYED RELEASE ORAL at 08:52

## 2019-01-09 RX ADMIN — CLONAZEPAM 1 MG: 0.5 TABLET ORAL at 08:52

## 2019-01-09 RX ADMIN — Medication 2 CAPSULE: at 06:42

## 2019-01-09 RX ADMIN — SITAGLIPTIN 100 MG: 50 TABLET, FILM COATED ORAL at 08:53

## 2019-01-09 RX ADMIN — METOPROLOL SUCCINATE 100 MG: 25 TABLET, EXTENDED RELEASE ORAL at 08:53

## 2019-01-09 RX ADMIN — Medication 100 MG: at 08:52

## 2019-01-09 RX ADMIN — HYDROXYZINE PAMOATE 50 MG: 50 CAPSULE ORAL at 03:30

## 2019-01-09 RX ADMIN — FOLIC ACID 1 MG: 1 TABLET ORAL at 08:52

## 2019-01-09 NOTE — BSMART NOTE
SW made contact with patient to complete disposition. SW completed request to submit hospitalization to . Patient reports he was scheduled for court for child support and required proof of hospitalization. Patient reports he is prepared for discharge. SW will complete disposition. BRIGHT Ugarte, LCSW-E

## 2019-01-09 NOTE — BH NOTES
0100-Patient has been observed resting with eyes closed and snoring during rounds. Patient came to nurse's station, first, requesting medication to \"help me sleep. \"  Patient was informed that PRN medication for insomnia had been discontinued. Patient was encouraged to speak with MD in the morning during rounds. Patient caught an attitude and stated, \"Well, I can't sleep now! \"  Patient was encouraged to go to bed and try relaxation techniques. Patient then stated, \"well they gave me something last night. .. Haldol. \"  Patient was made aware that he does not meet criteria, based on order parameters. Patient then stated, \"I feel like I'm about to pop off. \"  He was offered vistaril and was educated on benefits of this medication. Patient refused medication and stated, \"that doesn't work for me. \"  Patient was again encouraged to go to bed and try relaxation techniques. Patient turned away from this nurse and started walking to his room. On the way he slapped the refrigerator with fist and stated, \"I hope I don't blow up! \"  Patient currently snoring. Will continue to monitor and provide support as needed.

## 2019-01-09 NOTE — BH NOTES
Patient came up to the nurse's station and stated, \"There's still nothing you can give me for sleep? \"  Patient was again informed of medication available. Patient was informed that when rounds are done, he is always sleeping. Patient stated, \"Well that's because I'm in and out of sleep. \"  PRN medication was offered and patient agreed and given. Patient made comment about how the MDs are  Peeks" and questioned what time they usually come in. Patient was made aware of general times to expect MDs. Patient walked back to room and hit the door jam with closed fist.  Will continue to monitor and provide support as needed.

## 2019-01-09 NOTE — PROGRESS NOTES
Pt in milieu during the majority of the shift. Pt denies any SI and or HI/AVH. Pt appears to be anxious but, has some med seeking behaviors this evening. PT compliant with medications and meals. Pt contracted for safety. Will continue to monitor throughout the shift.

## 2019-01-09 NOTE — BH NOTES
Treatment team met - Medical Director:                                _x____present Psychiatrist:                                        _x____present Charge nurse:                                     _x____present MSW:                                                _x____present :                      __x___present Nurse Manager:                                  _____present Student RNs:                                      _____present Medical Students:                               _____present Art Therapist:                                      __x___present Clinical Coordinator:                           _x____present Internal :                      _____present Plan of care discussed and updated as appropriate. Threatened staff on the unit yesterday, demanding IM medicine, did not want to take it PO. Reported he had a court hearing on child support today.

## 2019-01-09 NOTE — PROGRESS NOTES
Patient received discharged orders, verbalized understanding of follow up appt. All personal items given to patient.

## 2019-01-09 NOTE — DISCHARGE SUMMARY
[facility]  Inpatient Psychiatry   Discharge Summary     Admit date: 1/5/2019    Discharge date and time: 01/09/2019    Discharge Physician: Paulette Galvan MD    DISCHARGE DIAGNOSES     Psychiatric Diagnoses:   Patient Active Problem List   Diagnosis Code    Hypertension I10    Chronic low back pain M54.5, G89.29    Diabetes mellitus (HCC) E11.9    ACP (advance care planning) Z71.89    Anxiety F41.9    Drug-seeking behavior Z76.5    Obesity (BMI 30.0-34. 9) E66.9    Cocaine use F14.90    MDD (major depressive disorder), recurrent episode, moderate (HCC) F33.1    RHONDA (generalized anxiety disorder) F41.1    Diabetes ([facility] [address] E11.9    Alcohol abuse F10.10    Cocaine abuse ([facility] [address] F14.10    Dysthymia F34.1       Level of impairment/disability: None    HOSPITAL COURSE   Elvie Cardenas is a 39 y.o. WHITE OR  male with a history of alcoholism, cocaine abuse, anxiety, who presented voluntarily. ED note: \"This pt is a 39 yr old male . Chart reviewed and case discussed with treatment team. He came to the hospital reporting worsening depression, SI and alcohol abuse. Telepsychiatry was consulted for assessment and recommendations for management. Upon interview pt says that he has been feeling depressed and alone for months and he came to the hospital to get help. He endorses SI with no intent or plan but says that he just wants to 'disappear.' He says that he is going through a lot lately ie not being able to work full time, not having his own place, he lost his car. He says that he has a psychiatrist and last saw them around 3-4 weeks ago but just found out he can no longer see the psychiatrist due to insurance issues. He was recently at [facility] around 1 month ago and would like to go back there. Pt says that he drinks alcohol around 2x per week, and that when he drinks he has around 36-45 beers. His last use was within the past 24 hours.  He denies current withdrawal sx but says that he has had them in the past. He denies hx of seizures. Most recent vitals are pulse 118 and /113. He also reports using cocaine around once per month, last use was around 5 days ago. Pt says that he has anxiety and in the past has also been dx with bipolar disorder. He says that his only home psych meds now are Swetha. He is requesting voluntary inpt psych\"     Pt confirmed above data, stated he has hx of DT, used alcohol heavily and snorted cocaine. Pt stated he was in tx for anxiety at [facility], saw NP Melia Gray, who prescribed him Clonazepam,. But his insurance was recently denied. He is now homeless and unemployed. Pt endorsed anxiety, depressed mood, recent passive SI. Hospital Course: Pt admitted and monitored for alcohol withdrawal. He had mild withdrawal symptoms which had resolved by day of discharge. He was started on Lexapro 10mg for mood and continued on Klonopin 1mg TId for anxiety. Pt reported he had been prescribed Klonopin 2mg TID by Outpatient Provider. Review of records indicated last prescription was for 0.5mg BID so he was discharged on 0.5mg TID prn anxiety. Pt had medication seeking behaviors on the unit. He stated he had been tried on \"thousands\" of medications in the past and nothing worked except for [employer]. He also requested Adderall to help with concentration. He had some inappropriate behaviors. He tended to get quickly agitated for no reason with the hopes of getting IM medications. When he was not given IMs, he would calm down quickly and go to his room. When confronted about behavior, he stated he had no recollection of it and had a habit of getting easily agitated for no reason. Pt denied SI during hospital course and reported improvement in depression. He is requesting discharge today. Says his sleep has improved and he is no longer having nightmares.  He  Plans to follow up outpatient and to quit drinking on his own as he has done so before in the past. Pt had a court date scheduled for today which he missed. It seems he was trying to avoid this court date. Pt not an imminent danger to self or others at this time and will be discharged. He is looking forward to spending more time with his son. He says he spoke with his ex-wife and she will allow him to see his son more often and this makes him happy. DISPOSITION/FOLLOW-UP     Disposition: home    Follow-up Appointments: Follow-up Information     Follow up With Specialties Details Why Contact Info        Patient will follow up with Rosy Jones for counseling services on 1/15/19 @ 8:00am for same day access  [address] [place] [address]  [address]   Outpatient medications:  No current facility-administered medications on file prior to encounter. Current Outpatient Medications on File Prior to Encounter   Medication Sig Dispense Refill    Blood-Glucose Meter (CONTOUR NEXT METER) misc Check blood sugar twice daily 1 Each 0    glucose blood VI test strips (CONTOUR NEXT TEST STRIPS) strip Check blood sugar twice daily 200 Strip 3    Lancets misc Check fasting blood sugar once daily 100 Each 0    SITagliptin (JANUVIA) 100 mg tablet Take 1 Tab by mouth daily. 30 Tab 2    naproxen (NAPROSYN) 500 mg tablet Take 1 Tab by mouth two (2) times daily (with meals).  20 Tab 0    metoprolol succinate (TOPROL-XL) 100 mg tablet TAKE 1 TABLET BY MOUTH DAILY 30 Tab 0         Medications discontinued during hospitalization:  Medications Discontinued During This Encounter   Medication Reason    LORazepam (ATIVAN) tablet 1 mg     LORazepam (ATIVAN) tablet 1 mg     LORazepam (ATIVAN) tablet 1 mg     LORazepam (ATIVAN) tablet 2 mg     traZODone (DESYREL) tablet 50 mg     LORazepam (ATIVAN) tablet 2 mg     LORazepam (ATIVAN) tablet 2 mg     LORazepam (ATIVAN) tablet 2 mg     SUMAtriptan (IMITREX) tablet 25 mg     prazosin (MINIPRESS) capsule 1 mg     amLODIPine (NORVASC) tablet 5 mg     traZODone (DESYREL) tablet 150 mg     clonazePAM (KLONOPIN) 1 mg tablet Reorder         Discharged medication:  Current Discharge Medication List      START taking these medications    Details   acamprosate (CAMPRAL) 333 mg tablet Take 1 Tab by mouth three (3) times daily for 30 days. Qty: 90 Tab, Refills: 0      escitalopram oxalate (LEXAPRO) 10 mg tablet Take 1 Tab by mouth daily. Qty: 30 Tab, Refills: 0         CONTINUE these medications which have CHANGED    Details   clonazePAM (KLONOPIN) 0.5 mg tablet Take 1 Tab by mouth three (3) times daily as needed. Max Daily Amount: 1.5 mg.  Qty: 90 Tab, Refills: 0    Associated Diagnoses: Depression with suicidal ideation         CONTINUE these medications which have NOT CHANGED    Details   Blood-Glucose Meter (CONTOUR NEXT METER) misc Check blood sugar twice daily  Qty: 1 Each, Refills: 0    Associated Diagnoses: Controlled type 2 diabetes mellitus without complication, without long-term current use of insulin ([facility])      SITagliptin (JANUVIA) 100 mg tablet Take 1 Tab by mouth daily. Qty: 30 Tab, Refills: 2    Associated Diagnoses: Type 2 diabetes mellitus without complication, without long-term current use of insulin ([facility])      naproxen (NAPROSYN) 500 mg tablet Take 1 Tab by mouth two (2) times daily (with meals). Qty: 20 Tab, Refills: 0      metoprolol succinate (TOPROL-XL) 100 mg tablet TAKE 1 TABLET BY MOUTH DAILY  Qty: 30 Tab, Refills: 0         STOP taking these medications       glucose blood VI test strips (CONTOUR NEXT TEST STRIPS) strip Comments:   Reason for Stopping:         Lancets misc Comments:   Reason for Stopping:               Instructions, risks (black box warning), benefits and side effects (EPS, TD, NMS) were discussed in detail prior to discharge. Patient denied any adverse medication side effects prior to discharge.        LABS/IMAGING DURING ADMISSION     Results for orders placed or performed during the hospital encounter of 01/05/19   GLUCOSE, POC   Result Value Ref Range    Glucose (POC) 220 (H) 70 - 110 mg/dL   GLUCOSE, POC   Result Value Ref Range    Glucose (POC) 166 (H) 70 - 110 mg/dL   GLUCOSE, POC   Result Value Ref Range    Glucose (POC) 192 (H) 70 - 110 mg/dL   GLUCOSE, POC   Result Value Ref Range    Glucose (POC) 111 (H) 70 - 110 mg/dL   GLUCOSE, POC   Result Value Ref Range    Glucose (POC) 219 (H) 70 - 110 mg/dL   GLUCOSE, POC   Result Value Ref Range    Glucose (POC) 124 (H) 70 - 110 mg/dL   GLUCOSE, POC   Result Value Ref Range    Glucose (POC) 187 (H) 70 - 110 mg/dL   GLUCOSE, POC   Result Value Ref Range    Glucose (POC) 98 70 - 110 mg/dL   GLUCOSE, POC   Result Value Ref Range    Glucose (POC) 172 (H) 70 - 110 mg/dL   GLUCOSE, POC   Result Value Ref Range    Glucose (POC) 121 (H) 70 - 110 mg/dL   GLUCOSE, POC   Result Value Ref Range    Glucose (POC) 130 (H) 70 - 110 mg/dL   GLUCOSE, POC   Result Value Ref Range    Glucose (POC) 115 (H) 70 - 110 mg/dL        DISCHARGE MENTAL STATUS EVALUATION     Appearance/Hygiene 39 y.o.  WHITE OR  male  Hygiene: Good   Attitude/Behavior/Social Relatedness Appropriate   Musculoskeletal Gait/Station: appropriate  Tone (flaccid, cogwheeling, spastic): not assessed  Psychomotor (hyperkinetic, hypokinetic): calm  Involuntary movements (tics, dyskinesias, akathisa, stereotypies): none   Speech   Rate, rhythm, volume, fluency and articulation are appropriate   Mood   euthymic   Affect    congruent   Thought Process Linear and goal directed   Thought Content and Perceptual Disturbances Denies self-injurious behavior (SIB), suicidal ideation (SI), aggressive behavior or homicidal ideation (HI)    Denies auditory and visual hallucinations   Sensorium and Cognition  Grossly intact   Insight  fair   Judgment fair       SUICIDE RISK ASSESSMENT     [] Admission  [x] Discharge     Key Factors:   Current admission precipitated by suicide attempt? []  Yes     2    [x]  No     1     Suicide Attempt History  [] Past attempts of high lethality    2 []  Past attempts of low lethality    1 [x]  No previous attempts       0   Suicidal Ideation []  Constant suicidal thoughts      2 []  Intermittent or fleeting suicidal  thoughts  1 [x]  Denies current suicidal thoughts    0   Suicide Plan   []  Has plan with actual OR potential access to planned method    2 []  Has plan without access to planned method      1 [x]  No plan            0   Plan Lethality []  Highly lethal plan (Carbon monoxide, gun, hanging, jumping)    2 []  Moderate lethality of plan          1 []  Low lethality of plan (biting, head banging, superficial scratching, pillow over face)  0   Safety Plan Agreement  []  Unwilling OR unable to agree due to impaired reality testing   2   []  Patient is ambivalent and/or guarded      1 [x]  Reliably agrees        0   Current Morbid Thoughts (reunion fantasies, preoccupations with death) []  Constantly     2     []  Frequently    1 [x]  Rarely    0   Elopement Risk  []  High risk     2 []  Moderate risk    1 [x]   Low risk    0   Symptoms    []  Hopeless  []  Helpless  []  Anhedonia   []  Guilt/shame  []  Anger/rage  []  Anxiety  []  Insomnia   []  Agitation   []  Impulsivity  []  5-6 symptoms present    2 []  3-4 symptoms present    1  [x]  0-2 symptoms present    0     Scoring Key:  10 or higher = Imminent Risk (consider 1:1)  4 - 9 = Moderate Risk (consider q 15 minute observation)Attended alcohol, tobacco, prescription and other drug psychoeducation group.   0 - 3 = Low Risk (consider q 30 minute observation)    Total Score: 1  ------------------------------------------------------------------------------------------------------------------  PLEASE ADDRESS THE FOLLOWING 5 ISSUES     Physician's Subjective Appraisal of Risk (check one):  []  Patient replies not trustworthy: several non-verbal cues.   []  Patient replies questionable: trustworthy: at least 1 non-verbal cue. [x]  Patient replies appear trustworthy. Family History of Suicide? []  Yes  [x]  No    Protective measures (select all that apply):  [x]  Successful past responses to stress  []  Spiritual/Jainism beliefs  [x]  Capacity for reality testing  []  Positive therapeutic relationships  []  Social supports/connections  [x]  Positive coping skills  []  Frustration tolerance/optimism  [x]  Children or pets in the home  [x]  Sense of responsibility to family  [x]  Agrees to treatment plan and follow up    Others (list):    High Risk Diagnoses (select all that apply):  [x]  Depression/Bipolar Disorder  [x]  Dual Diagnosis  []  Cardiovascular Disease  []  Schizophrenia  []  Chronic Pain  []  Epilepsy  []  Cancer  []  Personality Disorder  []  HIV/AIDS  []  Multiple Sclerosis    Dangerousness Assessment (Suicide, homicide, property destruction. ..)    Risk Factors reviewed and risk assessed to be:  [] low  [x] low-moderate  [] moderate   [] moderate-high  [] high     Protection factors reviewed and risk assessed to be:  [x] low  [] low-moderate  [] moderate   [] moderate-high  [] high     Response to treatment and risk assessed to be:  [x] low  [] low-moderate  [] moderate   [] moderate-high  [] high     Support reviewed and risk assessed to be:  [] low  [x] low-moderate  [] moderate   [] moderate-high  [] high     Acceptance of Discharge and outpatient treatment reviewed and risk assessed to be:    [x] low  [] low-moderate  [] moderate   [] moderate-high  [] high   Overall risk assessed to be:  [x] low  [] low-moderate  [] moderate   [] moderate-high  [] high     Completion of discharge was greater than 30 minutes. Over 50% of today's discharge was geared towards counseling and coordination of care.           Popeye Maciel MD  Psychiatry  [facility]

## 2019-01-09 NOTE — DISCHARGE INSTRUCTIONS
BEHAVIORAL HEALTH NURSING DISCHARGE NOTE      The following personal items collected during your admission are returned to you:   Dental Appliance: Dental Appliances: None  Vision:    Hearing Aid:    Jewelry: Jewelry: None  Clothing: Clothing: Footwear, Pants, Shirt, Undergarments  Other Valuables: Other Valuables: Keys, Wallet(locker 121/1)  Valuables sent to safe: Personal Items Sent to Safe: none      PATIENT INSTRUCTIONS:             The discharge information has been reviewed with the patient. The patient verbalized understanding.         Patient armband removed and shredded

## 2019-01-09 NOTE — BH NOTES
Pt appeared to have slept for 5+ hours. Pt had an angry affect while speaking to the RN; see RN note. Pt appears to be sleeping at this time. Will continue to monitor for safety.

## 2019-01-11 ENCOUNTER — TELEPHONE (OUTPATIENT)
Dept: FAMILY MEDICINE CLINIC | Age: 44
End: 2019-01-11

## 2019-01-11 NOTE — TELEPHONE ENCOUNTER
Patient calling stated for about 2 weeks now has been having bad stomach pains after eating, bloating and not been able to use the restroom. Has tried enemas and nothing has helped, requesting if possible to get a colonoscopy or anything else to see what is going on. Requesting call back today. Let him know will send over an encounter to Dr. Carlos Manuel Rehman and will give him a call back.

## 2019-01-11 NOTE — TELEPHONE ENCOUNTER
Pt stated loss of breathe during sleep. Pt was informed an appt will be need to assess his concerns. A referral can also be made for Sleep Study. Pt stated he is homeless and living in his car and is having trouble sleeping. Pt asked will is concerns be addressed if seen at the ED. Pt was informed, to go to ED if needed and given an appt @[facility] 1/14/19. Pt verbalized understanding.

## 2019-01-11 NOTE — TELEPHONE ENCOUNTER
Let patient know he can not just get a colonoscopy he will need to be seen before he can get an order for that. Patient stated he has been to the ER about 2 weeks and they told him to get a referral from his PCP, \" I wish Dr. Katie Patel would stop hiding behind people and talk to me. \" I let him know Dr. Katie Patel is very busy today with his patients but he did state he could give patient a referral to see a GI. Patient verbalized understanding and had no further questions.

## 2019-01-15 ENCOUNTER — HOSPITAL ENCOUNTER (EMERGENCY)
Age: 44
Discharge: HOME | End: 2019-01-15
Attending: EMERGENCY MEDICINE
Payer: MEDICAID

## 2019-01-15 VITALS
HEART RATE: 60 BPM | WEIGHT: 220 LBS | DIASTOLIC BLOOD PRESSURE: 103 MMHG | SYSTOLIC BLOOD PRESSURE: 145 MMHG | OXYGEN SATURATION: 100 % | BODY MASS INDEX: 31.57 KG/M2 | RESPIRATION RATE: 17 BRPM | TEMPERATURE: 97.6 F

## 2019-01-15 DIAGNOSIS — R51.9 FACE PAIN: Primary | ICD-10-CM

## 2019-01-15 DIAGNOSIS — K08.89 PAIN, DENTAL: ICD-10-CM

## 2019-01-15 PROCEDURE — 99282 EMERGENCY DEPT VISIT SF MDM: CPT

## 2019-01-15 RX ORDER — HYDROCODONE BITARTRATE AND ACETAMINOPHEN 5; 325 MG/1; MG/1
1 TABLET ORAL
Qty: 8 TAB | Refills: 0 | Status: SHIPPED | OUTPATIENT
Start: 2019-01-15 | End: 2019-01-31

## 2019-01-15 NOTE — DISCHARGE INSTRUCTIONS
Authy Activation    Thank you for requesting access to Aut[employer]. Please follow the instructions below to securely access and download your online medical record. Authy allows you to send messages to your doctor, view your test results, renew your prescriptions, schedule appointments, and more. How Do I Sign Up? 1. In your internet browser, go to [email or web address]  2. Click on the First Time User? Click Here link in the Sign In box. You will be redirect to the New Member Sign Up page. 3. Enter your Authy Access Code exactly as it appears below. You will not need to use this code after youve completed the sign-up process. If you do not sign up before the expiration date, you must request a new code. Authy Access Code: [email or web address]  Expires: 2019  1:45 PM (This is the date your Authy access code will )    4. Enter the last four digits of your Social Security Number (xxxx) and Date of Birth (mm/dd/yyyy) as indicated and click Submit. You will be taken to the next sign-up page. 5. Create a Authy ID. This will be your Authy login ID and cannot be changed, so think of one that is secure and easy to remember. 6. Create a Authy password. You can change your password at any time. 7. Enter your Password Reset Question and Answer. This can be used at a later time if you forget your password. 8. Enter your e-mail address. You will receive e-mail notification when new information is available in [address]. 9. Click Sign Up. You can now view and download portions of your medical record. 10. Click the Download Summary menu link to download a portable copy of your medical information. Additional Information    If you have questions, please visit the Frequently Asked Questions section of the Authy website at [email or web address] Remember, Authy is NOT to be used for urgent needs. For medical emergencies, dial 911. Discharge Review:    1.   The reason for my/the patient presentation in the  Emergency Department today has been evaluated by            _____ Yes     _____ No  the medical provider to my satisfaction. 2.  The results of studies such as X-Rays and Laboratory  work have been discussed with me/the patient.                       _____ Yes     _____No    3.   I have discussed the discharge diagnosis with the         Medical provider and I understand and agree with the   aftercare plan.                      _____Yes     _____No      Signature  __________________________________________________________

## 2019-01-15 NOTE — ED PROVIDER NOTES
The patient is a 39 y.o. male presents to the ED with complaints of severe right upper dental pain today. The patient states he had a tooth extracted this morning and called his dentist back because he is still in pain, but was unable to be seen again. The patient was prescribed Ibuprofen and Tylenol, but his pain has not been relieved. Denies any other associated signs or symptoms. The patient has no further complaints. The history is provided by the patient. Past Medical History:   Diagnosis Date    Depression     Diabetes ([facility] [address]     Drug-seeking behavior     56 prescriptions from 32 different prescribers at 6 different pharmacies in last 12 months    Herniated lumbar intervertebral disc     Hypertension     Neurological disorder L3,4,5 torn Herniated disc    Obesity (BMI 30.0-34. 9)        Past Surgical History:   Procedure Laterality Date    HX HERNIA REPAIR Bilateral 06/02/2017    robotic repair of bilateral indirect inguinal hernias with placement of mesh    HX ORTHOPAEDIC  trigger finger release         Family History:   Problem Relation Age of Onset    Hypertension Mother     Diabetes Mother     Heart Failure Mother     COPD Mother     Heart Disease Mother     Hypertension Father     Alcohol abuse Father        Social History     Socioeconomic History    Marital status:      Spouse name: Not on file    Number of children: Not on file    Years of education: Not on file    Highest education level: Not on file   Social Needs    Financial resource strain: Not on file    Food insecurity - worry: Not on file    Food insecurity - inability: Not on file   [employer] needs - medical: Not on file   [employer] needs - non-medical: Not on file   Occupational History    Not on file   Tobacco Use    Smoking status: Never Smoker    Smokeless tobacco: Never Used   Substance and Sexual Activity    Alcohol use: Yes     Comment: rarely    Drug use: No    Sexual activity: Yes     Partners: Female     Birth control/protection: Condom   Other Topics Concern    Not on file   Social History Narrative    Not on file         ALLERGIES: Patient has no known allergies. Review of Systems   Constitutional: Negative for chills, fatigue and fever. HENT: Positive for dental problem. Negative for sore throat. Eyes: Negative. Respiratory: Negative for cough and shortness of breath. Cardiovascular: Negative for chest pain and palpitations. Gastrointestinal: Negative for abdominal pain, nausea and vomiting. Genitourinary: Negative for dysuria. Musculoskeletal: Negative. Skin: Negative. Neurological: Negative for dizziness, weakness, light-headedness and headaches. Psychiatric/Behavioral: Negative. All other systems reviewed and are negative. There were no vitals filed for this visit. Physical Exam   Constitutional: He appears well-developed and well-nourished. No distress. HENT:   Head: Normocephalic and atraumatic. Nose: Nose normal.   Dental extraction noted at tooth 2. No bleeding. No abscess     Eyes: EOM are normal. Right eye exhibits no discharge. Left eye exhibits no discharge. No scleral icterus. Neck: Normal range of motion. Neck supple. No tracheal deviation present. Cardiovascular: Normal rate, regular rhythm and intact distal pulses. Pulmonary/Chest: Effort normal and breath sounds normal.   Abdominal: Soft. He exhibits no distension. Genitourinary:   Genitourinary Comments: NE     Musculoskeletal: He exhibits no deformity. Neurological: He is alert. Coordination normal.   Skin: Skin is warm and dry. NE.   Psychiatric: He has a normal mood and affect. His behavior is normal.   Nursing note and vitals reviewed.        MDM  Number of Diagnoses or Management Options  Face pain:   Pain, dental:   Diagnosis management comments: MDM:  Will offer the Pt further analgesia to get him into tomorrow, when he can follow up with his dentist.  Randee Ca NP  4:42 PM    DDX:  Dental pain, dental abscess, face pain. Randee Ca NP  4:43 PM        Risk of Complications, Morbidity, and/or Mortality  Presenting problems: low  Diagnostic procedures: low  Management options: low    Patient Progress  Patient progress: stable         Procedures                       Jayjay Martini acting as a scribe for and in the presence of Randee Ca NP      January 15, 2019 at 4:36 PM       Provider Attestation:      I personally performed the services described in the documentation, reviewed the documentation, as recorded by the scribe in my presence, and it accurately and completely records my words and actions. January 15, 2019 at 4:36 PM - Randee Ca NP      Diagnosis:   1. Face pain    2. Pain, dental          Disposition:   Discharged to Home      Follow-up Information     Follow up With Specialties Details Why Contact Info    your dentist    tomorrow              Medication List      START taking these medications    HYDROcodone-acetaminophen 5-325 mg per tablet  Commonly known as:  Nguyen Beaulieu  Take 1 Tab by mouth every six (6) hours as needed for Pain. Max Daily Amount: 4 Tabs. ASK your doctor about these medications    acamprosate 333 mg tablet  Commonly known as:  CAMPRAL  Take 1 Tab by mouth three (3) times daily for 30 days. Blood-Glucose Meter Misc  Commonly known as:  CONTOUR NEXT METER  Check blood sugar twice daily     clonazePAM 0.5 mg tablet  Commonly known as:  KlonoPIN  Take 1 Tab by mouth three (3) times daily as needed. Max Daily Amount: 1.5 mg.     escitalopram oxalate 10 mg tablet  Commonly known as:  LEXAPRO  Take 1 Tab by mouth daily. metoprolol succinate 100 mg tablet  Commonly known as:  TOPROL-XL  TAKE 1 TABLET BY MOUTH DAILY     naproxen 500 mg tablet  Commonly known as:  NAPROSYN  Take 1 Tab by mouth two (2) times daily (with meals).      SITagliptin 100 mg tablet  Commonly known as: ANUEL  Take 1 Tab by mouth daily.            Where to Get Your Medications      Information about where to get these medications is not yet available    Ask your nurse or doctor about these medications  · HYDROcodone-acetaminophen 5-325 mg per tablet

## 2019-01-17 ENCOUNTER — OFFICE VISIT (OUTPATIENT)
Dept: FAMILY MEDICINE CLINIC | Age: 44
End: 2019-01-17

## 2019-01-17 VITALS
WEIGHT: 224.4 LBS | SYSTOLIC BLOOD PRESSURE: 122 MMHG | TEMPERATURE: 97.9 F | BODY MASS INDEX: 32.13 KG/M2 | DIASTOLIC BLOOD PRESSURE: 82 MMHG | RESPIRATION RATE: 12 BRPM | HEART RATE: 63 BPM | HEIGHT: 70 IN | OXYGEN SATURATION: 96 %

## 2019-01-17 DIAGNOSIS — F41.9 ANXIETY AND DEPRESSION: ICD-10-CM

## 2019-01-17 DIAGNOSIS — R10.32 LLQ ABDOMINAL PAIN: Primary | ICD-10-CM

## 2019-01-17 DIAGNOSIS — F32.A ANXIETY AND DEPRESSION: ICD-10-CM

## 2019-01-17 DIAGNOSIS — F32.A DEPRESSION WITH SUICIDAL IDEATION: ICD-10-CM

## 2019-01-17 DIAGNOSIS — R45.851 DEPRESSION WITH SUICIDAL IDEATION: ICD-10-CM

## 2019-01-17 NOTE — PROGRESS NOTES
Rm 2  Patient presents to the clinic c/o needing a referral for a colonoscopy due to strong abdominal pain for a couple of months making it hard to sleep and some blood after using bathroom. Patient also wanting to see if can get something due to difficulty concentrating. Depression Screening:  PHQ over the last two weeks 4/6/2018 1/29/2018 12/13/2017 11/28/2017 5/23/2017 5/16/2017 3/20/2017   PHQ Not Done - - - - - - -   Little interest or pleasure in doing things Not at all Not at all - Not at all Not at all Not at all Not at all   Feeling down, depressed, irritable, or hopeless Not at all Not at all More than half the days Not at all Not at all Not at all Not at all   Total Score PHQ 2 0 0 - 0 0 0 0       Learning Assessment:  Learning Assessment 3/3/2017 4/27/2016 3/16/2016   PRIMARY LEARNER Patient Patient Patient   HIGHEST LEVEL OF EDUCATION - PRIMARY LEARNER  GRADUATED HIGH SCHOOL OR GED GRADUATED HIGH SCHOOL OR GED GRADUATED HIGH SCHOOL OR GED   BARRIERS PRIMARY LEARNER NONE - -   CO-LEARNER CAREGIVER No - -   PRIMARY LANGUAGE ENGLISH ENGLISH ENGLISH   LEARNER PREFERENCE PRIMARY READING DEMONSTRATION READING   ANSWERED BY patient patient BKednall Gaytan   RELATIONSHIP SELF SELF SELF       Abuse Screening:  Abuse Screening Questionnaire 3/16/2016   Do you ever feel afraid of your partner? N   Are you in a relationship with someone who physically or mentally threatens you? N   Is it safe for you to go home? Y       Health Maintenance reviewed and discussed per provider: yes     Coordination of Care:    1. Have you been to the ER, urgent care clinic since your last visit? Hospitalized since your last visit? no    2. Have you seen or consulted any other health care providers outside of the [address] [place] [address] since your last visit? Include any pap smears or colon screening.  No

## 2019-01-17 NOTE — PROGRESS NOTES
HISTORY OF PRESENT ILLNESS  Racquel Villagomez is a 39 y.o. male. HPI  Mr. Roxann Petty presents for LLQ abdominal pain x many months. It is fairly constant. He denies change in bowel pattern. He does c/o intermittent hematochezia. He desires to be sent for a colonoscopy today despite completing one last spring.   - He consulted with Dr. Rueda Em 12/2018 for abdominal pain. He was concerned pain could be related to prior hernia repair but after exam and chart review was advised against this. - Underwent CT abd/pelvis 12/10/18 - significant for diverticulosis of the distal descending and sigmoid colon without diverticulitis. - Colonoscopy 3/5/2018 with Dr. Melissa Matthews. Significant for diverticulosis. Advised to repeat in 10 years. 2) He desires a medication \"to calm him down. \" He took a friend's Adderall twice which worked well for him. PMHx significant for substance abuse and drug-seeking behavior. Review of Systems   Constitutional: Negative for chills and fever. Gastrointestinal: Positive for abdominal pain, blood in stool and nausea. Negative for constipation, diarrhea and vomiting. Psychiatric/Behavioral: Positive for suicidal ideas (recent; patient states improved at present). /82 (BP 1 Location: Left arm, BP Patient Position: Sitting)   Pulse 63   Temp 97.9 °F (36.6 °C) (Oral)   Resp 12   Ht 5' 10\" (1.778 m)   Wt 224 lb 6.4 oz (101.8 kg)   SpO2 96%   BMI 32.20 kg/m²     Physical Exam   Constitutional: He is oriented to person, place, and time. He appears well-developed and well-nourished. No distress. HENT:   Head: Normocephalic and atraumatic.    Right Ear: Tympanic membrane, external ear and ear canal normal.   Left Ear: Tympanic membrane, external ear and ear canal normal.   Nose: Nose normal.   Mouth/Throat: Uvula is midline, oropharynx is clear and moist and mucous membranes are normal. No oropharyngeal exudate, posterior oropharyngeal edema, posterior oropharyngeal erythema or tonsillar abscesses. Eyes: Conjunctivae are normal. Pupils are equal, round, and reactive to light. No scleral icterus. Neck: Neck supple. Cardiovascular: Normal rate, regular rhythm and normal heart sounds. Exam reveals no gallop. No murmur heard. Pulses:       Dorsalis pedis pulses are 2+ on the right side, and 2+ on the left side. Posterior tibial pulses are 2+ on the right side, and 2+ on the left side. No pedal edema. Pulmonary/Chest: Effort normal and breath sounds normal. No respiratory distress. He has no decreased breath sounds. He has no wheezes. He has no rhonchi. He has no rales. Abdominal: Soft. Bowel sounds are normal. He exhibits no distension and no mass. There is tenderness (LLQ). There is no rebound and no guarding. Genitourinary: Rectum normal and prostate normal. Rectal exam shows no external hemorrhoid, no fissure, no mass, no tenderness, anal tone normal and guaiac negative stool. Prostate is not enlarged and not tender. Lymphadenopathy:        Head (right side): No submandibular and no tonsillar adenopathy present. Head (left side): No submandibular and no tonsillar adenopathy present. He has no cervical adenopathy. Right: No supraclavicular adenopathy present. Left: No supraclavicular adenopathy present. Neurological: He is alert and oriented to person, place, and time. Skin: Skin is warm and dry. Psychiatric: His speech is normal. His mood appears anxious. He expresses no homicidal and no suicidal ideation. He expresses no suicidal plans and no homicidal plans.        ASSESSMENT and PLAN  Orders Placed This Encounter    REFERRAL TO GASTROENTEROLOGY     Referral Priority:   Routine     Referral Type:   Consultation     Referral Reason:   Specialty Services Required     Referred to Provider:   Milady Dowell MD     Requested Specialty:   Gastroenterology     Number of Visits Requested:   1    REFERRAL TO PSYCHIATRY     Referral Priority:   Routine     Referral Type:   Behavioral Health     Referral Reason:   Specialty Services Required     Number of Visits Requested:   1     Diagnoses and all orders for this visit:    1. LLQ abdominal pain  -     REFERRAL TO GASTROENTEROLOGY  - Referred to prior GI who completed colonoscopy 3/2019.     2. Depression with suicidal ideation  -     REFERRAL TO PSYCHIATRY   - Patient was given a list of locations who accept [facility] and advised to establish and follow closely with psychiatry. This is of utmost importance. I advised I can prescribe him Hydroxyzine to help with his anxiety, but he denied this stating he has this medication and that it does not work. Advised to schedule an appt asap. 3. Anxiety and depression  -     REFERRAL TO PSYCHIATRY      Follow-up Disposition:  Return if symptoms worsen or fail to improve.

## 2019-01-17 NOTE — PATIENT INSTRUCTIONS
1) I have referred you back to the gastroenterologist that did your colonoscopy. Follow up with him regarding your pain. 2) I have given you a list of psychiatry groups that accept your insurance. It is imperative that you establish and continue with them chronically.

## 2019-01-19 ENCOUNTER — HOSPITAL ENCOUNTER (EMERGENCY)
Age: 44
Discharge: HOME | End: 2019-01-20
Attending: EMERGENCY MEDICINE
Payer: MEDICAID

## 2019-01-19 DIAGNOSIS — F10.10 ALCOHOL ABUSE: Primary | ICD-10-CM

## 2019-01-19 DIAGNOSIS — F14.10 COCAINE ABUSE (HCC): ICD-10-CM

## 2019-01-19 LAB
AMPHET UR QL SCN: NEGATIVE
ANION GAP SERPL CALC-SCNC: 9 MMOL/L (ref 3–18)
APAP SERPL-MCNC: <2 UG/ML (ref 10–30)
APPEARANCE UR: CLEAR
BARBITURATES UR QL SCN: NEGATIVE
BASOPHILS # BLD: 0 K/UL (ref 0–0.1)
BASOPHILS NFR BLD: 1 % (ref 0–2)
BENZODIAZ UR QL: NEGATIVE
BILIRUB UR QL: NEGATIVE
BUN SERPL-MCNC: 9 MG/DL (ref 7–18)
BUN/CREAT SERPL: 10 (ref 12–20)
CALCIUM SERPL-MCNC: 9 MG/DL (ref 8.5–10.1)
CANNABINOIDS UR QL SCN: NEGATIVE
CHLORIDE SERPL-SCNC: 104 MMOL/L (ref 100–108)
CO2 SERPL-SCNC: 27 MMOL/L (ref 21–32)
COCAINE UR QL SCN: POSITIVE
COLOR UR: YELLOW
CREAT SERPL-MCNC: 0.9 MG/DL (ref 0.6–1.3)
DIFFERENTIAL METHOD BLD: ABNORMAL
EOSINOPHIL # BLD: 0.1 K/UL (ref 0–0.4)
EOSINOPHIL NFR BLD: 1 % (ref 0–5)
ERYTHROCYTE [DISTWIDTH] IN BLOOD BY AUTOMATED COUNT: 13.7 % (ref 11.6–14.5)
ETHANOL SERPL-MCNC: 87 MG/DL (ref 0–3)
GLUCOSE SERPL-MCNC: 161 MG/DL (ref 74–99)
GLUCOSE UR STRIP.AUTO-MCNC: NEGATIVE MG/DL
HCT VFR BLD AUTO: 39.1 % (ref 36–48)
HDSCOM,HDSCOM: ABNORMAL
HGB BLD-MCNC: 13.4 G/DL (ref 13–16)
HGB UR QL STRIP: NEGATIVE
KETONES UR QL STRIP.AUTO: NEGATIVE MG/DL
LEUKOCYTE ESTERASE UR QL STRIP.AUTO: NEGATIVE
LYMPHOCYTES # BLD: 1.7 K/UL (ref 0.9–3.6)
LYMPHOCYTES NFR BLD: 20 % (ref 21–52)
MCH RBC QN AUTO: 29.4 PG (ref 24–34)
MCHC RBC AUTO-ENTMCNC: 34.3 G/DL (ref 31–37)
MCV RBC AUTO: 85.7 FL (ref 74–97)
METHADONE UR QL: NEGATIVE
MONOCYTES # BLD: 0.4 K/UL (ref 0.05–1.2)
MONOCYTES NFR BLD: 5 % (ref 3–10)
NEUTS SEG # BLD: 6.2 K/UL (ref 1.8–8)
NEUTS SEG NFR BLD: 73 % (ref 40–73)
NITRITE UR QL STRIP.AUTO: NEGATIVE
OPIATES UR QL: POSITIVE
PCP UR QL: NEGATIVE
PH UR STRIP: 5 [PH] (ref 5–8)
PLATELET # BLD AUTO: 265 K/UL (ref 135–420)
PMV BLD AUTO: 9.3 FL (ref 9.2–11.8)
POTASSIUM SERPL-SCNC: 3.7 MMOL/L (ref 3.5–5.5)
PROT UR STRIP-MCNC: NEGATIVE MG/DL
RBC # BLD AUTO: 4.56 M/UL (ref 4.7–5.5)
SALICYLATES SERPL-MCNC: <2.8 MG/DL (ref 2.8–20)
SODIUM SERPL-SCNC: 140 MMOL/L (ref 136–145)
SP GR UR REFRACTOMETRY: 1.01 (ref 1–1.03)
UROBILINOGEN UR QL STRIP.AUTO: 0.2 EU/DL (ref 0.2–1)
WBC # BLD AUTO: 8.5 K/UL (ref 4.6–13.2)

## 2019-01-19 PROCEDURE — 85025 COMPLETE CBC W/AUTO DIFF WBC: CPT

## 2019-01-19 PROCEDURE — 80307 DRUG TEST PRSMV CHEM ANLYZR: CPT

## 2019-01-19 PROCEDURE — 80048 BASIC METABOLIC PNL TOTAL CA: CPT

## 2019-01-19 PROCEDURE — 81003 URINALYSIS AUTO W/O SCOPE: CPT

## 2019-01-19 PROCEDURE — 74011250637 HC RX REV CODE- 250/637: Performed by: EMERGENCY MEDICINE

## 2019-01-19 PROCEDURE — 99285 EMERGENCY DEPT VISIT HI MDM: CPT

## 2019-01-19 RX ORDER — HYDROXYZINE 25 MG/1
50 TABLET, FILM COATED ORAL
Status: DISCONTINUED | OUTPATIENT
Start: 2019-01-19 | End: 2019-01-20 | Stop reason: HOSPADM

## 2019-01-19 RX ORDER — CLONAZEPAM 0.5 MG/1
0.5 TABLET ORAL
Status: COMPLETED | OUTPATIENT
Start: 2019-01-19 | End: 2019-01-19

## 2019-01-19 RX ADMIN — CLONAZEPAM 0.5 MG: 0.5 TABLET ORAL at 20:24

## 2019-01-19 RX ADMIN — HYDROXYZINE HYDROCHLORIDE 50 MG: 25 TABLET, FILM COATED ORAL at 19:58

## 2019-01-20 VITALS
RESPIRATION RATE: 18 BRPM | OXYGEN SATURATION: 95 % | BODY MASS INDEX: 31.5 KG/M2 | WEIGHT: 220 LBS | HEIGHT: 70 IN | HEART RATE: 55 BPM | SYSTOLIC BLOOD PRESSURE: 129 MMHG | TEMPERATURE: 98.6 F | DIASTOLIC BLOOD PRESSURE: 82 MMHG

## 2019-01-20 PROCEDURE — 74011250637 HC RX REV CODE- 250/637: Performed by: EMERGENCY MEDICINE

## 2019-01-20 PROCEDURE — 74011000250 HC RX REV CODE- 250: Performed by: EMERGENCY MEDICINE

## 2019-01-20 RX ORDER — CLONAZEPAM 0.5 MG/1
0.5 TABLET ORAL
Status: COMPLETED | OUTPATIENT
Start: 2019-01-20 | End: 2019-01-20

## 2019-01-20 RX ORDER — CLONIDINE HYDROCHLORIDE 0.1 MG/1
0.1 TABLET ORAL
Status: COMPLETED | OUTPATIENT
Start: 2019-01-20 | End: 2019-01-20

## 2019-01-20 RX ORDER — AMLODIPINE BESYLATE 5 MG/1
TABLET ORAL
Refills: 2 | COMMUNITY
Start: 2018-12-20 | End: 2020-06-03

## 2019-01-20 RX ORDER — LIDOCAINE 4 G/100G
1 PATCH TOPICAL EVERY 24 HOURS
Status: DISCONTINUED | OUTPATIENT
Start: 2019-01-20 | End: 2019-01-20 | Stop reason: HOSPADM

## 2019-01-20 RX ADMIN — CLONAZEPAM 0.5 MG: 0.5 TABLET ORAL at 19:56

## 2019-01-20 RX ADMIN — CLONIDINE HYDROCHLORIDE 0.1 MG: 0.1 TABLET ORAL at 11:57

## 2019-01-20 RX ADMIN — CLONAZEPAM 0.5 MG: 0.5 TABLET ORAL at 11:57

## 2019-01-20 NOTE — ED NOTES
7:14 AM :Pt care assumed from Dr. Germain Strauss , ED provider. Pt complaint(s), current treatment plan, progression and available diagnostic results have been discussed thoroughly. Rounding occurred: yes  Intended Disposition: TBD   Pending diagnostic reports and/or labs (please list): Patient is cleared for addiction rehab but no psychiatric admission. Will need to see case management this morning. 4:07 PM : Pt care transferred to Dr. James Godfrey ,ED provider. History of patient complaint(s), available diagnostic reports and current treatment plan has been discussed thoroughly. Bedside rounding on patient occured : yes . Intended disposition of patient : TBD after psychiatric consult/  Pt complaining of sI and wanted to be evaluated. Scribe Attestation     Priscilarell Arora acting as a scribe for and in the presence of Dr. Germain Strauss MD      January 20, 2019 at 7:15 AM       Provider Attestation:      I personally performed the services described in the documentation, reviewed the documentation, as recorded by the scribe in my presence, and it accurately and completely records my words and actions.  January 20, 2019 at 7:15 AM - Dr. Germain Strauss MD

## 2019-01-20 NOTE — ED NOTES
Lay Soria with [facility] reached out to this nurse to discuss patient plan of care, states \"Patient has insurance and is therefor not eligible for [facility], consider reaching out to [facility] if this patient needs inpatient treatment as his [employer] would work with that. \" MD and charge nurse updated. intact

## 2019-01-20 NOTE — PROGRESS NOTES
Spoke with [place] from [facility] patient  Would need to be without Benzo's and opiates during stay, she stated patient is  unwilling to try alternates during treatment stay. [address] [facility] has the capacity to help with is detox needs and psych issues for [place]. [place] stated that if he changed his mind she is willing to work with him. Made Dr. Josue Fraire aware of above.

## 2019-01-20 NOTE — ED NOTES
Patient tells this nurse \"I take ambien at night to sleep, see if the Dr will give me some [place]. \"   Patient sitter at bedside reports patient previously snoring audibly with eyes closed in the bed. MD informed.

## 2019-01-20 NOTE — ED NOTES
I performed a brief evaluation, including history and physical, of the patient here in triage and I have determined that pt will need further treatment and evaluation from the main side ER physician. I have placed initial orders to help in expediting patients care. January 19, 2019 at 7:00 PM - Dennis Kang NP-C        Visit Vitals  /82 (BP 1 Location: Right arm, BP Patient Position: At rest)   Pulse 99   Temp 98 °F (36.7 °C)   Resp 15   Ht 5' 10\" (1.778 m)   Wt 99.8 kg (220 lb)   SpO2 98%   BMI 31.57 kg/m²          I was personally available for consultation in the emergency department. I have reviewed the chart and agree with the documentation recorded by the [facility], including the assessment, treatment plan, and disposition.   Fantasma Flowers MD

## 2019-01-20 NOTE — CONSULTS
This session was conducted via [employer]. Location of the patient: [facility]  Location of the psychiatrist: [facility]    Initial psychiatric Evaluation/[facility]    Consult was requested due to patient's wish to detox form alcohol and cocaine    HPI:    ED physician's report: The patient is a 39 y.o. male diabetes, drug-seeking behavior, HTN, presenting to the ED stating that he wants to be admitted to a mental health facility because he wants to stop abusing alcohol and cocaine. Patient notes that last EtOH was 3 Pm today and last cocaine use was 2 PM today. He notes that he has had a total of 10 beers today but usually drinks between 40-50 beers a day. He states that he has had withdrawal symptoms before but has never been admitted for this. Past charts show that patient with recent psych admission and he states that they did not help. Patient reports suicidal ideation today and that his plan is to make a  think that he has a gun and get shot. Reports tobacco usage. Denies any other drug usage besides the cocaine. Denies any other complaints or concerns at this time. During this interview the patient reported that he has been tired of living like this. He tried f/u with the outpatient psychiatrist but he was notified that his insurance was no longer accepted. He was just discharged from the hospital 10 days ago He says that he wants to go on living but he wants to be sober ad have a decent life. He agrees to participate in rehab. Current Facility-Administered Medications   Medication Dose Route Frequency Provider Last Rate Last Dose    hydrOXYzine HCl (ATARAX) tablet 50 mg  50 mg Oral TID PRN Connie Alejandro MD         Current Outpatient Medications   Medication Sig Dispense Refill    HYDROcodone-acetaminophen (NORCO) 5-325 mg per tablet Take 1 Tab by mouth every six (6) hours as needed for Pain. Max Daily Amount: 4 Tabs.  8 Tab 0    clonazePAM (KLONOPIN) 0.5 mg tablet Take 1 Tab by mouth three (3) times daily as needed. Max Daily Amount: 1.5 mg. 90 Tab 0    acamprosate (CAMPRAL) 333 mg tablet Take 1 Tab by mouth three (3) times daily for 30 days. 90 Tab 0    escitalopram oxalate (LEXAPRO) 10 mg tablet Take 1 Tab by mouth daily. 30 Tab 0    naproxen (NAPROSYN) 500 mg tablet Take 1 Tab by mouth two (2) times daily (with meals). 20 Tab 0    metoprolol succinate (TOPROL-XL) 100 mg tablet TAKE 1 TABLET BY MOUTH DAILY 30 Tab 0    Blood-Glucose Meter (CONTOUR NEXT METER) misc Check blood sugar twice daily 1 Each 0    SITagliptin (JANUVIA) 100 mg tablet Take 1 Tab by mouth daily. 30 Tab 2       Past History     Past Medical History:  Past Medical History:   Diagnosis Date    Depression     Diabetes ([address]     Drug-seeking behavior     56 prescriptions from 32 different prescribers at 6 different pharmacies in last 12 months    Herniated lumbar intervertebral disc     Hypertension     Neurological disorder L3,4,5 torn Herniated disc    Obesity (BMI 30.0-34. 9)        Past Surgical History:  Past Surgical History:   Procedure Laterality Date    HX HERNIA REPAIR Bilateral 06/02/2017    robotic repair of bilateral indirect inguinal hernias with placement of mesh    HX ORTHOPAEDIC  trigger finger release   Past Psychiatric History: the patient has had four inpatient hospitalization in the last year due to suicidal ideaiton and suicidal ideation. He has never tried to hurt himself. He denies any h/o violence. The patient has tried lexapro in the past with unclear results. Substance use history: alcohol and cocaine (see HPI). Longest duration of sobriet was 9 years. Family history: alcohol use d/o  (father), denies suicide attempts    Social history: the patient was born and raised in [place]. He has a brother. He is HS diploma. 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 He is  and has an 8yo son.     Legal history: denies    Mental status exam:  Appearance: middle aged man lying in bed, appears older than his stated age, good eye contact  Behavior: cooperative, pleasant  Speech: regular tone/volume, non pressured  Mood: \"depressed\"  Affect: constricted, appropriate  Thought procesS: linear, goal directed  Thought content: +SI with no intent, no HI/Delusions  Perceptions: No AH/VH  Insight: good  Judgment: good  Cognition: grossly intact    Assessment:  36yo  male with alcohol/cocaine use d/o and cocaine induced depression presents requesting help with his addiction problems. He appears to be motivated for treatment. Diagnoses:  Alcohol/cocaine use d/o with cocaine induced depression    Plan:  Start detox with ativan 2mg po qid for a day, then 2mg po tid for a day, then 2mg po bid for a day then 2mg once a day for 1 day. Refer patient to a rehab facility; prefers [facility] or [facility].  Continue lexapro/acamprosate    Thank you for the consult!

## 2019-01-20 NOTE — PROGRESS NOTES
Bina Robert stated that based on the patient's interview with her and reviewing with the MD patient will not meet their criteria for admission. Mo Calderón stated that over the phone patient denied SI and that the severity of his addiction was not as reported in the Psych evaluation.

## 2019-01-20 NOTE — ED NOTES
Bedside and Verbal shift change report given to [id number] Terrell Carlin (oncoming nurse) by Gela Morrell (offgoing nurse). Report included the following information SBAR.

## 2019-01-20 NOTE — CONSULTS
Follow-up Consult  TELEPSYCHIATRY is done with the help of onsite staff: Dr Faisal Maddox  Patient location: [facility])  Physician location: [place]    Patient Name: Racquel Villagomez   : 1975  MA  [address] [facility] Date:2019  7:03 PM    CC: safety evaluation    Interval History:   Staff notes were reviewed in chart. Racquel Villagomez is a 39 y.o. male with depression and substance abuse presented to the ED yesterday with SI with plan to get himself shot by police and asking for help with coming off alcohol and cocaine. Tele-psychiatry saw him yesterday and recommended inpatient psychiatry. Staff today report that the patient spoke with a representative from [facility] today and was deemed to not meet criteria for inpatient psychiatry anymore. After being told this, patient again made suicidal statements to hospital staff. On interview, patient clarifies that he told [facility] that he was not suicidal at that moment but he has ongoing intermittent suicidal thoughts to get himself shot or hang himself. He denies HI and symptoms of psychosis. He confirms that he abuses cocaine and alcohol but uses neither daily and primarily needs help with his depression rather than his substance abuse. He was last seen by his psychiatrist 3 weeks ago but was told at that time that his insurance is no longer accepted there. He agrees he still needs inpatient psychiatry or crisis stabilization.  He did not feel [facility] (most recent on ) & [facility] was helpful but thought that [facility] and [facility] were helpful for him in the past.      MEDICATIONS:  Current hospital medications:   Current Facility-Administered Medications   Medication Dose Route Frequency Provider Last Rate Last Dose    lidocaine 4 % patch 1 Patch  1 Patch TransDERmal Q24H Tavares Curtis MD   1 Patch at 19 1214    hydrOXYzine HCl (ATARAX) tablet 50 mg  50 mg Oral TID PRN David Meléndez MD   50 mg at 19     Current Outpatient Medications   Medication Sig Dispense Refill    amLODIPine (NORVASC) 5 mg tablet TAKE 1 TABLET BY MOUTH EVERY DAY  2    HYDROcodone-acetaminophen (NORCO) 5-325 mg per tablet Take 1 Tab by mouth every six (6) hours as needed for Pain. Max Daily Amount: 4 Tabs. 8 Tab 0    clonazePAM (KLONOPIN) 0.5 mg tablet Take 1 Tab by mouth three (3) times daily as needed. Max Daily Amount: 1.5 mg. 90 Tab 0    acamprosate (CAMPRAL) 333 mg tablet Take 1 Tab by mouth three (3) times daily for 30 days. 90 Tab 0    escitalopram oxalate (LEXAPRO) 10 mg tablet Take 1 Tab by mouth daily. 30 Tab 0    naproxen (NAPROSYN) 500 mg tablet Take 1 Tab by mouth two (2) times daily (with meals). 20 Tab 0    metoprolol succinate (TOPROL-XL) 100 mg tablet TAKE 1 TABLET BY MOUTH DAILY 30 Tab 0    Blood-Glucose Meter (CONTOUR NEXT METER) misc Check blood sugar twice daily 1 Each 0    SITagliptin (JANUVIA) 100 mg tablet Take 1 Tab by mouth daily. 30 Tab 2     MENTAL STATUS EXAM:     General Appearance and Behavior: appropriate, cooperative   Speech: normal rate and volume, irritable tone   Thought Process: goal directed   Thought Content: ongoing intermittent SI, no HI, no hallucinations or delusions   Mood: \"depressed\"   Affect: restricted to dysphoria   Sensorium and Intellect: alert, oriented to situation   Insight/Judgement: fair    ASSESSMENT    Magan Goel is a 39 y.o. male with depression and polysubstance abuse. He was recommended for inpatient psychiatry due SI yesterday. Patient briefly appeared to have resolution of SI but he clarifies that the thoughts are intermittent but ongoing and he continues to seeks inpatient or crisis stabilization treatment. He does not appear to be at risk for withdrawal. He denies HI and symptoms of psychosis.       Diagnoses:   Patient Active Problem List   Diagnosis Code    Hypertension I10    Chronic low back pain M54.5, G89.29    Diabetes mellitus (HCC) E11.9    ACP (advance care planning) Z71.89    Anxiety F41.9    Drug-seeking behavior Z76.5    Obesity (BMI 30.0-34. 9) E66.9    Cocaine use F14.90    MDD (major depressive disorder), recurrent episode, moderate (HCC) F33.1    RHONDA (generalized anxiety disorder) F41.1    Diabetes ([facility] E11.9    Alcohol abuse F10.10    Cocaine abuse ([facility] F14.10    Dysthymia F34.1     Treatment Recommendations:  1. Disposition: inpatient psychiatry or crisis stabilization   2. Psychiatric medications: restart lexapro 10mg daily and make available klonopin 0.5mg tid prn anxiety     The above were discussed with the patient and the referring provider, and able parties stated understanding and agreement with the recommendations.     Electronically signed by:  Regan Nielsen MD 1/20/2019 3:50 PM

## 2019-01-20 NOTE — ED NOTES
Bedside and Verbal shift change report given to Mary Chaves (charge nurse) by Luzmaria Officer, RN. Report included the following information SBAR, ED Summary and MAR. Patient sleeping with sitter at bedside.

## 2019-01-20 NOTE — ED NOTES
Sitter at bedside calls this nurse over saying the patient has a question, the patient then asks this nurse for [place] stating \"it should be in my chart, the Dr gave it to me. I've been up and down all night. \" Patient unable to provide which Dr prescribed him the medication, this nurse unable to find said prescription in patient history including 'care everywhere' outside of [address] [place] [address]. MD notified.

## 2019-01-20 NOTE — ED NOTES
Patient talking to [facility]. Sitter standing by patient while patient talking on the phone at the nurses station.

## 2019-01-20 NOTE — PROGRESS NOTES
Spoke with [facility] patient does not qualify for [facility] as he is insured and Other [facility] stabilization unit does not have a chemical dependency unit.

## 2019-01-20 NOTE — PROGRESS NOTES
Patient does not want to be submitted to the [facility] if no local beds available.

## 2019-01-20 NOTE — PROGRESS NOTES
Patient at this time in agreement with going to [facility] and is willing to work with treatment program. Spoke with Pb Pro she may want to speak with patient again to finish interview.

## 2019-01-20 NOTE — ED PROVIDER NOTES
EMERGENCY DEPARTMENT HISTORY AND PHYSICAL EXAM    7:14 PM      Date: 1/19/2019  Patient Name: Marly Peters    History of Presenting Illness     Chief Complaint   Patient presents with    Addiction problem    Suicidal         History Provided By: patient       Additional History (Context): Marly Peters is a 39 y.o. male diabetes, drug-seeking behavior, HTN, presenting to the ED stating that he wants to be admitted to a mental health facility because he wants to stop abusing alcohol and cocaine. Patient notes that last EtOH was 3 Pm today and last cocaine use was 2 PM today. He notes that he has had a total of 10 beers today but usually drinks between 40-50 beers a day. He states that he has had withdrawal symptoms before but has never been admitted for this. Past charts show that patient with recent psych admission and he states that they did not help. Patient reports suicidal ideations today and that his plan is to make a  think that he has a gun and get shot. Reports tobacco usage. Denies any other drug usage besides the cocaine. Denies any other complaints or concerns at this time. PCP: None    Current Facility-Administered Medications   Medication Dose Route Frequency Provider Last Rate Last Dose    lidocaine 4 % patch 1 Patch  1 Patch TransDERmal Q24H Pb Buenrostro MD   1 Patch at 01/20/19 1214    hydrOXYzine HCl (ATARAX) tablet 50 mg  50 mg Oral TID PRN Connie Alejandro MD   50 mg at 01/19/19 1958     Current Outpatient Medications   Medication Sig Dispense Refill    amLODIPine (NORVASC) 5 mg tablet TAKE 1 TABLET BY MOUTH EVERY DAY  2    HYDROcodone-acetaminophen (NORCO) 5-325 mg per tablet Take 1 Tab by mouth every six (6) hours as needed for Pain. Max Daily Amount: 4 Tabs. 8 Tab 0    clonazePAM (KLONOPIN) 0.5 mg tablet Take 1 Tab by mouth three (3) times daily as needed.  Max Daily Amount: 1.5 mg. 90 Tab 0    acamprosate (CAMPRAL) 333 mg tablet Take 1 Tab by mouth three (3) times daily for 30 days. 90 Tab 0    escitalopram oxalate (LEXAPRO) 10 mg tablet Take 1 Tab by mouth daily. 30 Tab 0    naproxen (NAPROSYN) 500 mg tablet Take 1 Tab by mouth two (2) times daily (with meals). 20 Tab 0    metoprolol succinate (TOPROL-XL) 100 mg tablet TAKE 1 TABLET BY MOUTH DAILY 30 Tab 0    Blood-Glucose Meter (CONTOUR NEXT METER) misc Check blood sugar twice daily 1 Each 0    SITagliptin (JANUVIA) 100 mg tablet Take 1 Tab by mouth daily. 30 Tab 2       Past History     Past Medical History:  Past Medical History:   Diagnosis Date    Depression     Diabetes [facility]     Drug-seeking behavior     56 prescriptions from 32 different prescribers at 6 different pharmacies in last 12 months    Herniated lumbar intervertebral disc     Hypertension     Neurological disorder L3,4,5 torn Herniated disc    Obesity (BMI 30.0-34. 9)        Past Surgical History:  Past Surgical History:   Procedure Laterality Date    HX HERNIA REPAIR Bilateral 06/02/2017    robotic repair of bilateral indirect inguinal hernias with placement of mesh    HX ORTHOPAEDIC  trigger finger release       Family History:  Family History   Problem Relation Age of Onset    Hypertension Mother     Diabetes Mother     Heart Failure Mother     COPD Mother     Heart Disease Mother     Hypertension Father     Alcohol abuse Father        Social History:  Social History     Tobacco Use    Smoking status: Never Smoker    Smokeless tobacco: Never Used   Substance Use Topics    Alcohol use: Yes     Comment: rarely    Drug use: No       Allergies:  No Known Allergies      Review of Systems       Review of Systems   Constitutional: Negative. Negative for activity change, chills and fever. HENT: Negative. Negative for ear pain, hearing loss and sore throat. Eyes: Negative. Negative for pain and visual disturbance. Respiratory: Negative. Negative for cough, chest tightness and shortness of breath. Cardiovascular: Negative. Negative for chest pain and leg swelling. Gastrointestinal: Negative. Negative for abdominal distention and abdominal pain. Genitourinary: Negative. Negative for dysuria and hematuria. Musculoskeletal: Negative. Skin: Negative. Negative for rash. Neurological: Negative. Negative for dizziness and headaches. Psychiatric/Behavioral: Positive for dysphoric mood and suicidal ideas. The patient is nervous/anxious. All other systems reviewed and are negative. Physical Exam     Visit Vitals  /82   Pulse (!) 55   Temp 98.6 °F (37 °C)   Resp 18   Ht 5' 10\" (1.778 m)   Wt 99.8 kg (220 lb)   SpO2 95%   BMI 31.57 kg/m²         Physical Exam   Constitutional: He is oriented to person, place, and time. He appears well-developed and well-nourished. HENT:   Head: Normocephalic and atraumatic. Eyes: EOM are normal. Pupils are equal, round, and reactive to light. Right eye exhibits no discharge. Left eye exhibits no discharge. 4 mm pupils reactive to light. Neck: Normal range of motion. Neck supple. No JVD present. No tracheal deviation present. Cardiovascular: Normal rate, regular rhythm and normal heart sounds. No murmur heard. No lower extremity edema. Pulmonary/Chest: Effort normal and breath sounds normal. No respiratory distress. He has no wheezes. He has no rales. Lungs CTA. Abdominal: Soft. Bowel sounds are normal. He exhibits no distension. There is no tenderness. There is no rebound. Musculoskeletal: Normal range of motion. He exhibits no tenderness or deformity. Neurological: He is alert and oriented to person, place, and time. No cranial nerve deficit. 5/5 strength UE/LE, 5/5 sensation UE/LE     Skin: Skin is warm and dry. No rash noted. No erythema. Psychiatric: He has a normal mood and affect. His behavior is normal.   Nursing note and vitals reviewed.         Diagnostic Study Results     Labs -  No results found for this or any previous visit (from the past 12 hour(s)). Radiologic Studies -   No orders to display         Medical Decision Making   I am the first provider for this patient. I reviewed the vital signs, available nursing notes, past medical history, past surgical history, family history and social history. Vital Signs-Reviewed the patient's vital signs. Records Reviewed: Nursing Notes        ED Course: Progress Notes, Reevaluation, and Consults:  23:21 - Consult:  Discussed care with Dr. Valentino Gonzalez, Telepsychiatry. Standard discussion; including history of patients chief complaint, available diagnostic results, and treatment course. Start detox with ativan 2mg po qid for a day, then 2mg po tid for a day, then 2mg po bid for a day then 2mg once a day for 1 day. Refer patient to a rehab facility; prefers [facility] or [facility]. Continue lexapro/acamprosate    8:02 PM :Pt care assumed from Dr. Zabrina Duque, ED provider. Pt complaint(s), current treatment plan, progression and available diagnostic results have been discussed thoroughly. Rounding occurred: yes  Intended Disposition: Home   Pending diagnostic reports and/or labs (please list): n/a  Pt refuses to be transferred to THE [facility] because they will not give him Benzols. The pt is now requesting to be discharged because he does not want to be here for days. Provider Notes (Medical Decision Making):     MDM  Number of Diagnoses or Management Options  Alcohol abuse:   Cocaine abuse [facility]):   Diagnosis management comments: Diff dx: cocaine abuse, drug seeking behavior, manipulative behavior, SI    Pt known well to this dept. Reports drinking and doing $150 cocaine today. Wants help. If he doesn't get it he will convince a  he's pulling a gun on them to be shot. No prior attempts. 9 ED visits in 1 mo, 1 psych admission, many in past with no change in behavior. Will have tele psych see him but do not believe he would benefit from admit.  No signs of withdrawal, no tremor, reports severe anxiety but baseline + has been doing cocaine. Will give home clonazepam, no ativan here. 8:50 PM  Has been here now for very long time, requesting to leave, rob for safety. Had admission offer but with no guaranteed benzos and he refused, showing he is manipulating to attempt to get benzos. Will d/c at this time. Safe for d/c, careful return precautions given. Pt/family comfortable with plan    Emiliano Najera MD          Diagnosis     Clinical Impression:   1. Alcohol abuse    2. Cocaine abuse ([facility]        Disposition: 0600: Pt care transferred to Dr. Saratha Gottron  ,ED provider. History of patient complaint(s), available diagnostic reports and current treatment plan has been discussed thoroughly. Bedside rounding on patient occured : yes  Intended disposition of patient : TBD  Pending diagnostics reports and/or labs (please list): placement for inpatient substance abuse. Follow-up Information     Follow up With Specialties Details Why Contact Info    [facility] EMERGENCY DEPT Emergency Medicine  As needed, If symptoms worsen [address] [place] [address] 70 In 3 days  [address] [place] [address] Crisis Stabilization Unit Psychiatry  As needed [address]  [phone]              Medication List      CONTINUE taking these medications    acamprosate 333 mg tablet  Commonly known as:  CAMPRAL  Take 1 Tab by mouth three (3) times daily for 30 days. amLODIPine 5 mg tablet  Commonly known as:  NORVASC     Blood-Glucose Meter Misc  Commonly known as:  CONTOUR NEXT METER  Check blood sugar twice daily     clonazePAM 0.5 mg tablet  Commonly known as:  KlonoPIN  Take 1 Tab by mouth three (3) times daily as needed. Max Daily Amount: 1.5 mg.     escitalopram oxalate 10 mg tablet  Commonly known as:  LEXAPRO  Take 1 Tab by mouth daily.      HYDROcodone-acetaminophen 5-325 mg per tablet  Commonly known as:  NORCO  Take 1 Tab by mouth every six (6) hours as needed for Pain. Max Daily Amount: 4 Tabs. metoprolol succinate 100 mg tablet  Commonly known as:  TOPROL-XL  TAKE 1 TABLET BY MOUTH DAILY     naproxen 500 mg tablet  Commonly known as:  NAPROSYN  Take 1 Tab by mouth two (2) times daily (with meals). SITagliptin 100 mg tablet  Commonly known as:  JANUVIA  Take 1 Tab by mouth daily. _______________________________    Attestations:  Scribe Attestation     Edmar Oliveros acting as a scribe for and in the presence of Josette Gillis MD      January 19, 2019 at 7:14 PM       Provider Attestation:      I personally performed the services described in the documentation, reviewed the documentation, as recorded by the scribe in my presence, and it accurately and completely records my words and actions.  January 19, 2019 at 7:14 PM - Josette Gillis MD   _______________________________

## 2019-01-20 NOTE — ED NOTES
4:10 PM :Pt care assumed from Dr. Faisal Maddox, ED provider. Pt complaint(s), current treatment plan, progression and available diagnostic results have been discussed thoroughly. Rounding occurred: yes Intended Disposition: TBD Pending diagnostic reports and/or labs (please list): reassessment from psych. 5:47 PM : Pt care transferred to Dr. Brianda Mercer ,ED provider. History of patient complaint(s), available diagnostic reports and current treatment plan has been discussed thoroughly. Bedside rounding on patient occured : yes . Intended disposition of patient : TBD Pending diagnostics reports and/or labs (please list): reassessment from psych. Scribe Attestation Juan Richardson acting as a scribe for and in the presence of Cici Holloway MD     
January 20, 2019 at 4:11 PM 
    
Provider Attestation:     
I personally performed the services described in the documentation, reviewed the documentation, as recorded by the scribe in my presence, and it accurately and completely records my words and actions.  January 20, 2019 at 4:11 PM - Cici Holloway MD

## 2019-01-20 NOTE — ED NOTES
This nurse was nearby this patient room to discharge patient in neighboring room, noted this patient to be sleeping soundly and snoring loudly.

## 2019-01-21 NOTE — ED NOTES
Assumed care of patient, patient resting comfortably but requesting Klonopin. Patient has no complaints of pain at this time. Will talk to MD regarding Klonopin.

## 2019-01-21 NOTE — ED NOTES
8:49 PM  01/20/19     Discharge instructions given to patient (name) with verbalization of understanding. Patient accompanied by self. Patient discharged with the following prescriptions none. Patient discharged to home (destination). Belongings given to patient.      Esteban Hunter RN

## 2019-01-21 NOTE — DISCHARGE INSTRUCTIONS
Patient Education        Learning About Alcohol Misuse  What is alcohol misuse? Alcohol misuse means drinking so much that it causes problems for you or others. Early problems with alcohol can start at home. You may argue with loved ones about how much you're drinking. Your job may be affected because of drinking. You may drink when it's dangerous or illegal, such as when you drive. Drinking too much for a long time can lead to health conditions like high blood pressure and liver problems. What are the symptoms? Symptoms of alcohol misuse may include:  · Drinking much more than you planned. · Drinking even though it's causing problems for you or others. · Putting yourself in situations where you might get hurt. · Wanting to cut down or stop drinking, but not being able to. · Feeling guilty about how much you're drinking. How is alcohol misuse treated? Getting help for problems with alcohol is up to you. But you don't have to do it alone. There are many people and kinds of treatments to help with alcohol problems. Talking to your doctor is the first step. When you get a doctor's help, treatment for alcohol problems can be safer and quicker. Treatment options can include:  · Treatment programs. Examples are group therapy, one or more types of counseling, and alcohol education. · Medicines. A doctor or counselor can help you know what kinds of medicines might help with cravings. · Free social support groups. These groups include AA (Alcoholics Anonymous) and SMART (Self-Management and Recovery Training). Your doctor can help you decide which type of program is best for you. Follow-up care is a key part of your treatment and safety. Be sure to make and go to all appointments, and call your doctor if you are having problems. It's also a good idea to know your test results and keep a list of the medicines you take. Where can you learn more? Go to http://jacklyn-denice.info/.   Enter H758 in the search box to learn more about \"Learning About Alcohol Misuse. \"  Current as of: May 7, 2018  Content Version: 11.9  © 2006-2018 [employer]. Care instructions adapted under license by [employer] (which disclaims liability or warranty for this information). If you have questions about a medical condition or this instruction, always ask your healthcare professional. [address] any warranty or liability for your use of this information. Patient Education        Alcohol, Drug, or Poison Ingestion: Care Instructions  Your Care Instructions    A person can become very sick, or die, from swallowing or using alcohol, drugs, or poisons. Alcohol poisoning occurs when a person drinks a large amount of alcohol. Alcohol can stop nerve signals that control breathing. It can also stop the gag reflex that prevents choking. Alcohol poisoning is serious. It can lead to brain damage or death if it's not treated right away. Drugs can be used by accident or on purpose. They can be swallowed, inhaled, injected, or absorbed through the skin. Drugs include over-the-counter medicine (such as aspirin or acetaminophen) and prescription medicine. They also include vitamins and supplements. And they include illegal drugs such as cocaine and heroin. And poisons are all around us. They include household , cosmetics, houseplants, and garden chemicals. The doctor has checked you carefully, but problems can develop later. If you notice any problems or new symptoms, get medical treatment right away. Follow-up care is a key part of your treatment and safety. Be sure to make and go to all appointments, and call your doctor if you are having problems. It's also a good idea to know your test results and keep a list of the medicines you take. How can you care for yourself at home?   Alcohol problems  · Talk to your doctor or counselor about programs that can help you stop using alcohol. · Plan ways to avoid being tempted to drink. ? Get rid of all alcohol in your home. ? Avoid places where you tend to drink. ? Stay away from places or events that offer alcohol. ? Stay away from people who drink a lot. Drug problems  · Talk to your doctor about programs that can help you stop using drugs. · Get rid of any drugs you might be tempted to misuse. · Learn how to say no when other people use drugs. · Don't spend time with people who use drugs. Poison prevention  · Keep products in the containers they came in. Keep them with the original labels. · Be careful when you use cleaning products, paints, solvents, and pesticides. Read labels before use. Use a fan to move strong odors and fumes out of your home. · Do not mix cleaning products. Try to use nontoxic . These include vinegar, lemon juice, and baking soda. When should you call for help? Poison control centers, hospitals, or your doctor can give immediate advice in the case of a poisoning. The [facility] number is [phone]. Have the poison container with you so you can give complete information to the poison control center, such as what the poison or substance is, how much was taken and when. Do not try to make the person vomit.   Call 911 anytime you think you may need emergency care.  For example, call if you or someone else:    · Has used or currently uses alcohol or drugs and is very confused or can't stay awake.     · Has passed out (lost consciousness).     · Has severe trouble breathing.     · Is having a seizure.    Call your doctor now or seek immediate medical care if you or someone else:    · Has new symptoms, or is not acting normally.    Watch closely for changes in your health, and be sure to contact your doctor if:    · You do not get better as expected.     · You need help with drug or alcohol problems.     · You have problems with depression or other mental health issues. Where can you learn more? Go to http://jacklyn-denice.info/. Enter E940 in the search box to learn more about \"Alcohol, Drug, or Poison Ingestion: Care Instructions. \"  Current as of: September 23, 2018  Content Version: 11.9  © 6139-3365 [employer]. Care instructions adapted under license by [employer] (which disclaims liability or warranty for this information). If you have questions about a medical condition or this instruction, always ask your healthcare professional. [address] any warranty or liability for your use of this information.

## 2019-01-21 NOTE — ED NOTES
Patient states that he does not want to be here for days and would like to be discharged if he will not be accepted somewhere soon. According to previous RN, [facility] did accept the patient but patient refused to go because Altagracia Neumann told him that they would not be giving him any benzos.

## 2019-01-21 NOTE — ANCILLARY DISCHARGE INSTRUCTIONS
Call made to [facility] spoke with Arelis, they are at capacity. Patient's most recent psych eval sent to [facility].

## 2019-01-31 ENCOUNTER — OFFICE VISIT (OUTPATIENT)
Dept: FAMILY MEDICINE CLINIC | Age: 44
End: 2019-01-31

## 2019-01-31 VITALS
HEIGHT: 70 IN | WEIGHT: 227 LBS | HEART RATE: 65 BPM | SYSTOLIC BLOOD PRESSURE: 123 MMHG | BODY MASS INDEX: 32.5 KG/M2 | RESPIRATION RATE: 18 BRPM | TEMPERATURE: 96 F | OXYGEN SATURATION: 100 % | DIASTOLIC BLOOD PRESSURE: 80 MMHG

## 2019-01-31 DIAGNOSIS — F10.10 ALCOHOL ABUSE: ICD-10-CM

## 2019-01-31 DIAGNOSIS — G89.29 CHRONIC MIDLINE LOW BACK PAIN WITHOUT SCIATICA: ICD-10-CM

## 2019-01-31 DIAGNOSIS — F41.9 ANXIETY AND DEPRESSION: Primary | ICD-10-CM

## 2019-01-31 DIAGNOSIS — M54.50 CHRONIC MIDLINE LOW BACK PAIN WITHOUT SCIATICA: ICD-10-CM

## 2019-01-31 DIAGNOSIS — F19.10 SUBSTANCE ABUSE (HCC): ICD-10-CM

## 2019-01-31 DIAGNOSIS — F32.A ANXIETY AND DEPRESSION: Primary | ICD-10-CM

## 2019-01-31 RX ORDER — ESCITALOPRAM OXALATE 10 MG/1
10 TABLET ORAL DAILY
Qty: 30 TAB | Refills: 0 | Status: SHIPPED | OUTPATIENT
Start: 2019-01-31 | End: 2020-06-03

## 2019-01-31 RX ORDER — CYCLOBENZAPRINE HCL 10 MG
TABLET ORAL
Qty: 90 TAB | Refills: 0 | Status: SHIPPED | OUTPATIENT
Start: 2019-01-31 | End: 2019-02-18 | Stop reason: SDUPTHER

## 2019-01-31 RX ORDER — METOPROLOL SUCCINATE 100 MG/1
TABLET, EXTENDED RELEASE ORAL
Qty: 90 TAB | Refills: 1 | Status: SHIPPED | OUTPATIENT
Start: 2019-01-31 | End: 2020-06-03

## 2019-01-31 RX ORDER — DISULFIRAM 250 MG/1
250 TABLET ORAL DAILY
Qty: 14 TAB | Refills: 0 | Status: SHIPPED | OUTPATIENT
Start: 2019-01-31 | End: 2020-06-03

## 2019-01-31 NOTE — PROGRESS NOTES
Rm:3    Chief Complaint   Patient presents with    Anxiety     Depression Screening:  PHQ over the last two weeks 4/6/2018 1/29/2018 12/13/2017 11/28/2017 5/23/2017 5/16/2017 3/20/2017   PHQ Not Done - - - - - - -   Little interest or pleasure in doing things Not at all Not at all - Not at all Not at all Not at all Not at all   Feeling down, depressed, irritable, or hopeless Not at all Not at all More than half the days Not at all Not at all Not at all Not at all   Total Score PHQ 2 0 0 - 0 0 0 0       Learning Assessment:  Learning Assessment 3/3/2017 4/27/2016 3/16/2016   PRIMARY LEARNER Patient Patient Patient   HIGHEST LEVEL OF EDUCATION - PRIMARY LEARNER  GRADUATED HIGH SCHOOL OR GED GRADUATED HIGH SCHOOL OR GED GRADUATED HIGH SCHOOL OR GED   BARRIERS PRIMARY LEARNER NONE - -   CO-LEARNER CAREGIVER No - -   PRIMARY LANGUAGE ENGLISH ENGLISH ENGLISH   LEARNER PREFERENCE PRIMARY READING DEMONSTRATION READING   ANSWERED BY patient patient DARIAN Gaytan   RELATIONSHIP SELF SELF SELF       Abuse Screening:  Abuse Screening Questionnaire 3/16/2016   Do you ever feel afraid of your partner? N   Are you in a relationship with someone who physically or mentally threatens you? N   Is it safe for you to go home? Y       Health Maintenance reviewed and discussed per provider: yes     Coordination of Care:    1. Have you been to the ER, urgent care clinic since your last visit? Hospitalized since your last visit? no    2. Have you seen or consulted any other health care providers outside of the [address] [place] [address] since your last visit? Include any pap smears or colon screening.  no

## 2019-01-31 NOTE — PATIENT INSTRUCTIONS
For the anxiety and depression, the last doctor at [facility] prescribed Lexapro. You need to be taking that daily, every day. You just filled a prescription for Klonopin two days ago so you should be good for anti-anxiety medication. The doctor at [facility] arranged follow up for you at [facility], [address]. You need to follow up with them. Their phone number is [phone]. Your other option is to see the psychiatrist in [place]. Don't use the excuse that you can't get their because [place] will pay for a cab. I have prescribed Antabuse for two weeks. This will give you time to see psych. I have also referred you to our back specialist, they will contact you.

## 2019-01-31 NOTE — PROGRESS NOTES
HISTORY OF PRESENT ILLNESS  Melida Moulton is a 39 y.o. male. Colleen Ng is here for follow up. He says he needs something badly for anxiety and depression and he wants an epidural for his back. He was recently hospitalized at [facility], and when discharged he was advised to follow up with [facility] but he never called them. He's been to the ED a couple of times since then also, always looking for Klonopin. He says he was going to a psychiatrist in [place]. I asked him why he stopped and he said he couldn't afford gas. Then I saw his insurance and I told him that they would give him a cab and he said they require a 3 day notice and he doesn't like that. I advised him that there isn't going to be anyone close to his house that he will be able to find soon. He has been prescribed Lexapro (by the doc at [facility]) and again here but it doesn't sound like he's taking it. He knows he has a drinking problem, it's been documented. He feels like antabuse would help him. He would like referral for his back, he's got a long history of chronic lower back pain. No bladder or bowel issues, no numbness or tingling. Review of Systems   Constitutional: Negative for chills and fever. HENT: Negative for hearing loss. Eyes: Negative for blurred vision and double vision. Respiratory: Negative for cough and shortness of breath. Cardiovascular: Negative for chest pain and palpitations. Gastrointestinal: Negative for abdominal pain, nausea and vomiting. Genitourinary: Negative for dysuria and urgency. Musculoskeletal: Positive for back pain and myalgias. Neurological: Negative for tingling and headaches. Psychiatric/Behavioral: Positive for depression and memory loss. Negative for suicidal ideas. The patient is nervous/anxious and has insomnia. Physical Exam   Constitutional: He is oriented to person, place, and time. He appears well-developed and well-nourished.    Eyes: Pupils are equal, round, and reactive to light. Neck: Neck supple. No thyromegaly present. Cardiovascular: Normal rate, regular rhythm and normal heart sounds. Pulmonary/Chest: Effort normal and breath sounds normal. No respiratory distress. He has no rales. Abdominal: Soft. He exhibits no distension. There is no tenderness. Musculoskeletal:   Tender lumbar muscles, ecreased rom   Lymphadenopathy:     He has no cervical adenopathy. Neurological: He is alert and oriented to person, place, and time. Psychiatric:   Appears angry, normal thought content. Doesn't have depressed mood or actions. Nursing note and vitals reviewed. ASSESSMENT and PLAN    ICD-10-CM ICD-9-CM    1. Anxiety and depression F41.9 300.00 escitalopram oxalate (LEXAPRO) 10 mg tablet    F32.9 311    2. Substance abuse ([facility] [address] F19.10 305.90 disulfiram (ANTABUSE) 250 mg tablet   3. Chronic midline low back pain without sciatica M54.5 724.2 REFERRAL TO ORTHOPEDICS    G89.29 338.29    4.  Alcohol abuse F10.10 305.00 disulfiram (ANTABUSE) 250 mg tablet

## 2019-02-03 ENCOUNTER — HOSPITAL ENCOUNTER (EMERGENCY)
Age: 44
Discharge: HOME | End: 2019-02-04
Attending: EMERGENCY MEDICINE
Payer: MEDICAID

## 2019-02-03 DIAGNOSIS — F10.10 ALCOHOL ABUSE: ICD-10-CM

## 2019-02-03 DIAGNOSIS — R45.851 SUICIDAL IDEATIONS: Primary | ICD-10-CM

## 2019-02-03 LAB
ALBUMIN SERPL-MCNC: 4.1 G/DL (ref 3.4–5)
ALBUMIN/GLOB SERPL: 0.9 {RATIO} (ref 0.8–1.7)
ALP SERPL-CCNC: 91 U/L (ref 45–117)
ALT SERPL-CCNC: 22 U/L (ref 16–61)
AMPHET UR QL SCN: NEGATIVE
ANION GAP SERPL CALC-SCNC: 8 MMOL/L (ref 3–18)
APAP SERPL-MCNC: <2 UG/ML (ref 10–30)
APPEARANCE UR: CLEAR
AST SERPL-CCNC: 29 U/L (ref 15–37)
BARBITURATES UR QL SCN: NEGATIVE
BASOPHILS # BLD: 0 K/UL (ref 0–0.1)
BASOPHILS NFR BLD: 0 % (ref 0–2)
BENZODIAZ UR QL: NEGATIVE
BILIRUB SERPL-MCNC: 0.2 MG/DL (ref 0.2–1)
BILIRUB UR QL: NEGATIVE
BUN SERPL-MCNC: 15 MG/DL (ref 7–18)
BUN/CREAT SERPL: 14 (ref 12–20)
CALCIUM SERPL-MCNC: 8.9 MG/DL (ref 8.5–10.1)
CANNABINOIDS UR QL SCN: NEGATIVE
CHLORIDE SERPL-SCNC: 102 MMOL/L (ref 100–108)
CO2 SERPL-SCNC: 27 MMOL/L (ref 21–32)
COCAINE UR QL SCN: NEGATIVE
COLOR UR: YELLOW
CREAT SERPL-MCNC: 1.11 MG/DL (ref 0.6–1.3)
DIFFERENTIAL METHOD BLD: NORMAL
EOSINOPHIL # BLD: 0.1 K/UL (ref 0–0.4)
EOSINOPHIL NFR BLD: 1 % (ref 0–5)
ERYTHROCYTE [DISTWIDTH] IN BLOOD BY AUTOMATED COUNT: 13.4 % (ref 11.6–14.5)
ETHANOL SERPL-MCNC: 195 MG/DL (ref 0–3)
GLOBULIN SER CALC-MCNC: 4.6 G/DL (ref 2–4)
GLUCOSE SERPL-MCNC: 176 MG/DL (ref 74–99)
GLUCOSE UR STRIP.AUTO-MCNC: NEGATIVE MG/DL
HCT VFR BLD AUTO: 41.7 % (ref 36–48)
HDSCOM,HDSCOM: NORMAL
HGB BLD-MCNC: 14.2 G/DL (ref 13–16)
HGB UR QL STRIP: NEGATIVE
KETONES UR QL STRIP.AUTO: NEGATIVE MG/DL
LEUKOCYTE ESTERASE UR QL STRIP.AUTO: NEGATIVE
LIPASE SERPL-CCNC: 299 U/L (ref 73–393)
LYMPHOCYTES # BLD: 2.3 K/UL (ref 0.9–3.6)
LYMPHOCYTES NFR BLD: 28 % (ref 21–52)
MCH RBC QN AUTO: 29.5 PG (ref 24–34)
MCHC RBC AUTO-ENTMCNC: 34.1 G/DL (ref 31–37)
MCV RBC AUTO: 86.7 FL (ref 74–97)
METHADONE UR QL: NEGATIVE
MONOCYTES # BLD: 0.6 K/UL (ref 0.05–1.2)
MONOCYTES NFR BLD: 7 % (ref 3–10)
NEUTS SEG # BLD: 5.2 K/UL (ref 1.8–8)
NEUTS SEG NFR BLD: 64 % (ref 40–73)
NITRITE UR QL STRIP.AUTO: NEGATIVE
OPIATES UR QL: NEGATIVE
PCP UR QL: NEGATIVE
PH UR STRIP: 6.5 [PH] (ref 5–8)
PLATELET # BLD AUTO: 257 K/UL (ref 135–420)
PMV BLD AUTO: 9.6 FL (ref 9.2–11.8)
POTASSIUM SERPL-SCNC: 3.7 MMOL/L (ref 3.5–5.5)
PROT SERPL-MCNC: 8.7 G/DL (ref 6.4–8.2)
PROT UR STRIP-MCNC: NEGATIVE MG/DL
RBC # BLD AUTO: 4.81 M/UL (ref 4.7–5.5)
SALICYLATES SERPL-MCNC: <2.8 MG/DL (ref 2.8–20)
SODIUM SERPL-SCNC: 137 MMOL/L (ref 136–145)
SP GR UR REFRACTOMETRY: 1.01 (ref 1–1.03)
UROBILINOGEN UR QL STRIP.AUTO: 0.2 EU/DL (ref 0.2–1)
WBC # BLD AUTO: 8.2 K/UL (ref 4.6–13.2)

## 2019-02-03 PROCEDURE — 96374 THER/PROPH/DIAG INJ IV PUSH: CPT

## 2019-02-03 PROCEDURE — 81003 URINALYSIS AUTO W/O SCOPE: CPT

## 2019-02-03 PROCEDURE — 74011250636 HC RX REV CODE- 250/636: Performed by: NURSE PRACTITIONER

## 2019-02-03 PROCEDURE — 96361 HYDRATE IV INFUSION ADD-ON: CPT

## 2019-02-03 PROCEDURE — 85025 COMPLETE CBC W/AUTO DIFF WBC: CPT

## 2019-02-03 PROCEDURE — 83690 ASSAY OF LIPASE: CPT

## 2019-02-03 PROCEDURE — 80307 DRUG TEST PRSMV CHEM ANLYZR: CPT

## 2019-02-03 PROCEDURE — 80053 COMPREHEN METABOLIC PANEL: CPT

## 2019-02-03 PROCEDURE — 99285 EMERGENCY DEPT VISIT HI MDM: CPT

## 2019-02-03 RX ADMIN — SODIUM CHLORIDE 1000 ML: 900 INJECTION, SOLUTION INTRAVENOUS at 23:28

## 2019-02-04 VITALS
SYSTOLIC BLOOD PRESSURE: 126 MMHG | TEMPERATURE: 98.2 F | BODY MASS INDEX: 31.5 KG/M2 | HEIGHT: 70 IN | OXYGEN SATURATION: 97 % | WEIGHT: 220 LBS | RESPIRATION RATE: 16 BRPM | DIASTOLIC BLOOD PRESSURE: 84 MMHG | HEART RATE: 81 BPM

## 2019-02-04 PROCEDURE — 96361 HYDRATE IV INFUSION ADD-ON: CPT

## 2019-02-04 PROCEDURE — 74011250636 HC RX REV CODE- 250/636: Performed by: EMERGENCY MEDICINE

## 2019-02-04 RX ORDER — LORAZEPAM 2 MG/ML
1 INJECTION INTRAMUSCULAR
Status: COMPLETED | OUTPATIENT
Start: 2019-02-04 | End: 2019-02-04

## 2019-02-04 RX ADMIN — LORAZEPAM 1 MG: 2 INJECTION, SOLUTION INTRAMUSCULAR; INTRAVENOUS at 07:44

## 2019-02-04 NOTE — ED NOTES
Patient sitting calmly in the bed, states he is no longer suicidal, states \"I think it is my alcohol problem\", patient states he does go to [place] [address] and does have sponsors, reminded the patient to utilize the sponsors. Provider made aware, will have tele pscyhiatry see the patient again

## 2019-02-04 NOTE — ED NOTES
Pt stated that he had lost custody of his son and that he was suicidal because of it; Pt has been drinking

## 2019-02-04 NOTE — ED NOTES
2 bags retrieved from locker #2 and given to patient. Verified all belongings were present. Envelope retrieved from [facility] [employer] by security and given to patient. All belongings present.

## 2019-02-04 NOTE — ED NOTES
Pt asking for medication to help him calm down. Request sent through Haley Mckenna NP, Based on ESDRAS, medications cannot be given that would depress his CNS.

## 2019-02-04 NOTE — DISCHARGE INSTRUCTIONS
Patient Education        Acute Alcohol Intoxication: Care Instructions  Your Care Instructions    You have had treatment to help your body rid itself of alcohol. Too much alcohol upsets the body's fluid balance. Your doctor may have given you fluids and vitamins. For some people, drinking too much alcohol is a one-time event. For others, it is an ongoing problem. In either case, it is serious. It can be life-threatening. Follow-up care is a key part of your treatment and safety. Be sure to make and go to all appointments, and call your doctor if you are having problems. It's also a good idea to know your test results and keep a list of the medicines you take. How can you care for yourself at home? · Do not drink and drive. · Be safe with medicines. Take your medicines exactly as prescribed. Call your doctor if you think you are having a problem with your medicine. · Your doctor may have prescribed disulfiram (Antabuse). Do not drink any alcohol while you are taking this medicine. You may have severe or even life-threatening side effects from even small amounts of alcohol. · If you were given medicine to prevent nausea, be sure to take it exactly as prescribed. · Before you take any medicine, tell your doctor if:  ? You have had a bad reaction to any medicines in the past.  ? You are taking other medicines, including over-the-counter ones, or have other health problems. ? You are or could be pregnant. · Be prepared to have some symptoms of withdrawal in the next few days. · Drink plenty of liquids in the next few days. · Seek help if you need it to stop drinking. Getting counseling and joining a support group can help you stay sober. Try a support group such as Alcoholics Anonymous. · Avoid alcohol when you take medicines. It can react with many medicines and cause serious problems. When should you call for help? Call 911 anytime you think you may need emergency care.  For example, call if:    · You feel confused and are seeing things that are not there.     · You are thinking about killing yourself or hurting others.     · You have a seizure.     · You vomit blood or what looks like coffee grounds.    Call your doctor now or seek immediate medical care if:    · You have trembling, restlessness, sweating, and other withdrawal symptoms that are new or that get worse.     · Your withdrawal symptoms come back after not bothering you for days or weeks.     · You can't stop vomiting.    Watch closely for changes in your health, and be sure to contact your doctor if:    · You need help to stop drinking. Where can you learn more? Go to http://jacklynRF-iT Solutionsdenice.info/. Enter T102 in the search box to learn more about \"Acute Alcohol Intoxication: Care Instructions. \"  Current as of: May 7, 2018  Content Version: 11.9  © 9154-8467 [employer]. Care instructions adapted under license by [employer] (which disclaims liability or warranty for this information). If you have questions about a medical condition or this instruction, always ask your healthcare professional. [place] [address] any warranty or liability for your use of this information. Patient Education        Suicidal Thoughts and Behavior: Care Instructions  Your Care Instructions  You have been seen by a doctor because you've had thoughts about killing yourself. Maybe you have tried to do it. This is much different from having fleeting thoughts of death, which many people have from time to time. Your doctor and support team will work hard to help keep you safe. Your team may include a , a , and a counselor. Most people who think about suicide don't want to die. They think suicide will end their problems and pain. People who consider suicide often feel hopeless, helpless, and worthless. These thoughts can make a person feel that there is no other choice.   But you do have a choice. Help is always available. The doctor and staff members are taking you and your pain very seriously. It is important to remember that there are people who are willing and able to talk with you about your suicidal thoughts. Treatment and close follow-up care can help you feel better about life. Thoughts of hopelessness and suicide may come from being depressed. Depression is a medical condition. When you have depression, there may be problems with activity levels in certain parts of your brain. Chemicals in your brain called neurotransmitters may be out of balance. But depression can be treated. Treatment for depression includes counseling, medicines, and lifestyle changes. With treatment, you can feel better. Your doctor doesn't want you to hurt yourself. He or she may ask you to sign a \"no harm\" agreement or contract. This contract is your promise that you will not hurt yourself between now and your next visit. Be completely honest with your doctor if you feel that you can't sign it. You will get help. Follow-up care is a key part of your treatment and safety. Be sure to make and go to all appointments, and call your doctor if you are having problems. It's also a good idea to know your test results and keep a list of the medicines you take. How can you care for yourself at home? · Talk to someone. Reach out to family members, friends, your doctor, or a counselor. Be open and honest with them about your thoughts and feelings. · Be safe with medicines. Take your medicines exactly as prescribed. Call your doctor if you think you are having a problem with your medicine. · Avoid illegal drugs and alcohol. · Attend all counseling sessions recommended by your doctor. · Have someone take away sharp or dangerous objects, guns, and drugs from your home. · Keep the numbers for these national suicide hotlines: 8-985-276-TALK (7-109-692-655.102.3215) and 1-133-KFXJOBC (7-518.465.3822).   When should you call for help?  Call 911 anytime you think you may need emergency care. For example, call if:    · You feel you cannot stop from hurting yourself or someone else.   [facility] your doctor now or seek immediate medical care if:    · You have one or more warning signs of suicide. For example, call if:  ? You feel like giving away your possessions. ? You use illegal drugs or drink alcohol heavily. ? You talk or write about death. This may include writing suicide notes and talking about guns, knives, or pills. ? You start to spend a lot of time alone or spend more time alone than usual.     · You hear voices.     · You start acting in an aggressive way that's not normal for you.    Watch closely for changes in your health, and be sure to contact your doctor if you have any problems. Where can you learn more? Go to http://jacklyn-denice.info/. Enter Z977 in the search box to learn more about \"Suicidal Thoughts and Behavior: Care Instructions. \"  Current as of: September 11, 2018  Content Version: 11.9  © 4941-2684 [employer]. Care instructions adapted under license by [employer] (which disclaims liability or warranty for this information). If you have questions about a medical condition or this instruction, always ask your healthcare professional. [address] any warranty or liability for your use of this information. [employer] Activation    Thank you for requesting access to [employer]. Please follow the instructions below to securely access and download your online medical record. [employer] allows you to send messages to your doctor, view your test results, renew your prescriptions, schedule appointments, and more. How Do I Sign Up? 1. In your internet browser, go to [email or web address]. 2. Click on the First Time User? Click Here link in the Sign In box. You will see the New Member Sign Up page.   3. Enter your [employer] Access Code exactly as it appears below. You will not need to use this code after youve completed the sign-up process. If you do not sign up before the expiration date, you must request a new code. [employer] Access Code: [email or web address]  Expires: 3/5/2019  7:00 PM (This is the date your [employer] access code will )    4. Enter the last four digits of your Social Security Number (xxxx) and Date of Birth (mm/dd/yyyy) as indicated and click Submit. You will be taken to the next sign-up page. 5. Create a [employer] ID. This will be your [employer] login ID and cannot be changed, so think of one that is secure and easy to remember. 6. Create a [employer] password. You can change your password at any time. 7. Enter your Password Reset Question and Answer. This can be used at a later time if you forget your password. 8. Enter your e-mail address. You will receive e-mail notification when new information is available in [address]. 9. Click Sign Up. You can now view and download portions of your medical record. 10. Click the Download Summary menu link to download a portable copy of your medical information. Additional Information    If you have questions, please visit the Frequently Asked Questions section of the [employer] website at [email or web address] Remember, [employer] is NOT to be used for urgent needs. For medical emergencies, dial 911.

## 2019-02-04 NOTE — ED PROVIDER NOTES
Nieves Ponce is a 39year old male who presents to the ED with a c/o suicide ideations. Pt states he lost custody of his son in a court enriquez with his ex wife. States he cannot take this pressure, and wants to bring harm to himself. Pt has a Hx of suicidal ideations. Denies a plan. Admits that he has had suicidal ideation in the past.     Nonah Lemon he's been drinking alcohol this evening. Past Medical History:   Diagnosis Date    Depression     Diabetes ([facility] [address]     Drug-seeking behavior     56 prescriptions from 32 different prescribers at 6 different pharmacies in last 12 months    Herniated lumbar intervertebral disc     Hypertension     Neurological disorder L3,4,5 torn Herniated disc    Obesity (BMI 30.0-34. 9)        Past Surgical History:   Procedure Laterality Date    HX HERNIA REPAIR Bilateral 06/02/2017    robotic repair of bilateral indirect inguinal hernias with placement of mesh    HX ORTHOPAEDIC  trigger finger release         Family History:   Problem Relation Age of Onset    Hypertension Mother     Diabetes Mother     Heart Failure Mother     COPD Mother     Heart Disease Mother     Hypertension Father     Alcohol abuse Father        Social History     Socioeconomic History    Marital status:      Spouse name: Not on file    Number of children: Not on file    Years of education: Not on file    Highest education level: Not on file   Social Needs    Financial resource strain: Not on file    Food insecurity - worry: Not on file    Food insecurity - inability: Not on file   [employer] needs - medical: Not on file   [employer] needs - non-medical: Not on file   Occupational History    Not on file   Tobacco Use    Smoking status: Never Smoker    Smokeless tobacco: Never Used   Substance and Sexual Activity    Alcohol use: Yes     Comment: rarely    Drug use: No    Sexual activity: Yes     Partners: Female     Birth control/protection: Condom Other Topics Concern    Not on file   Social History Narrative    Not on file         ALLERGIES: Seroquel [quetiapine]    Review of Systems   Constitutional: Negative. HENT: Negative. Eyes: Negative. Respiratory: Negative. Cardiovascular: Negative. Gastrointestinal: Negative. Endocrine: Negative. Genitourinary: Negative. Musculoskeletal: Negative. Skin: Negative. Allergic/Immunologic: Negative. Neurological: Negative. Hematological: Negative. Psychiatric/Behavioral: Positive for suicidal ideas. Vitals:    02/03/19 1916 02/03/19 2324 02/03/19 2354   BP: (!) 138/93 (!) 84/51 105/65   Pulse: 77 81    Resp: 16 16    Temp: 97.5 °F (36.4 °C) 98.1 °F (36.7 °C)    SpO2: 98% 93%    Weight: 99.8 kg (220 lb)     Height: 5' 10\" (1.778 m)              Physical Exam   Constitutional: He appears well-developed and well-nourished. No distress. Pt appears mildly intoxicated. HENT:   Head: Normocephalic and atraumatic. Nose: Nose normal.   Eyes: EOM are normal. Right eye exhibits no discharge. Left eye exhibits no discharge. No scleral icterus. Neck: Normal range of motion. Neck supple. No tracheal deviation present. Cardiovascular: Normal rate, regular rhythm and intact distal pulses. Pulmonary/Chest: Effort normal and breath sounds normal.   Abdominal: Soft. He exhibits no distension. Genitourinary:   Genitourinary Comments: NE     Musculoskeletal: He exhibits no deformity. Neurological: He is alert. Coordination normal.   Skin: Skin is warm and dry. Psychiatric: He has a normal mood and affect. His behavior is normal.   Nursing note and vitals reviewed.        MDM  Number of Diagnoses or Management Options  Diagnosis management comments: MDM:   The Pt consult for psychiatric evaluation was held for several hours as the Pt had a blood alcohol level of 195 in arrival.  A consult to psychiatry was placed at this time as his alcohol level should be at or below legal intoxication threshold. Dipak Baig, NP  2:04 AM    PROGRESS NOTE:  Care of the Pt has been turned over to Boogie Jara Md at time of my shift end. Dipak Baig NP    2:04 AM         Amount and/or Complexity of Data Reviewed  Clinical lab tests: ordered    Patient Progress  Patient progress: stable         Procedures      Note:  Assuming care of patient at beginning of shift    6:39 AM  I, Fadia Morales MD, assumed care of patient at the beginning of my shift. 6:39 AM    Date: 2/3/2019  Patient Name: [facility]    History of Presenting Illness     Chief Complaint   Patient presents with    Alcohol Problem    Mental Health Problem       Nursing notes regarding the HPI and triage nursing notes were reviewed. Prior medical records were reviewed. Current Outpatient Medications   Medication Sig Dispense Refill    clonazePAM (KLONOPIN) 0.5 mg tablet Take 1 Tab by mouth three (3) times daily as needed. Max Daily Amount: 1.5 mg. 90 Tab 0    metoprolol succinate (TOPROL-XL) 100 mg tablet TAKE 1 TABLET BY MOUTH DAILY 90 Tab 1    disulfiram (ANTABUSE) 250 mg tablet Take 1 Tab by mouth daily. 14 Tab 0    escitalopram oxalate (LEXAPRO) 10 mg tablet Take 1 Tab by mouth daily. 30 Tab 0    cyclobenzaprine (FLEXERIL) 10 mg tablet TAKE 1 TABLET BY MOUTH THREE TIMES DAILY AS NEEDED FOR MUSCLE SPASMS 90 Tab 0    amLODIPine (NORVASC) 5 mg tablet TAKE 1 TABLET BY MOUTH EVERY DAY  2       Past History     Past Medical History:  Past Medical History:   Diagnosis Date    Depression     Diabetes ([facility] [address]     Drug-seeking behavior     56 prescriptions from 32 different prescribers at 6 different pharmacies in last 12 months    Herniated lumbar intervertebral disc     Hypertension     Neurological disorder L3,4,5 torn Herniated disc    Obesity (BMI 30.0-34. 9)        Past Surgical History:  Past Surgical History:   Procedure Laterality Date    HX HERNIA REPAIR Bilateral 06/02/2017    robotic repair of bilateral indirect inguinal hernias with placement of mesh    HX ORTHOPAEDIC  trigger finger release       Family History:  Family History   Problem Relation Age of Onset    Hypertension Mother     Diabetes Mother     Heart Failure Mother     COPD Mother     Heart Disease Mother     Hypertension Father     Alcohol abuse Father        Social History:  Social History     Tobacco Use    Smoking status: Never Smoker    Smokeless tobacco: Never Used   Substance Use Topics    Alcohol use: Yes     Comment: rarely    Drug use: No       Allergies:   Allergies   Allergen Reactions    Seroquel [Quetiapine] Other (comments)     Pt states he started talking crazy saying weird things       Patient's primary care provider (as noted in EPIC):  None    Abnormal lab results from this emergency department encounter:  Labs Reviewed   METABOLIC PANEL, COMPREHENSIVE - Abnormal; Notable for the following components:       Result Value    Glucose 176 (*)     Protein, total 8.7 (*)     Globulin 4.6 (*)     All other components within normal limits   ETHYL ALCOHOL - Abnormal; Notable for the following components:    ALCOHOL(ETHYL),SERUM 195 (*)     All other components within normal limits   ACETAMINOPHEN - Abnormal; Notable for the following components:    Acetaminophen level <2 (*)     All other components within normal limits   SALICYLATE - Abnormal; Notable for the following components:    Salicylate level <5.8 (*)     All other components within normal limits   CBC WITH AUTOMATED DIFF   LIPASE   URINALYSIS W/ RFLX MICROSCOPIC   DRUG SCREEN, URINE       Lab values for this patient within approximately the last 12 hours:  Recent Results (from the past 12 hour(s))   URINALYSIS W/ RFLX MICROSCOPIC    Collection Time: 02/03/19  7:26 PM   Result Value Ref Range    Color YELLOW      Appearance CLEAR      Specific gravity 1.007 1.005 - 1.030      pH (UA) 6.5 5.0 - 8.0      Protein NEGATIVE  NEG mg/dL    Glucose NEGATIVE  NEG mg/dL Ketone NEGATIVE  NEG mg/dL    Bilirubin NEGATIVE  NEG      Blood NEGATIVE  NEG      Urobilinogen 0.2 0.2 - 1.0 EU/dL    Nitrites NEGATIVE  NEG      Leukocyte Esterase NEGATIVE  NEG     DRUG SCREEN, URINE    Collection Time: 02/03/19  7:26 PM   Result Value Ref Range    BENZODIAZEPINES NEGATIVE  NEG      BARBITURATES NEGATIVE  NEG      THC (TH-CANNABINOL) NEGATIVE  NEG      OPIATES NEGATIVE  NEG      PCP(PHENCYCLIDINE) NEGATIVE  NEG      COCAINE NEGATIVE  NEG      AMPHETAMINES NEGATIVE  NEG      METHADONE NEGATIVE  NEG      HDSCOM (NOTE)    CBC WITH AUTOMATED DIFF    Collection Time: 02/03/19  7:29 PM   Result Value Ref Range    WBC 8.2 4.6 - 13.2 K/uL    RBC 4.81 4.70 - 5.50 M/uL    HGB 14.2 13.0 - 16.0 g/dL    HCT 41.7 36.0 - 48.0 %    MCV 86.7 74.0 - 97.0 FL    MCH 29.5 24.0 - 34.0 PG    MCHC 34.1 31.0 - 37.0 g/dL    RDW 13.4 11.6 - 14.5 %    PLATELET 723 420 - 648 K/uL    MPV 9.6 9.2 - 11.8 FL    NEUTROPHILS 64 40 - 73 %    LYMPHOCYTES 28 21 - 52 %    MONOCYTES 7 3 - 10 %    EOSINOPHILS 1 0 - 5 %    BASOPHILS 0 0 - 2 %    ABS. NEUTROPHILS 5.2 1.8 - 8.0 K/UL    ABS. LYMPHOCYTES 2.3 0.9 - 3.6 K/UL    ABS. MONOCYTES 0.6 0.05 - 1.2 K/UL    ABS. EOSINOPHILS 0.1 0.0 - 0.4 K/UL    ABS. BASOPHILS 0.0 0.0 - 0.1 K/UL    DF AUTOMATED     METABOLIC PANEL, COMPREHENSIVE    Collection Time: 02/03/19  7:29 PM   Result Value Ref Range    Sodium 137 136 - 145 mmol/L    Potassium 3.7 3.5 - 5.5 mmol/L    Chloride 102 100 - 108 mmol/L    CO2 27 21 - 32 mmol/L    Anion gap 8 3.0 - 18 mmol/L    Glucose 176 (H) 74 - 99 mg/dL    BUN 15 7.0 - 18 MG/DL    Creatinine 1.11 0.6 - 1.3 MG/DL    BUN/Creatinine ratio 14 12 - 20      GFR est AA >60 >60 ml/min/1.73m2    GFR est non-AA >60 >60 ml/min/1.73m2    Calcium 8.9 8.5 - 10.1 MG/DL    Bilirubin, total 0.2 0.2 - 1.0 MG/DL    ALT (SGPT) 22 16 - 61 U/L    AST (SGOT) 29 15 - 37 U/L    Alk.  phosphatase 91 45 - 117 U/L    Protein, total 8.7 (H) 6.4 - 8.2 g/dL    Albumin 4.1 3.4 - 5.0 g/dL Globulin 4.6 (H) 2.0 - 4.0 g/dL    A-G Ratio 0.9 0.8 - 1.7     LIPASE    Collection Time: 02/03/19  7:29 PM   Result Value Ref Range    Lipase 299 73 - 393 U/L   ETHYL ALCOHOL    Collection Time: 02/03/19  7:29 PM   Result Value Ref Range    ALCOHOL(ETHYL),SERUM 195 (H) 0 - 3 MG/DL   ACETAMINOPHEN    Collection Time: 02/03/19  7:29 PM   Result Value Ref Range    Acetaminophen level <2 (L) 10.0 - 91.2 ug/mL   SALICYLATE    Collection Time: 02/03/19  7:29 PM   Result Value Ref Range    Salicylate level <3.8 (L) 2.8 - 20.0 MG/DL       Radiologist and cardiologist interpretations if available at time of this note:  Radiology results:  No results found. Medication(s) ordered for patient during this emergency visit encounter:  Medications   sodium chloride 0.9 % bolus infusion 1,000 mL (1,000 mL IntraVENous New Bag 2/3/19 0056)       Pt care assumed from Dr. Cristi Carpenter , ED provider. Pt complaint(s), current treatment plan, progression and available diagnostic results have been discussed thoroughly. Rounding occurred: no  Intended Disposition: Transfer  Pending diagnostic reports and/or labs (please list): [facility] case management transfer of a SI patient to an inpatient behavioral medicine facility. Consult Tele-Psychiatry    Informed by staff that patient states he is no longer suicidal, believes it was due to alcohol intoxication. Will have patient reevaluated by telepsychiatry. The on call tele-psychiatry was called and the patient was presented for psychiatry consult. I personally spoke with Dr. Tristan Webb, in the tele-psychiatry group, about the patient's presentation and management. 11:11 AM  Patient's pcp, per Dr. Tristan Webb, is writing for his psych meds and states he will not longer do that. Needs referral for outpatient psych care. Coding Diagnoses     Clinical Impression:   1. Suicidal ideations    2. Alcohol abuse        Disposition     Disposition:  Transfer. Leonora Enrique M.D.   PATRICA Hendrix Certified Emergency Physician

## 2019-02-04 NOTE — ED NOTES
Pt belongings in 2 bags to locker #2 and 1 bag sent to security. All items were checked and paper scrubs put on pt.

## 2019-02-04 NOTE — PROGRESS NOTES
Received a call back from Celine Sanders, crisis nurse from [facility] and states that they have 5 pts in their ED and also have limited staffing.

## 2019-02-04 NOTE — PROGRESS NOTES
Called [facility] and left a message for crisis nurse to return call, spoke with [facility].

## 2019-02-04 NOTE — CONSULTS
Name: María Elena Carias      : [birth year]. 43M    Date:  19     Time: 5:20am  Location of patient: [facility] ED  Location of doctor: [place]  This evaluation was conducted via telepsychiatry with the assistance of onsite staff. Chief Complaint: depression / SI    History of Present Illness:   Pt seen via televideo with the help of onsite staff. Pt is a 40 yo male with hx of depression presents to the hospital, self referred due to  a several week period of ongoing and worsening depressive sxs. Cites sxs inclusive of depressed mood, poor sleep, decreased appetite, feeling helplessness. States he has also been experiencing thoughts of suicide. No reported plan. States he was hospitalized recently however felt that he was discharged too soon as he continued to feel suicidal.  Now much worse. He reported that he lost a custody enriquez for his son. Last saw his son prior to . On ROS, pt denies AVHs, delusions nor HI. Notes ongoing SI, no specific plan reported. Pt is however highly guarded with writer. Pt presents as a danger to himself, requiring acute inpt psychiatric admission for safety, stabilization and treatment. INPT:  prior admissions     Outpt: noncompliant. SI/Attempts:  prior ideation, denies attempts. Substance Use: etoh   Forensic Hx: none reported     Weapons: none reported    Med Hx:  none acute reported     Medications & Freq see chart    Allergies: seroquel  Family Psych History/History of suicide:  none known      Housing: lives with brother   Employment: unemployed   Strengths/supports: brother  Mental Status Exam:   Appearance and attire: hospital attire   Attitude and behavior:  guarded  Affect and mood: depressed/irritable  Association and thought processes: limited, vague  Thought content:  denies delusions, Denies HI.  + SI  Perceptual: Denies AVHs  Sensorium, memory, and orientation: AxOx3  Intellectual Functioning: unable to assess fully.     Insight and judgment: impaired   Diagnosis: MDD severe, recurrent without psychotic features. Assessment: Pt reports ongoing SI, no specific plan reported. Pt is however highly guarded with writer. Pt presents as a danger to himself, requiring acute inpt psychiatric admission for safety, stabilization and treatment. Recommendations:  Pt requires acute inpt psychiatric admission  For safety, stabilization and treatment. Pt is voluntary for inpt treatment, requesting preference for [place]. Writer informed that could not be guaranteed. Should the pt no longer agree to voluntary admission, he will require screening by the [facility]. Please reconcile and continue pts current outpt medication regimen. Christiano Whiteside

## 2019-02-04 NOTE — ED NOTES
Pt has repeatedly stated that he'd be leaving in 10 minutes if he does not get meds to help calm him down. Informed Lotus Murray NP, of pts' statements.

## 2019-02-04 NOTE — CONSULTS
Follow-up Consult  TELEPSYCHIATRY is done with the help of onsite staff: Dr Milagro Schwartz    Patient location: [facility])  Physician location: [place]    Patient Name: Evlie Cardenas   : 1975  ZFK:[id number]  Admit Date:2/3/2019  7:20 PM    Reason for consult: safety evaluation    Interval History:   Staff notes were reviewed in chart. Elvie Cardenas is a 39 y.o. male with reported alcohol abuse and depression who came in intoxicated and suicidal yesterday. Serum alcohol was 195 at 1929 yesterday. Tele-psychiatry saw him and noted passive SI. Staff today report that patient has sobered and says he is no longer suicidal and wants to go home. On interview, says he feels \"better\" and mostly now just wants help getting back to outpatient mental health. He's been having trouble finding a psychiatrist who takes Medicaid and primary care is telling him they won't continue to write for his zoloft and klonopin. He's been out of zoloft for a week but he's been making his klonopin stretch such that he still usually has one pill to take every day if needed. Patient says he never had any plans for self harm and has never harmed himself in the past. No HI or symptoms of psychosis. He is future oriented and plans to  his son from school today. Also, he plans to go back to . He's been sober 2 weeks but relapsed briefly yesterday. MEDICATIONS:  Current hospital medications:   Current Outpatient Medications   Medication Sig Dispense Refill    clonazePAM (KLONOPIN) 0.5 mg tablet Take 1 Tab by mouth three (3) times daily as needed. Max Daily Amount: 1.5 mg. 90 Tab 0    metoprolol succinate (TOPROL-XL) 100 mg tablet TAKE 1 TABLET BY MOUTH DAILY 90 Tab 1    disulfiram (ANTABUSE) 250 mg tablet Take 1 Tab by mouth daily. 14 Tab 0    escitalopram oxalate (LEXAPRO) 10 mg tablet Take 1 Tab by mouth daily.  30 Tab 0    cyclobenzaprine (FLEXERIL) 10 mg tablet TAKE 1 TABLET BY MOUTH THREE TIMES DAILY AS NEEDED FOR MUSCLE SPASMS 90 Tab 0    amLODIPine (NORVASC) 5 mg tablet TAKE 1 TABLET BY MOUTH EVERY DAY  2     MENTAL STATUS EXAM:     General Appearance and Behavior: appropriate, cooperative   Speech: normal rate, tone, & volume   Thought Process: goal directed   Thought Content: denies SI, HI, hallucinations, and delusions   Mood: \"better\"   Affect: stable   Sensorium and Intellect: alert, oriented to situation   Insight/Judgement: fair    ASSESSMENT    Federico Burgos is a 39 y.o. male with alcohol abuse and depression who was passively suicidal while intoxicated and seeking return to the [facility] when first speaking with telepsychiatry. This morning, patient reports that he feels \"better\" and mostly needs help getting back to outpatient mental health. Protective factors include lack of past self harm, AA supports for the last 2 weeks, and future-orientation. SI fully resolve on sobering. No HI or symptoms of psychosis. Diagnoses:   Patient Active Problem List   Diagnosis Code    Hypertension I10    Chronic low back pain M54.5, G89.29    Diabetes mellitus (HCC) E11.9    ACP (advance care planning) Z71.89    Anxiety F41.9    Drug-seeking behavior Z76.5    Obesity (BMI 30.0-34. 9) E66.9    Cocaine use F14.90    MDD (major depressive disorder), recurrent episode, moderate (HCC) F33.1    RHONDA (generalized anxiety disorder) F41.1    Diabetes ([address] E11.9    Alcohol abuse F10.10    Cocaine abuse ([facility] [address] F14.10    Dysthymia F34.1     Treatment Recommendations:  1. Disposition: refer to outpatient mental health   2.  Psychiatric medications: may give klonopin 0.5mg x1 in the ED but would advise against providing any prescriptions to leave hospital    Electronically signed by:  Sam Rhodes MD 2/4/2019 10:51 AM

## 2019-02-04 NOTE — ED TRIAGE NOTES
Patient wants to get into detox program. \"I feel like my body is going to explode. \" Reports HI. States last drink in parking lot prior to coming in. States he is going to explode and hurt someone.

## 2019-02-18 ENCOUNTER — OFFICE VISIT (OUTPATIENT)
Dept: ORTHOPEDIC SURGERY | Age: 44
End: 2019-02-18

## 2019-02-18 VITALS
BODY MASS INDEX: 32.1 KG/M2 | HEART RATE: 80 BPM | DIASTOLIC BLOOD PRESSURE: 94 MMHG | OXYGEN SATURATION: 97 % | TEMPERATURE: 98 F | SYSTOLIC BLOOD PRESSURE: 134 MMHG | WEIGHT: 224.2 LBS | HEIGHT: 70 IN

## 2019-02-18 DIAGNOSIS — M51.36 ANNULAR TEAR OF LUMBAR DISC: Primary | ICD-10-CM

## 2019-02-18 DIAGNOSIS — M51.36 BULGING LUMBAR DISC: ICD-10-CM

## 2019-02-18 RX ORDER — OXYCODONE AND ACETAMINOPHEN 5; 325 MG/1; MG/1
1 TABLET ORAL
Qty: 90 TAB | Refills: 0 | Status: SHIPPED | OUTPATIENT
Start: 2019-02-18 | End: 2020-06-03

## 2019-02-18 RX ORDER — CYCLOBENZAPRINE HCL 10 MG
TABLET ORAL
Qty: 90 TAB | Refills: 0 | Status: SHIPPED | OUTPATIENT
Start: 2019-02-18 | End: 2020-06-03

## 2019-02-18 NOTE — PROGRESS NOTES
HISTORY OF PRESENT ILLNESS    Devang is a 39y.o. year old male comes in today as new patient to be evaluated and treated at the request of LIGIA Adler MD for my opinion/advice regarding: back pain    Patients symptoms have been present for 5-6 years. Pain level 9/10, It has slightly improved with stretches and ibuprofen 800mg. Patient has tried:  Epidurals about 6 monhts ago lasted <1 week. It is described as pain low back w/o known trauma and had been to spine prior and recommended surgery about  A year ago. IMAGING: MRI Lumbar 10/1/18  IMPRESSION:  1. Stable small nonimpinging left foraminal disc protrusion with annular fissure  L4/5.  2. Minimal bilateral facet joint effusions L4/5.  3. No other significant finding or other significant change. Past Surgical History:   Procedure Laterality Date    HX HERNIA REPAIR Bilateral 06/02/2017    robotic repair of bilateral indirect inguinal hernias with placement of mesh    HX ORTHOPAEDIC  trigger finger release     Social History     Socioeconomic History    Marital status:      Spouse name: Not on file    Number of children: Not on file    Years of education: Not on file    Highest education level: Not on file   Tobacco Use    Smoking status: Never Smoker    Smokeless tobacco: Never Used   Substance and Sexual Activity    Alcohol use: Yes     Comment: rarely    Drug use: No    Sexual activity: Yes     Partners: Female     Birth control/protection: Condom     Current Outpatient Medications   Medication Sig Dispense Refill    metoprolol succinate (TOPROL-XL) 100 mg tablet TAKE 1 TABLET BY MOUTH DAILY 90 Tab 1    disulfiram (ANTABUSE) 250 mg tablet Take 1 Tab by mouth daily. 14 Tab 0    escitalopram oxalate (LEXAPRO) 10 mg tablet Take 1 Tab by mouth daily.  30 Tab 0    cyclobenzaprine (FLEXERIL) 10 mg tablet TAKE 1 TABLET BY MOUTH THREE TIMES DAILY AS NEEDED FOR MUSCLE SPASMS 90 Tab 0    amLODIPine (NORVASC) 5 mg tablet TAKE 1 TABLET BY MOUTH EVERY DAY  2    clonazePAM (KLONOPIN) 0.5 mg tablet Take 1 Tab by mouth three (3) times daily as needed. Max Daily Amount: 1.5 mg. 90 Tab 0     Past Medical History:   Diagnosis Date    Depression     Diabetes ([facility] [address]     Drug-seeking behavior     56 prescriptions from 32 different prescribers at 6 different pharmacies in last 12 months    Herniated lumbar intervertebral disc     Hypertension     Neurological disorder L3,4,5 torn Herniated disc    Obesity (BMI 30.0-34. 5)      Family History   Problem Relation Age of Onset    Hypertension Mother     Diabetes Mother     Heart Failure Mother     COPD Mother     Heart Disease Mother     Hypertension Father     Alcohol abuse Father        ROS:  + numb bilateral and incontinence urine and stool and weakness left leg. No fever. All other systems reviewed and negative aside from that written in the HPI. Objective:  BP (!) 134/94   Pulse 80   Temp 98 °F (36.7 °C) (Oral)   Ht 5' 10\" (1.778 m)   Wt 224 lb 3.2 oz (101.7 kg)   SpO2 97%   BMI 32.17 kg/m²   GEN:  Appears stated age in NAD. NEURO:  Sensation intact to light touch but decreased left leg. Reflexes +1/4 patellar and Achilles bilaterally. M/S:  Examined seated and supine. Slump negative. LE Strength +4/5 left leg plantar flexor bilaterally Piriformis normal bilaterally  EXT:  no clubbing/cyanosis. no edema. SKIN: Warm & dry w/o rash. HEENT: Conjunctiva/lids WNL. External canals/nares WNL. Tongue midline. PERRL, EOMI. Hearing intact. NECK: Trachea midline. Supple, Full ROM. No thyromegaly. CARDIAC: No edema. LUNGS: Normal effort. ABD: Soft, NT. No HSM. PSYCH: A+O x3. Appropriate judgment and insight. Assessment/Plan:     ICD-10-CM ICD-9-CM    1.  Annular tear of lumbar disc M51.36 722.52 oxyCODONE-acetaminophen (PERCOCET) 5-325 mg per tablet      cyclobenzaprine (FLEXERIL) 10 mg tablet      REFERRAL TO ORTHOPEDICS   2. Bulging lumbar disc M51.26 722.10 oxyCODONE-acetaminophen (PERCOCET) 5-325 mg per tablet      cyclobenzaprine (FLEXERIL) 10 mg tablet      REFERRAL TO ORTHOPEDICS       Patient (or guardian if minor) verbalizes understanding of evaluation and plan. Will send Rx for percocet and flexeril and send to [facility] for likely intervention.

## 2019-02-19 ENCOUNTER — OFFICE VISIT (OUTPATIENT)
Dept: ORTHOPEDIC SURGERY | Age: 44
End: 2019-02-19

## 2019-02-19 ENCOUNTER — TELEPHONE (OUTPATIENT)
Dept: ORTHOPEDIC SURGERY | Age: 44
End: 2019-02-19

## 2019-02-19 VITALS
WEIGHT: 221.6 LBS | RESPIRATION RATE: 19 BRPM | OXYGEN SATURATION: 97 % | HEART RATE: 97 BPM | HEIGHT: 70 IN | DIASTOLIC BLOOD PRESSURE: 97 MMHG | TEMPERATURE: 98 F | SYSTOLIC BLOOD PRESSURE: 142 MMHG | BODY MASS INDEX: 31.73 KG/M2

## 2019-02-19 DIAGNOSIS — M54.50 CHRONIC BILATERAL LOW BACK PAIN WITHOUT SCIATICA: ICD-10-CM

## 2019-02-19 DIAGNOSIS — R32 URINARY AND FECAL INCONTINENCE: Primary | ICD-10-CM

## 2019-02-19 DIAGNOSIS — R15.9 URINARY AND FECAL INCONTINENCE: Primary | ICD-10-CM

## 2019-02-19 DIAGNOSIS — G89.29 CHRONIC BILATERAL LOW BACK PAIN WITHOUT SCIATICA: ICD-10-CM

## 2019-02-19 DIAGNOSIS — R20.0 BILATERAL LEG NUMBNESS: ICD-10-CM

## 2019-02-19 DIAGNOSIS — M54.6 THORACIC SPINE PAIN: ICD-10-CM

## 2019-02-19 DIAGNOSIS — M54.2 CERVICAL PAIN: ICD-10-CM

## 2019-02-19 RX ORDER — IBUPROFEN 800 MG/1
TABLET ORAL
COMMUNITY
End: 2020-06-03

## 2019-02-19 RX ORDER — DIAZEPAM 10 MG/1
TABLET ORAL
Qty: 1 TAB | Refills: 0 | OUTPATIENT
Start: 2019-02-19 | End: 2020-06-03

## 2019-02-19 NOTE — TELEPHONE ENCOUNTER
Pt returned call. Pt informed that since he was seeing Dr Racquel Thurman today for the issues he saw Dr Claudia Shepard initially. Pt informed the PA will need to be done by Dr Monette Lundborg office and just in case he is placed on a different medication regimen. Pt understood and thanked writer for call and information.

## 2019-02-19 NOTE — PROGRESS NOTES
[address]  [address]  [address]  Phone: [phone]  Fax: [phone]  INITIAL CONSULTATION  Patient: Vibha Paz                MRN: [id number]       SSN: [id number]  YOB: 1975        AGE: 39 y.o. SEX: male  Body mass index is 31.8 kg/m². PCP: Noy Vides,   02/19/19    Chief Complaint   Patient presents with    Back Pain     Lower     Buttocks pain     Left     Leg Pain     Bilateral     New Patient     Surgery Consult          HISTORY OF PRESENT ILLNESS, RADIOGRAPHS, and PLAN:         HISTORY OF PRESENT ILLNESS:  Mr. Azul Lofton is seen today at the request of Dr. Va Palacio. The patient is a 76-year-old male. 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 He has had chronic low back pain. Evidently, has been through pain management in the past with injections and the like. He is noted to have a progression of tingling and numbness in his legs. He has noticed onset of bowel and bladder dysfunction with incontinence. It sounds like overflow incontinence with a lack of sensation when he has to urinate or defecate. He has noticed some loss of dexterity with dropping things. Denies fever, chills, night sweats, weight loss or weight gain. He rates his pain as between an 8/10 and 10/10. Most activities make him worse. He has an MRI of his lumbar spine that demonstrates an annular tear at L4-L5 without nerve root compression. There is no other lumbar spinal stenosis. PHYSICAL EXAMINATION:  His physical exam demonstrates good range of motion of his cervical and lumbar spines. Good strength of his deltoids, biceps, triceps and intrinsics. No clonus, Wing or Babinski. Diffuse dysesthetic sensation. No real reflex change. No long tract signs. ASSESSMENT/PLAN:  The patient has several different problems.   He was referred to me with concern that his lumbar pathology was causing his incontinence. He has no lumbar stenosis. No cauda equina syndrome. His annular tear could be a source of chronic back pain, but it is not of neurologic import. This is a separate issue. With regard to his chronic back pain, I am going to have him seen at the [facility] and evaluate for the efficacy of a pain management situation. I do not see any surgical issues for him. Whether he is a candidate for further blocks, injections, medications, or rhizotomies, I would leave to their interventional wisdom at this point. With regard to his presentation with this progressive picture, he gives some descriptions of a myelopathic picture than an objective neurology on exam.  I would obtain an MRI of his cervical and thoracic spines, given his change in dexterity and balance that he notes as well as the incontinence. I would have him see neurology for their opinion, as to the source of his dysfunction. I think they will likely obtain an EMG, but I would like their opinion as to this neuropathic sensation in his arms and legs as well as his incontinence. They may want to get a brain MRI, but I would leave that up to them. I am going to have him see urology for evaluation of his incontinence. They may want to get a bladder cystometrogram or post void residual and start him on a medication such as Flomax, but I would rather not alter his presentation by medications before they have a chance to see him. At this point, I have no surgical pathology to address, but I am concerned by his presentation of pan incontinence and neuropathic symptoms in his arms and legs.       cc:  Dr. Tamiko Millan           Past Medical History:   Diagnosis Date    Depression     Diabetes [facility])     Drug-seeking behavior     56 prescriptions from 32 different prescribers at 6 different pharmacies in last 12 months    Herniated lumbar intervertebral disc     Hypertension     Neurological disorder L3,4,5 torn Herniated disc    Obesity (BMI 30.0-34. 5)        Family History   Problem Relation Age of Onset    Hypertension Mother     Diabetes Mother     Heart Failure Mother     COPD Mother     Heart Disease Mother     Hypertension Father     Alcohol abuse Father        Current Outpatient Medications   Medication Sig Dispense Refill    ibuprofen (MOTRIN) 800 mg tablet Take  by mouth.  cyclobenzaprine (FLEXERIL) 10 mg tablet TAKE 1 TABLET BY MOUTH THREE TIMES DAILY AS NEEDED FOR MUSCLE SPASMS 90 Tab 0    metoprolol succinate (TOPROL-XL) 100 mg tablet TAKE 1 TABLET BY MOUTH DAILY 90 Tab 1    escitalopram oxalate (LEXAPRO) 10 mg tablet Take 1 Tab by mouth daily. 30 Tab 0    amLODIPine (NORVASC) 5 mg tablet TAKE 1 TABLET BY MOUTH EVERY DAY  2    oxyCODONE-acetaminophen (PERCOCET) 5-325 mg per tablet Take 1 Tab by mouth every eight (8) hours as needed for Pain. Max Daily Amount: 3 Tabs. 90 Tab 0    disulfiram (ANTABUSE) 250 mg tablet Take 1 Tab by mouth daily. 14 Tab 0    clonazePAM (KLONOPIN) 0.5 mg tablet Take 1 Tab by mouth three (3) times daily as needed. Max Daily Amount: 1.5 mg. 90 Tab 0       Allergies   Allergen Reactions    Seroquel [Quetiapine] Other (comments)     Pt states he started talking crazy saying weird things       Past Surgical History:   Procedure Laterality Date    HX HERNIA REPAIR Bilateral 06/02/2017    robotic repair of bilateral indirect inguinal hernias with placement of mesh    HX ORTHOPAEDIC  trigger finger release       Past Medical History:   Diagnosis Date    Depression     Diabetes ([facility] [address]     Drug-seeking behavior     56 prescriptions from 32 different prescribers at 6 different pharmacies in last 12 months    Herniated lumbar intervertebral disc     Hypertension     Neurological disorder L3,4,5 torn Herniated disc    Obesity (BMI 30.0-34. 9)        Social History     Socioeconomic History    Marital status:      Spouse name: Not on file    Number of children: Not on file    Years of education: Not on file    Highest education level: Not on file   Social Needs    Financial resource strain: Not on file    Food insecurity - worry: Not on file    Food insecurity - inability: Not on file    Transportation needs - medical: Not on file   [place] needs - non-medical: Not on file   Occupational History    Not on file   Tobacco Use    Smoking status: Never Smoker    Smokeless tobacco: Never Used   Substance and Sexual Activity    Alcohol use: Yes     Comment: rarely    Drug use: No    Sexual activity: Yes     Partners: Female     Birth control/protection: Condom   Other Topics Concern    Not on file   Social History Narrative    Not on file           REVIEW OF SYSTEMS:   CONSTITUTIONAL SYMPTOMS:  Negative. EYES:  Negative. EARS, NOSE, THROAT AND MOUTH:  Negative. CARDIOVASCULAR:  Negative. RESPIRATORY:  Negative. GENITOURINARY: Per HPI. GASTROINTESTINAL:  Per HPI. INTEGUMENTARY (SKIN AND/OR BREAST):  Negative. MUSCULOSKELETAL: Per HPI.   ENDOCRINE/RHEUMATOLOGIC:  Negative. NEUROLOGICAL:  Per HPI. HEMATOLOGIC/LYMPHATIC:  Negative. ALLERGIC/IMMUNOLOGIC:  Negative. PSYCHIATRIC:  Negative. PHYSICAL EXAMINATION:   Visit Vitals  BP (!) 142/97   Pulse 97   Temp 98 °F (36.7 °C)   Resp 19   Ht 5' 10\" (1.778 m)   Wt 221 lb 9.6 oz (100.5 kg)   SpO2 97%   BMI 31.80 kg/m²    PAIN SCALE: 8/10    CONSTITUTIONAL: The patient is in no apparent distress and is alert and oriented x 3. HEENT: Normocephalic. Hearing grossly intact. NECK: Supple and symmetric. no tenderness, or masses were felt. RESPIRATORY: No labored breathing. CARDIOVASCULAR: The carotid pulses were normal. Peripheral pulses were 2+. CHEST: Normal AP diameter and normal contour without any kyphoscoliosis. LYMPHATIC: No lymphadenopathy was appreciated in the neck, axillae or groin. SKIN:  Negative for scars, rashes, lesions, or ulcers on the right upper, right lower, left upper, left lower and trunk. NEUROLOGICAL: Alert and oriented x 3. Ambulation without assistive device. FWB. Imbalance. EXTREMITIES:  See musculoskeletal.  MUSCULOSKELETAL:   Head and Neck:  Negative for misalignment, asymmetry, crepitation, defects, tenderness masses or effusions.  Left Upper Extremity: Paresthesia in fingers. Inspection, percussion and palpation performed. Valencias sign is negative.  Right Upper Extremity: Paresthesia in fingers. Inspection, percussion and palpation performed. Valencias sign is negative.  Spine, Ribs and Pelvis: Mid to low back pain with radiating BLE pain. B/L buttock pain. Inspection, percussion and palpation performed. Negative for misalignment, asymmetry, crepitation, defects, tenderness masses or effusions.  Left Lower Extremity: Numbness and pain. Inspection, percussion and palpation performed. Negative straight leg raise.  Right Lower Extremity: Numbness and pain. Inspection, percussion and palpation performed. Negative straight leg raise. SPINE EXAM:     Cervical spine: Neck is midline. Normal muscle tone. No focal atrophy is noted. Lumbar spine: No rash, ecchymosis, or gross obliquity. No focal atrophy is noted. ASSESSMENT    ICD-10-CM ICD-9-CM    1. Urinary and fecal incontinence R32 788.30 REFERRAL TO UROLOGY    R15.9 787.60    2. Thoracic spine pain M54.6 724.1 MRI THORAC SPINE WO CONT      REFERRAL TO NEUROLOGY   3. Cervical pain M54.2 723.1 MRI CERV SPINE WO CONT      REFERRAL TO NEUROLOGY   4. Bilateral leg numbness R20.0 782.0 REFERRAL TO NEUROLOGY   5. Chronic bilateral low back pain without sciatica M54.5 724.2 REFERRAL TO PAIN MANAGEMENT    G89.29 338.29        Written by Maria Ines Mcneill, as dictated by Taya Engel MD.    I, Dr. Taya Engel MD, confirm that all documentation is accurate.

## 2019-02-19 NOTE — TELEPHONE ENCOUNTER
Patient called again regarding this, I was attempting to reach the clinical staff but while I was doing that the phone jessica was disconnected. Please advise patient back at [phone].

## 2019-02-19 NOTE — TELEPHONE ENCOUNTER
Patient says he called our office yesterday to initiate prior auth for Percocet prescription and wanted to check status as he needs his pain medication. Please contact patient at [phone] to advise.

## 2019-02-19 NOTE — TELEPHONE ENCOUNTER
Attempted to contact pt at his cell phone number. Voicemail picked up so message left for pt to return call.

## 2019-03-11 NOTE — TELEPHONE ENCOUNTER
Patient is requesting a refill of his diabetic supplies.     Requested Prescriptions     Pending Prescriptions Disp Refills    ONETOUCH ULTRA BLUE TEST STRIP strip  0

## 2019-03-19 ENCOUNTER — TELEPHONE (OUTPATIENT)
Dept: INTERNAL MEDICINE CLINIC | Age: 44
End: 2019-03-19

## 2019-03-19 NOTE — TELEPHONE ENCOUNTER
Called patient to inform him the refill for diabetic supplies was approved and sent to the pharmacy. Patient did not answer the phone and a message was not left due to not accepting calls at this time.

## 2019-03-19 NOTE — TELEPHONE ENCOUNTER
Received fax from pharmacy stating that the One Touch Ultra Blue Test Strips are not covered. Pt will need to contact insurance company to see what test strips are covered.     Left vm for pt to return call

## 2019-08-06 NOTE — ED NOTES
Patient resting comfortably in the bed at this time, eyes closed, snoring lightly, in NAD. Side rails up x2, bed in lowest and locked position. Sitter at bedside. Will continue to monitor. 1.65

## 2020-03-17 ENCOUNTER — HOSPITAL ENCOUNTER (EMERGENCY)
Age: 45
Discharge: STILL ADMITTED OR ADMINISTRATIVE | End: 2020-03-17
Attending: EMERGENCY MEDICINE
Payer: MEDICAID

## 2020-03-17 PROCEDURE — 75810000275 HC EMERGENCY DEPT VISIT NO LEVEL OF CARE

## 2020-03-18 ENCOUNTER — PATIENT OUTREACH (OUTPATIENT)
Dept: CASE MANAGEMENT | Age: 45
End: 2020-03-18

## 2020-03-19 ENCOUNTER — PATIENT OUTREACH (OUTPATIENT)
Dept: CASE MANAGEMENT | Age: 45
End: 2020-03-19

## 2020-03-19 NOTE — PROGRESS NOTES
Contacted patient for Transitions of Care Coordination  follow up. No answer. Will attempt to contact at a later time.

## 2020-03-20 ENCOUNTER — PATIENT OUTREACH (OUTPATIENT)
Dept: CASE MANAGEMENT | Age: 45
End: 2020-03-20

## 2020-03-20 NOTE — PROGRESS NOTES
Contacted patient for Transitions of Care Coordination  follow up. No answer. Left message introducing self, role and reason for call. Requested return call. Contact information provided. 3rd attempt.

## 2020-07-07 ENCOUNTER — VIRTUAL VISIT (OUTPATIENT)
Dept: INTERNAL MEDICINE CLINIC | Age: 45
End: 2020-07-07

## 2020-07-07 DIAGNOSIS — D22.9 CHANGE IN SKIN MOLE: ICD-10-CM

## 2020-07-07 DIAGNOSIS — G47.00 INSOMNIA, UNSPECIFIED TYPE: Primary | ICD-10-CM

## 2020-07-07 RX ORDER — TRAZODONE HYDROCHLORIDE 50 MG/1
50 TABLET ORAL
Qty: 30 TAB | Refills: 0 | Status: SHIPPED | OUTPATIENT
Start: 2020-07-07 | End: 2020-07-14

## 2020-07-07 NOTE — PROGRESS NOTES
Veena Delarosa is a 38 y.o. male who was seen by synchronous (real-time) audio-video technology on 7/7/2020 for Sleep Apnea and Mole (under left arm )        Assessment & Plan:   Diagnoses and all orders for this visit:    1. Insomnia, unspecified type  -     traZODone (DESYREL) 50 mg tablet; Take 1 Tab by mouth nightly. 2. Change in skin mole  -     REFERRAL TO DERMATOLOGY      Follow-up and Dispositions    · Return if symptoms worsen or fail to improve. Subjective:     1) Sleep disturbance - 5 days has not slept- patient has tried tylenol PM - He does not consume caffeine - Denies changes in sleep pattern. 2) Mole- is under his left arm- has black tip on it and hurts now- has grown in size in the last two weeks. Prior to Admission medications    Medication Sig Start Date End Date Taking? Authorizing Provider   traZODone (DESYREL) 50 mg tablet Take 1 Tab by mouth nightly. 7/7/20  Yes Fawn Garcia NP   methocarbamoL (Robaxin-750) 750 mg tablet Take 1 Tab by mouth four (4) times daily. 6/3/20 7/7/20  Diamond Rich PA     Past Medical History:   Diagnosis Date    Depression     Diabetes ([facility] [address]     Drug-seeking behavior     56 prescriptions from 32 different prescribers at 6 different pharmacies in last 12 months    Herniated lumbar intervertebral disc     Hypertension     Neurological disorder L3,4,5 torn Herniated disc    Obesity (BMI 30.0-34. 9)        Review of Systems   Constitutional: Negative for chills, fever and malaise/fatigue. Eyes: Negative for blurred vision and double vision. Respiratory: Negative for cough, shortness of breath and wheezing. Cardiovascular: Negative for chest pain, palpitations and leg swelling. Neurological: Negative for dizziness and headaches. Psychiatric/Behavioral: Negative for depression. The patient is nervous/anxious and has insomnia. Objective:   No flowsheet data found.      [INSTRUCTIONS:  \"[x]\" Indicates a positive item  \"[]\" Indicates a negative item  -- DELETE ALL ITEMS NOT EXAMINED]    Constitutional: [x] Appears well-developed and well-nourished [x] No apparent distress      [] Abnormal -     Mental status: [x] Alert and awake  [x] Oriented to person/place/time [x] Able to follow commands    [] Abnormal -     Eyes:   EOM    [x]  Normal    [] Abnormal -   Sclera  [x]  Normal    [] Abnormal -          Discharge [x]  None visible   [] Abnormal -     HENT: [x] Normocephalic, atraumatic  [] Abnormal -   [x] Mouth/Throat: Mucous membranes are moist    External Ears [x] Normal  [] Abnormal -    Neck: [x] No visualized mass [] Abnormal -     Pulmonary/Chest: [x] Respiratory effort normal   [x] No visualized signs of difficulty breathing or respiratory distress        [] Abnormal -      Musculoskeletal:   [x] Normal gait with no signs of ataxia         [x] Normal range of motion of neck        [] Abnormal -     Neurological:        [x] No Facial Asymmetry (Cranial nerve 7 motor function) (limited exam due to video visit)          [x] No gaze palsy        [] Abnormal -          Skin:        [x] No significant exanthematous lesions or discoloration noted on facial skin         [] Abnormal -            Psychiatric:       [x] Normal Affect [] Abnormal -        [x] No Hallucinations    Other pertinent observable physical exam findings:-        We discussed the expected course, resolution and complications of the diagnosis(es) in detail. Medication risks, benefits, costs, interactions, and alternatives were discussed as indicated. I advised him to contact the office if his condition worsens, changes or fails to improve as anticipated. He expressed understanding with the diagnosis(es) and plan. Gio Pineda, who was evaluated through a patient-initiated, synchronous (real-time) audio-video encounter, and/or his healthcare decision maker, is aware that it is a billable service, with coverage as determined by his insurance carrier.  He provided verbal consent to proceed: Yes, and patient identification was verified. It was conducted pursuant to the emergency declaration under the [address] [place] [address] and the [employer] and [email or web address] General Act. A caregiver was present when appropriate. Ability to conduct physical exam was limited. I was in the office. The patient was at home.       Stephen Lo NP

## 2020-07-07 NOTE — PROGRESS NOTES
VV  Identified pt with two pt identifiers(name and ). Reviewed record in preparation for visit and have obtained necessary documentation. Chief Complaint   Patient presents with    Sleep Apnea    Mole     under left arm           Federico Greer is due for:  Health Maintenance Due   Topic    Eye Exam Retinal or Dilated     DTaP/Tdap/Td series (1 - Tdap)    Lipid Screen     Foot Exam Q1     MICROALBUMIN Q1     A1C test (Diabetic or Prediabetic)      Health Maintenance reviewed and discussed per provider  Please order/place referral if appropriate. Advance Directive:  1. Do you have an advance directive in place? Patient Reply: NO    2. If not, would you like material regarding how to put one in place? NO    Coordination of Care:  1. Have you been to the ER, urgent care clinic since your last visit? Hospitalized since your last visit? NO    2. Have you seen or consulted any other health care providers outside of the [address] [place] [address] since your last visit? Include any pap smears or colon screening. NO        Learning Assessment:  Learning Assessment 3/3/2017 2016 3/16/2016   PRIMARY LEARNER Patient Patient Patient   HIGHEST LEVEL OF EDUCATION - PRIMARY LEARNER  GRADUATED HIGH SCHOOL OR GED GRADUATED HIGH SCHOOL OR GED GRADUATED HIGH SCHOOL OR GED   BARRIERS PRIMARY LEARNER NONE - -   CO-LEARNER CAREGIVER No - -   PRIMARY LANGUAGE ENGLISH ENGLISH ENGLISH   LEARNER PREFERENCE PRIMARY READING DEMONSTRATION READING   ANSWERED BY patient patient DARIAN Hammerayush   RELATIONSHIP SELF SELF SELF     Depression Screening:  3 most recent [facility] Screens 2018 2018 2017 2017 2017 2017 3/20/2017   PHQ Not Done - - - - - - -   Little interest or pleasure in doing things Not at all Not at all - Not at all Not at all Not at all Not at all   Feeling down, depressed, irritable, or hopeless Not at all Not at all More than half the days Not at all Not at all Not at all Not at all Total Score PHQ 2 0 0 - 0 0 0 0     Abuse Screening:  Abuse Screening Questionnaire 3/16/2016   Do you ever feel afraid of your partner? N   Are you in a relationship with someone who physically or mentally threatens you? N   Is it safe for you to go home?  Y     Fall Risk  n/i

## 2020-07-14 ENCOUNTER — VIRTUAL VISIT (OUTPATIENT)
Dept: INTERNAL MEDICINE CLINIC | Age: 45
End: 2020-07-14

## 2020-07-14 DIAGNOSIS — G47.00 INSOMNIA, UNSPECIFIED TYPE: Primary | ICD-10-CM

## 2020-07-14 NOTE — PROGRESS NOTES
Rm Botello is a 38 y.o. male who was seen by synchronous (real-time) audio-video technology on 7/14/2020 for Insomnia (1 wk fu )        Assessment & Plan:   Diagnoses and all orders for this visit:    1. Insomnia, unspecified type  -     suvorexant (BELSOMRA) 10 mg tablet; Take 1 Tab by mouth nightly as needed for Insomnia. Max Daily Amount: 10 mg.   - Patient to discontinue trazodone     Follow-up and Dispositions    · Return if symptoms worsen or fail to improve. Subjective:     1) Insomnia F/U- Patient currently taking trazodone 50 mg nightly for one week and has tried taking two tablets as well and has not helped his symptoms of no sleep- reports he is very anxious- he had about 2 hours last night - in the past week is probably about an hour of sleep if that at night. Also reports anxiety he has had none stop and due to stress- reports anxiety during the day as well. Prior to Admission medications    Medication Sig Start Date End Date Taking? Authorizing Provider   suvorexant (BELSOMRA) 10 mg tablet Take 1 Tab by mouth nightly as needed for Insomnia. Max Daily Amount: 10 mg. 7/14/20  Yes Juan Burgess NP   traZODone (DESYREL) 50 mg tablet Take 1 Tab by mouth nightly. 7/7/20  Yes Juan Burgess NP     Past Medical History:   Diagnosis Date    Depression     Diabetes ([facility] [address]     Drug-seeking behavior     56 prescriptions from 32 different prescribers at 6 different pharmacies in last 12 months    Herniated lumbar intervertebral disc     Hypertension     Neurological disorder L3,4,5 torn Herniated disc    Obesity (BMI 30.0-34. 9)        Review of Systems   Constitutional: Negative for chills, fever and malaise/fatigue. Respiratory: Negative for cough, shortness of breath and wheezing. Cardiovascular: Negative for chest pain, palpitations and leg swelling. Psychiatric/Behavioral: Negative for depression. The patient is nervous/anxious and has insomnia.         Objective: No flowsheet data found. [INSTRUCTIONS:  \"[x]\" Indicates a positive item  \"[]\" Indicates a negative item  -- DELETE ALL ITEMS NOT EXAMINED]    Constitutional: [x] Appears well-developed and well-nourished [x] No apparent distress      [] Abnormal -     Mental status: [x] Alert and awake  [x] Oriented to person/place/time [x] Able to follow commands    [] Abnormal -     Eyes:   EOM    [x]  Normal    [] Abnormal -   Sclera  [x]  Normal    [] Abnormal -          Discharge [x]  None visible   [] Abnormal -     HENT: [x] Normocephalic, atraumatic  [] Abnormal -   [x] Mouth/Throat: Mucous membranes are moist    External Ears [x] Normal  [] Abnormal -    Neck: [x] No visualized mass [] Abnormal -     Pulmonary/Chest: [x] Respiratory effort normal   [x] No visualized signs of difficulty breathing or respiratory distress        [] Abnormal -      Musculoskeletal:   [x] Normal gait with no signs of ataxia         [x] Normal range of motion of neck        [] Abnormal -     Neurological:        [x] No Facial Asymmetry (Cranial nerve 7 motor function) (limited exam due to video visit)          [x] No gaze palsy        [] Abnormal -          Skin:        [x] No significant exanthematous lesions or discoloration noted on facial skin         [] Abnormal -            Psychiatric:       [x] Normal Affect [] Abnormal -        [x] No Hallucinations    Other pertinent observable physical exam findings:-        We discussed the expected course, resolution and complications of the diagnosis(es) in detail. Medication risks, benefits, costs, interactions, and alternatives were discussed as indicated. I advised him to contact the office if his condition worsens, changes or fails to improve as anticipated. He expressed understanding with the diagnosis(es) and plan.        Shivani Hauser, who was evaluated through a patient-initiated, synchronous (real-time) audio-video encounter, and/or his healthcare decision maker, is aware that it is a billable service, with coverage as determined by his insurance carrier. He provided verbal consent to proceed: Yes, and patient identification was verified. It was conducted pursuant to the emergency declaration under the [address] [place] [address] authority and the [employer] and [employer] General Act. A caregiver was present when appropriate. Ability to conduct physical exam was limited. I was in the office. The patient was at home.       Izabella Jones NP

## 2020-07-14 NOTE — PROGRESS NOTES
VV  Identified pt with two pt identifiers(name and ). Reviewed record in preparation for visit and have obtained necessary documentation. Chief Complaint   Patient presents with    Insomnia     1 wk fu       Juan M Salas preferred language for health care discussion is english/other. Juan M Salas is due for:  Health Maintenance Due   Topic    Eye Exam Retinal or Dilated     DTaP/Tdap/Td series (1 - Tdap)    Lipid Screen     Foot Exam Q1     MICROALBUMIN Q1     A1C test (Diabetic or Prediabetic)      Health Maintenance reviewed and discussed per provider  Please order/place referral if appropriate. Advance Directive:  1. Do you have an advance directive in place? Patient Reply: NO    2. If not, would you like material regarding how to put one in place? NO    Coordination of Care:  1. Have you been to the ER, urgent care clinic since your last visit? Hospitalized since your last visit? NO    2. Have you seen or consulted any other health care providers outside of the [address] [place] [address] since your last visit? Include any pap smears or colon screening. NO        Learning Assessment:  Learning Assessment 3/3/2017 2016 3/16/2016   PRIMARY LEARNER Patient Patient Patient   HIGHEST LEVEL OF EDUCATION - PRIMARY LEARNER  GRADUATED HIGH SCHOOL OR GED GRADUATED HIGH SCHOOL OR GED GRADUATED HIGH SCHOOL OR GED   BARRIERS PRIMARY LEARNER NONE - -   CO-LEARNER CAREGIVER No - -   PRIMARY LANGUAGE ENGLISH ENGLISH ENGLISH   LEARNER PREFERENCE PRIMARY READING DEMONSTRATION READING   ANSWERED BY patient patient DARIAN Gaytan   RELATIONSHIP SELF SELF SELF     Depression Screening:  3 most recent [facility] Screens 2018 2018 2017 2017 2017 2017 3/20/2017   PHQ Not Done - - - - - - -   Little interest or pleasure in doing things Not at all Not at all - Not at all Not at all Not at all Not at all   Feeling down, depressed, irritable, or hopeless Not at all Not at all More than half the days Not at all Not at all Not at all Not at all   Total Score PHQ 2 0 0 - 0 0 0 0     Abuse Screening:  Abuse Screening Questionnaire 3/16/2016   Do you ever feel afraid of your partner? N   Are you in a relationship with someone who physically or mentally threatens you? N   Is it safe for you to go home?  Y     Fall Risk  n/i

## 2020-07-15 NOTE — TELEPHONE ENCOUNTER
Message received form the pharmacy that the [address] is not covered by the patient's INS, requesting a med change. Patient is calling in stating he needs this done and a new medictation TODAY. Can not wait for the Ins to figure out of they are going to cover the medication.

## 2020-07-15 NOTE — LETTER
7/16/2020 1:18 PM 
 
Mr. Fili Davila [address] [address] You was referred to see a psychiatrist at your last office visit with us. Attached is a list of psychiatrist in the area. Most psychiatry office recommends that the patient calls to set up the office, due to personal and family questions that will be asked when making the appointment If you have any questions please feel free to call [phone]. Have a great day.   
 
 
 
Sincerely, 
 
 
Mark Lynch, NP/ cm

## 2020-07-16 NOTE — TELEPHONE ENCOUNTER
Attempted to contact pt at  number, no answer. m for pt to return call to office at [phone] . Will continue to try to contact pt.

## 2020-07-16 NOTE — TELEPHONE ENCOUNTER
Spoke with patient 2 identifiers was confirmed. Patient states he will need to set up a appt with psy so he can work around his schedule. I advised the patient I will mail him out a list of psy in the area and also for him to call his insurance company to see who is in network. Patient verbally understood.

## 2020-07-21 ENCOUNTER — DOCUMENTATION ONLY (OUTPATIENT)
Dept: INTERNAL MEDICINE CLINIC | Age: 45
End: 2020-07-21

## 2020-10-19 NOTE — DISCHARGE INSTRUCTIONS
Learning About How to Have a Healthy Back  What causes back pain? Back pain is often caused by overuse, strain, or injury. For example, people often hurt their backs playing sports or working in the yard, being jolted in a car accident, or lifting something too heavy. Aging plays a part too. Your bones and muscles tend to lose strength as you age, which makes injury more likely. The spongy discs between the bones of the spine (vertebrae) may suffer from wear and tear and no longer provide enough cushion between the bones. A disc that bulges or breaks open (herniated disc) can press on nerves, causing back pain. In some people, back pain is the result of arthritis, broken vertebrae caused by bone loss (osteoporosis), illness, or a spine problem. Although most people have back pain at one time or another, there are steps you can take to make it less likely. How can you have a healthy back? Reduce stress on your back through good posture  Slumping or slouching alone may not cause low back pain. But after the back has been strained or injured, bad posture can make pain worse. · Sleep in a position that maintains your back's normal curves and on a mattress that feels comfortable. Sleep on your side with a pillow between your knees, or sleep on your back with a pillow under your knees. These positions can reduce strain on your back. · Stand and sit up straight. \"Good posture\" generally means your ears, shoulders, and hips are in a straight line. · If you must stand for a long time, put one foot on a stool, ledge, or box. Switch feet every now and then. · Sit in a chair that is low enough to let you place both feet flat on the floor with both knees nearly level with your hips. If your chair or desk is too high, use a footrest to raise your knees. Place a small pillow, a rolled-up towel, or a lumbar roll in the curve of your back if you need extra support.   · Try a kneeling chair, which helps tilt your hips forward. This takes pressure off your lower back. · Try sitting on an exercise ball. It can rock from side to side, which helps keep your back loose. · When driving, keep your knees nearly level with your hips. Sit straight, and drive with both hands on the steering wheel. Your arms should be in a slightly bent position. Reduce stress on your back through careful lifting  · Squat down, bending at the hips and knees only. If you need to, put one knee to the floor and extend your other knee in front of you, bent at a right angle (half kneeling). · Press your chest straight forward. This helps keep your upper back straight while keeping a slight arch in your low back. · Hold the load as close to your body as possible, at the level of your belly button (navel). · Use your feet to change direction, taking small steps. · Lead with your hips as you change direction. Keep your shoulders in line with your hips as you move. · Set down your load carefully, squatting with your knees and hips only. Exercise and stretch your back  · Do some exercise on most days of the week, if your doctor says it is okay. You can walk, run, swim, or cycle. · Stretch your back muscles. Here are a few exercises to try:  [place] on your back, and gently pull one bent knee to your chest. Put that foot back on the floor, and then pull the other knee to your chest.  ¨ Do pelvic tilts. Lie on your back with your knees bent. Tighten your stomach muscles. Pull your belly button (navel) in and up toward your ribs. You should feel like your back is pressing to the floor and your hips and pelvis are slightly lifting off the floor. Hold for 6 seconds while breathing smoothly. ¨ Sit with your back flat against a wall. · Keep your core muscles strong. The muscles of your back, belly (abdomen), and buttocks support your spine. ¨ Pull in your belly and imagine pulling your navel toward your spine. Hold this for 6 seconds, then relax.  Remember to keep breathing normally as you tense your muscles. ¨ Do curl-ups. Always do them with your knees bent. Keep your low back on the floor, and curl your shoulders toward your knees using a smooth, slow motion. Keep your arms folded across your chest. If this bothers your neck, try putting your hands behind your neck (not your head), with your elbows spread apart. ¨ Lie on your back with your knees bent and your feet flat on the floor. Tighten your belly muscles, and then push with your feet and raise your buttocks up a few inches. Hold this position 6 seconds as you continue to breathe normally, then lower yourself slowly to the floor. Repeat 8 to 12 times. ¨ If you like group exercise, try Pilates or yoga. These classes have poses that strengthen the core muscles. Lead a healthy lifestyle  · Stay at a healthy weight to avoid strain on your back. · Do not smoke. Smoking increases the risk of osteoporosis, which weakens the spine. If you need help quitting, talk to your doctor about stop-smoking programs and medicines. These can increase your chances of quitting for good. Where can you learn more? Go to http://jacklynLeostreamdenice.info/. Enter L315 in the search box to learn more about \"Learning About How to Have a Healthy Back. \"  Current as of: March 21, 2017  Content Version: 11.4  © 0421-5240 Healthwise, [employer]. Care instructions adapted under license by [employer] (which disclaims liability or warranty for this information). If you have questions about a medical condition or this instruction, always ask your healthcare professional. [place] [address] any warranty or liability for your use of this information. Learning About Relief for Back Pain  What is back tension and strain? Back strain happens when you overstretch, or pull, a muscle in your back. You may hurt your back in an accident or when you exercise or lift something.   Most back pain will get better with rest and time. You can take care of yourself at home to help your back heal.  What can you do first to relieve back pain? When you first feel back pain, try these steps:  · Walk. Take a short walk (10 to 20 minutes) on a level surface (no slopes, hills, or stairs) every 2 to 3 hours. Walk only distances you can manage without pain, especially leg pain. · Relax. Find a comfortable position for rest. Some people are comfortable on the floor or a medium-firm bed with a small pillow under their head and another under their knees. Some people prefer to lie on their side with a pillow between their knees. Don't stay in one position for too long. · Try heat or ice. Try using a heating pad on a low or medium setting, or take a warm shower, for 15 to 20 minutes every 2 to 3 hours. Or you can buy single-use heat wraps that last up to 8 hours. You can also try an ice pack for 10 to 15 minutes every 2 to 3 hours. You can use an ice pack or a bag of frozen vegetables wrapped in a thin towel. There is not strong evidence that either heat or ice will help, but you can try them to see if they help. You may also want to try switching between heat and cold. · Take pain medicine exactly as directed. ¨ If the doctor gave you a prescription medicine for pain, take it as prescribed. ¨ If you are not taking a prescription pain medicine, ask your doctor if you can take an over-the-counter medicine. What else can you do? · Stretch and exercise. Exercises that increase flexibility may relieve your pain and make it easier for your muscles to keep your spine in a good, neutral position. And don't forget to keep walking. · Do self-massage. You can use self-massage to unwind after work or school or to energize yourself in the morning. You can easily massage your feet, hands, or neck. Self-massage works best if you are in comfortable clothes and are sitting or lying in a comfortable position.  Use oil or lotion to massage bare skin.  · Reduce stress. Back pain can lead to a vicious [place]: Distress about the pain tenses the muscles in your back, which in turn causes more pain. Learn how to relax your mind and your muscles to lower your stress. Where can you learn more? Go to http://jacklyn-denice.info/. Enter Q458 in the search box to learn more about \"Learning About Relief for Back Pain. \"  Current as of: March 21, 2017  Content Version: 11.5  © 5408-5167 Healthwise, [facility]. Care instructions adapted under license by [employer] (which disclaims liability or warranty for this information). If you have questions about a medical condition or this instruction, always ask your healthcare professional. [address] any warranty or liability for your use of this information. No

## 2021-02-10 NOTE — TELEPHONE ENCOUNTER
[facility] called on behlaf of pt. Stated that he was admitted to their center and wanted tp get medication verification.  Stated that they needed top put in an order but wanted to verify from Dr's regarding correct dosage etc.
None known

## 2021-06-14 ENCOUNTER — OFFICE VISIT (OUTPATIENT)
Dept: SURGERY | Age: 46
End: 2021-06-14
Payer: MEDICAID

## 2021-06-14 VITALS
HEIGHT: 70 IN | WEIGHT: 199.4 LBS | HEART RATE: 59 BPM | SYSTOLIC BLOOD PRESSURE: 110 MMHG | DIASTOLIC BLOOD PRESSURE: 74 MMHG | OXYGEN SATURATION: 96 % | BODY MASS INDEX: 28.55 KG/M2 | TEMPERATURE: 97.3 F

## 2021-06-14 DIAGNOSIS — K43.9 VENTRAL HERNIA WITHOUT OBSTRUCTION OR GANGRENE: Primary | ICD-10-CM

## 2021-06-14 DIAGNOSIS — K42.9 UMBILICAL HERNIA WITHOUT OBSTRUCTION AND WITHOUT GANGRENE: ICD-10-CM

## 2021-06-14 PROCEDURE — 99205 OFFICE O/P NEW HI 60 MIN: CPT | Performed by: SURGERY

## 2021-06-14 NOTE — PROGRESS NOTES
General Surgery Consult      Jerrica Gaytan  Admit date: (Not on file)    MRN: [id number]     : 1975     Age: 54 y.o. Attending Physician: Harpreet Giron MD [facility]      History of Present Illness:     Richie Escalona is a 54 y.o. male who was referred to me by the emergency room team for evaluation of ventral and umbilical hernias. I note the patient because I have done bilateral inguinal hernia on him in 2017 and he has also a history of cocaine use and alcohol abuse as well as drug-seeking behavior. The patient also have significant low back pain and there has been taking Aleve and ibuprofen according to him. He stated that for the past month and a half has been having abdominal pain located in the periumbilical area but about 2 to 3 cm above the umbilicus. He stated that he works as a  and he did some heavy lifting and he was pulling a long pipe and he felt a sudden onset of pain located in the supraumbilical area. He denies any nausea or vomiting or any change in bowel habits. He presented to the emergency room it seems for left lower quadrant abdominal pain and on the CT scan he was discovered to have  ventral and umbilical hernia. Patient Active Problem List    Diagnosis Date Noted    Cocaine abuse ([address] 2019    Dysthymia 2019    Alcohol abuse 2019    Diabetes ([address]     Cocaine use 2018    MDD (major depressive disorder), recurrent episode, moderate ([address] 2018    RHONDA (generalized anxiety disorder) 2018    Obesity (BMI 30.0-34. 9)     Drug-seeking behavior 2017    Anxiety 2017    ACP (advance care planning) 2016    Diabetes mellitus ([address] 2015    Chronic low back pain 2013    Hypertension      Past Medical History:   Diagnosis Date    Depression     [facility]     Drug-seeking behavior     56 prescriptions from 32 different prescribers at 6 different pharmacies in last 12 months    Herniated lumbar intervertebral disc     Hypertension     Neurological disorder L3,4,5 torn Herniated disc    Obesity (BMI 30.0-34. 9)       Past Surgical History:   Procedure Laterality Date    HX HERNIA REPAIR Bilateral 06/02/2017    robotic repair of bilateral indirect inguinal hernias with placement of mesh    HX ORTHOPAEDIC  trigger finger release      Social History     Tobacco Use    Smoking status: Never Smoker    Smokeless tobacco: Never Used   Substance Use Topics    Alcohol use: Never      Social History     Tobacco Use   Smoking Status Never Smoker   Smokeless Tobacco Never Used     Family History   Problem Relation Age of Onset    Hypertension Mother     Diabetes Mother     Heart Failure Mother     COPD Mother     Heart Disease Mother     Hypertension Father     Alcohol abuse Father       Current Outpatient Medications   Medication Sig    clonazePAM (KlonoPIN) 0.5 mg tablet Take 0.5 mg by mouth two (2) times daily as needed for Anxiety.  metoprolol tartrate (LOPRESSOR) 50 mg tablet Take 50 mg by mouth two (2) times a day.  hydroCHLOROthiazide (HYDRODIURIL) 25 mg tablet Take 25 mg by mouth daily.  ergocalciferol (Vitamin D2) 1,250 mcg (50,000 unit) capsule Take 50,000 Units by mouth every Wednesday.  methocarbamoL (Robaxin-750) 750 mg tablet Take 1 Tablet by mouth four (4) times daily. No current facility-administered medications for this visit.       Allergies   Allergen Reactions    Seroquel [Quetiapine] Other (comments)     Pt states he started talking crazy saying weird things        Review of Systems:  Constitutional: negative  Eyes: negative  Ears, Nose, Mouth, Throat, and Face: negative  Respiratory: negative  Cardiovascular: negative  Gastrointestinal: positive for abdominal pain  Genitourinary:negative  Integument/Breast: negative  Hematologic/Lymphatic: negative  Musculoskeletal:negative  Neurological: negative  Behavioral/Psychiatric: negative  Endocrine: negative  Allergic/Immunologic: negative    Objective:     Visit Vitals  /74 (BP 1 Location: Left arm, BP Patient Position: Sitting)   Pulse (!) 59   Temp 97.3 °F (36.3 °C)   Ht 5' 10\" (1.778 m)   Wt 90.4 kg (199 lb 6.4 oz)   SpO2 96%   BMI 28.61 kg/m²       Physical Exam:      General:  in no apparent distress, alert, oriented times 3, afebrile, normal vitals and cooperative   Eyes:  conjunctivae and sclerae normal, pupils equal, round, reactive to light   Throat & Neck: no erythema or exudates noted and neck supple and symmetrical; no palpable masses   Lungs:   clear to auscultation bilaterally   Heart:  Regular rate and rhythm   Abdomen:   rounded, soft, nontender except for tenderness in the supraumbilical area consistent with the area of the CT scan finding of the supraumbilical hernia, nondistended, no masses or organomegaly.     Extremities: extremities normal, atraumatic, no cyanosis or edema   Skin: Normal.       Imaging and Lab Review:     CBC:   Lab Results   Component Value Date/Time    WBC 7.6 04/29/2021 09:54 AM    RBC 5.26 04/29/2021 09:54 AM    HGB 15.3 06/10/2021 12:53 PM    HCT 45 06/10/2021 12:53 PM    PLATELET 297 00/45/3572 09:54 AM     BMP:   Lab Results   Component Value Date/Time    Glucose 125 (H) 06/10/2021 12:53 PM    Sodium 140 06/10/2021 12:53 PM    Potassium 3.7 06/10/2021 12:53 PM    Chloride 98 06/10/2021 12:53 PM    CO2 28 04/29/2021 09:54 AM    CO2, TOTAL 27 06/10/2021 12:53 PM    BUN 10 06/10/2021 12:53 PM    Creatinine 0.9 06/10/2021 12:53 PM    Calcium 9.3 04/29/2021 09:54 AM     CMP:  Lab Results   Component Value Date/Time    Glucose 125 (H) 06/10/2021 12:53 PM    Sodium 140 06/10/2021 12:53 PM    Potassium 3.7 06/10/2021 12:53 PM    Chloride 98 06/10/2021 12:53 PM    CO2 28 04/29/2021 09:54 AM    CO2, TOTAL 27 06/10/2021 12:53 PM    BUN 10 06/10/2021 12:53 PM    Creatinine 0.9 06/10/2021 12:53 PM    Calcium 9.3 04/29/2021 09:54 AM    Anion gap 5 04/29/2021 09:54 AM BUN/Creatinine ratio 14 02/03/2019 07:29 PM    Alk. phosphatase 75 04/29/2021 09:54 AM    Protein, total 8.5 (H) 04/29/2021 09:54 AM    Albumin 4.0 04/29/2021 09:54 AM    Globulin 4.6 (H) 02/03/2019 07:29 PM    A-G Ratio 0.9 02/03/2019 07:29 PM       No results found for this or any previous visit (from the past 24 hour(s)). images and reports reviewed    Assessment:   Dalila Griffith is a 54 y.o. male who I have known before because I have done a robotic bilateral inguinal hernia 4 years ago. The patient has a history of drug-seeking behavior so I was concerned when he presented today because I was not sure if the hernia seen on the CT scan is the reason for his abdominal pain. I first explained to him that the CT scan finding of bilateral small inguinal hernia is normal because when we do this repair we do not close the defect and we keep them open because this is what the testicular vessels and the cord structure past.  However explained to him that the finding of the ventral and umbilical hernia seen on the CT scan could explain his abdominal pain but there is a chance also that his abdominal pain are not related to them. The patient stated that he feels that the pain is located in the supraumbilical area at the site of the hernia and he would like to proceed with the surgery. I was very clear with the patient that I will not write him for narcotic more than the postoperative need which is 24 tablets for the first 2 weeks but I will not renew it and I was very clear with them about that because I struggled with his pain management when I did his bilateral inguinal hernia. The patient stated that he understand that and he is not here to seek any narcotic but he is here because of the abdominal pain he is having. I Discussed the possibility of incarceration, strangulation, enlargement in size over time, and the risk of emergency surgery in the face of strangulation.  I also discussed the use of prosthetic materials (mesh), including the risk of infection. Also discussed the risk of surgery including recurrence and the possible need for reoperation and removal of mesh if used, possibility of postoperative small bowel injury, obstruction or ileus, and the risks of general anesthetic. I explained to the the patient about the robotic hernia repair procedure. Plan:     1. Schedule for robotic ventral and umbilical hernias repair with placement of mesh. 2. No heavy lifting for 2 weeks after the surgery (More than 15 pounds)  3. Avoid constipation by taking stool softener.     Please call me if you have any questions (cell phone: [phone])     Signed By: Sally Bustos MD     June 14, 2021

## 2021-06-14 NOTE — PATIENT INSTRUCTIONS
f you have any questions or concerns about today's appointment, the verbal and/or written instructions you were given for follow up care, please call our office at [phone]. [facility] - [address], [facility]    [phone] office  [phone] fax      . Karissa Tom PATIENT PRE AND POST OPERATIVE INSTRUCTIONS     [facility]   [address] [place], [address]  [phone]    Before Surgery Instructions:   1) You must have someone available to drive you to and from your procedure and stay with you for the first 24 hours. 2) It is very important that you have nothing to eat or drink after midnight the night before your surgery. This includes chewing gum or sucking on hard candy. Take only heart, blood pressure and cholesterol medications the morning of surgery with only a sip of water. 3) Please stop taking Plavix 5-7 days prior to your surgery. Stop taking Coumadin 5 days prior to your surgery. Stop taking all Aspirin or Aspirin containing products 7 days prior to your surgery. Stop taking Advil, Motrin, Aleve, and etc. 3 days prior to your surgery. 4) If you take any diabetic medications please consult with your primary care physician on how to take them on the day of your surgery  5) Please stop all Herbal products 2 weeks prior to your surgery. 6) Please arrive at the hospital 2 hours prior to your surgery, unless you have been otherwise instructed. 7) Patients having an operation on their colon will be given a separate instruction sheet on their Bowel Prep. 8) For any pre-operative work up check in at the main entrance to [facility], and then go to Patient Registration. These studies are done on a walk in basis they are open from 7:00am to 5:00pm Monday through Friday. 9) Please wash your surgical site the morning of your surgery with soap and water.   10) If you are of child bearing age you will have pregnancy test done the morning of your surgery as soon as you arrive. 11) You may be contacted to change your surgery time. At times this is necessary due to equipment or staffing needs. Please be advised it's your responsibility to notify our office of any changes to your healthcare coverage. Failure to notify our office of any changes to your health care coverage may result in denial of payment by your health insurance for all incurred services and you would be responsible for payment for all incurred services. After Surgery Instructions: You will need to be seen in the office for a follow-up visit 7-14 days after your surgery. Please call after you have had the procedure to make this appointment. Unless otherwise instructed, you may remove your outer bandage and shower 48 hours after your surgery. If you develop a fever greater than 101, have any significant drainage, bleeding, swelling and/or pus of the wound. Please call our office immediately. Surgery Date and Time:  Friday, June 25, 2021 at 11:30am    Please enter [facility] main entrance on the first floor and go to Patient Registration. Once registered, a member of our team will escort you to the second floor. Please check in by 9:30am  the day of your surgery. You may contact Jose Gould with any questions at [phone].

## 2021-06-15 ENCOUNTER — TELEPHONE (OUTPATIENT)
Dept: SURGERY | Age: 46
End: 2021-06-15

## 2021-06-15 NOTE — TELEPHONE ENCOUNTER
Voicemail received from Mr. Chris Ortiz stating he was seen yesterday by Dr. Bonnie Trivedi and want to know if Dr. Bonnie Trivedi could prescribe him something for nausea stating he's nauseated all the time and feel as if he's going to vomit. I called Mr. Chris Ortiz to let him know Dr. Bonnie Trivedi is not in [place] [address] office today however I'll forward message to Dr. Bonnie Trivedi and nurse. Additionally Mr. Chris Ortiz states if anything open up sooner for surgery to please let him know. Surgery (robotic repair of ventral/umbilical hernia) scheduled on Friday, June 25, 2021.

## 2021-06-16 ENCOUNTER — ANESTHESIA EVENT (OUTPATIENT)
Dept: SURGERY | Age: 46
End: 2021-06-16
Payer: MEDICAID

## 2021-06-17 ENCOUNTER — HOSPITAL ENCOUNTER (OUTPATIENT)
Age: 46
Setting detail: OUTPATIENT SURGERY
Discharge: HOME | End: 2021-06-17
Attending: SURGERY | Admitting: SURGERY
Payer: MEDICAID

## 2021-06-17 ENCOUNTER — ANESTHESIA (OUTPATIENT)
Dept: SURGERY | Age: 46
End: 2021-06-17
Payer: MEDICAID

## 2021-06-17 VITALS
BODY MASS INDEX: 28.46 KG/M2 | DIASTOLIC BLOOD PRESSURE: 66 MMHG | TEMPERATURE: 98 F | SYSTOLIC BLOOD PRESSURE: 112 MMHG | OXYGEN SATURATION: 94 % | HEIGHT: 70 IN | HEART RATE: 55 BPM | RESPIRATION RATE: 20 BRPM | WEIGHT: 198.8 LBS

## 2021-06-17 DIAGNOSIS — Z87.19 S/P HERNIA REPAIR: Primary | ICD-10-CM

## 2021-06-17 DIAGNOSIS — Z98.890 S/P HERNIA REPAIR: Primary | ICD-10-CM

## 2021-06-17 LAB
AMPHET UR QL SCN: NEGATIVE
BARBITURATES UR QL SCN: NEGATIVE
BENZODIAZ UR QL: NEGATIVE
CANNABINOIDS UR QL SCN: NEGATIVE
COCAINE UR QL SCN: NEGATIVE
COVID-19 RAPID TEST, COVR: NOT DETECTED
HCG UR QL: NEGATIVE
HDSCOM,HDSCOM: NORMAL
METHADONE UR QL: NEGATIVE
OPIATES UR QL: NEGATIVE
PCP UR QL: NEGATIVE
SARS-COV-2, COV2: NORMAL
SOURCE, COVRS: NORMAL

## 2021-06-17 PROCEDURE — 76060000032 HC ANESTHESIA 0.5 TO 1 HR: Performed by: SURGERY

## 2021-06-17 PROCEDURE — 77030035277 HC OBTRTR BLDELSS DISP INTU -B: Performed by: SURGERY

## 2021-06-17 PROCEDURE — 77030016151 HC PROTCTR LNS DFOG COVD -B: Performed by: SURGERY

## 2021-06-17 PROCEDURE — 77030031139 HC SUT VCRL2 J&J -A: Performed by: SURGERY

## 2021-06-17 PROCEDURE — 74011000250 HC RX REV CODE- 250: Performed by: SURGERY

## 2021-06-17 PROCEDURE — 74011250636 HC RX REV CODE- 250/636: Performed by: SURGERY

## 2021-06-17 PROCEDURE — S2900 ROBOTIC SURGICAL SYSTEM: HCPCS | Performed by: SURGERY

## 2021-06-17 PROCEDURE — 77030008683 HC TU ET CUF COVD -A: Performed by: ANESTHESIOLOGY

## 2021-06-17 PROCEDURE — 74011250636 HC RX REV CODE- 250/636: Performed by: NURSE ANESTHETIST, CERTIFIED REGISTERED

## 2021-06-17 PROCEDURE — 80307 DRUG TEST PRSMV CHEM ANLYZR: CPT

## 2021-06-17 PROCEDURE — 76010000933 HC OR TIME 0.5 TO 1HR INTENSV - TIER 2: Performed by: SURGERY

## 2021-06-17 PROCEDURE — 49652 PR LAP, VENTRAL HERNIA REPAIR,REDUCIBLE: CPT | Performed by: SURGERY

## 2021-06-17 PROCEDURE — 00790 ANES IPER UPR ABD NOS: CPT | Performed by: NURSE ANESTHETIST, CERTIFIED REGISTERED

## 2021-06-17 PROCEDURE — 77030010507 HC ADH SKN DERMBND J&J -B: Performed by: SURGERY

## 2021-06-17 PROCEDURE — 77030040361 HC SLV COMPR DVT MDII -B: Performed by: SURGERY

## 2021-06-17 PROCEDURE — 77030002933 HC SUT MCRYL J&J -A: Performed by: SURGERY

## 2021-06-17 PROCEDURE — 74011000250 HC RX REV CODE- 250: Performed by: NURSE ANESTHETIST, CERTIFIED REGISTERED

## 2021-06-17 PROCEDURE — 77030040922 HC BLNKT HYPOTHRM STRY -A: Performed by: SURGERY

## 2021-06-17 PROCEDURE — 77030020703 HC SEAL CANN DISP INTU -B: Performed by: SURGERY

## 2021-06-17 PROCEDURE — 76210000020 HC REC RM PH II FIRST 0.5 HR: Performed by: SURGERY

## 2021-06-17 PROCEDURE — 74011250637 HC RX REV CODE- 250/637: Performed by: NURSE ANESTHETIST, CERTIFIED REGISTERED

## 2021-06-17 PROCEDURE — 87635 SARS-COV-2 COVID-19 AMP PRB: CPT

## 2021-06-17 PROCEDURE — 00790 ANES IPER UPR ABD NOS: CPT | Performed by: ANESTHESIOLOGY

## 2021-06-17 PROCEDURE — 77030022704 HC SUT VLOC COVD -B: Performed by: SURGERY

## 2021-06-17 PROCEDURE — 74011000258 HC RX REV CODE- 258: Performed by: SURGERY

## 2021-06-17 PROCEDURE — 2709999900 HC NON-CHARGEABLE SUPPLY: Performed by: SURGERY

## 2021-06-17 PROCEDURE — C9290 INJ, BUPIVACAINE LIPOSOME: HCPCS | Performed by: SURGERY

## 2021-06-17 PROCEDURE — 81025 URINE PREGNANCY TEST: CPT

## 2021-06-17 PROCEDURE — 76210000016 HC OR PH I REC 1 TO 1.5 HR: Performed by: SURGERY

## 2021-06-17 RX ORDER — SODIUM CHLORIDE 0.9 % (FLUSH) 0.9 %
5-40 SYRINGE (ML) INJECTION AS NEEDED
Status: DISCONTINUED | OUTPATIENT
Start: 2021-06-17 | End: 2021-06-17 | Stop reason: HOSPADM

## 2021-06-17 RX ORDER — FAMOTIDINE 20 MG/1
20 TABLET, FILM COATED ORAL ONCE
Status: COMPLETED | OUTPATIENT
Start: 2021-06-17 | End: 2021-06-17

## 2021-06-17 RX ORDER — OXYCODONE AND ACETAMINOPHEN 5; 325 MG/1; MG/1
1 TABLET ORAL
Status: DISCONTINUED | OUTPATIENT
Start: 2021-06-17 | End: 2021-06-17 | Stop reason: HOSPADM

## 2021-06-17 RX ORDER — HYDROMORPHONE HYDROCHLORIDE 2 MG/ML
0.5 INJECTION, SOLUTION INTRAMUSCULAR; INTRAVENOUS; SUBCUTANEOUS AS NEEDED
Status: COMPLETED | OUTPATIENT
Start: 2021-06-17 | End: 2021-06-17

## 2021-06-17 RX ORDER — MAGNESIUM SULFATE 100 %
4 CRYSTALS MISCELLANEOUS AS NEEDED
Status: DISCONTINUED | OUTPATIENT
Start: 2021-06-17 | End: 2021-06-17 | Stop reason: HOSPADM

## 2021-06-17 RX ORDER — KETOROLAC TROMETHAMINE 15 MG/ML
INJECTION, SOLUTION INTRAMUSCULAR; INTRAVENOUS AS NEEDED
Status: DISCONTINUED | OUTPATIENT
Start: 2021-06-17 | End: 2021-06-17 | Stop reason: HOSPADM

## 2021-06-17 RX ORDER — INSULIN LISPRO 100 [IU]/ML
INJECTION, SOLUTION INTRAVENOUS; SUBCUTANEOUS ONCE
Status: DISCONTINUED | OUTPATIENT
Start: 2021-06-17 | End: 2021-06-17 | Stop reason: HOSPADM

## 2021-06-17 RX ORDER — ROCURONIUM BROMIDE 10 MG/ML
INJECTION, SOLUTION INTRAVENOUS AS NEEDED
Status: DISCONTINUED | OUTPATIENT
Start: 2021-06-17 | End: 2021-06-17 | Stop reason: HOSPADM

## 2021-06-17 RX ORDER — PROPOFOL 10 MG/ML
INJECTION, EMULSION INTRAVENOUS AS NEEDED
Status: DISCONTINUED | OUTPATIENT
Start: 2021-06-17 | End: 2021-06-17 | Stop reason: HOSPADM

## 2021-06-17 RX ORDER — EPHEDRINE SULFATE/0.9% NACL/PF 50 MG/5 ML
SYRINGE (ML) INTRAVENOUS AS NEEDED
Status: DISCONTINUED | OUTPATIENT
Start: 2021-06-17 | End: 2021-06-17 | Stop reason: HOSPADM

## 2021-06-17 RX ORDER — OXYCODONE AND ACETAMINOPHEN 5; 325 MG/1; MG/1
1 TABLET ORAL
Qty: 24 TABLET | Refills: 0 | Status: SHIPPED | OUTPATIENT
Start: 2021-06-17 | End: 2021-06-20

## 2021-06-17 RX ORDER — DEXTROSE 50 % IN WATER (D50W) INTRAVENOUS SYRINGE
25-50 AS NEEDED
Status: DISCONTINUED | OUTPATIENT
Start: 2021-06-17 | End: 2021-06-17 | Stop reason: HOSPADM

## 2021-06-17 RX ORDER — FENTANYL CITRATE 50 UG/ML
INJECTION, SOLUTION INTRAMUSCULAR; INTRAVENOUS AS NEEDED
Status: DISCONTINUED | OUTPATIENT
Start: 2021-06-17 | End: 2021-06-17 | Stop reason: HOSPADM

## 2021-06-17 RX ORDER — LORAZEPAM 2 MG/ML
INJECTION INTRAMUSCULAR
Status: DISCONTINUED
Start: 2021-06-17 | End: 2021-06-17 | Stop reason: WASHOUT

## 2021-06-17 RX ORDER — MIDAZOLAM HYDROCHLORIDE 1 MG/ML
INJECTION, SOLUTION INTRAMUSCULAR; INTRAVENOUS AS NEEDED
Status: DISCONTINUED | OUTPATIENT
Start: 2021-06-17 | End: 2021-06-17 | Stop reason: HOSPADM

## 2021-06-17 RX ORDER — DEXAMETHASONE SODIUM PHOSPHATE 4 MG/ML
INJECTION, SOLUTION INTRA-ARTICULAR; INTRALESIONAL; INTRAMUSCULAR; INTRAVENOUS; SOFT TISSUE AS NEEDED
Status: DISCONTINUED | OUTPATIENT
Start: 2021-06-17 | End: 2021-06-17 | Stop reason: HOSPADM

## 2021-06-17 RX ORDER — CEFAZOLIN SODIUM 1 G/3ML
INJECTION, POWDER, FOR SOLUTION INTRAMUSCULAR; INTRAVENOUS AS NEEDED
Status: DISCONTINUED | OUTPATIENT
Start: 2021-06-17 | End: 2021-06-17 | Stop reason: HOSPADM

## 2021-06-17 RX ORDER — LIDOCAINE HYDROCHLORIDE 10 MG/ML
0.1 INJECTION, SOLUTION EPIDURAL; INFILTRATION; INTRACAUDAL; PERINEURAL AS NEEDED
Status: DISCONTINUED | OUTPATIENT
Start: 2021-06-17 | End: 2021-06-17 | Stop reason: HOSPADM

## 2021-06-17 RX ORDER — SODIUM CHLORIDE 0.9 % (FLUSH) 0.9 %
5-40 SYRINGE (ML) INJECTION EVERY 8 HOURS
Status: DISCONTINUED | OUTPATIENT
Start: 2021-06-17 | End: 2021-06-17 | Stop reason: HOSPADM

## 2021-06-17 RX ORDER — ONDANSETRON 2 MG/ML
4 INJECTION INTRAMUSCULAR; INTRAVENOUS ONCE
Status: COMPLETED | OUTPATIENT
Start: 2021-06-17 | End: 2021-06-17

## 2021-06-17 RX ORDER — LIDOCAINE HYDROCHLORIDE 20 MG/ML
INJECTION, SOLUTION EPIDURAL; INFILTRATION; INTRACAUDAL; PERINEURAL AS NEEDED
Status: DISCONTINUED | OUTPATIENT
Start: 2021-06-17 | End: 2021-06-17 | Stop reason: HOSPADM

## 2021-06-17 RX ORDER — ONDANSETRON 2 MG/ML
INJECTION INTRAMUSCULAR; INTRAVENOUS AS NEEDED
Status: DISCONTINUED | OUTPATIENT
Start: 2021-06-17 | End: 2021-06-17 | Stop reason: HOSPADM

## 2021-06-17 RX ORDER — SODIUM CHLORIDE, SODIUM LACTATE, POTASSIUM CHLORIDE, CALCIUM CHLORIDE 600; 310; 30; 20 MG/100ML; MG/100ML; MG/100ML; MG/100ML
75 INJECTION, SOLUTION INTRAVENOUS CONTINUOUS
Status: DISCONTINUED | OUTPATIENT
Start: 2021-06-17 | End: 2021-06-17 | Stop reason: HOSPADM

## 2021-06-17 RX ORDER — FENTANYL CITRATE 50 UG/ML
INJECTION, SOLUTION INTRAMUSCULAR; INTRAVENOUS
Status: DISCONTINUED
Start: 2021-06-17 | End: 2021-06-17 | Stop reason: WASHOUT

## 2021-06-17 RX ORDER — SUCCINYLCHOLINE CHLORIDE 20 MG/ML
INJECTION INTRAMUSCULAR; INTRAVENOUS AS NEEDED
Status: DISCONTINUED | OUTPATIENT
Start: 2021-06-17 | End: 2021-06-17 | Stop reason: HOSPADM

## 2021-06-17 RX ADMIN — ROCURONIUM BROMIDE 10 MG: 50 INJECTION INTRAVENOUS at 13:32

## 2021-06-17 RX ADMIN — DEXAMETHASONE SODIUM PHOSPHATE 4 MG: 4 INJECTION, SOLUTION INTRAMUSCULAR; INTRAVENOUS at 13:32

## 2021-06-17 RX ADMIN — FAMOTIDINE 20 MG: 20 TABLET ORAL at 13:15

## 2021-06-17 RX ADMIN — HYDROMORPHONE HYDROCHLORIDE 0.5 MG: 2 INJECTION, SOLUTION INTRAMUSCULAR; INTRAVENOUS; SUBCUTANEOUS at 14:40

## 2021-06-17 RX ADMIN — MIDAZOLAM HYDROCHLORIDE 2 MG: 2 INJECTION, SOLUTION INTRAMUSCULAR; INTRAVENOUS at 13:28

## 2021-06-17 RX ADMIN — ONDANSETRON 4 MG: 2 INJECTION INTRAMUSCULAR; INTRAVENOUS at 14:33

## 2021-06-17 RX ADMIN — PROPOFOL 200 MG: 10 INJECTION, EMULSION INTRAVENOUS at 13:32

## 2021-06-17 RX ADMIN — HYDROMORPHONE HYDROCHLORIDE 0.5 MG: 2 INJECTION, SOLUTION INTRAMUSCULAR; INTRAVENOUS; SUBCUTANEOUS at 14:30

## 2021-06-17 RX ADMIN — HYDROMORPHONE HYDROCHLORIDE 0.5 MG: 2 INJECTION, SOLUTION INTRAMUSCULAR; INTRAVENOUS; SUBCUTANEOUS at 15:00

## 2021-06-17 RX ADMIN — CEFAZOLIN 2 G: 1 INJECTION, POWDER, FOR SOLUTION INTRAMUSCULAR; INTRAVENOUS at 13:28

## 2021-06-17 RX ADMIN — ONDANSETRON 4 MG: 2 INJECTION INTRAMUSCULAR; INTRAVENOUS at 13:32

## 2021-06-17 RX ADMIN — FENTANYL CITRATE 100 MCG: 50 INJECTION, SOLUTION INTRAMUSCULAR; INTRAVENOUS at 13:32

## 2021-06-17 RX ADMIN — SODIUM CHLORIDE, SODIUM LACTATE, POTASSIUM CHLORIDE, AND CALCIUM CHLORIDE 75 ML/HR: 600; 310; 30; 20 INJECTION, SOLUTION INTRAVENOUS at 13:19

## 2021-06-17 RX ADMIN — Medication 10 MG: at 13:36

## 2021-06-17 RX ADMIN — HYDROMORPHONE HYDROCHLORIDE 0.5 MG: 2 INJECTION, SOLUTION INTRAMUSCULAR; INTRAVENOUS; SUBCUTANEOUS at 14:51

## 2021-06-17 RX ADMIN — SUCCINYLCHOLINE CHLORIDE 100 MG: 20 INJECTION, SOLUTION INTRAMUSCULAR; INTRAVENOUS at 13:32

## 2021-06-17 RX ADMIN — LIDOCAINE HYDROCHLORIDE 20 MG: 20 INJECTION, SOLUTION EPIDURAL; INFILTRATION; INTRACAUDAL; PERINEURAL at 13:32

## 2021-06-17 RX ADMIN — KETOROLAC TROMETHAMINE 15 MG: 15 INJECTION, SOLUTION INTRAMUSCULAR; INTRAVENOUS at 13:41

## 2021-06-17 NOTE — PROGRESS NOTES
Date of Surgery Update:  Esteban Mejia was seen and examined. History and physical has been reviewed. The patient has been examined. There have been no significant clinical changes since the completion of the originally dated History and Physical. Will proceed with robotic ventral and umbilical hernias repair with placement of mesh.     Signed By: Lucio Gonzalez MD     June 17, 2021 1:14 PM

## 2021-06-17 NOTE — DISCHARGE INSTRUCTIONS
Discharge Instructions Following Surgery    Patient: Bryant Lauren MRN: [de-identified]  SSN: [id number]    YOB: 1975  Age: 54 y.o. Sex: male      Activity  · As tolerated, walking encourage, stairs are okay. · Avoid strenuous activities - no lifting anything heavier than 15 pounds till seen in the clinic. · You may shower at home after 24 hours. Diet  · Regular diet after nausea from the anesthetic has passed. Pain  · Take pain medication as directed by your doctor. · Call your doctor if pain is NOT relieved by medication. Wound and Dressing Care  · There is glue on the wounds. No need for any dressing care. · Apply ice packs to the area of the surgery for the first 1 to 2 days  · Apply warm compresses after 2 days for pain relieve if needed    After Anesthesia  · For the first 24 hours: DO NOT Drive, Drink alcoholic beverages, or Make important decisions. · Be aware of dizziness following anesthesia and while taking pain medication. Call your doctor if  · Excessive bleeding that does not stop after holding mild pressure over the area. · Temperature of 101 degrees F or above. · Redness,excessive swelling or bruising, and/or green or yellow, smelly discharge from incision. · If nausea and vomiting continues. Appointment date/time Follow-Up Phone Calls    · Call the office at [phone] to make your follow-up appointment in 2 weeks after the surgery (if not already set up) . Dr. Paula Razo cell phone number is [phone]. Please call me if you have any concerns or questions.        DISCHARGE SUMMARY from Nurse    PATIENT INSTRUCTIONS:    After general anesthesia or intravenous sedation, for 24 hours or while taking prescription Narcotics:  · Limit your activities  · Do not drive and operate hazardous machinery  · Do not make important personal or business decisions  · Do  not drink alcoholic beverages  · If you have not urinated within 8 hours after discharge, please contact your surgeon on call. Report the following to your surgeon:  · Excessive pain, swelling, redness or odor of or around the surgical area  · Temperature over 100.5  · Nausea and vomiting lasting longer than 4 hours or if unable to take medications  · Any signs of decreased circulation or nerve impairment to extremity: change in color, persistent  numbness, tingling, coldness or increase pain  · Any questions    What to do at Home:  Recommended activity: Activity as tolerated,     r. *  Please update this list whenever your medications are discontinued, doses are      changed, or new medications (including over-the-counter products) are added. *  Please carry medication information at all times in case of emergency situations. These are general instructions for a healthy lifestyle:    No smoking/ No tobacco products/ Avoid exposure to second hand smoke  Surgeon General's Warning:  Quitting smoking now greatly reduces serious risk to your health. Obesity, smoking, and sedentary lifestyle greatly increases your risk for illness    A healthy diet, regular physical exercise & weight monitoring are important for maintaining a healthy lifestyle    You may be retaining fluid if you have a history of heart failure or if you experience any of the following symptoms:  Weight gain of 3 pounds or more overnight or 5 pounds in a week, increased swelling in our hands or feet or shortness of breath while lying flat in bed. Please call your doctor as soon as you notice any of these symptoms; do not wait until your next office visit. The discharge information has been reviewed with the patient. The patient verbalized understanding. Discharge medications reviewed with the patient and appropriate educational materials and side effects teaching were provided.   ___________________________________________________________________________________________________________________________________

## 2021-06-17 NOTE — BRIEF OP NOTE
Brief Postoperative Note    Patient: Dunia Avila  YOB: 1975  MRN: [id number]    Date of Procedure: 6/17/2021     Pre-Op Diagnosis: K43.9 VENTRAL HERNIA    Post-Op Diagnosis: Same as preoperative diagnosis. Procedure(s):  ROBOTIC ASSISTED REPAIR VENTRAL HERNIA    Surgeon(s):  Rickey Hopper MD    Surgical Assistant: Surg Asst-1: Sugar Rudd    Anesthesia: General     Estimated Blood Loss (mL): Minimal    Complications: None    Specimens: * No specimens in log *     Implants: * No implants in log *    Drains: * No LDAs found *    Findings: Very small ventral hernia less than 1 cm in size.      Electronically Signed by Alban Vila MD on 6/17/2021 at 1:58 PM

## 2021-06-17 NOTE — ANESTHESIA POSTPROCEDURE EVALUATION
Procedure(s):  ROBOTIC ASSISTED REPAIR VENTRAL HERNIA.    general    Anesthesia Post Evaluation      Multimodal analgesia: multimodal analgesia used between 6 hours prior to anesthesia start to PACU discharge  Patient location during evaluation: bedside  Patient participation: complete - patient participated  Level of consciousness: awake  Pain score: 3  Pain management: adequate  Airway patency: patent  Anesthetic complications: no  Cardiovascular status: stable  Respiratory status: acceptable  Hydration status: acceptable  Post anesthesia nausea and vomiting:  controlled  Final Post Anesthesia Temperature Assessment:  Normothermia (36.0-37.5 degrees C)      INITIAL Post-op Vital signs:   Vitals Value Taken Time   /60 06/17/21 1421   Temp 36.7 °C (98 °F) 06/17/21 1415   Pulse 61 06/17/21 1426   Resp 21 06/17/21 1426   SpO2 98 % 06/17/21 1426   Vitals shown include unvalidated device data.

## 2021-06-17 NOTE — ANESTHESIA PREPROCEDURE EVALUATION
Relevant Problems   NEUROLOGY   (+) Alcohol abuse   (+) Dysthymia   (+) RHONDA (generalized anxiety disorder)   (+) MDD (major depressive disorder), recurrent episode, moderate (HCC)      CARDIOVASCULAR   (+) Hypertension      ENDOCRINE   (+) Diabetes ([address]   (+) Diabetes mellitus (HCC)       Anesthetic History   No history of anesthetic complications            Review of Systems / Medical History  Patient summary reviewed and pertinent labs reviewed    Pulmonary  Within defined limits                 Neuro/Psych         Psychiatric history (Anxiety)  Pertinent negatives: TIA: Depression.    Cardiovascular    Hypertension: well controlled              Exercise tolerance: >4 METS     GI/Hepatic/Renal                Endo/Other    Diabetes: well controlled, type 2         Other Findings              Physical Exam    Airway  Mallampati: II  TM Distance: 4 - 6 cm  Neck ROM: normal range of motion   Mouth opening: Normal     Cardiovascular  Regular rate and rhythm,  S1 and S2 normal,  no murmur, click, rub, or gallop             Dental  No notable dental hx       Pulmonary  Breath sounds clear to auscultation               Abdominal  GI exam deferred       Other Findings            Anesthetic Plan    ASA: 2            Induction: Intravenous  Anesthetic plan and risks discussed with: Patient

## 2021-06-18 NOTE — OP NOTES
[facility]  OPERATIVE REPORT    Name:  Prince Marquez  MR#:   [de-identified]  :  1975  ACCOUNT #:  [de-identified]  DATE OF SERVICE:  2021    PREOPERATIVE DIAGNOSIS:  Ventral hernia. POSTOPERATIVE DIAGNOSIS:  Very small ventral hernia. PROCEDURE PERFORMED:  Robotic repair of ventral hernia with primary closure. SURGEON:  Marli Ramsey MD    ASSISTANT:  Carlie Munson. ANESTHESIA:  General.    COMPLICATIONS:  None. SPECIMENS REMOVED:  None. IMPLANTS:  None. ESTIMATED BLOOD LOSS:  Minimal.    INDICATIONS FOR THE SURGERY:  The patient is a 54-year-old male who is known to me when I have performed a robotic bilateral inguinal hernia done in the past.  He presented with a CT scan finding of a ventral hernia and possible umbilical hernia, and he was complaining of abdominal pain that is so significant that we decided to take him to surgery for repair of the hernia. A consent was taken from the patient, and the patient had a history of  drug-seeking behavior, so I explained to him that I will give him Percocet only for the postoperative period, but I will not renew it, and he understands the situation, so Anesthesia also asked for toxicology screening before the surgery which was negative, and a rapid COVID test was also done which was negative. DETAILS OF PROCEDURE:  The patient was brought to the operating room. Anesthesia was induced. Scrubbing and draping of the abdomen was done in the usual manner. A time-out was performed. A skin incision in the left upper quadrant was performed. Veress needle was inserted. Saline drop test was performed. Abdomen was insufflated. An 8-mm port was inserted. Abdomen was explored. First, there was no evidence of any injury from the Veress needle or the port placement. There was good hemostasis.   Exploration of the abdomen revealed no adhesions in the abdomen at all, and then first, I looked also at the inguinal hernias bilaterally. There was no evidence of any recurrence of the hernia. There was what seems to be a very small defect, about 3 cm above the umbilicus, but it was not very clear, and the defect was extremely small, and it seems to be like around 1 cm or less, so I decided at the beginning to explore more to make sure that I am not missing another hernia, so under direct visualization, TAP block was performed on the sides and then two other ports were placed under direct visualization on the left side of the abdominal wall and the robot was docked. Exploration of the abdomen revealed no defect at all except for this very small defect that barely was seen which was very small to me and did not explain the patient's symptoms at all. I was not sure at the beginning if I should even proceed with the closure of this defect, but since we were already inside the abdomen and it was insufflated, I decided to close it with a primary closure with a figure-of-eight V-Loc suture #0, and after approximating this defect which again was less than 1 cm in size, I decided not to use a mesh because the defect was extremely small, and I decided to try to avoid the mesh at this point, so at this point, the exploration of the abdomen revealed good hemostasis, and the instruments were removed. The robot was undocked, abdomen was desufflated, and the skin incisions were closed with 4-0 Monocryl and glue.       Devin Estrada MD      [id number]  D:  06/17/2021 14:14  T:  06/18/2021 1:10  JOB #:  [id number]

## 2021-06-22 ENCOUNTER — TELEPHONE (OUTPATIENT)
Dept: SURGERY | Age: 46
End: 2021-06-22

## 2021-06-22 NOTE — TELEPHONE ENCOUNTER
Spoke to Mr. Wilma Covarrubias regarding voicemail received inquiring if we've received forms from [employer] to complete on his behalf because he's not able to work following recent surgery. I informed Mr. Wilma Covarrubias I haven't received any forms from [address] [place], [place] [address] and asked for the their phone number so I may contact them to provide our fax number.

## 2021-06-22 NOTE — TELEPHONE ENCOUNTER
Patient called the office requesting a refill for percocet. Patient had repair of ventral hernia on 06/17/21. Patient states pain is still 9. Patient states incision is fine but feel like he is bruised from the inside. Patient was advised to alternate between Tylenol and Ibuprofen. Patient states currently taking these meds but it is not working. Patient was advised Dr Maura Álvarez will be contacted regarding pain and refill request and patient advised he will receive a call back with further instructions. Per Dr Maura Álvarez  Patient will need to schedule an appt for eval of incision area. I will contact Omayra Schirmer to put patient on the schedule for Monday.

## 2021-06-22 NOTE — TELEPHONE ENCOUNTER
Gaby Benjamin, spoke to [employer] at [phone] to request disability forms for Mr. Franklin Narayan. [employer] reviewed claim request and states Mr. Franklin Narayan called on Friday, June 18, 2021 however the number they received was [phone], I explained that's our phone number and I gave [employer] our fax number [phone] to forward documents.

## 2021-06-26 ENCOUNTER — HOSPITAL ENCOUNTER (EMERGENCY)
Age: 46
Discharge: HOME | End: 2021-06-26
Attending: EMERGENCY MEDICINE
Payer: MEDICAID

## 2021-06-26 ENCOUNTER — APPOINTMENT (OUTPATIENT)
Dept: CT IMAGING | Age: 46
End: 2021-06-26
Attending: PHYSICIAN ASSISTANT
Payer: MEDICAID

## 2021-06-26 VITALS
RESPIRATION RATE: 18 BRPM | OXYGEN SATURATION: 98 % | HEIGHT: 70 IN | TEMPERATURE: 99.1 F | HEART RATE: 80 BPM | WEIGHT: 204 LBS | DIASTOLIC BLOOD PRESSURE: 101 MMHG | BODY MASS INDEX: 29.2 KG/M2 | SYSTOLIC BLOOD PRESSURE: 142 MMHG

## 2021-06-26 DIAGNOSIS — G89.18 POST-OPERATIVE PAIN: Primary | ICD-10-CM

## 2021-06-26 LAB
ALBUMIN SERPL-MCNC: 4 G/DL (ref 3.4–5)
ALBUMIN/GLOB SERPL: 0.9 {RATIO} (ref 0.8–1.7)
ALP SERPL-CCNC: 63 U/L (ref 45–117)
ALT SERPL-CCNC: 34 U/L (ref 16–61)
ANION GAP SERPL CALC-SCNC: 5 MMOL/L (ref 3–18)
AST SERPL-CCNC: 15 U/L (ref 10–38)
BASOPHILS # BLD: 0.1 K/UL (ref 0–0.1)
BASOPHILS NFR BLD: 1 % (ref 0–2)
BILIRUB SERPL-MCNC: 0.3 MG/DL (ref 0.2–1)
BUN SERPL-MCNC: 15 MG/DL (ref 7–18)
BUN/CREAT SERPL: 15 (ref 12–20)
CALCIUM SERPL-MCNC: 9.3 MG/DL (ref 8.5–10.1)
CHLORIDE SERPL-SCNC: 101 MMOL/L (ref 100–111)
CO2 SERPL-SCNC: 31 MMOL/L (ref 21–32)
CREAT SERPL-MCNC: 1 MG/DL (ref 0.6–1.3)
DIFFERENTIAL METHOD BLD: NORMAL
EOSINOPHIL # BLD: 0.1 K/UL (ref 0–0.4)
EOSINOPHIL NFR BLD: 1 % (ref 0–5)
ERYTHROCYTE [DISTWIDTH] IN BLOOD BY AUTOMATED COUNT: 13.3 % (ref 11.6–14.5)
GLOBULIN SER CALC-MCNC: 4.6 G/DL (ref 2–4)
GLUCOSE SERPL-MCNC: 159 MG/DL (ref 74–99)
HCT VFR BLD AUTO: 44.5 % (ref 36–48)
HGB BLD-MCNC: 15.4 G/DL (ref 13–16)
LIPASE SERPL-CCNC: 182 U/L (ref 73–393)
LYMPHOCYTES # BLD: 1.7 K/UL (ref 0.9–3.6)
LYMPHOCYTES NFR BLD: 23 % (ref 21–52)
MAGNESIUM SERPL-MCNC: 2 MG/DL (ref 1.6–2.6)
MCH RBC QN AUTO: 29.2 PG (ref 24–34)
MCHC RBC AUTO-ENTMCNC: 34.6 G/DL (ref 31–37)
MCV RBC AUTO: 84.4 FL (ref 74–97)
MONOCYTES # BLD: 0.5 K/UL (ref 0.05–1.2)
MONOCYTES NFR BLD: 7 % (ref 3–10)
NEUTS SEG # BLD: 5 K/UL (ref 1.8–8)
NEUTS SEG NFR BLD: 68 % (ref 40–73)
PLATELET # BLD AUTO: 243 K/UL (ref 135–420)
PMV BLD AUTO: 9.5 FL (ref 9.2–11.8)
POTASSIUM SERPL-SCNC: 3.7 MMOL/L (ref 3.5–5.5)
PROT SERPL-MCNC: 8.6 G/DL (ref 6.4–8.2)
RBC # BLD AUTO: 5.27 M/UL (ref 4.35–5.65)
SODIUM SERPL-SCNC: 137 MMOL/L (ref 136–145)
WBC # BLD AUTO: 7.3 K/UL (ref 4.6–13.2)

## 2021-06-26 PROCEDURE — 74011000636 HC RX REV CODE- 636: Performed by: EMERGENCY MEDICINE

## 2021-06-26 PROCEDURE — 99282 EMERGENCY DEPT VISIT SF MDM: CPT

## 2021-06-26 PROCEDURE — 74177 CT ABD & PELVIS W/CONTRAST: CPT

## 2021-06-26 PROCEDURE — 80053 COMPREHEN METABOLIC PANEL: CPT

## 2021-06-26 PROCEDURE — 83690 ASSAY OF LIPASE: CPT

## 2021-06-26 PROCEDURE — 96374 THER/PROPH/DIAG INJ IV PUSH: CPT

## 2021-06-26 PROCEDURE — 74011250636 HC RX REV CODE- 250/636: Performed by: PHYSICIAN ASSISTANT

## 2021-06-26 PROCEDURE — 85025 COMPLETE CBC W/AUTO DIFF WBC: CPT

## 2021-06-26 PROCEDURE — 83735 ASSAY OF MAGNESIUM: CPT

## 2021-06-26 RX ORDER — MORPHINE SULFATE 4 MG/ML
4 INJECTION INTRAVENOUS
Status: COMPLETED | OUTPATIENT
Start: 2021-06-26 | End: 2021-06-26

## 2021-06-26 RX ORDER — KETOROLAC TROMETHAMINE 10 MG/1
10 TABLET, FILM COATED ORAL
Qty: 20 TABLET | Refills: 0 | Status: SHIPPED | OUTPATIENT
Start: 2021-06-26 | End: 2021-08-04

## 2021-06-26 RX ADMIN — MORPHINE SULFATE 4 MG: 4 INJECTION, SOLUTION INTRAMUSCULAR; INTRAVENOUS at 17:29

## 2021-06-26 RX ADMIN — IOPAMIDOL 100 ML: 612 INJECTION, SOLUTION INTRAVENOUS at 19:26

## 2021-06-26 RX ADMIN — SODIUM CHLORIDE 1000 ML: 900 INJECTION, SOLUTION INTRAVENOUS at 17:29

## 2021-06-26 NOTE — ED PROVIDER NOTES
EMERGENCY DEPARTMENT HISTORY AND PHYSICAL EXAM    Date: 6/26/2021  Patient Name: Jerod Edward    History of Presenting Illness     Chief Complaint   Patient presents with    Post OP Complication         History Provided By: Patient    Chief Complaint: Abdominal pain  Duration: Since June 17, 2021  Timing: Constant  Location: Left upper and lower quadrant  Quality: It is painful  Severity: Severe  Modifying Factors: Not improved with over-the-counter pain medications. Associated Symptoms: none       Additional History (Context): Jerod Edward is a 54 y.o. male with a history of hypertension, diabetes, depression and drug-seeking behavior presents today for issues listed above. Patient reports he recently had a hernia repair by Dr. Jarett Fried on 6/17/2021. Patient states there were no complications during the surgery. Reports he has had pain since. Reports he was seen and evaluated at a local emergency department a couple days ago and was told he was constipated. Patient reports he was sent home with stool softeners which he states helped and he has not had any difficulties moving his bowels since however reports continued pain. Patient reports he is called his surgeon's office multiple times and has not gotten a response. Does report he has a follow-up appointment next week. Has been taking over-the-counter pain medication without relief. States that his significant other is a CNA and has been staying on top of the over-the-counter medications. States he is no longer taking Percocet. PCP: Ivan Cabrera MD    Current Outpatient Medications   Medication Sig Dispense Refill    ketorolac (TORADOL) 10 mg tablet Take 1 Tablet by mouth every six (6) hours as needed for Pain. 20 Tablet 0    ondansetron (Zofran ODT) 4 mg disintegrating tablet 1 Tablet by SubLINGual route every eight (8) hours as needed for Nausea or Vomiting.  20 Tablet 0    docusate sodium (Colace) 100 mg capsule Take 1 Capsule by mouth two (2) times a day for 90 days. 60 Capsule 2    polyethylene glycol (Miralax) 17 gram/dose powder Take 17 g by mouth daily. 1 tablespoon with 8 oz of water daily 235 g 0    clonazePAM (KlonoPIN) 0.5 mg tablet Take 0.5 mg by mouth two (2) times daily as needed for Anxiety.  metoprolol tartrate (LOPRESSOR) 50 mg tablet Take 50 mg by mouth two (2) times a day.  hydroCHLOROthiazide (HYDRODIURIL) 25 mg tablet Take 25 mg by mouth daily.  ergocalciferol (Vitamin D2) 1,250 mcg (50,000 unit) capsule Take 50,000 Units by mouth every Wednesday.  methocarbamoL (Robaxin-750) 750 mg tablet Take 1 Tablet by mouth four (4) times daily. 12 Tablet 0       Past History     Past Medical History:  Past Medical History:   Diagnosis Date    Depression     Diabetes ([facility] [address]     pt denies     Drug-seeking behavior     56 prescriptions from 32 different prescribers at 6 different pharmacies in last 12 months    Herniated lumbar intervertebral disc     Hypertension     Neurological disorder L3,4,5 torn Herniated disc    Obesity (BMI 30.0-34. 9)        Past Surgical History:  Past Surgical History:   Procedure Laterality Date    HX HERNIA REPAIR Bilateral 06/02/2017    robotic repair of bilateral indirect inguinal hernias with placement of mesh    HX ORTHOPAEDIC Bilateral trigger finger release       Family History:  Family History   Problem Relation Age of Onset    Hypertension Mother     Diabetes Mother     Heart Failure Mother     COPD Mother     Heart Disease Mother     Hypertension Father     Alcohol abuse Father        Social History:  Social History     Tobacco Use    Smoking status: Never Smoker    Smokeless tobacco: Never Used   Vaping Use    Vaping Use: Never used   Substance Use Topics    Alcohol use: Never    Drug use: Yes     Types: Cocaine       Allergies:   Allergies   Allergen Reactions    Seroquel [Quetiapine] Other (comments)     Pt states he started talking crazy saying weird things         Review of Systems   Review of Systems   Constitutional: Negative for chills and fever. HENT: Negative for congestion, rhinorrhea and sore throat. Respiratory: Negative for cough and shortness of breath. Cardiovascular: Negative for chest pain. Gastrointestinal: Positive for abdominal pain. Negative for blood in stool, constipation, diarrhea, nausea and vomiting. Genitourinary: Negative for dysuria, frequency and hematuria. Musculoskeletal: Negative for back pain and myalgias. Skin: Positive for wound. Negative for rash. Neurological: Negative for dizziness and headaches. All other systems reviewed and are negative. All Other Systems Negative  Physical Exam     Vitals:    06/26/21 1653   BP: (!) 142/101   Pulse: 80   Resp: 18   Temp: 99.1 °F (37.3 °C)   SpO2: 98%   Weight: 92.5 kg (204 lb)   Height: 5' 10\" (1.778 m)     Physical Exam  Vitals and nursing note reviewed. Constitutional:       General: He is not in acute distress. Appearance: He is well-developed. He is not diaphoretic. HENT:      Head: Normocephalic and atraumatic. Eyes:      Conjunctiva/sclera: Conjunctivae normal.   Cardiovascular:      Rate and Rhythm: Normal rate and regular rhythm. Heart sounds: Normal heart sounds. Pulmonary:      Effort: Pulmonary effort is normal. No respiratory distress. Breath sounds: Normal breath sounds. Chest:      Chest wall: No tenderness. Abdominal:      General: Bowel sounds are normal. There is no distension. Palpations: Abdomen is soft. Tenderness: There is no abdominal tenderness. There is no guarding or rebound. Comments: Soft and non tender, no peritoneal signs. 3 small laparoscopic incisions noted to the left upper and lower quadrant of the abdomen, well-appearing, no secondary signs of infection. Musculoskeletal:         General: No deformity. Normal range of motion. Cervical back: Normal range of motion and neck supple. Skin:     General: Skin is warm and dry. Neurological:      Mental Status: He is alert and oriented to person, place, and time. Diagnostic Study Results     Labs -     Recent Results (from the past 12 hour(s))   CBC WITH AUTOMATED DIFF    Collection Time: 06/26/21  5:34 PM   Result Value Ref Range    WBC 7.3 4.6 - 13.2 K/uL    RBC 5.27 4.35 - 5.65 M/uL    HGB 15.4 13.0 - 16.0 g/dL    HCT 44.5 36.0 - 48.0 %    MCV 84.4 74.0 - 97.0 FL    MCH 29.2 24.0 - 34.0 PG    MCHC 34.6 31.0 - 37.0 g/dL    RDW 13.3 11.6 - 14.5 %    PLATELET 905 881 - 680 K/uL    MPV 9.5 9.2 - 11.8 FL    NEUTROPHILS 68 40 - 73 %    LYMPHOCYTES 23 21 - 52 %    MONOCYTES 7 3 - 10 %    EOSINOPHILS 1 0 - 5 %    BASOPHILS 1 0 - 2 %    ABS. NEUTROPHILS 5.0 1.8 - 8.0 K/UL    ABS. LYMPHOCYTES 1.7 0.9 - 3.6 K/UL    ABS. MONOCYTES 0.5 0.05 - 1.2 K/UL    ABS. EOSINOPHILS 0.1 0.0 - 0.4 K/UL    ABS. BASOPHILS 0.1 0.0 - 0.1 K/UL    DF AUTOMATED     METABOLIC PANEL, COMPREHENSIVE    Collection Time: 06/26/21  5:34 PM   Result Value Ref Range    Sodium 137 136 - 145 mmol/L    Potassium 3.7 3.5 - 5.5 mmol/L    Chloride 101 100 - 111 mmol/L    CO2 31 21 - 32 mmol/L    Anion gap 5 3.0 - 18 mmol/L    Glucose 159 (H) 74 - 99 mg/dL    BUN 15 7.0 - 18 MG/DL    Creatinine 1.00 0.6 - 1.3 MG/DL    BUN/Creatinine ratio 15 12 - 20      GFR est AA >60 >60 ml/min/1.73m2    GFR est non-AA >60 >60 ml/min/1.73m2    Calcium 9.3 8.5 - 10.1 MG/DL    Bilirubin, total 0.3 0.2 - 1.0 MG/DL    ALT (SGPT) 34 16 - 61 U/L    AST (SGOT) 15 10 - 38 U/L    Alk.  phosphatase 63 45 - 117 U/L    Protein, total 8.6 (H) 6.4 - 8.2 g/dL    Albumin 4.0 3.4 - 5.0 g/dL    Globulin 4.6 (H) 2.0 - 4.0 g/dL    A-G Ratio 0.9 0.8 - 1.7     LIPASE    Collection Time: 06/26/21  5:34 PM   Result Value Ref Range    Lipase 182 73 - 393 U/L   MAGNESIUM    Collection Time: 06/26/21  5:34 PM   Result Value Ref Range    Magnesium 2.0 1.6 - 2.6 mg/dL       Radiologic Studies -   CT ABD PELV W CONT   Final Result      No CT finding for an acute intra-abdominal or pelvic process. CT Results  (Last 48 hours)               06/26/21 1926  CT ABD PELV W CONT Final result    Impression:      No CT finding for an acute intra-abdominal or pelvic process. Narrative:  CT ABDOMEN/PELVIS WITH CONTRAST       HISTORY: Postop pain. Hernia repair 6/17/2021, bilateral inguinal hernia. COMPARISON: 12/10/2018. TECHNIQUE: 5 mm helical scans were obtained of the abdomen and pelvis after   uneventful administration of intravenous contrast. 100 was given. .  Coronal and   sagittal reformations. All CT scans are performed using dose optimization techniques as appropriate to   the performed exam including the following: Automated exposure control,   adjustment of mA and/or kV according to patient size, and use of iterative   reconstructive technique. FINDINGS:        The visualized lung bases are clear. There is homogeneous enhancement of the liver, spleen, and pancreas. The gallbladder is without stones or sludge. No adrenal nodules or masses. The kidneys enhance symmetrically. Exophytic cyst mid left kidney. No   hydronephrosis. There is no free intraperitoneal air, free fluid, or fluid collections. No abdominal or pelvic lymphadenopathy. The small and large bowel are normal in caliber. The appendix does not appear   inflamed. Diverticulosis. Bilateral fat-containing inguinal hernias are stable. No herniated bowel loops. No fluid collections. Tiny fat-containing umbilical hernia. The bladder is under distended and therefore incompletely evaluated. No gross   prostatomegaly. .        No significant calcifications in the abdominal aorta. No aneurysmal dilatation. Osseous structures are intact. CXR Results  (Last 48 hours)    None            Medical Decision Making   I am the first provider for this patient.     I reviewed the vital signs, available nursing notes, past medical history, past surgical history, family history and social history. Vital Signs-Reviewed the patient's vital signs. Records Reviewed: Nursing Notes and Old Medical Records     Procedures: None   Procedures    Provider Notes (Medical Decision Making):     Differential: Postop complication/pain, malingering, constipation, perforation      Plan: Order CT abdomen and pelvis given patient's description of severe continued pain. Also order basic labs. Have agreed to give 1 dose of morphine while in the emergency department. 8:06 PM  Have discussed reassuring imaging and have given him a copy of this. Did also discuss reassuring labs. Did discuss that no prescriptions for opioids at this time are indicated. Will discharge home with Toradol and have advised he can alternate with Tylenol. Have advised close general surgery follow-up. Will discharge home. MED RECONCILIATION:  No current facility-administered medications for this encounter. Current Outpatient Medications   Medication Sig    ketorolac (TORADOL) 10 mg tablet Take 1 Tablet by mouth every six (6) hours as needed for Pain.  ondansetron (Zofran ODT) 4 mg disintegrating tablet 1 Tablet by SubLINGual route every eight (8) hours as needed for Nausea or Vomiting.  docusate sodium (Colace) 100 mg capsule Take 1 Capsule by mouth two (2) times a day for 90 days.  polyethylene glycol (Miralax) 17 gram/dose powder Take 17 g by mouth daily. 1 tablespoon with 8 oz of water daily    clonazePAM (KlonoPIN) 0.5 mg tablet Take 0.5 mg by mouth two (2) times daily as needed for Anxiety.  metoprolol tartrate (LOPRESSOR) 50 mg tablet Take 50 mg by mouth two (2) times a day.  hydroCHLOROthiazide (HYDRODIURIL) 25 mg tablet Take 25 mg by mouth daily.  ergocalciferol (Vitamin D2) 1,250 mcg (50,000 unit) capsule Take 50,000 Units by mouth every Wednesday.     methocarbamoL (Robaxin-750) 750 mg tablet Take 1 Tablet by mouth four (4) times daily. Disposition:  Home     DISCHARGE NOTE:   Pt has been reexamined. Patient has no new complaints, changes, or physical findings. Care plan outlined and precautions discussed. Results of workup were reviewed with the patient. All medications were reviewed with the patient. All of pt's questions and concerns were addressed. Patient was instructed and agrees to follow up with Gen Surg as well as to return to the ED upon further deterioration. Patient is ready to go home. Follow-up Information     Follow up With Specialties Details Why [facility]    [facility] EMERGENCY DEPT Emergency Medicine  As needed [facility] [address] [place] [address]    Rosendo Carias MD Surgery Schedule an appointment as soon as possible for a visit   [address] [address]  [phone]            Current Discharge Medication List      START taking these medications    Details   ketorolac (TORADOL) 10 mg tablet Take 1 Tablet by mouth every six (6) hours as needed for Pain. Qty: 20 Tablet, Refills: 0  Start date: 6/26/2021                 Diagnosis     Clinical Impression:   1. Post-operative pain          \"Please note that this dictation was completed with [email or web address], the computer voice recognition software. Quite often unanticipated grammatical, syntax, homophones, and other interpretive errors are inadvertently transcribed by the computer software. Please disregard these errors. Please excuse any errors that have escaped final proofreading. \"

## 2021-06-27 NOTE — ED NOTES
8:20 PM  06/26/21     Discharge instructions given to patient (name) with verbalization of understanding. Patient accompanied by self. Patient discharged with the following prescriptions Toradol. Patient discharged to home (destination).       Evgeny Boudreaux RN

## 2021-06-28 ENCOUNTER — OFFICE VISIT (OUTPATIENT)
Dept: SURGERY | Age: 46
End: 2021-06-28
Payer: MEDICAID

## 2021-06-28 VITALS
WEIGHT: 206.2 LBS | OXYGEN SATURATION: 94 % | SYSTOLIC BLOOD PRESSURE: 114 MMHG | BODY MASS INDEX: 29.52 KG/M2 | DIASTOLIC BLOOD PRESSURE: 82 MMHG | TEMPERATURE: 98.4 F | HEIGHT: 70 IN | HEART RATE: 62 BPM

## 2021-06-28 DIAGNOSIS — Z09 POSTOPERATIVE EXAMINATION: Primary | ICD-10-CM

## 2021-06-28 PROCEDURE — 99024 POSTOP FOLLOW-UP VISIT: CPT | Performed by: SURGERY

## 2021-06-28 NOTE — PROGRESS NOTES
Patient seen and examined. Since the surgery he had went twice to the emergency room complaining of abdominal pain and asking for narcotics which he was not given. I also noticed that in the notes sent from the emergency room in the last visit which was 2 days ago he stated that he tried to call our office multiple times and he did not hear back a response. However Esvlad Ulrich and Ethyl Butter both answered to him and he stated today that they did answer him. The patient stated that he is doing relatively well but is still having abdominal pain. On examination his abdomen is soft and nontender and his wounds are healing well. He stated that yesterday he did cut the grass and he to use a push over lawnmower which caused him to have pain so I told him to please do not do any heavy lifting and do not cut the grass for another 1 week. He stated he would like to go back to work tomorrow which is okay with me. Follow-up with me as needed and explained to him the importance of seeking get pain management which he stated that he will.

## 2021-06-28 NOTE — PROGRESS NOTES
Fadi Higgins is a 54 y.o. male (: 1975) presenting to address:    Chief Complaint   Patient presents with    Surgical Follow-up     Repair of ventral hernia with primary closure 21       Medication list and allergies have been reviewed with Fadi Higgins and updated as of today's date. I have gone over all Medical, Surgical and Social History with Fadi Higgins and updated/added the information accordingly. 1. Have you been to the ER, Urgent Care or Hospitalized since your last visit? YES. [facility]      2. Have you followed up with your PCP or any other Physicians since your procedure/ last office visit?    NO

## 2021-07-01 NOTE — ED NOTES
The client did not return to the ER Department during the month of June 2021 on 06/20 and 06/26/2021 for post OP Complication, abdominal pain, nausea LLQ (left lower quadrant) 41/10 post OP Complication.

## 2021-07-07 ENCOUNTER — TELEPHONE (OUTPATIENT)
Dept: SURGERY | Age: 46
End: 2021-07-07

## 2021-07-07 NOTE — TELEPHONE ENCOUNTER
LVM for patient due to scheduled for f/u appt on 07/12/21 with Dr Kale Tabares. Patient was seen on 06/28/21. I was calling patient to see if he was feeling better and if he is having any issues. Per Dr Kale Tabares notes patient is to follow up as needed. Not sure if this appt was made a while ago.

## 2021-08-02 ENCOUNTER — TELEPHONE (OUTPATIENT)
Dept: SURGERY | Age: 46
End: 2021-08-02

## 2021-08-02 NOTE — TELEPHONE ENCOUNTER
Patient states he would like to confront someone in person regarding how he was treated regarding seeking pain meds. Patient states on last appt with Dr Barbara Perez, he was not given refills on rx'd pain meds after surgery. Patient states he was told by Dr. Nicole Astudillo he will not rx any more pain meds due to patient was presenting drug seeking behavior according to ER notes. Patient states he was not seeking drugs for pleasure. Patient states he was genually in pain. Patient states he goes to the hospital when he is pain for assistance with pain or to take care of the issue that is causing the pain. Patient states he had severe back issues a couple of years ago that caused him severe pain. Patient states he went to the ER at least once a month due to severe pain. Patient states does not appreciate being labeled as a drug seeker. I advised patient I can have the  him to discuss situation. Patient states on his day off he will go to the hospital to confront the ER Dr. that assisted home.

## 2021-08-02 NOTE — TELEPHONE ENCOUNTER
Patient states he us having severe let testicle pain. Pain is 8 out of 10. States feels swollen. Patient states not sure if it is from surgery done on 06/17/21 repair of ventral hernia  States he also feels pulling  Patient states not sure if he has to come see Dr Lane Mccartney or if it is another problem to be referred to another physician. I advised patient I will send a message to Dr Lane Mccartney regarding further instruction for pain. Patient verbalized understanding.

## 2021-08-05 ENCOUNTER — TELEPHONE (OUTPATIENT)
Dept: SURGERY | Age: 46
End: 2021-08-05

## 2021-08-05 NOTE — TELEPHONE ENCOUNTER
Patient called this morning and stated he has been to ER for left groin pain. Patient asking what next steps are. Dr Rani Pollard notified and he states left groin pain is not related to previous surgery repair of ventral hernia done 06/17/21 and patient must follow up with his  primary care physician. Patient was notified of Dr Rani Pollard message and patient verbalized understanding.

## 2021-08-10 ENCOUNTER — TELEPHONE (OUTPATIENT)
Dept: SURGERY | Age: 46
End: 2021-08-10

## 2021-08-10 NOTE — TELEPHONE ENCOUNTER
Mr. Eddie Welch called requesting to speak Dr. Dora Kaba. Dr. Dora Kaba is not in the office today will forward message. Appointment scheduled for Monday, August 16, 2021. I told Mr. Earline Fatima would also inform Dr. Dora Kaba of his recent ED visit, so he may review US and CT results. Mr. Eddie Welch states he's not trying to be rude but he's experiencing pain and another general surgeon recommended that he see Dr. Dora Kaba and states it appears the pain he's having is located at site of previous 2017 surgery. Mr. Eddie Welch states he knows he's called Dr. Dora Kaba twice since recent surgery and he understand/he was referred back to PCP and indicate he was also seen by a urologist but they can't seem to figure out where the pain is coming from. Mr. Eddie Welch states he really like Dr. Dora Kaba as his surgeon and would really like to talk to him about his symptoms. Again I explained Dr. Dora Kaba is out of the office for a few days and will be in surgery later this week and he'll return to clinic on Monday, August 16, 2021. Mr. Eddie Welch verbalized understanding.

## 2021-08-16 ENCOUNTER — OFFICE VISIT (OUTPATIENT)
Dept: SURGERY | Age: 46
End: 2021-08-16
Payer: MEDICAID

## 2021-08-16 VITALS
DIASTOLIC BLOOD PRESSURE: 77 MMHG | SYSTOLIC BLOOD PRESSURE: 114 MMHG | BODY MASS INDEX: 29.58 KG/M2 | TEMPERATURE: 97.5 F | HEART RATE: 56 BPM | HEIGHT: 70 IN | WEIGHT: 206.6 LBS | OXYGEN SATURATION: 97 %

## 2021-08-16 DIAGNOSIS — N50.82 SCROTAL PAIN: Primary | ICD-10-CM

## 2021-08-16 DIAGNOSIS — R10.32 LEFT GROIN PAIN: ICD-10-CM

## 2021-08-16 PROCEDURE — 99024 POSTOP FOLLOW-UP VISIT: CPT | Performed by: SURGERY

## 2021-08-16 NOTE — PROGRESS NOTES
General Surgery Consult    Darius Gaytan  Admit date: (Not on file)    MRN: [id number]     : 1975     Age: 54 y.o. Attending Physician: Joann Segovia MD [facility]      History of Present Illness:      Tk Gardner is a 54 y.o. male who is very well-known to me. 2 months ago I have performed robotic repair of incisional hernia with placement of mesh. The patient has no complaint from the surgery but has been complaining mainly of left scrotal pain as well as left groin pain. The patient has been to multiple emergency rooms and multiple physicians. He had multiple CT scans and ultrasounds that did not show any evidence of hernia recurrence in the left groin area. The ultrasound of the scrotum showed small hydrocele and a small cyst.  The patient stated that he was seen by the urology team.  He stated that they told him that the hydrocele and the cyst are not the reason for his scrotal pain and he said that the advised him to seek a second opinion and they offered him to send him to another general surgeon but he refused. However the patient later told me that he went and saw another general surgeon . When I asked him what is his name he said he does not recall and he stated that he is close to [facility]. looking at the chart I could not find any notes from any general surgeon. The patient denies any bulge or mass in the left groin area. Patient Active Problem List    Diagnosis Date Noted    [facility] abuse ([facility] 2019    Dysthymia 2019    Alcohol abuse 2019    Diabetes ([facility]     Cocaine use 2018    MDD (major depressive disorder), recurrent episode, moderate ([facility] 2018    RHONDA (generalized anxiety disorder) 2018    Obesity (BMI 30.0-34. 9)     Drug-seeking behavior 2017    Anxiety 2017    ACP (advance care planning) 2016    Diabetes mellitus ([facility] 2015    Chronic low back pain 2013    Hypertension Past Medical History:   Diagnosis Date    Depression     Diabetes ([facility]     pt denies     Drug-seeking behavior     56 prescriptions from 32 different prescribers at 6 different pharmacies in last 12 months    Herniated lumbar intervertebral disc     Hypertension     Neurological disorder L3,4,5 torn Herniated disc    Obesity (BMI 30.0-34. 9)       Past Surgical History:   Procedure Laterality Date    HX HERNIA REPAIR Bilateral 06/02/2017    robotic repair of bilateral indirect inguinal hernias with placement of mesh    HX ORTHOPAEDIC Bilateral trigger finger release      Social History     Tobacco Use    Smoking status: Never Smoker    Smokeless tobacco: Never Used   Substance Use Topics    Alcohol use: Not Currently      Social History     Tobacco Use   Smoking Status Never Smoker   Smokeless Tobacco Never Used     Family History   Problem Relation Age of Onset    Hypertension Mother     Diabetes Mother     Heart Failure Mother     COPD Mother     Heart Disease Mother     Hypertension Father     Alcohol abuse Father       Current Outpatient Medications   Medication Sig    methocarbamoL (Robaxin-750) 750 mg tablet Take 1 Tablet by mouth four (4) times daily as needed for Muscle Spasm(s) or Pain.  gabapentin (Neurontin) 300 mg capsule Take 1 Capsule by mouth three (3) times daily for 20 days. Max Daily Amount: 900 mg.    ondansetron (Zofran ODT) 4 mg disintegrating tablet 1 Tablet by SubLINGual route every eight (8) hours as needed for Nausea or Vomiting.  clonazePAM (KlonoPIN) 0.5 mg tablet Take 0.5 mg by mouth two (2) times daily as needed for Anxiety.  metoprolol tartrate (LOPRESSOR) 50 mg tablet Take 50 mg by mouth two (2) times a day.  hydroCHLOROthiazide (HYDRODIURIL) 25 mg tablet Take 25 mg by mouth daily.  ergocalciferol (Vitamin D2) 1,250 mcg (50,000 unit) capsule Take 50,000 Units by mouth every Wednesday.      No current facility-administered medications for this visit. Allergies   Allergen Reactions    Seroquel [Quetiapine] Other (comments)     Pt states he started talking crazy saying weird things          Review of Systems:  Pertinent items are noted in the History of Present Illness. Objective:     Visit Vitals  /77 (BP 1 Location: Right arm, BP Patient Position: Sitting, BP Cuff Size: Large adult)   Pulse (!) 56   Temp 97.5 °F (36.4 °C) (Temporal)   Ht 5' 10\" (1.778 m)   Wt 93.7 kg (206 lb 9.6 oz)   SpO2 97%   BMI 29.64 kg/m²       Physical Exam:      General:  in no apparent distress, alert and oriented times 3                   Abdomen:   rounded, soft, nontender, nondistended, no masses or organomegaly. Left groin exam shows no evidence of any inguinal hernia. Left scrotal exam is slightly tender. Imaging and Lab Review:     CBC:   Lab Results   Component Value Date/Time    WBC 7.3 06/26/2021 05:34 PM    RBC 5.27 06/26/2021 05:34 PM    HGB 15.6 08/02/2021 09:21 PM    HCT 46 08/02/2021 09:21 PM    PLATELET 422 15/50/3481 05:34 PM     BMP:   Lab Results   Component Value Date/Time    Glucose 127 (H) 08/02/2021 09:21 PM    Sodium 141 08/02/2021 09:21 PM    Potassium 3.5 08/02/2021 09:21 PM    Chloride 99 08/02/2021 09:21 PM    CO2 31 06/26/2021 05:34 PM    CO2, TOTAL 29 08/02/2021 09:21 PM    BUN 13 08/02/2021 09:21 PM    Creatinine 0.9 08/02/2021 09:21 PM    Calcium 9.3 06/26/2021 05:34 PM     CMP:  Lab Results   Component Value Date/Time    Glucose 127 (H) 08/02/2021 09:21 PM    Sodium 141 08/02/2021 09:21 PM    Potassium 3.5 08/02/2021 09:21 PM    Chloride 99 08/02/2021 09:21 PM    CO2 31 06/26/2021 05:34 PM    CO2, TOTAL 29 08/02/2021 09:21 PM    BUN 13 08/02/2021 09:21 PM    Creatinine 0.9 08/02/2021 09:21 PM    Calcium 9.3 06/26/2021 05:34 PM    Anion gap 5 06/26/2021 05:34 PM    BUN/Creatinine ratio 15 06/26/2021 05:34 PM    Alk.  phosphatase 63 06/26/2021 05:34 PM    Protein, total 8.6 (H) 06/26/2021 05:34 PM    Albumin 4.0 06/26/2021 05:34 PM    Globulin 4.6 (H) 06/26/2021 05:34 PM    A-G Ratio 0.9 06/26/2021 05:34 PM       No results found for this or any previous visit (from the past 24 hour(s)). images and reports reviewed    Assessment:   Alaina Malcolm is a 54 y.o. male who is presenting with a left groin and scrotal pain that he keeps coming to multiple physician and emergency room for it. I had a long discussion with the patient and I explained to him that he does not have recurrence of his left inguinal hernia. I told him that this is based on multiple physical examinations by me as well as by multiple emergency room physicians, and also it is based on multiple CT scans as well as a recent ultrasound that all did not show any evidence of inguinal hernia. And most importantly it was based on recently doing exploration of his abdomen 2 months ago for which I explore the groin area and there was no evidence of recurrence neither on the left or on the right. Given the fact that the imaging studies, the surgical exploration, and the physical examinations are all negative, the chances of the patient having recurrent inguinal hernia is almost none. When I reviewed the notes from the [facility]rology of [place] where they stated that they were able to feel a left inguinal hernia, I explained to the patient that when we repair inguinal hernias, we do not close the defect and probably they felt the ring and they made the assumption that it is a hernia but it is not. I also explained to the patient that I am always available to see him because there were some commands that we did not want to see the patient. I reassured him that he is always welcome to be seen by us and examined. I also offered him to send him for a third opinion if he would like me to do that but he refused. I explained to the patient that he has a small cyst in the scrotum as well as a small hydrocele.   Though I do not believe they are the cause of his left scrotal pain but it could be a possibility however he was already seen by the urology team. The patient have an appointment with pain management at the end of this month    Plan:     There is no recurrent left inguinal hernia. No need for any general surgery intervention.   Follow-up with the appointment for his pain management  Reconsider seeing the urology team again because I believe that they need to address the hydrocele and the cyst if the patient continued to have pain though again it is unlikely this is the reason  Follow-up with me as needed    Please call me if you have any questions (cell phone: [phone])     Signed By: Bishop Naomi MD     August 16, 2021

## 2021-08-16 NOTE — PROGRESS NOTES
Isac Miles is a 54 y.o. male (: 1975) presenting to address:      Chief Complaint   Patient presents with    Follow-up     Left groin pain and swelling       Medication list and allergies have been reviewed with Isac Miles and updated as of today's date. I have gone over all Medical, Surgical and Social History with Isac Miles and updated/added the information accordingly. 1. Have you been to the ER, Urgent Care or Hospitalized since your last visit? YES. [facility] 21 and 21 left groin pain      2. Have you followed up with your PCP or any other Physicians since your procedure/ last office visit?    NO

## 2021-08-16 NOTE — TELEPHONE ENCOUNTER
[facility] pain. I called [facility] of [facility] for office notes from [address] 08/06/21. I spoke with woman and could not understand her name. She states she will fax over office notes now.

## 2021-08-17 ENCOUNTER — TELEPHONE (OUTPATIENT)
Dept: SURGERY | Age: 46
End: 2021-08-17

## 2021-08-17 NOTE — TELEPHONE ENCOUNTER
Patient requesting to speak with Dr Kale Tabares regarding left groin pain. Pain states received a call from a surgeon yesterday to schedule surgery for diverticulitis. Patient states was told he has had this for over 3 months and this is where his pain is coming from. Patient would like to apologize to Dr Kale Tabares for his behavior. Patient states Dr Kale Tabares has only been very nice to him and he really appreciates it. Patient states please have Dr Kale Tabares call work [phone] and ask for plumbing dept or try [phone] cell first.  Patient states wants to speak with Dr Kale Tabares personally to apologize.

## 2021-11-16 PROBLEM — K57.32 DIVERTICULITIS LARGE INTESTINE W/O PERFORATION OR ABSCESS W/O BLEEDING: Status: ACTIVE | Noted: 2021-11-16

## 2022-01-26 NOTE — TELEPHONE ENCOUNTER
Requested Prescriptions     Pending Prescriptions Disp Refills    clonazePAM (KLONOPIN) 1 mg tablet 10 Tab 0     Sig: Take 1 Tab by mouth daily as needed. Pt asking for a refill of Klonopin, states he has surgery next week and would just need enough to get by until then.
No

## 2022-03-18 PROBLEM — F41.9 ANXIETY: Status: ACTIVE | Noted: 2017-06-09

## 2022-03-18 PROBLEM — F14.10 COCAINE ABUSE (HCC): Status: ACTIVE | Noted: 2019-01-06

## 2022-03-19 PROBLEM — K57.32 DIVERTICULITIS LARGE INTESTINE W/O PERFORATION OR ABSCESS W/O BLEEDING: Status: ACTIVE | Noted: 2021-11-16

## 2022-03-19 PROBLEM — F10.10 ALCOHOL ABUSE: Status: ACTIVE | Noted: 2019-01-06

## 2022-03-19 PROBLEM — F34.1 DYSTHYMIA: Status: ACTIVE | Noted: 2019-01-06

## 2022-03-19 PROBLEM — Z76.5 DRUG-SEEKING BEHAVIOR: Status: ACTIVE | Noted: 2017-08-04

## 2022-03-19 PROBLEM — F41.1 GAD (GENERALIZED ANXIETY DISORDER): Status: ACTIVE | Noted: 2018-04-11

## 2022-03-19 PROBLEM — F14.90 COCAINE USE: Status: ACTIVE | Noted: 2018-04-11

## 2022-03-19 PROBLEM — F33.1 MDD (MAJOR DEPRESSIVE DISORDER), RECURRENT EPISODE, MODERATE (HCC): Status: ACTIVE | Noted: 2018-04-11

## 2022-08-07 ENCOUNTER — HOSPITAL ENCOUNTER (EMERGENCY)
Age: 47
Discharge: HOME | End: 2022-08-07
Attending: EMERGENCY MEDICINE
Payer: MEDICAID

## 2022-08-07 VITALS
DIASTOLIC BLOOD PRESSURE: 98 MMHG | SYSTOLIC BLOOD PRESSURE: 145 MMHG | TEMPERATURE: 98.5 F | OXYGEN SATURATION: 97 % | RESPIRATION RATE: 18 BRPM | HEART RATE: 58 BPM

## 2022-08-07 DIAGNOSIS — M79.642 PAIN OF LEFT HAND: Primary | ICD-10-CM

## 2022-08-07 PROCEDURE — 74011250636 HC RX REV CODE- 250/636: Performed by: PHYSICIAN ASSISTANT

## 2022-08-07 PROCEDURE — 99284 EMERGENCY DEPT VISIT MOD MDM: CPT

## 2022-08-07 PROCEDURE — 96372 THER/PROPH/DIAG INJ SC/IM: CPT

## 2022-08-07 RX ORDER — MORPHINE SULFATE 2 MG/ML
2 INJECTION, SOLUTION INTRAMUSCULAR; INTRAVENOUS
Status: COMPLETED | OUTPATIENT
Start: 2022-08-07 | End: 2022-08-07

## 2022-08-07 RX ORDER — HYDROCODONE BITARTRATE AND ACETAMINOPHEN 5; 325 MG/1; MG/1
1 TABLET ORAL
Qty: 1 TABLET | Refills: 0 | Status: SHIPPED | OUTPATIENT
Start: 2022-08-07 | End: 2022-08-07

## 2022-08-07 RX ORDER — NALOXONE HYDROCHLORIDE 4 MG/.1ML
SPRAY NASAL
Qty: 2 EACH | Refills: 0 | Status: SHIPPED | OUTPATIENT
Start: 2022-08-07

## 2022-08-07 RX ADMIN — MORPHINE SULFATE 2 MG: 2 INJECTION, SOLUTION INTRAMUSCULAR; INTRAVENOUS at 20:47

## 2022-08-07 NOTE — ED TRIAGE NOTES
Pt c/o of left hand pain. Pt states he had surgery on three of his fingers two months ago. Pt states pain has gotten worse over the past couple of days. Pt had an xray on his hand yesterday.

## 2022-08-08 NOTE — DISCHARGE INSTRUCTIONS
Take medication as prescribed. Follow-up with your hand surgeon within 2 days for reassessment. Bring the results from this visit with you for their review. Return to the ED immediately for any new, worsening, or persistent symptoms.

## 2022-08-08 NOTE — ED PROVIDER NOTES
EMERGENCY DEPARTMENT HISTORY AND PHYSICAL EXAM    10:23 PM      Date: 8/7/2022  Patient Name: Diana Overton    History of Presenting Illness     Chief Complaint   Patient presents with    Hand Pain         History Provided By: Patient    Additional History (Context): Diana Overton is a 51 y.o. male with  noted PMH  who presents with complaint of left hand pain x 2 months. Patient notes he had surgery with Dr. Porter for trigger finger in June, notes he has had persistent pain since incident. Notes pain is worse with movement of digits. Patient notes he is evaluated earlier today at another hospital, was not prescribed any medication. Patient denies fever or chills, numbness or tingling, weakness. Denies recurrent injury or trauma. Notes he has outpatient follow-up for MRI on 8/20. PCP: Asim Kumar MD    Current Outpatient Medications   Medication Sig Dispense Refill    HYDROcodone-acetaminophen (NORCO) 5-325 mg per tablet Take 1 Tablet by mouth now for 1 dose. Max Daily Amount: 1 Tablet. Take 1-2 tablets PO every 4-6 hours as needed for pain control. If over the counter ibuprofen or acetaminophen was suggested, then only take the vicodin for pain not well controlled with the over the counter medication. 1 Tablet 0    naloxone (NARCAN) 4 mg/actuation nasal spray Use 1 spray intranasally, then discard. Repeat with new spray every 2 min as needed for opioid overdose symptoms, alternating nostrils. 2 Each 0    FLUoxetine 60 mg tab Take 60 mg by mouth. Indications: anxiousness associated with depression      Vitamin D2 1,250 mcg (50,000 unit) capsule take 1 capsule by mouth every week      hydroCHLOROthiazide (HYDRODIURIL) 25 mg tablet take 1 tablet by mouth once daily      metoprolol tartrate (LOPRESSOR) 25 mg tablet 25 mg three (3) times daily.          Past History     Past Medical History:  Past Medical History:   Diagnosis Date    Adverse effect of anesthesia     pt said BP dropped during surgery Anxiety and depression     patient states depression was once and has not repeated    Depression     patient denies depression    Diabetes ([address]     pt denies     Diabetes mellitus ([address]     Diverticulitis large intestine     Drug-seeking behavior     56 prescriptions from 32 different prescribers at 6 different pharmacies in last 12 months    Fibromyalgia     Herniated lumbar intervertebral disc     HTN (hypertension)     Hypertension     Neurological disorder L3,4,5 torn Herniated disc    Obesity (BMI 30.0-34. 9)     Sleep apnea     has never had a study       Past Surgical History:  Past Surgical History:   Procedure Laterality Date    COLONOSCOPY N/A 10/29/2021    DIAGNOSTIC COLONOSCOPY performed by Syeda Colbert MD at [address] [place] [address] Bilateral     HX HERNIA REPAIR Bilateral 06/02/2017    robotic repair of bilateral indirect inguinal hernias with placement of mesh    HX ORTHOPAEDIC Bilateral trigger finger release       Family History:  Family History   Problem Relation Age of Onset    Hypertension Mother     Diabetes Mother     Heart Failure Mother     Heart Disease Mother     Hypertension Father     Alcohol abuse Father        Social History:  Social History     Tobacco Use    Smoking status: Never    Smokeless tobacco: Never   Vaping Use    Vaping Use: Never used   Substance Use Topics    Alcohol use: Not Currently    Drug use: Not Currently     Types: Cocaine       Allergies: Allergies   Allergen Reactions    Seroquel [Quetiapine] Other (comments)     Pt states he started talking crazy saying weird things         Review of Systems       Review of Systems   Constitutional:  Negative for chills and fever. Respiratory:  Negative for shortness of breath. Cardiovascular:  Negative for chest pain. Gastrointestinal:  Negative for abdominal pain, nausea and vomiting. Musculoskeletal:  Positive for arthralgias and myalgias. Skin:  Negative for rash.    Neurological:  Negative for weakness. All other systems reviewed and are negative. Physical Exam   Visit Vitals  BP (!) 145/98   Pulse (!) 58   Temp 98.5 °F (36.9 °C)   Resp 18   SpO2 97%         Physical Exam  Vitals and nursing note reviewed. Constitutional:       General: He is not in acute distress. Appearance: He is well-developed. He is not diaphoretic. HENT:      Head: Normocephalic and atraumatic. Cardiovascular:      Rate and Rhythm: Normal rate and regular rhythm. Heart sounds: Normal heart sounds. No murmur heard. No friction rub. No gallop. Pulmonary:      Effort: Pulmonary effort is normal. No respiratory distress. Breath sounds: Normal breath sounds. No wheezing or rales. Musculoskeletal:         General: Normal range of motion. Right hand: Normal.      Left hand: Normal.      Cervical back: Normal range of motion and neck supple. Comments: No erythema/edema/discoloration to L hand, full ROM of digits, radial pulse 2+    Skin:     General: Skin is warm. Findings: No rash. Neurological:      Mental Status: He is alert. Diagnostic Study Results     Labs -  No results found for this or any previous visit (from the past 12 hour(s)). Radiologic Studies -   No orders to display         Medical Decision Making   I am the first provider for this patient. I reviewed the vital signs, available nursing notes, past medical history, past surgical history, family history and social history. Vital Signs-Reviewed the patient's vital signs. Records Reviewed: Nursing Notes and Old Medical Records (Time of Review: 10:23 PM)    ED Course: Progress Notes, Reevaluation, and Consults:  9:00 PM  Reviewed plan with patient. Discussed need for close outpatient follow-up with hand surgeon this week for reassessment. Discussed strict return precautions, including fever, weakness, skin discoloration, or any other medical concerns.      Provider Notes (Medical Decision Making): 21-year-old male who presents to the ED due to left hand pain x2 months after trigger finger surgery in June. Afebrile, nontoxic-appearing, looks well. No evidence of cellulitis, septic joint, recurrent trauma. Extremity neurovascularly intact. Full range of motion of digits. Do not feel labs or imaging are warranted. Patient is stable for discharge with close outpatient follow-up with hand surgeon for further assessment. Strict return precautions provided. Diagnosis     Clinical Impression:   1. Pain of left hand        Disposition: home     Follow-up Information       Follow up With Specialties Details Why [address] [facility] EMERGENCY DEPT Emergency Medicine  If symptoms worsen [address] [facility] [address] [place] [address]    Roberto Carlos Weiss MD Family Medicine Schedule an appointment as soon as possible for a visit   [address] [id number] [facility]  [phone]               Discharge Medication List as of 8/7/2022  8:43 PM        START taking these medications    Details   HYDROcodone-acetaminophen (NORCO) 5-325 mg per tablet Take 1 Tablet by mouth now for 1 dose. Max Daily Amount: 1 Tablet. Take 1-2 tablets PO every 4-6 hours as needed for pain control. If over the counter ibuprofen or acetaminophen was suggested, then only take the vicodin for pain not well controlled with  the over the counter medication. , Normal, Disp-1 Tablet, R-0      naloxone (NARCAN) 4 mg/actuation nasal spray Use 1 spray intranasally, then discard. Repeat with new spray every 2 min as needed for opioid overdose symptoms, alternating nostrils. , Normal, Disp-2 Each, R-0           CONTINUE these medications which have NOT CHANGED    Details   FLUoxetine 60 mg tab Take 60 mg by mouth.  Indications: anxiousness associated with depression, Historical Med      Vitamin D2 1,250 mcg (50,000 unit) capsule take 1 capsule by mouth every week, Historical Med, CANDY      hydroCHLOROthiazide (HYDRODIURIL) 25 mg tablet take 1 tablet by mouth once daily, Historical Med      metoprolol tartrate (LOPRESSOR) 25 mg tablet 25 mg three (3) times daily. , Historical Med           STOP taking these medications       diazePAM (Valium) 5 mg tablet Comments:   Reason for Stopping:         zolpidem (AMBIEN) 10 mg tablet Comments:   Reason for Stopping:         ALPRAZolam (XANAX) 0.5 mg tablet Comments:   Reason for Stopping:               Dictation disclaimer:  Please note that this dictation was completed with [employer], the [facility] voice recognition software. Quite often unanticipated grammatical, syntax, homophones, and other interpretive errors are inadvertently transcribed by the computer software. Please disregard these errors. Please excuse any errors that have escaped final proofreading.

## 2023-01-20 ENCOUNTER — HOSPITAL ENCOUNTER (EMERGENCY)
Age: 48
Discharge: HOME | End: 2023-01-20
Attending: STUDENT IN AN ORGANIZED HEALTH CARE EDUCATION/TRAINING PROGRAM
Payer: MEDICAID

## 2023-01-20 VITALS
BODY MASS INDEX: 30.06 KG/M2 | SYSTOLIC BLOOD PRESSURE: 145 MMHG | OXYGEN SATURATION: 99 % | TEMPERATURE: 98.8 F | HEART RATE: 63 BPM | HEIGHT: 70 IN | RESPIRATION RATE: 22 BRPM | DIASTOLIC BLOOD PRESSURE: 99 MMHG | WEIGHT: 210 LBS

## 2023-01-20 DIAGNOSIS — R10.9 FLANK PAIN: ICD-10-CM

## 2023-01-20 DIAGNOSIS — M62.838 MUSCLE SPASM: Primary | ICD-10-CM

## 2023-01-20 LAB
APPEARANCE UR: CLEAR
BILIRUB UR QL: NEGATIVE
COLOR UR: YELLOW
GLUCOSE UR STRIP.AUTO-MCNC: >1000 MG/DL
HGB UR QL STRIP: NEGATIVE
KETONES UR QL STRIP.AUTO: ABNORMAL MG/DL
LEUKOCYTE ESTERASE UR QL STRIP.AUTO: NEGATIVE
NITRITE UR QL STRIP.AUTO: NEGATIVE
PH UR STRIP: 5.5 (ref 5–8)
PROT UR STRIP-MCNC: NEGATIVE MG/DL
SP GR UR REFRACTOMETRY: 1.03 (ref 1–1.03)
UROBILINOGEN UR QL STRIP.AUTO: 0.2 EU/DL (ref 0.2–1)

## 2023-01-20 PROCEDURE — 81003 URINALYSIS AUTO W/O SCOPE: CPT

## 2023-01-20 PROCEDURE — 96372 THER/PROPH/DIAG INJ SC/IM: CPT

## 2023-01-20 PROCEDURE — 74011250637 HC RX REV CODE- 250/637: Performed by: NURSE PRACTITIONER

## 2023-01-20 PROCEDURE — 74011250636 HC RX REV CODE- 250/636: Performed by: NURSE PRACTITIONER

## 2023-01-20 PROCEDURE — 99284 EMERGENCY DEPT VISIT MOD MDM: CPT

## 2023-01-20 PROCEDURE — 74011000250 HC RX REV CODE- 250: Performed by: NURSE PRACTITIONER

## 2023-01-20 RX ORDER — CYCLOBENZAPRINE HCL 10 MG
10 TABLET ORAL
Qty: 20 TABLET | Refills: 0 | Status: SHIPPED | OUTPATIENT
Start: 2023-01-20 | End: 2023-02-09

## 2023-01-20 RX ORDER — KETOROLAC TROMETHAMINE 15 MG/ML
15 INJECTION, SOLUTION INTRAMUSCULAR; INTRAVENOUS
Status: COMPLETED | OUTPATIENT
Start: 2023-01-20 | End: 2023-01-20

## 2023-01-20 RX ORDER — HYDROCODONE BITARTRATE AND ACETAMINOPHEN 5; 325 MG/1; MG/1
1 TABLET ORAL
Status: COMPLETED | OUTPATIENT
Start: 2023-01-20 | End: 2023-01-20

## 2023-01-20 RX ORDER — CYCLOBENZAPRINE HCL 10 MG
10 TABLET ORAL
Status: COMPLETED | OUTPATIENT
Start: 2023-01-20 | End: 2023-01-20

## 2023-01-20 RX ORDER — LIDOCAINE 4 G/100G
1 PATCH TOPICAL
Status: DISCONTINUED | OUTPATIENT
Start: 2023-01-20 | End: 2023-01-20 | Stop reason: HOSPADM

## 2023-01-20 RX ADMIN — KETOROLAC TROMETHAMINE 15 MG: 15 INJECTION, SOLUTION INTRAMUSCULAR; INTRAVENOUS at 16:25

## 2023-01-20 RX ADMIN — HYDROCODONE BITARTRATE AND ACETAMINOPHEN 1 TABLET: 5; 325 TABLET ORAL at 16:25

## 2023-01-20 RX ADMIN — CYCLOBENZAPRINE 10 MG: 10 TABLET, FILM COATED ORAL at 16:25

## 2023-01-20 NOTE — Clinical Note
[address] [place] [address] Dr [facility]  [id number] [facility] [address] [address] [phone]    Work/School Note    Date: 1/20/2023    To Whom It May concern:    Mike Aj was seen and treated today in the emergency room by the following provider(s):  Attending Provider: Glover Sacks, DO  Nurse Practitioner: SHAHID Wooten. Mike Aj is excused from work/school on 01/20/23 and 01/21/23. He is medically clear to return to work/school on 1/22/2023.        Sincerely,          SHAHID Olivares

## 2023-01-20 NOTE — ED NOTES
PT complaning of bilateral flank pain 8/10 twisting aching pain. PT was also complaining of hematuria and Nausea.  PT denied SOB or Chest pain

## 2023-01-20 NOTE — ED PROVIDER NOTES
EMERGENCY DEPARTMENT HISTORY AND PHYSICAL EXAM    Date: 1/20/2023  Patient Name: Jory Gustafson    History of Presenting Illness     Chief Complaint   Patient presents with    Flank Pain         History Provided By: Patient      Additional History (Context): Jory Gustafson is a 49 y.o. male with history of anxiety, depression, diabetes, diverticulitis, fibromyalgia, herniated disc, hypertension who presents with complaints of ongoing left flank pain ongoing for the last 1 to 2 months. Patient denies any heavy lifting, strenuous activity, or injury/trauma/fall prior to onset of pain. He has a known herniated disc. He has tried various over-the-counter remedies and ibuprofen without improvement. Pain does not radiate. Pain is worse with direct palpation and movement. He denies any urinary complaints, hematuria, dysuria, vomiting, fever, or chills. He does sometimes have nausea when the pain gets bad. Normal appetite and no weight loss. Describes the pain as sharp, constant, and severe. Patient denies IVDA, saddle anesthesia, loss/retention of bowel or bladder, paresthesias, history of cancer, or trauma/fall. History of kidney stones. Pain is constant described as twisting. PCP: None    Current Facility-Administered Medications   Medication Dose Route Frequency Provider Last Rate Last Admin    lidocaine 4 % patch 1 Patch  1 Patch TransDERmal NOW SHAHID Mooney        HYDROcodone-acetaminophen (NORCO) 5-325 mg per tablet 1 Tablet  1 Tablet Oral NOW SHAHID Mooney        ketorolac (TORADOL) injection 15 mg  15 mg IntraMUSCular NOW SHAHID Mooney        cyclobenzaprine (FLEXERIL) tablet 10 mg  10 mg Oral NOW SHAHID Mooney         Current Outpatient Medications   Medication Sig Dispense Refill    methocarbamoL (ROBAXIN) 750 mg tablet Take 1 Tablet by mouth every six (6) hours as needed for Muscle Spasm(s) or Pain.  30 Tablet 0    haloperidoL (HALDOL) 5 mg tablet Take 1 Tablet by mouth every eight (8) hours as needed (nausea, pain, abdominal cramping). 30 Tablet 0    meloxicam (Mobic) 15 mg tablet Take 1 Tablet by mouth daily for 30 days. 30 Tablet 0    promethazine (PHENERGAN) 25 mg tablet Take 1 Tablet by mouth every six (6) hours as needed (nausea and/or abdominal cramping). 30 Tablet 0    dicyclomine (BENTYL) 20 mg tablet Take 1 Tablet by mouth every six (6) hours as needed for Abdominal Cramps or Pain. 30 Tablet 0    lidocaine (Lidoderm) 5 % Apply patch to the affected area for 12 hours a day and remove for 12 hours a day. 30 Each 0    FLUoxetine 60 mg tab Take 60 mg by mouth. Indications: anxiousness associated with depression      Vitamin D2 1,250 mcg (50,000 unit) capsule take 1 capsule by mouth every week      hydroCHLOROthiazide (HYDRODIURIL) 25 mg tablet take 1 tablet by mouth once daily      metoprolol tartrate (LOPRESSOR) 25 mg tablet 25 mg three (3) times daily. Past History     Past Medical History:  Past Medical History:   Diagnosis Date    Adverse effect of anesthesia     pt said BP dropped during surgery    Anxiety and depression     patient states depression was once and has not repeated    Depression     patient denies depression    Diabetes mellitus ([facility] [address]     Diverticulitis large intestine     Drug-seeking behavior     56 prescriptions from 32 different prescribers at 6 different pharmacies in last 12 months    Fibromyalgia     Herniated lumbar intervertebral disc     HTN (hypertension)     Hypertension     Neurological disorder L3,4,5 torn Herniated disc    Obesity (BMI 30.0-34. 9)     Sleep apnea     has never had a study       Past Surgical History:  Past Surgical History:   Procedure Laterality Date    COLONOSCOPY N/A 10/29/2021    DIAGNOSTIC COLONOSCOPY performed by Debby Ott MD at [address] [place] [address] Bilateral     HX HERNIA REPAIR Bilateral 06/02/2017    robotic repair of bilateral indirect inguinal hernias with placement of mesh    HX ORTHOPAEDIC Bilateral trigger finger release       Family History:  Family History   Problem Relation Age of Onset    Hypertension Mother     Diabetes Mother     Heart Failure Mother     Heart Disease Mother     Hypertension Father     Alcohol abuse Father        Social History:  Social History     Tobacco Use    Smoking status: Never    Smokeless tobacco: Never   Vaping Use    Vaping Use: Never used   Substance Use Topics    Alcohol use: Not Currently    Drug use: Not Currently     Types: Cocaine       Allergies: Allergies   Allergen Reactions    Seroquel [Quetiapine] Other (comments)     Pt states he started talking crazy saying weird things         Review of Systems   Review of Systems   Constitutional: Negative for chills and fever. HENT: Negative. Eyes: Negative. Respiratory: Negative for cough and shortness of breath. Cardiovascular: Negative for chest pain and palpitations. Gastrointestinal: Negative for abdominal pain, constipation, diarrhea, nausea and vomiting. Genitourinary: Negative. Negative for difficulty urinating and flank pain. Musculoskeletal: Positive for back pain. Negative for gait problem and neck pain. Skin: Negative. Allergic/Immunologic: Negative. Neurological: Negative for dizziness, weakness, numbness and headaches. Psychiatric/Behavioral: Negative. All other systems reviewed and are negative. All Other Systems Negative  Physical Exam     Vitals:    01/20/23 1550   BP: (!) 145/99   Pulse: 63   Resp: 22   Temp: 98.8 °F (37.1 °C)   SpO2: 99%   Weight: 95.3 kg (210 lb)   Height: 5' 10\" (1.778 m)     Physical Exam  Vitals and nursing note reviewed. Constitutional:       General: He is not in acute distress. Appearance: Normal appearance. He is not ill-appearing, toxic-appearing or diaphoretic. HENT:      Head: Normocephalic and atraumatic. Nose: Nose normal. No congestion or rhinorrhea.       Mouth/Throat:      Mouth: Mucous membranes are moist.      Pharynx: Oropharynx is clear. No oropharyngeal exudate or posterior oropharyngeal erythema. Eyes:      General: Vision grossly intact. Gaze aligned appropriately. No scleral icterus. Right eye: No discharge. Left eye: No discharge. Conjunctiva/sclera: Conjunctivae normal.   Cardiovascular:      Rate and Rhythm: Normal rate and regular rhythm. Pulses: Normal pulses. Heart sounds: Normal heart sounds. No murmur heard. No gallop. Pulmonary:      Effort: Pulmonary effort is normal. No respiratory distress. Breath sounds: Normal breath sounds. No stridor. No wheezing, rhonchi or rales. Chest:      Chest wall: No tenderness. Abdominal:      General: Abdomen is flat. Palpations: Abdomen is soft. Tenderness: There is no abdominal tenderness. There is no right CVA tenderness, left CVA tenderness, guarding or rebound. Musculoskeletal:         General: Normal range of motion. Cervical back: Normal, full passive range of motion without pain, normal range of motion and neck supple. No rigidity or tenderness. Thoracic back: Normal.      Lumbar back: Normal.        Back:       Comments: No midline spinal process tenderness, step-off, or deformity. Lymphadenopathy:      Cervical: No cervical adenopathy. Skin:     General: Skin is warm and dry. Capillary Refill: Capillary refill takes less than 2 seconds. Findings: No rash. Neurological:      General: No focal deficit present. Mental Status: He is alert and oriented to person, place, and time. Comments: Gait is steady and patient exhibits no evidence of ataxia. Patient is able to ambulate without difficulty. No focal neurological deficit noted.  No facial droop, slurred speech, or evidence of altered mentation noted on exam.   Psychiatric:         Mood and Affect: Mood normal.            Diagnostic Study Results     Labs -     Recent Results (from the past 12 hour(s)) URINALYSIS W/ RFLX MICROSCOPIC    Collection Time: 01/20/23  3:45 PM   Result Value Ref Range    Color YELLOW      Appearance CLEAR      Specific gravity 1.028 1.005 - 1.030      pH (UA) 5.5 5.0 - 8.0      Protein Negative NEG mg/dL    Glucose >1,000 (A) NEG mg/dL    Ketone TRACE (A) NEG mg/dL    Bilirubin Negative NEG      Blood Negative NEG      Urobilinogen 0.2 0.2 - 1.0 EU/dL    Nitrites Negative NEG      Leukocyte Esterase Negative NEG         Radiologic Studies -   No orders to display     CT Results  (Last 48 hours)      None          CXR Results  (Last 48 hours)      None              Medical Decision Making   I am the first provider for this patient. I reviewed the vital signs, available nursing notes, past medical history, past surgical history, family history and social history. Vital Signs- Reviewed the patient's vital signs. Pulse Oximetry Analysis - 99% on RA-normal     Records Reviewed: Nursing Notes, Old Medical Records, Previous electrocardiograms, Previous Radiology Studies, and Previous Laboratory Studies    Procedures:  Procedures      ED Course: Progress Notes, Reevaluation, and Consults:  4:36 PM medicated for pain we will send home with additional supportive medications. Will need close follow-up with Ortho/spine and a referral will be given. UA negative. Ruled out cauda equina based on lack of urinary retention/loss of bowel/bladder function. No saddle anesthesia. Spinal epidural abscess ruled out based on: Lack of progressive neurologic deficits. No fever on exam (pt has not taken anything to control fever within the last 4-6 hours). Lack of the following risk factors: IVDU history, recent spine surgery, indwelling spinal hardware, recent spine fracture, indwelling vascular catheter, immunocompromised, other site of infection. The patient's skin looks unremarkable for signs of redness, infection, or herpetic lesions indicating zoster. Abdomen is soft and non-tender. Patient is able to ambulate, moves without difficulty from sitting to standing position, changes position in examination chair without difficulty. Due to all the recent CT scans, do not feel that any additional emergent imaging is warranted at this time. Exam and HPI consistent with lumbosacral muscle spasm. Explained to patient that this pain may take a few weeks to completely resolve. Will provide work note as necessary. Provider Notes (Medical Decision Making):   54-year-old male here with left flank back pain. Patient presents ambulatory, moving BLE in NAD, well-hydrated, non-toxic in appearance, with normal vitals. Exam including neurologic exam normal. No red flags such as saddle paresthesias, weakness, bladder/bowel dysfunction, IVDA, DM, or fever - very low suspicion for epidural abscess, cauda equina, or spinal cord injury although these were considered. No recent injury/trauma/heavy lifting. Do not feel that any emergent imaging is warranted at this time. Patient is visited various ERs over the last several weeks And had multiple CT scans of the abdomen and pelvis (10 in the records that I can see), PVL of the renal artery/vein that was normal and multiple ER visits. He does have drug-seeking behavior. Does not appear to be any acute withdrawal on my exam.  Vitals are stable and he is afebrile. He is tender in a very small point tender area over the left flank with a palpable muscle spasm in that place. No overlying skin changes. Most recent CT abdomen and pelvis done on 1/2/2023 with contrast shows no evidence for acute abdominal or pelvic process. Hepatic cysts steatosis. Colonic diverticulosis. Kidneys/ureters/bladder showed no nephrolithiasis. Stable left exophytic renal cyst.    No swelling, deformity, erythema or bruising noted on exam. No midline TTP. Neurovascularly intact.  FAROM of bilateral LE. LE normal strength and sensation with equal and strong pulses bilaterally. Straight leg raise negative bilaterally. We will treat patient symptomatically. Do not feel that any emergent imaging is warranted at this time. Will disposition after reassessment assuming no clinical change or worsening and appropriate response to symptomatic treatment. Progress Note/Consultations:     MED RECONCILIATION:  Current Facility-Administered Medications   Medication Dose Route Frequency    lidocaine 4 % patch 1 Patch  1 Patch TransDERmal NOW    HYDROcodone-acetaminophen (NORCO) 5-325 mg per tablet 1 Tablet  1 Tablet Oral NOW    ketorolac (TORADOL) injection 15 mg  15 mg IntraMUSCular NOW    cyclobenzaprine (FLEXERIL) tablet 10 mg  10 mg Oral NOW     Current Outpatient Medications   Medication Sig    methocarbamoL (ROBAXIN) 750 mg tablet Take 1 Tablet by mouth every six (6) hours as needed for Muscle Spasm(s) or Pain.    haloperidoL (HALDOL) 5 mg tablet Take 1 Tablet by mouth every eight (8) hours as needed (nausea, pain, abdominal cramping). meloxicam (Mobic) 15 mg tablet Take 1 Tablet by mouth daily for 30 days. promethazine (PHENERGAN) 25 mg tablet Take 1 Tablet by mouth every six (6) hours as needed (nausea and/or abdominal cramping). dicyclomine (BENTYL) 20 mg tablet Take 1 Tablet by mouth every six (6) hours as needed for Abdominal Cramps or Pain.    lidocaine (Lidoderm) 5 % Apply patch to the affected area for 12 hours a day and remove for 12 hours a day. FLUoxetine 60 mg tab Take 60 mg by mouth. Indications: anxiousness associated with depression    Vitamin D2 1,250 mcg (50,000 unit) capsule take 1 capsule by mouth every week    hydroCHLOROthiazide (HYDRODIURIL) 25 mg tablet take 1 tablet by mouth once daily    metoprolol tartrate (LOPRESSOR) 25 mg tablet 25 mg three (3) times daily. Disposition:  Home in stable condition    DISCHARGE NOTE:     Patient has been reexamined. Patient has no new complaints, changes, or physical findings.   Care plan outlined and precautions discussed. Results of work up, treatment plan, and disposition were reviewed with the patient. All medications were reviewed with the patient; including any prescriptions given. Medication use, risk/benefit, side-effects, and precautions discussed. All of patient's questions and concerns were addressed. Patient was instructed and agrees to follow up with PCP, as well as to return to the ED upon further deterioration. Mandatory ER return criteria discussed (loss/retention of bowel/bladder, abdominal pain, fever, extremity weakness, paresthesias, or new/worsening/persistent concerns). Patient is ready to go home. Follow-up Information    None         Current Discharge Medication List              Diagnosis     Clinical Impression: No diagnosis found. Dictation disclaimer:  Please note that this dictation was completed with [id number], the computer voice recognition software. Quite often unanticipated grammatical, syntax, homophones, and other interpretive errors are inadvertently transcribed by the computer software. Please disregard these errors. Please excuse any errors that have escaped final proofreading.

## 2023-05-02 NOTE — ED NOTES
Pt is sleeping comfortably. Detail Level: Zone Initiate Treatment: Fluocinonide solution 0.05% BID Render In Strict Bullet Format?: No Plan: Patient is currently 7 months pregnant, we will wait till she delivers her baby to see if her hair has grown back to see if it needs to be injected with ILK. When she comes in for her follow up we will check do lab work to check her thyroid. Previously had her thyroid checked roughly 9 months ago

## 2023-07-07 ENCOUNTER — APPOINTMENT (OUTPATIENT)
Facility: HOSPITAL | Age: 48
End: 2023-07-07
Payer: MEDICAID

## 2023-07-07 ENCOUNTER — HOSPITAL ENCOUNTER (EMERGENCY)
Facility: HOSPITAL | Age: 48
Discharge: HOME | End: 2023-07-07
Payer: MEDICAID

## 2023-07-07 VITALS
HEART RATE: 61 BPM | BODY MASS INDEX: 28.63 KG/M2 | WEIGHT: 200 LBS | HEIGHT: 70 IN | RESPIRATION RATE: 17 BRPM | TEMPERATURE: 97.8 F | SYSTOLIC BLOOD PRESSURE: 121 MMHG | OXYGEN SATURATION: 100 % | DIASTOLIC BLOOD PRESSURE: 75 MMHG

## 2023-07-07 DIAGNOSIS — E87.6 HYPOKALEMIA: ICD-10-CM

## 2023-07-07 DIAGNOSIS — R10.9 RIGHT SIDED ABDOMINAL PAIN: Primary | ICD-10-CM

## 2023-07-07 LAB
ALBUMIN SERPL-MCNC: 3.7 G/DL (ref 3.4–5)
ALBUMIN/GLOB SERPL: 0.9 (ref 0.8–1.7)
ALP SERPL-CCNC: 80 U/L (ref 45–117)
ALT SERPL-CCNC: 25 U/L (ref 16–61)
AMPHET UR QL SCN: NEGATIVE
ANION GAP SERPL CALC-SCNC: 4 MMOL/L (ref 3–18)
APPEARANCE UR: CLEAR
AST SERPL-CCNC: 17 U/L (ref 10–38)
BARBITURATES UR QL SCN: NEGATIVE
BASOPHILS # BLD: 0.1 K/UL (ref 0–0.1)
BASOPHILS NFR BLD: 1 % (ref 0–2)
BENZODIAZ UR QL: POSITIVE
BILIRUB DIRECT SERPL-MCNC: <0.1 MG/DL (ref 0–0.2)
BILIRUB SERPL-MCNC: 0.2 MG/DL (ref 0.2–1)
BILIRUB UR QL: NEGATIVE
BUN SERPL-MCNC: 11 MG/DL (ref 7–18)
BUN/CREAT SERPL: 10 (ref 12–20)
CALCIUM SERPL-MCNC: 9 MG/DL (ref 8.5–10.1)
CANNABINOIDS UR QL SCN: NEGATIVE
CHLORIDE SERPL-SCNC: 102 MMOL/L (ref 100–111)
CO2 SERPL-SCNC: 29 MMOL/L (ref 21–32)
COCAINE UR QL SCN: NEGATIVE
COLOR UR: YELLOW
CREAT SERPL-MCNC: 1.06 MG/DL (ref 0.6–1.3)
DIFFERENTIAL METHOD BLD: NORMAL
EOSINOPHIL # BLD: 0.1 K/UL (ref 0–0.4)
EOSINOPHIL NFR BLD: 1 % (ref 0–5)
ERYTHROCYTE [DISTWIDTH] IN BLOOD BY AUTOMATED COUNT: 13 % (ref 11.6–14.5)
GLOBULIN SER CALC-MCNC: 3.9 G/DL (ref 2–4)
GLUCOSE SERPL-MCNC: 264 MG/DL (ref 74–99)
GLUCOSE UR STRIP.AUTO-MCNC: 100 MG/DL
HCT VFR BLD AUTO: 37.5 % (ref 36–48)
HGB BLD-MCNC: 13.1 G/DL (ref 13–16)
HGB UR QL STRIP: NEGATIVE
IMM GRANULOCYTES # BLD AUTO: 0 K/UL (ref 0–0.04)
IMM GRANULOCYTES NFR BLD AUTO: 0 % (ref 0–0.5)
KETONES UR QL STRIP.AUTO: NEGATIVE MG/DL
LEUKOCYTE ESTERASE UR QL STRIP.AUTO: NEGATIVE
LIPASE SERPL-CCNC: 228 U/L (ref 73–393)
LYMPHOCYTES # BLD: 1.6 K/UL (ref 0.9–3.6)
LYMPHOCYTES NFR BLD: 21 % (ref 21–52)
Lab: ABNORMAL
MCH RBC QN AUTO: 30 PG (ref 24–34)
MCHC RBC AUTO-ENTMCNC: 34.9 G/DL (ref 31–37)
MCV RBC AUTO: 85.8 FL (ref 78–100)
METHADONE UR QL: NEGATIVE
MONOCYTES # BLD: 0.5 K/UL (ref 0.05–1.2)
MONOCYTES NFR BLD: 7 % (ref 3–10)
NEUTS SEG # BLD: 5.3 K/UL (ref 1.8–8)
NEUTS SEG NFR BLD: 70 % (ref 40–73)
NITRITE UR QL STRIP.AUTO: NEGATIVE
NRBC # BLD: 0 K/UL (ref 0–0.01)
NRBC BLD-RTO: 0 PER 100 WBC
OPIATES UR QL: NEGATIVE
PCP UR QL: NEGATIVE
PH UR STRIP: 7 (ref 5–8)
PLATELET # BLD AUTO: 251 K/UL (ref 135–420)
PMV BLD AUTO: 9.7 FL (ref 9.2–11.8)
POTASSIUM SERPL-SCNC: 3 MMOL/L (ref 3.5–5.5)
PROT SERPL-MCNC: 7.6 G/DL (ref 6.4–8.2)
PROT UR STRIP-MCNC: NEGATIVE MG/DL
RBC # BLD AUTO: 4.37 M/UL (ref 4.35–5.65)
SODIUM SERPL-SCNC: 135 MMOL/L (ref 136–145)
SP GR UR REFRACTOMETRY: 1 (ref 1–1.03)
UROBILINOGEN UR QL STRIP.AUTO: 0.2 EU/DL (ref 0.2–1)
WBC # BLD AUTO: 7.6 K/UL (ref 4.6–13.2)

## 2023-07-07 PROCEDURE — 96376 TX/PRO/DX INJ SAME DRUG ADON: CPT

## 2023-07-07 PROCEDURE — 74177 CT ABD & PELVIS W/CONTRAST: CPT

## 2023-07-07 PROCEDURE — 85025 COMPLETE CBC W/AUTO DIFF WBC: CPT

## 2023-07-07 PROCEDURE — 99285 EMERGENCY DEPT VISIT HI MDM: CPT

## 2023-07-07 PROCEDURE — 96375 TX/PRO/DX INJ NEW DRUG ADDON: CPT

## 2023-07-07 PROCEDURE — 96361 HYDRATE IV INFUSION ADD-ON: CPT

## 2023-07-07 PROCEDURE — 80076 HEPATIC FUNCTION PANEL: CPT

## 2023-07-07 PROCEDURE — 83690 ASSAY OF LIPASE: CPT

## 2023-07-07 PROCEDURE — 6360000004 HC RX CONTRAST MEDICATION: Performed by: EMERGENCY MEDICINE

## 2023-07-07 PROCEDURE — 6370000000 HC RX 637 (ALT 250 FOR IP): Performed by: EMERGENCY MEDICINE

## 2023-07-07 PROCEDURE — 6360000002 HC RX W HCPCS: Performed by: EMERGENCY MEDICINE

## 2023-07-07 PROCEDURE — 80048 BASIC METABOLIC PNL TOTAL CA: CPT

## 2023-07-07 PROCEDURE — 96374 THER/PROPH/DIAG INJ IV PUSH: CPT

## 2023-07-07 PROCEDURE — 81003 URINALYSIS AUTO W/O SCOPE: CPT

## 2023-07-07 PROCEDURE — 80307 DRUG TEST PRSMV CHEM ANLYZR: CPT

## 2023-07-07 PROCEDURE — 2580000003 HC RX 258: Performed by: EMERGENCY MEDICINE

## 2023-07-07 PROCEDURE — 76705 ECHO EXAM OF ABDOMEN: CPT

## 2023-07-07 RX ORDER — TRAMADOL HYDROCHLORIDE 50 MG/1
50 TABLET ORAL EVERY 8 HOURS PRN
Qty: 9 TABLET | Refills: 0 | Status: SHIPPED | OUTPATIENT
Start: 2023-07-07 | End: 2023-07-10

## 2023-07-07 RX ORDER — MORPHINE SULFATE 4 MG/ML
4 INJECTION, SOLUTION INTRAMUSCULAR; INTRAVENOUS
Status: COMPLETED | OUTPATIENT
Start: 2023-07-07 | End: 2023-07-07

## 2023-07-07 RX ORDER — POTASSIUM CHLORIDE 20 MEQ/1
20 TABLET, EXTENDED RELEASE ORAL 2 TIMES DAILY
Qty: 4 TABLET | Refills: 0 | Status: SHIPPED | OUTPATIENT
Start: 2023-07-07 | End: 2023-07-09

## 2023-07-07 RX ORDER — 0.9 % SODIUM CHLORIDE 0.9 %
1000 INTRAVENOUS SOLUTION INTRAVENOUS ONCE
Status: COMPLETED | OUTPATIENT
Start: 2023-07-07 | End: 2023-07-07

## 2023-07-07 RX ORDER — ONDANSETRON 2 MG/ML
4 INJECTION INTRAMUSCULAR; INTRAVENOUS
Status: COMPLETED | OUTPATIENT
Start: 2023-07-07 | End: 2023-07-07

## 2023-07-07 RX ORDER — MORPHINE SULFATE 2 MG/ML
2 INJECTION, SOLUTION INTRAMUSCULAR; INTRAVENOUS
Status: COMPLETED | OUTPATIENT
Start: 2023-07-07 | End: 2023-07-07

## 2023-07-07 RX ORDER — DICYCLOMINE HYDROCHLORIDE 10 MG/1
10 CAPSULE ORAL 4 TIMES DAILY
Qty: 360 CAPSULE | Refills: 1 | Status: SHIPPED | OUTPATIENT
Start: 2023-07-07

## 2023-07-07 RX ORDER — ONDANSETRON 8 MG/1
8 TABLET, ORALLY DISINTEGRATING ORAL 3 TIMES DAILY PRN
Qty: 12 TABLET | Refills: 1 | Status: SHIPPED | OUTPATIENT
Start: 2023-07-07

## 2023-07-07 RX ADMIN — MORPHINE SULFATE 4 MG: 4 INJECTION INTRAVENOUS at 22:19

## 2023-07-07 RX ADMIN — MORPHINE SULFATE 4 MG: 4 INJECTION INTRAVENOUS at 18:56

## 2023-07-07 RX ADMIN — POTASSIUM BICARBONATE 40 MEQ: 782 TABLET, EFFERVESCENT ORAL at 22:19

## 2023-07-07 RX ADMIN — ONDANSETRON 4 MG: 2 INJECTION INTRAMUSCULAR; INTRAVENOUS at 18:56

## 2023-07-07 RX ADMIN — MORPHINE SULFATE 2 MG: 2 INJECTION, SOLUTION INTRAMUSCULAR; INTRAVENOUS at 19:51

## 2023-07-07 RX ADMIN — SODIUM CHLORIDE 1000 ML: 900 INJECTION, SOLUTION INTRAVENOUS at 18:56

## 2023-07-07 RX ADMIN — IOPAMIDOL 100 ML: 612 INJECTION, SOLUTION INTRAVENOUS at 19:10

## 2023-07-07 ASSESSMENT — ENCOUNTER SYMPTOMS
ABDOMINAL PAIN: 1
RESPIRATORY NEGATIVE: 1
VOMITING: 0
DIARRHEA: 1
ALLERGIC/IMMUNOLOGIC NEGATIVE: 1
NAUSEA: 1
BLOOD IN STOOL: 0
EYES NEGATIVE: 1

## 2023-07-07 ASSESSMENT — PAIN DESCRIPTION - DESCRIPTORS: DESCRIPTORS: ACHING

## 2023-07-07 ASSESSMENT — PAIN SCALES - GENERAL
PAINLEVEL_OUTOF10: 10
PAINLEVEL_OUTOF10: 10

## 2023-07-07 ASSESSMENT — PAIN DESCRIPTION - ORIENTATION
ORIENTATION: RIGHT
ORIENTATION: RIGHT
ORIENTATION: RIGHT;LOWER

## 2023-07-07 ASSESSMENT — PAIN DESCRIPTION - LOCATION
LOCATION: ABDOMEN

## 2023-07-07 ASSESSMENT — PAIN - FUNCTIONAL ASSESSMENT: PAIN_FUNCTIONAL_ASSESSMENT: 0-10

## 2023-07-07 ASSESSMENT — PAIN DESCRIPTION - PAIN TYPE: TYPE: ACUTE PAIN

## 2023-07-07 NOTE — ED TRIAGE NOTES
Pt arrives alert oriented ambulatory c/o RLQ abd pain that started this afternoon sudden onset. Pt able to speak in full sentences w/o complication in between intermittently \"pains\"  Pt advised RN he is a very hard stick and noted that he doesn't use heroin \"they just have a hard time getting my blood. \"   Pt made this statement many times. Patent airway ambulates w/o complications.

## 2023-09-03 ENCOUNTER — HOSPITAL ENCOUNTER (EMERGENCY)
Facility: HOSPITAL | Age: 48
Discharge: HOME | End: 2023-09-03
Attending: EMERGENCY MEDICINE
Payer: MEDICAID

## 2023-09-03 ENCOUNTER — APPOINTMENT (OUTPATIENT)
Facility: HOSPITAL | Age: 48
End: 2023-09-03
Payer: MEDICAID

## 2023-09-03 VITALS
WEIGHT: 209 LBS | TEMPERATURE: 98.2 F | BODY MASS INDEX: 29.92 KG/M2 | DIASTOLIC BLOOD PRESSURE: 93 MMHG | SYSTOLIC BLOOD PRESSURE: 131 MMHG | OXYGEN SATURATION: 99 % | HEART RATE: 109 BPM | HEIGHT: 70 IN | RESPIRATION RATE: 20 BRPM

## 2023-09-03 DIAGNOSIS — R07.9 CHEST PAIN, UNSPECIFIED TYPE: ICD-10-CM

## 2023-09-03 DIAGNOSIS — F41.1 ANXIETY STATE: Primary | ICD-10-CM

## 2023-09-03 LAB
ALBUMIN SERPL-MCNC: 3.7 G/DL (ref 3.4–5)
ALBUMIN/GLOB SERPL: 0.8 (ref 0.8–1.7)
ALP SERPL-CCNC: 101 U/L (ref 45–117)
ALT SERPL-CCNC: 29 U/L (ref 16–61)
ANION GAP SERPL CALC-SCNC: 6 MMOL/L (ref 3–18)
AST SERPL-CCNC: 60 U/L (ref 10–38)
BASOPHILS # BLD: 0.1 K/UL (ref 0–0.1)
BASOPHILS NFR BLD: 1 % (ref 0–2)
BILIRUB SERPL-MCNC: 0.4 MG/DL (ref 0.2–1)
BUN SERPL-MCNC: 14 MG/DL (ref 7–18)
BUN/CREAT SERPL: 12 (ref 12–20)
CALCIUM SERPL-MCNC: 8.9 MG/DL (ref 8.5–10.1)
CHLORIDE SERPL-SCNC: 99 MMOL/L (ref 100–111)
CO2 SERPL-SCNC: 28 MMOL/L (ref 21–32)
CREAT SERPL-MCNC: 1.21 MG/DL (ref 0.6–1.3)
D DIMER PPP FEU-MCNC: 0.28 UG/ML(FEU)
DIFFERENTIAL METHOD BLD: ABNORMAL
EOSINOPHIL # BLD: 0 K/UL (ref 0–0.4)
EOSINOPHIL NFR BLD: 0 % (ref 0–5)
ERYTHROCYTE [DISTWIDTH] IN BLOOD BY AUTOMATED COUNT: 12 % (ref 11.6–14.5)
GLOBULIN SER CALC-MCNC: 4.7 G/DL (ref 2–4)
GLUCOSE SERPL-MCNC: 296 MG/DL (ref 74–99)
HCT VFR BLD AUTO: 43.2 % (ref 36–48)
HGB BLD-MCNC: 15 G/DL (ref 13–16)
IMM GRANULOCYTES # BLD AUTO: 0 K/UL (ref 0–0.04)
IMM GRANULOCYTES NFR BLD AUTO: 0 % (ref 0–0.5)
LYMPHOCYTES # BLD: 1.6 K/UL (ref 0.9–3.6)
LYMPHOCYTES NFR BLD: 17 % (ref 21–52)
MCH RBC QN AUTO: 29 PG (ref 24–34)
MCHC RBC AUTO-ENTMCNC: 34.7 G/DL (ref 31–37)
MCV RBC AUTO: 83.4 FL (ref 78–100)
MONOCYTES # BLD: 0.6 K/UL (ref 0.05–1.2)
MONOCYTES NFR BLD: 7 % (ref 3–10)
NEUTS SEG # BLD: 7.4 K/UL (ref 1.8–8)
NEUTS SEG NFR BLD: 76 % (ref 40–73)
NRBC # BLD: 0 K/UL (ref 0–0.01)
NRBC BLD-RTO: 0 PER 100 WBC
PLATELET # BLD AUTO: 292 K/UL (ref 135–420)
PMV BLD AUTO: 9.3 FL (ref 9.2–11.8)
POTASSIUM SERPL-SCNC: 5.5 MMOL/L (ref 3.5–5.5)
PROT SERPL-MCNC: 8.4 G/DL (ref 6.4–8.2)
RBC # BLD AUTO: 5.18 M/UL (ref 4.35–5.65)
SODIUM SERPL-SCNC: 133 MMOL/L (ref 136–145)
TROPONIN I SERPL HS-MCNC: 19 NG/L (ref 0–78)
TROPONIN I SERPL HS-MCNC: 23 NG/L (ref 0–78)
WBC # BLD AUTO: 9.7 K/UL (ref 4.6–13.2)

## 2023-09-03 PROCEDURE — 93005 ELECTROCARDIOGRAM TRACING: CPT | Performed by: EMERGENCY MEDICINE

## 2023-09-03 PROCEDURE — 99285 EMERGENCY DEPT VISIT HI MDM: CPT

## 2023-09-03 PROCEDURE — 6360000002 HC RX W HCPCS: Performed by: EMERGENCY MEDICINE

## 2023-09-03 PROCEDURE — 84484 ASSAY OF TROPONIN QUANT: CPT

## 2023-09-03 PROCEDURE — 71045 X-RAY EXAM CHEST 1 VIEW: CPT

## 2023-09-03 PROCEDURE — 85025 COMPLETE CBC W/AUTO DIFF WBC: CPT

## 2023-09-03 PROCEDURE — 80053 COMPREHEN METABOLIC PANEL: CPT

## 2023-09-03 PROCEDURE — 96374 THER/PROPH/DIAG INJ IV PUSH: CPT

## 2023-09-03 PROCEDURE — 96375 TX/PRO/DX INJ NEW DRUG ADDON: CPT

## 2023-09-03 RX ORDER — FENTANYL CITRATE 50 UG/ML
50 INJECTION, SOLUTION INTRAMUSCULAR; INTRAVENOUS ONCE
Status: COMPLETED | OUTPATIENT
Start: 2023-09-03 | End: 2023-09-03

## 2023-09-03 RX ADMIN — FENTANYL CITRATE 50 MCG: 50 INJECTION, SOLUTION INTRAMUSCULAR; INTRAVENOUS at 14:22

## 2023-09-03 RX ADMIN — HYDROMORPHONE HYDROCHLORIDE 0.5 MG: 1 INJECTION, SOLUTION INTRAMUSCULAR; INTRAVENOUS; SUBCUTANEOUS at 16:05

## 2023-09-03 ASSESSMENT — PAIN SCALES - GENERAL
PAINLEVEL_OUTOF10: 9
PAINLEVEL_OUTOF10: 10

## 2023-09-03 ASSESSMENT — PAIN - FUNCTIONAL ASSESSMENT: PAIN_FUNCTIONAL_ASSESSMENT: 0-10

## 2023-09-03 ASSESSMENT — PAIN DESCRIPTION - LOCATION
LOCATION: CHEST

## 2023-09-03 NOTE — ED TRIAGE NOTES
Pt ambulatory to triage c/o CP starting at 0830 this am. Pt is seen by cardiology for a \"bridge in his heart that goes across the muscle\". Has been to 3 different ERs recently. Has an appointment with cardiology in the next 2 weeks. Hx HTN, anxiety.

## 2023-09-03 NOTE — ED PROVIDER NOTES
[facility] EMERGENCY DEPT  EMERGENCY DEPARTMENT ENCOUNTER    Patient Name: Olinda Smith  MRN: [de-identified]  YOB: 1975  Provider: Janel Mathew MD  PCP: None None   Time/Date of evaluation: 2:09 PM EDT on 9/3/23    History of Presenting Illness     Chief Complaint   Patient presents with    Chest Pain       History Provided By: Patient     History Mich Abreu):   Olinda Smith is a 49 y.o. male who presents to the emergency department with complaint of chest pain. 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 He has been to 3 different ERs recently has an appointment with cardiology in 2 weeks. History of hypertension anxiety. He said that he found out about his cardiac problem because he was having intermittent chest pain is anxiety and that ultimately the stress test had been ordered that came back abnormal.  Today the pain is 5-8 out of 10 and central radiating to the left. He does feel a large amount of anxiety. Mildly tachycardic at presentation. Denies fevers chills specific shortness of breath abdominal pain diarrhea constipation dysuria. Nursing Notes were all reviewed and agreed with or any disagreements were addressed in the HPI. Past History     Past Medical History:  Past Medical History:   Diagnosis Date    Adverse effect of anesthesia     pt said BP dropped during surgery    Anxiety and depression     patient states depression was once and has not repeated    Depression     patient denies depression    Diabetes mellitus ([address])     Diverticulitis large intestine     Drug-seeking behavior     56 prescriptions from 32 different prescribers at 6 different pharmacies in last 12 months    Fibromyalgia     Herniated lumbar intervertebral disc     HTN (hypertension)     Hypertension     Neurological disorder L3,4,5 torn Herniated disc    Obesity (BMI 30.0-34. 9)     Sleep apnea     has

## 2023-09-04 LAB
EKG ATRIAL RATE: 112 BPM
EKG DIAGNOSIS: NORMAL
EKG P AXIS: 65 DEGREES
EKG P-R INTERVAL: 148 MS
EKG Q-T INTERVAL: 338 MS
EKG QRS DURATION: 100 MS
EKG QTC CALCULATION (BAZETT): 461 MS
EKG R AXIS: 261 DEGREES
EKG T AXIS: 40 DEGREES
EKG VENTRICULAR RATE: 112 BPM

## 2023-09-04 PROCEDURE — 93010 ELECTROCARDIOGRAM REPORT: CPT | Performed by: INTERNAL MEDICINE

## 2023-09-11 ENCOUNTER — OFFICE VISIT (OUTPATIENT)
Age: 48
End: 2023-09-11
Payer: MEDICAID

## 2023-09-11 VITALS
DIASTOLIC BLOOD PRESSURE: 88 MMHG | WEIGHT: 213 LBS | HEIGHT: 70 IN | BODY MASS INDEX: 30.49 KG/M2 | SYSTOLIC BLOOD PRESSURE: 120 MMHG | HEART RATE: 62 BPM | OXYGEN SATURATION: 98 %

## 2023-09-11 DIAGNOSIS — E78.5 DYSLIPIDEMIA: ICD-10-CM

## 2023-09-11 DIAGNOSIS — R07.9 CHEST PAIN, UNSPECIFIED TYPE: Primary | ICD-10-CM

## 2023-09-11 DIAGNOSIS — I10 PRIMARY HYPERTENSION: ICD-10-CM

## 2023-09-11 DIAGNOSIS — F41.9 ANXIETY DISORDER, UNSPECIFIED TYPE: ICD-10-CM

## 2023-09-11 PROCEDURE — 3074F SYST BP LT 130 MM HG: CPT | Performed by: INTERNAL MEDICINE

## 2023-09-11 PROCEDURE — 93000 ELECTROCARDIOGRAM COMPLETE: CPT | Performed by: INTERNAL MEDICINE

## 2023-09-11 PROCEDURE — 3079F DIAST BP 80-89 MM HG: CPT | Performed by: INTERNAL MEDICINE

## 2023-09-11 PROCEDURE — 99204 OFFICE O/P NEW MOD 45 MIN: CPT | Performed by: INTERNAL MEDICINE

## 2023-09-11 RX ORDER — ALPRAZOLAM 1 MG/1
1 TABLET ORAL 3 TIMES DAILY PRN
COMMUNITY

## 2023-09-11 ASSESSMENT — ENCOUNTER SYMPTOMS
VOMITING: 0
COUGH: 0
SHORTNESS OF BREATH: 1
ABDOMINAL DISTENTION: 0
NAUSEA: 0
SORE THROAT: 0
ABDOMINAL PAIN: 0

## 2023-09-11 NOTE — PROGRESS NOTES
Siria Bryant presents today for   Chief Complaint   Patient presents with    New Patient     Est care states referred by [facility] due to chest pain and anxiety     Chest Pain     Left chest pressure/tightness on/off but has it a lot     Palpitations     Racing palps     Shortness of Breath     SOB with exertion and at rest        Siria Bryant preferred language for health care discussion is english/other. Is someone accompanying this pt? no    Is the patient using any DME equipment during OV? no    Depression Screening:  Depression: Not on file        Learning Assessment:  No question data found. Pt currently taking Anticoagulant therapy? no    Pt currently taking Antiplatelet therapy ? no      Coordination of Care:  1. Have you been to the ER, urgent care clinic since your last visit? Hospitalized since your last visit? no    2. Have you seen or consulted any other health care providers outside of the [address] [place] [address] since your last visit? Include any pap smears or colon screening.  no
EKG: Normal sinus rhythm, leftward axis, normal QTc interval, no ST or T wave changes concerning for ischemia. No change compared to previous EKGs. Assessment / Plan:   Recurrent chest pain. Likely noncardiac in nature and may be more related to either his anxiety or possibly musculoskeletal in nature. A recent cardiac catheterization in May 2023 at [facility] did not show any significant coronary artery disease. He also underwent an echocardiogram in April 2023 which was normal.  No further cardiac work-up necessary at this time. Hypertension. Patient's blood pressure appears to be reasonably well controlled on a combination of metoprolol and HCTZ. This is managed by his PCP. Dyslipidemia. Patient has been managed with atorvastatin 20 mg daily. This is also monitored by his PCP. Anxiety disorder. I suspect this is contributing to his symptoms. He does have a prescription for Xanax to take as needed for severe anxiety attacks. Patient can follow-up with cardiology in the future as needed.         Watson Ndiaye MD

## 2023-09-26 ENCOUNTER — APPOINTMENT (OUTPATIENT)
Facility: HOSPITAL | Age: 48
End: 2023-09-26
Payer: MEDICAID

## 2023-09-26 ENCOUNTER — HOSPITAL ENCOUNTER (EMERGENCY)
Facility: HOSPITAL | Age: 48
Discharge: HOME | End: 2023-09-26
Attending: EMERGENCY MEDICINE
Payer: MEDICAID

## 2023-09-26 VITALS
WEIGHT: 213 LBS | OXYGEN SATURATION: 100 % | TEMPERATURE: 98 F | HEIGHT: 70 IN | RESPIRATION RATE: 16 BRPM | SYSTOLIC BLOOD PRESSURE: 136 MMHG | DIASTOLIC BLOOD PRESSURE: 88 MMHG | BODY MASS INDEX: 30.49 KG/M2 | HEART RATE: 59 BPM

## 2023-09-26 DIAGNOSIS — K50.011 TERMINAL ILEITIS WITH RECTAL BLEEDING (HCC): Primary | ICD-10-CM

## 2023-09-26 DIAGNOSIS — R11.0 NAUSEA: ICD-10-CM

## 2023-09-26 LAB
ALBUMIN SERPL-MCNC: 3.7 G/DL (ref 3.4–5)
ALBUMIN/GLOB SERPL: 0.9 (ref 0.8–1.7)
ALP SERPL-CCNC: 94 U/L (ref 45–117)
ALT SERPL-CCNC: 26 U/L (ref 16–61)
ANION GAP SERPL CALC-SCNC: 3 MMOL/L (ref 3–18)
APPEARANCE UR: CLEAR
AST SERPL-CCNC: 19 U/L (ref 10–38)
BACTERIA URNS QL MICRO: NEGATIVE /HPF
BASOPHILS # BLD: 0.1 K/UL (ref 0–0.1)
BASOPHILS NFR BLD: 1 % (ref 0–2)
BILIRUB SERPL-MCNC: 0.4 MG/DL (ref 0.2–1)
BILIRUB UR QL: NEGATIVE
BUN SERPL-MCNC: 7 MG/DL (ref 7–18)
BUN/CREAT SERPL: 6 (ref 12–20)
CALCIUM SERPL-MCNC: 9 MG/DL (ref 8.5–10.1)
CHLORIDE SERPL-SCNC: 100 MMOL/L (ref 100–111)
CO2 SERPL-SCNC: 32 MMOL/L (ref 21–32)
COLOR UR: YELLOW
CREAT SERPL-MCNC: 1.14 MG/DL (ref 0.6–1.3)
DIFFERENTIAL METHOD BLD: NORMAL
EOSINOPHIL # BLD: 0.1 K/UL (ref 0–0.4)
EOSINOPHIL NFR BLD: 1 % (ref 0–5)
EPITH CASTS URNS QL MICRO: NORMAL /LPF (ref 0–5)
ERYTHROCYTE [DISTWIDTH] IN BLOOD BY AUTOMATED COUNT: 12.8 % (ref 11.6–14.5)
GLOBULIN SER CALC-MCNC: 4.3 G/DL (ref 2–4)
GLUCOSE SERPL-MCNC: 162 MG/DL (ref 74–99)
GLUCOSE UR STRIP.AUTO-MCNC: NEGATIVE MG/DL
HCT VFR BLD AUTO: 38.2 % (ref 36–48)
HGB BLD-MCNC: 13.5 G/DL (ref 13–16)
HGB UR QL STRIP: ABNORMAL
IMM GRANULOCYTES # BLD AUTO: 0 K/UL (ref 0–0.04)
IMM GRANULOCYTES NFR BLD AUTO: 0 % (ref 0–0.5)
KETONES UR QL STRIP.AUTO: NEGATIVE MG/DL
LEUKOCYTE ESTERASE UR QL STRIP.AUTO: NEGATIVE
LIPASE SERPL-CCNC: 196 U/L (ref 73–393)
LYMPHOCYTES # BLD: 1.7 K/UL (ref 0.9–3.6)
LYMPHOCYTES NFR BLD: 26 % (ref 21–52)
MCH RBC QN AUTO: 29.3 PG (ref 24–34)
MCHC RBC AUTO-ENTMCNC: 35.3 G/DL (ref 31–37)
MCV RBC AUTO: 82.9 FL (ref 78–100)
MONOCYTES # BLD: 0.5 K/UL (ref 0.05–1.2)
MONOCYTES NFR BLD: 8 % (ref 3–10)
NEUTS SEG # BLD: 4.3 K/UL (ref 1.8–8)
NEUTS SEG NFR BLD: 64 % (ref 40–73)
NITRITE UR QL STRIP.AUTO: NEGATIVE
NRBC # BLD: 0 K/UL (ref 0–0.01)
NRBC BLD-RTO: 0 PER 100 WBC
PH UR STRIP: 6 (ref 5–8)
PLATELET # BLD AUTO: 245 K/UL (ref 135–420)
PMV BLD AUTO: 9.7 FL (ref 9.2–11.8)
POTASSIUM SERPL-SCNC: 3.3 MMOL/L (ref 3.5–5.5)
PROT SERPL-MCNC: 8 G/DL (ref 6.4–8.2)
PROT UR STRIP-MCNC: NEGATIVE MG/DL
RBC # BLD AUTO: 4.61 M/UL (ref 4.35–5.65)
RBC #/AREA URNS HPF: NORMAL /HPF (ref 0–5)
SODIUM SERPL-SCNC: 135 MMOL/L (ref 136–145)
SP GR UR REFRACTOMETRY: 1 (ref 1–1.03)
UROBILINOGEN UR QL STRIP.AUTO: 0.2 EU/DL (ref 0.2–1)
WBC # BLD AUTO: 6.7 K/UL (ref 4.6–13.2)
WBC URNS QL MICRO: NEGATIVE /HPF (ref 0–4)

## 2023-09-26 PROCEDURE — 81001 URINALYSIS AUTO W/SCOPE: CPT

## 2023-09-26 PROCEDURE — 74177 CT ABD & PELVIS W/CONTRAST: CPT

## 2023-09-26 PROCEDURE — 85025 COMPLETE CBC W/AUTO DIFF WBC: CPT

## 2023-09-26 PROCEDURE — 96374 THER/PROPH/DIAG INJ IV PUSH: CPT

## 2023-09-26 PROCEDURE — 93005 ELECTROCARDIOGRAM TRACING: CPT | Performed by: EMERGENCY MEDICINE

## 2023-09-26 PROCEDURE — 71045 X-RAY EXAM CHEST 1 VIEW: CPT

## 2023-09-26 PROCEDURE — 6360000002 HC RX W HCPCS: Performed by: EMERGENCY MEDICINE

## 2023-09-26 PROCEDURE — 99285 EMERGENCY DEPT VISIT HI MDM: CPT

## 2023-09-26 PROCEDURE — 80053 COMPREHEN METABOLIC PANEL: CPT

## 2023-09-26 PROCEDURE — 6360000004 HC RX CONTRAST MEDICATION: Performed by: EMERGENCY MEDICINE

## 2023-09-26 PROCEDURE — 83690 ASSAY OF LIPASE: CPT

## 2023-09-26 RX ORDER — KETOROLAC TROMETHAMINE 30 MG/ML
INJECTION, SOLUTION INTRAMUSCULAR; INTRAVENOUS
Status: DISCONTINUED
Start: 2023-09-26 | End: 2023-09-26 | Stop reason: HOSPADM

## 2023-09-26 RX ORDER — ACETAMINOPHEN AND CODEINE PHOSPHATE 300; 30 MG/1; MG/1
1 TABLET ORAL EVERY 6 HOURS PRN
Qty: 6 TABLET | Refills: 0 | Status: SHIPPED | OUTPATIENT
Start: 2023-09-26 | End: 2023-09-29

## 2023-09-26 RX ORDER — METRONIDAZOLE 500 MG/1
500 TABLET ORAL 3 TIMES DAILY
Qty: 21 TABLET | Refills: 0 | Status: SHIPPED | OUTPATIENT
Start: 2023-09-26 | End: 2023-10-03

## 2023-09-26 RX ORDER — NALOXONE HYDROCHLORIDE 4 MG/.1ML
1 SPRAY NASAL PRN
Qty: 2 EACH | Refills: 0 | Status: SHIPPED | OUTPATIENT
Start: 2023-09-26

## 2023-09-26 RX ORDER — KETOROLAC TROMETHAMINE 30 MG/ML
15 INJECTION, SOLUTION INTRAMUSCULAR; INTRAVENOUS
Status: COMPLETED | OUTPATIENT
Start: 2023-09-26 | End: 2023-09-26

## 2023-09-26 RX ORDER — CIPROFLOXACIN 500 MG/1
500 TABLET, FILM COATED ORAL 2 TIMES DAILY
Qty: 14 TABLET | Refills: 0 | Status: SHIPPED | OUTPATIENT
Start: 2023-09-26 | End: 2023-10-03

## 2023-09-26 RX ORDER — ONDANSETRON 8 MG/1
8 TABLET, ORALLY DISINTEGRATING ORAL EVERY 8 HOURS PRN
Qty: 10 TABLET | Refills: 0 | Status: SHIPPED | OUTPATIENT
Start: 2023-09-26

## 2023-09-26 RX ADMIN — KETOROLAC TROMETHAMINE 15 MG: 30 INJECTION, SOLUTION INTRAMUSCULAR; INTRAVENOUS at 16:38

## 2023-09-26 RX ADMIN — IOPAMIDOL 100 ML: 612 INJECTION, SOLUTION INTRAVENOUS at 16:59

## 2023-09-26 ASSESSMENT — PAIN SCALES - GENERAL: PAINLEVEL_OUTOF10: 10

## 2023-09-26 ASSESSMENT — ENCOUNTER SYMPTOMS
CHEST TIGHTNESS: 0
EYES NEGATIVE: 1
ABDOMINAL PAIN: 1

## 2023-09-26 ASSESSMENT — PAIN - FUNCTIONAL ASSESSMENT: PAIN_FUNCTIONAL_ASSESSMENT: 0-10

## 2023-09-26 NOTE — DISCHARGE INSTRUCTIONS
Your CAT scan shows you have inflammation along your terminal ileum which could be a sign of infection or an inflammatory process (like Crohn's disease) but is not clear whether you have either. You need to take antibiotics as prescribed, and will give you a short course of pain medication to take while you are recovering and do not take Xanax while taking your opioid pain medication as that is a high risk combination. Please return if at all worsened or concerned.

## 2023-09-26 NOTE — ED NOTES
Discharge instructions reviewed with patient. Patient verbalized understanding. Patient advised to follow up as directed on discharge instructions. Patient denies questions, needs or concerns at this time. Patient verbalized understanding. No s/sx of distress noted.         Devaughn Simmons RN  09/26/23 0608

## 2023-09-26 NOTE — ED PROVIDER NOTES
EMERGENCY DEPARTMENT HISTORY AND PHYSICAL EXAM    6:14 PM      Date: 9/26/2023  Patient Name: Ramy Sidhu    History of Presenting Illness     Chief Complaint   Patient presents with    Abdominal Pain    Nausea       History From: Patient  Patient is a 26-year-old male with a history of polysubstance use in the past per his record, depression, anxiety, diverticulitis with partial colectomy, diabetes, the presents emergency department complaint of some lower abdominal pain has been increasing for the last week. Patient said that he had similar episodes but recent visits to the hospital were for chest pain and was told he had some problems with his heart. The patient has noted that he is having some soft stools and some mucus in his stools. Patient has a plan for colonoscopy in the coming weeks. The patient denies any medications for pain at home and denies any other aggravating alleviating factors. The patient admits to being an occasional marijuana user but denies any other smoking or drinking currently. The patient works for a [email or web address] and said that he cannot miss anymore work. Patient notes his pain has been increasing so he wanted to come to the hospital for evaluation. Patient has had a decreased appetite and said he has not wanted to eat anything for the last 2 days due to increasing nausea. Patient is a 26-year-old male with history of patient is a 26-year-old male with a history of diverticulitis that presents emergency department complaint of patient is a 26-year-old male patient is a 26-year-old male the patient is a 26-year-old male             Nursing Notes were all reviewed and agreed with or any disagreements were addressed in the HPI.     PCP: [facility]    Current Facility-Administered Medications   Medication Dose Route Frequency Provider Last Rate Last Admin    ketorolac (TORADOL) 30 MG/ML injection              Current Outpatient Medications   Medication Sig Dispense

## 2023-09-26 NOTE — ED TRIAGE NOTES
Severe lower abdminal pain and nausea x 3 weeks, frequent loose stools and noticed blood this am after going. \"This feels exactly like when I had Diverticulitis 4 years ago. \"

## 2023-09-27 ENCOUNTER — HOSPITAL ENCOUNTER (EMERGENCY)
Facility: HOSPITAL | Age: 48
Discharge: HOME | End: 2023-09-27
Payer: MEDICAID

## 2023-09-27 VITALS
SYSTOLIC BLOOD PRESSURE: 123 MMHG | HEART RATE: 62 BPM | WEIGHT: 213 LBS | RESPIRATION RATE: 18 BRPM | TEMPERATURE: 98.1 F | DIASTOLIC BLOOD PRESSURE: 78 MMHG | HEIGHT: 70 IN | OXYGEN SATURATION: 99 % | BODY MASS INDEX: 30.49 KG/M2

## 2023-09-27 DIAGNOSIS — R10.32 LEFT LOWER QUADRANT ABDOMINAL PAIN: Primary | ICD-10-CM

## 2023-09-27 DIAGNOSIS — K50.00 TERMINAL ILEITIS WITHOUT COMPLICATION (HCC): ICD-10-CM

## 2023-09-27 LAB
EKG ATRIAL RATE: 57 BPM
EKG DIAGNOSIS: NORMAL
EKG P AXIS: 18 DEGREES
EKG P-R INTERVAL: 156 MS
EKG Q-T INTERVAL: 432 MS
EKG QRS DURATION: 104 MS
EKG QTC CALCULATION (BAZETT): 420 MS
EKG R AXIS: 57 DEGREES
EKG T AXIS: 35 DEGREES
EKG VENTRICULAR RATE: 57 BPM

## 2023-09-27 PROCEDURE — 93010 ELECTROCARDIOGRAM REPORT: CPT | Performed by: INTERNAL MEDICINE

## 2023-09-27 PROCEDURE — 99283 EMERGENCY DEPT VISIT LOW MDM: CPT

## 2023-09-27 RX ORDER — DICYCLOMINE HCL 20 MG
20 TABLET ORAL 4 TIMES DAILY
Qty: 60 TABLET | Refills: 0 | Status: SHIPPED | OUTPATIENT
Start: 2023-09-27

## 2023-09-27 ASSESSMENT — ENCOUNTER SYMPTOMS
RESPIRATORY NEGATIVE: 1
ABDOMINAL PAIN: 1
VOMITING: 0
BLOOD IN STOOL: 0
DIARRHEA: 1
NAUSEA: 1

## 2023-09-27 NOTE — ED PROVIDER NOTES
distress. Breath sounds: Normal breath sounds. No stridor. No wheezing, rhonchi or rales. Chest:      Chest wall: No tenderness. Abdominal:      General: Bowel sounds are normal. There is no distension. Palpations: Abdomen is soft. Tenderness: There is abdominal tenderness in the left upper quadrant and left lower quadrant. There is no guarding or rebound. Negative signs include Ramirez's sign, Rovsing's sign and McBurney's sign. Musculoskeletal:         General: Normal range of motion. Cervical back: Normal range of motion and neck supple. Skin:     General: Skin is warm. Findings: No rash. Neurological:      General: No focal deficit present. Mental Status: He is alert and oriented to person, place, and time. Cranial Nerves: No cranial nerve deficit. Sensory: No sensory deficit. Motor: No weakness. SCREENINGS               Diagnostic Study Results     Labs -  No results found for this or any previous visit (from the past 12 hour(s)). EKG: All EKG's are interpreted by the Emergency Department Physician who either signs or Co-signs this chart in the absence of a cardiologist.    RADIOLOGY:   Non-plain film images such as CT, Ultrasound and MRI are read by the radiologist. Plain radiographic images are visualized and preliminarily interpreted by the emergency physician with the below findings:    Radiologic Studies -   No orders to display       The laboratory results, imaging results, and other diagnostic exams were reviewed in the EMR. Medical Decision Making   I am the first provider for this patient. I reviewed the vital signs, available nursing notes, past medical history, past surgical history, family history and social history. Vital Signs-Reviewed the patient's vital signs.     Records Reviewed: Nursing Notes and Old Medical Records Personally, on initial evaluation (Time of Review: 4:57 PM)      ED Course: Progress Notes,

## 2023-09-27 NOTE — ED TRIAGE NOTES
Pt in ED with c/o abdominal pain. PT was seen yesterday for the same thing and recommended to have a colonoscopy. Pt states the pain is worse and what to know if he can have an Emergent Colonoscopy done.

## 2023-10-18 PROBLEM — K29.80 DUODENITIS: Status: ACTIVE | Noted: 2023-10-18

## 2023-10-25 ENCOUNTER — HOSPITAL ENCOUNTER (EMERGENCY)
Facility: HOSPITAL | Age: 48
Discharge: HOME | End: 2023-10-25
Attending: EMERGENCY MEDICINE
Payer: MEDICAID

## 2023-10-25 ENCOUNTER — APPOINTMENT (OUTPATIENT)
Facility: HOSPITAL | Age: 48
End: 2023-10-25
Payer: MEDICAID

## 2023-10-25 VITALS
RESPIRATION RATE: 22 BRPM | OXYGEN SATURATION: 98 % | BODY MASS INDEX: 28.63 KG/M2 | HEIGHT: 70 IN | HEART RATE: 68 BPM | WEIGHT: 200 LBS | DIASTOLIC BLOOD PRESSURE: 79 MMHG | SYSTOLIC BLOOD PRESSURE: 119 MMHG | TEMPERATURE: 97.9 F

## 2023-10-25 DIAGNOSIS — K57.32 DIVERTICULITIS OF COLON: Primary | ICD-10-CM

## 2023-10-25 LAB
ALBUMIN SERPL-MCNC: 3.7 G/DL (ref 3.4–5)
ALBUMIN/GLOB SERPL: 0.8 (ref 0.8–1.7)
ALP SERPL-CCNC: 100 U/L (ref 45–117)
ALT SERPL-CCNC: 25 U/L (ref 16–61)
ANION GAP SERPL CALC-SCNC: 5 MMOL/L (ref 3–18)
APPEARANCE UR: CLEAR
AST SERPL-CCNC: 14 U/L (ref 10–38)
BASOPHILS # BLD: 0.1 K/UL (ref 0–0.1)
BASOPHILS NFR BLD: 1 % (ref 0–2)
BILIRUB SERPL-MCNC: 0.2 MG/DL (ref 0.2–1)
BILIRUB UR QL: NEGATIVE
BUN SERPL-MCNC: 9 MG/DL (ref 7–18)
BUN/CREAT SERPL: 8 (ref 12–20)
CALCIUM SERPL-MCNC: 9.4 MG/DL (ref 8.5–10.1)
CHLORIDE SERPL-SCNC: 101 MMOL/L (ref 100–111)
CO2 SERPL-SCNC: 29 MMOL/L (ref 21–32)
COLOR UR: YELLOW
CREAT SERPL-MCNC: 1.12 MG/DL (ref 0.6–1.3)
DIFFERENTIAL METHOD BLD: ABNORMAL
EOSINOPHIL # BLD: 0.1 K/UL (ref 0–0.4)
EOSINOPHIL NFR BLD: 1 % (ref 0–5)
ERYTHROCYTE [DISTWIDTH] IN BLOOD BY AUTOMATED COUNT: 12.9 % (ref 11.6–14.5)
GLOBULIN SER CALC-MCNC: 4.7 G/DL (ref 2–4)
GLUCOSE SERPL-MCNC: 195 MG/DL (ref 74–99)
GLUCOSE UR STRIP.AUTO-MCNC: 100 MG/DL
HCT VFR BLD AUTO: 42 % (ref 36–48)
HGB BLD-MCNC: 14.1 G/DL (ref 13–16)
HGB UR QL STRIP: NEGATIVE
IMM GRANULOCYTES # BLD AUTO: 0 K/UL (ref 0–0.04)
IMM GRANULOCYTES NFR BLD AUTO: 0 % (ref 0–0.5)
KETONES UR QL STRIP.AUTO: NEGATIVE MG/DL
LEUKOCYTE ESTERASE UR QL STRIP.AUTO: NEGATIVE
LIPASE SERPL-CCNC: 45 U/L (ref 13–75)
LYMPHOCYTES # BLD: 1.4 K/UL (ref 0.9–3.6)
LYMPHOCYTES NFR BLD: 19 % (ref 21–52)
MCH RBC QN AUTO: 28.8 PG (ref 24–34)
MCHC RBC AUTO-ENTMCNC: 33.6 G/DL (ref 31–37)
MCV RBC AUTO: 85.7 FL (ref 78–100)
MONOCYTES # BLD: 0.5 K/UL (ref 0.05–1.2)
MONOCYTES NFR BLD: 8 % (ref 3–10)
NEUTS SEG # BLD: 5 K/UL (ref 1.8–8)
NEUTS SEG NFR BLD: 71 % (ref 40–73)
NITRITE UR QL STRIP.AUTO: NEGATIVE
NRBC # BLD: 0 K/UL (ref 0–0.01)
NRBC BLD-RTO: 0 PER 100 WBC
PH UR STRIP: 6.5 (ref 5–8)
PLATELET # BLD AUTO: 275 K/UL (ref 135–420)
PMV BLD AUTO: 9.7 FL (ref 9.2–11.8)
POTASSIUM SERPL-SCNC: 3.7 MMOL/L (ref 3.5–5.5)
PROT SERPL-MCNC: 8.4 G/DL (ref 6.4–8.2)
PROT UR STRIP-MCNC: NEGATIVE MG/DL
RBC # BLD AUTO: 4.9 M/UL (ref 4.35–5.65)
SODIUM SERPL-SCNC: 135 MMOL/L (ref 136–145)
SP GR UR REFRACTOMETRY: 1.02 (ref 1–1.03)
UROBILINOGEN UR QL STRIP.AUTO: 0.2 EU/DL (ref 0.2–1)
WBC # BLD AUTO: 7.2 K/UL (ref 4.6–13.2)

## 2023-10-25 PROCEDURE — 85025 COMPLETE CBC W/AUTO DIFF WBC: CPT

## 2023-10-25 PROCEDURE — 6360000002 HC RX W HCPCS: Performed by: STUDENT IN AN ORGANIZED HEALTH CARE EDUCATION/TRAINING PROGRAM

## 2023-10-25 PROCEDURE — 6360000004 HC RX CONTRAST MEDICATION: Performed by: EMERGENCY MEDICINE

## 2023-10-25 PROCEDURE — 96374 THER/PROPH/DIAG INJ IV PUSH: CPT

## 2023-10-25 PROCEDURE — 80053 COMPREHEN METABOLIC PANEL: CPT

## 2023-10-25 PROCEDURE — 99285 EMERGENCY DEPT VISIT HI MDM: CPT

## 2023-10-25 PROCEDURE — 2580000003 HC RX 258: Performed by: EMERGENCY MEDICINE

## 2023-10-25 PROCEDURE — 81003 URINALYSIS AUTO W/O SCOPE: CPT

## 2023-10-25 PROCEDURE — 83690 ASSAY OF LIPASE: CPT

## 2023-10-25 PROCEDURE — 74177 CT ABD & PELVIS W/CONTRAST: CPT

## 2023-10-25 PROCEDURE — 96375 TX/PRO/DX INJ NEW DRUG ADDON: CPT

## 2023-10-25 PROCEDURE — 6360000002 HC RX W HCPCS: Performed by: EMERGENCY MEDICINE

## 2023-10-25 RX ORDER — HALOPERIDOL 5 MG/ML
2 INJECTION INTRAMUSCULAR
Status: DISCONTINUED | OUTPATIENT
Start: 2023-10-25 | End: 2023-10-25

## 2023-10-25 RX ORDER — HALOPERIDOL 5 MG/ML
1 INJECTION INTRAMUSCULAR
Status: COMPLETED | OUTPATIENT
Start: 2023-10-25 | End: 2023-10-25

## 2023-10-25 RX ORDER — HYDROMORPHONE HYDROCHLORIDE 1 MG/ML
1 INJECTION, SOLUTION INTRAMUSCULAR; INTRAVENOUS; SUBCUTANEOUS ONCE
Status: DISCONTINUED | OUTPATIENT
Start: 2023-10-25 | End: 2023-10-25

## 2023-10-25 RX ORDER — DIPHENHYDRAMINE HYDROCHLORIDE 50 MG/ML
25 INJECTION INTRAMUSCULAR; INTRAVENOUS
Status: COMPLETED | OUTPATIENT
Start: 2023-10-25 | End: 2023-10-25

## 2023-10-25 RX ORDER — 0.9 % SODIUM CHLORIDE 0.9 %
1000 INTRAVENOUS SOLUTION INTRAVENOUS ONCE
Status: COMPLETED | OUTPATIENT
Start: 2023-10-25 | End: 2023-10-25

## 2023-10-25 RX ADMIN — IOPAMIDOL 100 ML: 612 INJECTION, SOLUTION INTRAVENOUS at 21:41

## 2023-10-25 RX ADMIN — DIPHENHYDRAMINE HYDROCHLORIDE 25 MG: 50 INJECTION, SOLUTION INTRAMUSCULAR; INTRAVENOUS at 22:20

## 2023-10-25 RX ADMIN — HYDROMORPHONE HYDROCHLORIDE 0.5 MG: 1 INJECTION, SOLUTION INTRAMUSCULAR; INTRAVENOUS; SUBCUTANEOUS at 21:15

## 2023-10-25 RX ADMIN — HALOPERIDOL LACTATE 1 MG: 5 INJECTION, SOLUTION INTRAMUSCULAR at 22:20

## 2023-10-25 RX ADMIN — SODIUM CHLORIDE 1000 ML: 9 INJECTION, SOLUTION INTRAVENOUS at 21:18

## 2023-10-25 ASSESSMENT — PAIN DESCRIPTION - LOCATION: LOCATION: ABDOMEN

## 2023-10-25 ASSESSMENT — PAIN DESCRIPTION - ORIENTATION: ORIENTATION: LOWER;RIGHT

## 2023-10-25 ASSESSMENT — PAIN SCALES - GENERAL: PAINLEVEL_OUTOF10: 10

## 2023-10-25 ASSESSMENT — PAIN - FUNCTIONAL ASSESSMENT: PAIN_FUNCTIONAL_ASSESSMENT: 0-10

## 2023-10-25 NOTE — ED TRIAGE NOTES
PATIENT PRESENTED TO THE EMERGENCY DEPT BY EMS WITH COMPLAINT OF LOWER ABDOMINAL PAIN X 6 DAYS NOW, HOWEVER, WORSEN RATED PAIN 10/10    PATIENT IS TESTED POSITIVE FOR C DIFF AND BEEN ON VANCOMYCIN HAS 2 REMAINING DOSE

## 2023-10-26 NOTE — ED PROVIDER NOTES
Patient care assumed from Dr. Jarad Lakhani. Pending CT abdomen pelvis. This is redemonstrating inflammatory changes of the descending and sigmoid colon. No abscess or rupture or free air in the pelvis. Patient still has continued vancomycin antibiotic regimen for home use.  Discharge     Roldan Rodrigues DO  10/25/23 8074

## 2023-10-26 NOTE — ED PROVIDER NOTES
EMERGENCY DEPARTMENT HISTORY AND PHYSICAL EXAM      Patient Name: Tammie Osorio  MRN: [de-identified]  YOB: 1975  Provider: Irene Rueda MD  PCP: [facility]   Time/Date of evaluation: 9:32 PM EDT on 10/25/23    History of Presenting Illness     Chief Complaint   Patient presents with    Abdominal Pain       History Provided By: {History Source:85549::\"Patient\"}     History (Narative):   Tammie Osorio is a 49 y.o. male {PMHx:19314::\"with a PMHX of ***\"} who presents to the emergency department  {Arrival EBPY:46867} C/O *** onset ***. ***        Past History     Past Medical History:  Past Medical History:   Diagnosis Date    Adverse effect of anesthesia     pt said BP dropped during surgery    Anxiety and depression     patient states depression was once and has not repeated    Depression     patient denies depression    Diabetes mellitus ([facility])     Diverticulitis large intestine     Drug-seeking behavior     56 prescriptions from 32 different prescribers at 6 different pharmacies in last 12 months    Fibromyalgia     Herniated lumbar intervertebral disc     HTN (hypertension)     Hypertension     Neurological disorder L3,4,5 torn Herniated disc    Obesity (BMI 30.0-34. 9)     Sleep apnea     has never had a study       Past Surgical History:  Past Surgical History:   Procedure Laterality Date    COLONOSCOPY N/A 10/29/2021    DIAGNOSTIC COLONOSCOPY performed by Luis Enrique Snowden MD at [facility] ENDOSCOPY    COLONOSCOPY N/A 9/29/2023    COLONOSCOPY DIAGNOSTIC performed by Luz Mcfarland MD at [address] [place] [address] Drive Bilateral 06/02/2017    robotic repair of bilateral indirect inguinal hernias with placement of mesh    HERNIA REPAIR Bilateral     ORTHOPEDIC SURGERY Bilateral trigger finger release    UPPER GASTROINTESTINAL ENDOSCOPY N/A 10/19/2023    ENTEROSCOPY performed by Jomar Gómez MD at [facility] ENDOSCOPY       Family History:  Family History   Problem Relation Age of Onset    Heart Hematocrit 42.0 36.0 - 48.0 %    MCV 85.7 78.0 - 100.0 FL    MCH 28.8 24.0 - 34.0 PG    MCHC 33.6 31.0 - 37.0 g/dL    RDW 12.9 11.6 - 14.5 %    Platelets 968 073 - 498 K/uL    MPV 9.7 9.2 - 11.8 FL    Nucleated RBCs 0.0 0  WBC    nRBC 0.00 0.00 - 0.01 K/uL    Neutrophils % 71 40 - 73 %    Lymphocytes % 19 (L) 21 - 52 %    Monocytes % 8 3 - 10 %    Eosinophils % 1 0 - 5 %    Basophils % 1 0 - 2 %    Immature Granulocytes 0 0.0 - 0.5 %    Neutrophils Absolute 5.0 1.8 - 8.0 K/UL    Lymphocytes Absolute 1.4 0.9 - 3.6 K/UL    Monocytes Absolute 0.5 0.05 - 1.2 K/UL    Eosinophils Absolute 0.1 0.0 - 0.4 K/UL    Basophils Absolute 0.1 0.0 - 0.1 K/UL    Absolute Immature Granulocyte 0.0 0.00 - 0.04 K/UL    Differential Type AUTOMATED     CMP    Collection Time: 10/25/23  7:05 PM   Result Value Ref Range    Sodium 135 (L) 136 - 145 mmol/L    Potassium 3.7 3.5 - 5.5 mmol/L    Chloride 101 100 - 111 mmol/L    CO2 29 21 - 32 mmol/L    Anion Gap 5 3.0 - 18 mmol/L    Glucose 195 (H) 74 - 99 mg/dL    BUN 9 7.0 - 18 MG/DL    Creatinine 1.12 0.6 - 1.3 MG/DL    Bun/Cre Ratio 8 (L) 12 - 20      Est, Glom Filt Rate >60 >60 ml/min/1.73m2    Calcium 9.4 8.5 - 10.1 MG/DL    Total Bilirubin 0.2 0.2 - 1.0 MG/DL    ALT 25 16 - 61 U/L    AST 14 10 - 38 U/L    Alk Phosphatase 100 45 - 117 U/L    Total Protein 8.4 (H) 6.4 - 8.2 g/dL    Albumin 3.7 3.4 - 5.0 g/dL    Globulin 4.7 (H) 2.0 - 4.0 g/dL    Albumin/Globulin Ratio 0.8 0.8 - 1.7     Lipase    Collection Time: 10/25/23  7:05 PM   Result Value Ref Range    Lipase 45 13 - 75 U/L   Urinalysis    Collection Time: 10/25/23  7:20 PM   Result Value Ref Range    Color, UA YELLOW      Appearance CLEAR      Specific Gravity, UA 1.016 1.005 - 1.030      pH, Urine 6.5 5.0 - 8.0      Protein, UA Negative NEG mg/dL    Glucose,  (A) NEG mg/dL    Ketones, Urine Negative NEG mg/dL    Bilirubin Urine Negative NEG      Blood, Urine Negative NEG      Urobilinogen, Urine 0.2 0.2 - 1.0 EU/dL

## 2023-10-28 ENCOUNTER — HOSPITAL ENCOUNTER (EMERGENCY)
Facility: HOSPITAL | Age: 48
Discharge: HOME | End: 2023-10-29
Attending: STUDENT IN AN ORGANIZED HEALTH CARE EDUCATION/TRAINING PROGRAM
Payer: MEDICAID

## 2023-10-28 DIAGNOSIS — R11.2 NAUSEA AND VOMITING, UNSPECIFIED VOMITING TYPE: ICD-10-CM

## 2023-10-28 DIAGNOSIS — R11.0 NAUSEA: ICD-10-CM

## 2023-10-28 DIAGNOSIS — R10.32 LEFT LOWER QUADRANT ABDOMINAL PAIN: Primary | ICD-10-CM

## 2023-10-28 LAB
ALBUMIN SERPL-MCNC: 3.5 G/DL (ref 3.4–5)
ALBUMIN/GLOB SERPL: 0.8 (ref 0.8–1.7)
ALP SERPL-CCNC: 85 U/L (ref 45–117)
ALT SERPL-CCNC: 25 U/L (ref 16–61)
ANION GAP SERPL CALC-SCNC: 6 MMOL/L (ref 3–18)
APPEARANCE UR: CLEAR
AST SERPL-CCNC: 18 U/L (ref 10–38)
BASOPHILS # BLD: 0 K/UL (ref 0–0.1)
BASOPHILS NFR BLD: 1 % (ref 0–2)
BILIRUB SERPL-MCNC: 0.2 MG/DL (ref 0.2–1)
BILIRUB UR QL: NEGATIVE
BUN SERPL-MCNC: 8 MG/DL (ref 7–18)
BUN/CREAT SERPL: 8 (ref 12–20)
CALCIUM SERPL-MCNC: 9.1 MG/DL (ref 8.5–10.1)
CHLORIDE SERPL-SCNC: 111 MMOL/L (ref 100–111)
CO2 SERPL-SCNC: 24 MMOL/L (ref 21–32)
COLOR UR: YELLOW
CREAT SERPL-MCNC: 0.96 MG/DL (ref 0.6–1.3)
DIFFERENTIAL METHOD BLD: ABNORMAL
EOSINOPHIL # BLD: 0.1 K/UL (ref 0–0.4)
EOSINOPHIL NFR BLD: 1 % (ref 0–5)
ERYTHROCYTE [DISTWIDTH] IN BLOOD BY AUTOMATED COUNT: 13.1 % (ref 11.6–14.5)
GLOBULIN SER CALC-MCNC: 4.5 G/DL (ref 2–4)
GLUCOSE SERPL-MCNC: 106 MG/DL (ref 74–99)
GLUCOSE UR STRIP.AUTO-MCNC: 250 MG/DL
HCT VFR BLD AUTO: 41.2 % (ref 36–48)
HEMOCCULT STL QL: NEGATIVE
HGB BLD-MCNC: 14 G/DL (ref 13–16)
HGB UR QL STRIP: NEGATIVE
IMM GRANULOCYTES # BLD AUTO: 0 K/UL (ref 0–0.04)
IMM GRANULOCYTES NFR BLD AUTO: 0 % (ref 0–0.5)
KETONES UR QL STRIP.AUTO: ABNORMAL MG/DL
LEUKOCYTE ESTERASE UR QL STRIP.AUTO: NEGATIVE
LIPASE SERPL-CCNC: 52 U/L (ref 13–75)
LYMPHOCYTES # BLD: 1.5 K/UL (ref 0.9–3.6)
LYMPHOCYTES NFR BLD: 19 % (ref 21–52)
MCH RBC QN AUTO: 28.8 PG (ref 24–34)
MCHC RBC AUTO-ENTMCNC: 34 G/DL (ref 31–37)
MCV RBC AUTO: 84.8 FL (ref 78–100)
MONOCYTES # BLD: 0.8 K/UL (ref 0.05–1.2)
MONOCYTES NFR BLD: 10 % (ref 3–10)
NEUTS SEG # BLD: 5.4 K/UL (ref 1.8–8)
NEUTS SEG NFR BLD: 69 % (ref 40–73)
NITRITE UR QL STRIP.AUTO: NEGATIVE
NRBC # BLD: 0 K/UL (ref 0–0.01)
NRBC BLD-RTO: 0 PER 100 WBC
PH UR STRIP: 7 (ref 5–8)
PLATELET # BLD AUTO: 290 K/UL (ref 135–420)
PMV BLD AUTO: 9.7 FL (ref 9.2–11.8)
POTASSIUM SERPL-SCNC: 3.9 MMOL/L (ref 3.5–5.5)
PROT SERPL-MCNC: 8 G/DL (ref 6.4–8.2)
PROT UR STRIP-MCNC: NEGATIVE MG/DL
RBC # BLD AUTO: 4.86 M/UL (ref 4.35–5.65)
SODIUM SERPL-SCNC: 141 MMOL/L (ref 136–145)
SP GR UR REFRACTOMETRY: 1.02 (ref 1–1.03)
UROBILINOGEN UR QL STRIP.AUTO: 1 EU/DL (ref 0.2–1)
WBC # BLD AUTO: 7.8 K/UL (ref 4.6–13.2)

## 2023-10-28 PROCEDURE — 6370000000 HC RX 637 (ALT 250 FOR IP): Performed by: STUDENT IN AN ORGANIZED HEALTH CARE EDUCATION/TRAINING PROGRAM

## 2023-10-28 PROCEDURE — 99284 EMERGENCY DEPT VISIT MOD MDM: CPT

## 2023-10-28 PROCEDURE — 96375 TX/PRO/DX INJ NEW DRUG ADDON: CPT

## 2023-10-28 PROCEDURE — 81003 URINALYSIS AUTO W/O SCOPE: CPT

## 2023-10-28 PROCEDURE — 6360000002 HC RX W HCPCS: Performed by: STUDENT IN AN ORGANIZED HEALTH CARE EDUCATION/TRAINING PROGRAM

## 2023-10-28 PROCEDURE — 85025 COMPLETE CBC W/AUTO DIFF WBC: CPT

## 2023-10-28 PROCEDURE — 82272 OCCULT BLD FECES 1-3 TESTS: CPT

## 2023-10-28 PROCEDURE — 96372 THER/PROPH/DIAG INJ SC/IM: CPT

## 2023-10-28 PROCEDURE — 83690 ASSAY OF LIPASE: CPT

## 2023-10-28 PROCEDURE — 96374 THER/PROPH/DIAG INJ IV PUSH: CPT

## 2023-10-28 PROCEDURE — 80053 COMPREHEN METABOLIC PANEL: CPT

## 2023-10-28 RX ORDER — DICYCLOMINE HCL 20 MG
20 TABLET ORAL
Status: COMPLETED | OUTPATIENT
Start: 2023-10-28 | End: 2023-10-28

## 2023-10-28 RX ORDER — METOCLOPRAMIDE HYDROCHLORIDE 5 MG/ML
10 INJECTION INTRAMUSCULAR; INTRAVENOUS
Status: DISCONTINUED | OUTPATIENT
Start: 2023-10-28 | End: 2023-10-28

## 2023-10-28 RX ORDER — ONDANSETRON 2 MG/ML
4 INJECTION INTRAMUSCULAR; INTRAVENOUS
Status: COMPLETED | OUTPATIENT
Start: 2023-10-28 | End: 2023-10-28

## 2023-10-28 RX ORDER — MORPHINE SULFATE 10 MG/ML
8 INJECTION, SOLUTION INTRAMUSCULAR; INTRAVENOUS
Status: COMPLETED | OUTPATIENT
Start: 2023-10-28 | End: 2023-10-28

## 2023-10-28 RX ORDER — HALOPERIDOL 5 MG/ML
2 INJECTION INTRAMUSCULAR
Status: DISCONTINUED | OUTPATIENT
Start: 2023-10-28 | End: 2023-10-28

## 2023-10-28 RX ADMIN — MORPHINE SULFATE 8 MG: 10 INJECTION INTRAVENOUS at 22:55

## 2023-10-28 RX ADMIN — DICYCLOMINE HYDROCHLORIDE 20 MG: 20 TABLET ORAL at 22:55

## 2023-10-28 RX ADMIN — ONDANSETRON 4 MG: 2 INJECTION INTRAMUSCULAR; INTRAVENOUS at 22:57

## 2023-10-28 ASSESSMENT — ENCOUNTER SYMPTOMS
BLOOD IN STOOL: 1
FACIAL SWELLING: 0
ABDOMINAL PAIN: 1
CHEST TIGHTNESS: 0
NAUSEA: 1
BACK PAIN: 0
VOMITING: 1
DIARRHEA: 0
SHORTNESS OF BREATH: 0
CONSTIPATION: 0
ABDOMINAL DISTENTION: 0
EYE PAIN: 0

## 2023-10-28 ASSESSMENT — PAIN SCALES - GENERAL: PAINLEVEL_OUTOF10: 10

## 2023-10-29 VITALS
HEART RATE: 51 BPM | OXYGEN SATURATION: 95 % | BODY MASS INDEX: 29.92 KG/M2 | TEMPERATURE: 97.3 F | SYSTOLIC BLOOD PRESSURE: 108 MMHG | WEIGHT: 209 LBS | RESPIRATION RATE: 18 BRPM | HEIGHT: 70 IN | DIASTOLIC BLOOD PRESSURE: 72 MMHG

## 2023-10-29 PROCEDURE — 6360000002 HC RX W HCPCS: Performed by: STUDENT IN AN ORGANIZED HEALTH CARE EDUCATION/TRAINING PROGRAM

## 2023-10-29 RX ORDER — FENTANYL CITRATE 50 UG/ML
50 INJECTION, SOLUTION INTRAMUSCULAR; INTRAVENOUS
Status: COMPLETED | OUTPATIENT
Start: 2023-10-29 | End: 2023-10-29

## 2023-10-29 RX ORDER — HALOPERIDOL 5 MG/ML
2 INJECTION INTRAMUSCULAR
Status: COMPLETED | OUTPATIENT
Start: 2023-10-29 | End: 2023-10-29

## 2023-10-29 RX ADMIN — HALOPERIDOL LACTATE 2 MG: 5 INJECTION, SOLUTION INTRAMUSCULAR at 00:07

## 2023-10-29 RX ADMIN — FENTANYL CITRATE 50 MCG: 50 INJECTION INTRAMUSCULAR; INTRAVENOUS at 00:08

## 2023-10-29 ASSESSMENT — PAIN SCALES - GENERAL: PAINLEVEL_OUTOF10: 10

## 2023-10-29 NOTE — ED NOTES
EMERGENCY DEPARTMENT HISTORY AND PHYSICAL EXAM    1:09 AM      Date: 10/28/2023  Patient Name: Steven Espinal    History of Presenting Illness     Chief Complaint   Patient presents with    Abdominal Pain     Pt presents c/o abdominal pain x 1 month. Pt has been seen at many hospitals, per pt; all tests unremarkable. Pt states that he feels like he should be admitted for pain control. History From: Patient  HPI  40-year-old male with history of diverticular disease who presents with abdominal pain. Patient states for the past 5 weeks he has been having worsening abdominal pain. Located in left lower quadrant radiates to the right upper quadrant. Patient states that he has had multiple ct scans and gi workups but symptoms have not subsided. States he is taking Percocet to help with the symptoms but this has not helped his pain. Patient says he is finished his antibiotics of oral vancomycin for his C. difficile infection. When assessing ROS he endorses nausea, vomiting, bloody stool,however, no chest pain, shortness of breath, dysuria, or any other changes. Nursing Notes were all reviewed and agreed with or any disagreements were addressed in the HPI. PCP: Michell Richardson [facility]    No current facility-administered medications for this encounter. Current Outpatient Medications   Medication Sig Dispense Refill    ondansetron (ZOFRAN) 4 MG tablet Take 1 tablet by mouth daily as needed for Nausea or Vomiting 30 tablet 0    sertraline (ZOLOFT) 100 MG tablet Take 1 tablet by mouth daily      ondansetron (ZOFRAN) 4 MG tablet Take 2 tablets by mouth every 12 hours as needed for Nausea or Vomiting 12 tablet 0    dicyclomine (BENTYL) 20 MG tablet Take 1 tablet by mouth 4 times daily 60 tablet 0    ALPRAZolam (XANAX) 1 MG tablet Take 1 tablet by mouth 3 times daily as needed for Sleep.       atorvastatin (LIPITOR) 20 MG tablet Take 1 tablet by mouth nightly 30 tablet 0    metFORMIN (GLUCOPHAGE) 500 MG

## 2023-10-29 NOTE — ED NOTES
Pt presents c/o abdominal pain x 1 month. Pt has been seen at many hospitals, per pt; all tests unremarkable. Pt states that he feels like he should be admitted for pain control. Pt recently had C-diff; has finished antibiotics.      Nelson Montenegro RN  10/28/23 2009

## 2023-10-29 NOTE — DISCHARGE INSTRUCTIONS
You were evaluated for abdominal pain . Based on your work-up it was deemed that she was stable for discharge. Please follow-up with your primary care physician if you have any further concerns and go over your work-up. If you experience any chest pain, shortness of breath, worsening abdominal pain, vomiting blood, worsening headache, seizures, or any worsening of your symptoms please return to the emergency department immediately.   If you have any pending results or any further questions please contact the emergency department at [phone]

## 2023-10-30 ENCOUNTER — HOSPITAL ENCOUNTER (EMERGENCY)
Facility: HOSPITAL | Age: 48
Discharge: HOME | End: 2023-10-30
Attending: EMERGENCY MEDICINE
Payer: MEDICAID

## 2023-10-30 VITALS
DIASTOLIC BLOOD PRESSURE: 69 MMHG | HEART RATE: 50 BPM | RESPIRATION RATE: 15 BRPM | OXYGEN SATURATION: 98 % | TEMPERATURE: 97.6 F | WEIGHT: 209 LBS | SYSTOLIC BLOOD PRESSURE: 132 MMHG | BODY MASS INDEX: 29.92 KG/M2 | HEIGHT: 70 IN

## 2023-10-30 DIAGNOSIS — R10.84 GENERALIZED ABDOMINAL PAIN: Primary | ICD-10-CM

## 2023-10-30 LAB
ALBUMIN SERPL-MCNC: 3.6 G/DL (ref 3.4–5)
ALBUMIN/GLOB SERPL: 0.8 (ref 0.8–1.7)
ALP SERPL-CCNC: 91 U/L (ref 45–117)
ALT SERPL-CCNC: 27 U/L (ref 16–61)
AMPHET UR QL SCN: NEGATIVE
ANION GAP SERPL CALC-SCNC: 6 MMOL/L (ref 3–18)
APPEARANCE UR: CLEAR
AST SERPL-CCNC: 17 U/L (ref 10–38)
BARBITURATES UR QL SCN: NEGATIVE
BASOPHILS # BLD: 0 K/UL (ref 0–0.1)
BASOPHILS NFR BLD: 1 % (ref 0–2)
BENZODIAZ UR QL: POSITIVE
BILIRUB SERPL-MCNC: 0.2 MG/DL (ref 0.2–1)
BILIRUB UR QL: NEGATIVE
BUN SERPL-MCNC: 9 MG/DL (ref 7–18)
BUN/CREAT SERPL: 9 (ref 12–20)
CALCIUM SERPL-MCNC: 9 MG/DL (ref 8.5–10.1)
CANNABINOIDS UR QL SCN: POSITIVE
CHLORIDE SERPL-SCNC: 106 MMOL/L (ref 100–111)
CO2 SERPL-SCNC: 28 MMOL/L (ref 21–32)
COCAINE UR QL SCN: NEGATIVE
COLOR UR: YELLOW
CREAT SERPL-MCNC: 0.95 MG/DL (ref 0.6–1.3)
DIFFERENTIAL METHOD BLD: NORMAL
EOSINOPHIL # BLD: 0.1 K/UL (ref 0–0.4)
EOSINOPHIL NFR BLD: 2 % (ref 0–5)
ERYTHROCYTE [DISTWIDTH] IN BLOOD BY AUTOMATED COUNT: 13 % (ref 11.6–14.5)
ETHANOL SERPL-MCNC: <3 MG/DL (ref 0–3)
GLOBULIN SER CALC-MCNC: 4.5 G/DL (ref 2–4)
GLUCOSE SERPL-MCNC: 173 MG/DL (ref 74–99)
GLUCOSE UR STRIP.AUTO-MCNC: 250 MG/DL
HCT VFR BLD AUTO: 42.1 % (ref 36–48)
HGB BLD-MCNC: 14.4 G/DL (ref 13–16)
HGB UR QL STRIP: NEGATIVE
IMM GRANULOCYTES # BLD AUTO: 0 K/UL (ref 0–0.04)
IMM GRANULOCYTES NFR BLD AUTO: 0 % (ref 0–0.5)
KETONES UR QL STRIP.AUTO: NEGATIVE MG/DL
LEUKOCYTE ESTERASE UR QL STRIP.AUTO: NEGATIVE
LIPASE SERPL-CCNC: 56 U/L (ref 13–75)
LYMPHOCYTES # BLD: 1.5 K/UL (ref 0.9–3.6)
LYMPHOCYTES NFR BLD: 23 % (ref 21–52)
Lab: ABNORMAL
MCH RBC QN AUTO: 28.6 PG (ref 24–34)
MCHC RBC AUTO-ENTMCNC: 34.2 G/DL (ref 31–37)
MCV RBC AUTO: 83.5 FL (ref 78–100)
METHADONE UR QL: ABNORMAL
MONOCYTES # BLD: 0.5 K/UL (ref 0.05–1.2)
MONOCYTES NFR BLD: 7 % (ref 3–10)
NEUTS SEG # BLD: 4.4 K/UL (ref 1.8–8)
NEUTS SEG NFR BLD: 67 % (ref 40–73)
NITRITE UR QL STRIP.AUTO: NEGATIVE
NRBC # BLD: 0 K/UL (ref 0–0.01)
NRBC BLD-RTO: 0 PER 100 WBC
OPIATES UR QL: POSITIVE
PCP UR QL: NEGATIVE
PH UR STRIP: 6 (ref 5–8)
PLATELET # BLD AUTO: 290 K/UL (ref 135–420)
PMV BLD AUTO: 9.3 FL (ref 9.2–11.8)
POTASSIUM SERPL-SCNC: 3.3 MMOL/L (ref 3.5–5.5)
PROT SERPL-MCNC: 8.1 G/DL (ref 6.4–8.2)
PROT UR STRIP-MCNC: NEGATIVE MG/DL
RBC # BLD AUTO: 5.04 M/UL (ref 4.35–5.65)
SODIUM SERPL-SCNC: 140 MMOL/L (ref 136–145)
SP GR UR REFRACTOMETRY: 1.01 (ref 1–1.03)
UROBILINOGEN UR QL STRIP.AUTO: 0.2 EU/DL (ref 0.2–1)
WBC # BLD AUTO: 6.5 K/UL (ref 4.6–13.2)

## 2023-10-30 PROCEDURE — 2580000003 HC RX 258: Performed by: EMERGENCY MEDICINE

## 2023-10-30 PROCEDURE — 83690 ASSAY OF LIPASE: CPT

## 2023-10-30 PROCEDURE — 96376 TX/PRO/DX INJ SAME DRUG ADON: CPT

## 2023-10-30 PROCEDURE — 96374 THER/PROPH/DIAG INJ IV PUSH: CPT

## 2023-10-30 PROCEDURE — 80307 DRUG TEST PRSMV CHEM ANLYZR: CPT

## 2023-10-30 PROCEDURE — 96375 TX/PRO/DX INJ NEW DRUG ADDON: CPT

## 2023-10-30 PROCEDURE — 81003 URINALYSIS AUTO W/O SCOPE: CPT

## 2023-10-30 PROCEDURE — 82077 ASSAY SPEC XCP UR&BREATH IA: CPT

## 2023-10-30 PROCEDURE — 99284 EMERGENCY DEPT VISIT MOD MDM: CPT

## 2023-10-30 PROCEDURE — 85025 COMPLETE CBC W/AUTO DIFF WBC: CPT

## 2023-10-30 PROCEDURE — 6360000002 HC RX W HCPCS: Performed by: EMERGENCY MEDICINE

## 2023-10-30 PROCEDURE — 2500000003 HC RX 250 WO HCPCS: Performed by: EMERGENCY MEDICINE

## 2023-10-30 PROCEDURE — 80053 COMPREHEN METABOLIC PANEL: CPT

## 2023-10-30 RX ORDER — DIPHENHYDRAMINE HYDROCHLORIDE 50 MG/ML
25 INJECTION INTRAMUSCULAR; INTRAVENOUS
Status: COMPLETED | OUTPATIENT
Start: 2023-10-30 | End: 2023-10-30

## 2023-10-30 RX ORDER — KETAMINE HYDROCHLORIDE 50 MG/ML
0.5 INJECTION, SOLUTION, CONCENTRATE INTRAMUSCULAR; INTRAVENOUS
Status: COMPLETED | OUTPATIENT
Start: 2023-10-30 | End: 2023-10-30

## 2023-10-30 RX ORDER — LORAZEPAM 2 MG/ML
1 INJECTION INTRAMUSCULAR ONCE
Status: COMPLETED | OUTPATIENT
Start: 2023-10-30 | End: 2023-10-30

## 2023-10-30 RX ORDER — HALOPERIDOL 5 MG/ML
2.5 INJECTION INTRAMUSCULAR ONCE
Status: COMPLETED | OUTPATIENT
Start: 2023-10-30 | End: 2023-10-30

## 2023-10-30 RX ORDER — 0.9 % SODIUM CHLORIDE 0.9 %
1000 INTRAVENOUS SOLUTION INTRAVENOUS ONCE
Status: COMPLETED | OUTPATIENT
Start: 2023-10-30 | End: 2023-10-30

## 2023-10-30 RX ADMIN — KETAMINE HYDROCHLORIDE 45 MG: 50 INJECTION INTRAMUSCULAR; INTRAVENOUS at 11:03

## 2023-10-30 RX ADMIN — HALOPERIDOL LACTATE 2.5 MG: 5 INJECTION, SOLUTION INTRAMUSCULAR at 09:01

## 2023-10-30 RX ADMIN — SODIUM CHLORIDE 1000 ML: 9 INJECTION, SOLUTION INTRAVENOUS at 09:01

## 2023-10-30 RX ADMIN — DIPHENHYDRAMINE HYDROCHLORIDE 25 MG: 50 INJECTION INTRAMUSCULAR; INTRAVENOUS at 09:55

## 2023-10-30 RX ADMIN — LORAZEPAM 1 MG: 2 INJECTION INTRAMUSCULAR; INTRAVENOUS at 11:34

## 2023-10-30 RX ADMIN — HALOPERIDOL LACTATE 2.5 MG: 5 INJECTION, SOLUTION INTRAMUSCULAR at 09:55

## 2023-10-30 RX ADMIN — DIPHENHYDRAMINE HYDROCHLORIDE 25 MG: 50 INJECTION INTRAMUSCULAR; INTRAVENOUS at 09:01

## 2023-10-30 ASSESSMENT — LIFESTYLE VARIABLES
HOW MANY STANDARD DRINKS CONTAINING ALCOHOL DO YOU HAVE ON A TYPICAL DAY: PATIENT DOES NOT DRINK
HOW OFTEN DO YOU HAVE A DRINK CONTAINING ALCOHOL: NEVER

## 2023-10-30 ASSESSMENT — PAIN DESCRIPTION - LOCATION: LOCATION: ABDOMEN

## 2023-10-30 ASSESSMENT — PAIN SCALES - GENERAL: PAINLEVEL_OUTOF10: 9

## 2023-10-30 ASSESSMENT — PAIN - FUNCTIONAL ASSESSMENT: PAIN_FUNCTIONAL_ASSESSMENT: 0-10

## 2023-10-30 NOTE — ED NOTES
Discharge instructions reviewed with patient. Patient advised to follow up as directed on discharge instructions. Patient denies questions, needs or concerns at this time. Patient verbalized understanding. No s/sx of distress noted.         aTylor Irvin RN  10/30/23 9097

## 2023-10-30 NOTE — ED TRIAGE NOTES
Patient arrived to ER with complaints of abdominal pain for a few months, worse for last 2 weeks. Patient states he had a colonoscopy and was told they didn't find anything but continues in pain. Taking bentyl and is not helping. Currently is seeing veronica crook  and would like referral to another GI provider and pain controlled.

## 2023-10-30 NOTE — ED PROVIDER NOTES
EMERGENCY DEPARTMENT HISTORY AND PHYSICAL EXAM      Patient Name: Luisa Toth  MRN: [de-identified]  YOB: 1975  Provider: Abdoul Joe MD  PCP: [facility]   Time/Date of evaluation: 9:16 AM EDT on 10/30/23    History of Presenting Illness     Chief Complaint   Patient presents with    Abdominal Pain       History Provided By: Patient     History Vielka Neena):   Luisa Toth is a 49 y.o. male with a PMHX of anxiety and depression, diabetes, diverticulitis, and hypertension  who presents to the emergency department  by POV C/O left lower quadrant abdominal pain. The patient states that he had diverticulitis and had a colon resection. After that, he has had intermittent left lower quadrant abdominal pain. It is currently getting worse. He states that he \"cannot take it anymore. \"    He was brought to the ER by a work friend because it got so bad that he was unable to drive himself. He is having some nausea and vomiting since midnight. He is also having diarrhea. The patient states that he saw GI and had a colonoscopy on 10 October. Past History     Past Medical History:  Past Medical History:   Diagnosis Date    Adverse effect of anesthesia     pt said BP dropped during surgery    Anxiety and depression     patient states depression was once and has not repeated    Depression     patient denies depression    Diabetes mellitus ([address])     Diverticulitis large intestine     Drug-seeking behavior     56 prescriptions from 32 different prescribers at 6 different pharmacies in last 12 months    Fibromyalgia     Herniated lumbar intervertebral disc     HTN (hypertension)     Hypertension     Neurological disorder L3,4,5 torn Herniated disc    Obesity (BMI 30.0-34. 9)     Sleep apnea     has never had a study       Past Surgical History:  Past Surgical History:   Procedure Laterality Date    COLONOSCOPY N/A 10/29/2021    DIAGNOSTIC COLONOSCOPY performed by Ramy Bro MD at [facility]

## 2023-11-06 ENCOUNTER — APPOINTMENT (OUTPATIENT)
Facility: HOSPITAL | Age: 48
End: 2023-11-06
Payer: MEDICAID

## 2023-11-06 ENCOUNTER — HOSPITAL ENCOUNTER (EMERGENCY)
Facility: HOSPITAL | Age: 48
Discharge: HOME | End: 2023-11-06
Payer: MEDICAID

## 2023-11-06 VITALS
HEIGHT: 70 IN | RESPIRATION RATE: 18 BRPM | TEMPERATURE: 98.3 F | OXYGEN SATURATION: 98 % | DIASTOLIC BLOOD PRESSURE: 95 MMHG | SYSTOLIC BLOOD PRESSURE: 133 MMHG | HEART RATE: 57 BPM | BODY MASS INDEX: 28.63 KG/M2 | WEIGHT: 200 LBS

## 2023-11-06 DIAGNOSIS — E86.0 DEHYDRATION: ICD-10-CM

## 2023-11-06 DIAGNOSIS — R10.31 ABDOMINAL PAIN, CHRONIC, RIGHT LOWER QUADRANT: Primary | ICD-10-CM

## 2023-11-06 DIAGNOSIS — G89.29 ABDOMINAL PAIN, CHRONIC, RIGHT LOWER QUADRANT: Primary | ICD-10-CM

## 2023-11-06 LAB
ALBUMIN SERPL-MCNC: 4.2 G/DL (ref 3.4–5)
ALBUMIN/GLOB SERPL: 1 (ref 0.8–1.7)
ALP SERPL-CCNC: 104 U/L (ref 45–117)
ALT SERPL-CCNC: 29 U/L (ref 16–61)
ANION GAP SERPL CALC-SCNC: 7 MMOL/L (ref 3–18)
APPEARANCE UR: CLEAR
AST SERPL-CCNC: 28 U/L (ref 10–38)
BASOPHILS # BLD: 0.1 K/UL (ref 0–0.1)
BASOPHILS NFR BLD: 1 % (ref 0–2)
BILIRUB SERPL-MCNC: 0.4 MG/DL (ref 0.2–1)
BILIRUB UR QL: NEGATIVE
BUN SERPL-MCNC: 21 MG/DL (ref 7–18)
BUN/CREAT SERPL: 19 (ref 12–20)
CALCIUM SERPL-MCNC: 10.1 MG/DL (ref 8.5–10.1)
CHLORIDE SERPL-SCNC: 99 MMOL/L (ref 100–111)
CO2 SERPL-SCNC: 28 MMOL/L (ref 21–32)
COLOR UR: YELLOW
CREAT SERPL-MCNC: 1.11 MG/DL (ref 0.6–1.3)
DIFFERENTIAL METHOD BLD: ABNORMAL
EOSINOPHIL # BLD: 0.1 K/UL (ref 0–0.4)
EOSINOPHIL NFR BLD: 1 % (ref 0–5)
ERYTHROCYTE [DISTWIDTH] IN BLOOD BY AUTOMATED COUNT: 12.8 % (ref 11.6–14.5)
ETHANOL SERPL-MCNC: <3 MG/DL (ref 0–3)
GLOBULIN SER CALC-MCNC: 4.1 G/DL (ref 2–4)
GLUCOSE SERPL-MCNC: 224 MG/DL (ref 74–99)
GLUCOSE UR STRIP.AUTO-MCNC: 500 MG/DL
HCT VFR BLD AUTO: 43.5 % (ref 36–48)
HGB BLD-MCNC: 14.9 G/DL (ref 13–16)
HGB UR QL STRIP: NEGATIVE
IMM GRANULOCYTES # BLD AUTO: 0 K/UL (ref 0–0.04)
IMM GRANULOCYTES NFR BLD AUTO: 0 % (ref 0–0.5)
KETONES UR QL STRIP.AUTO: ABNORMAL MG/DL
LACTATE SERPL-SCNC: 1.3 MMOL/L (ref 0.4–2)
LACTATE SERPL-SCNC: 2.2 MMOL/L (ref 0.4–2)
LEUKOCYTE ESTERASE UR QL STRIP.AUTO: NEGATIVE
LIPASE SERPL-CCNC: 139 U/L (ref 13–75)
LYMPHOCYTES # BLD: 1.7 K/UL (ref 0.9–3.6)
LYMPHOCYTES NFR BLD: 19 % (ref 21–52)
MCH RBC QN AUTO: 28.4 PG (ref 24–34)
MCHC RBC AUTO-ENTMCNC: 34.3 G/DL (ref 31–37)
MCV RBC AUTO: 82.9 FL (ref 78–100)
MONOCYTES # BLD: 0.8 K/UL (ref 0.05–1.2)
MONOCYTES NFR BLD: 8 % (ref 3–10)
NEUTS SEG # BLD: 6.6 K/UL (ref 1.8–8)
NEUTS SEG NFR BLD: 71 % (ref 40–73)
NITRITE UR QL STRIP.AUTO: NEGATIVE
NRBC # BLD: 0 K/UL (ref 0–0.01)
NRBC BLD-RTO: 0 PER 100 WBC
PH UR STRIP: 6 (ref 5–8)
PLATELET # BLD AUTO: 309 K/UL (ref 135–420)
PMV BLD AUTO: 10.3 FL (ref 9.2–11.8)
POTASSIUM SERPL-SCNC: 3.6 MMOL/L (ref 3.5–5.5)
PROT SERPL-MCNC: 8.3 G/DL (ref 6.4–8.2)
PROT UR STRIP-MCNC: NEGATIVE MG/DL
RBC # BLD AUTO: 5.25 M/UL (ref 4.35–5.65)
SODIUM SERPL-SCNC: 134 MMOL/L (ref 136–145)
SP GR UR REFRACTOMETRY: 1.02 (ref 1–1.03)
UROBILINOGEN UR QL STRIP.AUTO: 0.2 EU/DL (ref 0.2–1)
WBC # BLD AUTO: 9.2 K/UL (ref 4.6–13.2)

## 2023-11-06 PROCEDURE — 80053 COMPREHEN METABOLIC PANEL: CPT

## 2023-11-06 PROCEDURE — 96374 THER/PROPH/DIAG INJ IV PUSH: CPT

## 2023-11-06 PROCEDURE — 82077 ASSAY SPEC XCP UR&BREATH IA: CPT

## 2023-11-06 PROCEDURE — 99284 EMERGENCY DEPT VISIT MOD MDM: CPT

## 2023-11-06 PROCEDURE — 96361 HYDRATE IV INFUSION ADD-ON: CPT

## 2023-11-06 PROCEDURE — 74018 RADEX ABDOMEN 1 VIEW: CPT

## 2023-11-06 PROCEDURE — 6360000002 HC RX W HCPCS: Performed by: EMERGENCY MEDICINE

## 2023-11-06 PROCEDURE — 81003 URINALYSIS AUTO W/O SCOPE: CPT

## 2023-11-06 PROCEDURE — 83605 ASSAY OF LACTIC ACID: CPT

## 2023-11-06 PROCEDURE — 83690 ASSAY OF LIPASE: CPT

## 2023-11-06 PROCEDURE — 96375 TX/PRO/DX INJ NEW DRUG ADDON: CPT

## 2023-11-06 PROCEDURE — 2580000003 HC RX 258: Performed by: EMERGENCY MEDICINE

## 2023-11-06 PROCEDURE — 85025 COMPLETE CBC W/AUTO DIFF WBC: CPT

## 2023-11-06 RX ORDER — KETOROLAC TROMETHAMINE 15 MG/ML
30 INJECTION, SOLUTION INTRAMUSCULAR; INTRAVENOUS
Status: COMPLETED | OUTPATIENT
Start: 2023-11-06 | End: 2023-11-06

## 2023-11-06 RX ORDER — ONDANSETRON 2 MG/ML
4 INJECTION INTRAMUSCULAR; INTRAVENOUS
Status: COMPLETED | OUTPATIENT
Start: 2023-11-06 | End: 2023-11-06

## 2023-11-06 RX ORDER — DICYCLOMINE HCL 20 MG
20 TABLET ORAL 4 TIMES DAILY
Qty: 60 TABLET | Refills: 0 | Status: SHIPPED | OUTPATIENT
Start: 2023-11-06

## 2023-11-06 RX ORDER — 0.9 % SODIUM CHLORIDE 0.9 %
30 INTRAVENOUS SOLUTION INTRAVENOUS ONCE
Status: DISCONTINUED | OUTPATIENT
Start: 2023-11-06 | End: 2023-11-06

## 2023-11-06 RX ORDER — 0.9 % SODIUM CHLORIDE 0.9 %
2000 INTRAVENOUS SOLUTION INTRAVENOUS ONCE
Status: COMPLETED | OUTPATIENT
Start: 2023-11-06 | End: 2023-11-06

## 2023-11-06 RX ADMIN — SODIUM CHLORIDE 2000 ML: 9 INJECTION, SOLUTION INTRAVENOUS at 10:31

## 2023-11-06 RX ADMIN — ONDANSETRON 4 MG: 2 INJECTION INTRAMUSCULAR; INTRAVENOUS at 10:34

## 2023-11-06 RX ADMIN — KETOROLAC TROMETHAMINE 30 MG: 15 INJECTION, SOLUTION INTRAMUSCULAR; INTRAVENOUS at 10:42

## 2023-11-06 ASSESSMENT — ENCOUNTER SYMPTOMS
ABDOMINAL PAIN: 1
ALLERGIC/IMMUNOLOGIC NEGATIVE: 1
RESPIRATORY NEGATIVE: 1
EYES NEGATIVE: 1

## 2023-11-06 ASSESSMENT — PAIN DESCRIPTION - DESCRIPTORS: DESCRIPTORS: ACHING

## 2023-11-06 ASSESSMENT — PAIN DESCRIPTION - LOCATION: LOCATION: ABDOMEN

## 2023-11-06 ASSESSMENT — PAIN SCALES - GENERAL: PAINLEVEL_OUTOF10: 8

## 2023-11-06 NOTE — ED TRIAGE NOTES
Pt in ED with c/o abdominal pain. Pt states he is seeing a GI currently and was told the mesh in stomach is causing pain.  Pt in ED for pain control and wanting to see an GI doctor

## 2023-11-06 NOTE — ED NOTES
Pt given specimen cup for C. DIFF , pt made aware . Pt went to restroom unable to get the sample at this time.  Informed SHAYY Sterling at this time      Sparkle Albert  11/06/23 1146

## 2023-11-06 NOTE — ED PROVIDER NOTES
unable to obtain follow up, return immediately      DISCHARGE MEDICATIONS:  Current Discharge Medication List        Controlled Substances Monitoring:          No data to display                (Please note that portions of this note were completed with a voice recognition program.  Efforts were made to edit the dictations but occasionally words are mis-transcribed.)    SHAYY Stapleton (electronically signed)  Attending Emergency Physician           Shasta Breaux [place]  11/06/23 3231

## 2023-11-16 ENCOUNTER — TELEPHONE (OUTPATIENT)
Age: 48
End: 2023-11-16

## 2023-11-16 NOTE — TELEPHONE ENCOUNTER
Left voicemail message to contact our office to schedule an appointment with Dr. Kayden Redman re: inguinal hernia evaluation.

## 2023-12-24 ENCOUNTER — HOSPITAL ENCOUNTER (EMERGENCY)
Facility: HOSPITAL | Age: 48
Discharge: HOME | End: 2023-12-24
Attending: EMERGENCY MEDICINE
Payer: MEDICAID

## 2023-12-24 VITALS
OXYGEN SATURATION: 99 % | TEMPERATURE: 97.9 F | DIASTOLIC BLOOD PRESSURE: 99 MMHG | SYSTOLIC BLOOD PRESSURE: 141 MMHG | RESPIRATION RATE: 18 BRPM | HEART RATE: 55 BPM

## 2023-12-24 DIAGNOSIS — M54.2 NECK PAIN: Primary | ICD-10-CM

## 2023-12-24 DIAGNOSIS — M54.12 CERVICAL RADICULOPATHY: ICD-10-CM

## 2023-12-24 PROCEDURE — 99283 EMERGENCY DEPT VISIT LOW MDM: CPT

## 2023-12-24 RX ORDER — CYCLOBENZAPRINE HCL 10 MG
10 TABLET ORAL 3 TIMES DAILY PRN
Qty: 15 TABLET | Refills: 0 | Status: SHIPPED | OUTPATIENT
Start: 2023-12-24 | End: 2023-12-29

## 2023-12-24 NOTE — ED PROVIDER NOTES
[facility] EMERGENCY DEPT  eMERGENCY dEPARTMENT eNCOUnter        Pt Name: Lanie Szymanski  MRN: [de-identified]  [id number] 1975  Date of evaluation: 12/24/2023  Provider: Nolan Esquivel MD  PCP: [facility]  Note Started: 5:09 PM EST 12/24/2023          CHIEF COMPLAINT       Chief Complaint   Patient presents with    Neck Pain       HISTORY OF PRESENT ILLNESS        Patient coming in with discomfort in his posterior neck. States that he is followed by a specialist for left-sided cervical radiculopathy at C5/C6. States that he called his doctor and was instructed to come to the emergency department to discuss management options. No real weakness in the left upper extremity. No trauma. No fevers. Please see the note below from the orthopedic specialist.    Note from [address]  This outlines a plan to manage his left upper extremity symptoms with nonnarcotic medications. Please see the note below from the provider from orthopedics:    Primarily he is bothered by catching and locking to the small finger, as well as pain throughout all fingers at the level of the A1 pulley. He has a very difficult examination as he says that every location is painful. In discussing a revision trigger finger release, I informed him that our goal would be to prevent symptomatic locking of the small finger. We discussed that this would not offer him any relief of the pain that he is having throughout the course of the small finger even extending into the palm. I have no clear anatomic reason for why he would be having issues in this location. I discussed that we would do this under local only anesthesia so that we could confirm intraoperatively whether there was any residual areas of catching or locking. After reviewing all of the risks, benefits and alternatives to surgery, we did get to the point of discussing postoperative pain medication.  I advised him that these types of surgeries generally require a combination

## 2023-12-24 NOTE — ED TRIAGE NOTES
Pt c/o neck pain rt c5-c6 being pinched. Pt states pain has been tolerated for years but is flaring up today. Denies injury to area today. A&Ox4. NAD noted.

## 2023-12-24 NOTE — DISCHARGE INSTRUCTIONS
I reviewed your orthopedist's most recent note and they recommend management with nonopioid medications. I am providing a prescription for Flexeril to be used in combination with Tylenol and ibuprofen as we discussed. I recommend the use of a topical medication such as capsaicin or perhaps a Lidoderm patch. Follow-up with your orthopedic specialist in the coming week to discuss further management of your pain. It appears you have been referred to a pain management specialist, please try to arrange that follow-up as soon as possible.

## 2024-01-04 DIAGNOSIS — M79.642 LEFT HAND PAIN: Primary | ICD-10-CM

## 2024-01-14 ENCOUNTER — APPOINTMENT (OUTPATIENT)
Facility: HOSPITAL | Age: 49
End: 2024-01-14
Payer: MEDICAID

## 2024-01-14 ENCOUNTER — HOSPITAL ENCOUNTER (EMERGENCY)
Facility: HOSPITAL | Age: 49
Discharge: HOME | End: 2024-01-14
Attending: STUDENT IN AN ORGANIZED HEALTH CARE EDUCATION/TRAINING PROGRAM
Payer: MEDICAID

## 2024-01-14 VITALS
SYSTOLIC BLOOD PRESSURE: 125 MMHG | OXYGEN SATURATION: 100 % | BODY MASS INDEX: 29.35 KG/M2 | TEMPERATURE: 97.6 F | DIASTOLIC BLOOD PRESSURE: 79 MMHG | HEART RATE: 63 BPM | WEIGHT: 205 LBS | RESPIRATION RATE: 18 BRPM | HEIGHT: 70 IN

## 2024-01-14 DIAGNOSIS — R10.32 LEFT LOWER QUADRANT ABDOMINAL PAIN: Primary | ICD-10-CM

## 2024-01-14 LAB
AMPHET UR QL SCN: NEGATIVE
ANION GAP SERPL CALC-SCNC: 3 MMOL/L (ref 3–18)
APPEARANCE UR: CLEAR
BARBITURATES UR QL SCN: NEGATIVE
BASOPHILS # BLD: 0.1 K/UL (ref 0–0.1)
BASOPHILS NFR BLD: 1 % (ref 0–2)
BENZODIAZ UR QL: POSITIVE
BILIRUB UR QL: NEGATIVE
BUN SERPL-MCNC: 13 MG/DL (ref 7–18)
BUN/CREAT SERPL: 14 (ref 12–20)
CALCIUM SERPL-MCNC: 9.4 MG/DL (ref 8.5–10.1)
CANNABINOIDS UR QL SCN: NEGATIVE
CHLORIDE SERPL-SCNC: 101 MMOL/L (ref 100–111)
CO2 SERPL-SCNC: 34 MMOL/L (ref 21–32)
COCAINE UR QL SCN: NEGATIVE
COLOR UR: YELLOW
CREAT SERPL-MCNC: 0.91 MG/DL (ref 0.6–1.3)
DIFFERENTIAL METHOD BLD: NORMAL
EOSINOPHIL # BLD: 0.1 K/UL (ref 0–0.4)
EOSINOPHIL NFR BLD: 1 % (ref 0–5)
ERYTHROCYTE [DISTWIDTH] IN BLOOD BY AUTOMATED COUNT: 13.2 % (ref 11.6–14.5)
GLUCOSE SERPL-MCNC: 154 MG/DL (ref 74–99)
GLUCOSE UR STRIP.AUTO-MCNC: 100 MG/DL
HCT VFR BLD AUTO: 39.6 % (ref 36–48)
HGB BLD-MCNC: 13.8 G/DL (ref 13–16)
HGB UR QL STRIP: NEGATIVE
IMM GRANULOCYTES # BLD AUTO: 0 K/UL (ref 0–0.04)
IMM GRANULOCYTES NFR BLD AUTO: 0 % (ref 0–0.5)
KETONES UR QL STRIP.AUTO: NEGATIVE MG/DL
LEUKOCYTE ESTERASE UR QL STRIP.AUTO: NEGATIVE
LYMPHOCYTES # BLD: 2.4 K/UL (ref 0.9–3.6)
LYMPHOCYTES NFR BLD: 29 % (ref 21–52)
Lab: ABNORMAL
MCH RBC QN AUTO: 28.9 PG (ref 24–34)
MCHC RBC AUTO-ENTMCNC: 34.8 G/DL (ref 31–37)
MCV RBC AUTO: 83 FL (ref 78–100)
METHADONE UR QL: NEGATIVE
MONOCYTES # BLD: 0.7 K/UL (ref 0.05–1.2)
MONOCYTES NFR BLD: 9 % (ref 3–10)
NEUTS SEG # BLD: 5 K/UL (ref 1.8–8)
NEUTS SEG NFR BLD: 60 % (ref 40–73)
NITRITE UR QL STRIP.AUTO: NEGATIVE
NRBC # BLD: 0 K/UL (ref 0–0.01)
NRBC BLD-RTO: 0 PER 100 WBC
OPIATES UR QL: NEGATIVE
PCP UR QL: NEGATIVE
PH UR STRIP: 6.5 (ref 5–8)
PLATELET # BLD AUTO: 286 K/UL (ref 135–420)
PMV BLD AUTO: 9.2 FL (ref 9.2–11.8)
POTASSIUM SERPL-SCNC: 3.6 MMOL/L (ref 3.5–5.5)
PROT UR STRIP-MCNC: NEGATIVE MG/DL
RBC # BLD AUTO: 4.77 M/UL (ref 4.35–5.65)
SODIUM SERPL-SCNC: 138 MMOL/L (ref 136–145)
SP GR UR REFRACTOMETRY: 1.01 (ref 1–1.03)
UROBILINOGEN UR QL STRIP.AUTO: 0.2 EU/DL (ref 0.2–1)
WBC # BLD AUTO: 8.3 K/UL (ref 4.6–13.2)

## 2024-01-14 PROCEDURE — 85025 COMPLETE CBC W/AUTO DIFF WBC: CPT

## 2024-01-14 PROCEDURE — 81003 URINALYSIS AUTO W/O SCOPE: CPT

## 2024-01-14 PROCEDURE — 6370000000 HC RX 637 (ALT 250 FOR IP): Performed by: STUDENT IN AN ORGANIZED HEALTH CARE EDUCATION/TRAINING PROGRAM

## 2024-01-14 PROCEDURE — 99285 EMERGENCY DEPT VISIT HI MDM: CPT

## 2024-01-14 PROCEDURE — 80048 BASIC METABOLIC PNL TOTAL CA: CPT

## 2024-01-14 PROCEDURE — 80307 DRUG TEST PRSMV CHEM ANLYZR: CPT

## 2024-01-14 PROCEDURE — 74177 CT ABD & PELVIS W/CONTRAST: CPT

## 2024-01-14 PROCEDURE — 6360000004 HC RX CONTRAST MEDICATION: Performed by: STUDENT IN AN ORGANIZED HEALTH CARE EDUCATION/TRAINING PROGRAM

## 2024-01-14 RX ORDER — FAMOTIDINE 20 MG/1
20 TABLET, FILM COATED ORAL
Status: COMPLETED | OUTPATIENT
Start: 2024-01-14 | End: 2024-01-14

## 2024-01-14 RX ORDER — DOCUSATE SODIUM 100 MG/1
100 CAPSULE, LIQUID FILLED ORAL 2 TIMES DAILY
Qty: 60 CAPSULE | Refills: 0 | Status: SHIPPED | OUTPATIENT
Start: 2024-01-14

## 2024-01-14 RX ORDER — DICYCLOMINE HYDROCHLORIDE 10 MG/1
20 CAPSULE ORAL
Status: COMPLETED | OUTPATIENT
Start: 2024-01-14 | End: 2024-01-14

## 2024-01-14 RX ORDER — DICYCLOMINE HCL 20 MG
20 TABLET ORAL 4 TIMES DAILY PRN
Qty: 30 TABLET | Refills: 0 | Status: SHIPPED | OUTPATIENT
Start: 2024-01-14

## 2024-01-14 RX ADMIN — IOPAMIDOL 100 ML: 612 INJECTION, SOLUTION INTRAVENOUS at 22:05

## 2024-01-14 RX ADMIN — FAMOTIDINE 20 MG: 20 TABLET, FILM COATED ORAL at 22:25

## 2024-01-14 RX ADMIN — DICYCLOMINE HYDROCHLORIDE 20 MG: 10 CAPSULE ORAL at 22:24

## 2024-01-14 ASSESSMENT — PAIN DESCRIPTION - PAIN TYPE: TYPE: ACUTE PAIN

## 2024-01-14 ASSESSMENT — PAIN DESCRIPTION - ORIENTATION: ORIENTATION: RIGHT;LEFT;LOWER

## 2024-01-14 ASSESSMENT — PAIN SCALES - GENERAL
PAINLEVEL_OUTOF10: 10
PAINLEVEL_OUTOF10: 10

## 2024-01-14 ASSESSMENT — PAIN - FUNCTIONAL ASSESSMENT: PAIN_FUNCTIONAL_ASSESSMENT: 0-10

## 2024-01-14 ASSESSMENT — PAIN DESCRIPTION - DESCRIPTORS: DESCRIPTORS: SHARP

## 2024-01-14 ASSESSMENT — PAIN DESCRIPTION - FREQUENCY: FREQUENCY: CONTINUOUS

## 2024-01-14 ASSESSMENT — PAIN DESCRIPTION - LOCATION
LOCATION: ABDOMEN
LOCATION: ABDOMEN

## 2024-01-15 NOTE — ED TRIAGE NOTES
Pt reports h/o chronic abdominal \"issues\" that \"haven't been figured out\". Seeing GI for same.  Pt reports onset of lower abdominal (L>R), lower back pain since Wednesday. Reports noting blood on paper after bm yesterday.

## 2024-01-23 DIAGNOSIS — M79.642 LEFT HAND PAIN: Primary | ICD-10-CM

## 2024-01-25 DIAGNOSIS — M79.642 LEFT HAND PAIN: ICD-10-CM

## 2024-01-25 PROCEDURE — 73130 X-RAY EXAM OF HAND: CPT | Performed by: ORTHOPAEDIC SURGERY

## 2024-01-26 ENCOUNTER — OFFICE VISIT (OUTPATIENT)
Age: 49
End: 2024-01-26
Payer: MEDICAID

## 2024-01-26 ENCOUNTER — HOSPITAL ENCOUNTER (EMERGENCY)
Facility: HOSPITAL | Age: 49
Discharge: HOME | End: 2024-01-26
Payer: MEDICAID

## 2024-01-26 VITALS — WEIGHT: 208 LBS | HEIGHT: 70 IN | BODY MASS INDEX: 29.78 KG/M2

## 2024-01-26 VITALS
RESPIRATION RATE: 18 BRPM | HEART RATE: 85 BPM | DIASTOLIC BLOOD PRESSURE: 98 MMHG | OXYGEN SATURATION: 100 % | WEIGHT: 207 LBS | TEMPERATURE: 98.1 F | SYSTOLIC BLOOD PRESSURE: 143 MMHG | HEIGHT: 70 IN | BODY MASS INDEX: 29.63 KG/M2

## 2024-01-26 DIAGNOSIS — M79.642 LEFT HAND PAIN: ICD-10-CM

## 2024-01-26 DIAGNOSIS — M65.352 TRIGGER LITTLE FINGER OF LEFT HAND: Primary | ICD-10-CM

## 2024-01-26 DIAGNOSIS — M65.352 TRIGGER LITTLE FINGER OF LEFT HAND: ICD-10-CM

## 2024-01-26 DIAGNOSIS — M54.50 ACUTE EXACERBATION OF CHRONIC LOW BACK PAIN: Primary | ICD-10-CM

## 2024-01-26 DIAGNOSIS — G89.29 ACUTE EXACERBATION OF CHRONIC LOW BACK PAIN: Primary | ICD-10-CM

## 2024-01-26 PROCEDURE — 6360000002 HC RX W HCPCS: Performed by: PHYSICIAN ASSISTANT

## 2024-01-26 PROCEDURE — 99284 EMERGENCY DEPT VISIT MOD MDM: CPT

## 2024-01-26 PROCEDURE — 96372 THER/PROPH/DIAG INJ SC/IM: CPT

## 2024-01-26 PROCEDURE — 6370000000 HC RX 637 (ALT 250 FOR IP): Performed by: PHYSICIAN ASSISTANT

## 2024-01-26 PROCEDURE — 99204 OFFICE O/P NEW MOD 45 MIN: CPT | Performed by: ORTHOPAEDIC SURGERY

## 2024-01-26 RX ORDER — CYCLOBENZAPRINE HCL 10 MG
TABLET ORAL
COMMUNITY
Start: 2024-01-10

## 2024-01-26 RX ORDER — GABAPENTIN 300 MG/1
CAPSULE ORAL
COMMUNITY
Start: 2024-01-26

## 2024-01-26 RX ORDER — MELOXICAM 15 MG/1
TABLET ORAL
COMMUNITY
Start: 2024-01-26

## 2024-01-26 RX ORDER — HYDROCODONE BITARTRATE AND ACETAMINOPHEN 5; 325 MG/1; MG/1
TABLET ORAL
COMMUNITY
Start: 2023-12-18

## 2024-01-26 RX ORDER — PREDNISONE 50 MG/1
50 TABLET ORAL DAILY
Qty: 5 TABLET | Refills: 0 | Status: SHIPPED | OUTPATIENT
Start: 2024-01-26 | End: 2024-01-31

## 2024-01-26 RX ORDER — KETOROLAC TROMETHAMINE 15 MG/ML
30 INJECTION, SOLUTION INTRAMUSCULAR; INTRAVENOUS ONCE
Status: COMPLETED | OUTPATIENT
Start: 2024-01-26 | End: 2024-01-26

## 2024-01-26 RX ORDER — POLYETHYLENE GLYCOL 3350 17 G/17G
POWDER, FOR SOLUTION ORAL
COMMUNITY
Start: 2023-10-25

## 2024-01-26 RX ORDER — ZIPRASIDONE HYDROCHLORIDE 40 MG/1
CAPSULE ORAL
COMMUNITY
Start: 2023-11-27

## 2024-01-26 RX ORDER — LIDOCAINE 50 MG/G
PATCH TOPICAL
COMMUNITY
Start: 2024-01-17

## 2024-01-26 RX ORDER — OXYCODONE HYDROCHLORIDE 5 MG/1
5 TABLET ORAL
Status: COMPLETED | OUTPATIENT
Start: 2024-01-26 | End: 2024-01-26

## 2024-01-26 RX ORDER — OXYCODONE HYDROCHLORIDE 5 MG/1
5 TABLET ORAL EVERY 4 HOURS PRN
COMMUNITY
Start: 2024-01-17 | End: 2024-01-27

## 2024-01-26 RX ADMIN — OXYCODONE 5 MG: 5 TABLET ORAL at 12:36

## 2024-01-26 RX ADMIN — KETOROLAC TROMETHAMINE 30 MG: 15 INJECTION, SOLUTION INTRAMUSCULAR; INTRAVENOUS at 12:36

## 2024-01-26 ASSESSMENT — PAIN SCALES - GENERAL: PAINLEVEL_OUTOF10: 8

## 2024-01-26 NOTE — ED PROVIDER NOTES
EMERGENCY DEPARTMENT HISTORY & PHYSICAL EXAM    [facility] EMERGENCY DEPT  1/26/24, 12:27 PM EST    Clinical Impression:  1. Acute exacerbation of chronic low back pain        Assessment/Differential Diagnosis:     Ddx low back pain, trauma, infectious process, bony injury, spinal cord concerns all considered    ED Course:   Initial assessment performed. The patients presenting problems have been discussed, and they are in agreement with the care plan formulated and outlined with them.  I have encouraged them to ask questions as they arise throughout their visit.    Patient comes to the ED with concern of right-sided low back pain with radiation of pain into his right leg.  Patient states that he has ongoing pain and is followed by orthopedist in [place].  He states he was in their office this morning and had a \"horrible MRI\".  Patient states they are considering surgery and injections.  He has not had injections recently.  He was discharged with no medication.  He states he and his friends were driving to [place] when he had worsening of his normal pain.  For that reason he came to the ED.  There is been no leg weakness, saddle anesthesia or incontinence.  No abdominal pain, no urinary symptoms.    Patient sitting on side of stretcher.  Rubbing his right low back.  He is alert and oriented answering my questions appropriately.  Lungs clear to auscultation, heart regular rate and rhythm, abdomen soft and nontender.  Examination of his back reveals no midline tenderness.  No swelling or rash.  No obvious bony defect.  Patient has point tenderness with palpation along paralumbar muscles on the right.  Positive straight leg raise with right foot neurovascular intact.    Patient's  does show that he received 2 prescriptions of oxycodone in the last few weeks.  For that reason I am unable to prescribe oxycodone for home.  I will give him a shot of Toradol here, 1 oxycodone.  
English

## 2024-01-26 NOTE — DISCHARGE INSTRUCTIONS
Ice to area of pain 20 min, 3-4 times a day. May try moist heat after 3 days of pain, 20 min, 3-4 times a day.  No heavy lifting, pushing, pulling until pain resolves.  Activity as tolerated.  Medications as prescribed.  Motrin 200mg, 3 very 8 hours with food.(Dont take with steroids)  Tylenol 325mg, 2 every 6 hours as needed for additional pain relief.  Continue your muscle relaxer  Return to ED if fever, worsening pain, leg weakness, urine or stool incontinence, or saddle anesthesia, or any new concerns.

## 2024-01-26 NOTE — PATIENT INSTRUCTIONS
Trigger Finger: Care Instructions  Overview  A trigger finger is a finger stuck in a bent position. It happens when the tendon that bends and straightens the thumb or finger can't slide smoothly under the ligaments that hold the tendon against the bones. In most cases, it's caused by a bump (nodule) that forms on the tendon. The bent finger usually straightens out on its own.  A trigger finger can be painful. But it normally isn't a serious problem.  Trigger fingers seem to occur more in some groups of people. These groups include:  People who have diabetes or arthritis.  People who have injured their hands in the past.  Musicians.  People who  tools often.  Rest and exercises may help your finger relax so that it can bend.  You may get a corticosteroid shot. This can reduce swelling and pain. Your doctor may put a splint on your finger. It will give your finger some rest. You may need surgery if the finger keeps locking in a bent position.  Follow-up care is a key part of your treatment and safety. Be sure to make and go to all appointments, and call your doctor if you are having problems. It's also a good idea to know your test results and keep a list of the medicines you take.  How can you care for yourself at home?  If your doctor put a splint on your finger, wear it as directed. Don't take it off until your doctor says you can.  You may need to change your activities to avoid movements that irritate the finger.  Take your medicines exactly as prescribed. Call your doctor if you think you are having a problem with your medicine.  Ask your doctor if you can take an over-the-counter pain medicine, such as acetaminophen (Tylenol), ibuprofen (Advil, Motrin), or naproxen (Aleve). Be safe with medicines. Read and follow all instructions on the label.  If your doctor recommends exercises, do them as directed.  When should you call for help?   Call your doctor now or seek immediate medical care if:    Your finger

## 2024-01-26 NOTE — PROGRESS NOTES
Name: Erlin Gordon    : 1975     [facility] SPECIALTY  [facility]  [address]  [address]  [place] [id number]  Dept: [phone]  Dept Fax: [phone]     Chief Complaint   Patient presents with    Hand Pain        Ht 1.778 m (5' 10\")   Wt 94.3 kg (208 lb)   BMI 29.84 kg/m²      Allergies   Allergen Reactions    Quetiapine Other (See Comments)     Pt states he started talking crazy saying weird things        Current Outpatient Medications   Medication Sig Dispense Refill    cyclobenzaprine (FLEXERIL) 10 MG tablet       gabapentin (NEURONTIN) 300 MG capsule       HYDROcodone-acetaminophen (NORCO) 5-325 MG per tablet       lidocaine (LIDODERM) 5 % Apply 1 patch as directed for 12 hours every 24 hours (12 hours on, 12 hours off)      meloxicam (MOBIC) 15 MG tablet       oxyCODONE (ROXICODONE) 5 MG immediate release tablet Take 1 tablet by mouth every 4 hours as needed. Max Daily Amount: 30 mg      polyethylene glycol (GLYCOLAX) 17 g packet       ziprasidone (GEODON) 40 MG capsule       predniSONE (DELTASONE) 50 MG tablet Take 1 tablet by mouth daily for 5 days 5 tablet 0    dicyclomine (BENTYL) 20 MG tablet Take 1 tablet by mouth 4 times daily as needed (abdominal pain) 30 tablet 0    docusate sodium (COLACE) 100 MG capsule Take 1 capsule by mouth 2 times daily 60 capsule 0    ondansetron (ZOFRAN-ODT) 4 MG disintegrating tablet Take 1 tablet by mouth 3 times daily as needed for Nausea or Vomiting 21 tablet 0    ibuprofen (ADVIL;MOTRIN) 600 MG tablet Take 1 tablet by mouth every 8 hours as needed for Pain 15 tablet 0    Spacer/Aero-Holding Chambers JOSE 1 Device by Does not apply route daily as needed (with inhaler) 1 each 0    ondansetron (ZOFRAN) 4 MG tablet Take 1 tablet by mouth daily as needed for Nausea or Vomiting 30 tablet 0    sertraline (ZOLOFT) 100 MG tablet Take 1 tablet by mouth daily      atorvastatin (LIPITOR) 20 MG tablet Take 1

## 2024-01-26 NOTE — ED TRIAGE NOTES
Pt arrives to ed reporting lower back that radiates down is right leg. Pt is seen by pain management and ortho.

## 2024-02-29 ENCOUNTER — HOSPITAL ENCOUNTER (EMERGENCY)
Age: 49
Discharge: HOME | End: 2024-02-29
Attending: FAMILY MEDICINE
Payer: MEDICAID

## 2024-02-29 ENCOUNTER — OFFICE VISIT (OUTPATIENT)
Age: 49
End: 2024-02-29

## 2024-02-29 VITALS
OXYGEN SATURATION: 98 % | TEMPERATURE: 98.4 F | RESPIRATION RATE: 16 BRPM | WEIGHT: 211 LBS | BODY MASS INDEX: 30.21 KG/M2 | HEIGHT: 70 IN | HEART RATE: 72 BPM

## 2024-02-29 VITALS — BODY MASS INDEX: 30.21 KG/M2 | WEIGHT: 211 LBS | HEIGHT: 70 IN

## 2024-02-29 DIAGNOSIS — M65.352 TRIGGER LITTLE FINGER OF LEFT HAND: ICD-10-CM

## 2024-02-29 DIAGNOSIS — G89.29 ACUTE EXACERBATION OF CHRONIC LOW BACK PAIN: Primary | ICD-10-CM

## 2024-02-29 DIAGNOSIS — E11.9 TYPE 2 DIABETES MELLITUS WITHOUT COMPLICATION, WITHOUT LONG-TERM CURRENT USE OF INSULIN (HCC): ICD-10-CM

## 2024-02-29 DIAGNOSIS — I10 PRIMARY HYPERTENSION: Primary | ICD-10-CM

## 2024-02-29 DIAGNOSIS — M54.50 ACUTE EXACERBATION OF CHRONIC LOW BACK PAIN: Primary | ICD-10-CM

## 2024-02-29 PROCEDURE — 99283 EMERGENCY DEPT VISIT LOW MDM: CPT

## 2024-02-29 RX ORDER — TRAMADOL HYDROCHLORIDE 50 MG/1
50 TABLET ORAL EVERY 8 HOURS PRN
Qty: 20 TABLET | Refills: 0 | Status: SHIPPED | OUTPATIENT
Start: 2024-02-29 | End: 2024-02-29

## 2024-02-29 RX ORDER — METOCLOPRAMIDE 10 MG/1
10 TABLET ORAL 4 TIMES DAILY
COMMUNITY
Start: 2024-02-22 | End: 2024-02-29

## 2024-02-29 RX ORDER — HYDROXYZINE 50 MG/1
TABLET, FILM COATED ORAL
COMMUNITY
Start: 2024-02-14 | End: 2024-02-29

## 2024-02-29 RX ORDER — BUSPIRONE HYDROCHLORIDE 15 MG/1
TABLET ORAL
COMMUNITY
Start: 2024-02-14 | End: 2024-02-29

## 2024-02-29 RX ORDER — DIAZEPAM 2 MG/1
TABLET ORAL
COMMUNITY
Start: 2024-01-31

## 2024-02-29 RX ORDER — HYDROCODONE BITARTRATE AND ACETAMINOPHEN 5; 325 MG/1; MG/1
1 TABLET ORAL EVERY 8 HOURS PRN
Qty: 5 TABLET | Refills: 0 | Status: SHIPPED | OUTPATIENT
Start: 2024-02-29 | End: 2024-03-07

## 2024-02-29 ASSESSMENT — ENCOUNTER SYMPTOMS: BACK PAIN: 1

## 2024-02-29 NOTE — DISCHARGE INSTRUCTIONS
As we spoke, my recommendation is that you see an orthopedic specialist regarding your back pain I did review your prescription drug monitoring program of [place] you have had pain medicines written.  But this appears to be an ongoing issue you had numerous CTs of the abdomen and pelvis and most recent ones did not say anything about having any issues with your lower back.  An MRI in 2018 does show some slight changes.  I do not see the most recent MRI done in December.  My recommendation is to call Dr. Santos Jalloh and be seen by him.  Continue to use your Flexeril and your Tylenol and ibuprofen for your pain I have written a handful of Norco to get you through until you can be seen by the primary care provider or seen by Dr. Jalloh.

## 2024-02-29 NOTE — ED TRIAGE NOTES
Reports a 5 year history of back pain. Worse since yesterday. States that [facility] follows him for this back pain. Just left Dr Kirby office where he is being seen for hand pain.

## 2024-02-29 NOTE — ED PROVIDER NOTES
Obesity (BMI 30.0-34.9)     Sleep apnea     has never had a study         SURGICAL HISTORY       Past Surgical History:   Procedure Laterality Date    COLONOSCOPY N/A 10/29/2021    DIAGNOSTIC COLONOSCOPY performed by Ling Lloyd MD at [facility]    COLONOSCOPY N/A 9/29/2023    COLONOSCOPY DIAGNOSTIC performed by Bernabe Garcia MD at [facility] END[facility]    HERNIA REPAIR Bilateral 06/02/2017    robotic repair of bilateral indirect inguinal hernias with placement of mesh    HERNIA REPAIR Bilateral     ORTHOPEDIC SURGERY Bilateral trigger finger release    UPPER GASTROINTESTINAL ENDOSCOPY N/A 10/19/2023    ENTEROSCOPY performed by Diane Monge MD at [facility]         CURRENT MEDICATIONS       Previous Medications    DIAZEPAM (VALIUM) 2 MG TABLET        IBUPROFEN (ADVIL;MOTRIN) 600 MG TABLET    Take 1 tablet by mouth every 8 hours as needed for Pain    LIDOCAINE (LIDODERM) 5 %    Apply 1 patch as directed for 12 hours every 24 hours (12 hours on, 12 hours off)    METFORMIN (GLUCOPHAGE) 500 MG TABLET        POLYETHYLENE GLYCOL (GLYCOLAX) 17 G PACKET        SPACER/AERO-HOLDING CHAMBERS JOSE    1 Device by Does not apply route daily as needed (with inhaler)       ALLERGIES     Quetiapine    FAMILY HISTORY       Family History   Problem Relation Age of Onset    Heart Failure Mother     Heart Disease Mother     Hypertension Father     Alcohol Abuse Father     Hypertension Mother     Diabetes Mother           SOCIAL HISTORY       Social History     Socioeconomic History    Marital status: Single     Spouse name: None    Number of children: None    Years of education: None    Highest education level: None   Tobacco Use    Smoking status: Never    Smokeless tobacco: Never   Substance and Sexual Activity    Alcohol use: Not Currently    Drug use: Yes     Types: Marijuana (Weed)   Social History Narrative         ** Merged History Encounter **       SCREENINGS         Susi Coma Scale  Eye Opening:

## 2024-02-29 NOTE — PROGRESS NOTES
gait and normal affect who does not appear to be in any significant pain.    Physical Exam:  Left pinky finger is grossly neurovascularly intact with good cap refill, slight decreased range of motion with good strength, mechanical locking noted and point tenderness at the A1 pulley, no swelling, no instability and no other skin lesions noted.      Right Hand - No point tenderness, Full range of motion, No instability, No Weakness, No, skin lesions, No swelling, Grossly neurovascularly intact.     A consent for surgery will be documented and signed by the patient or a legal guardian. All questions were answered. The risks of surgery were explained to the patient which include but not limited to infection, reoperation, multiple operations, nerve injury, artery injury, tendon injury, poor result, poor wound healing, unforeseen incidence, etc. Patient was told of no guarantees. Patient accepts all risks and benefits    The patient was counseled about the risks of amna Covid-19 during their perioperative period and any recovery window from their procedure. The patient was made aware that amna Covid-19 may worsen their prognosis for recovering from their procedure and lend to a higher morbidity and/or mortality risk. All material risks, benefits, and reasonable alternatives including postponing the procedure were discussed. The patient DOES wish to proceed with their procedure at this time.   Visit Diagnoses         Codes    Trigger little finger of left hand     M65.352                HPI:  The patient is here with a chief complaint of left 5th finger pain, throbbing, burning pain.  Diagnosis of left 5th trigger finger.  On blood work everything looks good.    X-rays of the left hand are unremarkable.    Assessment/Plan:  Plan will be for tramadol for pain.  We will see him back as needed.  If he gets worse, he is to give me a call.    As part of continued conservative pain management options the patient

## 2024-03-05 ENCOUNTER — TELEPHONE (OUTPATIENT)
Age: 49
End: 2024-03-05

## 2024-03-05 NOTE — TELEPHONE ENCOUNTER
Patient called to cancel surgery as he is upset about the post op pain medication choice of tramadol for his trigger finger release on 3/11/2024 at [facility]

## 2024-03-10 NOTE — ED PROVIDER NOTES
threatening deterioration in the patient's condition which required my urgent intervention.     CONSULTS:  None    PROCEDURES:  Unless otherwise noted below, none     Procedures      FINAL IMPRESSION      1. Left lower quadrant abdominal pain          DISPOSITION/PLAN   DISPOSITION Decision To Discharge 01/14/2024 11:08:19 PM      PATIENT REFERRED TO:  [facility]  [address]  [address]  [facility] [address]  [phone]  Schedule an appointment as soon as possible for a visit       Group, [facility]    Schedule an appointment as soon as possible for a visit   for help with pain management    Gustabo Gonzalez MD  [address]  [address]  [facility] [address]  [phone]    Schedule an appointment as soon as possible for a visit         DISCHARGE MEDICATIONS:  Discharge Medication List as of 1/14/2024 11:25 PM        START taking these medications    Details   docusate sodium (COLACE) 100 MG capsule Take 1 capsule by mouth 2 times daily, Disp-60 capsule, R-0Normal           Controlled Substances Monitoring:          No data to display                (Please note that portions of this note were completed with a voice recognition program.  Efforts were made to edit the dictations but occasionally words are mis-transcribed.)    Lilli Serrato MD (electronically signed)  Attending Emergency Physician           Lilli Serrato MD  03/10/24 4717

## 2024-04-20 ENCOUNTER — HOSPITAL ENCOUNTER (EMERGENCY)
Facility: HOSPITAL | Age: 49
Discharge: HOME | End: 2024-04-20
Payer: MEDICAID

## 2024-04-20 VITALS
HEIGHT: 70 IN | RESPIRATION RATE: 18 BRPM | BODY MASS INDEX: 29.35 KG/M2 | DIASTOLIC BLOOD PRESSURE: 82 MMHG | SYSTOLIC BLOOD PRESSURE: 129 MMHG | HEART RATE: 61 BPM | TEMPERATURE: 98 F | WEIGHT: 205 LBS | OXYGEN SATURATION: 99 %

## 2024-04-20 DIAGNOSIS — G89.29 ACUTE EXACERBATION OF CHRONIC LOW BACK PAIN: Primary | ICD-10-CM

## 2024-04-20 DIAGNOSIS — M54.50 ACUTE EXACERBATION OF CHRONIC LOW BACK PAIN: Primary | ICD-10-CM

## 2024-04-20 PROCEDURE — 6360000002 HC RX W HCPCS: Performed by: EMERGENCY MEDICINE

## 2024-04-20 PROCEDURE — 99284 EMERGENCY DEPT VISIT MOD MDM: CPT

## 2024-04-20 PROCEDURE — 96372 THER/PROPH/DIAG INJ SC/IM: CPT

## 2024-04-20 RX ORDER — METHOCARBAMOL 750 MG/1
750 TABLET, FILM COATED ORAL 4 TIMES DAILY
Qty: 20 TABLET | Refills: 0 | Status: SHIPPED | OUTPATIENT
Start: 2024-04-20 | End: 2024-04-25

## 2024-04-20 RX ORDER — HYDROMORPHONE HYDROCHLORIDE 1 MG/ML
1 INJECTION, SOLUTION INTRAMUSCULAR; INTRAVENOUS; SUBCUTANEOUS
Status: COMPLETED | OUTPATIENT
Start: 2024-04-20 | End: 2024-04-20

## 2024-04-20 RX ADMIN — HYDROMORPHONE HYDROCHLORIDE 1 MG: 1 INJECTION, SOLUTION INTRAMUSCULAR; INTRAVENOUS; SUBCUTANEOUS at 11:24

## 2024-04-20 ASSESSMENT — PAIN DESCRIPTION - LOCATION
LOCATION: BACK

## 2024-04-20 ASSESSMENT — PAIN SCALES - GENERAL
PAINLEVEL_OUTOF10: 9
PAINLEVEL_OUTOF10: 6
PAINLEVEL_OUTOF10: 3

## 2024-04-20 ASSESSMENT — PAIN - FUNCTIONAL ASSESSMENT
PAIN_FUNCTIONAL_ASSESSMENT: 0-10
PAIN_FUNCTIONAL_ASSESSMENT: 0-10

## 2024-04-20 ASSESSMENT — PAIN DESCRIPTION - DESCRIPTORS
DESCRIPTORS: DISCOMFORT
DESCRIPTORS: ACHING

## 2024-04-20 ASSESSMENT — PAIN DESCRIPTION - ORIENTATION
ORIENTATION: RIGHT;LEFT
ORIENTATION: LEFT;RIGHT
ORIENTATION: RIGHT;LEFT

## 2024-04-20 ASSESSMENT — ENCOUNTER SYMPTOMS: BACK PAIN: 1

## 2024-04-20 NOTE — ED TRIAGE NOTES
Chief Complaint   Patient presents with    Back Pain     Patient came in from home due to bilateral lower back pain. Patient is for surgery on May 23, 2024  due to herniated disc; on Hydrocodone+Paracetamol and Motrin but patient reported no relief.  With complaints of right and left leg numbness. No GI/ symptoms.

## 2024-04-20 NOTE — ED PROVIDER NOTES
[facility] EMERGENCY DEPT  EMERGENCY DEPARTMENT ENCOUNTER      Pt Name: Erlin Gordon  MRN: [id number]  Birthdate 1975  Date of evaluation: 4/20/2024  Provider: SHAYY Heard  11:10 AM    CHIEF COMPLAINT       Chief Complaint   Patient presents with    Back Pain         HISTORY OF PRESENT ILLNESS    Erlin Gordon is a 48 y.o. male who presents to the emergency department with a complaint of chronic low back pain worse over the past few days and has surgery scheduled with Dr. Ta in May.  He would like to consider being admitted for pain control as he is already on oxycodone Tylenol and Naprosyn without relief.  Denies fever rash IV drug use direct trauma bowel incontinence or urinary retention.  HPI    Nursing Notes were reviewed.    REVIEW OF SYSTEMS       Review of Systems   Constitutional:  Negative for fever and unexpected weight change.   Genitourinary:  Negative for difficulty urinating.   Musculoskeletal:  Positive for back pain.   Skin:  Negative for rash and wound.       Except as noted above the remainder of the review of systems was reviewed and negative.       PAST MEDICAL HISTORY     Past Medical History:   Diagnosis Date    Adverse effect of anesthesia     pt said BP dropped during surgery    Anxiety and depression     patient states depression was once and has not repeated    Depression     patient denies depression    Diabetes mellitus (HCC)     Diverticulitis large intestine     Drug-seeking behavior     56 prescriptions from 26 different prescribers at 11 different pharmacies in last 12 months    Fibromyalgia     Herniated lumbar intervertebral disc     HTN (hypertension)     Hypertension     Neurological disorder L3,4,5 torn Herniated disc    Obesity (BMI 30.0-34.9)     Sleep apnea     has never had a study         SURGICAL HISTORY       Past Surgical History:   Procedure Laterality Date    COLONOSCOPY N/A 10/29/2021    DIAGNOSTIC COLONOSCOPY performed by Ling Lloyd MD at [facility]

## 2024-05-22 PROBLEM — M48.00 SPINAL STENOSIS: Status: ACTIVE | Noted: 2024-05-22

## 2024-05-30 ENCOUNTER — HOSPITAL ENCOUNTER (EMERGENCY)
Facility: HOSPITAL | Age: 49
Discharge: HOME | End: 2024-05-30
Attending: EMERGENCY MEDICINE
Payer: MEDICAID

## 2024-05-30 ENCOUNTER — APPOINTMENT (OUTPATIENT)
Facility: HOSPITAL | Age: 49
End: 2024-05-30
Payer: MEDICAID

## 2024-05-30 VITALS
RESPIRATION RATE: 18 BRPM | OXYGEN SATURATION: 100 % | WEIGHT: 185 LBS | HEART RATE: 79 BPM | DIASTOLIC BLOOD PRESSURE: 82 MMHG | HEIGHT: 70 IN | TEMPERATURE: 98.1 F | BODY MASS INDEX: 26.48 KG/M2 | SYSTOLIC BLOOD PRESSURE: 128 MMHG

## 2024-05-30 DIAGNOSIS — Z98.890 STATUS POST LUMBAR SPINE OPERATIVE PROCEDURE FOR DECOMPRESSION OF SPINAL CORD: ICD-10-CM

## 2024-05-30 DIAGNOSIS — G89.29 CHRONIC BILATERAL LOW BACK PAIN WITH BILATERAL SCIATICA: Primary | ICD-10-CM

## 2024-05-30 DIAGNOSIS — M54.42 CHRONIC BILATERAL LOW BACK PAIN WITH BILATERAL SCIATICA: Primary | ICD-10-CM

## 2024-05-30 DIAGNOSIS — M54.41 CHRONIC BILATERAL LOW BACK PAIN WITH BILATERAL SCIATICA: Primary | ICD-10-CM

## 2024-05-30 LAB
ALBUMIN SERPL-MCNC: 3.7 G/DL (ref 3.4–5)
ALBUMIN/GLOB SERPL: 0.7 (ref 0.8–1.7)
ALP SERPL-CCNC: 72 U/L (ref 45–117)
ALT SERPL-CCNC: 12 U/L (ref 16–61)
ANION GAP SERPL CALC-SCNC: 5 MMOL/L (ref 3–18)
AST SERPL-CCNC: 10 U/L (ref 10–38)
BASOPHILS # BLD: 0.1 K/UL (ref 0–0.1)
BASOPHILS NFR BLD: 1 % (ref 0–2)
BILIRUB SERPL-MCNC: 0.3 MG/DL (ref 0.2–1)
BUN SERPL-MCNC: 9 MG/DL (ref 7–18)
BUN/CREAT SERPL: 9 (ref 12–20)
CALCIUM SERPL-MCNC: 10.4 MG/DL (ref 8.5–10.1)
CHLORIDE SERPL-SCNC: 101 MMOL/L (ref 100–111)
CO2 SERPL-SCNC: 30 MMOL/L (ref 21–32)
CREAT SERPL-MCNC: 1.05 MG/DL (ref 0.6–1.3)
CRP SERPL-MCNC: 5.2 MG/DL (ref 0–0.3)
DIFFERENTIAL METHOD BLD: ABNORMAL
EOSINOPHIL # BLD: 0.1 K/UL (ref 0–0.4)
EOSINOPHIL NFR BLD: 1 % (ref 0–5)
ERYTHROCYTE [DISTWIDTH] IN BLOOD BY AUTOMATED COUNT: 13.2 % (ref 11.6–14.5)
ERYTHROCYTE [SEDIMENTATION RATE] IN BLOOD: 36 MM/HR (ref 0–20)
GLOBULIN SER CALC-MCNC: 5.3 G/DL (ref 2–4)
GLUCOSE SERPL-MCNC: 146 MG/DL (ref 74–99)
HCT VFR BLD AUTO: 40.2 % (ref 36–48)
HGB BLD-MCNC: 13.8 G/DL (ref 13–16)
IMM GRANULOCYTES # BLD AUTO: 0 K/UL (ref 0–0.04)
IMM GRANULOCYTES NFR BLD AUTO: 0 % (ref 0–0.5)
LYMPHOCYTES # BLD: 1.5 K/UL (ref 0.9–3.6)
LYMPHOCYTES NFR BLD: 17 % (ref 21–52)
MCH RBC QN AUTO: 28.2 PG (ref 24–34)
MCHC RBC AUTO-ENTMCNC: 34.3 G/DL (ref 31–37)
MCV RBC AUTO: 82 FL (ref 78–100)
MONOCYTES # BLD: 0.7 K/UL (ref 0.05–1.2)
MONOCYTES NFR BLD: 8 % (ref 3–10)
NEUTS SEG # BLD: 6.3 K/UL (ref 1.8–8)
NEUTS SEG NFR BLD: 73 % (ref 40–73)
NRBC # BLD: 0 K/UL (ref 0–0.01)
NRBC BLD-RTO: 0 PER 100 WBC
PLATELET # BLD AUTO: 354 K/UL (ref 135–420)
PMV BLD AUTO: 8.7 FL (ref 9.2–11.8)
POTASSIUM SERPL-SCNC: 3.6 MMOL/L (ref 3.5–5.5)
PROT SERPL-MCNC: 9 G/DL (ref 6.4–8.2)
RBC # BLD AUTO: 4.9 M/UL (ref 4.35–5.65)
SODIUM SERPL-SCNC: 136 MMOL/L (ref 136–145)
WBC # BLD AUTO: 8.6 K/UL (ref 4.6–13.2)

## 2024-05-30 PROCEDURE — 85652 RBC SED RATE AUTOMATED: CPT

## 2024-05-30 PROCEDURE — 85025 COMPLETE CBC W/AUTO DIFF WBC: CPT

## 2024-05-30 PROCEDURE — 99284 EMERGENCY DEPT VISIT MOD MDM: CPT

## 2024-05-30 PROCEDURE — 80053 COMPREHEN METABOLIC PANEL: CPT

## 2024-05-30 PROCEDURE — 6370000000 HC RX 637 (ALT 250 FOR IP): Performed by: PHYSICIAN ASSISTANT

## 2024-05-30 PROCEDURE — 86140 C-REACTIVE PROTEIN: CPT

## 2024-05-30 PROCEDURE — 87040 BLOOD CULTURE FOR BACTERIA: CPT

## 2024-05-30 PROCEDURE — 96372 THER/PROPH/DIAG INJ SC/IM: CPT

## 2024-05-30 PROCEDURE — 72131 CT LUMBAR SPINE W/O DYE: CPT

## 2024-05-30 PROCEDURE — 6360000002 HC RX W HCPCS: Performed by: PHYSICIAN ASSISTANT

## 2024-05-30 RX ORDER — KETOROLAC TROMETHAMINE 15 MG/ML
30 INJECTION, SOLUTION INTRAMUSCULAR; INTRAVENOUS
Status: COMPLETED | OUTPATIENT
Start: 2024-05-30 | End: 2024-05-30

## 2024-05-30 RX ORDER — MORPHINE SULFATE 10 MG/ML
8 INJECTION, SOLUTION INTRAMUSCULAR; INTRAVENOUS
Status: COMPLETED | OUTPATIENT
Start: 2024-05-30 | End: 2024-05-30

## 2024-05-30 RX ADMIN — METOPROLOL TARTRATE 25 MG: 25 TABLET, FILM COATED ORAL at 16:29

## 2024-05-30 RX ADMIN — KETOROLAC TROMETHAMINE 30 MG: 15 INJECTION, SOLUTION INTRAMUSCULAR; INTRAVENOUS at 14:00

## 2024-05-30 RX ADMIN — MORPHINE SULFATE 8 MG: 10 INJECTION INTRAVENOUS at 14:00

## 2024-05-30 NOTE — ED PROVIDER NOTES
[facility] EMERGENCY DEPT  EMERGENCY DEPARTMENT ENCOUNTER         Pt Name: Erlin Gordon  MRN: [id number]  Birthdate 1975  Date of evaluation: 5/30/2024  Provider: Jennifer Arias PA-C   PCP: [facility] (Inactive)  Note Started: 1:36 PM 5/30/24     CHIEF COMPLAINT       Chief Complaint   Patient presents with    Back Pain    Leg Pain     Pt c/o lower back and B/L leg pain s/p spinal surgery x8 days ago (L4 & L5). Pt ambulatory with slow steady gait. Pt reports chronic B/L lower extremity numbness for years, which has gotten better since surgery because numbness is now localized to B/L calves.        HISTORY OF PRESENT ILLNESS: 1 or more elements      History From: Patient  HPI Limitations: none     Erlin Gordon is a 48 y.o. male who presents to the emergency department with a chief complaint of bilateral low back pain radiating into his right lower legs with associated numbness to his right thighs.  This is status post lumbar decompression at L4-L5 8 days ago by Dr. Ta at [facility].  Patient has a history of spinal stenosis which was his reason for surgery.  Patient states that his back hurts worse than before he went into surgery.  He has attempted to contact his surgical office who keeps telling him to go to the ER per the patient.  He has been seen in the ER at [facility] 2 times since his surgery 8 days ago.  No imaging has been done.  He is taking Tylenol, Motrin, Percocet, Flexeril at home without relief of the pain.  He denies any urinary retention, urinary incontinence, bowel incontinence, fever, chills, body aches.  He seems frustrated with his surgeon.    Patient states that he is here at [facility] today because he attempted to go to [facility] with his family members but realized he was in too much pain and had his family dropped him off at [facility] on their way to [facility] which is located in [facility].     Nursing  morning    [facility] EMERGENCY DEPT  [address]  [facility] [address] [phone]    As needed, If symptoms worsen    Zheng Robles MD  [address]  [address]  \[facility]\"" [address] [phone]      pain management specialist         DISCHARGE MEDICATIONS:     Medication List        ASK your doctor about these medications      cyclobenzaprine 10 MG tablet  Commonly known as: FLEXERIL  Take 1 tablet by mouth 3 times daily as needed for Muscle spasms  Ask about: Should I take this medication?     hydroCHLOROthiazide 25 MG tablet  Commonly known as: HYDRODIURIL     * ibuprofen 800 MG tablet  Commonly known as: ADVIL;MOTRIN     * ibuprofen 600 MG tablet  Commonly known as: ADVIL;MOTRIN  Take 1 tablet by mouth every 8 hours as needed for Pain     lidocaine 5 %  Commonly known as: LIDODERM     metFORMIN 500 MG tablet  Commonly known as: GLUCOPHAGE     metoprolol tartrate 25 MG tablet  Commonly known as: LOPRESSOR     oxyCODONE-acetaminophen 5-325 MG per tablet  Commonly known as: PERCOCET  Take 1 tablet by mouth every 4 hours as needed for Pain for up to 2 days. Max Daily Amount: 6 tablets  Ask about: Should I take this medication?     Xanax 1 MG tablet  Generic drug: ALPRAZolam           * This list has 2 medication(s) that are the same as other medications prescribed for you. Read the directions carefully, and ask your doctor or other care provider to review them with you.                     I am the Primary Clinician of Record.       (Please note that parts of this dictation were completed with voice recognition software. Quite often unanticipated grammatical, syntax, homophones, and other interpretive errors are inadvertently transcribed by the computer software. Please disregards these errors. Please excuse any errors that have escaped final proofreading.)     Jennifer Arias PA-C  06/02/24 9853

## 2024-05-30 NOTE — ED TRIAGE NOTES
Pt c/o lower back and B/L leg pain s/p spinal surgery x8 days ago (L4 & L5). Pt ambulatory with slow steady gait. Pt reports chronic B/L lower extremity numbness for years, which has gotten better since surgery because numbness is now localized to B/L calves.

## 2024-05-30 NOTE — DISCHARGE INSTRUCTIONS
I have discussed the CT findings with your surgeon.  You have no other indication for infection at this time, your lab work is unremarkable, you do not have a fever.  Your pain is unlikely to be related to infection.  Your surgeon would like to see you on Monday morning at 8 AM in his office to help with your postoperative complaints.

## 2024-06-17 ENCOUNTER — HOSPITAL ENCOUNTER (EMERGENCY)
Facility: HOSPITAL | Age: 49
Discharge: HOME | End: 2024-06-18
Payer: MEDICAID

## 2024-06-17 ENCOUNTER — APPOINTMENT (OUTPATIENT)
Facility: HOSPITAL | Age: 49
End: 2024-06-17
Payer: MEDICAID

## 2024-06-17 VITALS
BODY MASS INDEX: 25.77 KG/M2 | HEIGHT: 70 IN | RESPIRATION RATE: 18 BRPM | SYSTOLIC BLOOD PRESSURE: 120 MMHG | WEIGHT: 180 LBS | HEART RATE: 72 BPM | DIASTOLIC BLOOD PRESSURE: 94 MMHG | TEMPERATURE: 98.3 F | OXYGEN SATURATION: 100 %

## 2024-06-17 DIAGNOSIS — S33.5XXA LUMBAR SPRAIN, INITIAL ENCOUNTER: Primary | ICD-10-CM

## 2024-06-17 PROCEDURE — 99283 EMERGENCY DEPT VISIT LOW MDM: CPT

## 2024-06-17 PROCEDURE — 72100 X-RAY EXAM L-S SPINE 2/3 VWS: CPT

## 2024-06-18 PROCEDURE — 6370000000 HC RX 637 (ALT 250 FOR IP): Performed by: PHYSICIAN ASSISTANT

## 2024-06-18 RX ORDER — KETOROLAC TROMETHAMINE 10 MG/1
10 TABLET, FILM COATED ORAL EVERY 6 HOURS PRN
Qty: 20 TABLET | Refills: 0 | Status: SHIPPED | OUTPATIENT
Start: 2024-06-18

## 2024-06-18 RX ORDER — ACETAMINOPHEN 325 MG/1
650 TABLET ORAL EVERY 6 HOURS PRN
Qty: 120 TABLET | Refills: 3 | Status: SHIPPED | OUTPATIENT
Start: 2024-06-18

## 2024-06-18 RX ORDER — LIDOCAINE 50 MG/G
1 PATCH TOPICAL DAILY
Qty: 10 PATCH | Refills: 0 | Status: SHIPPED | OUTPATIENT
Start: 2024-06-18 | End: 2024-06-28

## 2024-06-18 RX ORDER — PREDNISONE 50 MG/1
50 TABLET ORAL DAILY
Qty: 5 TABLET | Refills: 0 | Status: SHIPPED | OUTPATIENT
Start: 2024-06-18 | End: 2024-06-23

## 2024-06-18 RX ORDER — CYCLOBENZAPRINE HCL 10 MG
10 TABLET ORAL 3 TIMES DAILY PRN
Qty: 21 TABLET | Refills: 0 | Status: SHIPPED | OUTPATIENT
Start: 2024-06-18 | End: 2024-06-28

## 2024-06-18 RX ORDER — OXYCODONE HYDROCHLORIDE AND ACETAMINOPHEN 5; 325 MG/1; MG/1
1 TABLET ORAL
Status: COMPLETED | OUTPATIENT
Start: 2024-06-18 | End: 2024-06-18

## 2024-06-18 RX ADMIN — OXYCODONE HYDROCHLORIDE AND ACETAMINOPHEN 1 TABLET: 5; 325 TABLET ORAL at 00:30

## 2024-06-18 ASSESSMENT — ENCOUNTER SYMPTOMS
BACK PAIN: 1
NAUSEA: 0
COUGH: 0
SORE THROAT: 0
EYE DISCHARGE: 0
SHORTNESS OF BREATH: 0
ABDOMINAL PAIN: 0
DIARRHEA: 0

## 2024-06-18 ASSESSMENT — PAIN DESCRIPTION - LOCATION: LOCATION: BACK

## 2024-06-18 ASSESSMENT — PAIN SCALES - GENERAL: PAINLEVEL_OUTOF10: 9

## 2024-06-18 NOTE — ED PROVIDER NOTES
EMERGENCY DEPARTMENT HISTORY AND PHYSICAL EXAM    Date: 6/17/2024  Patient Name: Erlin Gordon    History of Presenting Illness     Chief Complaint   Patient presents with    Back Pain         History Provided By: Patient       Additional History (Context): Erlin Gordon is a 48 y.o. male with a history of alcohol abuse, cocaine abuse, drug-seeking behavior, diabetes, diverticulitis presenting today for left lower back pain.  Patient reports he works at [employer] and was moving a large amount of concrete when he felt a rubbing/pop in his left lower back.  Patient reports no loss of bowel or bladder.  Has tried over-the-counter pain medication at home without relief.  Reports a lumbar decompression last month.      PCP: [facility] (Inactive)    No current facility-administered medications for this encounter.     Current Outpatient Medications   Medication Sig Dispense Refill    ketorolac (TORADOL) 10 MG tablet Take 1 tablet by mouth every 6 hours as needed for Pain 20 tablet 0    acetaminophen (AMINOFEN) 325 MG tablet Take 2 tablets by mouth every 6 hours as needed for Pain 120 tablet 3    lidocaine (LIDODERM) 5 % Place 1 patch onto the skin daily for 10 days 12 hours on, 12 hours off. 10 patch 0    cyclobenzaprine (FLEXERIL) 10 MG tablet Take 1 tablet by mouth 3 times daily as needed for Muscle spasms 21 tablet 0    predniSONE (DELTASONE) 50 MG tablet Take 1 tablet by mouth daily for 5 days 5 tablet 0    ibuprofen (ADVIL;MOTRIN) 800 MG tablet Take 1 tablet by mouth every 6 hours as needed for Pain      hydroCHLOROthiazide (HYDRODIURIL) 25 MG tablet       ALPRAZolam (XANAX) 1 MG tablet       metoprolol tartrate (LOPRESSOR) 25 MG tablet 1 tablet 50 mg in am 25 mg lunchtime, 25 mg in evening      metFORMIN (GLUCOPHAGE) 500 MG tablet 3 tablets      lidocaine (LIDODERM) 5 % Apply 1 patch as directed for 12 hours every 24 hours (12 hours on, 12 hours off)      ibuprofen (ADVIL;MOTRIN) 600 MG tablet

## 2024-06-20 LAB
BACTERIA SPEC CULT: NORMAL
BACTERIA SPEC CULT: NORMAL
SERVICE CMNT-IMP: NORMAL
SERVICE CMNT-IMP: NORMAL

## 2024-08-01 NOTE — PROGRESS NOTES
Patient: Erlin Gordon                MRN: [id number]       SSN: [id number]  YOB: 1975        AGE: 48 y.o.        SEX: male      [facility]  08/12/24    Chief Complaint   Patient presents with    Shoulder Pain     Right shoulder      HISTORY:  Erlin Gordon is a 48 y.o. male who is seen for several week history of right shoulder pain. His pain starts in his right shoulder and radiates into his right arm. He states that last week he felt sharp right shoulder pain when he lifted a heavy piece of cast iron pipe at work. He feels shoulder pain with overhead activities and at night. He experiences numbness and tingling radiating in his right arm. He was seen at [facility] ED on 8/2/24 where x-rays revealed no acute fracture. He was placed in a sling, prescribed Strat[place] and referred to orthopedics.     He has a h/o low back pain for which he underwent lumbar decompression L4-5 on 5/22/24 by Dr. Ta.     He was previously seen by Dr. Abdul for left little trigger finger.     Occupation, etc: Mr. Gordon  works as a  for [employer]. He lives in [place] with his [facility]. He has a 15 yo son. He weighs 182 lbs and is 5'10\". He enjoys surfing but has not been able to due to his pain.   Wt Readings from Last 3 Encounters:   08/12/24 82.6 kg (182 lb)   08/05/24 85.3 kg (188 lb)   08/02/24 85.3 kg (188 lb)      Body mass index is 26.11 kg/m².    Patient Active Problem List   Diagnosis    Cocaine abuse (HCC)    Anxiety    MDD (major depressive disorder), recurrent episode, moderate (HCC)    Diabetes mellitus (HCC)    Diabetes (HCC)    Hypertension    ACP (advance care planning)    Diverticulitis large intestine w/o perforation or abscess w/o bleeding    Chronic low back pain    Drug-seeking behavior    VENUS (generalized anxiety disorder)    Dysthymia    Cocaine use    Alcohol abuse    Obesity (BMI 30.0-34.9)    Dyslipidemia    Duodenitis    Spinal

## 2024-08-02 ENCOUNTER — HOSPITAL ENCOUNTER (EMERGENCY)
Facility: HOSPITAL | Age: 49
Discharge: HOME | End: 2024-08-02
Attending: EMERGENCY MEDICINE
Payer: MEDICAID

## 2024-08-02 ENCOUNTER — APPOINTMENT (OUTPATIENT)
Facility: HOSPITAL | Age: 49
End: 2024-08-02
Payer: MEDICAID

## 2024-08-02 VITALS
DIASTOLIC BLOOD PRESSURE: 88 MMHG | SYSTOLIC BLOOD PRESSURE: 142 MMHG | OXYGEN SATURATION: 100 % | HEIGHT: 70 IN | TEMPERATURE: 97.9 F | HEART RATE: 86 BPM | BODY MASS INDEX: 26.92 KG/M2 | RESPIRATION RATE: 16 BRPM | WEIGHT: 188 LBS

## 2024-08-02 DIAGNOSIS — S43.401A SPRAIN OF RIGHT SHOULDER, UNSPECIFIED SHOULDER SPRAIN TYPE, INITIAL ENCOUNTER: Primary | ICD-10-CM

## 2024-08-02 DIAGNOSIS — M25.511 ACUTE PAIN OF RIGHT SHOULDER: ICD-10-CM

## 2024-08-02 PROCEDURE — 6360000002 HC RX W HCPCS: Performed by: EMERGENCY MEDICINE

## 2024-08-02 PROCEDURE — 99284 EMERGENCY DEPT VISIT MOD MDM: CPT

## 2024-08-02 PROCEDURE — 96372 THER/PROPH/DIAG INJ SC/IM: CPT

## 2024-08-02 PROCEDURE — 73030 X-RAY EXAM OF SHOULDER: CPT

## 2024-08-02 RX ORDER — ASPIRIN 81 MG
TABLET,CHEWABLE ORAL
Qty: 60 G | Refills: 0 | Status: SHIPPED | OUTPATIENT
Start: 2024-08-02

## 2024-08-02 RX ORDER — HYDROCODONE BITARTRATE AND ACETAMINOPHEN 5; 325 MG/1; MG/1
1 TABLET ORAL EVERY 8 HOURS PRN
Qty: 9 TABLET | Refills: 0 | Status: SHIPPED | OUTPATIENT
Start: 2024-08-02 | End: 2024-08-05

## 2024-08-02 RX ORDER — KETOROLAC TROMETHAMINE 15 MG/ML
30 INJECTION, SOLUTION INTRAMUSCULAR; INTRAVENOUS
Status: COMPLETED | OUTPATIENT
Start: 2024-08-02 | End: 2024-08-02

## 2024-08-02 RX ADMIN — KETOROLAC TROMETHAMINE 30 MG: 15 INJECTION, SOLUTION INTRAMUSCULAR; INTRAVENOUS at 15:43

## 2024-08-02 ASSESSMENT — PAIN DESCRIPTION - LOCATION: LOCATION: SHOULDER

## 2024-08-02 ASSESSMENT — ENCOUNTER SYMPTOMS
NAUSEA: 0
BACK PAIN: 0
VOMITING: 0
SHORTNESS OF BREATH: 0

## 2024-08-02 ASSESSMENT — PAIN DESCRIPTION - ORIENTATION: ORIENTATION: RIGHT

## 2024-08-02 ASSESSMENT — PAIN SCALES - GENERAL
PAINLEVEL_OUTOF10: 10
PAINLEVEL_OUTOF10: 7
PAINLEVEL_OUTOF10: 10

## 2024-08-02 ASSESSMENT — PAIN - FUNCTIONAL ASSESSMENT
PAIN_FUNCTIONAL_ASSESSMENT: 0-10
PAIN_FUNCTIONAL_ASSESSMENT: 0-10

## 2024-08-02 NOTE — ED PROVIDER NOTES
g, R-0Normal           Controlled Substances Monitoring:          No data to display                Dictation disclaimer: Please note that this dictation was completed with [employer], the computer voice recognition software.  Quite often unanticipated grammatical, syntax, homophones, and other interpretive errors are inadvertently transcribed by the computer software.  Please disregard these errors.  Please excuse any errors that have escaped final proofreading.    My signature above authenticates this document and my orders, the final    diagnosis (es), discharge prescription (s), and instructions in the Epic    record.       Ron Butts DO (electronically signed)  Attending Emergency Physician           Ron Butts DO  08/02/24 1732

## 2024-08-02 NOTE — ED TRIAGE NOTES
Ambulatory to Triage with severe pain to right shoulder from lifting cast iron at work today. Numbness and tingling to right hand. Hx of rotator cuff injury, chronic pain.

## 2024-08-02 NOTE — ED NOTES
Discharge instructions reviewed with patient. Patient advised to follow up as directed on discharge instructions.  Patient denies questions, needs or concerns at this time.  Patient verbalized understanding. No s/sx of distress noted.       Patient ambulated with steady gait.

## 2024-08-12 ENCOUNTER — OFFICE VISIT (OUTPATIENT)
Age: 49
End: 2024-08-12
Payer: MEDICAID

## 2024-08-12 VITALS — TEMPERATURE: 97 F | WEIGHT: 182 LBS | HEIGHT: 70 IN | BODY MASS INDEX: 26.05 KG/M2

## 2024-08-12 DIAGNOSIS — S43.401A SPRAIN OF RIGHT SHOULDER, UNSPECIFIED SHOULDER SPRAIN TYPE, INITIAL ENCOUNTER: ICD-10-CM

## 2024-08-12 DIAGNOSIS — M75.51 BURSITIS OF RIGHT SHOULDER: ICD-10-CM

## 2024-08-12 DIAGNOSIS — M25.511 ACUTE PAIN OF RIGHT SHOULDER: Primary | ICD-10-CM

## 2024-08-12 DIAGNOSIS — M54.12 CERVICAL RADICULOPATHY: ICD-10-CM

## 2024-08-12 DIAGNOSIS — S16.1XXA STRAIN OF NECK MUSCLE, INITIAL ENCOUNTER: ICD-10-CM

## 2024-08-12 PROCEDURE — 20610 DRAIN/INJ JOINT/BURSA W/O US: CPT | Performed by: SPECIALIST

## 2024-08-12 PROCEDURE — 99203 OFFICE O/P NEW LOW 30 MIN: CPT | Performed by: SPECIALIST

## 2024-08-12 RX ORDER — BETAMETHASONE SODIUM PHOSPHATE AND BETAMETHASONE ACETATE 3; 3 MG/ML; MG/ML
3 INJECTION, SUSPENSION INTRA-ARTICULAR; INTRALESIONAL; INTRAMUSCULAR; SOFT TISSUE ONCE
Status: COMPLETED | OUTPATIENT
Start: 2024-08-12 | End: 2024-08-12

## 2024-08-12 RX ORDER — BUPIVACAINE HYDROCHLORIDE 5 MG/ML
4 INJECTION, SOLUTION PERINEURAL ONCE
Status: COMPLETED | OUTPATIENT
Start: 2024-08-12 | End: 2024-08-12

## 2024-08-12 RX ADMIN — BUPIVACAINE HYDROCHLORIDE 20 MG: 5 INJECTION, SOLUTION PERINEURAL at 14:48

## 2024-08-12 RX ADMIN — BETAMETHASONE SODIUM PHOSPHATE AND BETAMETHASONE ACETATE 3 MG: 3; 3 INJECTION, SUSPENSION INTRA-ARTICULAR; INTRALESIONAL; INTRAMUSCULAR; SOFT TISSUE at 14:49

## 2024-08-14 ENCOUNTER — TELEPHONE (OUTPATIENT)
Age: 49
End: 2024-08-14

## 2024-08-14 DIAGNOSIS — M75.51 BURSITIS OF RIGHT SHOULDER: ICD-10-CM

## 2024-08-14 DIAGNOSIS — M25.511 ACUTE PAIN OF RIGHT SHOULDER: Primary | ICD-10-CM

## 2024-08-14 NOTE — TELEPHONE ENCOUNTER
8/14/24 Patient was called today. The shoulder injection that Dr. Taylor did helped for two days and now the patient has pain again. He called the spine center and they told him he did not need to be seen since the pain is in the shoulder. Patient is requesting an MRI

## 2024-08-14 NOTE — TELEPHONE ENCOUNTER
Patient called to inform Dr. Taylor that the injection he was given worked, and that he's certain that the issue he's having is coming from is shoulder not his back. Patient is asking for a MRI order for the right shoulder.    Patient can be reached at [phone].

## 2024-08-15 NOTE — TELEPHONE ENCOUNTER
OK per Dr Taylor  MRI right shoulder without contrast--order written--  Left message for pt --also gave # of central scheduling [phone]

## 2024-08-16 ENCOUNTER — TELEPHONE (OUTPATIENT)
Age: 49
End: 2024-08-16

## 2024-08-18 DIAGNOSIS — M54.12 CERVICAL RADICULOPATHY: ICD-10-CM

## 2024-08-18 DIAGNOSIS — M25.511 ACUTE PAIN OF RIGHT SHOULDER: Primary | ICD-10-CM

## 2024-08-18 RX ORDER — METHYLPREDNISOLONE 4 MG/1
TABLET ORAL
Qty: 1 KIT | Refills: 0 | Status: SHIPPED | OUTPATIENT
Start: 2024-08-18 | End: 2024-08-24

## 2024-08-19 NOTE — TELEPHONE ENCOUNTER
8/19/24 Patient was called today. Phone kept ringing. No answer. Will try again later.  
Discussed with Dr Taylor--unable to prescribe any pain meds--pt is scheduled for MRI shoulder and was referred to spine for neck and radicular pain  
Pt is requesting a RX be sent to the pharmacy for his pain from shoulder down to fingers. Pt states he has tried tylenol, ibuprofen, tramadol. Pt states Tylenol 3 helps him but it doesn't last long. Pt does not want to go the weekend in pain if something can be sent to pharmacy today if possible, he would appreciate it.    pharmacy:  [facility] [id number] - [place] [address] - P [phone] - F [phone]    Pt's callback # [phone]  
Spoke with pt--  
cell phone/clothing/jewelry/necklace/purse/earrings

## 2024-08-27 ENCOUNTER — TELEPHONE (OUTPATIENT)
Age: 49
End: 2024-08-27

## 2024-08-28 ENCOUNTER — TELEPHONE (OUTPATIENT)
Age: 49
End: 2024-08-28

## 2024-08-28 NOTE — TELEPHONE ENCOUNTER
8/28/24 Patient was called and he will call [facility] to make sure they received the faxed MRI.

## 2024-08-28 NOTE — TELEPHONE ENCOUNTER
Patient called again ,and is asking that the Mri order from  for his Right Shoulder, be faxed as soon as possible to [facility].    [facility]   Tel. [phone]  Fax ( patient did not have the fax # )    Patient tel. [phone].

## 2024-09-03 ENCOUNTER — TELEPHONE (OUTPATIENT)
Age: 49
End: 2024-09-03

## 2024-09-03 NOTE — TELEPHONE ENCOUNTER
Alyssa from [facility] is requesting the mri of rt shoulder faxed over to [phone].

## 2024-09-24 ENCOUNTER — APPOINTMENT (OUTPATIENT)
Facility: HOSPITAL | Age: 49
End: 2024-09-24
Payer: MEDICAID

## 2024-09-24 ENCOUNTER — HOSPITAL ENCOUNTER (EMERGENCY)
Facility: HOSPITAL | Age: 49
Discharge: HOME | End: 2024-09-24
Payer: MEDICAID

## 2024-09-24 VITALS
RESPIRATION RATE: 18 BRPM | HEART RATE: 56 BPM | SYSTOLIC BLOOD PRESSURE: 147 MMHG | OXYGEN SATURATION: 100 % | BODY MASS INDEX: 26.48 KG/M2 | HEIGHT: 70 IN | TEMPERATURE: 98.6 F | DIASTOLIC BLOOD PRESSURE: 88 MMHG | WEIGHT: 185 LBS

## 2024-09-24 DIAGNOSIS — R10.84 GENERALIZED ABDOMINAL PAIN: Primary | ICD-10-CM

## 2024-09-24 LAB
ALBUMIN SERPL-MCNC: 4.2 G/DL (ref 3.4–5)
ALBUMIN/GLOB SERPL: 0.9 (ref 0.8–1.7)
ALP SERPL-CCNC: 62 U/L (ref 45–117)
ALT SERPL-CCNC: 14 U/L (ref 16–61)
ANION GAP SERPL CALC-SCNC: 3 MMOL/L (ref 3–18)
APPEARANCE UR: CLEAR
AST SERPL-CCNC: 13 U/L (ref 10–38)
BASOPHILS # BLD: 0.1 K/UL (ref 0–0.1)
BASOPHILS NFR BLD: 1 % (ref 0–2)
BILIRUB SERPL-MCNC: 0.3 MG/DL (ref 0.2–1)
BILIRUB UR QL: NEGATIVE
BUN SERPL-MCNC: 9 MG/DL (ref 7–18)
BUN/CREAT SERPL: 9 (ref 12–20)
CALCIUM SERPL-MCNC: 9.6 MG/DL (ref 8.5–10.1)
CHLORIDE SERPL-SCNC: 102 MMOL/L (ref 100–111)
CO2 SERPL-SCNC: 32 MMOL/L (ref 21–32)
COLOR UR: YELLOW
CREAT SERPL-MCNC: 1.05 MG/DL (ref 0.6–1.3)
DIFFERENTIAL METHOD BLD: NORMAL
EOSINOPHIL # BLD: 0.1 K/UL (ref 0–0.4)
EOSINOPHIL NFR BLD: 1 % (ref 0–5)
ERYTHROCYTE [DISTWIDTH] IN BLOOD BY AUTOMATED COUNT: 13.3 % (ref 11.6–14.5)
GLOBULIN SER CALC-MCNC: 4.6 G/DL (ref 2–4)
GLUCOSE SERPL-MCNC: 137 MG/DL (ref 74–99)
GLUCOSE UR STRIP.AUTO-MCNC: NEGATIVE MG/DL
HCT VFR BLD AUTO: 46.4 % (ref 36–48)
HGB BLD-MCNC: 15.7 G/DL (ref 13–16)
HGB UR QL STRIP: NEGATIVE
IMM GRANULOCYTES # BLD AUTO: 0 K/UL (ref 0–0.04)
IMM GRANULOCYTES NFR BLD AUTO: 0 % (ref 0–0.5)
KETONES UR QL STRIP.AUTO: NEGATIVE MG/DL
LEUKOCYTE ESTERASE UR QL STRIP.AUTO: NEGATIVE
LIPASE SERPL-CCNC: 110 U/L (ref 13–75)
LYMPHOCYTES # BLD: 1.3 K/UL (ref 0.9–3.6)
LYMPHOCYTES NFR BLD: 25 % (ref 21–52)
MCH RBC QN AUTO: 28.7 PG (ref 24–34)
MCHC RBC AUTO-ENTMCNC: 33.8 G/DL (ref 31–37)
MCV RBC AUTO: 84.8 FL (ref 78–100)
MONOCYTES # BLD: 0.3 K/UL (ref 0.05–1.2)
MONOCYTES NFR BLD: 6 % (ref 3–10)
NEUTS SEG # BLD: 3.5 K/UL (ref 1.8–8)
NEUTS SEG NFR BLD: 66 % (ref 40–73)
NITRITE UR QL STRIP.AUTO: NEGATIVE
NRBC # BLD: 0 K/UL (ref 0–0.01)
NRBC BLD-RTO: 0 PER 100 WBC
PH UR STRIP: 5.5 (ref 5–8)
PLATELET # BLD AUTO: 314 K/UL (ref 135–420)
PMV BLD AUTO: 9.3 FL (ref 9.2–11.8)
POTASSIUM SERPL-SCNC: 3.8 MMOL/L (ref 3.5–5.5)
PROT SERPL-MCNC: 8.8 G/DL (ref 6.4–8.2)
PROT UR STRIP-MCNC: NEGATIVE MG/DL
RBC # BLD AUTO: 5.47 M/UL (ref 4.35–5.65)
SODIUM SERPL-SCNC: 137 MMOL/L (ref 136–145)
SP GR UR REFRACTOMETRY: 1.01 (ref 1–1.03)
UROBILINOGEN UR QL STRIP.AUTO: 0.2 EU/DL (ref 0.2–1)
WBC # BLD AUTO: 5.3 K/UL (ref 4.6–13.2)

## 2024-09-24 PROCEDURE — 87086 URINE CULTURE/COLONY COUNT: CPT

## 2024-09-24 PROCEDURE — 74177 CT ABD & PELVIS W/CONTRAST: CPT

## 2024-09-24 PROCEDURE — 6370000000 HC RX 637 (ALT 250 FOR IP)

## 2024-09-24 PROCEDURE — 85025 COMPLETE CBC W/AUTO DIFF WBC: CPT

## 2024-09-24 PROCEDURE — 6360000002 HC RX W HCPCS

## 2024-09-24 PROCEDURE — 6360000004 HC RX CONTRAST MEDICATION

## 2024-09-24 PROCEDURE — 83690 ASSAY OF LIPASE: CPT

## 2024-09-24 PROCEDURE — 96374 THER/PROPH/DIAG INJ IV PUSH: CPT

## 2024-09-24 PROCEDURE — 80053 COMPREHEN METABOLIC PANEL: CPT

## 2024-09-24 PROCEDURE — 99285 EMERGENCY DEPT VISIT HI MDM: CPT

## 2024-09-24 PROCEDURE — 81003 URINALYSIS AUTO W/O SCOPE: CPT

## 2024-09-24 PROCEDURE — 96375 TX/PRO/DX INJ NEW DRUG ADDON: CPT

## 2024-09-24 RX ORDER — ONDANSETRON 8 MG/1
8 TABLET, ORALLY DISINTEGRATING ORAL EVERY 8 HOURS PRN
Qty: 10 TABLET | Refills: 0 | Status: SHIPPED | OUTPATIENT
Start: 2024-09-24

## 2024-09-24 RX ORDER — ONDANSETRON 2 MG/ML
4 INJECTION INTRAMUSCULAR; INTRAVENOUS
Status: COMPLETED | OUTPATIENT
Start: 2024-09-24 | End: 2024-09-24

## 2024-09-24 RX ORDER — ALPRAZOLAM 0.25 MG
1 TABLET ORAL
Status: COMPLETED | OUTPATIENT
Start: 2024-09-24 | End: 2024-09-24

## 2024-09-24 RX ORDER — DICYCLOMINE HYDROCHLORIDE 10 MG/1
20 CAPSULE ORAL
Status: COMPLETED | OUTPATIENT
Start: 2024-09-24 | End: 2024-09-24

## 2024-09-24 RX ORDER — PANTOPRAZOLE SODIUM 40 MG/1
40 TABLET, DELAYED RELEASE ORAL DAILY
Qty: 30 TABLET | Refills: 0 | Status: SHIPPED | OUTPATIENT
Start: 2024-09-24 | End: 2024-10-24

## 2024-09-24 RX ORDER — IOPAMIDOL 612 MG/ML
100 INJECTION, SOLUTION INTRAVASCULAR
Status: COMPLETED | OUTPATIENT
Start: 2024-09-24 | End: 2024-09-24

## 2024-09-24 RX ORDER — MORPHINE SULFATE 2 MG/ML
2 INJECTION, SOLUTION INTRAMUSCULAR; INTRAVENOUS
Status: COMPLETED | OUTPATIENT
Start: 2024-09-24 | End: 2024-09-24

## 2024-09-24 RX ADMIN — ALPRAZOLAM 1 MG: 0.25 TABLET ORAL at 15:54

## 2024-09-24 RX ADMIN — IOPAMIDOL 100 ML: 612 INJECTION, SOLUTION INTRAVENOUS at 12:39

## 2024-09-24 RX ADMIN — MORPHINE SULFATE 2 MG: 2 INJECTION, SOLUTION INTRAMUSCULAR; INTRAVENOUS at 12:48

## 2024-09-24 RX ADMIN — DICYCLOMINE HYDROCHLORIDE 20 MG: 10 CAPSULE ORAL at 11:20

## 2024-09-24 RX ADMIN — ONDANSETRON 4 MG: 2 INJECTION, SOLUTION INTRAMUSCULAR; INTRAVENOUS at 11:20

## 2024-09-24 ASSESSMENT — PAIN - FUNCTIONAL ASSESSMENT: PAIN_FUNCTIONAL_ASSESSMENT: 0-10

## 2024-09-24 ASSESSMENT — PAIN DESCRIPTION - LOCATION: LOCATION: ABDOMEN

## 2024-09-24 ASSESSMENT — PAIN SCALES - GENERAL
PAINLEVEL_OUTOF10: 9
PAINLEVEL_OUTOF10: 9

## 2024-09-24 ASSESSMENT — PAIN DESCRIPTION - DESCRIPTORS: DESCRIPTORS: SHOOTING;BURNING

## 2024-09-25 LAB
BACTERIA SPEC CULT: NORMAL
CC UR VC: NORMAL
SERVICE CMNT-IMP: NORMAL

## 2024-10-02 ENCOUNTER — HOSPITAL ENCOUNTER (EMERGENCY)
Facility: HOSPITAL | Age: 49
Discharge: HOME | End: 2024-10-02
Payer: MEDICAID

## 2024-10-02 ENCOUNTER — APPOINTMENT (OUTPATIENT)
Facility: HOSPITAL | Age: 49
End: 2024-10-02
Payer: MEDICAID

## 2024-10-02 VITALS
HEIGHT: 70 IN | DIASTOLIC BLOOD PRESSURE: 86 MMHG | SYSTOLIC BLOOD PRESSURE: 127 MMHG | OXYGEN SATURATION: 99 % | BODY MASS INDEX: 25.77 KG/M2 | WEIGHT: 180 LBS | TEMPERATURE: 98.6 F | HEART RATE: 53 BPM | RESPIRATION RATE: 18 BRPM

## 2024-10-02 DIAGNOSIS — K57.90 DIVERTICULOSIS: ICD-10-CM

## 2024-10-02 DIAGNOSIS — R74.8 ELEVATED LIPASE: Primary | ICD-10-CM

## 2024-10-02 DIAGNOSIS — R10.32 ABDOMINAL PAIN, LEFT LOWER QUADRANT: ICD-10-CM

## 2024-10-02 LAB
ALBUMIN SERPL-MCNC: 3.9 G/DL (ref 3.4–5)
ALBUMIN/GLOB SERPL: 1 (ref 0.8–1.7)
ALP SERPL-CCNC: 64 U/L (ref 45–117)
ALT SERPL-CCNC: 13 U/L (ref 16–61)
ANION GAP SERPL CALC-SCNC: 4 MMOL/L (ref 3–18)
APPEARANCE UR: CLEAR
AST SERPL-CCNC: 14 U/L (ref 10–38)
BACTERIA URNS QL MICRO: ABNORMAL /HPF
BASOPHILS # BLD: 0.1 K/UL (ref 0–0.1)
BASOPHILS NFR BLD: 1 % (ref 0–2)
BILIRUB SERPL-MCNC: 0.3 MG/DL (ref 0.2–1)
BILIRUB UR QL: NEGATIVE
BUN SERPL-MCNC: 9 MG/DL (ref 7–18)
BUN/CREAT SERPL: 8 (ref 12–20)
CALCIUM SERPL-MCNC: 9.4 MG/DL (ref 8.5–10.1)
CHLORIDE SERPL-SCNC: 101 MMOL/L (ref 100–111)
CO2 SERPL-SCNC: 32 MMOL/L (ref 21–32)
COLOR UR: YELLOW
CREAT SERPL-MCNC: 1.06 MG/DL (ref 0.6–1.3)
DIFFERENTIAL METHOD BLD: ABNORMAL
EOSINOPHIL # BLD: 0.1 K/UL (ref 0–0.4)
EOSINOPHIL NFR BLD: 1 % (ref 0–5)
EPITH CASTS URNS QL MICRO: ABNORMAL /LPF (ref 0–5)
ERYTHROCYTE [DISTWIDTH] IN BLOOD BY AUTOMATED COUNT: 13.2 % (ref 11.6–14.5)
GLOBULIN SER CALC-MCNC: 4.1 G/DL (ref 2–4)
GLUCOSE SERPL-MCNC: 104 MG/DL (ref 74–99)
GLUCOSE UR STRIP.AUTO-MCNC: NEGATIVE MG/DL
HCT VFR BLD AUTO: 41.5 % (ref 36–48)
HGB BLD-MCNC: 14.1 G/DL (ref 13–16)
HGB UR QL STRIP: NEGATIVE
IMM GRANULOCYTES # BLD AUTO: 0 K/UL (ref 0–0.04)
IMM GRANULOCYTES NFR BLD AUTO: 0 % (ref 0–0.5)
KETONES UR QL STRIP.AUTO: ABNORMAL MG/DL
LEUKOCYTE ESTERASE UR QL STRIP.AUTO: NEGATIVE
LIPASE SERPL-CCNC: 246 U/L (ref 13–75)
LYMPHOCYTES # BLD: 1.7 K/UL (ref 0.9–3.6)
LYMPHOCYTES NFR BLD: 25 % (ref 21–52)
MAGNESIUM SERPL-MCNC: 1.7 MG/DL (ref 1.6–2.6)
MCH RBC QN AUTO: 28.4 PG (ref 24–34)
MCHC RBC AUTO-ENTMCNC: 34 G/DL (ref 31–37)
MCV RBC AUTO: 83.5 FL (ref 78–100)
MONOCYTES # BLD: 0.6 K/UL (ref 0.05–1.2)
MONOCYTES NFR BLD: 8 % (ref 3–10)
NEUTS SEG # BLD: 4.5 K/UL (ref 1.8–8)
NEUTS SEG NFR BLD: 66 % (ref 40–73)
NITRITE UR QL STRIP.AUTO: NEGATIVE
NRBC # BLD: 0 K/UL (ref 0–0.01)
NRBC BLD-RTO: 0 PER 100 WBC
PH UR STRIP: 6 (ref 5–8)
PLATELET # BLD AUTO: 258 K/UL (ref 135–420)
PMV BLD AUTO: 9.1 FL (ref 9.2–11.8)
POTASSIUM SERPL-SCNC: 3.4 MMOL/L (ref 3.5–5.5)
PROT SERPL-MCNC: 8 G/DL (ref 6.4–8.2)
PROT UR STRIP-MCNC: ABNORMAL MG/DL
RBC # BLD AUTO: 4.97 M/UL (ref 4.35–5.65)
RBC #/AREA URNS HPF: ABNORMAL /HPF (ref 0–5)
SODIUM SERPL-SCNC: 137 MMOL/L (ref 136–145)
SP GR UR REFRACTOMETRY: 1.02 (ref 1–1.03)
UROBILINOGEN UR QL STRIP.AUTO: 1 EU/DL (ref 0.2–1)
WBC # BLD AUTO: 6.9 K/UL (ref 4.6–13.2)
WBC URNS QL MICRO: ABNORMAL /HPF (ref 0–4)

## 2024-10-02 PROCEDURE — 96376 TX/PRO/DX INJ SAME DRUG ADON: CPT

## 2024-10-02 PROCEDURE — 87086 URINE CULTURE/COLONY COUNT: CPT

## 2024-10-02 PROCEDURE — 96374 THER/PROPH/DIAG INJ IV PUSH: CPT

## 2024-10-02 PROCEDURE — 6360000002 HC RX W HCPCS

## 2024-10-02 PROCEDURE — 83735 ASSAY OF MAGNESIUM: CPT

## 2024-10-02 PROCEDURE — 81001 URINALYSIS AUTO W/SCOPE: CPT

## 2024-10-02 PROCEDURE — 6360000004 HC RX CONTRAST MEDICATION

## 2024-10-02 PROCEDURE — 96375 TX/PRO/DX INJ NEW DRUG ADDON: CPT

## 2024-10-02 PROCEDURE — 83690 ASSAY OF LIPASE: CPT

## 2024-10-02 PROCEDURE — 2580000003 HC RX 258

## 2024-10-02 PROCEDURE — 80053 COMPREHEN METABOLIC PANEL: CPT

## 2024-10-02 PROCEDURE — 74177 CT ABD & PELVIS W/CONTRAST: CPT

## 2024-10-02 PROCEDURE — 85025 COMPLETE CBC W/AUTO DIFF WBC: CPT

## 2024-10-02 PROCEDURE — 99285 EMERGENCY DEPT VISIT HI MDM: CPT

## 2024-10-02 RX ORDER — IOPAMIDOL 612 MG/ML
100 INJECTION, SOLUTION INTRAVASCULAR
Status: COMPLETED | OUTPATIENT
Start: 2024-10-02 | End: 2024-10-02

## 2024-10-02 RX ORDER — MORPHINE SULFATE 2 MG/ML
2 INJECTION, SOLUTION INTRAMUSCULAR; INTRAVENOUS
Status: COMPLETED | OUTPATIENT
Start: 2024-10-02 | End: 2024-10-02

## 2024-10-02 RX ORDER — ONDANSETRON 2 MG/ML
4 INJECTION INTRAMUSCULAR; INTRAVENOUS
Status: COMPLETED | OUTPATIENT
Start: 2024-10-02 | End: 2024-10-02

## 2024-10-02 RX ORDER — ONDANSETRON 4 MG/1
4 TABLET, ORALLY DISINTEGRATING ORAL 3 TIMES DAILY PRN
Qty: 21 TABLET | Refills: 0 | Status: SHIPPED | OUTPATIENT
Start: 2024-10-02

## 2024-10-02 RX ORDER — 0.9 % SODIUM CHLORIDE 0.9 %
1000 INTRAVENOUS SOLUTION INTRAVENOUS ONCE
Status: COMPLETED | OUTPATIENT
Start: 2024-10-02 | End: 2024-10-02

## 2024-10-02 RX ORDER — MORPHINE SULFATE 4 MG/ML
4 INJECTION, SOLUTION INTRAMUSCULAR; INTRAVENOUS
Status: COMPLETED | OUTPATIENT
Start: 2024-10-02 | End: 2024-10-02

## 2024-10-02 RX ADMIN — MORPHINE SULFATE 4 MG: 4 INJECTION, SOLUTION INTRAMUSCULAR; INTRAVENOUS at 15:50

## 2024-10-02 RX ADMIN — ONDANSETRON 4 MG: 2 INJECTION INTRAMUSCULAR; INTRAVENOUS at 15:59

## 2024-10-02 RX ADMIN — MORPHINE SULFATE 2 MG: 2 INJECTION, SOLUTION INTRAMUSCULAR; INTRAVENOUS at 17:18

## 2024-10-02 RX ADMIN — SODIUM CHLORIDE 1000 ML: 9 INJECTION, SOLUTION INTRAVENOUS at 15:54

## 2024-10-02 RX ADMIN — IOPAMIDOL 100 ML: 612 INJECTION, SOLUTION INTRAVENOUS at 17:11

## 2024-10-02 ASSESSMENT — PAIN DESCRIPTION - LOCATION
LOCATION: ABDOMEN

## 2024-10-02 ASSESSMENT — PAIN SCALES - GENERAL
PAINLEVEL_OUTOF10: 10
PAINLEVEL_OUTOF10: 9
PAINLEVEL_OUTOF10: 10

## 2024-10-02 ASSESSMENT — PAIN - FUNCTIONAL ASSESSMENT: PAIN_FUNCTIONAL_ASSESSMENT: 0-10

## 2024-10-02 ASSESSMENT — PAIN DESCRIPTION - DESCRIPTORS
DESCRIPTORS: SHARP;CRAMPING
DESCRIPTORS: SHARP
DESCRIPTORS: SHARP

## 2024-10-02 ASSESSMENT — PAIN DESCRIPTION - ORIENTATION
ORIENTATION: LEFT;LOWER
ORIENTATION: MID

## 2024-10-02 NOTE — ED TRIAGE NOTES
Pt came ambulatory to triage c/o LLQ pain x 1 wk. Pt also reports nausea.    Hx of diverticulitis.

## 2024-10-02 NOTE — ED PROVIDER NOTES
(4 mg IntraVENous Given 10/2/24 1550)   sodium chloride 0.9 % bolus 1,000 mL (0 mLs IntraVENous Stopped 10/2/24 1826)   iopamidol (ISOVUE-300) 61 % injection 100 mL (100 mLs IntraVENous Given 10/2/24 1711)   ondansetron (ZOFRAN) injection 4 mg (4 mg IntraVENous Given 10/2/24 1559)   morphine (PF) injection 2 mg (2 mg IntraVENous Given 10/2/24 1718)       ED Course as of 10/02/24 1831   Wed Oct 02, 2024   1823 Reevaluated patient's abdomen is still soft nontender with no peritoneal signs. [CD]      ED Course User Index  [CD] Aleah Dumont PA-C       Medical Decision Making     Differential diagnosis: Intra-abdominal infection, intra-abdominal obstruction, mesenteric ischemia, chronic abdominal pain    48-year-old male presented ambulatory to the ED for 1 week of left lower quadrant pain associated with persistent nausea.  Patient has a history of diverticulitis.  On physical exam he is well-appearing afebrile, nontachycardic in no acute distress.  On examination of abdomen is soft nontender with no peritoneal signs.  CBC showed no leukocytosis or significant anemia.  CMP showed mild hypokalemia at 3.4, no elevation in liver enzymes Patient's lipase was trending up at 246 from 100 however patient had no significant epigastric pain.  CT of the abdomen and pelvis showed no acute diverticulitis however did show significant diverticulosis.  Patient was discharged on Augmentin for possible diverticulitis and encouraged to follow-up with GI doctor.  Patient on very strict return precaution and was stable at time of discharge.    Disposition Considerations :d/c       Critical Care Time:       Aleah Dumont     Diagnosis and Disposition     DIAGNOSIS:   1. Elevated lipase    2. Abdominal pain, left lower quadrant    3. Diverticulosis        DISPOSITION Decision To Discharge 10/02/2024 06:02:59 PM  Condition at Disposition: Data Unavailable      PATIENT REFERRED TO:  [facility]    Go in 1

## 2024-10-02 NOTE — ED NOTES
Pt is being discharged, discharge instructions gone over with patient. Pt verbalized understanding. NAD voiced or noted.

## 2024-10-03 LAB
BACTERIA SPEC CULT: NORMAL
SERVICE CMNT-IMP: NORMAL

## 2024-10-08 ENCOUNTER — HOSPITAL ENCOUNTER (EMERGENCY)
Facility: HOSPITAL | Age: 49
Discharge: HOME | End: 2024-10-08
Payer: MEDICAID

## 2024-10-08 ENCOUNTER — APPOINTMENT (OUTPATIENT)
Facility: HOSPITAL | Age: 49
End: 2024-10-08
Payer: MEDICAID

## 2024-10-08 VITALS
TEMPERATURE: 98.5 F | BODY MASS INDEX: 25.77 KG/M2 | SYSTOLIC BLOOD PRESSURE: 130 MMHG | HEIGHT: 70 IN | WEIGHT: 180 LBS | HEART RATE: 51 BPM | OXYGEN SATURATION: 98 % | DIASTOLIC BLOOD PRESSURE: 89 MMHG | RESPIRATION RATE: 16 BRPM

## 2024-10-08 DIAGNOSIS — R10.12 LEFT UPPER QUADRANT ABDOMINAL PAIN: Primary | ICD-10-CM

## 2024-10-08 LAB
ALBUMIN SERPL-MCNC: 4.4 G/DL (ref 3.4–5)
ALBUMIN/GLOB SERPL: 0.9 (ref 0.8–1.7)
ALP SERPL-CCNC: 69 U/L (ref 45–117)
ALT SERPL-CCNC: 14 U/L (ref 16–61)
ANION GAP SERPL CALC-SCNC: 5 MMOL/L (ref 3–18)
APPEARANCE UR: CLEAR
AST SERPL-CCNC: 16 U/L (ref 10–38)
BASOPHILS # BLD: 0.1 K/UL (ref 0–0.1)
BASOPHILS NFR BLD: 1 % (ref 0–2)
BILIRUB SERPL-MCNC: 0.4 MG/DL (ref 0.2–1)
BILIRUB UR QL: NEGATIVE
BUN SERPL-MCNC: 12 MG/DL (ref 7–18)
BUN/CREAT SERPL: 13 (ref 12–20)
CALCIUM SERPL-MCNC: 9.9 MG/DL (ref 8.5–10.1)
CHLORIDE SERPL-SCNC: 99 MMOL/L (ref 100–111)
CO2 SERPL-SCNC: 33 MMOL/L (ref 21–32)
COLOR UR: YELLOW
CREAT SERPL-MCNC: 0.91 MG/DL (ref 0.6–1.3)
DIFFERENTIAL METHOD BLD: NORMAL
EOSINOPHIL # BLD: 0.1 K/UL (ref 0–0.4)
EOSINOPHIL NFR BLD: 1 % (ref 0–5)
ERYTHROCYTE [DISTWIDTH] IN BLOOD BY AUTOMATED COUNT: 13.1 % (ref 11.6–14.5)
GLOBULIN SER CALC-MCNC: 5 G/DL (ref 2–4)
GLUCOSE SERPL-MCNC: 103 MG/DL (ref 74–99)
GLUCOSE UR STRIP.AUTO-MCNC: NEGATIVE MG/DL
HCT VFR BLD AUTO: 46.4 % (ref 36–48)
HGB BLD-MCNC: 15.7 G/DL (ref 13–16)
HGB UR QL STRIP: NEGATIVE
IMM GRANULOCYTES # BLD AUTO: 0 K/UL (ref 0–0.04)
IMM GRANULOCYTES NFR BLD AUTO: 0 % (ref 0–0.5)
KETONES UR QL STRIP.AUTO: NEGATIVE MG/DL
LEUKOCYTE ESTERASE UR QL STRIP.AUTO: NEGATIVE
LIPASE SERPL-CCNC: 74 U/L (ref 13–75)
LYMPHOCYTES # BLD: 1.9 K/UL (ref 0.9–3.6)
LYMPHOCYTES NFR BLD: 26 % (ref 21–52)
MAGNESIUM SERPL-MCNC: 2.3 MG/DL (ref 1.6–2.6)
MCH RBC QN AUTO: 28.3 PG (ref 24–34)
MCHC RBC AUTO-ENTMCNC: 33.8 G/DL (ref 31–37)
MCV RBC AUTO: 83.8 FL (ref 78–100)
MONOCYTES # BLD: 0.5 K/UL (ref 0.05–1.2)
MONOCYTES NFR BLD: 8 % (ref 3–10)
NEUTS SEG # BLD: 4.6 K/UL (ref 1.8–8)
NEUTS SEG NFR BLD: 64 % (ref 40–73)
NITRITE UR QL STRIP.AUTO: NEGATIVE
NRBC # BLD: 0 K/UL (ref 0–0.01)
NRBC BLD-RTO: 0 PER 100 WBC
PH UR STRIP: 7.5 (ref 5–8)
PLATELET # BLD AUTO: 299 K/UL (ref 135–420)
PMV BLD AUTO: 9.3 FL (ref 9.2–11.8)
POTASSIUM SERPL-SCNC: 3.1 MMOL/L (ref 3.5–5.5)
PROT SERPL-MCNC: 9.4 G/DL (ref 6.4–8.2)
PROT UR STRIP-MCNC: NEGATIVE MG/DL
RBC # BLD AUTO: 5.54 M/UL (ref 4.35–5.65)
SODIUM SERPL-SCNC: 137 MMOL/L (ref 136–145)
SP GR UR REFRACTOMETRY: 1.01 (ref 1–1.03)
UROBILINOGEN UR QL STRIP.AUTO: 1 EU/DL (ref 0.2–1)
WBC # BLD AUTO: 7.1 K/UL (ref 4.6–13.2)

## 2024-10-08 PROCEDURE — 2580000003 HC RX 258: Performed by: NURSE PRACTITIONER

## 2024-10-08 PROCEDURE — 93005 ELECTROCARDIOGRAM TRACING: CPT | Performed by: NURSE PRACTITIONER

## 2024-10-08 PROCEDURE — 6360000002 HC RX W HCPCS: Performed by: NURSE PRACTITIONER

## 2024-10-08 PROCEDURE — 87491 CHLMYD TRACH DNA AMP PROBE: CPT

## 2024-10-08 PROCEDURE — 87591 N.GONORRHOEAE DNA AMP PROB: CPT

## 2024-10-08 PROCEDURE — 83690 ASSAY OF LIPASE: CPT

## 2024-10-08 PROCEDURE — 81003 URINALYSIS AUTO W/O SCOPE: CPT

## 2024-10-08 PROCEDURE — 83735 ASSAY OF MAGNESIUM: CPT

## 2024-10-08 PROCEDURE — 99285 EMERGENCY DEPT VISIT HI MDM: CPT

## 2024-10-08 PROCEDURE — 96376 TX/PRO/DX INJ SAME DRUG ADON: CPT

## 2024-10-08 PROCEDURE — 85025 COMPLETE CBC W/AUTO DIFF WBC: CPT

## 2024-10-08 PROCEDURE — 6370000000 HC RX 637 (ALT 250 FOR IP): Performed by: NURSE PRACTITIONER

## 2024-10-08 PROCEDURE — 80053 COMPREHEN METABOLIC PANEL: CPT

## 2024-10-08 PROCEDURE — 96375 TX/PRO/DX INJ NEW DRUG ADDON: CPT

## 2024-10-08 PROCEDURE — 76705 ECHO EXAM OF ABDOMEN: CPT

## 2024-10-08 PROCEDURE — 96374 THER/PROPH/DIAG INJ IV PUSH: CPT

## 2024-10-08 PROCEDURE — 71045 X-RAY EXAM CHEST 1 VIEW: CPT

## 2024-10-08 PROCEDURE — 6360000004 HC RX CONTRAST MEDICATION: Performed by: NURSE PRACTITIONER

## 2024-10-08 PROCEDURE — 74177 CT ABD & PELVIS W/CONTRAST: CPT

## 2024-10-08 RX ORDER — MORPHINE SULFATE 4 MG/ML
4 INJECTION, SOLUTION INTRAMUSCULAR; INTRAVENOUS
Status: COMPLETED | OUTPATIENT
Start: 2024-10-08 | End: 2024-10-08

## 2024-10-08 RX ORDER — 0.9 % SODIUM CHLORIDE 0.9 %
1000 INTRAVENOUS SOLUTION INTRAVENOUS ONCE
Status: COMPLETED | OUTPATIENT
Start: 2024-10-08 | End: 2024-10-08

## 2024-10-08 RX ORDER — ONDANSETRON 2 MG/ML
4 INJECTION INTRAMUSCULAR; INTRAVENOUS ONCE
Status: COMPLETED | OUTPATIENT
Start: 2024-10-08 | End: 2024-10-08

## 2024-10-08 RX ORDER — POTASSIUM CHLORIDE 1500 MG/1
20 TABLET, EXTENDED RELEASE ORAL 2 TIMES DAILY
Qty: 8 TABLET | Refills: 0 | Status: SHIPPED | OUTPATIENT
Start: 2024-10-08 | End: 2024-10-12

## 2024-10-08 RX ORDER — IOPAMIDOL 612 MG/ML
80 INJECTION, SOLUTION INTRAVASCULAR
Status: COMPLETED | OUTPATIENT
Start: 2024-10-08 | End: 2024-10-08

## 2024-10-08 RX ADMIN — ONDANSETRON 4 MG: 2 INJECTION INTRAMUSCULAR; INTRAVENOUS at 19:28

## 2024-10-08 RX ADMIN — SODIUM CHLORIDE 1000 ML: 9 INJECTION, SOLUTION INTRAVENOUS at 17:47

## 2024-10-08 RX ADMIN — ONDANSETRON 4 MG: 2 INJECTION INTRAMUSCULAR; INTRAVENOUS at 17:59

## 2024-10-08 RX ADMIN — POTASSIUM BICARBONATE 40 MEQ: 782 TABLET, EFFERVESCENT ORAL at 17:59

## 2024-10-08 RX ADMIN — MORPHINE SULFATE 4 MG: 4 INJECTION, SOLUTION INTRAMUSCULAR; INTRAVENOUS at 17:22

## 2024-10-08 RX ADMIN — IOPAMIDOL 80 ML: 612 INJECTION, SOLUTION INTRAVENOUS at 16:57

## 2024-10-08 ASSESSMENT — PAIN - FUNCTIONAL ASSESSMENT
PAIN_FUNCTIONAL_ASSESSMENT: 0-10
PAIN_FUNCTIONAL_ASSESSMENT: ACTIVITIES ARE NOT PREVENTED

## 2024-10-08 ASSESSMENT — PAIN DESCRIPTION - DESCRIPTORS: DESCRIPTORS: ACHING;SHARP

## 2024-10-08 ASSESSMENT — PAIN SCALES - GENERAL
PAINLEVEL_OUTOF10: 10
PAINLEVEL_OUTOF10: 10

## 2024-10-08 ASSESSMENT — PAIN DESCRIPTION - ORIENTATION: ORIENTATION: LEFT

## 2024-10-08 ASSESSMENT — PAIN DESCRIPTION - LOCATION
LOCATION: ABDOMEN
LOCATION: ABDOMEN

## 2024-10-08 NOTE — ED NOTES
Medicated pt, allergies verified.  PIV removed.   Paperwork provided to pt, questions answered and pt verbalized understanding of paperwork.

## 2024-10-08 NOTE — DISCHARGE INSTRUCTIONS
Thank you for allowing me to take care of you today.    Please follow-up with your primary care provider and your gastroenterologist as discussed.    Please return to the emergency department with any new worsening or concerning signs or symptoms.

## 2024-10-08 NOTE — ED TRIAGE NOTES
Pt presents to triage for abdominal pain. Pain is in LLQ and goes down to groin. Pt endorses nausea, minimal relief with zofran.     Pt has been seen twice over the last 2 weeks. Has scheduled GI appt with Dr. Bass on 10/16/24.

## 2024-10-09 LAB
C TRACH RRNA SPEC QL NAA+PROBE: NEGATIVE
EKG ATRIAL RATE: 47 BPM
EKG DIAGNOSIS: NORMAL
EKG P AXIS: 44 DEGREES
EKG P-R INTERVAL: 150 MS
EKG Q-T INTERVAL: 446 MS
EKG QRS DURATION: 118 MS
EKG QTC CALCULATION (BAZETT): 394 MS
EKG R AXIS: -22 DEGREES
EKG T AXIS: 42 DEGREES
EKG VENTRICULAR RATE: 47 BPM
N GONORRHOEA RRNA SPEC QL NAA+PROBE: NEGATIVE
SPECIMEN SOURCE: NORMAL

## 2024-10-09 PROCEDURE — 93010 ELECTROCARDIOGRAM REPORT: CPT | Performed by: INTERNAL MEDICINE

## 2024-11-24 ENCOUNTER — HOSPITAL ENCOUNTER (EMERGENCY)
Facility: HOSPITAL | Age: 49
Discharge: HOME | End: 2024-11-24
Attending: STUDENT IN AN ORGANIZED HEALTH CARE EDUCATION/TRAINING PROGRAM
Payer: MEDICAID

## 2024-11-24 VITALS
SYSTOLIC BLOOD PRESSURE: 145 MMHG | OXYGEN SATURATION: 100 % | RESPIRATION RATE: 20 BRPM | TEMPERATURE: 98.6 F | BODY MASS INDEX: 25.77 KG/M2 | HEART RATE: 56 BPM | HEIGHT: 70 IN | DIASTOLIC BLOOD PRESSURE: 91 MMHG | WEIGHT: 180 LBS

## 2024-11-24 DIAGNOSIS — G89.29 CHRONIC CHEST PAIN: Primary | ICD-10-CM

## 2024-11-24 DIAGNOSIS — R07.9 CHRONIC CHEST PAIN: Primary | ICD-10-CM

## 2024-11-24 PROCEDURE — 96372 THER/PROPH/DIAG INJ SC/IM: CPT

## 2024-11-24 PROCEDURE — 99284 EMERGENCY DEPT VISIT MOD MDM: CPT

## 2024-11-24 PROCEDURE — 93005 ELECTROCARDIOGRAM TRACING: CPT | Performed by: STUDENT IN AN ORGANIZED HEALTH CARE EDUCATION/TRAINING PROGRAM

## 2024-11-24 PROCEDURE — 6360000002 HC RX W HCPCS: Performed by: STUDENT IN AN ORGANIZED HEALTH CARE EDUCATION/TRAINING PROGRAM

## 2024-11-24 RX ORDER — KETOROLAC TROMETHAMINE 15 MG/ML
15 INJECTION, SOLUTION INTRAMUSCULAR; INTRAVENOUS ONCE
Status: COMPLETED | OUTPATIENT
Start: 2024-11-24 | End: 2024-11-24

## 2024-11-24 RX ORDER — SERTRALINE HYDROCHLORIDE 100 MG/1
100 TABLET, FILM COATED ORAL DAILY
COMMUNITY

## 2024-11-24 RX ADMIN — KETOROLAC TROMETHAMINE 15 MG: 15 INJECTION, SOLUTION INTRAMUSCULAR; INTRAVENOUS at 18:21

## 2024-11-24 ASSESSMENT — PAIN SCALES - GENERAL: PAINLEVEL_OUTOF10: 9

## 2024-11-24 ASSESSMENT — PAIN - FUNCTIONAL ASSESSMENT: PAIN_FUNCTIONAL_ASSESSMENT: 0-10

## 2024-11-24 NOTE — ED PROVIDER NOTES
5 %    Apply 1 patch as directed for 12 hours every 24 hours (12 hours on, 12 hours off)    METFORMIN (GLUCOPHAGE) 500 MG TABLET    3 tablets    METHOCARBAMOL (ROBAXIN-750) 750 MG TABLET    Take 1 tablet by mouth 4 times daily as needed (muscle aches/pains)    METOPROLOL TARTRATE (LOPRESSOR) 25 MG TABLET    1 tablet 50 mg in am 25 mg lunchtime, 25 mg in evening    NALOXONE (NARCAN) 4 MG/0.1ML LIQD NASAL SPRAY    1 spray by Nasal route as needed for Opioid Reversal    ONDANSETRON (ZOFRAN-ODT) 4 MG DISINTEGRATING TABLET    Take 1 tablet by mouth 3 times daily as needed for Nausea or Vomiting    PANTOPRAZOLE (PROTONIX) 40 MG TABLET    Take 1 tablet by mouth daily    POTASSIUM CHLORIDE (KLOR-CON M) 20 MEQ EXTENDED RELEASE TABLET    Take 1 tablet by mouth 2 times daily for 4 days    SERTRALINE (ZOLOFT) 100 MG TABLET    Take 1 tablet by mouth daily       ALLERGIES     Ketamine, Quetiapine, and Tramadol    FAMILY HISTORY       Family History   Problem Relation Age of Onset    Heart Failure Mother     Heart Disease Mother     Hypertension Father     Alcohol Abuse Father     Hypertension Mother     Diabetes Mother           SOCIAL HISTORY       Social History     Socioeconomic History    Marital status: Legally      Spouse name: None    Number of children: None    Years of education: None    Highest education level: None   Tobacco Use    Smoking status: Never    Smokeless tobacco: Never   Substance and Sexual Activity    Alcohol use: Not Currently    Drug use: Yes     Types: Marijuana (Weed), Cocaine     Comment: last time 3 weeks ago for marijuana/cocain 3 years ago   Social History Narrative         ** Merged History Encounter **       SCREENINGS         Susi Coma Scale  Eye Opening: Spontaneous  Best Verbal Response: Oriented  Best Motor Response: Obeys commands  Susi Coma Scale Score: 15                     CIWA Assessment  BP: (!) 143/96  Pulse: 56                 PHYSICAL EXAM    (up to 7 for level 4, 8

## 2024-11-24 NOTE — ED TRIAGE NOTES
Pt C/o CP that originally started 4 years ago. States was seen at [facility] yesterday and given dilaudid that helped his pain. States the ER doctor at [facility] told him that he should be taking Percocet for pain, but his PCP would have to prescribe that. Pt states he saw a cardiologist yesterday and is going to have a Holter monitor sent. Also states he was told he need a heart cath.

## 2024-11-25 LAB
EKG ATRIAL RATE: 55 BPM
EKG DIAGNOSIS: NORMAL
EKG P AXIS: 28 DEGREES
EKG P-R INTERVAL: 144 MS
EKG Q-T INTERVAL: 412 MS
EKG QRS DURATION: 104 MS
EKG QTC CALCULATION (BAZETT): 394 MS
EKG R AXIS: -8 DEGREES
EKG T AXIS: 46 DEGREES
EKG VENTRICULAR RATE: 55 BPM

## 2024-11-25 PROCEDURE — 93010 ELECTROCARDIOGRAM REPORT: CPT | Performed by: INTERNAL MEDICINE

## 2024-12-02 ENCOUNTER — HOSPITAL ENCOUNTER (EMERGENCY)
Facility: HOSPITAL | Age: 49
Discharge: HOME | End: 2024-12-02
Attending: STUDENT IN AN ORGANIZED HEALTH CARE EDUCATION/TRAINING PROGRAM
Payer: MEDICAID

## 2024-12-02 ENCOUNTER — APPOINTMENT (OUTPATIENT)
Facility: HOSPITAL | Age: 49
End: 2024-12-02
Payer: MEDICAID

## 2024-12-02 VITALS
HEIGHT: 70 IN | WEIGHT: 188 LBS | HEART RATE: 61 BPM | TEMPERATURE: 98.6 F | OXYGEN SATURATION: 99 % | DIASTOLIC BLOOD PRESSURE: 103 MMHG | BODY MASS INDEX: 26.92 KG/M2 | RESPIRATION RATE: 12 BRPM | SYSTOLIC BLOOD PRESSURE: 159 MMHG

## 2024-12-02 DIAGNOSIS — R07.9 CHEST PAIN, UNSPECIFIED TYPE: Primary | ICD-10-CM

## 2024-12-02 DIAGNOSIS — R06.09 DYSPNEA ON EXERTION: ICD-10-CM

## 2024-12-02 DIAGNOSIS — R00.2 PALPITATIONS: ICD-10-CM

## 2024-12-02 LAB
ANION GAP SERPL CALC-SCNC: 5 MMOL/L (ref 3–18)
BASOPHILS # BLD: 0.1 K/UL (ref 0–0.1)
BASOPHILS NFR BLD: 1 % (ref 0–2)
BUN SERPL-MCNC: 13 MG/DL (ref 7–18)
BUN/CREAT SERPL: 14 (ref 12–20)
CALCIUM SERPL-MCNC: 9.5 MG/DL (ref 8.5–10.1)
CHLORIDE SERPL-SCNC: 100 MMOL/L (ref 100–111)
CO2 SERPL-SCNC: 28 MMOL/L (ref 21–32)
CREAT SERPL-MCNC: 0.96 MG/DL (ref 0.6–1.3)
DIFFERENTIAL METHOD BLD: ABNORMAL
EKG ATRIAL RATE: 61 BPM
EKG DIAGNOSIS: NORMAL
EKG P AXIS: 28 DEGREES
EKG P-R INTERVAL: 144 MS
EKG Q-T INTERVAL: 406 MS
EKG QRS DURATION: 96 MS
EKG QTC CALCULATION (BAZETT): 408 MS
EKG R AXIS: 127 DEGREES
EKG T AXIS: 1 DEGREES
EKG VENTRICULAR RATE: 61 BPM
EOSINOPHIL # BLD: 0 K/UL (ref 0–0.4)
EOSINOPHIL NFR BLD: 0 % (ref 0–5)
ERYTHROCYTE [DISTWIDTH] IN BLOOD BY AUTOMATED COUNT: 13.2 % (ref 11.6–14.5)
GLUCOSE SERPL-MCNC: 157 MG/DL (ref 74–99)
HCT VFR BLD AUTO: 44.9 % (ref 36–48)
HGB BLD-MCNC: 15 G/DL (ref 13–16)
IMM GRANULOCYTES # BLD AUTO: 0 K/UL (ref 0–0.04)
IMM GRANULOCYTES NFR BLD AUTO: 0 % (ref 0–0.5)
LYMPHOCYTES # BLD: 1.1 K/UL (ref 0.9–3.6)
LYMPHOCYTES NFR BLD: 17 % (ref 21–52)
MAGNESIUM SERPL-MCNC: 2.1 MG/DL (ref 1.6–2.6)
MCH RBC QN AUTO: 28.7 PG (ref 24–34)
MCHC RBC AUTO-ENTMCNC: 33.4 G/DL (ref 31–37)
MCV RBC AUTO: 86 FL (ref 78–100)
MONOCYTES # BLD: 0.5 K/UL (ref 0.05–1.2)
MONOCYTES NFR BLD: 7 % (ref 3–10)
NEUTS SEG # BLD: 4.6 K/UL (ref 1.8–8)
NEUTS SEG NFR BLD: 74 % (ref 40–73)
NRBC # BLD: 0 K/UL (ref 0–0.01)
NRBC BLD-RTO: 0 PER 100 WBC
NT PRO BNP: 35 PG/ML (ref 0–450)
PLATELET # BLD AUTO: 250 K/UL (ref 135–420)
PMV BLD AUTO: 9.3 FL (ref 9.2–11.8)
POTASSIUM SERPL-SCNC: 3.8 MMOL/L (ref 3.5–5.5)
RBC # BLD AUTO: 5.22 M/UL (ref 4.35–5.65)
SODIUM SERPL-SCNC: 133 MMOL/L (ref 136–145)
T4 FREE SERPL-MCNC: 0.8 NG/DL (ref 0.7–1.5)
TROPONIN I SERPL HS-MCNC: 6 NG/L (ref 0–78)
TSH SERPL DL<=0.05 MIU/L-ACNC: 0.97 UIU/ML (ref 0.36–3.74)
WBC # BLD AUTO: 6.2 K/UL (ref 4.6–13.2)

## 2024-12-02 PROCEDURE — 96375 TX/PRO/DX INJ NEW DRUG ADDON: CPT

## 2024-12-02 PROCEDURE — 84443 ASSAY THYROID STIM HORMONE: CPT

## 2024-12-02 PROCEDURE — 99285 EMERGENCY DEPT VISIT HI MDM: CPT

## 2024-12-02 PROCEDURE — 71046 X-RAY EXAM CHEST 2 VIEWS: CPT

## 2024-12-02 PROCEDURE — 93010 ELECTROCARDIOGRAM REPORT: CPT | Performed by: INTERNAL MEDICINE

## 2024-12-02 PROCEDURE — 85025 COMPLETE CBC W/AUTO DIFF WBC: CPT

## 2024-12-02 PROCEDURE — 83880 ASSAY OF NATRIURETIC PEPTIDE: CPT

## 2024-12-02 PROCEDURE — 84439 ASSAY OF FREE THYROXINE: CPT

## 2024-12-02 PROCEDURE — 80048 BASIC METABOLIC PNL TOTAL CA: CPT

## 2024-12-02 PROCEDURE — 93005 ELECTROCARDIOGRAM TRACING: CPT | Performed by: STUDENT IN AN ORGANIZED HEALTH CARE EDUCATION/TRAINING PROGRAM

## 2024-12-02 PROCEDURE — 6360000002 HC RX W HCPCS: Performed by: STUDENT IN AN ORGANIZED HEALTH CARE EDUCATION/TRAINING PROGRAM

## 2024-12-02 PROCEDURE — 84484 ASSAY OF TROPONIN QUANT: CPT

## 2024-12-02 PROCEDURE — 96374 THER/PROPH/DIAG INJ IV PUSH: CPT

## 2024-12-02 PROCEDURE — 83735 ASSAY OF MAGNESIUM: CPT

## 2024-12-02 PROCEDURE — 94761 N-INVAS EAR/PLS OXIMETRY MLT: CPT

## 2024-12-02 RX ORDER — LORAZEPAM 2 MG/ML
1 INJECTION INTRAMUSCULAR ONCE
Status: COMPLETED | OUTPATIENT
Start: 2024-12-02 | End: 2024-12-02

## 2024-12-02 RX ORDER — HYDROMORPHONE HYDROCHLORIDE 1 MG/ML
0.5 INJECTION, SOLUTION INTRAMUSCULAR; INTRAVENOUS; SUBCUTANEOUS
Status: COMPLETED | OUTPATIENT
Start: 2024-12-02 | End: 2024-12-02

## 2024-12-02 RX ADMIN — LORAZEPAM 1 MG: 2 INJECTION INTRAMUSCULAR; INTRAVENOUS at 16:42

## 2024-12-02 RX ADMIN — HYDROMORPHONE HYDROCHLORIDE 0.5 MG: 1 INJECTION, SOLUTION INTRAMUSCULAR; INTRAVENOUS; SUBCUTANEOUS at 16:44

## 2024-12-02 ASSESSMENT — PAIN DESCRIPTION - ORIENTATION: ORIENTATION: LEFT;UPPER

## 2024-12-02 ASSESSMENT — PAIN DESCRIPTION - DESCRIPTORS: DESCRIPTORS: ACHING;SHARP

## 2024-12-02 ASSESSMENT — PAIN SCALES - GENERAL: PAINLEVEL_OUTOF10: 9

## 2024-12-02 ASSESSMENT — PAIN DESCRIPTION - LOCATION: LOCATION: BACK;CHEST

## 2024-12-02 NOTE — ED NOTES
Pt provided AVS and d/c instructions. Pt verbalized understanding and was discharged in no acute distress.

## 2024-12-02 NOTE — ED PROVIDER NOTES
metFORMIN 500 MG tablet  Commonly known as: GLUCOPHAGE     methocarbamol 750 MG tablet  Commonly known as: Robaxin-750  Take 1 tablet by mouth 4 times daily as needed (muscle aches/pains)     metoprolol tartrate 25 MG tablet  Commonly known as: LOPRESSOR     naloxone 4 MG/0.1ML Liqd nasal spray  Commonly known as: Narcan  1 spray by Nasal route as needed for Opioid Reversal     ondansetron 4 MG disintegrating tablet  Commonly known as: ZOFRAN-ODT  Take 1 tablet by mouth 3 times daily as needed for Nausea or Vomiting     pantoprazole 40 MG tablet  Commonly known as: PROTONIX  Take 1 tablet by mouth daily     potassium chloride 20 MEQ extended release tablet  Commonly known as: KLOR-CON M  Take 1 tablet by mouth 2 times daily for 4 days     sertraline 100 MG tablet  Commonly known as: ZOLOFT     Xanax 1 MG tablet  Generic drug: ALPRAZolam           * This list has 2 medication(s) that are the same as other medications prescribed for you. Read the directions carefully, and ask your doctor or other care provider to review them with you.                  Disposition: Home    Patient condition at time of disposition: Stable    DISCHARGE NOTE:   Pt has been reexamined. Patient has no new complaints, changes, or physical findings.  Care plan outlined and precautions discussed.  Results were reviewed with the patient. All medications were reviewed with the patient. All of pt's questions and concerns were addressed.  Alarm symptoms and return precautions associated with chief complaint and evaluation were reviewed with the patient in detail.  The patient demonstrated adequate understanding.  Patient was instructed to follow up with PCP, as well as given strict return precautions to the ED upon further deterioration. Patient is ready for discharge home.          Dragon Disclaimer     Please note that this dictation was completed with [employer] the "[employer] voice recognition software.  Quite often unanticipated grammatical,

## 2024-12-02 NOTE — ED TRIAGE NOTES
Pt in ED with c/o chest pain and rapid hear rate. Pt states his HR last week was in the 150s bpm. And the last couple days his HR has been 110. Pt had a heart cath scheduled for wednsday but insurance is unable to auth the procedure due to not having a heart CT

## 2024-12-19 ENCOUNTER — APPOINTMENT (OUTPATIENT)
Facility: HOSPITAL | Age: 49
End: 2024-12-19
Payer: MEDICAID

## 2024-12-19 ENCOUNTER — HOSPITAL ENCOUNTER (EMERGENCY)
Facility: HOSPITAL | Age: 49
Discharge: HOME | End: 2024-12-19
Attending: EMERGENCY MEDICINE
Payer: MEDICAID

## 2024-12-19 VITALS
OXYGEN SATURATION: 99 % | HEART RATE: 54 BPM | RESPIRATION RATE: 11 BRPM | BODY MASS INDEX: 26.52 KG/M2 | HEIGHT: 68 IN | TEMPERATURE: 97.3 F | DIASTOLIC BLOOD PRESSURE: 74 MMHG | WEIGHT: 175 LBS | SYSTOLIC BLOOD PRESSURE: 125 MMHG

## 2024-12-19 DIAGNOSIS — R07.9 CHEST PAIN, UNSPECIFIED TYPE: Primary | ICD-10-CM

## 2024-12-19 LAB
ALBUMIN SERPL-MCNC: 4.2 G/DL (ref 3.4–5)
ALBUMIN/GLOB SERPL: 1 (ref 0.8–1.7)
ALP SERPL-CCNC: 79 U/L (ref 45–117)
ALT SERPL-CCNC: 21 U/L (ref 16–61)
ANION GAP SERPL CALC-SCNC: 4 MMOL/L (ref 3–18)
AST SERPL-CCNC: 16 U/L (ref 10–38)
BASOPHILS # BLD: 0.1 K/UL (ref 0–0.1)
BASOPHILS NFR BLD: 1 % (ref 0–2)
BILIRUB SERPL-MCNC: 0.3 MG/DL (ref 0.2–1)
BUN SERPL-MCNC: 13 MG/DL (ref 7–18)
BUN/CREAT SERPL: 14 (ref 12–20)
CALCIUM SERPL-MCNC: 9.4 MG/DL (ref 8.5–10.1)
CHLORIDE SERPL-SCNC: 102 MMOL/L (ref 100–111)
CO2 SERPL-SCNC: 31 MMOL/L (ref 21–32)
CREAT SERPL-MCNC: 0.93 MG/DL (ref 0.6–1.3)
DIFFERENTIAL METHOD BLD: ABNORMAL
EKG ATRIAL RATE: 50 BPM
EKG ATRIAL RATE: 52 BPM
EKG DIAGNOSIS: NORMAL
EKG DIAGNOSIS: NORMAL
EKG P AXIS: 38 DEGREES
EKG P AXIS: 44 DEGREES
EKG P-R INTERVAL: 148 MS
EKG P-R INTERVAL: 152 MS
EKG Q-T INTERVAL: 434 MS
EKG Q-T INTERVAL: 444 MS
EKG QRS DURATION: 100 MS
EKG QRS DURATION: 96 MS
EKG QTC CALCULATION (BAZETT): 395 MS
EKG QTC CALCULATION (BAZETT): 412 MS
EKG R AXIS: 126 DEGREES
EKG R AXIS: 128 DEGREES
EKG T AXIS: 34 DEGREES
EKG T AXIS: 35 DEGREES
EKG VENTRICULAR RATE: 50 BPM
EKG VENTRICULAR RATE: 52 BPM
EOSINOPHIL # BLD: 0.1 K/UL (ref 0–0.4)
EOSINOPHIL NFR BLD: 1 % (ref 0–5)
ERYTHROCYTE [DISTWIDTH] IN BLOOD BY AUTOMATED COUNT: 12.7 % (ref 11.6–14.5)
GLOBULIN SER CALC-MCNC: 4.1 G/DL (ref 2–4)
GLUCOSE SERPL-MCNC: 125 MG/DL (ref 74–99)
HCT VFR BLD AUTO: 44.7 % (ref 36–48)
HGB BLD-MCNC: 15.2 G/DL (ref 13–16)
IMM GRANULOCYTES # BLD AUTO: 0 K/UL (ref 0–0.04)
IMM GRANULOCYTES NFR BLD AUTO: 0 % (ref 0–0.5)
LYMPHOCYTES # BLD: 1.8 K/UL (ref 0.9–3.6)
LYMPHOCYTES NFR BLD: 20 % (ref 21–52)
MCH RBC QN AUTO: 29.2 PG (ref 24–34)
MCHC RBC AUTO-ENTMCNC: 34 G/DL (ref 31–37)
MCV RBC AUTO: 85.8 FL (ref 78–100)
MONOCYTES # BLD: 0.7 K/UL (ref 0.05–1.2)
MONOCYTES NFR BLD: 8 % (ref 3–10)
NEUTS SEG # BLD: 6.3 K/UL (ref 1.8–8)
NEUTS SEG NFR BLD: 70 % (ref 40–73)
NRBC # BLD: 0 K/UL (ref 0–0.01)
NRBC BLD-RTO: 0 PER 100 WBC
NT PRO BNP: 25 PG/ML (ref 0–450)
PLATELET # BLD AUTO: 305 K/UL (ref 135–420)
PMV BLD AUTO: 9.8 FL (ref 9.2–11.8)
POTASSIUM SERPL-SCNC: 3.5 MMOL/L (ref 3.5–5.5)
PROT SERPL-MCNC: 8.3 G/DL (ref 6.4–8.2)
RBC # BLD AUTO: 5.21 M/UL (ref 4.35–5.65)
SODIUM SERPL-SCNC: 137 MMOL/L (ref 136–145)
TROPONIN I SERPL HS-MCNC: 5 NG/L (ref 0–78)
TROPONIN I SERPL HS-MCNC: 6 NG/L (ref 0–78)
WBC # BLD AUTO: 8.9 K/UL (ref 4.6–13.2)

## 2024-12-19 PROCEDURE — 84484 ASSAY OF TROPONIN QUANT: CPT

## 2024-12-19 PROCEDURE — 2580000003 HC RX 258: Performed by: EMERGENCY MEDICINE

## 2024-12-19 PROCEDURE — 83880 ASSAY OF NATRIURETIC PEPTIDE: CPT

## 2024-12-19 PROCEDURE — 71046 X-RAY EXAM CHEST 2 VIEWS: CPT

## 2024-12-19 PROCEDURE — 93005 ELECTROCARDIOGRAM TRACING: CPT | Performed by: EMERGENCY MEDICINE

## 2024-12-19 PROCEDURE — 96375 TX/PRO/DX INJ NEW DRUG ADDON: CPT

## 2024-12-19 PROCEDURE — 6370000000 HC RX 637 (ALT 250 FOR IP): Performed by: EMERGENCY MEDICINE

## 2024-12-19 PROCEDURE — 85025 COMPLETE CBC W/AUTO DIFF WBC: CPT

## 2024-12-19 PROCEDURE — 99285 EMERGENCY DEPT VISIT HI MDM: CPT

## 2024-12-19 PROCEDURE — 6360000002 HC RX W HCPCS: Performed by: EMERGENCY MEDICINE

## 2024-12-19 PROCEDURE — 96374 THER/PROPH/DIAG INJ IV PUSH: CPT

## 2024-12-19 PROCEDURE — 2500000003 HC RX 250 WO HCPCS: Performed by: EMERGENCY MEDICINE

## 2024-12-19 PROCEDURE — 80053 COMPREHEN METABOLIC PANEL: CPT

## 2024-12-19 RX ORDER — ALPRAZOLAM 0.5 MG
1 TABLET ORAL
Status: COMPLETED | OUTPATIENT
Start: 2024-12-19 | End: 2024-12-19

## 2024-12-19 RX ORDER — FAMOTIDINE 20 MG/1
20 TABLET, FILM COATED ORAL 2 TIMES DAILY
Qty: 60 TABLET | Refills: 0 | Status: SHIPPED | OUTPATIENT
Start: 2024-12-19

## 2024-12-19 RX ORDER — ACETAMINOPHEN 325 MG/1
975 TABLET ORAL
Status: DISCONTINUED | OUTPATIENT
Start: 2024-12-19 | End: 2024-12-19

## 2024-12-19 RX ORDER — ONDANSETRON 2 MG/ML
4 INJECTION INTRAMUSCULAR; INTRAVENOUS
Status: COMPLETED | OUTPATIENT
Start: 2024-12-19 | End: 2024-12-19

## 2024-12-19 RX ADMIN — ALPRAZOLAM 1 MG: 0.5 TABLET ORAL at 15:31

## 2024-12-19 RX ADMIN — ALUMINUM HYDROXIDE AND MAGNESIUM HYDROXIDE 20 ML: 200; 200 SUSPENSION ORAL at 13:41

## 2024-12-19 RX ADMIN — ONDANSETRON 4 MG: 2 INJECTION INTRAMUSCULAR; INTRAVENOUS at 13:41

## 2024-12-19 RX ADMIN — NITROGLYCERIN 0.5 INCH: 20 OINTMENT TOPICAL at 15:31

## 2024-12-19 RX ADMIN — FAMOTIDINE 20 MG: 10 INJECTION, SOLUTION INTRAVENOUS at 13:41

## 2024-12-19 ASSESSMENT — PAIN DESCRIPTION - DESCRIPTORS: DESCRIPTORS: STABBING

## 2024-12-19 ASSESSMENT — PAIN - FUNCTIONAL ASSESSMENT: PAIN_FUNCTIONAL_ASSESSMENT: 0-10

## 2024-12-19 ASSESSMENT — PAIN SCALES - GENERAL
PAINLEVEL_OUTOF10: 10
PAINLEVEL_OUTOF10: 10

## 2024-12-19 ASSESSMENT — ENCOUNTER SYMPTOMS
VOMITING: 0
NAUSEA: 0
SHORTNESS OF BREATH: 0
COUGH: 0
ABDOMINAL PAIN: 0

## 2024-12-19 ASSESSMENT — PAIN DESCRIPTION - LOCATION
LOCATION: CHEST
LOCATION: CHEST

## 2024-12-19 NOTE — ED TRIAGE NOTES
Pt arrives ambulatory to triage c/o chest pain and SOB. Pt states he had a heart cath about 2 weeks ago at [facility]. Pt states every since the procedure, he has been having chest pain. Pt states pain has gotten worse the last 2 days. Pt states SOB comes and goes.

## 2024-12-19 NOTE — ED PROVIDER NOTES
EMERGENCY DEPARTMENT HISTORY AND PHYSICAL EXAM    3:24 PM      Date: 12/19/2024  Patient Name: Erlin Gordon    History of Presenting Illness     Chief Complaint   Patient presents with    Chest Pain       History From: Patient    Erlin Gordon is a 48 y.o. male   48-year-old male with past medical history of diabetes, hypertension, anxiety, hyperlipidemia presents to the ER today for 2 months of ongoing chest pain.  Patient states the chest pain is always present at baseline but there is times where he has worsening of chest pain.  Patient did get a recent cath done on 12/2024 which shows that he has a 70% percent stenosis of the LAD.  Patient had a stress test that was done this year which is also normal.  No evidence of any inducible ischemia or scar in the stress test.  Patient has had multiple CTAs recently as well as multiple visits for chest pain.  Patient states that he does not know what is going on and he thinks that he needs to keep seeking different physicians because what ever is going on is a mystery.  Patient states that he has had intermittent palpitations but they resolve.    Patient describes this chest pain as midsternal nonradiating describes it as a grabbing sensation as if something is sitting on his chest.    Denies nausea, vomiting  Denies recent traveling  Denies fever, shortness of breath           Nursing Notes were all reviewed and agreed with or any disagreements were addressed in the HPI.    PCP: Chon Valenzuela, DO    Current Facility-Administered Medications   Medication Dose Route Frequency Provider Last Rate Last Admin    ALPRAZolam (XANAX) tablet 1 mg  1 mg Oral NOW Kwasi Larry MD        nitroglycerin (NITRO-BID) 2 % ointment 0.5 inch  0.5 inch Topical NOW Kwasi Larry MD         Current Outpatient Medications   Medication Sig Dispense Refill    famotidine (PEPCID) 20 MG tablet Take 1 tablet by mouth 2 times daily 60 tablet 0    sertraline (ZOLOFT) 100 MG tablet Take 1

## 2025-01-23 ENCOUNTER — HOSPITAL ENCOUNTER (EMERGENCY)
Facility: HOSPITAL | Age: 50
Discharge: HOME | End: 2025-01-23
Attending: STUDENT IN AN ORGANIZED HEALTH CARE EDUCATION/TRAINING PROGRAM
Payer: MEDICAID

## 2025-01-23 ENCOUNTER — APPOINTMENT (OUTPATIENT)
Facility: HOSPITAL | Age: 50
End: 2025-01-23
Payer: MEDICAID

## 2025-01-23 VITALS
TEMPERATURE: 97.8 F | SYSTOLIC BLOOD PRESSURE: 121 MMHG | HEIGHT: 70 IN | HEART RATE: 60 BPM | DIASTOLIC BLOOD PRESSURE: 74 MMHG | BODY MASS INDEX: 27.92 KG/M2 | WEIGHT: 195 LBS | RESPIRATION RATE: 18 BRPM | OXYGEN SATURATION: 98 %

## 2025-01-23 DIAGNOSIS — R07.9 CHEST PAIN, UNSPECIFIED TYPE: Primary | ICD-10-CM

## 2025-01-23 DIAGNOSIS — M79.7 FIBROMYALGIA: ICD-10-CM

## 2025-01-23 LAB
ALBUMIN SERPL-MCNC: 3.7 G/DL (ref 3.4–5)
ALBUMIN/GLOB SERPL: 1 (ref 0.8–1.7)
ALP SERPL-CCNC: 77 U/L (ref 45–117)
ALT SERPL-CCNC: 20 U/L (ref 16–61)
ANION GAP SERPL CALC-SCNC: 5 MMOL/L (ref 3–18)
AST SERPL-CCNC: 17 U/L (ref 10–38)
BASOPHILS # BLD: 0.04 K/UL (ref 0–0.1)
BASOPHILS NFR BLD: 0.6 % (ref 0–2)
BILIRUB SERPL-MCNC: 0.3 MG/DL (ref 0.2–1)
BUN SERPL-MCNC: 14 MG/DL (ref 7–18)
BUN/CREAT SERPL: 14 (ref 12–20)
CALCIUM SERPL-MCNC: 8.9 MG/DL (ref 8.5–10.1)
CHLORIDE SERPL-SCNC: 106 MMOL/L (ref 100–111)
CO2 SERPL-SCNC: 26 MMOL/L (ref 21–32)
CREAT SERPL-MCNC: 1.01 MG/DL (ref 0.6–1.3)
DIFFERENTIAL METHOD BLD: ABNORMAL
EKG ATRIAL RATE: 52 BPM
EKG DIAGNOSIS: NORMAL
EKG P AXIS: 35 DEGREES
EKG P-R INTERVAL: 152 MS
EKG Q-T INTERVAL: 432 MS
EKG QRS DURATION: 100 MS
EKG QTC CALCULATION (BAZETT): 401 MS
EKG R AXIS: -8 DEGREES
EKG T AXIS: 39 DEGREES
EKG VENTRICULAR RATE: 52 BPM
EOSINOPHIL # BLD: 0.06 K/UL (ref 0–0.4)
EOSINOPHIL NFR BLD: 0.9 % (ref 0–5)
ERYTHROCYTE [DISTWIDTH] IN BLOOD BY AUTOMATED COUNT: 12.9 % (ref 11.6–14.5)
GLOBULIN SER CALC-MCNC: 3.6 G/DL (ref 2–4)
GLUCOSE BLD STRIP.AUTO-MCNC: 117 MG/DL (ref 70–110)
GLUCOSE SERPL-MCNC: 107 MG/DL (ref 74–99)
HCT VFR BLD AUTO: 40.6 % (ref 36–48)
HGB BLD-MCNC: 13.9 G/DL (ref 13–16)
IMM GRANULOCYTES # BLD AUTO: 0.01 K/UL (ref 0–0.04)
IMM GRANULOCYTES NFR BLD AUTO: 0.2 % (ref 0–0.5)
LYMPHOCYTES # BLD: 1.26 K/UL (ref 0.9–3.6)
LYMPHOCYTES NFR BLD: 19.6 % (ref 21–52)
MAGNESIUM SERPL-MCNC: 2 MG/DL (ref 1.6–2.6)
MCH RBC QN AUTO: 28.9 PG (ref 24–34)
MCHC RBC AUTO-ENTMCNC: 34.2 G/DL (ref 31–37)
MCV RBC AUTO: 84.4 FL (ref 78–100)
MONOCYTES # BLD: 0.47 K/UL (ref 0.05–1.2)
MONOCYTES NFR BLD: 7.3 % (ref 3–10)
NEUTS SEG # BLD: 4.58 K/UL (ref 1.8–8)
NEUTS SEG NFR BLD: 71.4 % (ref 40–73)
NRBC # BLD: 0 K/UL (ref 0–0.01)
NRBC BLD-RTO: 0 PER 100 WBC
NT PRO BNP: 19 PG/ML (ref 0–900)
PLATELET # BLD AUTO: 292 K/UL (ref 135–420)
PMV BLD AUTO: 9.2 FL (ref 9.2–11.8)
POTASSIUM SERPL-SCNC: 4.2 MMOL/L (ref 3.5–5.5)
PROT SERPL-MCNC: 7.3 G/DL (ref 6.4–8.2)
RBC # BLD AUTO: 4.81 M/UL (ref 4.35–5.65)
SODIUM SERPL-SCNC: 137 MMOL/L (ref 136–145)
TROPONIN I SERPL HS-MCNC: 6 NG/L (ref 0–78)
WBC # BLD AUTO: 6.4 K/UL (ref 4.6–13.2)

## 2025-01-23 PROCEDURE — 85025 COMPLETE CBC W/AUTO DIFF WBC: CPT

## 2025-01-23 PROCEDURE — 96374 THER/PROPH/DIAG INJ IV PUSH: CPT

## 2025-01-23 PROCEDURE — 84484 ASSAY OF TROPONIN QUANT: CPT

## 2025-01-23 PROCEDURE — 71046 X-RAY EXAM CHEST 2 VIEWS: CPT

## 2025-01-23 PROCEDURE — 99285 EMERGENCY DEPT VISIT HI MDM: CPT

## 2025-01-23 PROCEDURE — 83880 ASSAY OF NATRIURETIC PEPTIDE: CPT

## 2025-01-23 PROCEDURE — 82962 GLUCOSE BLOOD TEST: CPT

## 2025-01-23 PROCEDURE — 6360000002 HC RX W HCPCS: Performed by: STUDENT IN AN ORGANIZED HEALTH CARE EDUCATION/TRAINING PROGRAM

## 2025-01-23 PROCEDURE — 80053 COMPREHEN METABOLIC PANEL: CPT

## 2025-01-23 PROCEDURE — 6370000000 HC RX 637 (ALT 250 FOR IP): Performed by: STUDENT IN AN ORGANIZED HEALTH CARE EDUCATION/TRAINING PROGRAM

## 2025-01-23 PROCEDURE — 93005 ELECTROCARDIOGRAM TRACING: CPT | Performed by: STUDENT IN AN ORGANIZED HEALTH CARE EDUCATION/TRAINING PROGRAM

## 2025-01-23 PROCEDURE — 93010 ELECTROCARDIOGRAM REPORT: CPT | Performed by: INTERNAL MEDICINE

## 2025-01-23 PROCEDURE — 83735 ASSAY OF MAGNESIUM: CPT

## 2025-01-23 RX ORDER — DICYCLOMINE HYDROCHLORIDE 10 MG/1
20 CAPSULE ORAL
Status: COMPLETED | OUTPATIENT
Start: 2025-01-23 | End: 2025-01-23

## 2025-01-23 RX ORDER — OXYCODONE HYDROCHLORIDE 5 MG/1
5 TABLET ORAL
Status: COMPLETED | OUTPATIENT
Start: 2025-01-23 | End: 2025-01-23

## 2025-01-23 RX ORDER — KETOROLAC TROMETHAMINE 15 MG/ML
15 INJECTION, SOLUTION INTRAMUSCULAR; INTRAVENOUS ONCE
Status: COMPLETED | OUTPATIENT
Start: 2025-01-23 | End: 2025-01-23

## 2025-01-23 RX ORDER — RANOLAZINE 500 MG/1
500 TABLET, EXTENDED RELEASE ORAL 2 TIMES DAILY
Qty: 60 TABLET | Refills: 0 | Status: SHIPPED | OUTPATIENT
Start: 2025-01-23

## 2025-01-23 RX ORDER — NITROGLYCERIN 0.4 MG/1
0.4 TABLET SUBLINGUAL EVERY 5 MIN PRN
Status: DISCONTINUED | OUTPATIENT
Start: 2025-01-23 | End: 2025-01-23 | Stop reason: HOSPADM

## 2025-01-23 RX ORDER — LIDOCAINE 4 G/G
1 PATCH TOPICAL
Status: DISCONTINUED | OUTPATIENT
Start: 2025-01-23 | End: 2025-01-23 | Stop reason: HOSPADM

## 2025-01-23 RX ADMIN — DICYCLOMINE HYDROCHLORIDE 20 MG: 10 CAPSULE ORAL at 14:50

## 2025-01-23 RX ADMIN — NITROGLYCERIN 0.4 MG: 0.4 TABLET SUBLINGUAL at 14:47

## 2025-01-23 RX ADMIN — NITROGLYCERIN 0.4 MG: 0.4 TABLET SUBLINGUAL at 14:54

## 2025-01-23 RX ADMIN — OXYCODONE HYDROCHLORIDE 5 MG: 5 TABLET ORAL at 15:36

## 2025-01-23 RX ADMIN — KETOROLAC TROMETHAMINE 15 MG: 15 INJECTION, SOLUTION INTRAMUSCULAR; INTRAVENOUS at 14:50

## 2025-01-23 ASSESSMENT — PAIN - FUNCTIONAL ASSESSMENT: PAIN_FUNCTIONAL_ASSESSMENT: 0-10

## 2025-01-23 ASSESSMENT — PAIN SCALES - GENERAL: PAINLEVEL_OUTOF10: 10

## 2025-01-23 ASSESSMENT — PAIN DESCRIPTION - LOCATION: LOCATION: GENERALIZED

## 2025-01-23 NOTE — ED TRIAGE NOTES
Patient presents to ER ambulatory with chronic worsening of fibromyalgia and chest pain since yesterday.

## 2025-01-23 NOTE — ED PROVIDER NOTES
troponins and therefore can exclude ACS.  However that being said patient has a strong preference to be admitted.  He states he is still in excruciating pain and is therefore given a dose of oxycodone and a lidocaine patch.    Case discussed with Dr. Yepez with cardiology.  Because [facility] stated that he required admission for stress test, I felt that I should talk to cardiology as I feel this is highly unlikely be related to a cardiac process and the patient is stable for discharge however he is better known to [facility] and they have better access to his records.  Dr. Yepez was able to evaluate his previous cardiac catheterization which was done 1 month ago and was normal with all mild findings.  Therefore thinks it is unlikely that he could have any significant acute process especially after having ruled out ACS.  After discussing however recommend starting Ranexa to help with chronic chest pain.  I discussed this with the patient he states people have recommended this to him in the past and therefore he will be given a prescription.  Discussed close follow-up with cardiology.  Discussed pain management with his fibromyalgia.  Recommend follow-up with his primary care doctor however discussed how exercise can be very beneficial for treating fibromyalgia pain.  He is comfortable this plan at this time.    Patient stable for discharge at this time.  Patient is in agreement with the plan to be discharged at this time.  All the patient's questions were answered.  Patient was given written instructions on the diagnosis, and states understanding of the plan moving forward.  We did discuss important signs and symptoms that should prompt quick return to the emergency department.         CRITICAL CARE TIME   Total Critical Care time was 0 minutes, excluding separately reportable procedures.  There was a high probability of clinically significant/life threatening deterioration in the patient's condition which required my

## 2025-01-23 NOTE — ED NOTES
No change in pt's chest pain after NTG SL x2, still 9/10. \"The only [facility] was [facility] combination of some patch they gave me and Dilaudid. Dr. Serrato updated.

## 2025-02-03 ENCOUNTER — HOSPITAL ENCOUNTER (EMERGENCY)
Facility: HOSPITAL | Age: 50
Discharge: HOME | End: 2025-02-03
Payer: MEDICAID

## 2025-02-03 ENCOUNTER — APPOINTMENT (OUTPATIENT)
Facility: HOSPITAL | Age: 50
End: 2025-02-03
Payer: MEDICAID

## 2025-02-03 VITALS
DIASTOLIC BLOOD PRESSURE: 86 MMHG | RESPIRATION RATE: 14 BRPM | OXYGEN SATURATION: 100 % | HEART RATE: 56 BPM | BODY MASS INDEX: 27.2 KG/M2 | HEIGHT: 70 IN | TEMPERATURE: 98.6 F | WEIGHT: 190 LBS | SYSTOLIC BLOOD PRESSURE: 142 MMHG

## 2025-02-03 DIAGNOSIS — I10 ESSENTIAL HYPERTENSION: ICD-10-CM

## 2025-02-03 DIAGNOSIS — R07.9 CHEST PAIN, UNSPECIFIED TYPE: Primary | ICD-10-CM

## 2025-02-03 DIAGNOSIS — F41.1 ANXIETY STATE: ICD-10-CM

## 2025-02-03 LAB
AMPHET UR QL SCN: NEGATIVE
ANION GAP SERPL CALC-SCNC: 3 MMOL/L (ref 3–18)
APPEARANCE UR: CLEAR
BARBITURATES UR QL SCN: NEGATIVE
BASOPHILS # BLD: 0.04 K/UL (ref 0–0.1)
BASOPHILS NFR BLD: 0.6 % (ref 0–2)
BENZODIAZ UR QL: NEGATIVE
BILIRUB UR QL: NEGATIVE
BUN SERPL-MCNC: 12 MG/DL (ref 7–18)
BUN/CREAT SERPL: 12 (ref 12–20)
CALCIUM SERPL-MCNC: 9.8 MG/DL (ref 8.5–10.1)
CANNABINOIDS UR QL SCN: NEGATIVE
CHLORIDE SERPL-SCNC: 104 MMOL/L (ref 100–111)
CO2 SERPL-SCNC: 31 MMOL/L (ref 21–32)
COCAINE UR QL SCN: NEGATIVE
COLOR UR: YELLOW
CREAT SERPL-MCNC: 1.01 MG/DL (ref 0.6–1.3)
D DIMER PPP FEU-MCNC: <0.27 UG/ML(FEU)
DIFFERENTIAL METHOD BLD: NORMAL
EOSINOPHIL # BLD: 0.09 K/UL (ref 0–0.4)
EOSINOPHIL NFR BLD: 1.3 % (ref 0–5)
ERYTHROCYTE [DISTWIDTH] IN BLOOD BY AUTOMATED COUNT: 13.6 % (ref 11.6–14.5)
GLUCOSE SERPL-MCNC: 176 MG/DL (ref 74–99)
GLUCOSE UR STRIP.AUTO-MCNC: NEGATIVE MG/DL
HCT VFR BLD AUTO: 38.6 % (ref 36–48)
HGB BLD-MCNC: 13.2 G/DL (ref 13–16)
HGB UR QL STRIP: NEGATIVE
IMM GRANULOCYTES # BLD AUTO: 0.03 K/UL (ref 0–0.04)
IMM GRANULOCYTES NFR BLD AUTO: 0.4 % (ref 0–0.5)
KETONES UR QL STRIP.AUTO: NEGATIVE MG/DL
LEUKOCYTE ESTERASE UR QL STRIP.AUTO: NEGATIVE
LYMPHOCYTES # BLD: 1.69 K/UL (ref 0.9–3.3)
LYMPHOCYTES NFR BLD: 25.2 % (ref 21–52)
Lab: ABNORMAL
MCH RBC QN AUTO: 29.3 PG (ref 24–34)
MCHC RBC AUTO-ENTMCNC: 34.2 G/DL (ref 31–37)
MCV RBC AUTO: 85.8 FL (ref 78–100)
METHADONE UR QL: NEGATIVE
MONOCYTES # BLD: 0.47 K/UL (ref 0.05–1.2)
MONOCYTES NFR BLD: 7 % (ref 3–10)
NEUTS SEG # BLD: 4.38 K/UL (ref 1.8–8)
NEUTS SEG NFR BLD: 65.5 % (ref 40–73)
NITRITE UR QL STRIP.AUTO: NEGATIVE
NRBC # BLD: 0 K/UL (ref 0–0.01)
NRBC BLD-RTO: 0 PER 100 WBC
OPIATES UR QL: POSITIVE
PCP UR QL: NEGATIVE
PH UR STRIP: 7 (ref 5–8)
PLATELET # BLD AUTO: 222 K/UL (ref 135–420)
PMV BLD AUTO: 9.7 FL (ref 9.2–11.8)
POTASSIUM SERPL-SCNC: 3.5 MMOL/L (ref 3.5–5.5)
PROT UR STRIP-MCNC: NEGATIVE MG/DL
RBC # BLD AUTO: 4.5 M/UL (ref 4.35–5.65)
SODIUM SERPL-SCNC: 138 MMOL/L (ref 136–145)
SP GR UR REFRACTOMETRY: 1.01 (ref 1–1.03)
TROPONIN I SERPL HS-MCNC: 7 NG/L (ref 0–78)
TROPONIN I SERPL HS-MCNC: 7 NG/L (ref 0–78)
UROBILINOGEN UR QL STRIP.AUTO: 0.2 EU/DL (ref 0.2–1)
WBC # BLD AUTO: 6.7 K/UL (ref 4.6–13.2)

## 2025-02-03 PROCEDURE — 81003 URINALYSIS AUTO W/O SCOPE: CPT

## 2025-02-03 PROCEDURE — 6360000002 HC RX W HCPCS: Performed by: NURSE PRACTITIONER

## 2025-02-03 PROCEDURE — 80048 BASIC METABOLIC PNL TOTAL CA: CPT

## 2025-02-03 PROCEDURE — 80307 DRUG TEST PRSMV CHEM ANLYZR: CPT

## 2025-02-03 PROCEDURE — 96375 TX/PRO/DX INJ NEW DRUG ADDON: CPT

## 2025-02-03 PROCEDURE — 84484 ASSAY OF TROPONIN QUANT: CPT

## 2025-02-03 PROCEDURE — 93005 ELECTROCARDIOGRAM TRACING: CPT | Performed by: NURSE PRACTITIONER

## 2025-02-03 PROCEDURE — 93005 ELECTROCARDIOGRAM TRACING: CPT | Performed by: EMERGENCY MEDICINE

## 2025-02-03 PROCEDURE — 6370000000 HC RX 637 (ALT 250 FOR IP): Performed by: NURSE PRACTITIONER

## 2025-02-03 PROCEDURE — 71045 X-RAY EXAM CHEST 1 VIEW: CPT

## 2025-02-03 PROCEDURE — 85379 FIBRIN DEGRADATION QUANT: CPT

## 2025-02-03 PROCEDURE — 85025 COMPLETE CBC W/AUTO DIFF WBC: CPT

## 2025-02-03 PROCEDURE — 96374 THER/PROPH/DIAG INJ IV PUSH: CPT

## 2025-02-03 PROCEDURE — 99285 EMERGENCY DEPT VISIT HI MDM: CPT

## 2025-02-03 RX ORDER — ONDANSETRON 2 MG/ML
4 INJECTION INTRAMUSCULAR; INTRAVENOUS ONCE
Status: COMPLETED | OUTPATIENT
Start: 2025-02-03 | End: 2025-02-03

## 2025-02-03 RX ORDER — KETOROLAC TROMETHAMINE 15 MG/ML
15 INJECTION, SOLUTION INTRAMUSCULAR; INTRAVENOUS
Status: COMPLETED | OUTPATIENT
Start: 2025-02-03 | End: 2025-02-03

## 2025-02-03 RX ORDER — MORPHINE SULFATE 4 MG/ML
4 INJECTION, SOLUTION INTRAMUSCULAR; INTRAVENOUS
Status: COMPLETED | OUTPATIENT
Start: 2025-02-03 | End: 2025-02-03

## 2025-02-03 RX ORDER — ALPRAZOLAM 0.5 MG
0.5 TABLET ORAL
Status: COMPLETED | OUTPATIENT
Start: 2025-02-03 | End: 2025-02-03

## 2025-02-03 RX ADMIN — ONDANSETRON 4 MG: 2 INJECTION, SOLUTION INTRAMUSCULAR; INTRAVENOUS at 19:22

## 2025-02-03 RX ADMIN — MORPHINE SULFATE 4 MG: 4 INJECTION, SOLUTION INTRAMUSCULAR; INTRAVENOUS at 19:22

## 2025-02-03 RX ADMIN — ALPRAZOLAM 0.5 MG: 0.5 TABLET ORAL at 21:47

## 2025-02-03 RX ADMIN — KETOROLAC TROMETHAMINE 15 MG: 15 INJECTION, SOLUTION INTRAMUSCULAR; INTRAVENOUS at 21:47

## 2025-02-03 ASSESSMENT — HEART SCORE: ECG: NORMAL

## 2025-02-03 NOTE — ED TRIAGE NOTES
Patient arrives ambulatory to triage. Patient c/o chest pain that has been going on for three weeks.  Patient states that he is no longer seeing his cardiologist because he was not understanding his chest pain and is not doing anything about it.  States he was seen at [facility] and was told he was going to be admitted for a stress test and cardiogram but he had to leave the hospital before the tests were completed.

## 2025-02-04 NOTE — ED NOTES
Medication given per MAR order. Education regarding medication provided.    Tolerated well with no complaints.     Instructed patient to utilize the call light if he needs anything further.

## 2025-02-04 NOTE — PROGRESS NOTES
Discussed with GONZALO Handley   Patient presented with chronic chest pain symptoms.  Patient EKG is normal without any ischemic EKG changes with minimal bradycardia   Troponins are normal, 7,  7.    Patient have extensive cardiac workup done recently in [facility]   In November 2024 patient have normal nuclear stress test.    In December 2024 patient have abnormal coronary CTA with mild-to-moderate disease in the diagonal branch.    In December of 2024 patient underwent cardiac catheterization with small branch diagonal nonischemic disease with nonischemic IFR.    Mid LAD also have myocardial bridging underwent further ischemic testing with the IFR that was nonischemic.  At this point no evidence of any ACS change in EKG or troponin elevation.  Since patient's symptoms are chronic and recent extensive cardiac testing, I do not see any indication to repeat further ischemic testing.  Patient should have lifestyle changes and follow-up with primary care physician and primary cardiologist.  Please call if you have any questions or concern or change in cardiac status.

## 2025-02-04 NOTE — ED PROVIDER NOTES
[facility] EMERGENCY DEPARTMENT  EMERGENCY DEPARTMENT ENCOUNTER       Pt Name: Erlin Gordon  MRN: [id number]  Birthdate 1975  Date of evaluation: 2/3/2025  PCP: No primary care provider on file.  Note Started: 9:51 PM 2/3/25     CHIEF COMPLAINT       Chief Complaint   Patient presents with    Chest Pain        HISTORY OF PRESENT ILLNESS: 1 or more elements      History From: Patient  HPI Limitations: None  Chronic Conditions: DM, depression, anxiety, cocaine abuse, hypertension, diverticulosis, hyperlipidemia, fibromyalgia, sleep apnea, coronary artery stenosis, congenital anomaly of coronary artery  Social Determinants affecting Dx or Tx: None      Erlin Gordon is a 49 y.o. male with history of hypertension, cocaine abuse, CAD, congenital malformation of coronary artery who presents to ED c/o acute on chronic chest pain.  Patient has been seen multiple times in the [facility] over the last month, states when he was at [facility] They tried to admit him for a repeat stress test but he was unable to stay.  Patient reports pain has been worse over the last 2 to 3 days.  Pain is described as central and aching, worse with activity and accompanied by shortness of breath.  Patient states he does have a cardiologist at [facility] but he \"has been showing away from them because they do not understand his pain and are not treating it.\"  Patient had cardiac catheterization in December 2024.  No leg pain or swelling, no recent long trips or mobility, no fever or chills, no cough.  Patient does report nausea.  No focal weakness or paresthesias.     Nursing Notes were all reviewed and agreed with or any disagreements were addressed in the HPI.    PAST HISTORY     Past Medical History:  Past Medical History:   Diagnosis Date    Adverse effect of anesthesia     pt said BP dropped during surgery    Anxiety and depression     patient states depression was once and has not repeated    Arthritis     COVID     x 3

## 2025-02-04 NOTE — ED NOTES
Report given to MANNIE Hendrix.      Patient information discussed and reviewed per facility protocol.      Outstanding orders reviewed (if applicable).     No applicable questions at this time.      Will sign off from patient.

## 2025-02-04 NOTE — FLOWSHEET NOTE
02/03/25 1930   Vital Signs   Pulse 56   Respirations 14   BP (!) 142/86   MAP (Calculated) 105     Medication given per MAR order. Education regarding medication provided.    Tolerated well with no complaints.     Instructed patient to utilize the call light if h needs anything further.

## 2025-02-05 LAB
EKG ATRIAL RATE: 59 BPM
EKG DIAGNOSIS: NORMAL
EKG P AXIS: 24 DEGREES
EKG P-R INTERVAL: 142 MS
EKG Q-T INTERVAL: 418 MS
EKG QRS DURATION: 100 MS
EKG QTC CALCULATION (BAZETT): 413 MS
EKG R AXIS: -6 DEGREES
EKG T AXIS: 41 DEGREES
EKG VENTRICULAR RATE: 59 BPM

## 2025-02-06 LAB
EKG ATRIAL RATE: 55 BPM
EKG DIAGNOSIS: NORMAL
EKG P AXIS: 58 DEGREES
EKG P-R INTERVAL: 152 MS
EKG Q-T INTERVAL: 430 MS
EKG QRS DURATION: 108 MS
EKG QTC CALCULATION (BAZETT): 411 MS
EKG R AXIS: -13 DEGREES
EKG T AXIS: 43 DEGREES
EKG VENTRICULAR RATE: 55 BPM

## 2025-02-07 ASSESSMENT — HEART SCORE: ECG: NORMAL

## 2025-02-15 ENCOUNTER — APPOINTMENT (OUTPATIENT)
Facility: HOSPITAL | Age: 50
End: 2025-02-15
Payer: MEDICAID

## 2025-02-15 ENCOUNTER — HOSPITAL ENCOUNTER (EMERGENCY)
Facility: HOSPITAL | Age: 50
Discharge: HOME | End: 2025-02-15
Payer: MEDICAID

## 2025-02-15 VITALS
WEIGHT: 190 LBS | TEMPERATURE: 98 F | HEIGHT: 70 IN | HEART RATE: 80 BPM | SYSTOLIC BLOOD PRESSURE: 158 MMHG | RESPIRATION RATE: 18 BRPM | OXYGEN SATURATION: 100 % | DIASTOLIC BLOOD PRESSURE: 97 MMHG | BODY MASS INDEX: 27.2 KG/M2

## 2025-02-15 DIAGNOSIS — R07.9 CHEST PAIN, UNSPECIFIED TYPE: Primary | ICD-10-CM

## 2025-02-15 LAB
ALBUMIN SERPL-MCNC: 3.8 G/DL (ref 3.4–5)
ALBUMIN/GLOB SERPL: 1 (ref 0.8–1.7)
ALP SERPL-CCNC: 83 U/L (ref 45–117)
ALT SERPL-CCNC: 13 U/L (ref 16–61)
ANION GAP SERPL CALC-SCNC: 7 MMOL/L (ref 3–18)
AST SERPL-CCNC: 12 U/L (ref 10–38)
BASOPHILS # BLD: 0.04 K/UL (ref 0–0.1)
BASOPHILS NFR BLD: 0.5 % (ref 0–2)
BILIRUB SERPL-MCNC: 0.3 MG/DL (ref 0.2–1)
BUN SERPL-MCNC: 8 MG/DL (ref 7–18)
BUN/CREAT SERPL: 8 (ref 12–20)
CALCIUM SERPL-MCNC: 9.2 MG/DL (ref 8.5–10.1)
CHLORIDE SERPL-SCNC: 104 MMOL/L (ref 100–111)
CO2 SERPL-SCNC: 26 MMOL/L (ref 21–32)
CREAT SERPL-MCNC: 1 MG/DL (ref 0.6–1.3)
D DIMER PPP FEU-MCNC: 0.28 UG/ML(FEU)
DIFFERENTIAL METHOD BLD: ABNORMAL
EOSINOPHIL # BLD: 0.07 K/UL (ref 0–0.4)
EOSINOPHIL NFR BLD: 0.9 % (ref 0–5)
ERYTHROCYTE [DISTWIDTH] IN BLOOD BY AUTOMATED COUNT: 13.4 % (ref 11.6–14.5)
GLOBULIN SER CALC-MCNC: 3.9 G/DL (ref 2–4)
GLUCOSE SERPL-MCNC: 209 MG/DL (ref 74–99)
HCT VFR BLD AUTO: 43 % (ref 36–48)
HGB BLD-MCNC: 14.1 G/DL (ref 13–16)
IMM GRANULOCYTES # BLD AUTO: 0.03 K/UL (ref 0–0.04)
IMM GRANULOCYTES NFR BLD AUTO: 0.4 % (ref 0–0.5)
LYMPHOCYTES # BLD: 0.76 K/UL (ref 0.9–3.6)
LYMPHOCYTES NFR BLD: 9.7 % (ref 21–52)
MCH RBC QN AUTO: 28.1 PG (ref 24–34)
MCHC RBC AUTO-ENTMCNC: 32.8 G/DL (ref 31–37)
MCV RBC AUTO: 85.7 FL (ref 78–100)
MONOCYTES # BLD: 0.44 K/UL (ref 0.05–1.2)
MONOCYTES NFR BLD: 5.6 % (ref 3–10)
NEUTS SEG # BLD: 6.47 K/UL (ref 1.8–8)
NEUTS SEG NFR BLD: 82.9 % (ref 40–73)
NRBC # BLD: 0 K/UL (ref 0–0.01)
NRBC BLD-RTO: 0 PER 100 WBC
NT PRO BNP: 20 PG/ML (ref 0–900)
PLATELET # BLD AUTO: 232 K/UL (ref 135–420)
PMV BLD AUTO: 9.8 FL (ref 9.2–11.8)
POTASSIUM SERPL-SCNC: 3.6 MMOL/L (ref 3.5–5.5)
PROT SERPL-MCNC: 7.7 G/DL (ref 6.4–8.2)
RBC # BLD AUTO: 5.02 M/UL (ref 4.35–5.65)
SODIUM SERPL-SCNC: 137 MMOL/L (ref 136–145)
TROPONIN I SERPL HS-MCNC: 7 NG/L (ref 0–78)
WBC # BLD AUTO: 7.8 K/UL (ref 4.6–13.2)

## 2025-02-15 PROCEDURE — 96374 THER/PROPH/DIAG INJ IV PUSH: CPT

## 2025-02-15 PROCEDURE — 85025 COMPLETE CBC W/AUTO DIFF WBC: CPT

## 2025-02-15 PROCEDURE — 85379 FIBRIN DEGRADATION QUANT: CPT

## 2025-02-15 PROCEDURE — 96375 TX/PRO/DX INJ NEW DRUG ADDON: CPT

## 2025-02-15 PROCEDURE — 99285 EMERGENCY DEPT VISIT HI MDM: CPT

## 2025-02-15 PROCEDURE — 84484 ASSAY OF TROPONIN QUANT: CPT

## 2025-02-15 PROCEDURE — 80053 COMPREHEN METABOLIC PANEL: CPT

## 2025-02-15 PROCEDURE — 93005 ELECTROCARDIOGRAM TRACING: CPT | Performed by: STUDENT IN AN ORGANIZED HEALTH CARE EDUCATION/TRAINING PROGRAM

## 2025-02-15 PROCEDURE — 6360000002 HC RX W HCPCS: Performed by: PHYSICIAN ASSISTANT

## 2025-02-15 PROCEDURE — 83880 ASSAY OF NATRIURETIC PEPTIDE: CPT

## 2025-02-15 PROCEDURE — 71045 X-RAY EXAM CHEST 1 VIEW: CPT

## 2025-02-15 RX ORDER — MORPHINE SULFATE 2 MG/ML
2 INJECTION, SOLUTION INTRAMUSCULAR; INTRAVENOUS
Status: COMPLETED | OUTPATIENT
Start: 2025-02-15 | End: 2025-02-15

## 2025-02-15 RX ORDER — KETOROLAC TROMETHAMINE 15 MG/ML
15 INJECTION, SOLUTION INTRAMUSCULAR; INTRAVENOUS
Status: COMPLETED | OUTPATIENT
Start: 2025-02-15 | End: 2025-02-15

## 2025-02-15 RX ADMIN — MORPHINE SULFATE 2 MG: 2 INJECTION, SOLUTION INTRAMUSCULAR; INTRAVENOUS at 17:04

## 2025-02-15 RX ADMIN — KETOROLAC TROMETHAMINE 15 MG: 15 INJECTION, SOLUTION INTRAMUSCULAR; INTRAVENOUS at 13:57

## 2025-02-15 ASSESSMENT — PAIN SCALES - GENERAL
PAINLEVEL_OUTOF10: 8
PAINLEVEL_OUTOF10: 9

## 2025-02-15 ASSESSMENT — PAIN DESCRIPTION - LOCATION
LOCATION: CHEST
LOCATION: CHEST

## 2025-02-15 ASSESSMENT — PAIN DESCRIPTION - ORIENTATION: ORIENTATION: LEFT

## 2025-02-15 ASSESSMENT — PAIN - FUNCTIONAL ASSESSMENT: PAIN_FUNCTIONAL_ASSESSMENT: 0-10

## 2025-02-15 ASSESSMENT — PAIN DESCRIPTION - DESCRIPTORS: DESCRIPTORS: PRESSURE;SHARP;TIGHTNESS

## 2025-02-15 NOTE — ED PROVIDER NOTES
EMERGENCY DEPARTMENT HISTORY AND PHYSICAL EXAM      Date: 2/15/2025  Patient Name: Erlin Gordon    History of Presenting Illness     Chief Complaint   Patient presents with    Chest Pain       History (Context): Erlin Gordon is a 49 y.o. male with significant PMHx for sleep apnea, depression, DM, fibromyalgia, htn presents ambulatory to the ED today. Patient reports constant midsternal nonradiating chest pain for the past month.  Patient states he is followed by cardiology at [facility] with multiple \"full workup\" performed and unable to determine etiology. Denies dizziness, shortness of breath, lower leg pain or swelling. Patient reports symptoms are worse with exertion.  Patient has been taking OTC medication without relief of symptoms.  Denies cough, congestion, fever, chills      PCP: No primary care provider on file.    Current Facility-Administered Medications   Medication Dose Route Frequency Provider Last Rate Last Admin    morphine (PF) injection 2 mg  2 mg IntraVENous NOW Dariela Sanchez PA         Current Outpatient Medications   Medication Sig Dispense Refill    metoprolol tartrate (LOPRESSOR) 25 MG tablet Take 1 tablet by mouth 2 times daily 60 tablet 2    amLODIPine (NORVASC) 10 MG tablet Take 1 tablet by mouth daily 90 tablet 0    ranolazine (RANEXA) 500 MG extended release tablet Take 1 tablet by mouth 2 times daily 60 tablet 0    famotidine (PEPCID) 20 MG tablet Take 1 tablet by mouth 2 times daily 60 tablet 0    sertraline (ZOLOFT) 100 MG tablet Take 1 tablet by mouth daily      potassium chloride (KLOR-CON M) 20 MEQ extended release tablet Take 1 tablet by mouth 2 times daily for 4 days 8 tablet 0    ondansetron (ZOFRAN-ODT) 4 MG disintegrating tablet Take 1 tablet by mouth 3 times daily as needed for Nausea or Vomiting 21 tablet 0    pantoprazole (PROTONIX) 40 MG tablet Take 1 tablet by mouth daily 30 tablet 0    buPROPion (WELLBUTRIN) 75 MG tablet Take by mouth Pt unsure of dose

## 2025-02-15 NOTE — ED TRIAGE NOTES
Arrived c/o left sided  chest pain ongoing for 1 month- reports the pain is so bad that he is unable to sleep at night. States he was seen earlier at [facility] but states they told him to get a second opinion. Reports he has a problem in the \"bridge\" of his heart. States he did have a stress test scheduled for [id number] but canceled it after he was told to see another cardiologist as well for differing opinions

## 2025-02-16 LAB
EKG ATRIAL RATE: 84 BPM
EKG DIAGNOSIS: NORMAL
EKG P AXIS: 62 DEGREES
EKG P-R INTERVAL: 150 MS
EKG Q-T INTERVAL: 364 MS
EKG QRS DURATION: 96 MS
EKG QTC CALCULATION (BAZETT): 430 MS
EKG R AXIS: -33 DEGREES
EKG T AXIS: 31 DEGREES
EKG VENTRICULAR RATE: 84 BPM

## 2025-02-16 PROCEDURE — 93010 ELECTROCARDIOGRAM REPORT: CPT | Performed by: INTERNAL MEDICINE

## 2025-02-18 ENCOUNTER — HOSPITAL ENCOUNTER (EMERGENCY)
Facility: HOSPITAL | Age: 50
Discharge: HOME | End: 2025-02-18
Attending: EMERGENCY MEDICINE
Payer: MEDICAID

## 2025-02-18 ENCOUNTER — APPOINTMENT (OUTPATIENT)
Facility: HOSPITAL | Age: 50
End: 2025-02-18
Payer: MEDICAID

## 2025-02-18 VITALS
SYSTOLIC BLOOD PRESSURE: 108 MMHG | OXYGEN SATURATION: 100 % | RESPIRATION RATE: 11 BRPM | HEART RATE: 62 BPM | DIASTOLIC BLOOD PRESSURE: 83 MMHG | TEMPERATURE: 98.1 F

## 2025-02-18 DIAGNOSIS — R07.9 CHEST PAIN, UNSPECIFIED TYPE: Primary | ICD-10-CM

## 2025-02-18 LAB
ALBUMIN SERPL-MCNC: 3.6 G/DL (ref 3.4–5)
ALBUMIN/GLOB SERPL: 1 (ref 0.8–1.7)
ALP SERPL-CCNC: 81 U/L (ref 45–117)
ALT SERPL-CCNC: 15 U/L (ref 16–61)
ANION GAP SERPL CALC-SCNC: 0 MMOL/L (ref 3–18)
AST SERPL-CCNC: 15 U/L (ref 10–38)
BASOPHILS # BLD: 0.05 K/UL (ref 0–0.1)
BASOPHILS NFR BLD: 0.6 % (ref 0–2)
BILIRUB SERPL-MCNC: 0.3 MG/DL (ref 0.2–1)
BUN SERPL-MCNC: 9 MG/DL (ref 7–18)
BUN/CREAT SERPL: 10 (ref 12–20)
CALCIUM SERPL-MCNC: 9.1 MG/DL (ref 8.5–10.1)
CHLORIDE SERPL-SCNC: 102 MMOL/L (ref 100–111)
CO2 SERPL-SCNC: 33 MMOL/L (ref 21–32)
CREAT SERPL-MCNC: 0.94 MG/DL (ref 0.6–1.3)
DIFFERENTIAL METHOD BLD: ABNORMAL
EOSINOPHIL # BLD: 0.1 K/UL (ref 0–0.4)
EOSINOPHIL NFR BLD: 1.2 % (ref 0–5)
ERYTHROCYTE [DISTWIDTH] IN BLOOD BY AUTOMATED COUNT: 12.9 % (ref 11.6–14.5)
GLOBULIN SER CALC-MCNC: 3.5 G/DL (ref 2–4)
GLUCOSE SERPL-MCNC: 201 MG/DL (ref 74–99)
HCT VFR BLD AUTO: 41.4 % (ref 36–48)
HGB BLD-MCNC: 13.7 G/DL (ref 13–16)
IMM GRANULOCYTES # BLD AUTO: 0.04 K/UL (ref 0–0.04)
IMM GRANULOCYTES NFR BLD AUTO: 0.5 % (ref 0–0.5)
LYMPHOCYTES # BLD: 1.18 K/UL (ref 0.9–3.6)
LYMPHOCYTES NFR BLD: 14 % (ref 21–52)
MCH RBC QN AUTO: 29 PG (ref 24–34)
MCHC RBC AUTO-ENTMCNC: 33.1 G/DL (ref 31–37)
MCV RBC AUTO: 87.5 FL (ref 78–100)
MONOCYTES # BLD: 0.55 K/UL (ref 0.05–1.2)
MONOCYTES NFR BLD: 6.5 % (ref 3–10)
NEUTS SEG # BLD: 6.5 K/UL (ref 1.8–8)
NEUTS SEG NFR BLD: 77.2 % (ref 40–73)
NRBC # BLD: 0 K/UL (ref 0–0.01)
NRBC BLD-RTO: 0 PER 100 WBC
PLATELET # BLD AUTO: 232 K/UL (ref 135–420)
PMV BLD AUTO: 9.6 FL (ref 9.2–11.8)
POTASSIUM SERPL-SCNC: 4.3 MMOL/L (ref 3.5–5.5)
PROT SERPL-MCNC: 7.1 G/DL (ref 6.4–8.2)
RBC # BLD AUTO: 4.73 M/UL (ref 4.35–5.65)
SODIUM SERPL-SCNC: 135 MMOL/L (ref 136–145)
TROPONIN I SERPL HS-MCNC: 4 NG/L (ref 0–78)
WBC # BLD AUTO: 8.4 K/UL (ref 4.6–13.2)

## 2025-02-18 PROCEDURE — 84484 ASSAY OF TROPONIN QUANT: CPT

## 2025-02-18 PROCEDURE — 85025 COMPLETE CBC W/AUTO DIFF WBC: CPT

## 2025-02-18 PROCEDURE — 93005 ELECTROCARDIOGRAM TRACING: CPT | Performed by: EMERGENCY MEDICINE

## 2025-02-18 PROCEDURE — 80053 COMPREHEN METABOLIC PANEL: CPT

## 2025-02-18 PROCEDURE — 99284 EMERGENCY DEPT VISIT MOD MDM: CPT

## 2025-02-18 RX ORDER — MORPHINE SULFATE 2 MG/ML
2 INJECTION, SOLUTION INTRAMUSCULAR; INTRAVENOUS
Status: DISCONTINUED | OUTPATIENT
Start: 2025-02-18 | End: 2025-02-18

## 2025-02-18 RX ORDER — ACETAMINOPHEN 325 MG/1
650 TABLET ORAL
Status: DISCONTINUED | OUTPATIENT
Start: 2025-02-18 | End: 2025-02-18 | Stop reason: HOSPADM

## 2025-02-18 ASSESSMENT — ENCOUNTER SYMPTOMS
GASTROINTESTINAL NEGATIVE: 1
CHEST TIGHTNESS: 0
WHEEZING: 0

## 2025-02-18 ASSESSMENT — PAIN - FUNCTIONAL ASSESSMENT: PAIN_FUNCTIONAL_ASSESSMENT: 0-10

## 2025-02-18 ASSESSMENT — PAIN DESCRIPTION - LOCATION: LOCATION: CHEST

## 2025-02-18 ASSESSMENT — PAIN SCALES - GENERAL: PAINLEVEL_OUTOF10: 8

## 2025-02-18 ASSESSMENT — PAIN DESCRIPTION - DESCRIPTORS: DESCRIPTORS: ACHING;BURNING

## 2025-02-19 NOTE — ED NOTES
PT refused all medications offered. PT stated \"I came here for stronger medications than what I take at home. I have nitro and tylenol at home. I dont need that here. I came here for something different and I'm just gonna leave cause y'all aren't gonna give it to me\".

## 2025-02-19 NOTE — ED PROVIDER NOTES
[facility] EMERGENCY DEPARTMENT  EMERGENCY DEPARTMENT ENCOUNTER      Pt Name: Erlin Gordon  MRN: [id number]  Birthdate 1975  Date of evaluation: 2/18/2025  Provider: CIELO VAZQUEZ MD  9:02 PM    CHIEF COMPLAINT       Chief Complaint   Patient presents with    Chest Pain         HISTORY OF PRESENT ILLNESS    rElin Gordon is a 49 y.o. male who presents to the emergency department     49-year-old male past medical history of anxiety mood disorder diabetes hypertension chronic pain drug-seeking behavior cocaine abuse alcohol abuse and dyslipidemia presents the emergency department chest pain.  Chest pain is left-sided nonradiating.  Patient was just admitted to the [facility] and left AGAINST MEDICAL ADVICE.  He says he has coronary artery disease no interventions were done.  Patient has no nausea no vomiting no dyspnea on exertion or shortness of breath.  No abdominal pain or back pain.  Nothing makes pain worse but is better with morphine and nitroglycerin.  No other issues expressed.    The history is provided by the patient and medical records. No  was used.       Nursing Notes were reviewed.    REVIEW OF SYSTEMS       Review of Systems   Respiratory:  Negative for chest tightness and wheezing.    Cardiovascular:  Positive for chest pain.   Gastrointestinal: Negative.    Neurological:  Negative for dizziness and syncope.   Hematological: Negative.        Except as noted above the remainder of the review of systems was reviewed and negative.       PAST MEDICAL HISTORY     Past Medical History:   Diagnosis Date    Adverse effect of anesthesia     pt said BP dropped during surgery    Anxiety and depression     patient states depression was once and has not repeated    Arthritis     COVID     x 3 no hospitalization    Depression     patient denies depression    Diabetes mellitus (HCC)     Displacement of lumbar intervertebral disc     Diverticulitis large

## 2025-02-19 NOTE — ED NOTES
PT offered toradol by MD Walter. PT refused stating \"I don't want that. I've taken that before and it does nothing for me. Tell the doc to just keep it. I don't need to waste my time with that\".

## 2025-02-19 NOTE — ED NOTES
Connected pt to monitor   Changed pt into gown  Pt has call bell, bed in lowest position   Pt stated that he needs pain meds and something or nausea. Provider notified

## 2025-02-20 ENCOUNTER — HOSPITAL ENCOUNTER (EMERGENCY)
Facility: HOSPITAL | Age: 50
Discharge: HOME | End: 2025-02-21
Attending: EMERGENCY MEDICINE
Payer: MEDICAID

## 2025-02-20 ENCOUNTER — HOSPITAL ENCOUNTER (EMERGENCY)
Facility: HOSPITAL | Age: 50
Discharge: HOME | End: 2025-02-20
Attending: EMERGENCY MEDICINE
Payer: MEDICAID

## 2025-02-20 ENCOUNTER — APPOINTMENT (OUTPATIENT)
Facility: HOSPITAL | Age: 50
End: 2025-02-20
Payer: MEDICAID

## 2025-02-20 VITALS
OXYGEN SATURATION: 100 % | HEART RATE: 61 BPM | RESPIRATION RATE: 19 BRPM | DIASTOLIC BLOOD PRESSURE: 87 MMHG | TEMPERATURE: 98.5 F | SYSTOLIC BLOOD PRESSURE: 138 MMHG

## 2025-02-20 DIAGNOSIS — R07.9 CHEST PAIN, UNSPECIFIED TYPE: Primary | ICD-10-CM

## 2025-02-20 DIAGNOSIS — R07.89 ATYPICAL CHEST PAIN: Primary | ICD-10-CM

## 2025-02-20 LAB
ANION GAP SERPL CALC-SCNC: 5 MMOL/L (ref 3–18)
BASOPHILS # BLD: 0.05 K/UL (ref 0–0.1)
BASOPHILS NFR BLD: 0.7 % (ref 0–2)
BUN SERPL-MCNC: 10 MG/DL (ref 7–18)
BUN/CREAT SERPL: 11 (ref 12–20)
CALCIUM SERPL-MCNC: 9 MG/DL (ref 8.5–10.1)
CHLORIDE SERPL-SCNC: 101 MMOL/L (ref 100–111)
CO2 SERPL-SCNC: 29 MMOL/L (ref 21–32)
CREAT SERPL-MCNC: 0.88 MG/DL (ref 0.6–1.3)
DIFFERENTIAL METHOD BLD: ABNORMAL
EOSINOPHIL # BLD: 0.06 K/UL (ref 0–0.4)
EOSINOPHIL NFR BLD: 0.8 % (ref 0–5)
ERYTHROCYTE [DISTWIDTH] IN BLOOD BY AUTOMATED COUNT: 12.9 % (ref 11.6–14.5)
GLUCOSE SERPL-MCNC: 145 MG/DL (ref 74–99)
HCT VFR BLD AUTO: 42.7 % (ref 36–48)
HGB BLD-MCNC: 14.3 G/DL (ref 13–16)
IMM GRANULOCYTES # BLD AUTO: 0.03 K/UL (ref 0–0.04)
IMM GRANULOCYTES NFR BLD AUTO: 0.4 % (ref 0–0.5)
LYMPHOCYTES # BLD: 1.47 K/UL (ref 0.9–3.6)
LYMPHOCYTES NFR BLD: 20.2 % (ref 21–52)
MAGNESIUM SERPL-MCNC: 1.8 MG/DL (ref 1.6–2.6)
MCH RBC QN AUTO: 29 PG (ref 24–34)
MCHC RBC AUTO-ENTMCNC: 33.5 G/DL (ref 31–37)
MCV RBC AUTO: 86.6 FL (ref 78–100)
MONOCYTES # BLD: 0.51 K/UL (ref 0.05–1.2)
MONOCYTES NFR BLD: 7 % (ref 3–10)
NEUTS SEG # BLD: 5.17 K/UL (ref 1.8–8)
NEUTS SEG NFR BLD: 70.9 % (ref 40–73)
NRBC # BLD: 0 K/UL (ref 0–0.01)
NRBC BLD-RTO: 0 PER 100 WBC
PLATELET # BLD AUTO: 258 K/UL (ref 135–420)
PMV BLD AUTO: 9.3 FL (ref 9.2–11.8)
POTASSIUM SERPL-SCNC: 3.8 MMOL/L (ref 3.5–5.5)
RBC # BLD AUTO: 4.93 M/UL (ref 4.35–5.65)
SODIUM SERPL-SCNC: 135 MMOL/L (ref 136–145)
WBC # BLD AUTO: 7.3 K/UL (ref 4.6–13.2)

## 2025-02-20 PROCEDURE — 99284 EMERGENCY DEPT VISIT MOD MDM: CPT

## 2025-02-20 PROCEDURE — 99285 EMERGENCY DEPT VISIT HI MDM: CPT

## 2025-02-20 PROCEDURE — 80048 BASIC METABOLIC PNL TOTAL CA: CPT

## 2025-02-20 PROCEDURE — 93005 ELECTROCARDIOGRAM TRACING: CPT | Performed by: EMERGENCY MEDICINE

## 2025-02-20 PROCEDURE — 83735 ASSAY OF MAGNESIUM: CPT

## 2025-02-20 PROCEDURE — 71045 X-RAY EXAM CHEST 1 VIEW: CPT

## 2025-02-20 PROCEDURE — 85025 COMPLETE CBC W/AUTO DIFF WBC: CPT

## 2025-02-20 PROCEDURE — 84484 ASSAY OF TROPONIN QUANT: CPT

## 2025-02-20 RX ORDER — METHOCARBAMOL 500 MG/1
1500 TABLET, FILM COATED ORAL
Status: DISCONTINUED | OUTPATIENT
Start: 2025-02-20 | End: 2025-02-20 | Stop reason: HOSPADM

## 2025-02-20 RX ORDER — ACETAMINOPHEN 500 MG
1000 TABLET ORAL
Status: DISCONTINUED | OUTPATIENT
Start: 2025-02-20 | End: 2025-02-20 | Stop reason: HOSPADM

## 2025-02-20 RX ORDER — LIDOCAINE 4 G/G
1 PATCH TOPICAL
Status: DISCONTINUED | OUTPATIENT
Start: 2025-02-20 | End: 2025-02-20 | Stop reason: HOSPADM

## 2025-02-20 RX ORDER — DIPHENHYDRAMINE HCL 25 MG
25 CAPSULE ORAL
Status: DISCONTINUED | OUTPATIENT
Start: 2025-02-20 | End: 2025-02-20 | Stop reason: HOSPADM

## 2025-02-20 ASSESSMENT — PAIN DESCRIPTION - ORIENTATION
ORIENTATION: LEFT
ORIENTATION: MID

## 2025-02-20 ASSESSMENT — PAIN DESCRIPTION - LOCATION
LOCATION: CHEST
LOCATION: CHEST

## 2025-02-20 ASSESSMENT — PAIN DESCRIPTION - ONSET: ONSET: SUDDEN

## 2025-02-20 ASSESSMENT — PAIN DESCRIPTION - DESCRIPTORS: DESCRIPTORS: SHARP

## 2025-02-20 ASSESSMENT — PAIN DESCRIPTION - FREQUENCY
FREQUENCY: CONTINUOUS
FREQUENCY: CONTINUOUS

## 2025-02-20 ASSESSMENT — PAIN DESCRIPTION - PAIN TYPE
TYPE: ACUTE PAIN
TYPE: ACUTE PAIN

## 2025-02-20 ASSESSMENT — PAIN SCALES - GENERAL
PAINLEVEL_OUTOF10: 9
PAINLEVEL_OUTOF10: 10

## 2025-02-20 ASSESSMENT — PAIN - FUNCTIONAL ASSESSMENT
PAIN_FUNCTIONAL_ASSESSMENT: 0-10
PAIN_FUNCTIONAL_ASSESSMENT: 0-10
PAIN_FUNCTIONAL_ASSESSMENT: ACTIVITIES ARE NOT PREVENTED
PAIN_FUNCTIONAL_ASSESSMENT: ACTIVITIES ARE NOT PREVENTED

## 2025-02-21 VITALS
RESPIRATION RATE: 12 BRPM | WEIGHT: 190 LBS | BODY MASS INDEX: 27.2 KG/M2 | HEART RATE: 62 BPM | OXYGEN SATURATION: 100 % | SYSTOLIC BLOOD PRESSURE: 122 MMHG | TEMPERATURE: 97.9 F | DIASTOLIC BLOOD PRESSURE: 75 MMHG | HEIGHT: 70 IN

## 2025-02-21 LAB
AMPHET UR QL SCN: NEGATIVE
APPEARANCE UR: NORMAL
BARBITURATES UR QL SCN: NEGATIVE
BENZODIAZ UR QL: POSITIVE
BILIRUB UR QL: NEGATIVE
CANNABINOIDS UR QL SCN: NEGATIVE
COCAINE UR QL SCN: NEGATIVE
COLOR UR: YELLOW
EKG ATRIAL RATE: 55 BPM
EKG ATRIAL RATE: 59 BPM
EKG ATRIAL RATE: 59 BPM
EKG DIAGNOSIS: NORMAL
EKG P AXIS: 19 DEGREES
EKG P AXIS: 22 DEGREES
EKG P AXIS: 25 DEGREES
EKG P-R INTERVAL: 148 MS
EKG P-R INTERVAL: 148 MS
EKG P-R INTERVAL: 156 MS
EKG Q-T INTERVAL: 394 MS
EKG Q-T INTERVAL: 414 MS
EKG Q-T INTERVAL: 436 MS
EKG QRS DURATION: 102 MS
EKG QRS DURATION: 104 MS
EKG QRS DURATION: 108 MS
EKG QTC CALCULATION (BAZETT): 390 MS
EKG QTC CALCULATION (BAZETT): 409 MS
EKG QTC CALCULATION (BAZETT): 417 MS
EKG R AXIS: -12 DEGREES
EKG R AXIS: -19 DEGREES
EKG R AXIS: -40 DEGREES
EKG T AXIS: 23 DEGREES
EKG T AXIS: 26 DEGREES
EKG T AXIS: 31 DEGREES
EKG VENTRICULAR RATE: 55 BPM
EKG VENTRICULAR RATE: 59 BPM
EKG VENTRICULAR RATE: 59 BPM
GLUCOSE UR STRIP.AUTO-MCNC: NEGATIVE MG/DL
HGB UR QL STRIP: NEGATIVE
KETONES UR QL STRIP.AUTO: NEGATIVE MG/DL
LEUKOCYTE ESTERASE UR QL STRIP.AUTO: NEGATIVE
Lab: ABNORMAL
METHADONE UR QL: NEGATIVE
NITRITE UR QL STRIP.AUTO: NEGATIVE
OPIATES UR QL: NEGATIVE
PCP UR QL: NEGATIVE
PH UR STRIP: 7.5 (ref 5–8)
PROT UR STRIP-MCNC: NEGATIVE MG/DL
SP GR UR REFRACTOMETRY: 1.01 (ref 1–1.03)
TROPONIN I SERPL HS-MCNC: 4 NG/L (ref 0–78)
TROPONIN I SERPL HS-MCNC: 6 NG/L (ref 0–78)
UROBILINOGEN UR QL STRIP.AUTO: 1 EU/DL (ref 0.2–1)

## 2025-02-21 PROCEDURE — 6370000000 HC RX 637 (ALT 250 FOR IP): Performed by: EMERGENCY MEDICINE

## 2025-02-21 PROCEDURE — 93010 ELECTROCARDIOGRAM REPORT: CPT | Performed by: INTERNAL MEDICINE

## 2025-02-21 PROCEDURE — 81003 URINALYSIS AUTO W/O SCOPE: CPT

## 2025-02-21 PROCEDURE — 6360000002 HC RX W HCPCS: Performed by: EMERGENCY MEDICINE

## 2025-02-21 PROCEDURE — 80307 DRUG TEST PRSMV CHEM ANLYZR: CPT

## 2025-02-21 RX ORDER — KETOROLAC TROMETHAMINE 15 MG/ML
15 INJECTION, SOLUTION INTRAMUSCULAR; INTRAVENOUS
Status: COMPLETED | OUTPATIENT
Start: 2025-02-21 | End: 2025-02-21

## 2025-02-21 RX ORDER — ACETAMINOPHEN 500 MG
1000 TABLET ORAL
Status: COMPLETED | OUTPATIENT
Start: 2025-02-21 | End: 2025-02-21

## 2025-02-21 RX ORDER — NITROGLYCERIN 0.4 MG/1
0.4 TABLET SUBLINGUAL
Status: COMPLETED | OUTPATIENT
Start: 2025-02-21 | End: 2025-02-21

## 2025-02-21 RX ORDER — OXYCODONE AND ACETAMINOPHEN 5; 325 MG/1; MG/1
1 TABLET ORAL EVERY 6 HOURS PRN
Qty: 10 TABLET | Refills: 0 | Status: SHIPPED | OUTPATIENT
Start: 2025-02-21 | End: 2025-02-24

## 2025-02-21 RX ADMIN — KETOROLAC TROMETHAMINE 15 MG: 15 INJECTION, SOLUTION INTRAMUSCULAR; INTRAVENOUS at 01:09

## 2025-02-21 RX ADMIN — NITROGLYCERIN 0.4 MG: 0.4 TABLET SUBLINGUAL at 01:07

## 2025-02-21 RX ADMIN — ACETAMINOPHEN 1000 MG: 500 TABLET ORAL at 02:22

## 2025-02-21 ASSESSMENT — PAIN DESCRIPTION - LOCATION
LOCATION: CHEST

## 2025-02-21 ASSESSMENT — LIFESTYLE VARIABLES
HOW OFTEN DO YOU HAVE A DRINK CONTAINING ALCOHOL: NEVER
HOW MANY STANDARD DRINKS CONTAINING ALCOHOL DO YOU HAVE ON A TYPICAL DAY: PATIENT DOES NOT DRINK

## 2025-02-21 ASSESSMENT — PAIN DESCRIPTION - DESCRIPTORS
DESCRIPTORS: ACHING

## 2025-02-21 ASSESSMENT — ENCOUNTER SYMPTOMS
EYES NEGATIVE: 1
GASTROINTESTINAL NEGATIVE: 1
RESPIRATORY NEGATIVE: 1

## 2025-02-21 ASSESSMENT — PAIN SCALES - GENERAL
PAINLEVEL_OUTOF10: 10

## 2025-02-21 ASSESSMENT — PAIN DESCRIPTION - ORIENTATION: ORIENTATION: LEFT

## 2025-02-21 NOTE — ED NOTES
Discharge instructions reviewed with patient. Verbalized understanding. PIV removed. Patient ambulatory with steady gait to the ED lobby in [place].

## 2025-02-21 NOTE — ED PROVIDER NOTES
[facility] EMERGENCY DEPARTMENT  EMERGENCY DEPARTMENT ENCOUNTER      Pt Name: Erlin Gordon  MRN: [id number]  Birthdate 1975  Date of evaluation: 2/20/2025  Provider: CIELO VAZQUEZ MD  10:44 PM    CHIEF COMPLAINT       Chief Complaint   Patient presents with    Chest Pain         HISTORY OF PRESENT ILLNESS    Erlin Gordon is a 49 y.o. male who presents to the emergency department     49-year-old male past medical history of hypertension cocaine abuse alcohol abuse presents emergency department with chest pain.  Patient is going to multiple hospitals for this chest pain.  He just left [facility].  Yesterday he went to [facility].  I saw him 2 days ago and he left [facility] AGAINST MEDICAL ADVICE.  He is requesting narcotics for his chest pain he said morphine is the only thing that makes him feel better.    The history is provided by the patient. No  was used.       Nursing Notes were reviewed.    REVIEW OF SYSTEMS       Review of Systems   Cardiovascular:  Positive for chest pain. Negative for palpitations and leg swelling.       Except as noted above the remainder of the review of systems was reviewed and negative.       PAST MEDICAL HISTORY     Past Medical History:   Diagnosis Date    Adverse effect of anesthesia     pt said BP dropped during surgery    Anxiety and depression     patient states depression was once and has not repeated    Arthritis     COVID     x 3 no hospitalization    Depression     patient denies depression    Diabetes mellitus (HCC)     Displacement of lumbar intervertebral disc     Diverticulitis large intestine     Drug-seeking behavior     56 prescriptions from 26 different prescribers at 11 different pharmacies in last 12 months    Fibromyalgia     Herniated lumbar intervertebral disc     HTN (hypertension)     Hypertension     Left lumbar radiculopathy     Neurological disorder L3,4,5 torn Herniated disc    Obesity (BMI        Physical Exam  Vitals and nursing note reviewed.   Constitutional:       Appearance: Normal appearance.   HENT:      Head: Normocephalic and atraumatic.      Nose: Nose normal.      Mouth/Throat:      Mouth: Mucous membranes are moist.   Cardiovascular:      Rate and Rhythm: Normal rate and regular rhythm.   Pulmonary:      Effort: Pulmonary effort is normal. No respiratory distress.      Breath sounds: Normal breath sounds. No stridor. No wheezing, rhonchi or rales.   Chest:      Chest wall: No tenderness.   Abdominal:      Palpations: Abdomen is soft.      Tenderness: There is no abdominal tenderness.   Musculoskeletal:         General: Normal range of motion.      Cervical back: Neck supple.   Skin:     General: Skin is warm.      Capillary Refill: Capillary refill takes less than 2 seconds.   Neurological:      Mental Status: He is alert and oriented to person, place, and time.         DIAGNOSTIC RESULTS     EKG: All EKG's are interpreted by the Emergency Department Physician who either signs or Co-signs this chart in the absence of a cardiologist.    Normal sinus rhythm normal axis normal intervals no ST elevation or depression    RADIOLOGY:   Non-plain film images such as CT, Ultrasound and MRI are read by the radiologist. Plain radiographic images are visualized and preliminarily interpreted by the emergency physician with the below findings:        Interpretation per the Radiologist below, if available at the time of this note:    XR CHEST PORTABLE    (Results Pending)         ED BEDSIDE ULTRASOUND:   Performed by ED Physician - none    LABS:  Labs Reviewed   BASIC METABOLIC PANEL - Abnormal; Notable for the following components:       Result Value    Sodium 135 (*)     Glucose 145 (*)     BUN/Creatinine Ratio 11 (*)     All other components within normal limits   CBC WITH AUTO DIFFERENTIAL - Abnormal; Notable for the following components:    Lymphocytes % 20.2 (*)     All other components within

## 2025-02-21 NOTE — ED TRIAGE NOTES
Patient presents to the ED for evaluation of CP. Patient states he has been to [facility] and here for the same thing. He had a heart cath in December 2024. Patient states this time, his CP is causing n/v. He took 2 sublingual nitro. One at 1800 and the second one at 1900. No relief. Rates pain 10/10

## 2025-02-21 NOTE — ED PROVIDER NOTES
[facility] EMERGENCY DEPARTMENT  eMERGENCY dEPARTMENT eNCOUnter      Pt Name: Erlin Gordon  MRN: [id number]  Birthdate 1975 of evaluation: 2/20/2025  Provider:Eloy Reyes MD    CHIEF COMPLAINT       HPI    Erlin Gordon is a 49 y.o. male  present to the ER with C/O having chest pain. He went to [facility] ER this evening for chest pain and was unhappy with his care. He left [place] and went to [facility] ER requesting opiate analgesia.  The ER attendant examined and him and reviewed his medical records. Patient was requesting opiates for pain. He claimed that his chest pain is due to cardiac ischemia.  However, the cardiologist at [facility] states his chest pain is non cardiac at present and has an opiate seeking history.  He was not given any opiates and  got upset and left [facility] ER.  He then  came to [facility] ER.  He C/O having left anterior chest pain.        ROS  Review of Systems   Constitutional: Negative.    HENT: Negative.     Eyes: Negative.    Respiratory: Negative.     Cardiovascular:  Positive for chest pain. Negative for palpitations and leg swelling.   Gastrointestinal: Negative.    Genitourinary: Negative.    Musculoskeletal: Negative.    Skin: Negative.    Neurological: Negative.    Psychiatric/Behavioral: Negative.     All other systems reviewed and are negative.    Except as noted above the remainder of the review of systems was reviewed and negative.       PAST MEDICAL HISTORY     Past Medical History:   Diagnosis Date    Adverse effect of anesthesia     pt said BP dropped during surgery    Anxiety and depression     patient states depression was once and has not repeated    Arthritis     COVID     x 3 no hospitalization    Depression     patient denies depression    Diabetes mellitus (HCC)     Displacement of lumbar intervertebral disc     Diverticulitis large intestine     Drug-seeking behavior     56 prescriptions from 26 different prescribers at 11 different     T Axis 23 degrees    Diagnosis       Sinus bradycardia  Left axis deviation  Abnormal ECG  When compared with ECG of 18-FEB-2025 20:11,  No significant change was found     Basic Metabolic Panel    Collection Time: 02/20/25  9:48 PM   Result Value Ref Range    Sodium 135 (L) 136 - 145 mmol/L    Potassium 3.8 3.5 - 5.5 mmol/L    Chloride 101 100 - 111 mmol/L    CO2 29 21 - 32 mmol/L    Anion Gap 5 3.0 - 18 mmol/L    Glucose 145 (H) 74 - 99 mg/dL    BUN 10 7.0 - 18 MG/DL    Creatinine 0.88 0.6 - 1.3 MG/DL    BUN/Creatinine Ratio 11 (L) 12 - 20      Est, Glom Filt Rate >90 >60 ml/min/1.73m2    Calcium 9.0 8.5 - 10.1 MG/DL   CBC with Auto Differential    Collection Time: 02/20/25  9:48 PM   Result Value Ref Range    WBC 7.3 4.6 - 13.2 K/uL    RBC 4.93 4.35 - 5.65 M/uL    Hemoglobin 14.3 13.0 - 16.0 g/dL    Hematocrit 42.7 36.0 - 48.0 %    MCV 86.6 78.0 - 100.0 FL    MCH 29.0 24.0 - 34.0 PG    MCHC 33.5 31.0 - 37.0 g/dL    RDW 12.9 11.6 - 14.5 %    Platelets 258 135 - 420 K/uL    MPV 9.3 9.2 - 11.8 FL    Nucleated RBCs 0.0 0  WBC    nRBC 0.00 0.00 - 0.01 K/uL    Neutrophils % 70.9 40.0 - 73.0 %    Lymphocytes % 20.2 (L) 21.0 - 52.0 %    Monocytes % 7.0 3.0 - 10.0 %    Eosinophils % 0.8 0.0 - 5.0 %    Basophils % 0.7 0.0 - 2.0 %    Immature Granulocytes % 0.4 0.0 - 0.5 %    Neutrophils Absolute 5.17 1.80 - 8.00 K/UL    Lymphocytes Absolute 1.47 0.90 - 3.60 K/UL    Monocytes Absolute 0.51 0.05 - 1.20 K/UL    Eosinophils Absolute 0.06 0.00 - 0.40 K/UL    Basophils Absolute 0.05 0.00 - 0.10 K/UL    Immature Granulocytes Absolute 0.03 0.00 - 0.04 K/UL    Differential Type AUTOMATED     Magnesium    Collection Time: 02/20/25  9:48 PM   Result Value Ref Range    Magnesium 1.8 1.6 - 2.6 mg/dL   Troponin    Collection Time: 02/20/25  9:48 PM   Result Value Ref Range    Troponin, High Sensitivity 4 0 - 78 ng/L   EKG 12 Lead    Collection Time: 02/20/25 11:09 PM   Result Value Ref Range    Ventricular Rate 55 BPM

## 2025-02-21 NOTE — ED TRIAGE NOTES
Patient A/O x 4, presented to the ED with complaint of chronic chest pain. Patient was evaluated and discharged from [facility] ED less than 1 hour ago. Patient denies other complaints.

## 2025-02-21 NOTE — ED NOTES
Patient asked to have his IV removed. IV removed. Discharge papers explained and given to patient. Patient ambulated to ER waiting room.

## 2025-02-24 ENCOUNTER — HOSPITAL ENCOUNTER (EMERGENCY)
Facility: HOSPITAL | Age: 50
Discharge: HOME | End: 2025-02-24
Attending: EMERGENCY MEDICINE
Payer: MEDICAID

## 2025-02-24 ENCOUNTER — TELEPHONE (OUTPATIENT)
Age: 50
End: 2025-02-24

## 2025-02-24 VITALS
BODY MASS INDEX: 27.2 KG/M2 | RESPIRATION RATE: 18 BRPM | WEIGHT: 190 LBS | HEIGHT: 70 IN | TEMPERATURE: 97.8 F | OXYGEN SATURATION: 99 % | HEART RATE: 70 BPM | DIASTOLIC BLOOD PRESSURE: 94 MMHG | SYSTOLIC BLOOD PRESSURE: 142 MMHG

## 2025-02-24 DIAGNOSIS — R07.9 CHEST PAIN, UNSPECIFIED TYPE: Primary | ICD-10-CM

## 2025-02-24 PROCEDURE — 93005 ELECTROCARDIOGRAM TRACING: CPT | Performed by: EMERGENCY MEDICINE

## 2025-02-24 PROCEDURE — 99283 EMERGENCY DEPT VISIT LOW MDM: CPT

## 2025-02-24 ASSESSMENT — PAIN SCALES - GENERAL: PAINLEVEL_OUTOF10: 4

## 2025-02-24 ASSESSMENT — PAIN DESCRIPTION - DESCRIPTORS: DESCRIPTORS: ACHING

## 2025-02-24 ASSESSMENT — PAIN - FUNCTIONAL ASSESSMENT
PAIN_FUNCTIONAL_ASSESSMENT: PREVENTS OR INTERFERES WITH ALL ACTIVE AND SOME PASSIVE ACTIVITIES
PAIN_FUNCTIONAL_ASSESSMENT: 0-10

## 2025-02-24 ASSESSMENT — PAIN DESCRIPTION - FREQUENCY: FREQUENCY: CONTINUOUS

## 2025-02-24 ASSESSMENT — PAIN DESCRIPTION - PAIN TYPE: TYPE: ACUTE PAIN

## 2025-02-24 ASSESSMENT — PAIN DESCRIPTION - ONSET: ONSET: GRADUAL

## 2025-02-24 ASSESSMENT — PAIN DESCRIPTION - LOCATION: LOCATION: CHEST

## 2025-02-24 ASSESSMENT — ENCOUNTER SYMPTOMS: GASTROINTESTINAL NEGATIVE: 1

## 2025-02-24 NOTE — TELEPHONE ENCOUNTER
confirmed patient identity with full name and . Pt expressed severe CP for 2 months, worsening w exertion, SOB, waking up in the night sweating, states it affects his daily activities- has needed to call out of work, \"cannot function like this\". Claims he has been seen in ED 15+ times but each time has normal troponins so he is never admitted. States he had a cardiac cath in December and doctor told him there is a problem with his \"bridge\" that is causing his pain and he needs it to be fixed. Chart review appears that patient has left ED AMA on multiple occasions this past week.   Patient expressed frustration with situation and pain, states he needs to be seen today. I explained that the soonest I could get him an appointment is in 2 weeks.   I instructed the patient that he should return to the emergency room if he feels CP is worsening or that this is an emergency. He expressed frustration that ED docs \"brush him off because troponin is always normal\" and \"a different doctor needs to call them and tell them to admit me\". I assured patient that we would schedule his new patient appointment on 3/11 and reinforced that he should return to the ED if his condition worsens.

## 2025-02-24 NOTE — TELEPHONE ENCOUNTER
Spoke with and reviewed the patients complaints with Dr. Rik Kaba. We will keep patients appointment on 3/11. Patient cannot be seen sooner at this time.    Contacted the patient by phone, confirmed identity with two patient identifiers, informed patient that we are unable to see him sooner at this time. Will keep his appointment on 3/11 and contact him if any sooner availability opens up. Patient was agreeable and stated he'd just have to keep going to the emergency room.

## 2025-02-24 NOTE — ED PROVIDER NOTES
[facility] EMERGENCY DEPARTMENT  EMERGENCY DEPARTMENT ENCOUNTER      Pt Name: Erlin Gordon  MRN: [id number]  Birthdate 1975  Date of evaluation: 2/24/2025  Provider: CIELO VAZQUEZ MD  12:51 PM    CHIEF COMPLAINT       Chief Complaint   Patient presents with    Chest Pain         HISTORY OF PRESENT ILLNESS    Erlin Gordon is a 49 y.o. male who presents to the emergency department     49-year-old male with multiple comorbid conditions presents emergency department chest pain.  Patient has been seen by myself twice in the past week.  He has been to multiple ED's.  He says he wants his \"bridge fix\" in his heart.  Patient was not aware this was a freestanding ED and cardiology was not available.  He has follow-up with Dr. Kaba on February 11 at 11:00.  He does not want any treatment by us.  Patient wants to leave.    The history is provided by the patient. No  was used.       Nursing Notes were reviewed.    REVIEW OF SYSTEMS       Review of Systems   Cardiovascular:  Positive for chest pain.   Gastrointestinal: Negative.        Except as noted above the remainder of the review of systems was reviewed and negative.       PAST MEDICAL HISTORY     Past Medical History:   Diagnosis Date    Adverse effect of anesthesia     pt said BP dropped during surgery    Anxiety and depression     patient states depression was once and has not repeated    Arthritis     COVID     x 3 no hospitalization    Depression     patient denies depression    Diabetes mellitus (HCC)     Displacement of lumbar intervertebral disc     Diverticulitis large intestine     Drug-seeking behavior     56 prescriptions from 26 different prescribers at 11 different pharmacies in last 12 months    Fibromyalgia     Herniated lumbar intervertebral disc     HTN (hypertension)     Hypertension     Left lumbar radiculopathy     Neurological disorder L3,4,5 torn Herniated disc    Obesity (BMI 30.0-34.9)     Sleep

## 2025-02-24 NOTE — ED TRIAGE NOTES
Patient arrived to ER with complaints of chest pain for a year and dizziness for past week. Patient had an appointment with cardiology and decided he wanted to change from [facility] cardiology to [facility] cardiology. Patient has an appointment on 3/11/25 with [facility] cardiology.

## 2025-02-24 NOTE — TELEPHONE ENCOUNTER
----- Message from DENIS Beckett NP sent at 2/21/2025  4:33 PM EST -----  Patient would like to establish care with cardiology. He would prefer the [place] office.     Thanks, Petty

## 2025-02-24 NOTE — TELEPHONE ENCOUNTER
Patient called at my direct line stating that his chest pain is severe, he went to the ED at [facility] and was told his EKG was normal and there was no reason to admit him. He states the ED doctor told him to call this office to try to have a 2D echocardiogram done today. I explained to the patient that if he needs cardiac testing done, typically the providers would write an order to do so or send a referral to the practice. Patient states he has percocet at home and it is not helping the pain at all. Patient states he is not upset with the office, but wants testing done or needs to be admitted because the pain is unbearable. Asked if the doctor here could call the ED so he can be admitted. I explained to this patient that this doctor has not seen him so he cannot make referrals or recommendations on his care. Patient expressed understanding. Stated he will come to the office now if we could get him an appointment because he needs to see someone. Explained to the patient how full our schedule is and expressed that he was scheduled at the soonest available as an urgent patient. Patient requested I speak with the doctors for him to be seen this week. I told patient I will call him back after discussing with manager.

## 2025-02-25 LAB
EKG ATRIAL RATE: 67 BPM
EKG DIAGNOSIS: NORMAL
EKG P AXIS: 25 DEGREES
EKG P-R INTERVAL: 146 MS
EKG Q-T INTERVAL: 382 MS
EKG QRS DURATION: 108 MS
EKG QTC CALCULATION (BAZETT): 403 MS
EKG R AXIS: -33 DEGREES
EKG T AXIS: 40 DEGREES
EKG VENTRICULAR RATE: 67 BPM

## 2025-03-11 ENCOUNTER — OFFICE VISIT (OUTPATIENT)
Age: 50
End: 2025-03-11
Payer: MEDICAID

## 2025-03-11 VITALS
SYSTOLIC BLOOD PRESSURE: 117 MMHG | TEMPERATURE: 96.9 F | HEART RATE: 62 BPM | OXYGEN SATURATION: 100 % | BODY MASS INDEX: 27.97 KG/M2 | DIASTOLIC BLOOD PRESSURE: 76 MMHG | HEIGHT: 70 IN | WEIGHT: 195.4 LBS

## 2025-03-11 DIAGNOSIS — R06.02 EXERTIONAL SHORTNESS OF BREATH: ICD-10-CM

## 2025-03-11 DIAGNOSIS — E11.9 TYPE 2 DIABETES MELLITUS WITHOUT COMPLICATION, WITHOUT LONG-TERM CURRENT USE OF INSULIN (HCC): ICD-10-CM

## 2025-03-11 DIAGNOSIS — R01.1 SYSTOLIC MURMUR: ICD-10-CM

## 2025-03-11 DIAGNOSIS — R00.2 PALPITATIONS: ICD-10-CM

## 2025-03-11 DIAGNOSIS — R07.9 CHEST PAIN, UNSPECIFIED TYPE: ICD-10-CM

## 2025-03-11 DIAGNOSIS — I10 PRIMARY HYPERTENSION: ICD-10-CM

## 2025-03-11 DIAGNOSIS — G47.00 FREQUENT NOCTURNAL AWAKENING: ICD-10-CM

## 2025-03-11 DIAGNOSIS — I25.119 CORONARY ARTERY DISEASE INVOLVING NATIVE CORONARY ARTERY OF NATIVE HEART WITH ANGINA PECTORIS: Primary | ICD-10-CM

## 2025-03-11 PROCEDURE — 3078F DIAST BP <80 MM HG: CPT | Performed by: INTERNAL MEDICINE

## 2025-03-11 PROCEDURE — 3074F SYST BP LT 130 MM HG: CPT | Performed by: INTERNAL MEDICINE

## 2025-03-11 PROCEDURE — 99205 OFFICE O/P NEW HI 60 MIN: CPT | Performed by: INTERNAL MEDICINE

## 2025-03-11 RX ORDER — ALPRAZOLAM 0.5 MG
TABLET ORAL
COMMUNITY
Start: 2025-02-11

## 2025-03-11 RX ORDER — ASPIRIN 81 MG/1
81 TABLET, CHEWABLE ORAL DAILY
COMMUNITY
Start: 2024-12-05

## 2025-03-11 RX ORDER — AMLODIPINE BESYLATE 10 MG/1
10 TABLET ORAL DAILY
COMMUNITY
Start: 2024-12-31

## 2025-03-11 RX ORDER — ROSUVASTATIN CALCIUM 40 MG/1
TABLET, COATED ORAL
COMMUNITY
Start: 2024-12-04

## 2025-03-11 ASSESSMENT — PATIENT HEALTH QUESTIONNAIRE - PHQ9
2. FEELING DOWN, DEPRESSED OR HOPELESS: NOT AT ALL
1. LITTLE INTEREST OR PLEASURE IN DOING THINGS: NOT AT ALL
SUM OF ALL RESPONSES TO PHQ QUESTIONS 1-9: 0

## 2025-03-11 ASSESSMENT — ENCOUNTER SYMPTOMS
GASTROINTESTINAL NEGATIVE: 1
SHORTNESS OF BREATH: 1
EYES NEGATIVE: 1
ALLERGIC/IMMUNOLOGIC NEGATIVE: 1

## 2025-03-11 NOTE — PROGRESS NOTES
1. \"Have you been to the ER, urgent care clinic since your last visit?  Hospitalized since your last visit?\" Reviewed by Dr. Rik Kaba  2. \"Have you seen or consulted any other health care providers outside of the [facility] since your last visit?\" Reviewed by Dr. Rik Kaba

## 2025-03-11 NOTE — PROGRESS NOTES
Erlin Gordon (:  1975) is a 49 y.o. male,New patient, here for evaluation of the following chief complaint(s):  New Patient and Chest Pain (Worsens w/ exertion)      Subjective   SUBJECTIVE/OBJECTIVE:    History of Present Illness  The patient is a 49-year-old male who presents for evaluation of chest pain, hypertension, diabetes mellitus, sleep apnea, and shortness of breath.    He has been experiencing cardiac issues for the past 2 years, characterized by chest pain, perspiration, and nausea. He describes his chest pain as sharp, akin to being struck by lightning. He has undergone 2 cardiac catheterizations, one approximately 1.5 years ago and another in 2024, due to arterial blockages ranging from 40% to 70%. Despite these interventions, he continues to experience severe chest pain, necessitating frequent hospital visits. A stress test was not performed during his last hospital stay due to a family emergency. Upon his return, he was advised to consult a cardiologist. 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 He was informed that his myocardial bridge could be causing his symptoms by preventing arterial expansion during increased blood flow. He has been compliant with his medication regimen, which has significantly improved his condition. He has been advised to undergo a sleep study but has expressed reluctance. He was previously prescribed Ambien, which he found beneficial. He has a history of L4-L5 back surgery and reports leg swelling, but no foot swelling. He has a family history of congestive heart failure in his mother.    He also reports hypertension, which is well-controlled except for occasional spikes at night. He is requesting refills of his amlodipine and metoprolol prescriptions, as his pharmacy will not refill ER prescriptions. He initially experienced heart palpitations, which were effectively managed with metoprolol within a month.    He has been living with

## 2025-03-12 ENCOUNTER — TELEPHONE (OUTPATIENT)
Age: 50
End: 2025-03-12

## 2025-03-12 LAB
CRP SERPL-MCNC: 1 MG/L (ref 0–10)
ERYTHROCYTE [SEDIMENTATION RATE] IN BLOOD BY WESTERGREN METHOD: 34 MM/HR (ref 0–30)

## 2025-03-12 NOTE — TELEPHONE ENCOUNTER
Pt called to inquire about his meds that he said were supposed to be put in yesterday.  Pt stated that he \"really needs his med\" due to his heart condition.  Pt asked if someone from the clinical staff could contact him to give him an update.  Pt was told that a msg would be sent to the clinical staff.

## 2025-03-18 ENCOUNTER — APPOINTMENT (OUTPATIENT)
Facility: HOSPITAL | Age: 50
End: 2025-03-18
Payer: MEDICAID

## 2025-03-18 ENCOUNTER — HOSPITAL ENCOUNTER (OUTPATIENT)
Facility: HOSPITAL | Age: 50
Setting detail: OBSERVATION
Discharge: HOME | End: 2025-03-19
Attending: INTERNAL MEDICINE | Admitting: INTERNAL MEDICINE
Payer: MEDICAID

## 2025-03-18 DIAGNOSIS — Q24.5 MYOCARDIAL BRIDGE: Primary | ICD-10-CM

## 2025-03-18 DIAGNOSIS — R07.9 CHEST PAIN, UNSPECIFIED TYPE: ICD-10-CM

## 2025-03-18 LAB
AMPHET UR QL SCN: NEGATIVE
ANION GAP SERPL CALC-SCNC: 5 MMOL/L (ref 3–18)
BARBITURATES UR QL SCN: NEGATIVE
BASOPHILS # BLD: 0.04 K/UL (ref 0–0.1)
BASOPHILS NFR BLD: 0.6 % (ref 0–2)
BENZODIAZ UR QL: POSITIVE
BUN SERPL-MCNC: 11 MG/DL (ref 7–18)
BUN/CREAT SERPL: 10 (ref 12–20)
CALCIUM SERPL-MCNC: 9.4 MG/DL (ref 8.5–10.1)
CANNABINOIDS UR QL SCN: POSITIVE
CHLORIDE SERPL-SCNC: 100 MMOL/L (ref 100–111)
CO2 SERPL-SCNC: 30 MMOL/L (ref 21–32)
COCAINE UR QL SCN: POSITIVE
CREAT SERPL-MCNC: 1.09 MG/DL (ref 0.6–1.3)
D DIMER PPP FEU-MCNC: <0.27 UG/ML(FEU)
DIFFERENTIAL METHOD BLD: ABNORMAL
EOSINOPHIL # BLD: 0.09 K/UL (ref 0–0.4)
EOSINOPHIL NFR BLD: 1.3 % (ref 0–5)
ERYTHROCYTE [DISTWIDTH] IN BLOOD BY AUTOMATED COUNT: 12.8 % (ref 11.6–14.5)
GLUCOSE SERPL-MCNC: 250 MG/DL (ref 74–99)
HCT VFR BLD AUTO: 40.8 % (ref 36–48)
HGB BLD-MCNC: 13.7 G/DL (ref 13–16)
IMM GRANULOCYTES # BLD AUTO: 0.03 K/UL (ref 0–0.04)
IMM GRANULOCYTES NFR BLD AUTO: 0.4 % (ref 0–0.5)
LYMPHOCYTES # BLD: 1.57 K/UL (ref 0.9–3.6)
LYMPHOCYTES NFR BLD: 22.5 % (ref 21–52)
Lab: ABNORMAL
MAGNESIUM SERPL-MCNC: 1.9 MG/DL (ref 1.6–2.6)
MCH RBC QN AUTO: 28.4 PG (ref 24–34)
MCHC RBC AUTO-ENTMCNC: 33.6 G/DL (ref 31–37)
MCV RBC AUTO: 84.5 FL (ref 78–100)
METHADONE UR QL: NEGATIVE
MONOCYTES # BLD: 0.41 K/UL (ref 0.05–1.2)
MONOCYTES NFR BLD: 5.9 % (ref 3–10)
NEUTS SEG # BLD: 4.84 K/UL (ref 1.8–8)
NEUTS SEG NFR BLD: 69.3 % (ref 40–73)
NRBC # BLD: 0 K/UL (ref 0–0.01)
NRBC BLD-RTO: 0 PER 100 WBC
OPIATES UR QL: POSITIVE
PCP UR QL: NEGATIVE
PLATELET # BLD AUTO: 297 K/UL (ref 135–420)
PMV BLD AUTO: 9.1 FL (ref 9.2–11.8)
POTASSIUM SERPL-SCNC: 3.4 MMOL/L (ref 3.5–5.5)
RBC # BLD AUTO: 4.83 M/UL (ref 4.35–5.65)
SODIUM SERPL-SCNC: 135 MMOL/L (ref 136–145)
TROPONIN I SERPL HS-MCNC: 11 NG/L (ref 0–78)
TROPONIN I SERPL HS-MCNC: 8 NG/L (ref 0–78)
WBC # BLD AUTO: 7 K/UL (ref 4.6–13.2)

## 2025-03-18 PROCEDURE — 6360000002 HC RX W HCPCS: Performed by: HEALTH CARE PROVIDER

## 2025-03-18 PROCEDURE — 83735 ASSAY OF MAGNESIUM: CPT

## 2025-03-18 PROCEDURE — 84484 ASSAY OF TROPONIN QUANT: CPT

## 2025-03-18 PROCEDURE — G0378 HOSPITAL OBSERVATION PER HR: HCPCS

## 2025-03-18 PROCEDURE — 96375 TX/PRO/DX INJ NEW DRUG ADDON: CPT

## 2025-03-18 PROCEDURE — 80048 BASIC METABOLIC PNL TOTAL CA: CPT

## 2025-03-18 PROCEDURE — 6370000000 HC RX 637 (ALT 250 FOR IP): Performed by: HEALTH CARE PROVIDER

## 2025-03-18 PROCEDURE — 96374 THER/PROPH/DIAG INJ IV PUSH: CPT

## 2025-03-18 PROCEDURE — 71046 X-RAY EXAM CHEST 2 VIEWS: CPT

## 2025-03-18 PROCEDURE — 93005 ELECTROCARDIOGRAM TRACING: CPT | Performed by: INTERNAL MEDICINE

## 2025-03-18 PROCEDURE — 85025 COMPLETE CBC W/AUTO DIFF WBC: CPT

## 2025-03-18 PROCEDURE — 85379 FIBRIN DEGRADATION QUANT: CPT

## 2025-03-18 PROCEDURE — 99285 EMERGENCY DEPT VISIT HI MDM: CPT

## 2025-03-18 PROCEDURE — 80307 DRUG TEST PRSMV CHEM ANLYZR: CPT

## 2025-03-18 PROCEDURE — 96376 TX/PRO/DX INJ SAME DRUG ADON: CPT

## 2025-03-18 RX ORDER — ONDANSETRON 2 MG/ML
4 INJECTION INTRAMUSCULAR; INTRAVENOUS
Status: COMPLETED | OUTPATIENT
Start: 2025-03-18 | End: 2025-03-18

## 2025-03-18 RX ORDER — MORPHINE SULFATE 4 MG/ML
4 INJECTION, SOLUTION INTRAMUSCULAR; INTRAVENOUS ONCE
Refills: 0 | Status: COMPLETED | OUTPATIENT
Start: 2025-03-18 | End: 2025-03-18

## 2025-03-18 RX ORDER — ASPIRIN 81 MG/1
324 TABLET, CHEWABLE ORAL ONCE
Status: COMPLETED | OUTPATIENT
Start: 2025-03-18 | End: 2025-03-18

## 2025-03-18 RX ORDER — METOPROLOL TARTRATE 25 MG/1
25 TABLET, FILM COATED ORAL
Status: COMPLETED | OUTPATIENT
Start: 2025-03-18 | End: 2025-03-18

## 2025-03-18 RX ADMIN — ONDANSETRON 4 MG: 2 INJECTION, SOLUTION INTRAMUSCULAR; INTRAVENOUS at 20:02

## 2025-03-18 RX ADMIN — METOPROLOL TARTRATE 25 MG: 25 TABLET, FILM COATED ORAL at 21:34

## 2025-03-18 RX ADMIN — ONDANSETRON 4 MG: 2 INJECTION, SOLUTION INTRAMUSCULAR; INTRAVENOUS at 23:14

## 2025-03-18 RX ADMIN — POTASSIUM BICARBONATE 20 MEQ: 782 TABLET, EFFERVESCENT ORAL at 20:06

## 2025-03-18 RX ADMIN — ASPIRIN 81 MG CHEWABLE TABLET 324 MG: 81 TABLET CHEWABLE at 20:02

## 2025-03-18 RX ADMIN — MORPHINE SULFATE 4 MG: 4 INJECTION, SOLUTION INTRAMUSCULAR; INTRAVENOUS at 20:02

## 2025-03-18 RX ADMIN — MORPHINE SULFATE 4 MG: 4 INJECTION, SOLUTION INTRAMUSCULAR; INTRAVENOUS at 23:15

## 2025-03-18 ASSESSMENT — PAIN DESCRIPTION - FREQUENCY
FREQUENCY: CONTINUOUS
FREQUENCY: CONTINUOUS

## 2025-03-18 ASSESSMENT — PAIN DESCRIPTION - PAIN TYPE
TYPE: ACUTE PAIN
TYPE: ACUTE PAIN

## 2025-03-18 ASSESSMENT — PAIN SCALES - GENERAL
PAINLEVEL_OUTOF10: 8
PAINLEVEL_OUTOF10: 3
PAINLEVEL_OUTOF10: 10
PAINLEVEL_OUTOF10: 9

## 2025-03-18 ASSESSMENT — PAIN DESCRIPTION - ONSET
ONSET: ON-GOING
ONSET: ON-GOING

## 2025-03-18 ASSESSMENT — PAIN DESCRIPTION - ORIENTATION
ORIENTATION: LEFT
ORIENTATION: MID;RIGHT;LEFT
ORIENTATION: RIGHT;LEFT;LOWER;MID
ORIENTATION: RIGHT;LEFT;LOWER;MID

## 2025-03-18 ASSESSMENT — PAIN DESCRIPTION - LOCATION
LOCATION: CHEST

## 2025-03-18 ASSESSMENT — PAIN - FUNCTIONAL ASSESSMENT
PAIN_FUNCTIONAL_ASSESSMENT: ACTIVITIES ARE NOT PREVENTED
PAIN_FUNCTIONAL_ASSESSMENT: 0-10
PAIN_FUNCTIONAL_ASSESSMENT: ACTIVITIES ARE NOT PREVENTED
PAIN_FUNCTIONAL_ASSESSMENT: ACTIVITIES ARE NOT PREVENTED

## 2025-03-18 ASSESSMENT — PAIN DESCRIPTION - DESCRIPTORS
DESCRIPTORS: SHARP;SHOOTING
DESCRIPTORS: SHARP
DESCRIPTORS: ACHING
DESCRIPTORS: ACHING

## 2025-03-18 NOTE — ED NOTES
Charge nurse made aware x2 pt needs bed on MTA for active cp, extensive cardiac hx to include stent placement and a bridge. Pt in triage with active substernal cp that radiates

## 2025-03-18 NOTE — ED PROVIDER NOTES
EMERGENCY DEPARTMENT HISTORY AND PHYSICAL EXAM        Date: 3/18/2025  Patient Name: Erlin Gordon    History of Presenting Illness     Chief Complaint   Patient presents with    Chest Pain       History Provided By: History obtained from patient    HPI: Erlin Gordon, 49 y.o. male presents to the ED with cc of acute on chronic chest pain    Patient reports having chest pain for several months that is waxed and waned but over the last 3 days it has been constant.  In the emergency department pain is rated as an 8 out of 10 made worse with motion, location is substernal does not radiate.  He has nausea without vomiting.  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  Patient reports that [facility] cardiology was back and forth about stent placement he became frustrated with their practice and has recently established care with Dr. Sendy Ribera.  Patient denies history of heart attack does not smoke.  He does have diabetes on metformin hypertension hyperlipidemia says that both parents  before the age of 46 mother had CHF and dad was an alcoholic.  He was seen earlier today at the [facility] Emergency department for abdominal pain.  He said that he intentionally did not bring up his cardiac complaints because he was dissatisfied with [facility] cardiology and that is why he is here today.  He did have an outpatient nuclear stress test scheduled by Dr. Kaba for today but he reported his symptoms to Dr. Kaba and was advised to come to the emergency department for evaluation.  He reports taking 3 nitroglycerin tablets today with some relief of symptoms.      Pmh: Coronary artery disease with stents, myocardial bridge (abnormal anatomy)    No nausea, vomiting, diarrhea, fever, chills, shortness of breath, leg swelling     There are no other complaints, changes, or physical findings at this time.    Records Reviewed:

## 2025-03-18 NOTE — ED TRIAGE NOTES
Pt presents ambulatory to ED for c/o cp that is in center of chest and radiate to left chest and down arm. P reports that he has cardiac hx of 3 stents and bridge placement. Pt reports he was to have stress test today but was advised buy Dr. Chino to comes to ED to be evaluated instead. Pt reports he took 3 nitros today with no relief, last dose at 1700.

## 2025-03-19 ENCOUNTER — APPOINTMENT (OUTPATIENT)
Facility: HOSPITAL | Age: 50
End: 2025-03-19
Payer: MEDICAID

## 2025-03-19 ENCOUNTER — TELEPHONE (OUTPATIENT)
Age: 50
End: 2025-03-19

## 2025-03-19 ENCOUNTER — HOSPITAL ENCOUNTER (OUTPATIENT)
Facility: HOSPITAL | Age: 50
Setting detail: OBSERVATION
Discharge: HOME | End: 2025-03-21
Payer: MEDICAID

## 2025-03-19 VITALS
SYSTOLIC BLOOD PRESSURE: 123 MMHG | DIASTOLIC BLOOD PRESSURE: 82 MMHG | HEART RATE: 51 BPM | BODY MASS INDEX: 27.92 KG/M2 | WEIGHT: 195 LBS | HEIGHT: 70 IN

## 2025-03-19 VITALS
SYSTOLIC BLOOD PRESSURE: 124 MMHG | TEMPERATURE: 97.7 F | HEART RATE: 54 BPM | RESPIRATION RATE: 16 BRPM | HEIGHT: 70 IN | OXYGEN SATURATION: 98 % | BODY MASS INDEX: 27.96 KG/M2 | DIASTOLIC BLOOD PRESSURE: 81 MMHG | WEIGHT: 195.33 LBS

## 2025-03-19 PROBLEM — Q24.5 MYOCARDIAL BRIDGE: Status: ACTIVE | Noted: 2025-03-19

## 2025-03-19 LAB
ECHO BSA: 2.09 M2
EKG ATRIAL RATE: 98 BPM
EKG DIAGNOSIS: NORMAL
EKG P AXIS: 60 DEGREES
EKG P-R INTERVAL: 146 MS
EKG Q-T INTERVAL: 348 MS
EKG QRS DURATION: 102 MS
EKG QTC CALCULATION (BAZETT): 444 MS
EKG R AXIS: -14 DEGREES
EKG T AXIS: 19 DEGREES
EKG VENTRICULAR RATE: 98 BPM
NUC STRESS EJECTION FRACTION: 60 %
STRESS BASELINE DIAS BP: 82 MMHG
STRESS BASELINE HR: 52 BPM
STRESS BASELINE ST DEPRESSION: 0 MM
STRESS BASELINE SYS BP: 123 MMHG
STRESS ESTIMATED WORKLOAD: 1 METS
STRESS PEAK DIAS BP: 87 MMHG
STRESS PEAK SYS BP: 130 MMHG
STRESS PERCENT HR ACHIEVED: 46 %
STRESS POST PEAK HR: 79 BPM
STRESS RATE PRESSURE PRODUCT: NORMAL BPM*MMHG
STRESS TARGET HR: 170 BPM
TID: 1.02
TROPONIN I SERPL HS-MCNC: 14 NG/L (ref 0–78)

## 2025-03-19 PROCEDURE — 96376 TX/PRO/DX INJ SAME DRUG ADON: CPT

## 2025-03-19 PROCEDURE — 84484 ASSAY OF TROPONIN QUANT: CPT

## 2025-03-19 PROCEDURE — 6370000000 HC RX 637 (ALT 250 FOR IP): Performed by: INTERNAL MEDICINE

## 2025-03-19 PROCEDURE — A9502 TC99M TETROFOSMIN: HCPCS | Performed by: PHYSICIAN ASSISTANT

## 2025-03-19 PROCEDURE — 93010 ELECTROCARDIOGRAM REPORT: CPT | Performed by: INTERNAL MEDICINE

## 2025-03-19 PROCEDURE — G0378 HOSPITAL OBSERVATION PER HR: HCPCS

## 2025-03-19 PROCEDURE — 96372 THER/PROPH/DIAG INJ SC/IM: CPT

## 2025-03-19 PROCEDURE — 2500000003 HC RX 250 WO HCPCS: Performed by: INTERNAL MEDICINE

## 2025-03-19 PROCEDURE — 6360000002 HC RX W HCPCS: Performed by: PHYSICIAN ASSISTANT

## 2025-03-19 PROCEDURE — 3430000000 HC RX DIAGNOSTIC RADIOPHARMACEUTICAL: Performed by: PHYSICIAN ASSISTANT

## 2025-03-19 PROCEDURE — 6360000002 HC RX W HCPCS: Performed by: INTERNAL MEDICINE

## 2025-03-19 PROCEDURE — 99239 HOSP IP/OBS DSCHRG MGMT >30: CPT | Performed by: HOSPITALIST

## 2025-03-19 PROCEDURE — 94761 N-INVAS EAR/PLS OXIMETRY MLT: CPT

## 2025-03-19 PROCEDURE — 93017 CV STRESS TEST TRACING ONLY: CPT

## 2025-03-19 PROCEDURE — 6370000000 HC RX 637 (ALT 250 FOR IP): Performed by: PHYSICIAN ASSISTANT

## 2025-03-19 RX ORDER — ROSUVASTATIN CALCIUM 20 MG/1
40 TABLET, COATED ORAL NIGHTLY
Status: DISCONTINUED | OUTPATIENT
Start: 2025-03-19 | End: 2025-03-19 | Stop reason: HOSPADM

## 2025-03-19 RX ORDER — MAGNESIUM SULFATE IN WATER 40 MG/ML
2000 INJECTION, SOLUTION INTRAVENOUS PRN
Status: DISCONTINUED | OUTPATIENT
Start: 2025-03-19 | End: 2025-03-19 | Stop reason: HOSPADM

## 2025-03-19 RX ORDER — LIDOCAINE 4 G/G
1 PATCH TOPICAL ONCE
Status: DISCONTINUED | OUTPATIENT
Start: 2025-03-19 | End: 2025-03-19

## 2025-03-19 RX ORDER — CARVEDILOL 6.25 MG/1
6.25 TABLET ORAL 2 TIMES DAILY WITH MEALS
Status: DISCONTINUED | OUTPATIENT
Start: 2025-03-19 | End: 2025-03-19 | Stop reason: HOSPADM

## 2025-03-19 RX ORDER — SODIUM CHLORIDE 0.9 % (FLUSH) 0.9 %
5-40 SYRINGE (ML) INJECTION EVERY 12 HOURS SCHEDULED
Status: DISCONTINUED | OUTPATIENT
Start: 2025-03-19 | End: 2025-03-19 | Stop reason: HOSPADM

## 2025-03-19 RX ORDER — SODIUM CHLORIDE 9 MG/ML
INJECTION, SOLUTION INTRAVENOUS PRN
Status: DISCONTINUED | OUTPATIENT
Start: 2025-03-19 | End: 2025-03-19 | Stop reason: HOSPADM

## 2025-03-19 RX ORDER — POTASSIUM CHLORIDE 1500 MG/1
40 TABLET, EXTENDED RELEASE ORAL PRN
Status: DISCONTINUED | OUTPATIENT
Start: 2025-03-19 | End: 2025-03-19 | Stop reason: HOSPADM

## 2025-03-19 RX ORDER — ONDANSETRON 4 MG/1
4 TABLET, ORALLY DISINTEGRATING ORAL EVERY 8 HOURS PRN
Status: DISCONTINUED | OUTPATIENT
Start: 2025-03-19 | End: 2025-03-19 | Stop reason: HOSPADM

## 2025-03-19 RX ORDER — PANTOPRAZOLE SODIUM 40 MG/1
40 TABLET, DELAYED RELEASE ORAL
Status: DISCONTINUED | OUTPATIENT
Start: 2025-03-20 | End: 2025-03-19 | Stop reason: HOSPADM

## 2025-03-19 RX ORDER — ACETAMINOPHEN 650 MG/1
650 SUPPOSITORY RECTAL EVERY 6 HOURS PRN
Status: DISCONTINUED | OUTPATIENT
Start: 2025-03-19 | End: 2025-03-19 | Stop reason: HOSPADM

## 2025-03-19 RX ORDER — AMLODIPINE BESYLATE 10 MG/1
10 TABLET ORAL DAILY
Qty: 30 TABLET | Refills: 3 | Status: SHIPPED | OUTPATIENT
Start: 2025-03-20

## 2025-03-19 RX ORDER — KETOROLAC TROMETHAMINE 15 MG/ML
15 INJECTION, SOLUTION INTRAMUSCULAR; INTRAVENOUS EVERY 6 HOURS PRN
Status: DISCONTINUED | OUTPATIENT
Start: 2025-03-19 | End: 2025-03-19 | Stop reason: HOSPADM

## 2025-03-19 RX ORDER — IBUPROFEN 400 MG/1
800 TABLET, FILM COATED ORAL EVERY 8 HOURS PRN
Status: DISCONTINUED | OUTPATIENT
Start: 2025-03-19 | End: 2025-03-19

## 2025-03-19 RX ORDER — LIDOCAINE 4 G/G
1 PATCH TOPICAL
Status: DISCONTINUED | OUTPATIENT
Start: 2025-03-19 | End: 2025-03-19 | Stop reason: HOSPADM

## 2025-03-19 RX ORDER — POTASSIUM CHLORIDE 7.45 MG/ML
10 INJECTION INTRAVENOUS PRN
Status: DISCONTINUED | OUTPATIENT
Start: 2025-03-19 | End: 2025-03-19 | Stop reason: HOSPADM

## 2025-03-19 RX ORDER — ENOXAPARIN SODIUM 100 MG/ML
40 INJECTION SUBCUTANEOUS DAILY
Status: DISCONTINUED | OUTPATIENT
Start: 2025-03-19 | End: 2025-03-19 | Stop reason: HOSPADM

## 2025-03-19 RX ORDER — ACETAMINOPHEN 325 MG/1
650 TABLET ORAL EVERY 6 HOURS PRN
Status: DISCONTINUED | OUTPATIENT
Start: 2025-03-19 | End: 2025-03-19 | Stop reason: HOSPADM

## 2025-03-19 RX ORDER — HYDROCHLOROTHIAZIDE 25 MG/1
25 TABLET ORAL DAILY
Status: DISCONTINUED | OUTPATIENT
Start: 2025-03-19 | End: 2025-03-19 | Stop reason: HOSPADM

## 2025-03-19 RX ORDER — ALPRAZOLAM 0.5 MG
0.5 TABLET ORAL 3 TIMES DAILY PRN
Status: DISCONTINUED | OUTPATIENT
Start: 2025-03-19 | End: 2025-03-19 | Stop reason: HOSPADM

## 2025-03-19 RX ORDER — POLYETHYLENE GLYCOL 3350 17 G/17G
17 POWDER, FOR SOLUTION ORAL DAILY PRN
Status: DISCONTINUED | OUTPATIENT
Start: 2025-03-19 | End: 2025-03-19 | Stop reason: HOSPADM

## 2025-03-19 RX ORDER — AMLODIPINE BESYLATE 10 MG/1
10 TABLET ORAL DAILY
Status: DISCONTINUED | OUTPATIENT
Start: 2025-03-19 | End: 2025-03-19 | Stop reason: HOSPADM

## 2025-03-19 RX ORDER — ONDANSETRON 2 MG/ML
4 INJECTION INTRAMUSCULAR; INTRAVENOUS EVERY 6 HOURS PRN
Status: DISCONTINUED | OUTPATIENT
Start: 2025-03-19 | End: 2025-03-19 | Stop reason: HOSPADM

## 2025-03-19 RX ORDER — METOPROLOL TARTRATE 25 MG/1
25 TABLET, FILM COATED ORAL 2 TIMES DAILY
Status: DISCONTINUED | OUTPATIENT
Start: 2025-03-19 | End: 2025-03-19

## 2025-03-19 RX ORDER — HYDROMORPHONE HYDROCHLORIDE 2 MG/1
2 TABLET ORAL EVERY 4 HOURS PRN
COMMUNITY
Start: 2025-03-18 | End: 2025-03-23

## 2025-03-19 RX ORDER — REGADENOSON 0.08 MG/ML
0.4 INJECTION, SOLUTION INTRAVENOUS
Status: COMPLETED | OUTPATIENT
Start: 2025-03-19 | End: 2025-03-19

## 2025-03-19 RX ORDER — ASPIRIN 81 MG/1
81 TABLET, CHEWABLE ORAL DAILY
Status: DISCONTINUED | OUTPATIENT
Start: 2025-03-19 | End: 2025-03-19 | Stop reason: HOSPADM

## 2025-03-19 RX ORDER — SODIUM CHLORIDE 0.9 % (FLUSH) 0.9 %
5-40 SYRINGE (ML) INJECTION PRN
Status: DISCONTINUED | OUTPATIENT
Start: 2025-03-19 | End: 2025-03-19 | Stop reason: HOSPADM

## 2025-03-19 RX ADMIN — ALPRAZOLAM 0.5 MG: 0.5 TABLET ORAL at 01:21

## 2025-03-19 RX ADMIN — ENOXAPARIN SODIUM 40 MG: 100 INJECTION SUBCUTANEOUS at 09:44

## 2025-03-19 RX ADMIN — REGADENOSON 0.4 MG: 0.08 INJECTION, SOLUTION INTRAVENOUS at 12:54

## 2025-03-19 RX ADMIN — ASPIRIN 81 MG CHEWABLE TABLET 81 MG: 81 TABLET CHEWABLE at 09:44

## 2025-03-19 RX ADMIN — SODIUM CHLORIDE, PRESERVATIVE FREE 10 ML: 5 INJECTION INTRAVENOUS at 09:45

## 2025-03-19 RX ADMIN — TETROFOSMIN 11 MILLICURIE: 1.38 INJECTION, POWDER, LYOPHILIZED, FOR SOLUTION INTRAVENOUS at 10:15

## 2025-03-19 RX ADMIN — ONDANSETRON 4 MG: 2 INJECTION, SOLUTION INTRAMUSCULAR; INTRAVENOUS at 01:44

## 2025-03-19 RX ADMIN — TETROFOSMIN 33 MILLICURIE: 1.38 INJECTION, POWDER, LYOPHILIZED, FOR SOLUTION INTRAVENOUS at 12:55

## 2025-03-19 RX ADMIN — ALPRAZOLAM 0.5 MG: 0.5 TABLET ORAL at 09:44

## 2025-03-19 RX ADMIN — ALPRAZOLAM 0.5 MG: 0.5 TABLET ORAL at 14:49

## 2025-03-19 RX ADMIN — SODIUM CHLORIDE, PRESERVATIVE FREE 10 ML: 5 INJECTION INTRAVENOUS at 01:45

## 2025-03-19 RX ADMIN — ROSUVASTATIN CALCIUM 40 MG: 20 TABLET, FILM COATED ORAL at 01:21

## 2025-03-19 RX ADMIN — METOPROLOL TARTRATE 25 MG: 25 TABLET, FILM COATED ORAL at 01:21

## 2025-03-19 RX ADMIN — ACETAMINOPHEN 650 MG: 325 TABLET ORAL at 02:35

## 2025-03-19 RX ADMIN — IBUPROFEN 800 MG: 400 TABLET, FILM COATED ORAL at 01:21

## 2025-03-19 RX ADMIN — AMLODIPINE BESYLATE 10 MG: 10 TABLET ORAL at 09:44

## 2025-03-19 ASSESSMENT — ENCOUNTER SYMPTOMS
COUGH: 0
SHORTNESS OF BREATH: 0
VOMITING: 0
DIARRHEA: 0
WHEEZING: 0
NAUSEA: 0
ABDOMINAL PAIN: 0
STRIDOR: 0
CHEST TIGHTNESS: 0

## 2025-03-19 ASSESSMENT — PAIN SCALES - GENERAL
PAINLEVEL_OUTOF10: 9
PAINLEVEL_OUTOF10: 8
PAINLEVEL_OUTOF10: 8

## 2025-03-19 ASSESSMENT — PAIN - FUNCTIONAL ASSESSMENT
PAIN_FUNCTIONAL_ASSESSMENT: ACTIVITIES ARE NOT PREVENTED
PAIN_FUNCTIONAL_ASSESSMENT: ACTIVITIES ARE NOT PREVENTED

## 2025-03-19 ASSESSMENT — PAIN DESCRIPTION - ORIENTATION
ORIENTATION: LEFT;MID
ORIENTATION: LEFT
ORIENTATION: LEFT;MID

## 2025-03-19 ASSESSMENT — PAIN DESCRIPTION - LOCATION
LOCATION: CHEST;ARM
LOCATION: CHEST
LOCATION: CHEST;ARM

## 2025-03-19 ASSESSMENT — PAIN DESCRIPTION - DESCRIPTORS
DESCRIPTORS: ACHING;SHOOTING;STABBING
DESCRIPTORS: ACHING;SHOOTING;SHARP
DESCRIPTORS: SHOOTING;SHARP

## 2025-03-19 ASSESSMENT — PAIN DESCRIPTION - PAIN TYPE: TYPE: ACUTE PAIN

## 2025-03-19 ASSESSMENT — PAIN DESCRIPTION - ONSET: ONSET: ON-GOING

## 2025-03-19 ASSESSMENT — PAIN DESCRIPTION - FREQUENCY: FREQUENCY: CONTINUOUS

## 2025-03-19 NOTE — DISCHARGE INSTRUCTIONS
Discharge Instructions    Patient: Erlin Gordon MRN: [id number]  CSN: [id number]    YOB: 1975  Age: 49 y.o.  Sex: male    DOA: 3/18/2025       DIET:  cardiac diet    ACTIVITY: activity as tolerated    ADDITIONAL INFORMATION: If you experience any of the following symptoms but not limited to Fever, chills, nausea, vomiting, diarrhea, change in mentation, falling, bleeding, shortness of breath, chest pain, please call your primary care physician or return to the emergency room if you cannot get hold of your doctor:     FOLLOW UP CARE:      Kimmie Paz MD  3/19/2025 2:43 PM    DISCHARGE SUMMARY from Nurse    PATIENT INSTRUCTIONS:    What to do at Home:  Recommended activity: activity as tolerated,    If you experience any of the following symptoms Refer to discharge instructions, , please follow up with PCP.    *  Please give a list of your current medications to your Primary Care Provider.    *  Please update this list whenever your medications are discontinued, doses are      changed, or new medications (including over-the-counter products) are added.    *  Please carry medication information at all times in case of emergency situations.    These are general instructions for a healthy lifestyle:    No smoking/ No tobacco products/ Avoid exposure to second hand smoke  Surgeon General's Warning:  Quitting smoking now greatly reduces serious risk to your health.    Obesity, smoking, and sedentary lifestyle greatly increases your risk for illness    A healthy diet, regular physical exercise & weight monitoring are important for maintaining a healthy lifestyle    You may be retaining fluid if you have a history of heart failure or if you experience any of the following symptoms:  Weight gain of 3 pounds or more overnight or 5 pounds in a week, increased swelling in our hands or feet or shortness of breath while lying flat in bed.  Please call your doctor as soon as you notice any of

## 2025-03-19 NOTE — PROGRESS NOTES
4 Eyes Skin Assessment     NAME:  Erlin Gordon  YOB: 1975  MEDICAL RECORD NUMBER:  [id number]    The patient is being assessed for  Shift Handoff    I agree that at least one RN has performed a thorough Head to Toe Skin Assessment on the patient. ALL assessment sites listed below have been assessed.      Areas assessed by both nurses:    Head, Face, Ears, Shoulders, Back, Chest, Arms, Elbows, Hands, Sacrum. Buttock, Coccyx, Ischium, Legs. Feet and Heels, and Under Medical Devices         Does the Patient have a Wound? No noted wound(s)       Kd Prevention initiated by RN: No  Wound Care Orders initiated by RN: No    Pressure Injury (Stage 3,4, Unstageable, DTI, NWPT, and Complex wounds) if present, place Wound referral order by RN under : No    New Ostomies, if present place, Ostomy referral order under : No     Nurse 1 eSignature: Electronically signed by Rochelle Horton RN on 3/19/25 at 7:16 AM EDT    **SHARE this note so that the co-signing nurse can place an eSignature**    Nurse 2 eSignature: {Esignature:[id number]}

## 2025-03-19 NOTE — PROGRESS NOTES
Patient has stated  'I did not say I would kill myself .\" I stated if I could not be out of this pain  I did not want to live this way,  at no time did I say I would hurt myself . I want to be fixed. Dr. Kiser asked the patient if was suicidal and the patient  stated he was not. Patient has requested that the [facility] does not come to see him again

## 2025-03-19 NOTE — PROGRESS NOTES
conducted an initial consultation and Spiritual Assessment for Erlin Gordon, who is a 49 y.o.,male. Patient’s Primary Language is: English.   According to the patient’s EMR Worship Affiliation is: [employer]ats[employer].     The  provided the following Interventions:  Initiated a relationship of care and support to the patient in bed 206 where he has been for the last few hours due to severe chest pain.  Patient spoke of not planning to live with pain if he is not fixed when time comes to leave.  He shared he plans to commit suicide  since he has nothing to live for. Patient indicated that he has done some research on what is the brenton way to go.   Explored issues of violette, belief, spirituality and Restoration/ritual needs while hospitalized. Patient is not interested in prayer and is not active in a local [employer].  Listened empathically.  Provided chaplaincy education.  Provided information about Spiritual Care Services.  Offered prayer and assurance of continued prayers on patients behalf.   Chart reviewed.    The following outcomes were achieved:  Patient shared limited information about his medical narrative and spiritual journey/beliefs.  Patient processed feeling about current hospitalization.    Spiritual Health History and Assessment/Progress Note  [facility]    Initial Encounter, Crisis (iv-sa-eje), Trauma, Follow up,  ,      Name: Erlin Gordon MRN: [id number]    Age: 49 y.o.     Sex: male   Language: English   Jainism: Uatsdin   Chest pain     Date: 3/19/2025            Total Time Calculated: 13 min              Spiritual Assessment began in [facility] 2S TELEMETRY        Referral/Consult From: Rounding   Encounter Overview/Reason: Initial Encounter, Crisis (iv-sa-eje)  Service Provided For: Patient (new admit)    Violette, Belief, Meaning:   Patient Other: not involved with any Restoration community  Family/Friends No family/friends present      Importance and Influence:  Patient has no

## 2025-03-19 NOTE — ED NOTES
Patient changed into gown. Vital signs monitored by bedside monitor. Patient is ambulatory. Alert and oriented x 4. No respiratory distress observed. Skin is warm and dry to touch. Blankets covering patient. Personal belongings placed in bags at bedside. IV flushed, saline locked.

## 2025-03-19 NOTE — TELEPHONE ENCOUNTER
I called the patient back and the number listed and he did answer right away. He started in verbalizing what he needs and what the hospital should of done and did not do. I finally got him to give me his full name and  so I could pull up his records to review.   Patient stated \"I need to be admitted to the hospital for pain control\". He also stated that he was in so much pain that he could not do his ST yesterday and wants to be admitted so they can do the test while he is medicated. I explained that this is not how this works. He needs to be assessed at the ER to see if he need to be admitted. If they run test, and they are negative, then they will not admit him.  He also said that the pain is coming from is clogged arteries and needs this fixed immediately. He kept talking in circles and repeating everything.   He also mentioned that he went to the ER at [facility], as he thought he was going to bleed to death. He was discharged and from what the patient described sounded like a hemorrhoid problem. When I asked him if that was it, he stated yes. I also asked him why he didn't say something about his chest pain while he was there, and he said he wanted to go to a hospital with [facility].     I finally asked ALEJA RODRIGUEZ if she had time to speak with him, about going to the ER. She replied yes and he was transferred to her phone.

## 2025-03-19 NOTE — H&P
HISTORY & PHYSICAL      Patient: Erlin Gordon MRN: [id number]  CSN: [id number]    YOB: 1975  Age: 49 y.o.  Sex: male    DOA: 3/18/2025 LOS:  LOS: 0 days        DOA: 3/18/2025        Assessment/Plan     49 years old presented with chest pain, that he states is chronic and has gotten worse over the last 3 days    -Chest pain, atypical, with history of extensive workup as below  Suspect noncardiac  -Anxiety, depression, history of substance use per records  -Hypertension  -Diabetes mellitus  -Other medical problems as below    The patient is placed under observation  Trend cardiac enzymes  Continue home meds  Further management per cardiology, consulted by ED  Minimize and monitor use of opiates  Reassured patient that his pain will be addressed, while attempting to minimize  risks associated with multiple source and excessive use of opiates  Recommended close outpatient follow-up    Observation admission plan was discussed with patient      Patient Active Problem List   Diagnosis    Cocaine abuse (HCC)    Anxiety    MDD (major depressive disorder), recurrent episode, moderate (HCC)    Diabetes mellitus (HCC)    Diabetes (HCC)    Hypertension    ACP (advance care planning)    Diverticulitis large intestine w/o perforation or abscess w/o bleeding    Chronic low back pain    Drug-seeking behavior    VENUS (generalized anxiety disorder)    Dysthymia    Cocaine use    Alcohol abuse    Obesity (BMI 30.0-34.9)    Dyslipidemia    Duodenitis    Spinal stenosis    Chest pain       History of Presenting Illness:  49 years old who is admitted for further evaluation of chest pain.  The patient has been having chest pain chronically, and had history of multiple visits and evaluations for chest pain.  He was followed by [facility] cardiology, and recently transferred care to Dr. Kaba.  He described the chest pain as midsternal, sharp, that has gotten worse over the last 3 days.  Patient states he was supposed to

## 2025-03-19 NOTE — DISCHARGE SUMMARY
Discharge Summary    Patient: Erlin Gordon MRN: [id number]  CSN: [id number]    YOB: 1975  Age: 49 y.o.  Sex: male    DOA: 3/18/2025 LOS:  LOS: 0 days        Disposition: Home     Discharge Date: 3/19/2025    Admission Diagnosis: Chest pain [R07.9]    Discharge Diagnosis:    Atypical chest pain, ACS ruled out  Suspect chest pain due to cocaine use  Hypertension  Polysubstance abuse  Diabetes mellitus type 2  Drug-seeking behavior      Discharge Condition: Stable      PHYSICAL EXAM  Visit Vitals  /81   Pulse 54   Temp 97.7 °F (36.5 °C) (Oral)   Resp 16   Ht 1.778 m (5' 10\")   Wt 88.6 kg (195 lb 5.2 oz)   SpO2 98%   BMI 28.03 kg/m²       General: Alert, cooperative, no acute distress    HEENT: PERRLA, EOMI. Anicteric sclerae.  Lungs:  CTA Bilaterally. No Wheezing/Rales.  Heart:             Regular rate and Rhythm.  Abdomen: Soft, Non distended, Non tender. + Bowel sounds.  Extremities: No edema.  Psych:   Good insight. Not anxious or agitated.  Neurologic:  AA, oriented X 3. Moves all ext                               History of Presenting Illness:  49 years old who is admitted for further evaluation of chest pain.  The patient has been having chest pain chronically, and had history of multiple visits and evaluations for chest pain.  He was followed by [facility] cardiology, and recently transferred care to Dr. Kaba.  He described the chest pain as midsternal, sharp, that has gotten worse over the last 3 days.  Patient states he was supposed to have nuclear stress test today, however was deferred as he was reporting chest pain, and was advised to come to the ED.  Per [facility] cardiology note 2/28/25, his chest pain was felt noncardiac and he was referred to his PCP to manage noncardiac chest pain.  Of note, the patient was seen at [facility] ED for abdominal pain prior to this presentation and was discharged with Dilaudid.  He states he did not mention chest pain to them, as he is

## 2025-03-19 NOTE — PLAN OF CARE
assessment     Problem: Neurosensory - Adult  Goal: Achieves stable or improved neurological status  Outcome: Progressing  Flowsheets (Taken 3/19/2025 0132)  Achieves stable or improved neurological status:   Assess for and report changes in neurological status   Maintain blood pressure and fluid volume within ordered parameters to optimize cerebral perfusion and minimize risk of hemorrhage     Problem: Cardiovascular - Adult  Goal: Absence of cardiac dysrhythmias or at baseline  Outcome: Progressing  Flowsheets (Taken 3/19/2025 0132)  Absence of cardiac dysrhythmias or at baseline:   Monitor cardiac rate and rhythm   Assess for signs of decreased cardiac output     Problem: Gastrointestinal - Adult  Goal: Minimal or absence of nausea and vomiting  Outcome: Progressing  Flowsheets (Taken 3/19/2025 0132)  Minimal or absence of nausea and vomiting:   Administer IV fluids as ordered to ensure adequate hydration   Maintain NPO status until nausea and vomiting are resolved   Administer ordered antiemetic medications as needed
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
## 2025-03-19 NOTE — CONSULTS
Or    acetaminophen (TYLENOL) suppository 650 mg  650 mg Rectal Q6H PRN    ibuprofen (ADVIL;MOTRIN) tablet 800 mg  800 mg Oral Q8H PRN    lidocaine 4 % external patch 1 patch  1 patch TransDERmal Once PRN         Physical Exam:     Vitals:    03/19/25 0701   BP: 95/75   Pulse: 60   Resp: 16   Temp: 97.6 °F (36.4 °C)   SpO2: 97%       TELE: normal sinus rhythm    BP Readings from Last 3 Encounters:   03/19/25 95/75   03/11/25 117/76   02/24/25 (!) 142/94     Pulse Readings from Last 3 Encounters:   03/19/25 60   03/11/25 62   02/24/25 70     Wt Readings from Last 3 Encounters:   03/19/25 88.6 kg (195 lb 5.2 oz)   03/11/25 88.6 kg (195 lb 6.4 oz)   02/24/25 86.2 kg (190 lb)       General:  alert, appears stated age, and cooperative  Neck:  no JVD  Lungs:  clear to auscultation bilaterally  Heart:  RRR  Abdomen:  abdomen is soft without significant tenderness, masses, organomegaly or guarding  Extremities:  extremities normal, atraumatic, no cyanosis or edema  Skin: warm and dry, no hyperpigmentation, vitiligo, or suspicious lesions  Neuro: alert, oriented x3, affect appropriate  Psych: agitated     Data Review:     Recent Labs     03/18/25  1823   WBC 7.0   HGB 13.7   HCT 40.8        Recent Labs     03/18/25  1823   *   K 3.4*      CO2 30   BUN 11   CREATININE 1.09   MG 1.9       All Cardiac Markers in the last 24 hours:    Lab Results   Component Value Date/Time    TROPHS 14 03/19/2025 12:25 AM    TROPHS 11 03/18/2025 09:15 PM    TROPHS 8 03/18/2025 06:23 PM     No results found for: \"NTPROBNP\"    Last Lipid:    Lab Results   Component Value Date/Time    CHOL 199 04/29/2021 09:54 AM    HDL 42 04/29/2021 09:54 AM     04/29/2021 09:54 AM       Cardiographics:     Encounter Date: 03/18/25   EKG 12 Lead   Result Value    Ventricular Rate 98    Atrial Rate 98    P-R Interval 146    QRS Duration 102    Q-T Interval 348    QTc Calculation (Bazett) 444    P Axis 60    R Axis -14    T Axis 19

## 2025-03-19 NOTE — CARE COORDINATION
03/19/25 0946   IMM Letter   Observation Status Letter date given: 03/19/25   Observation Status Letter time given: 0946   Observation Status Letter given to Patient/Family/Significant other/Guardian/POA/by: Isaiah Hickman     Observation notice provided in writing to patient and/or caregiver as well as verbal explanation of the policy.  Patients who are in outpatient status also receive the Observation notice.    Isaiah Hcikman BSW  Case Management      
D/C order noted for today. Orders reviewed. No needs identified at this time. SW remains available if needed.     Isaiah Hickman BSW  Case Management    
Transition of Care is related to the following treatment goals: Disp unknown at this time   The Patient and/or Patient Representative was provided with a Choice of Provider?   (n/a)   Freedom of Choice list was provided with basic dialogue that supports the patient's individualized plan of care/goals, treatment preferences, and shares the quality data associated with the providers?  No     SW spoke with patient at bedside, pt state being living in car for the past 5 days. SW provided pt with housing resources.    Isaiah Hickman BSW  Case Management

## 2025-03-19 NOTE — TELEPHONE ENCOUNTER
Dr. Kaba received a message from the answering service to call the patient. He gave me a verbal order to call the patient and assess the situation.

## 2025-03-19 NOTE — TELEPHONE ENCOUNTER
Late entry.  Discussed with patient over the phone yesterday.    Thalia transferred call to me.  Patient reported that he was having severe chest discomfort that was so bad that, \"I can barely stand it.\"  I directed him to the nearest ED.  He asks if he can be direct admitted for a stress test and pain management.  I discussed with him that we are unable to do this, he needs to go to the emergency department and have labs as well as an EKG done.  Based on these results, the emergency department provider will decide next steps.  He reports understanding and that he will go to the emergency department.    Juli Licea PA-C  1:48 PM

## 2025-03-19 NOTE — PROGRESS NOTES
Patient called doctors office  [phone] to try to get doctor to consult the attending about pain meds. Requested I make a note for the attending MD to call his pain management doctor about the pain medication he takes ay home. Signed.

## 2025-03-20 LAB
ALDOST SERPL-MCNC: 6.4 NG/DL (ref 0–30)
ALDOST/RENIN PLAS-RTO: 14.3 {RATIO} (ref 0–30)
RENIN PLAS-CCNC: 0.45 NG/ML/HR (ref 0.17–5.38)

## 2025-03-21 ENCOUNTER — TELEPHONE (OUTPATIENT)
Age: 50
End: 2025-03-21

## 2025-03-21 NOTE — TELEPHONE ENCOUNTER
Pt called wanting to know if Dr. Kaba would call him to talk about a proposed procedure (something about a bridge and a blocked artery). He states he's in extreme pain and wants to know what he should do. He saw Dr. Kaba on 3/11 and has a f/u w/Vinayak on 4/1 but he states he can't wait that long due to the pain. Pt was scheduled for f/u w/Dr. Kaba on Monday 3/24 as there was an opening but pt stated he also would like Dr. Kaba to call him to discuss issue

## 2025-04-09 ENCOUNTER — APPOINTMENT (OUTPATIENT)
Facility: HOSPITAL | Age: 50
End: 2025-04-09
Payer: MEDICAID

## 2025-04-09 ENCOUNTER — HOSPITAL ENCOUNTER (EMERGENCY)
Facility: HOSPITAL | Age: 50
Discharge: HOME | End: 2025-04-09
Payer: MEDICAID

## 2025-04-09 VITALS
SYSTOLIC BLOOD PRESSURE: 130 MMHG | BODY MASS INDEX: 27.26 KG/M2 | WEIGHT: 190 LBS | TEMPERATURE: 98.4 F | RESPIRATION RATE: 18 BRPM | OXYGEN SATURATION: 99 % | HEART RATE: 98 BPM | DIASTOLIC BLOOD PRESSURE: 83 MMHG

## 2025-04-09 DIAGNOSIS — K40.90 INGUINAL HERNIA WITHOUT OBSTRUCTION OR GANGRENE, RECURRENCE NOT SPECIFIED, UNSPECIFIED LATERALITY: ICD-10-CM

## 2025-04-09 DIAGNOSIS — E86.0 DEHYDRATION: ICD-10-CM

## 2025-04-09 DIAGNOSIS — K57.90 DIVERTICULOSIS: Primary | ICD-10-CM

## 2025-04-09 DIAGNOSIS — K42.9 UMBILICAL HERNIA WITHOUT OBSTRUCTION AND WITHOUT GANGRENE: ICD-10-CM

## 2025-04-09 LAB
ABO + RH BLD: NORMAL
ALBUMIN SERPL-MCNC: 2.1 G/DL (ref 3.4–5)
ALBUMIN/GLOB SERPL: 0.8 (ref 0.8–1.7)
ALP SERPL-CCNC: 48 U/L (ref 45–117)
ALT SERPL-CCNC: 13 U/L (ref 16–61)
AMPHET UR QL SCN: NEGATIVE
ANION GAP SERPL CALC-SCNC: 8 MMOL/L (ref 3–18)
ANION GAP SERPL CALC-SCNC: 9 MMOL/L (ref 3–18)
APPEARANCE UR: CLEAR
AST SERPL-CCNC: 31 U/L (ref 10–38)
BARBITURATES UR QL SCN: NEGATIVE
BASOPHILS # BLD: 0.04 K/UL (ref 0–0.1)
BASOPHILS NFR BLD: 0.6 % (ref 0–2)
BENZODIAZ UR QL: NEGATIVE
BILIRUB SERPL-MCNC: 0.2 MG/DL (ref 0.2–1)
BILIRUB UR QL: NEGATIVE
BLOOD GROUP ANTIBODIES SERPL: NORMAL
BUN SERPL-MCNC: 10 MG/DL (ref 7–18)
BUN SERPL-MCNC: 12 MG/DL (ref 7–18)
BUN/CREAT SERPL: 16 (ref 12–20)
BUN/CREAT SERPL: 24 (ref 12–20)
CA-I BLD-SCNC: 1.24 MMOL/L (ref 1.12–1.32)
CA-I SERPL-SCNC: 1.16 MMOL/L (ref 1.15–1.33)
CALCIUM SERPL-MCNC: 6 MG/DL (ref 8.5–10.1)
CALCIUM SERPL-MCNC: 8.4 MG/DL (ref 8.5–10.1)
CANNABINOIDS UR QL SCN: NEGATIVE
CHLORIDE SERPL-SCNC: 104 MMOL/L (ref 100–111)
CHLORIDE SERPL-SCNC: 118 MMOL/L (ref 100–111)
CO2 SERPL-SCNC: 16 MMOL/L (ref 21–32)
CO2 SERPL-SCNC: 25 MMOL/L (ref 21–32)
COCAINE UR QL SCN: NEGATIVE
COLOR UR: YELLOW
CREAT SERPL-MCNC: 0.41 MG/DL (ref 0.6–1.3)
CREAT SERPL-MCNC: 0.75 MG/DL (ref 0.6–1.3)
DIFFERENTIAL METHOD BLD: ABNORMAL
EKG ATRIAL RATE: 87 BPM
EKG DIAGNOSIS: NORMAL
EKG P AXIS: 58 DEGREES
EKG P-R INTERVAL: 140 MS
EKG Q-T INTERVAL: 358 MS
EKG QRS DURATION: 102 MS
EKG QTC CALCULATION (BAZETT): 430 MS
EKG R AXIS: -51 DEGREES
EKG T AXIS: 18 DEGREES
EKG VENTRICULAR RATE: 87 BPM
EOSINOPHIL # BLD: 0.05 K/UL (ref 0–0.4)
EOSINOPHIL NFR BLD: 0.7 % (ref 0–5)
ERYTHROCYTE [DISTWIDTH] IN BLOOD BY AUTOMATED COUNT: 13.4 % (ref 11.6–14.5)
ETHANOL SERPL-MCNC: <3 MG/DL (ref 0–3)
GLOBULIN SER CALC-MCNC: 2.7 G/DL (ref 2–4)
GLUCOSE SERPL-MCNC: 108 MG/DL (ref 74–99)
GLUCOSE SERPL-MCNC: 150 MG/DL (ref 74–99)
GLUCOSE UR STRIP.AUTO-MCNC: >1000 MG/DL
HCT VFR BLD AUTO: 41 % (ref 36–48)
HGB BLD-MCNC: 13.6 G/DL (ref 13–16)
HGB UR QL STRIP: NEGATIVE
IMM GRANULOCYTES # BLD AUTO: 0.04 K/UL (ref 0–0.04)
IMM GRANULOCYTES NFR BLD AUTO: 0.6 % (ref 0–0.5)
KETONES UR QL STRIP.AUTO: NEGATIVE MG/DL
LEUKOCYTE ESTERASE UR QL STRIP.AUTO: NEGATIVE
LIPASE SERPL-CCNC: 25 U/L (ref 13–75)
LYMPHOCYTES # BLD: 1.02 K/UL (ref 0.9–3.6)
LYMPHOCYTES NFR BLD: 14.3 % (ref 21–52)
Lab: NORMAL
MCH RBC QN AUTO: 28.2 PG (ref 24–34)
MCHC RBC AUTO-ENTMCNC: 33.2 G/DL (ref 31–37)
MCV RBC AUTO: 85.1 FL (ref 78–100)
METHADONE UR QL: NEGATIVE
MONOCYTES # BLD: 0.41 K/UL (ref 0.05–1.2)
MONOCYTES NFR BLD: 5.7 % (ref 3–10)
NEUTS SEG # BLD: 5.58 K/UL (ref 1.8–8)
NEUTS SEG NFR BLD: 78.1 % (ref 40–73)
NITRITE UR QL STRIP.AUTO: NEGATIVE
NRBC # BLD: 0 K/UL (ref 0–0.01)
NRBC BLD-RTO: 0 PER 100 WBC
OPIATES UR QL: NEGATIVE
PCP UR QL: NEGATIVE
PH UR STRIP: 6 (ref 5–8)
PLATELET # BLD AUTO: 245 K/UL (ref 135–420)
PMV BLD AUTO: 9.8 FL (ref 9.2–11.8)
POTASSIUM SERPL-SCNC: 3.1 MMOL/L (ref 3.5–5.5)
POTASSIUM SERPL-SCNC: 3.8 MMOL/L (ref 3.5–5.5)
PROT SERPL-MCNC: 4.8 G/DL (ref 6.4–8.2)
PROT UR STRIP-MCNC: NEGATIVE MG/DL
RBC # BLD AUTO: 4.82 M/UL (ref 4.35–5.65)
SODIUM SERPL-SCNC: 137 MMOL/L (ref 136–145)
SODIUM SERPL-SCNC: 143 MMOL/L (ref 136–145)
SP GR UR REFRACTOMETRY: 1.01 (ref 1–1.03)
SPECIMEN EXP DATE BLD: NORMAL
TROPONIN I SERPL HS-MCNC: 7 NG/L (ref 0–78)
UROBILINOGEN UR QL STRIP.AUTO: 0.2 EU/DL (ref 0.2–1)
WBC # BLD AUTO: 7.1 K/UL (ref 4.6–13.2)

## 2025-04-09 PROCEDURE — 84484 ASSAY OF TROPONIN QUANT: CPT

## 2025-04-09 PROCEDURE — 80307 DRUG TEST PRSMV CHEM ANLYZR: CPT

## 2025-04-09 PROCEDURE — 83690 ASSAY OF LIPASE: CPT

## 2025-04-09 PROCEDURE — 6360000002 HC RX W HCPCS: Performed by: PHYSICIAN ASSISTANT

## 2025-04-09 PROCEDURE — 96376 TX/PRO/DX INJ SAME DRUG ADON: CPT

## 2025-04-09 PROCEDURE — 86901 BLOOD TYPING SEROLOGIC RH(D): CPT

## 2025-04-09 PROCEDURE — 82330 ASSAY OF CALCIUM: CPT

## 2025-04-09 PROCEDURE — 93005 ELECTROCARDIOGRAM TRACING: CPT | Performed by: PHYSICIAN ASSISTANT

## 2025-04-09 PROCEDURE — 2580000003 HC RX 258: Performed by: PHYSICIAN ASSISTANT

## 2025-04-09 PROCEDURE — 6360000002 HC RX W HCPCS

## 2025-04-09 PROCEDURE — 6370000000 HC RX 637 (ALT 250 FOR IP): Performed by: PHYSICIAN ASSISTANT

## 2025-04-09 PROCEDURE — 74177 CT ABD & PELVIS W/CONTRAST: CPT

## 2025-04-09 PROCEDURE — 96361 HYDRATE IV INFUSION ADD-ON: CPT

## 2025-04-09 PROCEDURE — 85025 COMPLETE CBC W/AUTO DIFF WBC: CPT

## 2025-04-09 PROCEDURE — 96374 THER/PROPH/DIAG INJ IV PUSH: CPT

## 2025-04-09 PROCEDURE — 93010 ELECTROCARDIOGRAM REPORT: CPT | Performed by: INTERNAL MEDICINE

## 2025-04-09 PROCEDURE — 82077 ASSAY SPEC XCP UR&BREATH IA: CPT

## 2025-04-09 PROCEDURE — 96375 TX/PRO/DX INJ NEW DRUG ADDON: CPT

## 2025-04-09 PROCEDURE — 86850 RBC ANTIBODY SCREEN: CPT

## 2025-04-09 PROCEDURE — 80053 COMPREHEN METABOLIC PANEL: CPT

## 2025-04-09 PROCEDURE — 99285 EMERGENCY DEPT VISIT HI MDM: CPT

## 2025-04-09 PROCEDURE — 6360000004 HC RX CONTRAST MEDICATION: Performed by: PHYSICIAN ASSISTANT

## 2025-04-09 PROCEDURE — 86900 BLOOD TYPING SEROLOGIC ABO: CPT

## 2025-04-09 PROCEDURE — 81003 URINALYSIS AUTO W/O SCOPE: CPT

## 2025-04-09 RX ORDER — MORPHINE SULFATE 4 MG/ML
INJECTION, SOLUTION INTRAMUSCULAR; INTRAVENOUS
Status: DISPENSED
Start: 2025-04-09 | End: 2025-04-10

## 2025-04-09 RX ORDER — IOPAMIDOL 612 MG/ML
100 INJECTION, SOLUTION INTRAVASCULAR
Status: COMPLETED | OUTPATIENT
Start: 2025-04-09 | End: 2025-04-09

## 2025-04-09 RX ORDER — KETOROLAC TROMETHAMINE 15 MG/ML
INJECTION, SOLUTION INTRAMUSCULAR; INTRAVENOUS
Status: DISPENSED
Start: 2025-04-09 | End: 2025-04-10

## 2025-04-09 RX ORDER — MORPHINE SULFATE 4 MG/ML
4 INJECTION, SOLUTION INTRAMUSCULAR; INTRAVENOUS
Status: COMPLETED | OUTPATIENT
Start: 2025-04-09 | End: 2025-04-09

## 2025-04-09 RX ORDER — SODIUM CHLORIDE, SODIUM LACTATE, POTASSIUM CHLORIDE, AND CALCIUM CHLORIDE .6; .31; .03; .02 G/100ML; G/100ML; G/100ML; G/100ML
1000 INJECTION, SOLUTION INTRAVENOUS ONCE
Status: COMPLETED | OUTPATIENT
Start: 2025-04-09 | End: 2025-04-09

## 2025-04-09 RX ORDER — ONDANSETRON 2 MG/ML
INJECTION INTRAMUSCULAR; INTRAVENOUS
Status: DISPENSED
Start: 2025-04-09 | End: 2025-04-10

## 2025-04-09 RX ORDER — ONDANSETRON 4 MG/1
4 TABLET, FILM COATED ORAL EVERY 8 HOURS PRN
Qty: 10 TABLET | Refills: 0 | Status: SHIPPED | OUTPATIENT
Start: 2025-04-09

## 2025-04-09 RX ORDER — ONDANSETRON 2 MG/ML
INJECTION INTRAMUSCULAR; INTRAVENOUS
Status: COMPLETED
Start: 2025-04-09 | End: 2025-04-09

## 2025-04-09 RX ORDER — KETOROLAC TROMETHAMINE 15 MG/ML
15 INJECTION, SOLUTION INTRAMUSCULAR; INTRAVENOUS
Status: COMPLETED | OUTPATIENT
Start: 2025-04-09 | End: 2025-04-09

## 2025-04-09 RX ORDER — ONDANSETRON 2 MG/ML
4 INJECTION INTRAMUSCULAR; INTRAVENOUS
Status: COMPLETED | OUTPATIENT
Start: 2025-04-09 | End: 2025-04-09

## 2025-04-09 RX ADMIN — MORPHINE SULFATE 4 MG: 4 INJECTION, SOLUTION INTRAMUSCULAR; INTRAVENOUS at 17:09

## 2025-04-09 RX ADMIN — SODIUM CHLORIDE, SODIUM LACTATE, POTASSIUM CHLORIDE, AND CALCIUM CHLORIDE 1000 ML: .6; .31; .03; .02 INJECTION, SOLUTION INTRAVENOUS at 16:31

## 2025-04-09 RX ADMIN — KETOROLAC TROMETHAMINE 15 MG: 15 INJECTION, SOLUTION INTRAMUSCULAR; INTRAVENOUS at 14:52

## 2025-04-09 RX ADMIN — ONDANSETRON 4 MG: 2 INJECTION, SOLUTION INTRAMUSCULAR; INTRAVENOUS at 17:09

## 2025-04-09 RX ADMIN — ONDANSETRON 4 MG: 2 INJECTION, SOLUTION INTRAMUSCULAR; INTRAVENOUS at 13:52

## 2025-04-09 RX ADMIN — POTASSIUM BICARBONATE 40 MEQ: 782 TABLET, EFFERVESCENT ORAL at 14:18

## 2025-04-09 RX ADMIN — IOPAMIDOL 100 ML: 612 INJECTION, SOLUTION INTRAVENOUS at 15:34

## 2025-04-09 RX ADMIN — MORPHINE SULFATE 4 MG: 4 INJECTION, SOLUTION INTRAMUSCULAR; INTRAVENOUS at 13:46

## 2025-04-09 ASSESSMENT — PAIN SCALES - GENERAL
PAINLEVEL_OUTOF10: 8
PAINLEVEL_OUTOF10: 10
PAINLEVEL_OUTOF10: 8
PAINLEVEL_OUTOF10: 8
PAINLEVEL_OUTOF10: 9

## 2025-04-09 ASSESSMENT — PAIN - FUNCTIONAL ASSESSMENT
PAIN_FUNCTIONAL_ASSESSMENT: 0-10
PAIN_FUNCTIONAL_ASSESSMENT: 0-10

## 2025-04-09 ASSESSMENT — PAIN DESCRIPTION - DESCRIPTORS: DESCRIPTORS: ACHING

## 2025-04-09 ASSESSMENT — PAIN DESCRIPTION - LOCATION
LOCATION: ABDOMEN
LOCATION: ABDOMEN
LOCATION: ABDOMEN;BACK;CHEST
LOCATION: ABDOMEN;BACK

## 2025-04-09 ASSESSMENT — PAIN DESCRIPTION - PAIN TYPE: TYPE: ACUTE PAIN

## 2025-04-09 ASSESSMENT — PAIN DESCRIPTION - ORIENTATION: ORIENTATION: RIGHT;LOWER
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
## 2025-04-09 NOTE — ED PROVIDER NOTES
reviewed with the patient. All medications were reviewed with the patient. All of pt's questions and concerns were addressed.  Alarm symptoms and return precautions associated with chief complaint and evaluation were reviewed with the patient in detail.  The patient demonstrated adequate understanding.  Patient was instructed to follow up with PCP, as well as given strict return precautions to the ED upon further deterioration. Patient is ready for discharge home.        Dragon Disclaimer     Please note that this dictation was completed with TapRush, the computer voice recognition software.  Quite often unanticipated grammatical, syntax, homophones, and other interpretive errors are inadvertently transcribed by the computer software.  Please disregard these errors.  Please excuse any errors that have escaped final proofreading.      Dariela Walters PA-C, PA  04/09/25 1902

## 2025-07-16 NOTE — ED PROVIDER NOTES
Patient was signed out to me by the previous physician Dr. Larry.  Dr. Larry is primary physician of record.  Please refer to her dictation for more complete history and medical decision making.  In summary he is a 48-year-old male with comes past pain for about 2 months.  He recently had a left heart cath 2 weeks ago which showed nonobstructive coronary artery disease and he did not have any stents placed.  2 EKGs, lab work and initial troponin as well as chest x-ray are all normal.  Patient signed out be awaiting repeat troponin with plan of care of discharge home if this is normal and not uptrending.    Repeat troponin is 6.    Physical Exam  Constitutional: Well nourished, well developed, appears stated age. Alert and oriented.  HEENT: Neck supple without meningismus. PERRLA, no conjunctival injection. EOM intact  Cardiovascular: RRR, Warm, well-perfused extremities  Respiratory: CTAB, Unlabored respiratory effort  Abdominal: Soft, nontender, nondistended, no CVA tenderness  Musculoskeletal: Full range of motion all extremities. No deformities. No peripheral edema.  Skin: Warm, dry. No rashes  Vascular: Pulses equal in all 4 extremities.  Neuro: CNs II-XII grossly intact. Sensation and motor function of extremities grossly intact.  Psych: Appropriate mood and affect.    DISPOSITION Decision To Discharge 12/19/2024 04:26:32 PM   DISPOSITION CONDITION Stable     DISCHARGE NOTE:  Positive and negative test results were discussed. My clinical assessment and recommendations were discussed.  Patient verbally conveys understanding and agreement of the signs, symptoms, diagnosis, treatment and prognosis and additionally agrees to follow up as discussed.  Patient also agrees with the care-plan and conveys that all questions have been answered.  I have also provided discharge instructions that include: educational information regarding their diagnosis and treatment, and list of reasons why they would want to return to the  8 0 13 11 10 0     Interpretation of Total Score Depression Severity: 1-4 = Minimal depression, 5-9 = Mild depression, 10-14 = Moderate depression, 15-19 = Moderately severe depression, 20-27 = Severe depression          7/16/2025    11:10 AM   LORA 7 SCORE   LORA-7 Total Score 14     Interpretation of LORA-7 score: 5-9 = mild anxiety, 10-14 = moderate anxiety, 15+ = severe anxiety. Recommend referral to behavioral health for scores 10 or greater.    Past Medical History:   Diagnosis Date    Allergic rhinitis     Anxiety     Caesarean Section     7/10/94    Depression     well controlled    Dysplastic nevus 07/21/2010    Graves' disease 08/08/2023    Hyperlipidemia     Hypertension     Hyperthyroidism 2020    Impaired fasting glucose     Obesity     SAVANAH on CPAP 03/25/2024         MEDICATIONS:  Prior to Visit Medications    Medication Sig   Multiple Vitamins-Minerals (THERAPEUTIC MULTIVITAMIN-MINERALS) tablet Take 1 tablet by mouth daily   losartan (COZAAR) 100 MG tablet TAKE ONE TABLET BY MOUTH EVERY DAY   pravastatin (PRAVACHOL) 20 MG tablet TAKE ONE TABLET BY MOUTH EVERY DAY   KLOR-CON M20 20 MEQ extended release tablet TAKE ONE TABLET BY MOUTH TWICE A DAY   amLODIPine (NORVASC) 2.5 MG tablet Take 1 tablet by mouth daily   hydroCHLOROthiazide (HYDRODIURIL) 25 MG tablet TAKE 1 TABLET DAILY   venlafaxine (EFFEXOR XR) 150 MG extended release capsule Take 1 capsule by mouth daily   cetirizine (ZYRTEC) 10 MG tablet Take 1 tablet by mouth daily           Review of Systems - As per HPI      OBJECTIVE:  Vitals:    07/16/25 1100   BP: 116/72   Pulse: 87   Temp: 97.4 °F (36.3 °C)   TempSrc: Temporal   SpO2: 98%   Weight: 86.2 kg (190 lb)     Body mass index is 35.9 kg/m².     Wt Readings from Last 3 Encounters:   07/16/25 86.2 kg (190 lb)   06/02/25 87.5 kg (193 lb)   02/18/25 87.1 kg (192 lb)     BP Readings from Last 3 Encounters:   07/16/25 116/72   06/02/25 129/81   02/18/25 117/72        GEN: NAD, A&O, Non-toxic  HEENT:

## (undated) DEVICE — GOWN,SIRUS,POLYRNF,SETINSLV,XL,20/CS: Brand: MEDLINE

## (undated) DEVICE — (D)SYR 10ML 1/5ML GRAD NSAF -- PKGING CHANGE USE ITEM 338027

## (undated) DEVICE — STERILE POLYISOPRENE POWDER-FREE SURGICAL GLOVES: Brand: PROTEXIS

## (undated) DEVICE — ARM DRAPE

## (undated) DEVICE — BLADELESS OBTURATOR: Brand: WECK VISTA

## (undated) DEVICE — VISUALIZATION SYSTEM: Brand: CLEARIFY

## (undated) DEVICE — MAYO STAND COVER: Brand: CONVERTORS

## (undated) DEVICE — SUTURE VCRL SZ 2-0 L27IN ABSRB UD L26MM SH 1/2 CIR J417H

## (undated) DEVICE — Device

## (undated) DEVICE — NEEDLE INSUF L120MM DIA2MM DISP FOR PNEUMOPERI ENDOPATH

## (undated) DEVICE — SEAL UNIV 5-8MM DISP BX/10 -- DA VINCI XI - SNGL USE

## (undated) DEVICE — 3M™ BAIR PAWS FLEX™ WARMING GOWN, STANDARD, 20 PER CASE 81003: Brand: BAIR PAWS™

## (undated) DEVICE — ICE BG EGL STYL 9 IN BLU 12 CS

## (undated) DEVICE — SYR LR LCK 1ML GRAD NSAF 30ML --

## (undated) DEVICE — TIP COVER ACCESSORY

## (undated) DEVICE — (D)PREP SKN CHLRAPRP APPL 26ML -- CONVERT TO ITEM 371833

## (undated) DEVICE — NDL PRT INJ NSAF BLNT 18GX1.5 --

## (undated) DEVICE — LIGHT HANDLE: Brand: DEVON

## (undated) DEVICE — INTENDED FOR TISSUE SEPARATION, AND OTHER PROCEDURES THAT REQUIRE A SHARP SURGICAL BLADE TO PUNCTURE OR CUT.: Brand: BARD-PARKER ® CARBON RIB-BACK BLADES

## (undated) DEVICE — CORD ES L10FT MPLR LAP

## (undated) DEVICE — ELECTRO LUBE IS A SINGLE PATIENT USE DEVICE THAT IS INTENDED TO BE USED ON ELECTROSURGICAL ELECTRODES TO REDUCE STICKING.: Brand: KEY SURGICAL ELECTRO LUBE

## (undated) DEVICE — SUTURE ABSRB L12IN L12MM SZ 2-0 GS-22 VLT GLYCOLIDE VLOCM2115

## (undated) DEVICE — GARMENT,MEDLINE,DVT,INT,CALF,MED, GEN2: Brand: MEDLINE

## (undated) DEVICE — SOLUTION IV 1000ML 0.9% SOD CHL

## (undated) DEVICE — COLUMN DRAPE

## (undated) DEVICE — (D)PACK ICE DISP -- DISC BY MFR

## (undated) DEVICE — REM POLYHESIVE ADULT PATIENT RETURN ELECTRODE: Brand: VALLEYLAB

## (undated) DEVICE — SUTURE VLOC 90 2/0 VL 6 GS-22 VLOCM2105

## (undated) DEVICE — DERMABOND SKIN ADH 0.7ML -- DERMABOND ADVANCED 12/BX

## (undated) DEVICE — KIT,ANTI FOG,W/SPONGE & FLUID,SOFT PACK: Brand: MEDLINE

## (undated) DEVICE — SUTURE ABSRB L6IN L37MM 0 GS-21 GRN 1/2 CIR TAPR PNT NDL VLOCL0306

## (undated) DEVICE — DRAPE TOWEL: Brand: CONVERTORS

## (undated) DEVICE — SUTURE MCRYL SZ 4-0 L18IN ABSRB UD L19MM PS-2 3/8 CIR PRIM Y496G

## (undated) DEVICE — SUTURE MCRYL SZ 4-0 L27IN ABSRB UD L24MM PS-1 3/8 CIR PRIM Y935H

## (undated) DEVICE — BLANKET WRM AD W50XL85.8IN PACU FULL BODY FORC AIR

## (undated) DEVICE — KENDALL SCD EXPRESS SLEEVES, KNEE LENGTH, MEDIUM: Brand: KENDALL SCD

## (undated) DEVICE — PREP SKN CHLRAPRP APL 26ML STR --

## (undated) DEVICE — DRAPE TWL SURG 16X26IN BLU ORB04] ALLCARE INC]

## (undated) DEVICE — COVER LT HNDL BLU STRL -- MEDICHOICE